# Patient Record
Sex: FEMALE | Race: WHITE | NOT HISPANIC OR LATINO | Employment: FULL TIME | URBAN - METROPOLITAN AREA
[De-identification: names, ages, dates, MRNs, and addresses within clinical notes are randomized per-mention and may not be internally consistent; named-entity substitution may affect disease eponyms.]

---

## 2017-04-18 ENCOUNTER — TELEPHONE (OUTPATIENT)
Dept: FAMILY MEDICINE | Facility: OTHER | Age: 37
End: 2017-04-18

## 2017-04-18 NOTE — TELEPHONE ENCOUNTER
I spoke with scheduling and patient requested appointment after 5 pm.   Appears to be her first pregnancy, so she should keep appointment in clinic vs phone visit.     Yaquelin Haynes, RN, BSN

## 2017-04-18 NOTE — TELEPHONE ENCOUNTER
Pt is scheduled with Donnie Mansfield (instead of RN) on Thursday for a pregnancy test.   Possibly due to later appt availability?  We may be able to have her drop off a urine sample at a convenient time and then have RN staff discuss results and next steps by phone.  Will discuss with RN staff/supervisor as needed.    Elizabeth Mackenzie RN, BSN

## 2017-04-19 ENCOUNTER — ALLIED HEALTH/NURSE VISIT (OUTPATIENT)
Dept: FAMILY MEDICINE | Facility: OTHER | Age: 37
End: 2017-04-19
Payer: COMMERCIAL

## 2017-04-19 VITALS — BODY MASS INDEX: 22.49 KG/M2 | DIASTOLIC BLOOD PRESSURE: 76 MMHG | WEIGHT: 131 LBS | SYSTOLIC BLOOD PRESSURE: 124 MMHG

## 2017-04-19 DIAGNOSIS — Z32.00 POSSIBLE PREGNANCY, NOT YET CONFIRMED: Primary | ICD-10-CM

## 2017-04-19 LAB — BETA HCG QUAL IFA URINE: POSITIVE

## 2017-04-19 PROCEDURE — 84703 CHORIONIC GONADOTROPIN ASSAY: CPT | Performed by: FAMILY MEDICINE

## 2017-04-19 PROCEDURE — 84702 CHORIONIC GONADOTROPIN TEST: CPT | Performed by: OBSTETRICS & GYNECOLOGY

## 2017-04-19 PROCEDURE — 36415 COLL VENOUS BLD VENIPUNCTURE: CPT | Performed by: OBSTETRICS & GYNECOLOGY

## 2017-04-19 RX ORDER — PRENATAL VIT/IRON FUM/FOLIC AC 27MG-0.8MG
1 TABLET ORAL DAILY
Status: ON HOLD | COMMUNITY
End: 2018-01-06

## 2017-04-19 ASSESSMENT — PAIN SCALES - GENERAL: PAINLEVEL: NO PAIN (0)

## 2017-04-19 NOTE — PROGRESS NOTES
Tasha Short is a 36 year old here today for a pregnancy test.  LMP: Patient's last menstrual period was 2017 (exact date).  Wt: 131 lbs 0 oz.    Symptoms include weight gain, breast tenderness, absence of menses and fatigue.    Tasha informed of positive pregnancy test results. BRENDAN: 2017    Educational advice given: nutrition, smoking and drugs & alcohol.    Current medications reviewed: Yes    Previous pregnancy history remarkable for: none.    Plan: Follow-up appointment for pre- care with Dr. Benavidez made per pt request, take multivitamin or pre- vitamins and OB Education packet given.    She is to call back if she has any questions or concerns.  She is advised to notify a provider immediately if she experiences any severe cramping or abdominal pain or any vaginal bleeding.  Vicki Owens RN

## 2017-04-19 NOTE — MR AVS SNAPSHOT
After Visit Summary   4/19/2017    Tasha Short    MRN: [id number]           Patient Information     Date Of Birth          1980        Visit Information        Provider Department      4/19/2017 3:00 PM NL RN TEAM A, [facility]        Today's Diagnoses     Possible pregnancy, not yet confirmed    -  1       Follow-ups after your visit        Your next 10 appointments already scheduled     May 15, 2017  9:30 AM CDT   New [facility]atal with Madeline Benavidez,    [facility]    [address] [facility] [id number] [phone]              Who to contact     If you have questions or need follow up information about today's clinic visit or your schedule please contact [facility] directly at [phone].  Normal or non-critical lab and imaging results will be communicated to you by Y'allhart, letter or phone within 4 business days after the clinic has received the results. If you do not hear from us within 7 days, please contact the clinic through Y'allhart or phone. If you have a critical or abnormal lab result, we will notify you by phone as soon as possible.  Submit refill requests through [employer] or call your pharmacy and they will forward the refill request to us. Please allow 3 business days for your refill to be completed.          Additional Information About Your Visit        MyChart Information     [employer] gives you secure access to your electronic health record. If you see a primary care provider, you can also send messages to your care team and make appointments. If you have questions, please call your primary care clinic.  If you do not have a primary care provider, please call [phone] and they will assist you.        Care EveryWhere ID     This is your Care EveryWhere ID. This could be used by other organizations to access your [place] medical records  [id number]        Your Vitals Were      Last Period Breastfeeding? BMI (Body Mass Index)             03/01/2017 (Exact Date) No 22.49 kg/m2          Blood Pressure from Last 3 Encounters:   04/19/17 124/76   12/27/16 120/84   06/03/16 132/89    Weight from Last 3 Encounters:   04/19/17 131 lb (59.4 kg)   12/27/16 120 lb 9.6 oz (54.7 kg)   06/03/16 115 lb (52.2 kg)              We Performed the Following     Beta HCG qual IFA urine     HCG, quantitative, pregnancy        Primary Care Provider Office Phone # Fax #    Hortensia Carol Goldberg [phone]        [facility] [address] [place] [address]        Thank you!     Thank you for choosing [facility]  for your care. Our goal is always to provide you with excellent care. Hearing back from our patients is one way we can continue to improve our services. Please take a few minutes to complete the written survey that you may receive in the mail after your visit with us. Thank you!             Your Updated Medication List - Protect others around you: Learn how to safely use, store and throw away your medicines at www.disposemymeds.org.          This list is accurate as of: 4/19/17  3:29 PM.  Always use your most recent med list.                   Brand Name Dispense Instructions for use    busPIRone 5 MG tablet    BUSPAR    90 tablet    Take 1 tablet (5 mg) by mouth 3 times daily       flunisolide 29 MCG/ACT (0.025%) Soln    NASAREL    1 Box    Spray 2 sprays into both nostrils 2 times daily       gabapentin 100 MG capsule    NEURONTIN     Take 100 mg by mouth 3 times daily Reported on 4/19/2017       lisinopril 20 MG tablet    PRINIVIL/ZESTRIL    30 tablet    Take 1 tablet (20 mg) by mouth daily       methylphenidate 10 MG tablet    RITALIN     Take 10 mg by mouth 2 times daily       prenatal multivitamin  plus iron 27-0.8 MG Tabs per tablet      Take 1 tablet by mouth daily       PREVIFEM 0.25-35 MG-MCG per tablet   Generic drug:  norgestimate-ethinyl  estradiol     84 tablet    TAKE 1 TABLET BY MOUTH DAILY       propranolol 20 MG tablet    INDERAL    60 tablet    Take 1 tablet (20 mg) by mouth 2 times daily

## 2017-04-20 DIAGNOSIS — Z36.87 UNSURE OF LMP (LAST MENSTRUAL PERIOD) AS REASON FOR ULTRASOUND SCAN: Primary | ICD-10-CM

## 2017-04-20 LAB — B-HCG SERPL-ACNC: ABNORMAL IU/L (ref 0–5)

## 2017-05-15 ENCOUNTER — RADIANT APPOINTMENT (OUTPATIENT)
Dept: ULTRASOUND IMAGING | Facility: OTHER | Age: 37
End: 2017-05-15
Attending: OBSTETRICS & GYNECOLOGY
Payer: COMMERCIAL

## 2017-05-15 ENCOUNTER — PRENATAL OFFICE VISIT (OUTPATIENT)
Dept: OBGYN | Facility: OTHER | Age: 37
End: 2017-05-15
Payer: COMMERCIAL

## 2017-05-15 ENCOUNTER — TELEPHONE (OUTPATIENT)
Dept: OBGYN | Facility: OTHER | Age: 37
End: 2017-05-15

## 2017-05-15 VITALS
BODY MASS INDEX: 21.34 KG/M2 | DIASTOLIC BLOOD PRESSURE: 80 MMHG | SYSTOLIC BLOOD PRESSURE: 116 MMHG | HEIGHT: 64 IN | HEART RATE: 86 BPM | WEIGHT: 125 LBS

## 2017-05-15 DIAGNOSIS — O09.511 ELDERLY PRIMIGRAVIDA IN FIRST TRIMESTER: Primary | ICD-10-CM

## 2017-05-15 DIAGNOSIS — F10.11 H/O ETOH ABUSE: ICD-10-CM

## 2017-05-15 DIAGNOSIS — F41.1 GENERALIZED ANXIETY DISORDER: ICD-10-CM

## 2017-05-15 DIAGNOSIS — F17.200 TOBACCO USE DISORDER: ICD-10-CM

## 2017-05-15 DIAGNOSIS — Z36.87 UNSURE OF LMP (LAST MENSTRUAL PERIOD) AS REASON FOR ULTRASOUND SCAN: ICD-10-CM

## 2017-05-15 PROBLEM — Z23 NEED FOR TDAP VACCINATION: Status: ACTIVE | Noted: 2017-05-15

## 2017-05-15 LAB
ABO + RH BLD: NORMAL
ABO + RH BLD: NORMAL
ALBUMIN UR-MCNC: NEGATIVE MG/DL
APPEARANCE UR: CLEAR
BASOPHILS # BLD AUTO: 0 10E9/L (ref 0–0.2)
BASOPHILS NFR BLD AUTO: 0.3 %
BILIRUB UR QL STRIP: NEGATIVE
BLD GP AB SCN SERPL QL: NORMAL
BLOOD BANK CMNT PATIENT-IMP: NORMAL
COLOR UR AUTO: YELLOW
DIFFERENTIAL METHOD BLD: NORMAL
EOSINOPHIL # BLD AUTO: 0.1 10E9/L (ref 0–0.7)
EOSINOPHIL NFR BLD AUTO: 1.3 %
ERYTHROCYTE [DISTWIDTH] IN BLOOD BY AUTOMATED COUNT: 12.9 % (ref 10–15)
GLUCOSE UR STRIP-MCNC: NEGATIVE MG/DL
HCT VFR BLD AUTO: 38.7 % (ref 35–47)
HGB BLD-MCNC: 13.8 G/DL (ref 11.7–15.7)
HGB UR QL STRIP: NEGATIVE
KETONES UR STRIP-MCNC: NEGATIVE MG/DL
LEUKOCYTE ESTERASE UR QL STRIP: NEGATIVE
LYMPHOCYTES # BLD AUTO: 1.2 10E9/L (ref 0.8–5.3)
LYMPHOCYTES NFR BLD AUTO: 17.3 %
MCH RBC QN AUTO: 32.3 PG (ref 26.5–33)
MCHC RBC AUTO-ENTMCNC: 35.7 G/DL (ref 31.5–36.5)
MCV RBC AUTO: 91 FL (ref 78–100)
MONOCYTES # BLD AUTO: 0.6 10E9/L (ref 0–1.3)
MONOCYTES NFR BLD AUTO: 8 %
NEUTROPHILS # BLD AUTO: 5 10E9/L (ref 1.6–8.3)
NEUTROPHILS NFR BLD AUTO: 73.1 %
NITRATE UR QL: NEGATIVE
PH UR STRIP: 6 PH (ref 5–7)
PLATELET # BLD AUTO: 160 10E9/L (ref 150–450)
RBC # BLD AUTO: 4.27 10E12/L (ref 3.8–5.2)
RBC #/AREA URNS AUTO: NORMAL /HPF (ref 0–2)
SP GR UR STRIP: <=1.005 (ref 1–1.03)
SPECIMEN EXP DATE BLD: NORMAL
URN SPEC COLLECT METH UR: NORMAL
UROBILINOGEN UR STRIP-ACNC: 0.2 EU/DL (ref 0.2–1)
WBC # BLD AUTO: 6.9 10E9/L (ref 4–11)
WBC #/AREA URNS AUTO: NORMAL /HPF (ref 0–2)

## 2017-05-15 PROCEDURE — 36415 COLL VENOUS BLD VENIPUNCTURE: CPT | Performed by: OBSTETRICS & GYNECOLOGY

## 2017-05-15 PROCEDURE — 87389 HIV-1 AG W/HIV-1&-2 AB AG IA: CPT | Performed by: OBSTETRICS & GYNECOLOGY

## 2017-05-15 PROCEDURE — 87340 HEPATITIS B SURFACE AG IA: CPT | Performed by: OBSTETRICS & GYNECOLOGY

## 2017-05-15 PROCEDURE — 85025 COMPLETE CBC W/AUTO DIFF WBC: CPT | Performed by: OBSTETRICS & GYNECOLOGY

## 2017-05-15 PROCEDURE — 76801 OB US < 14 WKS SINGLE FETUS: CPT

## 2017-05-15 PROCEDURE — 99207 ZZC FIRST OB VISIT: CPT | Performed by: OBSTETRICS & GYNECOLOGY

## 2017-05-15 PROCEDURE — 86780 TREPONEMA PALLIDUM: CPT | Performed by: OBSTETRICS & GYNECOLOGY

## 2017-05-15 PROCEDURE — 86901 BLOOD TYPING SEROLOGIC RH(D): CPT | Performed by: OBSTETRICS & GYNECOLOGY

## 2017-05-15 PROCEDURE — 86900 BLOOD TYPING SEROLOGIC ABO: CPT | Performed by: OBSTETRICS & GYNECOLOGY

## 2017-05-15 PROCEDURE — 87491 CHLMYD TRACH DNA AMP PROBE: CPT | Performed by: OBSTETRICS & GYNECOLOGY

## 2017-05-15 PROCEDURE — 87591 N.GONORRHOEAE DNA AMP PROB: CPT | Performed by: OBSTETRICS & GYNECOLOGY

## 2017-05-15 PROCEDURE — 81001 URINALYSIS AUTO W/SCOPE: CPT | Performed by: OBSTETRICS & GYNECOLOGY

## 2017-05-15 PROCEDURE — 86762 RUBELLA ANTIBODY: CPT | Performed by: OBSTETRICS & GYNECOLOGY

## 2017-05-15 PROCEDURE — 86850 RBC ANTIBODY SCREEN: CPT | Performed by: OBSTETRICS & GYNECOLOGY

## 2017-05-15 ASSESSMENT — PAIN SCALES - GENERAL: PAINLEVEL: NO PAIN (0)

## 2017-05-15 NOTE — PATIENT INSTRUCTIONS
Prenatal Care  What is prenatal care?   Prenatal care is the care you receive when you are pregnant. It includes care given by your healthcare provider, support from your family, and an extra focus on giving yourself the care you need during this special time. Prenatal care improves your chances for a healthy pregnancy and healthy baby.   When should I see my healthcare provider?   Good care during pregnancy includes regularly scheduled prenatal exams.   If you are not yet pregnant but planning to get pregnant in the next few months, see your provider. Your provider may do some tests and talk about things you can do to have a healthy pregnancy and healthy baby.   You should schedule your first prenatal visit with your provider as soon as you think or know you are pregnant. Depending on your health and health history, your provider will probably schedule visits at least once a month for the first 6 months. During the 7th and 8th months you will see your provider every 2 weeks. During the last month you will probably see your provider once a week until you deliver your baby. If your pregnancy is high risk, your provider will probably want to see you more often. In some cases your provider may refer you to a medical specialist for more help with special needs, such as diabetes.   At each visit your healthcare provider will check to make sure that you and the baby are healthy. Regular visits can help you and your provider prevent possible problems. They can also help your provider find and treat any problems early. In addition to meeting your medical needs, your provider advise you about caring for yourself. You will talk about how to have a healthy diet and get plenty of exercise and rest. Your provider can also help you deal with the emotional changes that can happen during pregnancy.   What will happen at the first prenatal visit?   At your first visit, your provider will ask about your personal medical history. He  or she will also ask about the baby's father and your family health history. This information can help give your provider an idea of any problems you might have during your pregnancy. You will have a physical exam, including checks of your height, weight, and blood pressure and a pelvic exam. You will have a Pap test, urine tests, blood tests, and cultures of the cervix and vagina to check for infection.   Your provider will calculate your due date and the age of your baby. If your periods were regular before you got pregnant, and you are sure of the day when your last period started, your due date will be estimated to be 40 weeks from that day.   Your provider will talk to you about how to stay healthy during your pregnancy.   What will happen at other prenatal visits?   Your provider will check how you are doing and how the baby is developing. He or she will discuss how you are feeling, ask if you have any problems, and answer your questions.   During each prenatal visit your provider will:   weigh you   take your blood pressure   check your urine for sugar, protein, or bacteria   check your face, hands, ankles, and feet for swelling   listen to the baby's heartbeat   measure the size of the uterus to check the baby's growth.   At different times during the pregnancy, other exams and tests may be done. Some are routine and others are done only when a problem is suspected or you have a risk factor for a problem. Examples of other tests you might have are:   tests to check for genetic problems and some birth defects, such as:   chorionic villus sampling of cells from the placenta   amniocentesis to test the fluid around the baby   blood tests called triple or quad screens   ultrasound scans to check the baby's growth, development, and health and to look at your uterus, the amniotic sac, and the placenta   blood tests to check for diabetes   electronic monitoring to check the health of the baby.   How can I take care  of myself during my pregnancy?   Here are some things you can do to take good care of yourself during your pregnancy and prepare for the birth of your child:   Keep all appointments with your healthcare provider. Use these visits to discuss your pregnancy concerns or problems. Write down questions before each visit so that you will not forget about things you want to talk about.   Eat healthy meals that include whole grains, fresh fruits and vegetables, and calcium-rich foods, such as milk, cheese, and yogurt. Choose foods low in saturated fat. Do not eat uncooked or undercooked meats or fish.   Avoid certain fish with high levels of mercury. These fish include shark, kylah mackerel, swordfish, and tilefish. Do not eat more than 12 ounces of fish per week, or no more than 6 ounces of tuna fish per week. Because albacore tuna fish is also high in mercury, choose light tuna.   Drink plenty of water each day.   Take vitamins, other supplements, and medicines as recommended by your provider.   Unless your healthcare provider tells you not to, try to be physically active for at least 30 minutes a day, most days of the week. If you are pressed for time, you might find it easier to exercise 10 minutes at a time, 3 times a day. Consider taking a prenatal exercise class.   Do not smoke, do not drink alcohol, and do not take illegal drugs.   Talk to your healthcare provider before you take any medicine, including nonprescription and herbal medicines. Some medicines are not safe during pregnancy.   Avoid hot tubs or saunas.   If you have cats in your home, do not empty the cat litter while you are pregnant. It may contain a parasite that causes an infection called toxoplasmosis, which can cause birth defects. Also, use gloves when you work in garden areas used by cats.   Stay away from toxic chemicals like insecticides, solvents (such as some  or paint thinners), lead, and mercury. Check labels on household products.  Most dangerous products have pregnancy warnings on their labels. Ask your healthcare provider about products if you are unsure.   Relax by taking breaks from work or chores.   Help reduce stress by sharing your feelings with others.   Report any violence or other types of abuse in your home.   Learn more about pregnancy, labor, and delivery. Read books, watch videos, go to a childbirth class, and talk with experienced moms.   Plan for the lifestyle changes a new baby will bring. Prepare for possible changes in your budget, work situation, daily schedule, and relationships with family and friends.   Talk to your provider about the pros and cons of breast-feeding.   Before and during your pregnancy, try to do everything you can to keep yourself and your baby healthy during your pregnancy.     Published by [employer].  This content is reviewed periodically and is subject to change as new health information becomes available. The information is intended to inform and educate and is not a replacement for medical evaluation, advice, diagnosis or treatment by a healthcare professional.   Developed by [employer].   ? 2010 [employer] and/or its affiliates. All Rights Reserved.   Copyright   [employer] 2011

## 2017-05-15 NOTE — MR AVS SNAPSHOT
After Visit Summary   5/15/2017    Tasha Short    MRN: [id number]           Patient Information     Date Of Birth          1980        Visit Information        Provider Department      5/15/2017 9:30 AM Madeline Benavidez DO [facility]        Today's Diagnoses     Elderly primigravida in first trimester    -  1    H/O ETOH abuse        Tobacco use disorder        Generalized anxiety disorder          Care Instructions    Prenatal Care  What is prenatal care?   Prenatal care is the care you receive when you are pregnant. It includes care given by your healthcare provider, support from your family, and an extra focus on giving yourself the care you need during this special time. Prenatal care improves your chances for a healthy pregnancy and healthy baby.   When should I see my healthcare provider?   Good care during pregnancy includes regularly scheduled prenatal exams.   If you are not yet pregnant but planning to get pregnant in the next few months, see your provider. Your provider may do some tests and talk about things you can do to have a healthy pregnancy and healthy baby.   You should schedule your first prenatal visit with your provider as soon as you think or know you are pregnant. Depending on your health and health history, your provider will probably schedule visits at least once a month for the first 6 months. During the 7th and 8th months you will see your provider every 2 weeks. During the last month you will probably see your provider once a week until you deliver your baby. If your pregnancy is high risk, your provider will probably want to see you more often. In some cases your provider may refer you to a medical specialist for more help with special needs, such as diabetes.   At each visit your healthcare provider will check to make sure that you and the baby are healthy. Regular visits can help you and your provider prevent possible problems. They can  also help your provider find and treat any problems early. In addition to meeting your medical needs, your provider advise you about caring for yourself. You will talk about how to have a healthy diet and get plenty of exercise and rest. Your provider can also help you deal with the emotional changes that can happen during pregnancy.   What will happen at the first prenatal visit?   At your first visit, your provider will ask about your personal medical history. He or she will also ask about the baby's father and your family health history. This information can help give your provider an idea of any problems you might have during your pregnancy. You will have a physical exam, including checks of your height, weight, and blood pressure and a pelvic exam. You will have a Pap test, urine tests, blood tests, and cultures of the cervix and vagina to check for infection.   Your provider will calculate your due date and the age of your baby. If your periods were regular before you got pregnant, and you are sure of the day when your last period started, your due date will be estimated to be 40 weeks from that day.   Your provider will talk to you about how to stay healthy during your pregnancy.   What will happen at other prenatal visits?   Your provider will check how you are doing and how the baby is developing. He or she will discuss how you are feeling, ask if you have any problems, and answer your questions.   During each prenatal visit your provider will:   weigh you   take your blood pressure   check your urine for sugar, protein, or bacteria   check your face, hands, ankles, and feet for swelling   listen to the baby's heartbeat   measure the size of the uterus to check the baby's growth.   At different times during the pregnancy, other exams and tests may be done. Some are routine and others are done only when a problem is suspected or you have a risk factor for a problem. Examples of other tests you might have  are:   tests to check for genetic problems and some birth defects, such as:   chorionic villus sampling of cells from the placenta   amniocentesis to test the fluid around the baby   blood tests called triple or quad screens   ultrasound scans to check the baby's growth, development, and health and to look at your uterus, the amniotic sac, and the placenta   blood tests to check for diabetes   electronic monitoring to check the health of the baby.   How can I take care of myself during my pregnancy?   Here are some things you can do to take good care of yourself during your pregnancy and prepare for the birth of your child:   Keep all appointments with your healthcare provider. Use these visits to discuss your pregnancy concerns or problems. Write down questions before each visit so that you will not forget about things you want to talk about.   Eat healthy meals that include whole grains, fresh fruits and vegetables, and calcium-rich foods, such as milk, cheese, and yogurt. Choose foods low in saturated fat. Do not eat uncooked or undercooked meats or fish.   Avoid certain fish with high levels of mercury. These fish include shark, kylah mackerel, swordfish, and tilefish. Do not eat more than 12 ounces of fish per week, or no more than 6 ounces of tuna fish per week. Because albacore tuna fish is also high in mercury, choose light tuna.   Drink plenty of water each day.   Take vitamins, other supplements, and medicines as recommended by your provider.   Unless your healthcare provider tells you not to, try to be physically active for at least 30 minutes a day, most days of the week. If you are pressed for time, you might find it easier to exercise 10 minutes at a time, 3 times a day. Consider taking a prenatal exercise class.   Do not smoke, do not drink alcohol, and do not take illegal drugs.   Talk to your healthcare provider before you take any medicine, including nonprescription and herbal medicines. Some  medicines are not safe during pregnancy.   Avoid hot tubs or saunas.   If you have cats in your home, do not empty the cat litter while you are pregnant. It may contain a parasite that causes an infection called toxoplasmosis, which can cause birth defects. Also, use gloves when you work in garden areas used by cats.   Stay away from toxic chemicals like insecticides, solvents (such as some  or paint thinners), lead, and mercury. Check labels on household products. Most dangerous products have pregnancy warnings on their labels. Ask your healthcare provider about products if you are unsure.   Relax by taking breaks from work or chores.   Help reduce stress by sharing your feelings with others.   Report any violence or other types of abuse in your home.   Learn more about pregnancy, labor, and delivery. Read books, watch videos, go to a childbirth class, and talk with experienced moms.   Plan for the lifestyle changes a new baby will bring. Prepare for possible changes in your budget, work situation, daily schedule, and relationships with family and friends.   Talk to your provider about the pros and cons of breast-feeding.   Before and during your pregnancy, try to do everything you can to keep yourself and your baby healthy during your pregnancy.     Published by [employer].  This content is reviewed periodically and is subject to change as new health information becomes available. The information is intended to inform and educate and is not a replacement for medical evaluation, advice, diagnosis or treatment by a healthcare professional.   Developed by [employer].   ? 2010 [facility] and/or its affiliates. All Rights Reserved.   Copyright   [employer] 2011            Follow-ups after your visit        Follow-up notes from your care team     Return in about 4 weeks (around 6/12/2017).      Your next 10 appointments already scheduled     Jun 12, 2017  9:30 AM CDT   ESTABLISHED PRENATAL  "with Madeline Benavidez, DO   [facility] ([facility]    [address] [facility] [address] [phone]              Future tests that were ordered for you today     Open Future Orders        Priority Expected Expires Ordered    US OB < 14 Weeks Single Routine 5/15/2017 8/13/2017 5/15/2017            Who to contact     If you have questions or need follow up information about today's clinic visit or your schedule please contact [facility] directly at [phone].  Normal or non-critical lab and imaging results will be communicated to you by Cubiclhart, letter or phone within 4 business days after the clinic has received the results. If you do not hear from us within 7 days, please contact the clinic through iPowowt or phone. If you have a critical or abnormal lab result, we will notify you by phone as soon as possible.  Submit refill requests through [employer] or call your pharmacy and they will forward the refill request to us. Please allow 3 business days for your refill to be completed.          Additional Information About Your Visit        Cubiclhart Information     [employer] gives you secure access to your electronic health record. If you see a primary care provider, you can also send messages to your care team and make appointments. If you have questions, please call your primary care clinic.  If you do not have a primary care provider, please call [phone] and they will assist you.        Care EveryWhere ID     This is your Care EveryWhere ID. This could be used by other organizations to access your [facility] medical records  [id number]        Your Vitals Were     Pulse Height Last Period BMI (Body Mass Index)          86 5' 4\" (1.626 m) 03/01/2017 (Exact Date) 21.46 kg/m2         Blood Pressure from Last 3 Encounters:   05/15/17 116/80   04/19/17 124/76   12/27/16 120/84    Weight from Last 3 Encounters:   05/15/17 125 lb (56.7 kg)   04/19/17 131 lb " (59.4 kg)   12/27/16 120 lb 9.6 oz (54.7 kg)              We Performed the Following     ABO/Rh type and screen     Anti Treponema     CBC with platelets differential     Chlamydia trachomatis PCR     Hepatitis B surface antigen     HIV Antigen Antibody Combo     Neisseria gonorrhoeae PCR     Rubella Antibody IgG Quantitative     UA with Microscopic reflex to Culture        Primary Care Provider Office Phone # Fax #    Hortensia Carol Goldberg [phone]       [facility] [address] [place] [address]        Thank you!     Thank you for choosing [facility]  for your care. Our goal is always to provide you with excellent care. Hearing back from our patients is one way we can continue to improve our services. Please take a few minutes to complete the written survey that you may receive in the mail after your visit with us. Thank you!             Your Updated Medication List - Protect others around you: Learn how to safely use, store and throw away your medicines at www.disposemymeds.org.          This list is accurate as of: 5/15/17 10:32 AM.  Always use your most recent med list.                   Brand Name Dispense Instructions for use    busPIRone 5 MG tablet    BUSPAR    90 tablet    Take 1 tablet (5 mg) by mouth 3 times daily       flunisolide 29 MCG/ACT (0.025%) Soln    NASAREL    1 Box    Spray 2 sprays into both nostrils 2 times daily       methylphenidate 10 MG tablet    RITALIN     Take 10 mg by mouth 2 times daily       prenatal multivitamin  plus iron 27-0.8 MG Tabs per tablet      Take 1 tablet by mouth daily

## 2017-05-15 NOTE — TELEPHONE ENCOUNTER
Please let patient know I asked and patient needs to be seen by FP to have her medications changed.    Madeline Benavidez

## 2017-05-15 NOTE — PROGRESS NOTES
HPI:    Tasha is a 36 year old yo  at 10 5/7 weeks by LMP of 3/1/17 who presents today for her initial OB visit.  Last pap smear was in  and was normal.    Issues: None    Past OB Hx: N/A     Father of baby: No health issues       Past Medical History:   Diagnosis Date     ALCOHOL ABUSE-IN REMISS 2007     Cataplexy and narcolepsy 2002    tried Provigil, Straterra, Ritalin (works)     ROSA 3 - cervical intraepithelial neoplasia grade 3 1/4/10    ROSA 2/3  on LEEP biopsy     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     Hypersomnia with sleep apnea      Irregular menstrual cycle 1997     Metrorrhagia 2001     Narcolepsy and cataplexy      Other acne      Other and unspecified adverse effect of drug, medicinal and biological substance 2001    Allergic and adverse reaction to Dilantin     Substance abuse 10/2/2009    see 2009 confidential report     Unspecified contraceptive management              Past Surgical History:   Procedure Laterality Date     COLPOSCOPY CERVIX, LOOP ELECTRODE BIOPSY, COMBINED  2010    ROSA 2/3     HC REMOVAL OF TONSILS,12+ Y/O      Tonsils 12+y.o.     REPAIR TENDON PERONEAL  11/15/2013    Procedure: REPAIR TENDON PERONEAL;  right peroneal tendon  transfer;  Surgeon: Jesus Manuel Oden DPM;  Location: [facility] OR        Family History   Problem Relation Age of Onset     Depression Mother      Arthritis Paternal Grandmother      Hypertension Paternal Grandfather      birth FF     Asthma No family hx of      C.A.D. No family hx of      DIABETES No family hx of      Cancer - colorectal No family hx of      Prostate Cancer No family hx of      Coronary Artery Disease No family hx of      Ovarian Cancer No family hx of      Mental Illness No family hx of      CEREBROVASCULAR DISEASE No family hx of      Anesthesia Reaction No family hx of      Other Cancer No family hx of      OSTEOPOROSIS No family hx of      Known  Genetic Syndrome No family hx of      Obesity No family hx of      Unknown/Adopted No family hx of         Social History     Social History     Marital status: Single     Spouse name: N/A     Number of children: 0     Years of education: 14     Occupational History     [employer]      [employer]      [employer]     Social History Main Topics     Smoking status: Current Every Day Smoker     Packs/day: 0.50     Last attempt to quit: 1/5/2015     Smokeless tobacco: Never Used     Alcohol use No     Drug use: No     Sexual activity: Yes     Partners: Male     Birth control/ protection: Condom, Pill     Other Topics Concern     Parent/Sibling W/ Cabg, Mi Or Angioplasty Before 65f 55m? No     Social History Narrative    Lives with significan other. Denies domestic  violence issues.         Current Outpatient Prescriptions:      Prenatal Vit-Fe Fumarate-FA (PRENATAL MULTIVITAMIN  PLUS IRON) 27-0.8 MG TABS per tablet, Take 1 tablet by mouth daily, Disp: , Rfl:      methylphenidate (RITALIN) 10 MG tablet, Take 10 mg by mouth 2 times daily, Disp: , Rfl:      busPIRone (BUSPAR) 5 MG tablet, Take 1 tablet (5 mg) by mouth 3 times daily, Disp: 90 tablet, Rfl: 3     flunisolide (NASAREL) 29 MCG/ACT (0.025%) SOLN, Spray 2 sprays into both nostrils 2 times daily, Disp: 1 Box, Rfl: 12  No current facility-administered medications for this visit.     Facility-Administered Medications Ordered in Other Visits:      bupivacaine 0.5% EPINEPHrine 1:200,000 injection, , , PRN, Lucian Champion APRN CRNA, 30 mL at 11/15/13 1257      Objective:  Physical Exam:   Breast:  Benign exam, no masses palpated.  No skin changes, no axillary lymphadenopathy, no nipple discharge.  Axilla feel completely normal, no lymph node enlargement and non-tender.  Heart=RRR, no murmurs  Thyroid=normal, no masses, no TTP  Lungs=Clear to ascultation bilateral, non-labored breathing  Abd=soft,  Nontender/nondistended, +bowel sounds x4  PELVIC:    External genitalia: normal without lesion  Vagina: normal mucosa and rugae, no discharge.  Cervix: normal without lesion, healthy, GC and Chlamydia obtained  Uterus: small, mobile, nontender.  Adnexa: non tender, without masses  Rectal: deffered  Ext=no clubbing or cyanosis  FHTs=160's by BSUS      Assessment:   1.  IUP at 10 5/7 weeks here for her initial OB visit    Plan:    1.  PNV  2.  NOB Labs and ultrasound   3.  Discussed routine prenatal care, quad screen, GCT, anatomy scan at ~19 weeks, frequency of visits.  4.  Discussed first trimester screen and she wants it done  5.  Delivery hospital: [facility]  6.  RTC 4 weeks.  7.  Anxiety/depression=stable with Buspar  8.  History of HTN on antidepressants=stable  9.  Tobacco abuse=1/2 pack per day  10.  Narcolepsy= patient taking Ritalin and has been for 10+ years, recommend stopping and follow up with FP  11.  AMA=will get level II ultrasound   12.  History of ETOH abuse  13.  History of LEEP      Discussed physician coverage, tertiary support, diet, exercise, weight gain, schedule of visits, routine and indicated ultrasounds, and childbirth education.    Options for  testing for chromosomal and birth defects were discussed with the patient. Diagnostic tests include CVS and Amniocentesis. We discussed that these tests are definitive but invasive and do carry a risk of fetal loss.   Screening tests include nuchal translucency/blood marker testing in the first trimester and quad screening in the second trimester. We discussed that these are screening tests and not diagnostic tests and that false positives and negatives are a distinct possibility.     25 minutes was spent face to face with the patient today discussing her history, diagnosis, and follow-up plan as noted above.  Over 50% of the visit was spent in counseling and coordination of care.    Total Visit Time: 30 minutes      Madeline Benavidez

## 2017-05-15 NOTE — NURSING NOTE
"Chief Complaint   Patient presents with     Prenatal Care       Initial /80 (BP Location: Right arm)  Pulse 86  Ht 5' 4\" (1.626 m)  Wt 125 lb (56.7 kg)  LMP 03/01/2017 (Exact Date)  BMI 21.46 kg/m2 Estimated body mass index is 21.46 kg/(m^2) as calculated from the following:    Height as of this encounter: 5' 4\" (1.626 m).    Weight as of this encounter: 125 lb (56.7 kg).  Medication Reconciliation: complete  "

## 2017-05-15 NOTE — TELEPHONE ENCOUNTER
Patient informed she needs to see FP for change in medication (Ritalin).   Patient declined to schedule at this time.     Yaquelin Haynes, RN, BSN

## 2017-05-16 LAB
HBV SURFACE AG SERPL QL IA: NONREACTIVE
HIV 1+2 AB+HIV1 P24 AG SERPL QL IA: NORMAL
RUBV IGG SERPL IA-ACNC: 20 IU/ML
T PALLIDUM IGG+IGM SER QL: NEGATIVE

## 2017-05-17 LAB
C TRACH DNA SPEC QL NAA+PROBE: NORMAL
N GONORRHOEA DNA SPEC QL NAA+PROBE: NORMAL
SPECIMEN SOURCE: NORMAL
SPECIMEN SOURCE: NORMAL

## 2017-06-19 ENCOUNTER — PRENATAL OFFICE VISIT (OUTPATIENT)
Dept: OBGYN | Facility: OTHER | Age: 37
End: 2017-06-19
Payer: COMMERCIAL

## 2017-06-19 VITALS
WEIGHT: 131 LBS | DIASTOLIC BLOOD PRESSURE: 84 MMHG | BODY MASS INDEX: 22.49 KG/M2 | SYSTOLIC BLOOD PRESSURE: 124 MMHG | HEART RATE: 80 BPM

## 2017-06-19 DIAGNOSIS — F10.11 H/O ETOH ABUSE: ICD-10-CM

## 2017-06-19 DIAGNOSIS — K21.9 GASTROESOPHAGEAL REFLUX DISEASE WITHOUT ESOPHAGITIS: ICD-10-CM

## 2017-06-19 DIAGNOSIS — O09.512 ELDERLY PRIMIGRAVIDA IN SECOND TRIMESTER: Primary | ICD-10-CM

## 2017-06-19 DIAGNOSIS — F33.1 MAJOR DEPRESSIVE DISORDER, RECURRENT EPISODE, MODERATE (H): ICD-10-CM

## 2017-06-19 DIAGNOSIS — F41.1 GENERALIZED ANXIETY DISORDER: ICD-10-CM

## 2017-06-19 PROCEDURE — 99207 ZZC PRENATAL VISIT: CPT | Performed by: OBSTETRICS & GYNECOLOGY

## 2017-06-19 ASSESSMENT — PAIN SCALES - GENERAL: PAINLEVEL: NO PAIN (0)

## 2017-06-19 NOTE — NURSING NOTE
"Chief Complaint   Patient presents with     Prenatal Care       Initial /84  Pulse 80  Wt 131 lb (59.4 kg)  LMP 03/01/2017 (Exact Date)  BMI 22.49 kg/m2 Estimated body mass index is 22.49 kg/(m^2) as calculated from the following:    Height as of 5/15/17: 5' 4\" (1.626 m).    Weight as of this encounter: 131 lb (59.4 kg).  Medication Reconciliation: complete     15w5d    "

## 2017-06-19 NOTE — PATIENT INSTRUCTIONS
Prenatal Care  What is prenatal care?   Prenatal care is the care you receive when you are pregnant. It includes care given by your healthcare provider, support from your family, and an extra focus on giving yourself the care you need during this special time. Prenatal care improves your chances for a healthy pregnancy and healthy baby.   When should I see my healthcare provider?   Good care during pregnancy includes regularly scheduled prenatal exams.   If you are not yet pregnant but planning to get pregnant in the next few months, see your provider. Your provider may do some tests and talk about things you can do to have a healthy pregnancy and healthy baby.   You should schedule your first prenatal visit with your provider as soon as you think or know you are pregnant. Depending on your health and health history, your provider will probably schedule visits at least once a month for the first 6 months. During the 7th and 8th months you will see your provider every 2 weeks. During the last month you will probably see your provider once a week until you deliver your baby. If your pregnancy is high risk, your provider will probably want to see you more often. In some cases your provider may refer you to a medical specialist for more help with special needs, such as diabetes.   At each visit your healthcare provider will check to make sure that you and the baby are healthy. Regular visits can help you and your provider prevent possible problems. They can also help your provider find and treat any problems early. In addition to meeting your medical needs, your provider advise you about caring for yourself. You will talk about how to have a healthy diet and get plenty of exercise and rest. Your provider can also help you deal with the emotional changes that can happen during pregnancy.   What will happen at the first prenatal visit?   At your first visit, your provider will ask about your personal medical history. He  or she will also ask about the baby's father and your family health history. This information can help give your provider an idea of any problems you might have during your pregnancy. You will have a physical exam, including checks of your height, weight, and blood pressure and a pelvic exam. You will have a Pap test, urine tests, blood tests, and cultures of the cervix and vagina to check for infection.   Your provider will calculate your due date and the age of your baby. If your periods were regular before you got pregnant, and you are sure of the day when your last period started, your due date will be estimated to be 40 weeks from that day.   Your provider will talk to you about how to stay healthy during your pregnancy.   What will happen at other prenatal visits?   Your provider will check how you are doing and how the baby is developing. He or she will discuss how you are feeling, ask if you have any problems, and answer your questions.   During each prenatal visit your provider will:   weigh you   take your blood pressure   check your urine for sugar, protein, or bacteria   check your face, hands, ankles, and feet for swelling   listen to the baby's heartbeat   measure the size of the uterus to check the baby's growth.   At different times during the pregnancy, other exams and tests may be done. Some are routine and others are done only when a problem is suspected or you have a risk factor for a problem. Examples of other tests you might have are:   tests to check for genetic problems and some birth defects, such as:   chorionic villus sampling of cells from the placenta   amniocentesis to test the fluid around the baby   blood tests called triple or quad screens   ultrasound scans to check the baby's growth, development, and health and to look at your uterus, the amniotic sac, and the placenta   blood tests to check for diabetes   electronic monitoring to check the health of the baby.   How can I take care  of myself during my pregnancy?   Here are some things you can do to take good care of yourself during your pregnancy and prepare for the birth of your child:   Keep all appointments with your healthcare provider. Use these visits to discuss your pregnancy concerns or problems. Write down questions before each visit so that you will not forget about things you want to talk about.   Eat healthy meals that include whole grains, fresh fruits and vegetables, and calcium-rich foods, such as milk, cheese, and yogurt. Choose foods low in saturated fat. Do not eat uncooked or undercooked meats or fish.   Avoid certain fish with high levels of mercury. These fish include shark, kylah mackerel, swordfish, and tilefish. Do not eat more than 12 ounces of fish per week, or no more than 6 ounces of tuna fish per week. Because albacore tuna fish is also high in mercury, choose light tuna.   Drink plenty of water each day.   Take vitamins, other supplements, and medicines as recommended by your provider.   Unless your healthcare provider tells you not to, try to be physically active for at least 30 minutes a day, most days of the week. If you are pressed for time, you might find it easier to exercise 10 minutes at a time, 3 times a day. Consider taking a prenatal exercise class.   Do not smoke, do not drink alcohol, and do not take illegal drugs.   Talk to your healthcare provider before you take any medicine, including nonprescription and herbal medicines. Some medicines are not safe during pregnancy.   Avoid hot tubs or saunas.   If you have cats in your home, do not empty the cat litter while you are pregnant. It may contain a parasite that causes an infection called toxoplasmosis, which can cause birth defects. Also, use gloves when you work in garden areas used by cats.   Stay away from toxic chemicals like insecticides, solvents (such as some  or paint thinners), lead, and mercury. Check labels on household products.  Most dangerous products have pregnancy warnings on their labels. Ask your healthcare provider about products if you are unsure.   Relax by taking breaks from work or chores.   Help reduce stress by sharing your feelings with others.   Report any violence or other types of abuse in your home.   Learn more about pregnancy, labor, and delivery. Read books, watch videos, go to a childbirth class, and talk with experienced moms.   Plan for the lifestyle changes a new baby will bring. Prepare for possible changes in your budget, work situation, daily schedule, and relationships with family and friends.   Talk to your provider about the pros and cons of breast-feeding.   Before and during your pregnancy, try to do everything you can to keep yourself and your baby healthy during your pregnancy.     Published by [employer].  This content is reviewed periodically and is subject to change as new health information becomes available. The information is intended to inform and educate and is not a replacement for medical evaluation, advice, diagnosis or treatment by a healthcare professional.   Developed by [employer].   ? 2010 [employer] and/or its affiliates. All Rights Reserved.   Copyright   [employer] 2011

## 2017-06-19 NOTE — MR AVS SNAPSHOT
After Visit Summary   6/19/2017    Tasha Short    MRN: [id number]           Patient Information     Date Of Birth          1980        Visit Information        Provider Department      6/19/2017 9:45 AM Madeline Benavidez, DO [facility]        Today's Diagnoses     Elderly primigravida in second trimester    -  1    Gastroesophageal reflux disease without esophagitis        H/O ETOH abuse        Generalized anxiety disorder        Major depressive disorder, recurrent episode, moderate (H)          Care Instructions    Prenatal Care  What is prenatal care?   Prenatal care is the care you receive when you are pregnant. It includes care given by your healthcare provider, support from your family, and an extra focus on giving yourself the care you need during this special time. Prenatal care improves your chances for a healthy pregnancy and healthy baby.   When should I see my healthcare provider?   Good care during pregnancy includes regularly scheduled prenatal exams.   If you are not yet pregnant but planning to get pregnant in the next few months, see your provider. Your provider may do some tests and talk about things you can do to have a healthy pregnancy and healthy baby.   You should schedule your first prenatal visit with your provider as soon as you think or know you are pregnant. Depending on your health and health history, your provider will probably schedule visits at least once a month for the first 6 months. During the 7th and 8th months you will see your provider every 2 weeks. During the last month you will probably see your provider once a week until you deliver your baby. If your pregnancy is high risk, your provider will probably want to see you more often. In some cases your provider may refer you to a medical specialist for more help with special needs, such as diabetes.   At each visit your healthcare provider will check to make sure that you and the  baby are healthy. Regular visits can help you and your provider prevent possible problems. They can also help your provider find and treat any problems early. In addition to meeting your medical needs, your provider advise you about caring for yourself. You will talk about how to have a healthy diet and get plenty of exercise and rest. Your provider can also help you deal with the emotional changes that can happen during pregnancy.   What will happen at the first prenatal visit?   At your first visit, your provider will ask about your personal medical history. He or she will also ask about the baby's father and your family health history. This information can help give your provider an idea of any problems you might have during your pregnancy. You will have a physical exam, including checks of your height, weight, and blood pressure and a pelvic exam. You will have a Pap test, urine tests, blood tests, and cultures of the cervix and vagina to check for infection.   Your provider will calculate your due date and the age of your baby. If your periods were regular before you got pregnant, and you are sure of the day when your last period started, your due date will be estimated to be 40 weeks from that day.   Your provider will talk to you about how to stay healthy during your pregnancy.   What will happen at other prenatal visits?   Your provider will check how you are doing and how the baby is developing. He or she will discuss how you are feeling, ask if you have any problems, and answer your questions.   During each prenatal visit your provider will:   weigh you   take your blood pressure   check your urine for sugar, protein, or bacteria   check your face, hands, ankles, and feet for swelling   listen to the baby's heartbeat   measure the size of the uterus to check the baby's growth.   At different times during the pregnancy, other exams and tests may be done. Some are routine and others are done only when a  problem is suspected or you have a risk factor for a problem. Examples of other tests you might have are:   tests to check for genetic problems and some birth defects, such as:   chorionic villus sampling of cells from the placenta   amniocentesis to test the fluid around the baby   blood tests called triple or quad screens   ultrasound scans to check the baby's growth, development, and health and to look at your uterus, the amniotic sac, and the placenta   blood tests to check for diabetes   electronic monitoring to check the health of the baby.   How can I take care of myself during my pregnancy?   Here are some things you can do to take good care of yourself during your pregnancy and prepare for the birth of your child:   Keep all appointments with your healthcare provider. Use these visits to discuss your pregnancy concerns or problems. Write down questions before each visit so that you will not forget about things you want to talk about.   Eat healthy meals that include whole grains, fresh fruits and vegetables, and calcium-rich foods, such as milk, cheese, and yogurt. Choose foods low in saturated fat. Do not eat uncooked or undercooked meats or fish.   Avoid certain fish with high levels of mercury. These fish include shark, kylah mackerel, swordfish, and tilefish. Do not eat more than 12 ounces of fish per week, or no more than 6 ounces of tuna fish per week. Because albacore tuna fish is also high in mercury, choose light tuna.   Drink plenty of water each day.   Take vitamins, other supplements, and medicines as recommended by your provider.   Unless your healthcare provider tells you not to, try to be physically active for at least 30 minutes a day, most days of the week. If you are pressed for time, you might find it easier to exercise 10 minutes at a time, 3 times a day. Consider taking a prenatal exercise class.   Do not smoke, do not drink alcohol, and do not take illegal drugs.   Talk to your  healthcare provider before you take any medicine, including nonprescription and herbal medicines. Some medicines are not safe during pregnancy.   Avoid hot tubs or saunas.   If you have cats in your home, do not empty the cat litter while you are pregnant. It may contain a parasite that causes an infection called toxoplasmosis, which can cause birth defects. Also, use gloves when you work in garden areas used by cats.   Stay away from toxic chemicals like insecticides, solvents (such as some  or paint thinners), lead, and mercury. Check labels on household products. Most dangerous products have pregnancy warnings on their labels. Ask your healthcare provider about products if you are unsure.   Relax by taking breaks from work or chores.   Help reduce stress by sharing your feelings with others.   Report any violence or other types of abuse in your home.   Learn more about pregnancy, labor, and delivery. Read books, watch videos, go to a childbirth class, and talk with experienced moms.   Plan for the lifestyle changes a new baby will bring. Prepare for possible changes in your budget, work situation, daily schedule, and relationships with family and friends.   Talk to your provider about the pros and cons of breast-feeding.   Before and during your pregnancy, try to do everything you can to keep yourself and your baby healthy during your pregnancy.     Published by [employer].  This content is reviewed periodically and is subject to change as new health information becomes available. The information is intended to inform and educate and is not a replacement for medical evaluation, advice, diagnosis or treatment by a healthcare professional.   Developed by [employer].   ? 2010 [facility] and/or its affiliates. All Rights Reserved.   Copyright   Clinical Reference Systems 2011              Follow-ups after your visit        Additional Services     PERINATOLOGY REFERRAL       Your provider has referred you  to Perinatology Services (please use [facility] Referral if referring to [facility]).    Please be aware that coverage of these services is subject to the terms and limitations of your health insurance plan.  Call member services at your health plan with any benefit or coverage questions.      Please bring the following with you to your appointment:    (1) Any X-Rays, CTs or MRIs which have been performed.  Contact the facility where they were done to arrange for  prior to your scheduled appointment.  Any new CT, MRI or other procedures ordered by your specialist must be performed at a [facility] or coordinated by your clinic's referral office.    (2) List of current medications   (3) This referral request   (4) Any documents/labs given to you for this referral                  Follow-up notes from your care team     Return in about 4 weeks (around 7/17/2017).      Who to contact     If you have questions or need follow up information about today's clinic visit or your schedule please contact [facility] directly at [phone].  Normal or non-critical lab and imaging results will be communicated to you by [employer], letter or phone within 4 business days after the clinic has received the results. If you do not hear from us within 7 days, please contact the clinic through [employer] or phone. If you have a critical or abnormal lab result, we will notify you by phone as soon as possible.  Submit refill requests through [employer] or call your pharmacy and they will forward the refill request to us. Please allow 3 business days for your refill to be completed.          Additional Information About Your Visit        [employer] Information     [employer] gives you secure access to your electronic health record. If you see a primary care provider, you can also send messages to your care team and make appointments. If you have questions, please call your primary care clinic.  If you  do not have a primary care provider, please call [phone] and they will assist you.        Care EveryWhere ID     This is your Care EveryWhere ID. This could be used by other organizations to access your [facility] medical records  [id number]        Your Vitals Were     Pulse Last Period BMI (Body Mass Index)             80 03/01/2017 (Exact Date) 22.49 kg/m2          Blood Pressure from Last 3 Encounters:   06/19/17 124/84   05/15/17 116/80   04/19/17 124/76    Weight from Last 3 Encounters:   06/19/17 131 lb (59.4 kg)   05/15/17 125 lb (56.7 kg)   04/19/17 131 lb (59.4 kg)              We Performed the Following     PERINATOLOGY REFERRAL          Today's Medication Changes          These changes are accurate as of: 6/19/17 10:15 AM.  If you have any questions, ask your nurse or doctor.               Start taking these medicines.        Dose/Directions    ranitidine 150 MG tablet   Commonly known as:  ZANTAC   Used for:  Gastroesophageal reflux disease without esophagitis   Started by:  Madeline Benavidez DO        Dose:  150 mg   Take 1 tablet (150 mg) by mouth 2 times daily   Quantity:  60 tablet   Refills:  1            Where to get your medicines      These medications were sent to [facility] [place] - [address]  [address] [place] [address], [place] [address]     Phone:  [phone]     ranitidine 150 MG tablet                Primary Care Provider Office Phone # Fax #    Hortensia Carol Goldberg [phone]        [facility] [address] [place] [address]        Thank you!     Thank you for choosing [facility]  for your care. Our goal is always to provide you with excellent care. Hearing back from our patients is one way we can continue to improve our services. Please take a few minutes to complete the written survey that you may receive in the mail after your visit with us. Thank you!              Your Updated Medication List - Protect others around you: Learn how to safely use, store and throw away your medicines at www.disposemymeds.org.          This list is accurate as of: 6/19/17 10:15 AM.  Always use your most recent med list.                   Brand Name Dispense Instructions for use    busPIRone 5 MG tablet    BUSPAR    90 tablet    Take 1 tablet (5 mg) by mouth 3 times daily       flunisolide 29 MCG/ACT (0.025%) Soln    NASAREL    1 Box    Spray 2 sprays into both nostrils 2 times daily       methylphenidate 10 MG tablet    RITALIN     Take 10 mg by mouth 2 times daily       prenatal multivitamin  plus iron 27-0.8 MG Tabs per tablet      Take 1 tablet by mouth daily       ranitidine 150 MG tablet    ZANTAC    60 tablet    Take 1 tablet (150 mg) by mouth 2 times daily

## 2017-07-18 ENCOUNTER — TRANSFERRED RECORDS (OUTPATIENT)
Dept: HEALTH INFORMATION MANAGEMENT | Facility: CLINIC | Age: 37
End: 2017-07-18

## 2017-07-20 PROBLEM — O44.40 LOW LYING PLACENTA, ANTEPARTUM: Status: ACTIVE | Noted: 2017-07-20

## 2017-09-07 NOTE — PROGRESS NOTES
"  SUBJECTIVE:                                                    Tasha Short is a 36 year old female who presents to clinic today for the following health issues:      History of Present Illness   Depression & Anxiety Follow-up:     Depression/Anxiety:  Depression only    Status since last visit::  Worsened    Other associated symptoms of depression::  None    Significant life event::  No    Current substance use::  None    Anxiety/Panic symptoms::  No    Also to establish care, provider from [facility]     - In second trimester of pregnancy, things going well    1st baby      Been off Ritalin a couple months now   - Buspar - taking as needed, up to 3x/day      Last week only took one   - Reported HTN with anti-depressants  - Hydroxyzine, Prozac, Effexor, Zoloft, Wellburtrin - etc. All in the past      Seemed like got used to them after awhile  - Got DNA swab from psychiatry - Paxil and Effexor   - Also has hypersomnia      Very tired all the time      Falling asleep at work., as above, been off Ritalin for awhile now and feels like really struggling       PHQ-9 SCORE 4/18/2016 12/27/2016 9/11/2017   Total Score - - -   Total Score MyChart - - 8 (Mild depression)   Total Score 8 1 8       ROMI-7 SCORE 4/18/2016 12/27/2016 9/11/2017   Total Score - - -   Total Score - - 6 (mild anxiety)   Total Score 10 3 6         Answers for HPI/ROS submitted by the patient on 9/11/2017   If you checked off any problems, how difficult have these problems made it for you to do your work, take care of things at home, or get along with other people?: Somewhat difficult  PHQ9 TOTAL SCORE: 8  ROMI 7 TOTAL SCORE: 6    Problem list and histories reviewed & adjusted, as indicated.  Additional history: as documented    ROS:  Constitutional, HEENT, cardiovascular, pulmonary, gi and gu systems are negative, except as otherwise noted.      OBJECTIVE:   /80  Pulse 93  Temp 98.1  F (36.7  C) (Oral)  Resp 16  Ht 5' 4\" (1.626 m) "  Wt 138 lb (62.6 kg)  LMP 03/01/2017 (Exact Date)  SpO2 100%  BMI 23.69 kg/m2  Body mass index is 23.69 kg/(m^2).  GENERAL APPEARANCE: healthy, alert and no distress  EYES: Eyes grossly normal to inspection, PERRLA, conjunctivae and sclerae without injection or discharge, EOM intact   PSYCH: Alert and oriented x3; speech- coherent , normal rate and volume; able to articulate logical thoughts, able to abstract reason, no tangential thoughts, no hallucinations or delusions, mentation appears normal, Mood is euthymic. Affect is appropriate for this mood state and bright. Thought content is free of suicidal ideation, hallucinations, and delusions. Dress is adequate and upkept. Eye contact is good during conversation.       Diagnostic Test Results:  none     ASSESSMENT/PLAN:       ICD-10-CM    1. Major depressive disorder, recurrent episode, moderate (H) F33.1 PARoxetine (PAXIL) 10 MG tablet   2. Generalized anxiety disorder F41.1 PARoxetine (PAXIL) 10 MG tablet   3. Hypersomnia G47.10 atomoxetine (STRATTERA) 40 MG capsule     - Discussed risk vs. Benefit of SSRI/SNRI treatment in pregnancy  - Recommend trial of Paxil, discussed use and side effects, including Preg category C  - Recheck 1 month   - Continue to use Buspar as needed   - Regarding stimulants for hypersomnia      All are category C, but would recommend non-stimulant first, and then if needed stimulants      Discussed risks to baby with category C     The patient indicates understanding of these issues and agrees with the plan.    Follow up: 1 month         Enid Mansfield PA-C  [facility]

## 2017-09-11 ENCOUNTER — PRENATAL OFFICE VISIT (OUTPATIENT)
Dept: OBGYN | Facility: OTHER | Age: 37
End: 2017-09-11
Payer: COMMERCIAL

## 2017-09-11 ENCOUNTER — OFFICE VISIT (OUTPATIENT)
Dept: FAMILY MEDICINE | Facility: OTHER | Age: 37
End: 2017-09-11
Payer: COMMERCIAL

## 2017-09-11 VITALS
WEIGHT: 140 LBS | DIASTOLIC BLOOD PRESSURE: 84 MMHG | HEART RATE: 88 BPM | BODY MASS INDEX: 24.03 KG/M2 | SYSTOLIC BLOOD PRESSURE: 130 MMHG

## 2017-09-11 VITALS
TEMPERATURE: 98.1 F | RESPIRATION RATE: 16 BRPM | HEART RATE: 93 BPM | HEIGHT: 64 IN | BODY MASS INDEX: 23.56 KG/M2 | WEIGHT: 138 LBS | DIASTOLIC BLOOD PRESSURE: 80 MMHG | OXYGEN SATURATION: 100 % | SYSTOLIC BLOOD PRESSURE: 120 MMHG

## 2017-09-11 DIAGNOSIS — K21.9 GASTROESOPHAGEAL REFLUX DISEASE WITHOUT ESOPHAGITIS: ICD-10-CM

## 2017-09-11 DIAGNOSIS — F17.200 TOBACCO USE DISORDER: ICD-10-CM

## 2017-09-11 DIAGNOSIS — Z23 NEED FOR VACCINATION: ICD-10-CM

## 2017-09-11 DIAGNOSIS — Z23 NEED FOR TDAP VACCINATION: ICD-10-CM

## 2017-09-11 DIAGNOSIS — F10.11 H/O ETOH ABUSE: ICD-10-CM

## 2017-09-11 DIAGNOSIS — F33.2 SEVERE EPISODE OF RECURRENT MAJOR DEPRESSIVE DISORDER, WITHOUT PSYCHOTIC FEATURES (H): ICD-10-CM

## 2017-09-11 DIAGNOSIS — F41.1 GENERALIZED ANXIETY DISORDER: ICD-10-CM

## 2017-09-11 DIAGNOSIS — F33.1 MAJOR DEPRESSIVE DISORDER, RECURRENT EPISODE, MODERATE (H): Primary | ICD-10-CM

## 2017-09-11 DIAGNOSIS — G47.10 HYPERSOMNIA: ICD-10-CM

## 2017-09-11 DIAGNOSIS — O09.512 ELDERLY PRIMIGRAVIDA IN SECOND TRIMESTER: Primary | ICD-10-CM

## 2017-09-11 DIAGNOSIS — O09.511 ELDERLY PRIMIGRAVIDA IN FIRST TRIMESTER: ICD-10-CM

## 2017-09-11 PROCEDURE — 99214 OFFICE O/P EST MOD 30 MIN: CPT | Performed by: PHYSICIAN ASSISTANT

## 2017-09-11 PROCEDURE — 99207 ZZC PRENATAL VISIT: CPT | Performed by: OBSTETRICS & GYNECOLOGY

## 2017-09-11 PROCEDURE — 90471 IMMUNIZATION ADMIN: CPT | Performed by: OBSTETRICS & GYNECOLOGY

## 2017-09-11 PROCEDURE — 90715 TDAP VACCINE 7 YRS/> IM: CPT | Performed by: OBSTETRICS & GYNECOLOGY

## 2017-09-11 RX ORDER — ATOMOXETINE 40 MG/1
40 CAPSULE ORAL DAILY
Qty: 30 CAPSULE | Refills: 1 | Status: SHIPPED | OUTPATIENT
Start: 2017-09-11 | End: 2017-12-11

## 2017-09-11 RX ORDER — PAROXETINE 10 MG/1
5 TABLET, FILM COATED ORAL AT BEDTIME
Qty: 15 TABLET | Refills: 1 | Status: SHIPPED | OUTPATIENT
Start: 2017-09-11 | End: 2017-12-11

## 2017-09-11 ASSESSMENT — PAIN SCALES - GENERAL
PAINLEVEL: NO PAIN (0)
PAINLEVEL: NO PAIN (0)

## 2017-09-11 ASSESSMENT — ANXIETY QUESTIONNAIRES
GAD7 TOTAL SCORE: 6
3. WORRYING TOO MUCH ABOUT DIFFERENT THINGS: SEVERAL DAYS
1. FEELING NERVOUS, ANXIOUS, OR ON EDGE: SEVERAL DAYS
5. BEING SO RESTLESS THAT IT IS HARD TO SIT STILL: NOT AT ALL
6. BECOMING EASILY ANNOYED OR IRRITABLE: MORE THAN HALF THE DAYS
2. NOT BEING ABLE TO STOP OR CONTROL WORRYING: SEVERAL DAYS
4. TROUBLE RELAXING: NOT AT ALL
7. FEELING AFRAID AS IF SOMETHING AWFUL MIGHT HAPPEN: SEVERAL DAYS
GAD7 TOTAL SCORE: 6
7. FEELING AFRAID AS IF SOMETHING AWFUL MIGHT HAPPEN: SEVERAL DAYS
GAD7 TOTAL SCORE: 6

## 2017-09-11 ASSESSMENT — PATIENT HEALTH QUESTIONNAIRE - PHQ9
SUM OF ALL RESPONSES TO PHQ QUESTIONS 1-9: 8
10. IF YOU CHECKED OFF ANY PROBLEMS, HOW DIFFICULT HAVE THESE PROBLEMS MADE IT FOR YOU TO DO YOUR WORK, TAKE CARE OF THINGS AT HOME, OR GET ALONG WITH OTHER PEOPLE: SOMEWHAT DIFFICULT
SUM OF ALL RESPONSES TO PHQ QUESTIONS 1-9: 8

## 2017-09-11 NOTE — MR AVS SNAPSHOT
After Visit Summary   9/11/2017    Tasha Short    MRN: [id number]           Patient Information     Date Of Birth          1980        Visit Information        Provider Department      9/11/2017 7:30 AM Madeline Benavidez DO [facility]        Today's Diagnoses     Elderly primigravida in second trimester    -  1    Gastroesophageal reflux disease without esophagitis        Need for Tdap vaccination        H/O ETOH abuse        Tobacco use disorder        Severe episode of recurrent major depressive disorder, without psychotic features (H)        Elderly primigravida in first trimester        Need for vaccination          Care Instructions    What to watch out for are: regular contractions every 5 min, vaginal bleeding, decreased fetal movement, or leakage of fluid.  Please call the office or go to L&D if you develop any of these signs and symptoms.      I will see you for your follow up appointment.  Please feel free to call if you have any questions or concerns.      Thanks,  Madeline Benavidez DO            Follow-ups after your visit        Follow-up notes from your care team     Return in about 2 weeks (around 9/25/2017).      Your next 10 appointments already scheduled     Sep 25, 2017  7:15 AM CDT   ESTABLISHED PRENATAL with DO Whitney Anguiano [facility] ([facility])    [address] [address]   [phone]            Oct 16, 2017  7:45 AM CDT   [facility]    [address] [address]   [phone]            Oct 30, 2017  7:30 AM CDT   ESTABLISHED PRENATAL with DO Whitney Anguiano [facility] ([facility])    [address] [address]   [phone]            Nov 13, 2017  7:30 AM CST   ESTABLISHED PRENATAL with DO Whitney Anguiano  [facility] ([facility])    [address] [facility] [address] [phone]              Who to contact     If you have questions or need follow up information about today's clinic visit or your schedule please contact [facility] directly at [phone].  Normal or non-critical lab and imaging results will be communicated to you by MyChart, letter or phone within 4 business days after the clinic has received the results. If you do not hear from us within 7 days, please contact the clinic through MyChart or phone. If you have a critical or abnormal lab result, we will notify you by phone as soon as possible.  Submit refill requests through [employer] or call your pharmacy and they will forward the refill request to us. Please allow 3 business days for your refill to be completed.          Additional Information About Your Visit        MyChart Information     [employer] gives you secure access to your electronic health record. If you see a primary care provider, you can also send messages to your care team and make appointments. If you have questions, please call your primary care clinic.  If you do not have a primary care provider, please call [phone] and they will assist you.        Care EveryWhere ID     This is your Care EveryWhere ID. This could be used by other organizations to access your [place] medical records  [id number]        Your Vitals Were     Pulse Last Period BMI (Body Mass Index)             88 03/01/2017 (Exact Date) 24.03 kg/m2          Blood Pressure from Last 3 Encounters:   09/11/17 130/84   06/19/17 124/84   05/15/17 116/80    Weight from Last 3 Encounters:   09/11/17 140 lb (63.5 kg)   06/19/17 131 lb (59.4 kg)   05/15/17 125 lb (56.7 kg)              We Performed the Following     1st  Administration  [85401]     TDAP VACCINE (ADACEL) [60309.002]        Primary Care Provider    None       No address on file        Equal Access to Services      LINDY CAMERON : Hadii aad ku robert Rossi, waaxda luqadaha, qaybta kaalmada adelaurie, noemi curt misaelparker ottonicholtavo singh . So [facility] [phone].    ATENCIÓN: Si habla español, tiene a chaves disposición servicios gratuitos de asistencia lingüística. Llame al [phone].    We comply with applicable federal civil rights laws and [place] laws. We do not discriminate on the basis of race, color, national origin, age, disability sex, sexual orientation or gender identity.            Thank you!     Thank you for choosing [facility]  for your care. Our goal is always to provide you with excellent care. Hearing back from our patients is one way we can continue to improve our services. Please take a few minutes to complete the written survey that you may receive in the mail after your visit with us. Thank you!             Your Updated Medication List - Protect others around you: Learn how to safely use, store and throw away your medicines at www.disposemymeds.org.          This list is accurate as of: 9/11/17  8:11 AM.  Always use your most recent med list.                   Brand Name Dispense Instructions for use Diagnosis    busPIRone 5 MG tablet    BUSPAR    90 tablet    Take 1 tablet (5 mg) by mouth 3 times daily    Anxiety       flunisolide 29 MCG/ACT (0.025%) Soln    NASAREL    1 Box    Spray 2 sprays into both nostrils 2 times daily    Nasal congestion       methylphenidate 10 MG tablet    RITALIN     Take 10 mg by mouth 2 times daily        prenatal multivitamin plus iron 27-0.8 MG Tabs per tablet      Take 1 tablet by mouth daily        ranitidine 150 MG tablet    ZANTAC    60 tablet    Take 1 tablet (150 mg) by mouth 2 times daily    Gastroesophageal reflux disease without esophagitis

## 2017-09-11 NOTE — NURSING NOTE
"Chief Complaint   Patient presents with     Prenatal Care     GCT and offer T-dap       Initial /84  Pulse 88  Wt 140 lb (63.5 kg)  LMP 03/01/2017 (Exact Date)  BMI 24.03 kg/m2 Estimated body mass index is 24.03 kg/(m^2) as calculated from the following:    Height as of 5/15/17: 5' 4\" (1.626 m).    Weight as of this encounter: 140 lb (63.5 kg).  Medication Reconciliation: complete     27w5d    Screening Questionnaire for Adult Immunization    Are you sick today?   No   Do you have allergies to medications, food, a vaccine component or latex?   No   Have you ever had a serious reaction after receiving a vaccination?   No   Do you have a long-term health problem with heart disease, lung disease, asthma, kidney disease, metabolic disease (e.g. diabetes), anemia, or other blood disorder?   No   Do you have cancer, leukemia, HIV/AIDS, or any other immune system problem?   No   In the past 3 months, have you taken medications that affect  your immune system, such as prednisone, other steroids, or anticancer drugs; drugs for the treatment of rheumatoid arthritis, Crohn s disease, or psoriasis; or have you had radiation treatments?   No   Have you had a seizure, or a brain or other nervous system problem?   No   During the past year, have you received a transfusion of blood or blood     products, or been given immune (gamma) globulin or antiviral drug?   No   For women: Are you pregnant or is there a chance you could become        pregnant during the next month?   Yes   Have you received any vaccinations in the past 4 weeks?   No     Immunization questionnaire pregnant.        Per orders of Dr. Benavidez, injection of T-dap given by Ebony Reyes. Patient instructed to remain in clinic for 15 minutes afterwards, and to report any adverse reaction to me immediately.       Screening performed by Ebony Reyes on 9/11/2017 at 8:02 AM.      "

## 2017-09-11 NOTE — NURSING NOTE
"Chief Complaint   Patient presents with     Depression     Panel Management     height, flu, lipid, phq9, bmp       Initial /80  Pulse 93  Temp 98.1  F (36.7  C) (Oral)  Resp 16  Ht 5' 4\" (1.626 m)  Wt 138 lb (62.6 kg)  LMP 03/01/2017 (Exact Date)  SpO2 100%  BMI 23.69 kg/m2 Estimated body mass index is 23.69 kg/(m^2) as calculated from the following:    Height as of this encounter: 5' 4\" (1.626 m).    Weight as of this encounter: 138 lb (62.6 kg).  Medication Reconciliation: complete     Annie Colorado MA       "

## 2017-09-11 NOTE — PATIENT INSTRUCTIONS
What to watch out for are: regular contractions every 5 min, vaginal bleeding, decreased fetal movement, or leakage of fluid.  Please call the office or go to L&D if you develop any of these signs and symptoms.      I will see you for your follow up appointment.  Please feel free to call if you have any questions or concerns.      Thanks,  Madeline Benavidez, DO

## 2017-09-11 NOTE — PATIENT INSTRUCTIONS
- Start Paxil 1/2 tablet (5 mg) at night  - Recheck 1 month     - Start Strattera, if too expensive call and I will send Intuniv

## 2017-09-11 NOTE — MR AVS SNAPSHOT
After Visit Summary   9/11/2017    Tasha Short    MRN: [id number]           Patient Information     Date Of Birth          1980        Visit Information        Provider Department      9/11/2017 8:00 AM Enid Mansfield PA-C [facility]        Today's Diagnoses     Major depressive disorder, recurrent episode, moderate (H)    -  1    Generalized anxiety disorder        Hypersomnia          Care Instructions    - Start Paxil 1/2 tablet (5 mg) at night  - Recheck 1 month     - Start Strattera, if too expensive call and I will send Intuniv                   Follow-ups after your visit        Your next 10 appointments already scheduled     Sep 25, 2017  7:15 AM CDT   ESTABLISHED PRENATAL with Madeline [facility] DO   [facility]    [address] [address] [phone]            Oct 16, 2017  7:45 AM CDT   ESTABLISHED PRENATAL with Madeline [facility]z DO   [facility]    [address] [address] [phone]            Oct 30, 2017  7:30 AM CDT   ESTABLISHED PRENATAL with Madeline [facility]z DO   [facility]    [address] [address] [phone]            Nov 13, 2017  7:30 AM CST   ESTABLISHED PRENATAL with Madeline [facility]z DO   [facility]    [address] [place] [address] [phone]              Who to contact     If you have questions or need follow up information about today's clinic visit or your schedule please contact [facility] directly at [phone].  Normal or non-critical lab and imaging results will be communicated to you by MyChart, letter or phone within 4 business days after the clinic has received the results. If you do not hear from us within 7 days, please contact the clinic  "through [employer] or phone. If you have a critical or abnormal lab result, we will notify you by phone as soon as possible.  Submit refill requests through [employer] or call your pharmacy and they will forward the refill request to us. Please allow 3 business days for your refill to be completed.          Additional Information About Your Visit        [employer] Information     [employer] gives you secure access to your electronic health record. If you see a primary care provider, you can also send messages to your care team and make appointments. If you have questions, please call your primary care clinic.  If you do not have a primary care provider, please call [phone] and they will assist you.        Care EveryWhere ID     This is your Care EveryWhere ID. This could be used by other organizations to access your [place] medical records  [id number]        Your Vitals Were     Pulse Temperature Respirations Height Last Period Pulse Oximetry    93 98.1  F (36.7  C) (Oral) 16 5' 4\" (1.626 m) 03/01/2017 (Exact Date) 100%    BMI (Body Mass Index)                   23.69 kg/m2            Blood Pressure from Last 3 Encounters:   09/11/17 120/80   09/11/17 130/84   06/19/17 124/84    Weight from Last 3 Encounters:   09/11/17 138 lb (62.6 kg)   09/11/17 140 lb (63.5 kg)   06/19/17 131 lb (59.4 kg)              Today, you had the following     No orders found for display         Today's Medication Changes          These changes are accurate as of: 9/11/17  8:45 AM.  If you have any questions, ask your nurse or doctor.               Start taking these medicines.        Dose/Directions    atomoxetine 40 MG capsule   Commonly known as:  STRATTERA   Used for:  Hypersomnia   Started by:  Enid Mansfield PA-C        Dose:  40 mg   Take 1 capsule (40 mg) by mouth daily   Quantity:  30 capsule   Refills:  1       PARoxetine 10 MG tablet   Commonly known as:  PAXIL   Used for:  Major depressive disorder, recurrent " episode, moderate (H), Generalized anxiety disorder   Started by:  Enid Mansfield PA-C        Dose:  5 mg   Take 0.5 tablets (5 mg) by mouth At Bedtime   Quantity:  15 tablet   Refills:  1            Where to get your medicines      These medications were sent to [facility] [place] - [address] [place] [address]  [address] [place] [address], [place] [address]     Phone:  [phone]     atomoxetine 40 MG capsule    PARoxetine 10 MG tablet                Primary Care Provider    None       No address on file        Equal Access to Services     [facility]: Hadii adam ernst hadasho Soomaali, waaxda luqadaha, qaybta kaalmada espinoza, noemi singh . So [facility] [phone].    ATENCIÓN: Si habla español, tiene a chaves disposición servicios gratuitos de asistencia lingüística. [facility] [phone].    We comply with applicable federal civil rights laws and [place] laws. We do not discriminate on the basis of race, color, national origin, age, disability sex, sexual orientation or gender identity.            Thank you!     Thank you for choosing [facility]  for your care. Our goal is always to provide you with excellent care. Hearing back from our patients is one way we can continue to improve our services. Please take a few minutes to complete the written survey that you may receive in the mail after your visit with us. Thank you!             Your Updated Medication List - Protect others around you: Learn how to safely use, store and throw away your medicines at www.disposemymeds.org.          This list is accurate as of: 9/11/17  8:45 AM.  Always use your most recent med list.                   Brand Name Dispense Instructions for use Diagnosis    atomoxetine 40 MG capsule    STRATTERA    30 capsule    Take 1 capsule (40 mg) by mouth daily    Hypersomnia       busPIRone 5 MG tablet    BUSPAR    90 tablet    Take 1 tablet  (5 mg) by mouth 3 times daily    Anxiety       flunisolide 29 MCG/ACT (0.025%) Soln    NASAREL    1 Box    Spray 2 sprays into both nostrils 2 times daily    Nasal congestion       methylphenidate 10 MG tablet    RITALIN     Take 10 mg by mouth 2 times daily        PARoxetine 10 MG tablet    PAXIL    15 tablet    Take 0.5 tablets (5 mg) by mouth At Bedtime    Major depressive disorder, recurrent episode, moderate (H), Generalized anxiety disorder       prenatal multivitamin plus iron 27-0.8 MG Tabs per tablet      Take 1 tablet by mouth daily        ranitidine 150 MG tablet    ZANTAC    60 tablet    Take 1 tablet (150 mg) by mouth 2 times daily    Gastroesophageal reflux disease without esophagitis

## 2017-09-11 NOTE — NURSING NOTE
"Chief Complaint   Patient presents with     Prenatal Care     GCT and offer T-dap       Initial /84  Pulse 88  Wt 140 lb (63.5 kg)  LMP 03/01/2017 (Exact Date)  BMI 24.03 kg/m2 Estimated body mass index is 24.03 kg/(m^2) as calculated from the following:    Height as of 5/15/17: 5' 4\" (1.626 m).    Weight as of this encounter: 140 lb (63.5 kg).  Medication Reconciliation: complete  "

## 2017-09-11 NOTE — PROGRESS NOTES
36 year old  at 27w5d weeks presents to the clinic for a routine prenatal visit.  Low lying placenta.  Repeat ultrasound tomorrow with MFM.  Pelvic rest  No vaginal bleeding, leakage of fluid, or contractions   Good fetal movement.  Fundal height=26cm  ULPn=087  GCT and hgb this week  Rh B+  RTC 2 weeks.  TDAP today  GERD=stable  Anxiety/depression=stable with Buspar  Narcolepsy  Tobacco abuse=5cigs/day  Syphilis testing declined    Madeline Benavidez

## 2017-09-12 ASSESSMENT — PATIENT HEALTH QUESTIONNAIRE - PHQ9: SUM OF ALL RESPONSES TO PHQ QUESTIONS 1-9: 8

## 2017-09-12 ASSESSMENT — ANXIETY QUESTIONNAIRES: GAD7 TOTAL SCORE: 6

## 2017-09-25 ENCOUNTER — PRENATAL OFFICE VISIT (OUTPATIENT)
Dept: OBGYN | Facility: OTHER | Age: 37
End: 2017-09-25
Payer: COMMERCIAL

## 2017-09-25 VITALS
HEART RATE: 105 BPM | BODY MASS INDEX: 24.03 KG/M2 | DIASTOLIC BLOOD PRESSURE: 87 MMHG | SYSTOLIC BLOOD PRESSURE: 138 MMHG | WEIGHT: 140 LBS

## 2017-09-25 DIAGNOSIS — F17.200 TOBACCO USE DISORDER: ICD-10-CM

## 2017-09-25 DIAGNOSIS — O09.513 ELDERLY PRIMIGRAVIDA IN THIRD TRIMESTER: Primary | ICD-10-CM

## 2017-09-25 DIAGNOSIS — K21.9 GASTROESOPHAGEAL REFLUX DISEASE WITHOUT ESOPHAGITIS: ICD-10-CM

## 2017-09-25 DIAGNOSIS — Z98.890 S/P LEEP: ICD-10-CM

## 2017-09-25 DIAGNOSIS — F33.1 MAJOR DEPRESSIVE DISORDER, RECURRENT EPISODE, MODERATE (H): ICD-10-CM

## 2017-09-25 DIAGNOSIS — O44.40 LOW LYING PLACENTA, ANTEPARTUM: ICD-10-CM

## 2017-09-25 PROCEDURE — 99207 ZZC PRENATAL VISIT: CPT | Performed by: OBSTETRICS & GYNECOLOGY

## 2017-09-25 ASSESSMENT — PAIN SCALES - GENERAL: PAINLEVEL: NO PAIN (0)

## 2017-09-25 NOTE — MR AVS SNAPSHOT
After Visit Summary   9/25/2017    Tasha Short    MRN: [id number]           Patient Information     Date Of Birth          1980        Visit Information        Provider Department      9/25/2017 7:15 AM Madeline Benavidez DO [facility]        Today's Diagnoses     Elderly primigravida in third trimester    -  1    Low lying placenta, antepartum        S/P LEEP        Major depressive disorder, recurrent episode, moderate (H)        Tobacco use disorder        Gastroesophageal reflux disease without esophagitis          Care Instructions    What to watch out for are: regular contractions every 5 min, vaginal bleeding, decreased fetal movement, or leakage of fluid.  Please call the office or go to L&D if you develop any of these signs and symptoms.      I will see you for your follow up appointment.  Please feel free to call if you have any questions or concerns.      Thanks,  Madeline Benavidez, DO            Follow-ups after your visit        Follow-up notes from your care team     Return in about 2 weeks (around 10/9/2017).      Your next 10 appointments already scheduled     Oct 16, 2017  7:45 AM CDT   ESTABLISHED PRENATAL with Madeline Benavidez DO   [facility]    [address] [place] [address]   [phone]            Oct 30, 2017  7:30 AM CDT   ESTABLISHED PRENATAL with Madeline Benavidez DO   [facility]    [address] [place] [address]   [phone]            Nov 13, 2017  7:30 AM CST   ESTABLISHED PRENATAL with Madeline Benavidez DO   [facility]    [address] [place] [address]   [phone]              Who to contact     If you have questions or need follow up information about today's clinic visit or your schedule please contact [facility] directly at [phone].  Normal or  non-critical lab and imaging results will be communicated to you by MyChart, letter or phone within 4 business days after the clinic has received the results. If you do not hear from us within 7 days, please contact the clinic through [employer] or phone. If you have a critical or abnormal lab result, we will notify you by phone as soon as possible.  Submit refill requests through [employer] or call your pharmacy and they will forward the refill request to us. Please allow 3 business days for your refill to be completed.          Additional Information About Your Visit        [employer] Information     [employer] gives you secure access to your electronic health record. If you see a primary care provider, you can also send messages to your care team and make appointments. If you have questions, please call your primary care clinic.  If you do not have a primary care provider, please call [phone] and they will assist you.        Care EveryWhere ID     This is your Care EveryWhere ID. This could be used by other organizations to access your [place] medical records  [id number]        Your Vitals Were     Pulse Last Period BMI (Body Mass Index)             105 03/01/2017 (Exact Date) 24.03 kg/m2          Blood Pressure from Last 3 Encounters:   09/25/17 138/87   09/11/17 120/80   09/11/17 130/84    Weight from Last 3 Encounters:   09/25/17 140 lb (63.5 kg)   09/11/17 138 lb (62.6 kg)   09/11/17 140 lb (63.5 kg)              Today, you had the following     No orders found for display       Primary Care Provider Office Phone # Fax #    Enid LINDSEY Mansfield PA-C [phone]       [address] [place] [address] [address]  [facility] [address]        Equal Access to Services     : Hadii adam Rossi, waniteshda lusandraadaha, qaybta kaalmada espinoza, noemi singh . So [facility] [phone].    ATENCIÓN: Si habla español, tiene a chaves disposición servicios gratuitos de asistencia  lingüística. [facility] [phone].    We comply with applicable federal civil rights laws and [place] laws. We do not discriminate on the basis of race, color, national origin, age, disability sex, sexual orientation or gender identity.            Thank you!     Thank you for choosing [facility]  for your care. Our goal is always to provide you with excellent care. Hearing back from our patients is one way we can continue to improve our services. Please take a few minutes to complete the written survey that you may receive in the mail after your visit with us. Thank you!             Your Updated Medication List - Protect others around you: Learn how to safely use, store and throw away your medicines at www.disposemymeds.org.          This list is accurate as of: 9/25/17  7:41 AM.  Always use your most recent med list.                   Brand Name Dispense Instructions for use Diagnosis    atomoxetine 40 MG capsule    STRATTERA    30 capsule    Take 1 capsule (40 mg) by mouth daily    Hypersomnia       busPIRone 5 MG tablet    BUSPAR    90 tablet    Take 1 tablet (5 mg) by mouth 3 times daily    Anxiety       flunisolide 29 MCG/ACT (0.025%) Soln    NASAREL    1 Box    Spray 2 sprays into both nostrils 2 times daily    Nasal congestion       PARoxetine 10 MG tablet    PAXIL    15 tablet    Take 0.5 tablets (5 mg) by mouth At Bedtime    Major depressive disorder, recurrent episode, moderate (H), Generalized anxiety disorder       prenatal multivitamin plus iron 27-0.8 MG Tabs per tablet      Take 1 tablet by mouth daily        ranitidine 150 MG tablet    ZANTAC    60 tablet    Take 1 tablet (150 mg) by mouth 2 times daily    Gastroesophageal reflux disease without esophagitis

## 2017-09-25 NOTE — PROGRESS NOTES
36 year old  at 29w5d weeks presents to the clinic for a routine prenatal visit.  Low lying placenta=repeat ultrasound with MFM in Oct  Pelvic rest  AMA  No vaginal bleeding, leakage of fluid, or contractions   Good fetal movement.  Fundal height=28cm  WZRh=043  GCT and hgb this week.  Discussed importance of this.  Rh B+  RTC 2 weeks.  Tobacco abuse=5cigs/day  Depression=pt was starting on Paxil but she has not started taking it yet. Patient recently scored an 8 on the PHQ-9  GERD=stable    Madeline Benavidez

## 2017-09-25 NOTE — NURSING NOTE
"Chief Complaint   Patient presents with     Prenatal Care     Had T-dap       Initial /87  Pulse 105  Wt 140 lb (63.5 kg)  LMP 03/01/2017 (Exact Date)  BMI 24.03 kg/m2 Estimated body mass index is 24.03 kg/(m^2) as calculated from the following:    Height as of 9/11/17: 5' 4\" (1.626 m).    Weight as of this encounter: 140 lb (63.5 kg).  Medication Reconciliation: complete  "

## 2017-09-29 ENCOUNTER — TELEPHONE (OUTPATIENT)
Dept: FAMILY MEDICINE | Facility: OTHER | Age: 37
End: 2017-09-29

## 2017-09-29 ENCOUNTER — OFFICE VISIT (OUTPATIENT)
Dept: FAMILY MEDICINE | Facility: OTHER | Age: 37
End: 2017-09-29
Payer: COMMERCIAL

## 2017-09-29 VITALS
RESPIRATION RATE: 18 BRPM | WEIGHT: 139.4 LBS | BODY MASS INDEX: 23.93 KG/M2 | TEMPERATURE: 98.1 F | OXYGEN SATURATION: 100 % | SYSTOLIC BLOOD PRESSURE: 118 MMHG | HEART RATE: 108 BPM | DIASTOLIC BLOOD PRESSURE: 78 MMHG

## 2017-09-29 DIAGNOSIS — R06.02 SOB (SHORTNESS OF BREATH): Primary | ICD-10-CM

## 2017-09-29 DIAGNOSIS — D72.829 LEUKOCYTOSIS, UNSPECIFIED TYPE: ICD-10-CM

## 2017-09-29 DIAGNOSIS — R53.83 LETHARGY: ICD-10-CM

## 2017-09-29 DIAGNOSIS — R20.9 DISTURBANCE OF SKIN SENSATION: ICD-10-CM

## 2017-09-29 LAB
ALBUMIN UR-MCNC: NEGATIVE MG/DL
APPEARANCE UR: CLEAR
BASOPHILS # BLD AUTO: 0 10E9/L (ref 0–0.2)
BASOPHILS NFR BLD AUTO: 0.2 %
BILIRUB UR QL STRIP: NEGATIVE
COLOR UR AUTO: YELLOW
DIFFERENTIAL METHOD BLD: ABNORMAL
EOSINOPHIL # BLD AUTO: 0.1 10E9/L (ref 0–0.7)
EOSINOPHIL NFR BLD AUTO: 0.5 %
ERYTHROCYTE [DISTWIDTH] IN BLOOD BY AUTOMATED COUNT: 14 % (ref 10–15)
GLUCOSE UR STRIP-MCNC: NEGATIVE MG/DL
HCT VFR BLD AUTO: 33.6 % (ref 35–47)
HGB BLD-MCNC: 11.6 G/DL (ref 11.7–15.7)
HGB UR QL STRIP: NEGATIVE
KETONES UR STRIP-MCNC: NEGATIVE MG/DL
LEUKOCYTE ESTERASE UR QL STRIP: NEGATIVE
LYMPHOCYTES # BLD AUTO: 1.4 10E9/L (ref 0.8–5.3)
LYMPHOCYTES NFR BLD AUTO: 8.9 %
MAGNESIUM SERPL-MCNC: 1.6 MG/DL (ref 1.6–2.3)
MCH RBC QN AUTO: 33.5 PG (ref 26.5–33)
MCHC RBC AUTO-ENTMCNC: 34.5 G/DL (ref 31.5–36.5)
MCV RBC AUTO: 97 FL (ref 78–100)
MONOCYTES # BLD AUTO: 1 10E9/L (ref 0–1.3)
MONOCYTES NFR BLD AUTO: 6.4 %
NEUTROPHILS # BLD AUTO: 13 10E9/L (ref 1.6–8.3)
NEUTROPHILS NFR BLD AUTO: 84 %
NITRATE UR QL: NEGATIVE
PH UR STRIP: 7 PH (ref 5–7)
PLATELET # BLD AUTO: 173 10E9/L (ref 150–450)
POTASSIUM SERPL-SCNC: 3.7 MMOL/L (ref 3.4–5.3)
RBC # BLD AUTO: 3.46 10E12/L (ref 3.8–5.2)
SOURCE: NORMAL
SP GR UR STRIP: 1.01 (ref 1–1.03)
UROBILINOGEN UR STRIP-ACNC: 0.2 EU/DL (ref 0.2–1)
WBC # BLD AUTO: 15.4 10E9/L (ref 4–11)

## 2017-09-29 PROCEDURE — 36415 COLL VENOUS BLD VENIPUNCTURE: CPT | Performed by: NURSE PRACTITIONER

## 2017-09-29 PROCEDURE — 99214 OFFICE O/P EST MOD 30 MIN: CPT | Performed by: NURSE PRACTITIONER

## 2017-09-29 PROCEDURE — 84132 ASSAY OF SERUM POTASSIUM: CPT | Performed by: NURSE PRACTITIONER

## 2017-09-29 PROCEDURE — 83735 ASSAY OF MAGNESIUM: CPT | Performed by: NURSE PRACTITIONER

## 2017-09-29 PROCEDURE — 81003 URINALYSIS AUTO W/O SCOPE: CPT | Performed by: NURSE PRACTITIONER

## 2017-09-29 PROCEDURE — 85025 COMPLETE CBC W/AUTO DIFF WBC: CPT | Performed by: NURSE PRACTITIONER

## 2017-09-29 ASSESSMENT — PAIN SCALES - GENERAL: PAINLEVEL: NO PAIN (0)

## 2017-09-29 NOTE — TELEPHONE ENCOUNTER
Spoke with patient.  Woke up with heartburn.  Feels like she's is going to pass out. Doing a better with fluids and eating.  BP 96/70 pluse 86 more short of breath then normal, able to talk in full sentences.  Baby is moving more this am.   Urination is normal.   Weaker and icky feeling then yesterday.    Advised she needs to be seen for evaluation.  If symptoms worsen she should to to ED.  Appointment made in clinic.    RECOMMENDED DISPOSITION:  See in 24 hours -   Will comply with recommendation: yes   If further questions/concerns or if Sx do not improve, worsen or new Sx develop, call your PCP or [facility] as soon as possible.    NOTES:  Disposition was determined by the first positive assessment question, therefore all previous assessment questions were negative.     Guideline used:breathing problem, heartburn  Telephone Triage Protocols for Nurses, Fifth Edition, Vonnie Weldon, RN, BSN

## 2017-09-29 NOTE — TELEPHONE ENCOUNTER
Please have RN call Tasha to see how she is feeling if symptoms of SOB have improved? Her lab results show that she has a current infection. Recommend chest xray to rule out pneumonia. If her symptoms are worsening I recommend going to ED for further evaluation.     Thank you  Libia Miramontes CNP

## 2017-09-29 NOTE — TELEPHONE ENCOUNTER
Spoke with patient. She has been resting at home. Stated the shortness of breath is still coming and going. Stated she is unable to get an CXR until Monday 10/02/2017. Patient would like to know if it is possible for her to be treated with antibiotics without a CXR. Discussed its difficult to treat without knowing a cause especially during pregnancy. Patient declined going to the ED for the shortness of breath. Please review and advise. Doris Estrella RN, BSN

## 2017-09-29 NOTE — PROGRESS NOTES
SUBJECTIVE:                                                    Tasha Short is a 36 year old female who presents to clinic today for the following health issues:      HPI    Pt spoke with triage RN this morning. Please see notes below. Feeling a little bit better but not much change since the phone call. Just feels like she is going to pass out all the time.    Philipp Weldon      9/29/17 8:20 AM   Note      Spoke with patient.  Woke up with heartburn.  Feels like she's is going to pass out. Doing a better with fluids and eating.  BP 96/70 pluse 86 more short of breath then normal, able to talk in full sentences.  Baby is moving more this am.   Urination is normal.   Weaker and icky feeling then yesterday.     Advised she needs to be seen for evaluation.  If symptoms worsen she should to to ED.  Appointment made in clinic.     RECOMMENDED DISPOSITION:  See in 24 hours -   Will comply with recommendation: yes   If further questions/concerns or if Sx do not improve, worsen or new Sx develop, call your PCP or [facility] as soon as possible.     NOTES:  Disposition was determined by the first positive assessment question, therefore all previous assessment questions were negative.      Guideline used:breathing problem, heartburn  Telephone Triage Protocols for Nurses, Fifth Edition, Vonnie Weldon, RN, BSN        Patient states she has history of low potassium and magnesium and that symptoms are similar.   She is 30 wks pregnant with first baby.   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 Denies seasonal allergies. States that she is feeling a bit sinusy the frontal area behind her eyes. Has had a cough that is moist and nonproductive.    Problem list and histories reviewed & adjusted, as indicated.  Additional history: as documented    BP Readings from Last 3 Encounters:   09/29/17 118/78   09/25/17 138/87   09/11/17 120/80    Wt Readings from Last 3 Encounters:   09/29/17  139 lb 6.4 oz (63.2 kg)   09/25/17 140 lb (63.5 kg)   09/11/17 138 lb (62.6 kg)         Labs reviewed in EPIC      ROS:  Constitutional, HEENT, cardiovascular, pulmonary, gi and gu systems are negative, except as otherwise noted.      OBJECTIVE:   /78 (BP Location: Left arm, Patient Position: Chair, Cuff Size: Adult Regular)  Pulse 108  Temp 98.1  F (36.7  C) (Oral)  Resp 18  Wt 139 lb 6.4 oz (63.2 kg)  LMP 03/01/2017 (Exact Date)  SpO2 100%  BMI 23.93 kg/m2  Body mass index is 23.93 kg/(m^2).  GENERAL: healthy, alert and no distress  EYES: Eyes grossly normal to inspection, PERRL and conjunctivae and sclerae normal  HENT: ear canals and TM's normal, nose and mouth without ulcers or lesions  NECK: no adenopathy, no asymmetry, masses, or scars and thyroid normal to palpation  RESP: lungs clear to auscultation - no rales, rhonchi or wheezes  CV: regular rate and rhythm, normal S1 S2, no S3 or S4, no murmur, click or rub, no peripheral edema and peripheral pulses strong  ABDOMEN: soft, nontender, no hepatosplenomegaly, no masses and bowel sounds normal  MS: no gross musculoskeletal defects noted, no edema  SKIN: no suspicious lesions or rashes  NEURO: Normal strength and tone, mentation intact and speech normal  BACK: no CVA tenderness, no paralumbar tenderness  PSYCH: mentation appears normal, affect normal/bright    Diagnostic Test Results:  pending    ASSESSMENT/PLAN:     1. SOB (shortness of breath)  Will check for anemia, viral, bacterial infection.   - CBC with platelets and differential    2. Disturbance of skin sensation  History of electrolyte dysfunction   - Potassium; Future  - Magnesium    3. Lethargy  - CBC with platelets and differential  - *UA reflex to Microscopic and Culture ([place] [facility] (except [place] and [place])    Home care instructions were reviewed with the patient. The risks, benefits and treatment options of prescribed medications or other treatments have been  discussed with the patient. The patient verbalized their understanding and should call or follow up if no improvement or if they develop further problems.  Return to clinic prn    Patient Instructions   I will call you with your lab results. Please take it easy over the weekend. Symptoms appear to be from onset of viral infection. Recommend increased fluids, may take probiotic or eat yogurt, vitamin C both for immune booster.     Tylenol as needed for discomfort.   Return to clinic if symptoms worsen or do not improve with treatment.     Thank you  Libia Miramontes CNP

## 2017-09-29 NOTE — TELEPHONE ENCOUNTER
Huddled with WS. Provider declined prescribing antibiotics a this time as we do not know what we are treating. Would recommend monitoring her symptoms, if shortness of breath worsens would recommend following up in the ED. Informed the patient of this and patient stated she understood. Doris Estrella, RN, BSN

## 2017-09-29 NOTE — TELEPHONE ENCOUNTER
Huddled with WS. RN to speak with patient and find out if she is still having or worsening shortness of breath. If still having shortness of breath would recommend being seen in the ED as her blood work labs are showing signs of an infection. Urine results were normal. WS is recommending a CXR to see if this is the source of an infection.     LM for the patient to return call to the clinic to discuss the below. Will await to hear from patient. Doris Estrella, RN, BSN

## 2017-09-29 NOTE — PROGRESS NOTES
Please advise Tasha Short,  1980, that her lab results were magnesium and potassium are normal   [phone] (home) NONE (work)  Libia Miramontes

## 2017-09-29 NOTE — PATIENT INSTRUCTIONS
I will call you with your lab results. Please take it easy over the weekend. Symptoms appear to be from onset of viral infection. Recommend increased fluids, may take probiotic or eat yogurt, vitamin C both for immune booster.     Tylenol as needed for discomfort.   Return to clinic if symptoms worsen or do not improve with treatment.     Thank you  Libia Miramontes CNP

## 2017-09-29 NOTE — MR AVS SNAPSHOT
After Visit Summary   9/29/2017    Tasha Short    MRN: [id number]           Patient Information     Date Of Birth          1980        Visit Information        Provider Department      9/29/2017 12:00 PM Libia Miramontes APRN CNP [facility]        Today's Diagnoses     Disturbance of skin sensation    -  1    Lethargy        SOB (shortness of breath)          Care Instructions    I will call you with your lab results. Please take it easy over the weekend. Symptoms appear to be from onset of viral infection. Recommend increased fluids, may take probiotic or eat yogurt, vitamin C both for immune booster.     Tylenol as needed for discomfort.   Return to clinic if symptoms worsen or do not improve with treatment.     Thank you  Libia Miramontes CNP            Follow-ups after your visit        Your next 10 appointments already scheduled     Oct 16, 2017  7:45 AM CDT   [facility]    [address] [facility] [address] [phone]            Oct 30, 2017  7:30 AM CDT   [facility]    [address] [facility] [address] [phone]            Nov 13, 2017  7:30 AM CST   [facility]ABL[facility]   [facility]    [address] [facility] [address]   [phone]              Future tests that were ordered for you today     Open Future Orders        Priority Expected Expires Ordered    Potassium Routine  3/28/2018 9/29/2017            Who to contact     If you have questions or need follow up information about today's clinic visit or your schedule please contact [facility] directly at [phone].  Normal or non-critical lab and imaging results will be communicated to you by  MyChart, letter or phone within 4 business days after the clinic has received the results. If you do not hear from us within 7 days, please contact the clinic through [employer] or phone. If you have a critical or abnormal lab result, we will notify you by phone as soon as possible.  Submit refill requests through [employer] or call your pharmacy and they will forward the refill request to us. Please allow 3 business days for your refill to be completed.          Additional Information About Your Visit        [employer] Information     [employer] gives you secure access to your electronic health record. If you see a primary care provider, you can also send messages to your care team and make appointments. If you have questions, please call your primary care clinic.  If you do not have a primary care provider, please call [phone] and they will assist you.        Care EveryWhere ID     This is your Care EveryWhere ID. This could be used by other organizations to access your [facility] medical records  [id number]        Your Vitals Were     Pulse Temperature Respirations Last Period Pulse Oximetry BMI (Body Mass Index)    108 98.1  F (36.7  C) (Oral) 18 03/01/2017 (Exact Date) 100% 23.93 kg/m2       Blood Pressure from Last 3 Encounters:   09/29/17 118/78   09/25/17 138/87   09/11/17 120/80    Weight from Last 3 Encounters:   09/29/17 139 lb 6.4 oz (63.2 kg)   09/25/17 140 lb (63.5 kg)   09/11/17 138 lb (62.6 kg)              We Performed the Following     *UA reflex to Microscopic and Culture ([place] [facility] (except [place] and [place])     CBC with platelets and differential     Magnesium        Primary Care Provider Office Phone # Fax #    Enid Mansfield PA-C [phone]       [address]  [facility] [address]        Equal Access to Services     LINDY CAMERON : Catalina Rossi, wolf hunter, qaybta kadeonte doran, noemi joseph  laestiven araiza. So [facility] [phone].    ATENCIÓN: Si habla doe, tiene a chaves disposición servicios gratuitos de asistencia lingüística. Daksha luz [phone].    We comply with applicable federal civil rights laws and [place] laws. We do not discriminate on the basis of race, color, national origin, age, disability, sex, sexual orientation, or gender identity.            Thank you!     Thank you for choosing [facility]  for your care. Our goal is always to provide you with excellent care. Hearing back from our patients is one way we can continue to improve our services. Please take a few minutes to complete the written survey that you may receive in the mail after your visit with us. Thank you!             Your Updated Medication List - Protect others around you: Learn how to safely use, store and throw away your medicines at www.disposemymeds.org.          This list is accurate as of: 9/29/17 12:31 PM.  Always use your most recent med list.                   Brand Name Dispense Instructions for use Diagnosis    atomoxetine 40 MG capsule    STRATTERA    30 capsule    Take 1 capsule (40 mg) by mouth daily    Hypersomnia       busPIRone 5 MG tablet    BUSPAR    90 tablet    Take 1 tablet (5 mg) by mouth 3 times daily    Anxiety       flunisolide 29 MCG/ACT (0.025%) Soln    NASAREL    1 Box    Spray 2 sprays into both nostrils 2 times daily    Nasal congestion       PARoxetine 10 MG tablet    PAXIL    15 tablet    Take 0.5 tablets (5 mg) by mouth At Bedtime    Major depressive disorder, recurrent episode, moderate (H), Generalized anxiety disorder       prenatal multivitamin plus iron 27-0.8 MG Tabs per tablet      Take 1 tablet by mouth daily        ranitidine 150 MG tablet    ZANTAC    60 tablet    Take 1 tablet (150 mg) by mouth 2 times daily    Gastroesophageal reflux disease without esophagitis

## 2017-09-29 NOTE — NURSING NOTE
"Chief Complaint   Patient presents with     Breathing Problem       Initial /78 (BP Location: Left arm, Patient Position: Chair, Cuff Size: Adult Regular)  Pulse 108  Temp 98.1  F (36.7  C) (Oral)  Resp 18  Wt 139 lb 6.4 oz (63.2 kg)  LMP 03/01/2017 (Exact Date)  SpO2 100%  BMI 23.93 kg/m2 Estimated body mass index is 23.93 kg/(m^2) as calculated from the following:    Height as of 9/11/17: 5' 4\" (1.626 m).    Weight as of this encounter: 139 lb 6.4 oz (63.2 kg).  Medication Reconciliation: complete    "

## 2017-09-30 ENCOUNTER — HOSPITAL ENCOUNTER (EMERGENCY)
Facility: CLINIC | Age: 37
Discharge: HOME | End: 2017-09-30
Attending: PHYSICIAN ASSISTANT | Admitting: PHYSICIAN ASSISTANT
Payer: COMMERCIAL

## 2017-09-30 VITALS
SYSTOLIC BLOOD PRESSURE: 126 MMHG | WEIGHT: 143 LBS | BODY MASS INDEX: 24.55 KG/M2 | HEART RATE: 104 BPM | RESPIRATION RATE: 20 BRPM | DIASTOLIC BLOOD PRESSURE: 71 MMHG | TEMPERATURE: 98.8 F | OXYGEN SATURATION: 100 %

## 2017-09-30 DIAGNOSIS — F17.200 TOBACCO USE DISORDER: ICD-10-CM

## 2017-09-30 DIAGNOSIS — Z72.0 TOBACCO ABUSE: ICD-10-CM

## 2017-09-30 DIAGNOSIS — Z3A.30 30 WEEKS GESTATION OF PREGNANCY: ICD-10-CM

## 2017-09-30 DIAGNOSIS — J20.9 ACUTE BRONCHITIS, UNSPECIFIED ORGANISM: ICD-10-CM

## 2017-09-30 DIAGNOSIS — O09.513 ELDERLY PRIMIGRAVIDA IN THIRD TRIMESTER: ICD-10-CM

## 2017-09-30 LAB
ALBUMIN SERPL-MCNC: 2.6 G/DL (ref 3.4–5)
ALBUMIN UR-MCNC: NEGATIVE MG/DL
ALP SERPL-CCNC: 94 U/L (ref 40–150)
ALT SERPL W P-5'-P-CCNC: 22 U/L (ref 0–50)
ANION GAP SERPL CALCULATED.3IONS-SCNC: 9 MMOL/L (ref 3–14)
APPEARANCE UR: CLEAR
AST SERPL W P-5'-P-CCNC: 13 U/L (ref 0–45)
BASOPHILS # BLD AUTO: 0 10E9/L (ref 0–0.2)
BASOPHILS NFR BLD AUTO: 0.1 %
BILIRUB SERPL-MCNC: 0.3 MG/DL (ref 0.2–1.3)
BILIRUB UR QL STRIP: NEGATIVE
BUN SERPL-MCNC: 10 MG/DL (ref 7–30)
CALCIUM SERPL-MCNC: 8.4 MG/DL (ref 8.5–10.1)
CHLORIDE SERPL-SCNC: 109 MMOL/L (ref 94–109)
CO2 SERPL-SCNC: 21 MMOL/L (ref 20–32)
COLOR UR AUTO: YELLOW
CREAT SERPL-MCNC: 0.54 MG/DL (ref 0.52–1.04)
DIFFERENTIAL METHOD BLD: ABNORMAL
EOSINOPHIL # BLD AUTO: 0.2 10E9/L (ref 0–0.7)
EOSINOPHIL NFR BLD AUTO: 1.3 %
ERYTHROCYTE [DISTWIDTH] IN BLOOD BY AUTOMATED COUNT: 13.8 % (ref 10–15)
GFR SERPL CREATININE-BSD FRML MDRD: >90 ML/MIN/1.7M2
GLUCOSE SERPL-MCNC: 106 MG/DL (ref 70–99)
GLUCOSE UR STRIP-MCNC: NEGATIVE MG/DL
HCT VFR BLD AUTO: 30.2 % (ref 35–47)
HGB BLD-MCNC: 10.5 G/DL (ref 11.7–15.7)
HGB UR QL STRIP: NEGATIVE
IMM GRANULOCYTES # BLD: 0.1 10E9/L (ref 0–0.4)
IMM GRANULOCYTES NFR BLD: 0.6 %
KETONES UR STRIP-MCNC: NEGATIVE MG/DL
LEUKOCYTE ESTERASE UR QL STRIP: NEGATIVE
LYMPHOCYTES # BLD AUTO: 1.7 10E9/L (ref 0.8–5.3)
LYMPHOCYTES NFR BLD AUTO: 11.9 %
MCH RBC QN AUTO: 33.3 PG (ref 26.5–33)
MCHC RBC AUTO-ENTMCNC: 34.8 G/DL (ref 31.5–36.5)
MCV RBC AUTO: 96 FL (ref 78–100)
MONOCYTES # BLD AUTO: 1.2 10E9/L (ref 0–1.3)
MONOCYTES NFR BLD AUTO: 8 %
NEUTROPHILS # BLD AUTO: 11.2 10E9/L (ref 1.6–8.3)
NEUTROPHILS NFR BLD AUTO: 78.1 %
NITRATE UR QL: NEGATIVE
PH UR STRIP: 6 PH (ref 5–7)
PLATELET # BLD AUTO: 153 10E9/L (ref 150–450)
POTASSIUM SERPL-SCNC: 3.4 MMOL/L (ref 3.4–5.3)
PROT SERPL-MCNC: 5.5 G/DL (ref 6.8–8.8)
RBC # BLD AUTO: 3.15 10E12/L (ref 3.8–5.2)
SODIUM SERPL-SCNC: 139 MMOL/L (ref 133–144)
SOURCE: NORMAL
SP GR UR STRIP: 1.01 (ref 1–1.03)
UROBILINOGEN UR STRIP-MCNC: 0 MG/DL (ref 0–2)
WBC # BLD AUTO: 14.4 10E9/L (ref 4–11)

## 2017-09-30 PROCEDURE — 94640 AIRWAY INHALATION TREATMENT: CPT | Performed by: PHYSICIAN ASSISTANT

## 2017-09-30 PROCEDURE — 85025 COMPLETE CBC W/AUTO DIFF WBC: CPT | Performed by: PHYSICIAN ASSISTANT

## 2017-09-30 PROCEDURE — 36415 COLL VENOUS BLD VENIPUNCTURE: CPT | Performed by: PHYSICIAN ASSISTANT

## 2017-09-30 PROCEDURE — 80053 COMPREHEN METABOLIC PANEL: CPT | Performed by: PHYSICIAN ASSISTANT

## 2017-09-30 PROCEDURE — 25000125 ZZHC RX 250: Performed by: PHYSICIAN ASSISTANT

## 2017-09-30 PROCEDURE — 99283 EMERGENCY DEPT VISIT LOW MDM: CPT | Mod: 25 | Performed by: PHYSICIAN ASSISTANT

## 2017-09-30 PROCEDURE — 81003 URINALYSIS AUTO W/O SCOPE: CPT | Performed by: PHYSICIAN ASSISTANT

## 2017-09-30 PROCEDURE — 99284 EMERGENCY DEPT VISIT MOD MDM: CPT | Mod: Z6 | Performed by: PHYSICIAN ASSISTANT

## 2017-09-30 RX ORDER — AZITHROMYCIN 250 MG/1
TABLET, FILM COATED ORAL
Qty: 6 TABLET | Refills: 0 | Status: SHIPPED | OUTPATIENT
Start: 2017-09-30 | End: 2017-10-05

## 2017-09-30 RX ORDER — ALBUTEROL SULFATE 0.83 MG/ML
2.5 SOLUTION RESPIRATORY (INHALATION)
Status: DISCONTINUED | OUTPATIENT
Start: 2017-09-30 | End: 2017-09-30 | Stop reason: HOSPADM

## 2017-09-30 RX ORDER — ALBUTEROL SULFATE 90 UG/1
2 AEROSOL, METERED RESPIRATORY (INHALATION) EVERY 4 HOURS PRN
Qty: 1 INHALER | Refills: 0 | Status: ON HOLD | OUTPATIENT
Start: 2017-09-30 | End: 2018-01-06

## 2017-09-30 RX ADMIN — ALBUTEROL SULFATE 2.5 MG: 2.5 SOLUTION RESPIRATORY (INHALATION) at 19:35

## 2017-09-30 ASSESSMENT — ENCOUNTER SYMPTOMS
LIGHT-HEADEDNESS: 0
DIZZINESS: 0
COUGH: 1
CHILLS: 1
SINUS PRESSURE: 1
NAUSEA: 0
FEVER: 0
DIARRHEA: 0
SHORTNESS OF BREATH: 1
VOMITING: 0
ABDOMINAL PAIN: 0
SORE THROAT: 0
WHEEZING: 1

## 2017-09-30 NOTE — ED NOTES
Patient states she was seen for shortness of breath at clinic yesterday. They called her back yesterday and told her to be seen for a chest xray as lab work was elevated.

## 2017-09-30 NOTE — ED AVS SNAPSHOT
[facility]    [address]     [place] [address]    Phone:  [phone]    Fax:  [phone]                                       Tasha Short   MRN: [id number]    Department:  [facility]   Date of Visit:  9/30/2017           Patient Information     Date Of Birth          1980        Your diagnoses for this visit were:     Acute bronchitis, unspecified organism     Elderly primigravida in third trimester     Tobacco abuse        You were seen by Elio Quiles PA-C.      Follow-up Information     Follow up with Enid Mansfield PA-C. Schedule an appointment as soon as possible for a visit in 3 days.    Specialty:  Physician Assistant - Medical    Why:  For ED recheck    Contact information:    [address] [facility] [address]  [facility] [address]  [phone]          Discharge Instructions         What Is Acute Bronchitis?  Acute bronchitis is when the airways in your lungs (bronchial tubes) become red and swollen (inflamed). It is usually caused by a viral infection. But it can also occur because of a bacteria or allergen. Symptoms include a cough that produces yellow or greenish mucus and can last for days or sometimes weeks.  Inside healthy lungs    Air travels in and out of the lungs through the airways. The linings of these airways produce sticky mucus. This mucus traps particles that enter the lungs. Tiny structures called cilia then sweep the particles out of the airways.     Healthy airway: Airways are normally open. Air moves in and out easily.      Healthy cilia: Tiny, hairlike cilia sweep mucus and particles up and out of the airways.   Lungs with bronchitis  Bronchitis often occurs with a cold or the flu virus. The airways become inflamed (red and swollen). There is a deep hacking cough from the extra mucus. Other symptoms may include:    Wheezing    Chest discomfort    Shortness of breath    Mild fever  A  second infection, this time due to bacteria, may then occur. And airways irritated by allergens or smoke are more likely to get infected.        Inflamed airway: Inflammation and extra mucus narrow the airway, causing shortness of breath.      Impaired cilia: Extra mucus impairs cilia, causing congestion and wheezing. Smoking makes the problem worse.   Making a diagnosis  A physical exam, health history, and certain tests help your healthcare provider make the diagnosis.  Health history  Your healthcare provider will ask you about your symptoms.  The exam  Your provider listens to your chest for signs of congestion. He or she may also check your ears, nose, and throat.  Possible tests    A sputum test for bacteria. This requires a sample of mucus from your lungs.    A nasal or throat swab. This tests to see if you have a bacterial infection.    A chest X-ray. This is done if your healthcare provider thinks you have pneumonia.    Tests to check for an underlying condition. Other tests may be done to check for things such as allergies, asthma, or COPD (chronic obstructive pulmonary disease). You may need to see a specialist for more lung function testing.  Treating a cough  The main treatment for bronchitis is easing symptoms. Avoiding smoke, allergens, and other things that trigger coughing can often help. If the infection is bacterial, you may be given antibiotics. During the illness, it's important to get plenty of sleep. To ease symptoms:    Don t smoke. Also avoid secondhand smoke.    Use a humidifier. Or try breathing in steam from a hot shower. This may help loosen mucus.    Drink a lot of water and juice. They can soothe the throat and may help thin mucus.    Sit up or use extra pillows when in bed. This helps to lessen coughing and congestion.    Ask your provider about using medicine. Ask about using cough medicine, pain and fever medicine, or a decongestant.  Antibiotics  Most cases of bronchitis are caused  by cold or flu viruses. They don t need antibiotics to treat them, even if your mucus is thick and green or yellow. Antibiotics don t treat viral illness and antibiotics have not been shown to have any benefit in cases of acute bronchitis. Taking antibiotics when they are not needed increases your risk of getting an infection later that is antibiotic-resistant. Antibiotics can also cause severe cases of diarrhea that require other antibiotics to treat.  It is important that you accept your healthcare provider's opinion to not use antibiotics. Your provider will prescribe antibiotics if the infection is caused by bacteria. If they are prescribed:    Take all of the medicine. Take the medicine until it is used up, even if symptoms have improved. If you don t, the bronchitis may come back.    Take the medicines as directed. For instance, some medicines should be taken with food.    Ask about side effects. Ask your provider or pharmacist what side effects are common, and what to do about them.  Follow-up care  You should see your provider again in 2 to 3 weeks. By this time, symptoms should have improved. An infection that lasts longer may mean you have a more serious problem.  Prevention    Avoid tobacco smoke. If you smoke, quit. Stay away from smoky places. Ask friends and family not to smoke around you, or in your home or car.    Get checked for allergies.    Ask your provider about getting a yearly flu shot. Also ask about pneumococcal or pneumonia shots.    Wash your hands often. This helps reduce the chance of picking up viruses that cause colds and flu.  Call your healthcare provider if:    Symptoms worsen, or you have new symptoms    Breathing problems worsen or  become severe    Symptoms don t get better within a week, or within 3 days of taking antibiotics   Date Last Reviewed: 2/1/2017 2000-2017 [employer]. [address] [place] [address], [place] [address]. All rights reserved. This information is  not intended as a substitute for professional medical care. Always follow your healthcare professional's instructions.          Future Appointments        Provider Department Dept Phone Center    10/16/2017 7:45 AM Madeline Benavidez DO [facility] [phone] [facility]    10/30/2017 7:30 AM Madeline Benavidez DO [facility] [phone] [facility]    11/13/2017 7:30 AM Madeline Benavidez [facility] [phone] [facility]      24 Hour Appointment Hotline       To make an appointment at any [facility], call [phone] ([phone]). If you don't have a family doctor or clinic, we will help you find one. [facility] are conveniently located to serve the needs of you and your family.             Review of your medicines      START taking        Dose / Directions Last dose taken    albuterol 108 (90 BASE) MCG/ACT Inhaler   Commonly known as:  PROAIR HFA/PROVENTIL HFA/VENTOLIN HFA   Dose:  2 puff   Quantity:  1 Inhaler        Inhale 2 puffs into the lungs every 4 hours as needed   Refills:  0        azithromycin 250 MG tablet   Commonly known as:  ZITHROMAX Z-CHANEL   Quantity:  6 tablet        Two tablets on the first day, then one tablet daily for the next 4 days   Refills:  0          Our records show that you are taking the medicines listed below. If these are incorrect, please call your family doctor or clinic.        Dose / Directions Last dose taken    atomoxetine 40 MG capsule   Commonly known as:  STRATTERA   Dose:  40 mg   Quantity:  30 capsule        Take 1 capsule (40 mg) by mouth daily   Refills:  1        busPIRone 5 MG tablet   Commonly known as:  BUSPAR   Dose:  5 mg   Quantity:  90 tablet        Take 1 tablet (5 mg) by mouth 3 times daily   Refills:  3        flunisolide 29 MCG/ACT (0.025%) Soln   Commonly known as:  NASAREL   Dose:  2 spray   Quantity:  1 Box        Spray 2 sprays into both nostrils 2 times daily   Refills:   12        PARoxetine 10 MG tablet   Commonly known as:  PAXIL   Dose:  5 mg   Quantity:  15 tablet        Take 0.5 tablets (5 mg) by mouth At Bedtime   Refills:  1        prenatal multivitamin plus iron 27-0.8 MG Tabs per tablet   Dose:  1 tablet   Indication:  Pregnancy        Take 1 tablet by mouth daily   Refills:  0        ranitidine 150 MG tablet   Commonly known as:  ZANTAC   Dose:  150 mg   Quantity:  60 tablet        Take 1 tablet (150 mg) by mouth 2 times daily   Refills:  1                Prescriptions were sent or printed at these locations (2 Prescriptions)                   [facility]   [address] [address]    Telephone:  [phone]   Fax:  [phone]   Hours:                  These medications are not ready yet because we are checking if your insurance will help you pay for them. Call your pharmacy to confirm that your medication is ready for pickup. It may take up to 24 hours for them to receive the prescription. If the prescription is not ready within 3 business days, please contact your clinic or your provider (2 of 2)         azithromycin (ZITHROMAX Z-CHANEL) 250 MG tablet               albuterol (PROAIR HFA/PROVENTIL HFA/VENTOLIN HFA) 108 (90 BASE) MCG/ACT Inhaler                Procedures and tests performed during your visit     CBC with platelets differential    Comprehensive metabolic panel    UA reflex to Microscopic      Orders Needing Specimen Collection     None      Pending Results     No orders found from 9/28/2017 to 10/1/2017.            Pending Culture Results     No orders found from 9/28/2017 to 10/1/2017.            Pending Results Instructions     If you had any lab results that were not finalized at the time of your Discharge, you can call the ED Lab Result RN at [phone]. You will be contacted by this team for any positive Lab results or changes in treatment. The nurses are available 7 days a week from 10A to 6:30P.  You  can leave a message 24 hours per day and they will return your call.        Thank you for choosing [facility]       Thank you for choosing [facility] for your care. Our goal is always to provide you with excellent care. Hearing back from our patients is one way we can continue to improve our services. Please take a few minutes to complete the written survey that you may receive in the mail after you visit with us. Thank you!        [employer] Information     [employer] gives you secure access to your electronic health record. If you see a primary care provider, you can also send messages to your care team and make appointments. If you have questions, please call your primary care clinic.  If you do not have a primary care provider, please call [phone] and they will assist you.        Care EveryWhere ID     This is your Care EveryWhere ID. This could be used by other organizations to access your [facility] medical records  [id number]        Equal Access to Services     LINDY CAMERON : Catalina Rossi, wolf hunter, noemi ferrer. So [facility] [phone].    ATENCIÓN: Si habla español, tiene a chaves disposición servicios gratuitos de asistencia lingüística. Llame al [phone].    We comply with applicable federal civil rights laws and [place] laws. We do not discriminate on the basis of race, color, national origin, age, disability, sex, sexual orientation, or gender identity.            After Visit Summary       This is your record. Keep this with you and show to your community pharmacist(s) and doctor(s) at your next visit.

## 2017-09-30 NOTE — ED PROVIDER NOTES
History     Chief Complaint   Patient presents with     Shortness of Breath     The history is provided by the patient.     Tasha Short is a 37 year old female who presents to the ED for evaluation of shortness of breath. Patient is currently 30 weeks pregnant. She has been experiencing shortness of breath since yesterday. She was seen in the clinic yesterday and received a call that her lab work was abnormal. She was told to come into ED to have chest xray to rule out PE. She has been fighting a sinus/nasal cold over the past couple of weeks. Has had chest tightness, head pressure and a productive cough. Productive cough is a yellow mucous. She typically has allergies in the fall, that involves nasal congestion, but is typically relieved by a nasal decongestant. No recent long car rides or plane rides. Smokes roughly around 5 cigarettes a day.     Denies sore throat, leg edema, calf pain, rash or abdominal pain.     I have reviewed the Medications, Allergies, Past Medical and Surgical History, and Social History in the Epic system.    Allergies:   Allergies   Allergen Reactions     Penicillins      hives     Phenytoin      rash,puritis (Dilantin)     Prednisone Itching     Face itching, unable to concentrate          Current Facility-Administered Medications on File Prior to Encounter:  bupivacaine 0.5% EPINEPHrine 1:200,000 injection     Current Outpatient Prescriptions on File Prior to Encounter:  PARoxetine (PAXIL) 10 MG tablet Take 0.5 tablets (5 mg) by mouth At Bedtime   atomoxetine (STRATTERA) 40 MG capsule Take 1 capsule (40 mg) by mouth daily (Patient not taking: Reported on 9/29/2017)   ranitidine (ZANTAC) 150 MG tablet Take 1 tablet (150 mg) by mouth 2 times daily   Prenatal Vit-Fe Fumarate-FA (PRENATAL MULTIVITAMIN  PLUS IRON) 27-0.8 MG TABS per tablet Take 1 tablet by mouth daily   busPIRone (BUSPAR) 5 MG tablet Take 1 tablet (5 mg) by mouth 3 times daily   flunisolide (NASAREL) 29 MCG/ACT  "(0.025%) SOLN Spray 2 sprays into both nostrils 2 times daily       Patient Active Problem List   Diagnosis     ROSA I (cervical intraepithelial neoplasia I)     Cervical dysplasia     S/P LEEP     Narcolepsy and cataplexy     Health Care Home     Calcific tendonitis peroneals     Hypersomnia     Major depressive disorder, recurrent episode, moderate (H)     Generalized anxiety disorder     HTN, goal below 140/90     Irregular heartbeat     Tobacco use disorder     Elderly primigravida in first trimester     Need for Tdap vaccination     H/O ETOH abuse     Gastroesophageal reflux disease without esophagitis     Low lying placenta, antepartum       Past Surgical History:   Procedure Laterality Date     COLPOSCOPY CERVIX, LOOP ELECTRODE BIOPSY, COMBINED  11/2010    ROSA 2/3     HC REMOVAL OF TONSILS,12+ Y/O  1999    Tonsils 12+y.o.     REPAIR TENDON PERONEAL  11/15/2013    Procedure: REPAIR TENDON PERONEAL;  right peroneal tendon  transfer;  Surgeon: Jesus Manuel Oden DPM;  Location:  OR       Social History   Substance Use Topics     Smoking status: Current Every Day Smoker     Packs/day: 0.50     Last attempt to quit: 1/5/2015     Smokeless tobacco: Never Used     Alcohol use No       Most Recent Immunizations   Administered Date(s) Administered     HepB 02/18/1997     Influenza Vaccine IM 3yrs+ 4 Valent IIV4 11/05/2015     MMR 01/11/1982     Mantoux 05/23/2005     TD (ADULT, 7+) 10/13/2003     TDAP Vaccine (Adacel) 09/11/2017       BMI: Estimated body mass index is 24.55 kg/(m^2) as calculated from the following:    Height as of 9/11/17: 1.626 m (5' 4\").    Weight as of this encounter: 64.9 kg (143 lb).      Review of Systems   Constitutional: Positive for chills. Negative for fever.   HENT: Positive for congestion and sinus pressure. Negative for sore throat.    Respiratory: Positive for cough, shortness of breath and wheezing.    Cardiovascular: Negative for leg swelling.   Gastrointestinal: Negative for " abdominal pain, diarrhea, nausea and vomiting.   Skin: Negative for rash.   Neurological: Negative for dizziness and light-headedness.   All other systems reviewed and are negative.      Physical Exam   BP: (!) 141/106  Pulse: 104  Temp: 98.8  F (37.1  C)  Resp: 20  Weight: 64.9 kg (143 lb)  SpO2: 100 %  Physical Exam   Constitutional: She is oriented to person, place, and time. She appears well-developed and well-nourished. No distress.   HENT:   Head: Normocephalic and atraumatic.   Right Ear: Tympanic membrane, external ear and ear canal normal.   Left Ear: Tympanic membrane, external ear and ear canal normal.   Mouth/Throat: Uvula is midline, oropharynx is clear and moist and mucous membranes are normal. No oropharyngeal exudate, posterior oropharyngeal edema or posterior oropharyngeal erythema.   Eyes: Conjunctivae, EOM and lids are normal. Pupils are equal, round, and reactive to light.   Neck: Trachea normal and normal range of motion. Neck supple.   Cardiovascular: Regular rhythm, normal heart sounds and normal pulses.  Tachycardia present.  Exam reveals no gallop, no distant heart sounds and no friction rub.    No murmur heard.  Pulmonary/Chest: Effort normal. No respiratory distress. She has no decreased breath sounds. She has wheezes in the right upper field and the right middle field. She has no rhonchi. She has rales.   Abdominal: Soft. Bowel sounds are normal. There is no tenderness. There is no rigidity, no rebound and no guarding.   Musculoskeletal: Normal range of motion.   Neurological: She is alert and oriented to person, place, and time.   Skin: Skin is warm and dry. No rash noted. No pallor.   Psychiatric: She has a normal mood and affect. Her behavior is normal.   Nursing note and vitals reviewed.      ED Course     ED Course       WELLS PE SCORE for Pulmonary Embolism likelihood (calculator)  Background  Calculates likelihood of PE based on 7 criteria including suspected DVT, HR>100, recent  surgery or immobilization, prior VTE, hemoptysis, active cancer.  Data  has ROSA I (cervical intraepithelial neoplasia I); Cervical dysplasia; S/P LEEP; Narcolepsy and cataplexy; Health Care Home; Calcific tendonitis peroneals; Hypersomnia; Major depressive disorder, recurrent episode, moderate (H); Generalized anxiety disorder; HTN, goal below 140/90; Irregular heartbeat; Tobacco use disorder; Elderly primigravida in first trimester; Need for Tdap vaccination; H/O ETOH abuse; Gastroesophageal reflux disease without esophagitis; and Low lying placenta, antepartum on her problem list.   has a past surgical history that includes REMOVAL OF TONSILS,12+ Y/O (1999); Colposcopy cervix, loop electrode biopsy, combined (11/2010); and Repair tendon peroneal (11/15/2013).  Pulse: 104  Criteria   Of possible 12.5 points for 7 possible items:  NEGATIVE  Interpretation  Wells PE Score: 0  Score 0-2 points: Low PE probability (3.6% risk)            Procedures             Critical Care time:  none                   Labs Ordered and Resulted from Time of ED Arrival Up to the Time of Departure from the ED   CBC WITH PLATELETS DIFFERENTIAL - Abnormal; Notable for the following:        Result Value    WBC 14.4 (*)     RBC Count 3.15 (*)     Hemoglobin 10.5 (*)     Hematocrit 30.2 (*)     MCH 33.3 (*)     Absolute Neutrophil 11.2 (*)     All other components within normal limits   COMPREHENSIVE METABOLIC PANEL - Abnormal; Notable for the following:     Glucose 106 (*)     Calcium 8.4 (*)     Albumin 2.6 (*)     Protein Total 5.5 (*)     All other components within normal limits   URINE MACROSCOPIC WITH REFLEX TO MICRO   PERIPHERAL IV CATHETER        Results for orders placed or performed during the hospital encounter of 09/30/17 (from the past 24 hour(s))   CBC with platelets differential   Result Value Ref Range    WBC 14.4 (H) 4.0 - 11.0 10e9/L    RBC Count 3.15 (L) 3.8 - 5.2 10e12/L    Hemoglobin 10.5 (L) 11.7 - 15.7 g/dL     Hematocrit 30.2 (L) 35.0 - 47.0 %    MCV 96 78 - 100 fl    MCH 33.3 (H) 26.5 - 33.0 pg    MCHC 34.8 31.5 - 36.5 g/dL    RDW 13.8 10.0 - 15.0 %    Platelet Count 153 150 - 450 10e9/L    Diff Method Automated Method     % Neutrophils 78.1 %    % Lymphocytes 11.9 %    % Monocytes 8.0 %    % Eosinophils 1.3 %    % Basophils 0.1 %    % Immature Granulocytes 0.6 %    Absolute Neutrophil 11.2 (H) 1.6 - 8.3 10e9/L    Absolute Lymphocytes 1.7 0.8 - 5.3 10e9/L    Absolute Monocytes 1.2 0.0 - 1.3 10e9/L    Absolute Eosinophils 0.2 0.0 - 0.7 10e9/L    Absolute Basophils 0.0 0.0 - 0.2 10e9/L    Abs Immature Granulocytes 0.1 0 - 0.4 10e9/L   Comprehensive metabolic panel   Result Value Ref Range    Sodium 139 133 - 144 mmol/L    Potassium 3.4 3.4 - 5.3 mmol/L    Chloride 109 94 - 109 mmol/L    Carbon Dioxide 21 20 - 32 mmol/L    Anion Gap 9 3 - 14 mmol/L    Glucose 106 (H) 70 - 99 mg/dL    Urea Nitrogen 10 7 - 30 mg/dL    Creatinine 0.54 0.52 - 1.04 mg/dL    GFR Estimate >90 >60 mL/min/1.7m2    GFR Estimate If Black >90 >60 mL/min/1.7m2    Calcium 8.4 (L) 8.5 - 10.1 mg/dL    Bilirubin Total 0.3 0.2 - 1.3 mg/dL    Albumin 2.6 (L) 3.4 - 5.0 g/dL    Protein Total 5.5 (L) 6.8 - 8.8 g/dL    Alkaline Phosphatase 94 40 - 150 U/L    ALT 22 0 - 50 U/L    AST 13 0 - 45 U/L   UA reflex to Microscopic   Result Value Ref Range    Color Urine Yellow     Appearance Urine Clear     Glucose Urine Negative NEG^Negative mg/dL    Bilirubin Urine Negative NEG^Negative    Ketones Urine Negative NEG^Negative mg/dL    Specific Gravity Urine 1.008 1.003 - 1.035    Blood Urine Negative NEG^Negative    pH Urine 6.0 5.0 - 7.0 pH    Protein Albumin Urine Negative NEG^Negative mg/dL    Urobilinogen mg/dL 0.0 0.0 - 2.0 mg/dL    Nitrite Urine Negative NEG^Negative    Leukocyte Esterase Urine Negative NEG^Negative    Source Unspecified Urine        Medications   albuterol neb solution 2.5 mg (2.5 mg Nebulization Given 9/30/17 1935)       Assessments & Plan (with  Medical Decision Making)     Acute bronchitis, unspecified organism  Elderly primigravida in third trimester  Tobacco abuse     37 year old female currently 30w3d gestation with her first pregnancy who presents for evaluation of a worsening cough over the past 1 week. Now developing some dyspnea in the past 2 days.  With symptom onset, she had rhinorrhea and congestion, but that has now improved. Pulmonary symptoms persist. Patient is a smoker. Denies any lower extremity edema or discomfort. She was seen in the clinic yesterday and had laboratory work with noted leukocytosis. She is recommended to have a chest x-ray, and therefore came to the ED today. On exam blood pressure is elevated initially but by the end of her visit her blood pressure was down to 126/71. Temperature is 98.8 and oxygen saturations are 100% on room air. Right sided wheezing and a few rales noted on pulmonary exam. Albuterol nebulization treatment provided with good relief. No further dyspnea. No further wheezing noted on repeat exam. Laboratory workup with a CBC and CMP shows no elevation in her hepatic enzymes, no thrombocytopenia, and no proteinuria. Hemoglobin slightly low at 10.5, but she does not have any active leading signs, and this is likely secondary to her pregnancy. I do not think the risk of chest x-ray is worth it at this time. She has persistent and worsening symptoms of bronchitis without exam findings suggestive of pneumonia. Given her smoking history, I think treatment with antibiotics would be appropriate. Azithromycin prescribed as she is allergic to penicillins. Possible side effects of medication discussed. We discussed that this is category B in pregnancy. I did send her home with an albuterol MDI to use given the significant improvement using the albuterol nebulization treatment in the ED. Increase fluids and increase rest. Return if symptoms worsening or changing. The patient was in agreement with this plan and was  suitable for discharge. I recommended follow up appointment with her PCP in the next 3-4 days for repeat evaluation to recheck her condition.      I have reviewed the nursing notes.    I have reviewed the findings, diagnosis, plan and need for follow up with the patient.       Discharge Medication List as of 9/30/2017  8:11 PM      START taking these medications    Details   azithromycin (ZITHROMAX Z-CHANEL) 250 MG tablet Two tablets on the first day, then one tablet daily for the next 4 days, Disp-6 tablet, R-0, E-Prescribe      albuterol (PROAIR HFA/PROVENTIL HFA/VENTOLIN HFA) 108 (90 BASE) MCG/ACT Inhaler Inhale 2 puffs into the lungs every 4 hours as needed, Disp-1 Inhaler, R-0, E-Prescribe             Final diagnoses:   Acute bronchitis, unspecified organism   Elderly primigravida in third trimester   Tobacco abuse     This document serves as a record of services personally performed by Elio Quiles PA. It was created on their behalf by Erika Lima, a trained medical scribe. The creation of this record is based on the provider's personal observations and the statements of the patient. This document has been checked and approved by the attending provider.    Note: Chart documentation done in part with Dragon Voice Recognition software. Although reviewed after completion, some word and grammatical errors may remain.        9/30/2017   Elio Quiles PA-C   [facility] EMERGENCY DEPARTMENT     Elio Quiles PA-C  09/30/17 [id number]

## 2017-09-30 NOTE — ED AVS SNAPSHOT
[facility] Emergency Department    [address] [place] [address]    Phone:  [phone]    Fax:  [phone]                                       Tasha Short   MRN: [id number]    Department:  [facility] Emergency Department   Date of Visit:  9/30/2017           After Visit Summary Signature Page     I have received my discharge instructions, and my questions have been answered. I have discussed any challenges I see with this plan with the nurse or doctor.    ..........................................................................................................................................  Patient/Patient Representative Signature      ..........................................................................................................................................  Patient Representative Print Name and Relationship to Patient    ..................................................               ................................................  Date                                            Time    ..........................................................................................................................................  Reviewed by Signature/Title    ...................................................              ..............................................  Date                                                            Time

## 2017-10-01 NOTE — DISCHARGE INSTRUCTIONS

## 2017-10-16 ENCOUNTER — PRENATAL OFFICE VISIT (OUTPATIENT)
Dept: OBGYN | Facility: OTHER | Age: 37
End: 2017-10-16
Payer: COMMERCIAL

## 2017-10-16 VITALS
SYSTOLIC BLOOD PRESSURE: 124 MMHG | WEIGHT: 146 LBS | DIASTOLIC BLOOD PRESSURE: 80 MMHG | BODY MASS INDEX: 25.06 KG/M2 | HEART RATE: 88 BPM

## 2017-10-16 DIAGNOSIS — O09.513 ELDERLY PRIMIGRAVIDA IN THIRD TRIMESTER: Primary | ICD-10-CM

## 2017-10-16 DIAGNOSIS — O44.40 LOW LYING PLACENTA, ANTEPARTUM: ICD-10-CM

## 2017-10-16 DIAGNOSIS — F17.200 TOBACCO USE DISORDER: ICD-10-CM

## 2017-10-16 DIAGNOSIS — F41.1 GENERALIZED ANXIETY DISORDER: ICD-10-CM

## 2017-10-16 PROCEDURE — 99207 ZZC PRENATAL VISIT: CPT | Performed by: OBSTETRICS & GYNECOLOGY

## 2017-10-16 ASSESSMENT — PAIN SCALES - GENERAL: PAINLEVEL: NO PAIN (0)

## 2017-10-16 NOTE — PROGRESS NOTES
37 year old  at 32w5d weeks presents to the clinic for a routine prenatal visit.    No concerns.  Patient denies any vaginal bleeding, leakage of fluid, or contractions     Good fetal movement  Fundal height=32cm  MUNf=702  GERD=stable  Anxiety/depression=stable  Tobacco abuse  Low lying placenta=MFM ultrasound Oct 24th  Discussed labor precautions.  RTC 2 weeks    Madeline Benavidez

## 2017-10-16 NOTE — MR AVS SNAPSHOT
After Visit Summary   10/16/2017    Tasha Short    MRN: [id number]           Patient Information     Date Of Birth          1980        Visit Information        Provider Department      10/16/2017 7:45 AM Madeline Benavidez DO [facility]        Today's Diagnoses     Elderly primigravida in third trimester    -  1    Low lying placenta, antepartum        Generalized anxiety disorder        Tobacco use disorder          Care Instructions    SIGNS OF  LABOR    Labor is  if it happens more than three weeks before your due date.    It can be hard to know if you are in labor, since the symptoms can be like the normal feelings of pregnancy.  Often, the only difference is the symptoms increase or they don't go away.     Signs of  labor can include:    Change in your vaginal discharge:  You will have more vaginal discharge when you are pregnant and it should be creamy white.  Call the clinic right away if your discharge has a foul odor, pink or bloody,  or if it becomes watery or is more than is normal for you during your pregnancy.    More than 5-6 contractions or tightenings per hour.  Contractions can feel like period cramps or bowel (gas or diarrhea) pain.  You will feel it in the lower part of your abdomen, in your back or as a pressure feeling in your bottom.  It is often regular, coming for 30 seconds or a minute and then going away, only to come back 5 or 10 minutes later. Some contractions are normal during pregnancy, but if you are feeling more than 5-6 in one hour, empty your bladder, then drink 16-24 ounces of water, eat a snack and lay down on your left side. Put your hand on your abdomen to count the contractions.  If after one hour of resting you have still had 5-6 contractions call your clinic.          Follow-ups after your visit        Follow-up notes from your care team     Return in about 2 weeks (around 10/30/2017).      Your next 10  appointments already scheduled     Oct 30, 2017  7:30 AM CDT   ESTABLISHED PRENATAL with Madeline Benavidez    [facility]    [address] [facility] [address] [facility] [address] [phone]            Nov 13, 2017  7:30 AM CST   ESTABLISHED PRENATAL with Madeline Benavidez DO   [facility] ([facility])    [address] [facility] [id number] [phone]              Who to contact     If you have questions or need follow up information about today's clinic visit or your schedule please contact [facility] directly at [phone].  Normal or non-critical lab and imaging results will be communicated to you by Tibersofthart, letter or phone within 4 business days after the clinic has received the results. If you do not hear from us within 7 days, please contact the clinic through Handpayt or phone. If you have a critical or abnormal lab result, we will notify you by phone as soon as possible.  Submit refill requests through [employer] or call your pharmacy and they will forward the refill request to us. Please allow 3 business days for your refill to be completed.          Additional Information About Your Visit        Emerging [employer] Information     [employer] gives you secure access to your electronic health record. If you see a primary care provider, you can also send messages to your care team and make appointments. If you have questions, please call your primary care clinic.  If you do not have a primary care provider, please call [phone] and they will assist you.        Care EveryWhere ID     This is your Care EveryWhere ID. This could be used by other organizations to access your [place] medical records  [id number]        Your Vitals Were     Pulse Last Period BMI (Body Mass Index)             88 03/01/2017 (Exact Date) 25.06 kg/m2          Blood Pressure from Last 3 Encounters:   10/16/17 124/80   09/30/17 126/71   09/29/17  118/78    Weight from Last 3 Encounters:   10/16/17 146 lb (66.2 kg)   09/30/17 143 lb (64.9 kg)   09/29/17 139 lb 6.4 oz (63.2 kg)              Today, you had the following     No orders found for display       Primary Care Provider Office Phone # Fax #    Enid PORTILLO MARTHA [place] [phone]       [address]  [facility] [address]        Equal Access to Services     LINDY CAMERON : Hadii aad ku hadasho Soomaali, waaxda luqadaha, qaybta kaalmada adeegyada, waxay idiin hayaan constantine singh . So [facility] [phone].    ATENCIÓN: Si habla español, tiene a chaves disposición servicios gratuitos de asistencia lingüística. Ll[facility] [phone].    We comply with applicable federal civil rights laws and [place] laws. We do not discriminate on the basis of race, color, national origin, age, disability, sex, sexual orientation, or gender identity.            Thank you!     Thank you for choosing [facility]  for your care. Our goal is always to provide you with excellent care. Hearing back from our patients is one way we can continue to improve our services. Please take a few minutes to complete the written survey that you may receive in the mail after your visit with us. Thank you!             Your Updated Medication List - Protect others around you: Learn how to safely use, store and throw away your medicines at www.disposemymeds.org.          This list is accurate as of: 10/16/17  8:07 AM.  Always use your most recent med list.                   Brand Name Dispense Instructions for use Diagnosis    albuterol 108 (90 BASE) MCG/ACT Inhaler    PROAIR HFA/PROVENTIL HFA/VENTOLIN HFA    1 Inhaler    Inhale 2 puffs into the lungs every 4 hours as needed        atomoxetine 40 MG capsule    STRATTERA    30 capsule    Take 1 capsule (40 mg) by mouth daily    Hypersomnia       busPIRone 5 MG tablet    BUSPAR    90 tablet    Take 1 tablet (5 mg) by mouth 3 times daily    Anxiety        flunisolide 29 MCG/ACT (0.025%) Soln    NASAREL    1 Box    Spray 2 sprays into both nostrils 2 times daily    Nasal congestion       PARoxetine 10 MG tablet    PAXIL    15 tablet    Take 0.5 tablets (5 mg) by mouth At Bedtime    Major depressive disorder, recurrent episode, moderate (H), Generalized anxiety disorder       prenatal multivitamin plus iron 27-0.8 MG Tabs per tablet      Take 1 tablet by mouth daily        ranitidine 150 MG tablet    ZANTAC    60 tablet    Take 1 tablet (150 mg) by mouth 2 times daily    Gastroesophageal reflux disease without esophagitis

## 2017-10-16 NOTE — NURSING NOTE
"Chief Complaint   Patient presents with     Prenatal Care       Initial /80  Pulse 88  Wt 146 lb (66.2 kg)  LMP 03/01/2017 (Exact Date)  BMI 25.06 kg/m2 Estimated body mass index is 25.06 kg/(m^2) as calculated from the following:    Height as of 9/11/17: 5' 4\" (1.626 m).    Weight as of this encounter: 146 lb (66.2 kg).  Medication Reconciliation: complete     32w5d    "

## 2017-10-30 ENCOUNTER — PRENATAL OFFICE VISIT (OUTPATIENT)
Dept: OBGYN | Facility: OTHER | Age: 37
End: 2017-10-30
Payer: COMMERCIAL

## 2017-10-30 ENCOUNTER — TELEPHONE (OUTPATIENT)
Dept: OBGYN | Facility: OTHER | Age: 37
End: 2017-10-30

## 2017-10-30 VITALS
HEART RATE: 82 BPM | WEIGHT: 148 LBS | BODY MASS INDEX: 25.4 KG/M2 | SYSTOLIC BLOOD PRESSURE: 134 MMHG | DIASTOLIC BLOOD PRESSURE: 80 MMHG

## 2017-10-30 DIAGNOSIS — K21.9 GASTROESOPHAGEAL REFLUX DISEASE WITHOUT ESOPHAGITIS: ICD-10-CM

## 2017-10-30 DIAGNOSIS — F33.1 MAJOR DEPRESSIVE DISORDER, RECURRENT EPISODE, MODERATE (H): ICD-10-CM

## 2017-10-30 DIAGNOSIS — F17.200 TOBACCO USE DISORDER: ICD-10-CM

## 2017-10-30 DIAGNOSIS — F10.11 H/O ETOH ABUSE: ICD-10-CM

## 2017-10-30 DIAGNOSIS — O44.40 LOW LYING PLACENTA, ANTEPARTUM: Primary | ICD-10-CM

## 2017-10-30 DIAGNOSIS — O09.513 ELDERLY PRIMIGRAVIDA IN THIRD TRIMESTER: ICD-10-CM

## 2017-10-30 DIAGNOSIS — F41.1 GENERALIZED ANXIETY DISORDER: ICD-10-CM

## 2017-10-30 PROCEDURE — 99207 ZZC PRENATAL VISIT: CPT | Performed by: OBSTETRICS & GYNECOLOGY

## 2017-10-30 ASSESSMENT — PAIN SCALES - GENERAL: PAINLEVEL: NO PAIN (0)

## 2017-10-30 NOTE — PROGRESS NOTES
37 year old  at 34w5d weeks presents to the clinic for a routine prenatal visit.  Low lying placenta at 27 6/7 weeks=1.4cm  Repeat ultrasound on  with MFM  Will schedule primary c section today and cancel if needed.  Patient desires   No vaginal bleeding, leakage of fluid, or contractions  Fundal height=34cm  HTEb=690's  Tobacco abuse=a few cigs/day  Anxiety/depression=stable  GERD=stable  Labor precautions discussed  RTC 1 week    Madeline Benavidez

## 2017-10-30 NOTE — NURSING NOTE
"Chief Complaint   Patient presents with     Prenatal Care       Initial /80  Pulse 82  Wt 148 lb (67.1 kg)  LMP 03/01/2017 (Exact Date)  BMI 25.4 kg/m2 Estimated body mass index is 25.4 kg/(m^2) as calculated from the following:    Height as of 9/11/17: 5' 4\" (1.626 m).    Weight as of this encounter: 148 lb (67.1 kg).  Medication Reconciliation: complete     34w5d    "

## 2017-10-30 NOTE — TELEPHONE ENCOUNTER
Surgery Scheduled    Date of Surgery 17 Time of Surgery 7:30am  Procedure:  Section  Hospital/Surgical Facility: [facility]  Surgeon: Dr Benavidez  Type of Anesthesia Anticipated: Spinal  Pre-Op: 17 with Dr Benavidez   Post-Op: 17 with Dr Benavidez  Pre-Certification -to be completed  Consent Signed -to be completed  Hospital Stay -inpatient procedure    Surgery Packet (and/or) Colonscopy Prep (was given/or mailed) to patient. Patient was also instructed to arrive 1 1/2 hour(s) prior to surgery.  Patient understood and agrees to the plan.      Mayda Abbasi  Specialty    ___________________________________________  Surgery Pre-Certification     Medical Record Number: [id number]   Tasha PORTILLO Deja   YOB: 1980   Phone: [phone] (home)   Primary Provider: Enid Mansfield     Reason for Admit:   OB Procedure     Surgeon: Dr Benavidez   Surgical Procedure:  Section   ICD-9 Coded: low lying placenta   Date of Surgery: 17   Consent signed? No    Date signed:   Hospital: [facility]   Inpatient- Length of stay:  3 days.     Requestor:   Myriam Abbasi     Location:   [facility]

## 2017-11-01 ENCOUNTER — TRANSFERRED RECORDS (OUTPATIENT)
Dept: HEALTH INFORMATION MANAGEMENT | Facility: CLINIC | Age: 37
End: 2017-11-01

## 2017-11-13 ENCOUNTER — PRENATAL OFFICE VISIT (OUTPATIENT)
Dept: OBGYN | Facility: OTHER | Age: 37
End: 2017-11-13
Payer: COMMERCIAL

## 2017-11-13 VITALS
OXYGEN SATURATION: 100 % | BODY MASS INDEX: 25.66 KG/M2 | WEIGHT: 149.5 LBS | SYSTOLIC BLOOD PRESSURE: 140 MMHG | HEART RATE: 100 BPM | DIASTOLIC BLOOD PRESSURE: 90 MMHG

## 2017-11-13 DIAGNOSIS — O16.3 ELEVATED BLOOD PRESSURE AFFECTING PREGNANCY IN THIRD TRIMESTER, ANTEPARTUM: Primary | ICD-10-CM

## 2017-11-13 DIAGNOSIS — K21.9 GASTROESOPHAGEAL REFLUX DISEASE WITHOUT ESOPHAGITIS: ICD-10-CM

## 2017-11-13 DIAGNOSIS — F17.200 TOBACCO USE DISORDER: ICD-10-CM

## 2017-11-13 DIAGNOSIS — O09.513 ELDERLY PRIMIGRAVIDA IN THIRD TRIMESTER: ICD-10-CM

## 2017-11-13 DIAGNOSIS — F33.1 MAJOR DEPRESSIVE DISORDER, RECURRENT EPISODE, MODERATE (H): ICD-10-CM

## 2017-11-13 DIAGNOSIS — F41.1 GENERALIZED ANXIETY DISORDER: ICD-10-CM

## 2017-11-13 PROBLEM — O44.40 LOW LYING PLACENTA, ANTEPARTUM: Status: RESOLVED | Noted: 2017-07-20 | Resolved: 2017-11-13

## 2017-11-13 LAB
ALBUMIN SERPL-MCNC: 2.6 G/DL (ref 3.4–5)
ALP SERPL-CCNC: 152 U/L (ref 40–150)
ALT SERPL W P-5'-P-CCNC: 40 U/L (ref 0–50)
ANION GAP SERPL CALCULATED.3IONS-SCNC: 9 MMOL/L (ref 3–14)
AST SERPL W P-5'-P-CCNC: 35 U/L (ref 0–45)
BILIRUB SERPL-MCNC: 0.4 MG/DL (ref 0.2–1.3)
BUN SERPL-MCNC: 16 MG/DL (ref 7–30)
CALCIUM SERPL-MCNC: 8.4 MG/DL (ref 8.5–10.1)
CHLORIDE SERPL-SCNC: 108 MMOL/L (ref 94–109)
CO2 SERPL-SCNC: 23 MMOL/L (ref 20–32)
CREAT SERPL-MCNC: 0.79 MG/DL (ref 0.52–1.04)
CREAT UR-MCNC: 131 MG/DL
ERYTHROCYTE [DISTWIDTH] IN BLOOD BY AUTOMATED COUNT: 15.1 % (ref 10–15)
GFR SERPL CREATININE-BSD FRML MDRD: 82 ML/MIN/1.7M2
GLUCOSE SERPL-MCNC: 90 MG/DL (ref 70–99)
HCT VFR BLD AUTO: 33 % (ref 35–47)
HGB BLD-MCNC: 11.1 G/DL (ref 11.7–15.7)
MCH RBC QN AUTO: 32.8 PG (ref 26.5–33)
MCHC RBC AUTO-ENTMCNC: 33.6 G/DL (ref 31.5–36.5)
MCV RBC AUTO: 98 FL (ref 78–100)
PLATELET # BLD AUTO: 158 10E9/L (ref 150–450)
POTASSIUM SERPL-SCNC: 3.8 MMOL/L (ref 3.4–5.3)
PROT SERPL-MCNC: 5.8 G/DL (ref 6.8–8.8)
PROT UR-MCNC: 0.3 G/L
PROT/CREAT 24H UR: 0.23 G/G CR (ref 0–0.2)
RBC # BLD AUTO: 3.38 10E12/L (ref 3.8–5.2)
SODIUM SERPL-SCNC: 140 MMOL/L (ref 133–144)
WBC # BLD AUTO: 12.4 10E9/L (ref 4–11)

## 2017-11-13 PROCEDURE — 80053 COMPREHEN METABOLIC PANEL: CPT | Performed by: OBSTETRICS & GYNECOLOGY

## 2017-11-13 PROCEDURE — 87653 STREP B DNA AMP PROBE: CPT | Performed by: OBSTETRICS & GYNECOLOGY

## 2017-11-13 PROCEDURE — 85027 COMPLETE CBC AUTOMATED: CPT | Performed by: OBSTETRICS & GYNECOLOGY

## 2017-11-13 PROCEDURE — 84156 ASSAY OF PROTEIN URINE: CPT | Performed by: OBSTETRICS & GYNECOLOGY

## 2017-11-13 PROCEDURE — 99207 ZZC PRENATAL VISIT: CPT | Performed by: OBSTETRICS & GYNECOLOGY

## 2017-11-13 PROCEDURE — 36415 COLL VENOUS BLD VENIPUNCTURE: CPT | Performed by: OBSTETRICS & GYNECOLOGY

## 2017-11-13 ASSESSMENT — PAIN SCALES - GENERAL: PAINLEVEL: MODERATE PAIN (5)

## 2017-11-13 NOTE — NURSING NOTE
"Chief Complaint   Patient presents with     Prenatal Care     phq9       Initial /90 (BP Location: Right arm, Patient Position: Sitting, Cuff Size: Adult Regular)  Pulse 100  Wt 149 lb 8 oz (67.8 kg)  LMP 03/01/2017 (Exact Date)  SpO2 100%  BMI 25.66 kg/m2 Estimated body mass index is 25.66 kg/(m^2) as calculated from the following:    Height as of 9/11/17: 5' 4\" (1.626 m).    Weight as of this encounter: 149 lb 8 oz (67.8 kg).  Medication Reconciliation: complete   Taylor Mayo MA  November 13, 2017      "

## 2017-11-13 NOTE — PROGRESS NOTES
37 year old  at 36w5d weeks presents to the clinic for a routine prenatal visit.  Patient complains of rib pain on her right side.  She was seen in the ED a few weeks ago and was dx with bronchitis.  She was given a Z beck which she finished however she is still having a lot of coughing.  Will have her see FP today or schedule an appointment   Still smoking 2 cigs/day  Elevated blood pressure vesct=299/90.  No headaches or vision changes.  No epigastric pain.  Will check PIH labs today.  No vaginal bleeding, leakage of fluid, or contractions  Breech presentation at her last M ultrasound=pt declines a version.  C section scheduled for   Fundal height=35cm  YBFt=957  CX=Cl//-3  GBS done today  GERD=stable  Labor precautions discussed  Pre-E precautions given      Madeline Benavidez

## 2017-11-13 NOTE — MR AVS SNAPSHOT
After Visit Summary   11/13/2017    Tasha Short    MRN: [id number]           Patient Information     Date Of Birth          1980        Visit Information        Provider Department      11/13/2017 7:30 AM Madeline Benavidez DO [facility]        Today's Diagnoses     Elevated blood pressure affecting pregnancy in third trimester, antepartum    -  1    Gastroesophageal reflux disease without esophagitis        Elderly primigravida in third trimester        Breech presentation, single or unspecified fetus        Major depressive disorder, recurrent episode, moderate (H)        Generalized anxiety disorder        Tobacco use disorder          Care Instructions    What to watch out for are: regular contractions every 5 min, vaginal bleeding, decreased fetal movement, or leakage of fluid.  Please call the office or go to L&D if you develop any of these signs and symptoms.      I will see you for your follow up appointment.  Please feel free to call if you have any questions or concerns.      Thanks,  Madeline Benavidez, DO            Follow-ups after your visit        Follow-up notes from your care team     Return in about 1 week (around 11/20/2017).      Your next 10 appointments already scheduled     Nov 27, 2017  7:15 AM CST   [facility]    [address] [facility] [address]   [phone]            Jan 19, 2018  1:30 PM CST   Office Visit with Madeline Benavidez DO   [facility]    [address] [facility] [address]   [phone]           Bring a current list of meds and any records pertaining to this visit. For Physicals, please bring immunization records and any forms needing to be filled out. Please arrive 10 minutes early to complete paperwork.              Who to contact     If you have questions or need follow up  information about today's clinic visit or your schedule please contact [facility] directly at [phone].  Normal or non-critical lab and imaging results will be communicated to you by Gynzyhart, letter or phone within 4 business days after the clinic has received the results. If you do not hear from us within 7 days, please contact the clinic through Gynzyhart or phone. If you have a critical or abnormal lab result, we will notify you by phone as soon as possible.  Submit refill requests through [employer] or call your pharmacy and they will forward the refill request to us. Please allow 3 business days for your refill to be completed.          Additional Information About Your Visit        MyChart Information     [employer] gives you secure access to your electronic health record. If you see a primary care provider, you can also send messages to your care team and make appointments. If you have questions, please call your primary care clinic.  If you do not have a primary care provider, please call [phone] and they will assist you.        Care EveryWhere ID     This is your Care EveryWhere ID. This could be used by other organizations to access your [place] medical records  [id number]        Your Vitals Were     Pulse Last Period Pulse Oximetry Breastfeeding? BMI (Body Mass Index)       100 03/01/2017 (Exact Date) 100% Unknown 25.66 kg/m2        Blood Pressure from Last 3 Encounters:   11/13/17 140/90   10/30/17 134/80   10/16/17 124/80    Weight from Last 3 Encounters:   11/13/17 149 lb 8 oz (67.8 kg)   10/30/17 148 lb (67.1 kg)   10/16/17 146 lb (66.2 kg)              We Performed the Following     CBC with platelets     Comprehensive metabolic panel     Creatinine urine calculation only     Group B strep PCR     Protein  random urine with Creat Ratio          Today's Medication Changes          These changes are accurate as of: 11/13/17  8:15 AM.  If you have any questions, ask your nurse or  doctor.               These medicines have changed or have updated prescriptions.        Dose/Directions    * ranitidine 150 MG tablet   Commonly known as:  ZANTAC   This may have changed:  Another medication with the same name was added. Make sure you understand how and when to take each.   Used for:  Gastroesophageal reflux disease without esophagitis   Changed by:  Madeline Benavidez DO        Dose:  150 mg   Take 1 tablet (150 mg) by mouth 2 times daily   Quantity:  60 tablet   Refills:  1       * ranitidine 150 MG tablet   Commonly known as:  ZANTAC   This may have changed:  You were already taking a medication with the same name, and this prescription was added. Make sure you understand how and when to take each.   Used for:  Gastroesophageal reflux disease without esophagitis   Changed by:  Madeline Benavidez DO        Dose:  150 mg   Take 1 tablet (150 mg) by mouth 2 times daily   Quantity:  60 tablet   Refills:  1       * Notice:  This list has 2 medication(s) that are the same as other medications prescribed for you. Read the directions carefully, and ask your doctor or other care provider to review them with you.         Where to get your medicines      These medications were sent to [facility] [place] - [address]  [address] [place] [address], [place] [address]     Phone:  [phone]     ranitidine 150 MG tablet                Primary Care Provider Office Phone # Fax #    Enid Mansfield PA-C [phone]       [address] [facility] [address]  [facility] [address]        Equal Access to Services     [facility]: Hadii adam hubbardo Soroselia, waaxda luqadaha, qaybta kaalmada espinoza, noemi singh . So [facility] [phone].    ATENCIÓN: Si habla español, tiene a chaves disposición servicios gratuitos de asistencia lingüística. Llame al [phone].    We comply with applicable federal civil  rights laws and [place] laws. We do not discriminate on the basis of race, color, national origin, age, disability, sex, sexual orientation, or gender identity.            Thank you!     Thank you for choosing [facility]  for your care. Our goal is always to provide you with excellent care. Hearing back from our patients is one way we can continue to improve our services. Please take a few minutes to complete the written survey that you may receive in the mail after your visit with us. Thank you!             Your Updated Medication List - Protect others around you: Learn how to safely use, store and throw away your medicines at www.disposemymeds.org.          This list is accurate as of: 11/13/17  8:15 AM.  Always use your most recent med list.                   Brand Name Dispense Instructions for use Diagnosis    albuterol 108 (90 BASE) MCG/ACT Inhaler    PROAIR HFA/PROVENTIL HFA/VENTOLIN HFA    1 Inhaler    Inhale 2 puffs into the lungs every 4 hours as needed        atomoxetine 40 MG capsule    STRATTERA    30 capsule    Take 1 capsule (40 mg) by mouth daily    Hypersomnia       busPIRone 5 MG tablet    BUSPAR    90 tablet    Take 1 tablet (5 mg) by mouth 3 times daily    Anxiety       flunisolide 29 MCG/ACT (0.025%) Soln    NASAREL    1 Box    Spray 2 sprays into both nostrils 2 times daily    Nasal congestion       PARoxetine 10 MG tablet    PAXIL    15 tablet    Take 0.5 tablets (5 mg) by mouth At Bedtime    Major depressive disorder, recurrent episode, moderate (H), Generalized anxiety disorder       prenatal multivitamin plus iron 27-0.8 MG Tabs per tablet      Take 1 tablet by mouth daily        * ranitidine 150 MG tablet    ZANTAC    60 tablet    Take 1 tablet (150 mg) by mouth 2 times daily    Gastroesophageal reflux disease without esophagitis       * ranitidine 150 MG tablet    ZANTAC    60 tablet    Take 1 tablet (150 mg) by mouth 2 times daily    Gastroesophageal reflux disease  without esophagitis       * Notice:  This list has 2 medication(s) that are the same as other medications prescribed for you. Read the directions carefully, and ask your doctor or other care provider to review them with you.

## 2017-11-14 LAB
GP B STREP DNA SPEC QL NAA+PROBE: NEGATIVE
SPECIMEN SOURCE: NORMAL

## 2017-11-21 ENCOUNTER — PRENATAL OFFICE VISIT (OUTPATIENT)
Dept: OBGYN | Facility: OTHER | Age: 37
End: 2017-11-21
Payer: COMMERCIAL

## 2017-11-21 VITALS
WEIGHT: 148 LBS | DIASTOLIC BLOOD PRESSURE: 86 MMHG | SYSTOLIC BLOOD PRESSURE: 128 MMHG | HEART RATE: 76 BPM | BODY MASS INDEX: 25.4 KG/M2

## 2017-11-21 DIAGNOSIS — O13.3 PREGNANCY-INDUCED HYPERTENSION IN THIRD TRIMESTER: ICD-10-CM

## 2017-11-21 DIAGNOSIS — Z23 NEED FOR TDAP VACCINATION: ICD-10-CM

## 2017-11-21 DIAGNOSIS — K21.9 GASTROESOPHAGEAL REFLUX DISEASE WITHOUT ESOPHAGITIS: ICD-10-CM

## 2017-11-21 DIAGNOSIS — O09.523 ELDERLY MULTIGRAVIDA IN THIRD TRIMESTER: Primary | ICD-10-CM

## 2017-11-21 LAB
ALT SERPL W P-5'-P-CCNC: 38 U/L (ref 0–50)
AST SERPL W P-5'-P-CCNC: 30 U/L (ref 0–45)
CREAT UR-MCNC: 65 MG/DL
ERYTHROCYTE [DISTWIDTH] IN BLOOD BY AUTOMATED COUNT: 14.9 % (ref 10–15)
HCT VFR BLD AUTO: 34.3 % (ref 35–47)
HGB BLD-MCNC: 11.5 G/DL (ref 11.7–15.7)
MCH RBC QN AUTO: 32.5 PG (ref 26.5–33)
MCHC RBC AUTO-ENTMCNC: 33.5 G/DL (ref 31.5–36.5)
MCV RBC AUTO: 97 FL (ref 78–100)
PLATELET # BLD AUTO: 167 10E9/L (ref 150–450)
PROT UR-MCNC: 0.13 G/L
PROT/CREAT 24H UR: 0.2 G/G CR (ref 0–0.2)
RBC # BLD AUTO: 3.54 10E12/L (ref 3.8–5.2)
WBC # BLD AUTO: 12 10E9/L (ref 4–11)

## 2017-11-21 PROCEDURE — 84460 ALANINE AMINO (ALT) (SGPT): CPT | Performed by: ADVANCED PRACTICE MIDWIFE

## 2017-11-21 PROCEDURE — 84156 ASSAY OF PROTEIN URINE: CPT | Performed by: ADVANCED PRACTICE MIDWIFE

## 2017-11-21 PROCEDURE — 99207 ZZC COMPLICATED OB VISIT: CPT | Performed by: ADVANCED PRACTICE MIDWIFE

## 2017-11-21 PROCEDURE — 36415 COLL VENOUS BLD VENIPUNCTURE: CPT | Performed by: ADVANCED PRACTICE MIDWIFE

## 2017-11-21 PROCEDURE — 84450 TRANSFERASE (AST) (SGOT): CPT | Performed by: ADVANCED PRACTICE MIDWIFE

## 2017-11-21 PROCEDURE — 85027 COMPLETE CBC AUTOMATED: CPT | Performed by: ADVANCED PRACTICE MIDWIFE

## 2017-11-21 NOTE — MR AVS SNAPSHOT
After Visit Summary   11/21/2017    Tasha Short    MRN: [id number]           Patient Information     Date Of Birth          1980        Visit Information        Provider Department      11/21/2017 1:45 PM Isamar Ramírez APRN CNM [facility]        Today's Diagnoses     Elderly multigravida in third trimester    -  1    Breech presentation, single or unspecified fetus        Gastroesophageal reflux disease without esophagitis        Need for Tdap vaccination        Pregnancy-induced hypertension in third trimester           Follow-ups after your visit        Your next 10 appointments already scheduled     Nov 27, 2017  7:15 AM CST   ESTABLISHED PRENATAL with Madeline Benavidez DO   [facility]    [address] [facility] [id number] [phone]            Jan 19, 2018  1:30 PM CST   Office Visit with Madeline Benavidez DO   [facility] ([facility])    [address] [facility] [id number] [phone]           Bring a current list of meds and any records pertaining to this visit. For Physicals, please bring immunization records and any forms needing to be filled out. Please arrive 10 minutes early to complete paperwork.              Who to contact     If you have questions or need follow up information about today's clinic visit or your schedule please contact [facility] directly at [phone].  Normal or non-critical lab and imaging results will be communicated to you by MyChart, letter or phone within 4 business days after the clinic has received the results. If you do not hear from us within 7 days, please contact the clinic through MyChart or phone. If you have a critical or abnormal lab result, we will notify you by phone as soon as possible.  Submit refill requests through [employer] or call your pharmacy and they will forward the refill request to us. Please  allow 3 business days for your refill to be completed.          Additional Information About Your Visit        MyChart Information     [employer]sp[employer] Technologies[employer] gives you secure access to your electronic health record. If you see a primary care provider, you can also send messages to your care team and make appointments. If you have questions, please call your primary care clinic.  If you do not have a primary care provider, please call [phone] and they will assist you.        Care EveryWhere ID     This is your Care EveryWhere ID. This could be used by other organizations to access your [place] medical records  [id number]        Your Vitals Were     Pulse Last Period BMI (Body Mass Index)             76 03/01/2017 (Exact Date) 25.4 kg/m2          Blood Pressure from Last 3 Encounters:   11/21/17 128/86   11/13/17 140/90   10/30/17 134/80    Weight from Last 3 Encounters:   11/21/17 148 lb (67.1 kg)   11/13/17 149 lb 8 oz (67.8 kg)   10/30/17 148 lb (67.1 kg)              We Performed the Following     ALT     AST     CBC with platelets     Protein  random urine with Creat Ratio        Primary Care Provider Office Phone # Fax #    Enid LINDSEY Mansfield PA-C [phone]       [address] [address]        Equal Access to Services     [facility] : Hadii adam ku hadasho Soomaali, waaxda luqadaha, qaybta kaalmada adeegyada, noemi gamingin haydom singh . So [facility] [phone].    ATENCIÓN: Si habla español, tiene a chaves disposición servicios gratuitos de asistencia lingüística. [facility] [phone].    We comply with applicable federal civil rights laws and [place] laws. We do not discriminate on the basis of race, color, national origin, age, disability, sex, sexual orientation, or gender identity.            Thank you!     Thank you for choosing [facility]  for your care. Our goal is always to provide you with excellent care. Hearing back from our patients  is one way we can continue to improve our services. Please take a few minutes to complete the written survey that you may receive in the mail after your visit with us. Thank you!             Your Updated Medication List - Protect others around you: Learn how to safely use, store and throw away your medicines at www.disposemymeds.org.          This list is accurate as of: 11/21/17  2:13 PM.  Always use your most recent med list.                   Brand Name Dispense Instructions for use Diagnosis    albuterol 108 (90 BASE) MCG/ACT Inhaler    PROAIR HFA/PROVENTIL HFA/VENTOLIN HFA    1 Inhaler    Inhale 2 puffs into the lungs every 4 hours as needed        atomoxetine 40 MG capsule    STRATTERA    30 capsule    Take 1 capsule (40 mg) by mouth daily    Hypersomnia       busPIRone 5 MG tablet    BUSPAR    90 tablet    Take 1 tablet (5 mg) by mouth 3 times daily    Anxiety       flunisolide 29 MCG/ACT (0.025%) Soln    NASAREL    1 Box    Spray 2 sprays into both nostrils 2 times daily    Nasal congestion       PARoxetine 10 MG tablet    PAXIL    15 tablet    Take 0.5 tablets (5 mg) by mouth At Bedtime    Major depressive disorder, recurrent episode, moderate (H), Generalized anxiety disorder       prenatal multivitamin plus iron 27-0.8 MG Tabs per tablet      Take 1 tablet by mouth daily        * ranitidine 150 MG tablet    ZANTAC    60 tablet    Take 1 tablet (150 mg) by mouth 2 times daily    Gastroesophageal reflux disease without esophagitis       * ranitidine 150 MG tablet    ZANTAC    60 tablet    Take 1 tablet (150 mg) by mouth 2 times daily    Gastroesophageal reflux disease without esophagitis       * Notice:  This list has 2 medication(s) that are the same as other medications prescribed for you. Read the directions carefully, and ask your doctor or other care provider to review them with you.

## 2017-11-21 NOTE — PROGRESS NOTES
No HA/VD/EGP  No contra/LOF/VB  Taking BP at home and is a bit higher than here today.   Will recheck labs.   Has c/s scheduled.   Not breast feeding, female baby, perhaps pills for BC.  Partner is in [place] she thinks so her parents will be her support.   Discussed depression.  Has issues with SAD.  Consider restarting meds immediately post partum  RTC 1 week,\  Isamar Burden, FLORES, CNM

## 2017-11-21 NOTE — NURSING NOTE
"Chief Complaint   Patient presents with     Prenatal Care       Initial /86 (BP Location: Left arm, Patient Position: Chair, Cuff Size: Adult Regular)  Pulse 76  Wt 148 lb (67.1 kg)  LMP 03/01/2017 (Exact Date)  BMI 25.4 kg/m2 Estimated body mass index is 25.4 kg/(m^2) as calculated from the following:    Height as of 9/11/17: 5' 4\" (1.626 m).    Weight as of this encounter: 148 lb (67.1 kg).  Medication Reconciliation: complete   Alexandra Puri CMA      "

## 2017-11-27 ENCOUNTER — PRENATAL OFFICE VISIT (OUTPATIENT)
Dept: OBGYN | Facility: OTHER | Age: 37
End: 2017-11-27
Payer: COMMERCIAL

## 2017-11-27 VITALS
WEIGHT: 148 LBS | BODY MASS INDEX: 25.4 KG/M2 | HEART RATE: 88 BPM | DIASTOLIC BLOOD PRESSURE: 98 MMHG | SYSTOLIC BLOOD PRESSURE: 144 MMHG

## 2017-11-27 DIAGNOSIS — F17.200 TOBACCO USE DISORDER: ICD-10-CM

## 2017-11-27 DIAGNOSIS — F41.1 GENERALIZED ANXIETY DISORDER: ICD-10-CM

## 2017-11-27 DIAGNOSIS — O09.513 ELDERLY PRIMIGRAVIDA IN THIRD TRIMESTER: Primary | ICD-10-CM

## 2017-11-27 DIAGNOSIS — O13.3 PREGNANCY-INDUCED HYPERTENSION IN THIRD TRIMESTER: ICD-10-CM

## 2017-11-27 PROCEDURE — 99207 ZZC PRENATAL VISIT: CPT | Performed by: OBSTETRICS & GYNECOLOGY

## 2017-11-27 ASSESSMENT — PAIN SCALES - GENERAL: PAINLEVEL: MODERATE PAIN (5)

## 2017-11-27 NOTE — NURSING NOTE
"Chief Complaint   Patient presents with     Prenatal Care       Initial BP (!) 144/98  Pulse 88  Wt 67.1 kg (148 lb)  LMP 03/01/2017 (Exact Date)  BMI 25.4 kg/m2 Estimated body mass index is 25.4 kg/(m^2) as calculated from the following:    Height as of 9/11/17: 1.626 m (5' 4\").    Weight as of this encounter: 67.1 kg (148 lb).  Medication Reconciliation: complete       "

## 2017-11-27 NOTE — MR AVS SNAPSHOT
After Visit Summary   11/27/2017    Tasha Short    MRN: [id number]           Patient Information     Date Of Birth          1980        Visit Information        Provider Department      11/27/2017 7:15 AM Madeline Benavidez DO [facility]        Today's Diagnoses     Elderly primigravida in third trimester    -  1    Pregnancy-induced hypertension in third trimester        Breech presentation, single or unspecified fetus        Generalized anxiety disorder        Tobacco use disorder          Care Instructions    Go To L&D          Follow-ups after your visit        Your next 10 appointments already scheduled     Jan 19, 2018  1:30 PM CST   Office Visit with Madeline Benavidez DO   [facility] ([facility])    [address] [facility] [address] [phone]           Bring a current list of meds and any records pertaining to this visit. For Physicals, please bring immunization records and any forms needing to be filled out. Please arrive 10 minutes early to complete paperwork.              Who to contact     If you have questions or need follow up information about today's clinic visit or your schedule please contact [facility] directly at [phone].  Normal or non-critical lab and imaging results will be communicated to you by MyChart, letter or phone within 4 business days after the clinic has received the results. If you do not hear from us within 7 days, please contact the clinic through [employer] Network[employer] or phone. If you have a critical or abnormal lab result, we will notify you by phone as soon as possible.  Submit refill requests through [employer] or call your pharmacy and they will forward the refill request to us. Please allow 3 business days for your refill to be completed.          Additional Information About Your Visit        MyChart Information     [employer] gives you secure access to your electronic health  record. If you see a primary care provider, you can also send messages to your care team and make appointments. If you have questions, please call your primary care clinic.  If you do not have a primary care provider, please call [phone] and they will assist you.        Care EveryWhere ID     This is your Care EveryWhere ID. This could be used by other organizations to access your [place] medical records  [id number]        Your Vitals Were     Pulse Last Period BMI (Body Mass Index)             88 03/01/2017 (Exact Date) 25.4 kg/m2          Blood Pressure from Last 3 Encounters:   11/27/17 (!) 144/98   11/21/17 128/86   11/13/17 140/90    Weight from Last 3 Encounters:   11/27/17 148 lb (67.1 kg)   11/21/17 148 lb (67.1 kg)   11/13/17 149 lb 8 oz (67.8 kg)              Today, you had the following     No orders found for display       Primary Care Provider Office Phone # Fax #    Enid LINDSEY Mansfield PA-C [phone]       [address] [place] [address] [address]  [facility] [address]        Equal Access to Services     [facility]: Hadii aad ku hadasho Soomaali, waaxda luqadaha, qaybta kaalmada adeegyada, noemi elias haydelvinn constantine singh . So [facility] [phone].    ATENCIÓN: Si habla español, tiene a chaves disposición servicios gratuitos de asistencia lingüística. Llame al [phone].    We comply with applicable federal civil rights laws and [place] laws. We do not discriminate on the basis of race, color, national origin, age, disability, sex, sexual orientation, or gender identity.            Thank you!     Thank you for choosing [facility]  for your care. Our goal is always to provide you with excellent care. Hearing back from our patients is one way we can continue to improve our services. Please take a few minutes to complete the written survey that you may receive in the mail after your visit with us. Thank you!             Your Updated Medication List - Protect others  around you: Learn how to safely use, store and throw away your medicines at www.disposemymeds.org.          This list is accurate as of: 11/27/17  8:20 AM.  Always use your most recent med list.                   Brand Name Dispense Instructions for use Diagnosis    albuterol 108 (90 BASE) MCG/ACT Inhaler    PROAIR HFA/PROVENTIL HFA/VENTOLIN HFA    1 Inhaler    Inhale 2 puffs into the lungs every 4 hours as needed        atomoxetine 40 MG capsule    STRATTERA    30 capsule    Take 1 capsule (40 mg) by mouth daily    Hypersomnia       busPIRone 5 MG tablet    BUSPAR    90 tablet    Take 1 tablet (5 mg) by mouth 3 times daily    Anxiety       flunisolide 29 MCG/ACT (0.025%) Soln    NASAREL    1 Box    Spray 2 sprays into both nostrils 2 times daily    Nasal congestion       PARoxetine 10 MG tablet    PAXIL    15 tablet    Take 0.5 tablets (5 mg) by mouth At Bedtime    Major depressive disorder, recurrent episode, moderate (H), Generalized anxiety disorder       prenatal multivitamin plus iron 27-0.8 MG Tabs per tablet      Take 1 tablet by mouth daily        * ranitidine 150 MG tablet    ZANTAC    60 tablet    Take 1 tablet (150 mg) by mouth 2 times daily    Gastroesophageal reflux disease without esophagitis       * ranitidine 150 MG tablet    ZANTAC    60 tablet    Take 1 tablet (150 mg) by mouth 2 times daily    Gastroesophageal reflux disease without esophagitis       * Notice:  This list has 2 medication(s) that are the same as other medications prescribed for you. Read the directions carefully, and ask your doctor or other care provider to review them with you.

## 2017-11-27 NOTE — PROGRESS NOTES
37 year old  at 38w5d weeks presents to the clinic for a routine prenatal visit.  Pre-op originally scheduled for today however patient needs to go to L&D today due to elevated blood pressure  Patient complains of pain along her left upper abdominal radiating to her upper back.  This has been there for the last few days however very painful yesterday.  Heat helped a little.   Some blurry vision.  No N/V  Blood pressure=144/98  I discussed with patient the need to go to L&D for a workup and likely delivery due to GHTN and possible Pre-E.  Patient states she has to have carpet installed today so may not go today.  I discussed the urgency asso with this and the need to go there now.    Likely still breech based on heart tone location however will scan patient in triage  No vaginal bleeding, leakage of fluid, or contractions   Fundal height=37cm  CBBo=146  CX=deferred  GBS=Negative  Tobacco abuse  GERD=stable  Anxiety=stable    Madeline Benavidez

## 2017-12-07 ENCOUNTER — MYC MEDICAL ADVICE (OUTPATIENT)
Dept: OBGYN | Facility: OTHER | Age: 37
End: 2017-12-07

## 2017-12-07 DIAGNOSIS — Z98.891 S/P CESAREAN SECTION: Primary | ICD-10-CM

## 2017-12-07 NOTE — TELEPHONE ENCOUNTER
LM for the patient to return call to the clinic to discuss the below. Will await to hear from patient. Doris Estrella RN, BSN     Responded via [employer] using hypertension protocol. Doris Estrella RN, BSN

## 2017-12-07 NOTE — TELEPHONE ENCOUNTER
"GG-patient is requesting a refill on Percocet. She thinks she will be out on . She has an appt with PCP on Monday at 1100. Please call patient back     Tasha Short is a 37 year old female who calls with elevated B/P.    NURSING ASSESSMENT:  Description:  Pt states she was discharged from hospital 17 after a  and started on Labetalol 100mg BID. B/P at home are 140-150s/90-100s  Onset/duration:  pregnancy  Precip. factors:  Pre-eclampsia   Associated symptoms:  Dizzy, HA, Denies nausea or vomiting. Pt also stating she would like to restart antidepressants. She denies wanting to harm herself or anyone else. She was on something prior to pregnancy. She states she feels she is sleeping ok.  Unable to concentrate \"feels like she is all over the place\"  Pain: at  incision site   LMP/preg/breast feeding:  Not breast feeding    Allergies:   Allergies   Allergen Reactions     Penicillins      hives     Phenytoin      rash,puritis (Dilantin)     Prednisone Itching     Face itching, unable to concentrate      RECOMMENDED DISPOSITION:  Huddled with CDL who said appt Monday is fine.  Next 5 appointments (look out 90 days)     Dec 11, 2017 11:00 AM CST   [facility] with Enid Mansfield PA-C   [facility]    [address] [address]   [phone]            2018  1:30 PM CST   Office Visit with Madeline Benavidez DO   [facility]    [address] [facility] [address]   [phone]                  Will comply with recommendation: Yes  If further questions/concerns or if symptoms do not improve, worsen or new symptoms develop, call your PCP or [facility] as soon as possible.      Guideline used: HTN, Depression  Telephone Triage Protocols for Nurses, Fifth Edition, Vonnie Hernandez RN    "

## 2017-12-08 RX ORDER — OXYCODONE AND ACETAMINOPHEN 5; 325 MG/1; MG/1
1-2 TABLET ORAL EVERY 4 HOURS PRN
Qty: 10 TABLET | Refills: 0 | Status: ON HOLD | OUTPATIENT
Start: 2017-12-08 | End: 2018-01-06

## 2017-12-08 NOTE — PROGRESS NOTES
SUBJECTIVE:                                                    Tasha Short is a 37 year old female who presents to clinic today for the following health issues:      History of Present Illness     Depression & Anxiety Follow-up:     Depression/Anxiety:  Depression & Anxiety    Status since last visit::  Worsened    Other associated symptoms of depression and anxiety::  None    Significant life event::  YES    Current substance use::  None    - New baby  - Buspar as needed   - Wishes to start Paxil       Pt would also like to discuss being rx'ed something for hypersomnia today has not had medication for it since before pregnancy, - was put on strattera but made her more tired, was previously taking ritalin.  -  Made her more tired       Also rechecking her blood pressure.  Was rx'ed labetalol in the hospital but pt states it is not helping has been checking BPs at home states they are 140-150's/'s  - Not working   - Previously on Lisinopril   - Not breast feeding         ROMI-7 SCORE 12/27/2016 9/11/2017 12/11/2017   Total Score - - -   Total Score - 6 (mild anxiety) 9 (mild anxiety)   Total Score 3 6 9       PHQ-9 SCORE 12/27/2016 9/11/2017 12/11/2017   Total Score - - -   Total Score MyChart - 8 (Mild depression) 5 (Mild depression)   Total Score 1 8 5       Answers for HPI/ROS submitted by the patient on 12/11/2017   If you checked off any problems, how difficult have these problems made it for you to do your work, take care of things at home, or get along with other people?: Somewhat difficult  PHQ9 TOTAL SCORE: 5  ROMI 7 TOTAL SCORE: 9        Plan from last visit 9/11/17  - Discussed risk vs. Benefit of SSRI/SNRI treatment in pregnancy  - Recommend trial of Paxil, discussed use and side effects, including Preg category C  - Recheck 1 month   - Continue to use Buspar as needed   - Regarding stimulants for hypersomnia      All are category C, but would recommend non-stimulant first, and then if needed  stimulants      Discussed risks to baby with category C         Problem list and histories reviewed & adjusted, as indicated.  Additional history: as documented    Labs reviewed in EPIC    ROS:  Constitutional, HEENT, cardiovascular, pulmonary, gi and gu systems are negative, except as otherwise noted.      OBJECTIVE:   BP (!) 154/112 (BP Location: Right arm, Patient Position: Chair, Cuff Size: Adult Regular)  Pulse 92  Temp 98.7  F (37.1  C) (Oral)  Resp 16  Wt 126 lb (57.2 kg)  SpO2 99%  BMI 21.63 kg/m2  Body mass index is 21.63 kg/(m^2).  GENERAL APPEARANCE: healthy, alert and no distress  EYES: Eyes grossly normal to inspection, PERRLA, conjunctivae and sclerae without injection or discharge, EOM intact   RESP: Lungs clear to auscultation - no rales, rhonchi or wheezes   CV: Regular rates and rhythm, normal S1 S2, no S3 or S4, no murmur, click or rub  MS: No musculoskeletal defects are noted and gait is age appropriate without ataxia   SKIN: No suspicious lesions or rashes, hydration status appears adeuqate with normal skin turgor   PSYCH: Alert and oriented x3; speech- coherent , normal rate and volume; able to articulate logical thoughts, able to abstract reason, no tangential thoughts, no hallucinations or delusions, mentation appears normal, Mood is euthymic. Affect is appropriate for this mood state and bright. Thought content is free of suicidal ideation, hallucinations, and delusions. Dress is adequate and upkept. Eye contact is good during conversation.       Diagnostic Test Results:  none     ASSESSMENT/PLAN:       ICD-10-CM    1. HTN, goal below 140/90 I10 lisinopril (PRINIVIL/ZESTRIL) 20 MG tablet   2. Major depressive disorder, recurrent episode, moderate (H) F33.1 PARoxetine (PAXIL) 10 MG tablet   3. Generalized anxiety disorder F41.1 busPIRone (BUSPAR) 5 MG tablet     PARoxetine (PAXIL) 10 MG tablet   4. Narcolepsy and cataplexy G47.411 methylphenidate (RITALIN) 10 MG tablet     1. HTN   -  Chronic issue for patient before and after pregnancy   - NOT breastfeeding   - Will have her stop labetalol since not effective   - Restart Lisinopril  - Monitor BPs, given log   - Recheck 1 month, will need BMP at that time     2 & 3. Mood   - Worsened but denies any post partum   - Restart Paxil 10 mg, 1/2 tablet for 1 week then can increase, reviewed use and side effects   - Advised watch for post partum, does have support home (baby 11 days old)   - Continue Buspar as needed, reviewed use and side effects   - Recheck 1 month due to holiday     4. Narcolepsy   - Documented Narcolepsy, was on Ritalin for many years, stopped due to pregnancy   - Strattera was not effective   - Will restart Ritalin since not breast feeding   - Reviewed use and side effects, may be different in her body now that had baby  - Will recheck in 1 month and plan to do CSA at that time     The patient indicates understanding of these issues and agrees with the plan.    Follow up: 1 month         Enid Mansfield PA-C  [facility]

## 2017-12-08 NOTE — TELEPHONE ENCOUNTER
Patient needs to  the prescription because it is a narcotic.  She will have to pick it up in [place] because that is the location that I am at today.  Find out if that is ok.     Madeline Benavidez

## 2017-12-08 NOTE — TELEPHONE ENCOUNTER
GG-Pharmacy is [facility] in [facility]. She wants to wait on restarting Buspar.    Elizabeth Hernandez, RN, BSN

## 2017-12-08 NOTE — TELEPHONE ENCOUNTER
Patient can have #10 more Percocet refilled however she needs an appointment if that does not work.  I can start patient back on Buspar.  Please find out what Pharmacy she wants to use and where she wants to  the prescription from.  I am only in [place] this morning.    Madeline Benavidez

## 2017-12-08 NOTE — PROGRESS NOTES
Patient needs to  the prescription because it is a narcotic.  She will have to pick it up in [place] because that is the location that I am at today.  Find out if that is ok.    Madeline Benavidez

## 2017-12-11 ENCOUNTER — OFFICE VISIT (OUTPATIENT)
Dept: FAMILY MEDICINE | Facility: OTHER | Age: 37
End: 2017-12-11
Payer: COMMERCIAL

## 2017-12-11 VITALS
SYSTOLIC BLOOD PRESSURE: 142 MMHG | BODY MASS INDEX: 21.63 KG/M2 | RESPIRATION RATE: 16 BRPM | WEIGHT: 126 LBS | DIASTOLIC BLOOD PRESSURE: 95 MMHG | HEART RATE: 92 BPM | OXYGEN SATURATION: 99 % | TEMPERATURE: 98.7 F

## 2017-12-11 DIAGNOSIS — G47.411 NARCOLEPSY AND CATAPLEXY: ICD-10-CM

## 2017-12-11 DIAGNOSIS — F41.1 GENERALIZED ANXIETY DISORDER: ICD-10-CM

## 2017-12-11 DIAGNOSIS — F33.1 MAJOR DEPRESSIVE DISORDER, RECURRENT EPISODE, MODERATE (H): ICD-10-CM

## 2017-12-11 DIAGNOSIS — I10 HTN, GOAL BELOW 140/90: Primary | ICD-10-CM

## 2017-12-11 PROCEDURE — 99214 OFFICE O/P EST MOD 30 MIN: CPT | Performed by: PHYSICIAN ASSISTANT

## 2017-12-11 RX ORDER — PAROXETINE 10 MG/1
10 TABLET, FILM COATED ORAL AT BEDTIME
Qty: 30 TABLET | Refills: 1 | Status: ON HOLD | OUTPATIENT
Start: 2017-12-11 | End: 2018-01-10

## 2017-12-11 RX ORDER — BUSPIRONE HYDROCHLORIDE 5 MG/1
5 TABLET ORAL 3 TIMES DAILY
Qty: 90 TABLET | Refills: 3 | Status: ON HOLD | OUTPATIENT
Start: 2017-12-11 | End: 2018-01-10

## 2017-12-11 RX ORDER — METHYLPHENIDATE HYDROCHLORIDE 10 MG/1
10 TABLET ORAL 2 TIMES DAILY
Qty: 60 TABLET | Refills: 0 | Status: ON HOLD | OUTPATIENT
Start: 2017-12-11 | End: 2018-01-10

## 2017-12-11 RX ORDER — LISINOPRIL 20 MG/1
20 TABLET ORAL DAILY
Qty: 90 TABLET | Refills: 3 | Status: SHIPPED | OUTPATIENT
Start: 2017-12-11 | End: 2018-02-22

## 2017-12-11 ASSESSMENT — ANXIETY QUESTIONNAIRES
2. NOT BEING ABLE TO STOP OR CONTROL WORRYING: MORE THAN HALF THE DAYS
GAD7 TOTAL SCORE: 9
5. BEING SO RESTLESS THAT IT IS HARD TO SIT STILL: NOT AT ALL
3. WORRYING TOO MUCH ABOUT DIFFERENT THINGS: SEVERAL DAYS
GAD7 TOTAL SCORE: 9
GAD7 TOTAL SCORE: 9
6. BECOMING EASILY ANNOYED OR IRRITABLE: NEARLY EVERY DAY
4. TROUBLE RELAXING: MORE THAN HALF THE DAYS
7. FEELING AFRAID AS IF SOMETHING AWFUL MIGHT HAPPEN: NOT AT ALL
1. FEELING NERVOUS, ANXIOUS, OR ON EDGE: SEVERAL DAYS
7. FEELING AFRAID AS IF SOMETHING AWFUL MIGHT HAPPEN: NOT AT ALL

## 2017-12-11 ASSESSMENT — PATIENT HEALTH QUESTIONNAIRE - PHQ9
SUM OF ALL RESPONSES TO PHQ QUESTIONS 1-9: 5
10. IF YOU CHECKED OFF ANY PROBLEMS, HOW DIFFICULT HAVE THESE PROBLEMS MADE IT FOR YOU TO DO YOUR WORK, TAKE CARE OF THINGS AT HOME, OR GET ALONG WITH OTHER PEOPLE: SOMEWHAT DIFFICULT
SUM OF ALL RESPONSES TO PHQ QUESTIONS 1-9: 5

## 2017-12-11 NOTE — NURSING NOTE
"Chief Complaint   Patient presents with     Recheck Medication     Depression     Panel Management     flu, tobacco use, lipid, phq/clint       Initial BP (!) 154/112 (BP Location: Right arm, Patient Position: Chair, Cuff Size: Adult Regular)  Pulse 92  Temp 98.7  F (37.1  C) (Oral)  Resp 16  Wt 126 lb (57.2 kg)  SpO2 99%  BMI 21.63 kg/m2 Estimated body mass index is 21.63 kg/(m^2) as calculated from the following:    Height as of 9/11/17: 5' 4\" (1.626 m).    Weight as of this encounter: 126 lb (57.2 kg).  Medication Reconciliation: complete  "

## 2017-12-11 NOTE — MR AVS SNAPSHOT
After Visit Summary   12/11/2017    Tasha Short    MRN: [id number]           Patient Information     Date Of Birth          1980        Visit Information        Provider Department      12/11/2017 11:00 AM Enid Mansfield PA-C [facility]        Today's Diagnoses     Need for prophylactic vaccination and inoculation against influenza    -  1    HTN, goal below 140/90        Major depressive disorder, recurrent episode, moderate (H)        Generalized anxiety disorder        Anxiety        Essential hypertension with goal blood pressure less than 140/90        Narcolepsy and cataplexy          Care Instructions    - Start Paxil      1/2 tablet for 1 week, then increase to full tablet     - Continue Buspar as needed     - Start Lisinopril, and monitor blood pressure     - Restart Ritalin 10 mg twice a day     - Recheck 1 month                     Follow-ups after your visit        Your next 10 appointments already scheduled     Jan 19, 2018  1:30 PM CST   Office Visit with Madeline Benavidez DO   [facility]    [address] [facility] [id number] [phone]           Bring a current list of meds and any records pertaining to this visit. For Physicals, please bring immunization records and any forms needing to be filled out. Please arrive 10 minutes early to complete paperwork.              Who to contact     If you have questions or need follow up information about today's clinic visit or your schedule please contact [facility] directly at [phone].  Normal or non-critical lab and imaging results will be communicated to you by MyChart, letter or phone within 4 business days after the clinic has received the results. If you do not hear from us within 7 days, please contact the clinic through MyChart or phone. If you have a critical or abnormal lab result, we will notify you by phone as  soon as possible.  Submit refill requests through [email or web address] or call your pharmacy and they will forward the refill request to us. Please allow 3 business days for your refill to be completed.          Additional Information About Your Visit        [email or web address] Information     [email or web address] gives you secure access to your electronic health record. If you see a primary care provider, you can also send messages to your care team and make appointments. If you have questions, please call your primary care clinic.  If you do not have a primary care provider, please call [phone] and they will assist you.        Care EveryWhere ID     This is your Care EveryWhere ID. This could be used by other organizations to access your [place] medical records  [id number]        Your Vitals Were     Pulse Temperature Respirations Pulse Oximetry Breastfeeding? BMI (Body Mass Index)    92 98.7  F (37.1  C) (Oral) 16 99% No 21.63 kg/m2       Blood Pressure from Last 3 Encounters:   12/11/17 (!) 142/95   11/27/17 (!) 144/98   11/21/17 128/86    Weight from Last 3 Encounters:   12/11/17 126 lb (57.2 kg)   11/27/17 148 lb (67.1 kg)   11/21/17 148 lb (67.1 kg)              Today, you had the following     No orders found for display         Today's Medication Changes          These changes are accurate as of: 12/11/17 11:42 AM.  If you have any questions, ask your nurse or doctor.               Start taking these medicines.        Dose/Directions    lisinopril 20 MG tablet   Commonly known as:  PRINIVIL/ZESTRIL   Used for:  Essential hypertension with goal blood pressure less than 140/90   Started by:  Enid Mansfield PA-C        Dose:  20 mg   Take 1 tablet (20 mg) by mouth daily   Quantity:  90 tablet   Refills:  3       methylphenidate 10 MG tablet   Commonly known as:  RITALIN   Used for:  Narcolepsy and cataplexy   Started by:  Enid Mansfield PA-C        Dose:  10 mg   Take 1 tablet (10 mg) by mouth 2 times  daily   Quantity:  60 tablet   Refills:  0         These medicines have changed or have updated prescriptions.        Dose/Directions    PARoxetine 10 MG tablet   Commonly known as:  PAXIL   This may have changed:  how much to take   Used for:  Major depressive disorder, recurrent episode, moderate (H), Generalized anxiety disorder   Changed by:  Enid Mansfield PA-C        Dose:  10 mg   Take 1 tablet (10 mg) by mouth At Bedtime   Quantity:  30 tablet   Refills:  1         Stop taking these medicines if you haven't already. Please contact your care team if you have questions.     atomoxetine 40 MG capsule   Commonly known as:  STRATTERA   Stopped by:  Enid Mansfield PA-C           LABETALOL HCL PO   Stopped by:  Enid Mansfield PA-C                Where to get your medicines      These medications were sent to [facility] - [place], [place] - [address] [facility]  [address], [facility] [address]     Phone:  [phone]     busPIRone 5 MG tablet    lisinopril 20 MG tablet    PARoxetine 10 MG tablet         Some of these will need a paper prescription and others can be bought over the counter.  Ask your nurse if you have questions.     Bring a paper prescription for each of these medications     methylphenidate 10 MG tablet                Primary Care Provider Office Phone # Fax #    Enid Mansfield PA-C [phone]       [address]  [facility] [address]        Equal Access to Services     LINDY CAMERON AH: Hadii adam hubbardo Soroselia, waaxda luqadaha, qaybta kaalmada adeegyada, waxay curt araiza. So [facility] [phone].    ATENCIÓN: Si habla español, tiene a chaves disposición servicios gratuitos de asistencia lingüística. Llame al [phone].    We comply with applicable federal civil rights laws and [place] laws. We do not discriminate on the basis of race, color, national origin, age,  disability, sex, sexual orientation, or gender identity.            Thank you!     Thank you for choosing [facility]  for your care. Our goal is always to provide you with excellent care. Hearing back from our patients is one way we can continue to improve our services. Please take a few minutes to complete the written survey that you may receive in the mail after your visit with us. Thank you!             Your Updated Medication List - Protect others around you: Learn how to safely use, store and throw away your medicines at www.disposemymeds.org.          This list is accurate as of: 17 11:42 AM.  Always use your most recent med list.                   Brand Name Dispense Instructions for use Diagnosis    albuterol 108 (90 BASE) MCG/ACT Inhaler    PROAIR HFA/PROVENTIL HFA/VENTOLIN HFA    1 Inhaler    Inhale 2 puffs into the lungs every 4 hours as needed        busPIRone 5 MG tablet    BUSPAR    90 tablet    Take 1 tablet (5 mg) by mouth 3 times daily    Anxiety       flunisolide 29 MCG/ACT (0.025%) Soln    NASAREL    1 Box    Spray 2 sprays into both nostrils 2 times daily    Nasal congestion       lisinopril 20 MG tablet    PRINIVIL/ZESTRIL    90 tablet    Take 1 tablet (20 mg) by mouth daily    Essential hypertension with goal blood pressure less than 140/90       methylphenidate 10 MG tablet    RITALIN    60 tablet    Take 1 tablet (10 mg) by mouth 2 times daily    Narcolepsy and cataplexy       oxyCODONE-acetaminophen 5-325 MG per tablet    PERCOCET    10 tablet    Take 1-2 tablets by mouth every 4 hours as needed for pain    S/P  section       PARoxetine 10 MG tablet    PAXIL    30 tablet    Take 1 tablet (10 mg) by mouth At Bedtime    Major depressive disorder, recurrent episode, moderate (H), Generalized anxiety disorder       prenatal multivitamin plus iron 27-0.8 MG Tabs per tablet      Take 1 tablet by mouth daily        ranitidine 150 MG tablet    ZANTAC    60 tablet     Take 1 tablet (150 mg) by mouth 2 times daily    Gastroesophageal reflux disease without esophagitis

## 2017-12-11 NOTE — PATIENT INSTRUCTIONS
- Start Paxil      1/2 tablet for 1 week, then increase to full tablet     - Continue Buspar as needed     - Start Lisinopril, and monitor blood pressure     - Restart Ritalin 10 mg twice a day     - Recheck 1 month

## 2017-12-12 ASSESSMENT — ANXIETY QUESTIONNAIRES: GAD7 TOTAL SCORE: 9

## 2017-12-12 ASSESSMENT — PATIENT HEALTH QUESTIONNAIRE - PHQ9: SUM OF ALL RESPONSES TO PHQ QUESTIONS 1-9: 5

## 2017-12-14 ENCOUNTER — MYC MEDICAL ADVICE (OUTPATIENT)
Dept: FAMILY MEDICINE | Facility: OTHER | Age: 37
End: 2017-12-14

## 2018-01-03 ENCOUNTER — TELEPHONE (OUTPATIENT)
Dept: FAMILY MEDICINE | Facility: OTHER | Age: 38
End: 2018-01-03

## 2018-01-03 NOTE — TELEPHONE ENCOUNTER
Tasha Short is a 37 year old female who's grandmother Joan calls with concerns about her mental health.    NURSING ASSESSMENT:  Description:  Depression, patient feeling very stressed and down lately per grandmother  Onset/duration:  Worsened over the last 1 1/2 weeks  Precip. factors:  Depression, new baby at home with minimal help  Associated symptoms:  Grandmother said that she is with patient and she is safe. Patient denies suicidal ideation or wanting to hurt herself or others.  Improves/worsens symptoms:  Recently increased her antidepressant and that has not helped yet  Pain scale (0-10)   0/10  LMP/preg/breast feeding: Recent pregnancy  Last exam/Treatment:  12/11/2017  Allergies:   Allergies   Allergen Reactions     Penicillins      hives     Phenytoin      rash,puritis (Dilantin)     Prednisone Itching     Face itching, unable to concentrate        MEDICATIONS:   Taking medication(s) as prescribed? Yes  Taking over the counter medication(s?) No  Any medication side effects? No significant side effects    Any barriers to taking medication(s) as prescribed?  No  Medication(s) improving/managing symptoms?  No  Medication reconciliation completed: No      NURSING PLAN: Routed to provider Yes     Grandmother Paige is requesting a work in appointment for patient tomorrow between 11:30 and noon. Patient does not drive and her baby has an appointment tomorrow at 10:30. Her mother is giving her a ride and mother needs to be gone by 2:30 to go to work. Paige is requesting only Donnie see patient since she knows her. Grandmother is concerned about Tasha's depression after the baby. Please advise if able to work in 1/4/18 around 11:30 am.    Brittney Pugh RN, BSN

## 2018-01-03 NOTE — TELEPHONE ENCOUNTER
Reason for Call:  Other appointment    Detailed comments: pt grandmother Paige calling states pt needs to see Johanne Block today or tomorrow. Pt is post partum depressed and grandmother worried about something happening with pt  [phone]    Phone Number Patient can be reached at: Cell number on file:    Telephone Information:   Mobile [phone]       Best Time: ANY    Can we leave a detailed message on this number? YES    Call taken on 1/3/2018 at 9:51 AM by Rosa Isela Easton

## 2018-01-03 NOTE — TELEPHONE ENCOUNTER
"Gma called back. There is no consent on file to talk to Grandma so I asked her to get the patient so I could speak with her directly.      NURSING ASSESSMENT:  Description:  I spoke with patient who states she feels \"horrible\". She   Onset/duration:  Restarted antidepressant 12/11/17  Precip. factors:  History of depression  Associated symptoms:  Denies thoughts of harming herself or others. Does not want to F/U with OB. Both Gma and pt stated they do not think it is postpartum depression. Pt states she has some help with baby and the baby has a well child appointment tomorrow.    Improves/worsens symptoms:  Restarted Paxil 12/11/17, Pt states she has noticed an improvement.   Pain scale (0-10)   0/10  LMP/preg/breast feeding:  First baby-approximately 1 month old  Last exam/Treatment:  12/11/2017  Allergies:   Allergies   Allergen Reactions     Penicillins      hives     Phenytoin      rash,puritis (Dilantin)     Prednisone Itching     Face itching, unable to concentrate      RECOMMENDED DISPOSITION:  See in 72 hours  Will comply with recommendation: Yes   Next 5 appointments (look out 90 days)     Jan 05, 2018 11:20 AM CST   Office Visit with FLORES Powell CNP   [facility]    [address] [place] [address], [address]  [facility] [id number] [phone]            Jan 19, 2018  1:30 PM CST   Office Visit with Madeline Benavidez DO   [facility]    [address] [facility] [id number] [phone]                If further questions/concerns or if symptoms do not improve, worsen or new symptoms develop, call your PCP or [facility] as soon as possible.      Guideline used: depression, nursing advice, postpartum depression  Telephone Triage Protocols for Nurses, Fifth Edition, Vonnie Hernandez RN        "

## 2018-01-03 NOTE — TELEPHONE ENCOUNTER
LM for the patient to return call to the clinic to discuss the below. Please read the patient CDL response. Please offer appointment with OB/GYN for postpartum depression. Doris Estrella RN, BSN

## 2018-01-03 NOTE — TELEPHONE ENCOUNTER
Unfortunately not able to work in. Already over booked.     Donnie Mansfield PA-C  [facility]

## 2018-01-05 ENCOUNTER — HOSPITAL ENCOUNTER (INPATIENT)
Facility: CLINIC | Age: 38
LOS: 5 days | Discharge: HOME | End: 2018-01-10
Attending: PSYCHIATRY & NEUROLOGY | Admitting: PSYCHIATRY & NEUROLOGY
Payer: COMMERCIAL

## 2018-01-05 ENCOUNTER — TRANSFERRED RECORDS (OUTPATIENT)
Dept: HEALTH INFORMATION MANAGEMENT | Facility: CLINIC | Age: 38
End: 2018-01-05

## 2018-01-05 ENCOUNTER — APPOINTMENT (OUTPATIENT)
Dept: GENERAL RADIOLOGY | Facility: CLINIC | Age: 38
End: 2018-01-05
Attending: NURSE PRACTITIONER
Payer: COMMERCIAL

## 2018-01-05 ENCOUNTER — HOSPITAL ENCOUNTER (EMERGENCY)
Facility: CLINIC | Age: 38
Discharge: ANOTHER HOSPITAL | End: 2018-01-05
Attending: NURSE PRACTITIONER | Admitting: NURSE PRACTITIONER
Payer: COMMERCIAL

## 2018-01-05 VITALS
OXYGEN SATURATION: 98 % | DIASTOLIC BLOOD PRESSURE: 78 MMHG | SYSTOLIC BLOOD PRESSURE: 135 MMHG | RESPIRATION RATE: 20 BRPM | HEART RATE: 117 BPM | TEMPERATURE: 98.7 F

## 2018-01-05 DIAGNOSIS — R45.851 SUICIDAL IDEATION: ICD-10-CM

## 2018-01-05 DIAGNOSIS — G47.411 NARCOLEPSY AND CATAPLEXY: ICD-10-CM

## 2018-01-05 LAB
ALBUMIN SERPL-MCNC: 4 G/DL (ref 3.4–5)
ALBUMIN UR-MCNC: 100 MG/DL
ALP SERPL-CCNC: 78 U/L (ref 40–150)
ALT SERPL W P-5'-P-CCNC: 27 U/L (ref 0–50)
AMPHETAMINES UR QL: NOT DETECTED NG/ML
ANION GAP SERPL CALCULATED.3IONS-SCNC: 11 MMOL/L (ref 3–14)
APAP SERPL-MCNC: <5 MG/L (ref 10–20)
APPEARANCE UR: ABNORMAL
AST SERPL W P-5'-P-CCNC: 16 U/L (ref 0–45)
B-HCG SERPL-ACNC: 2 IU/L (ref 0–5)
BACTERIA #/AREA URNS HPF: ABNORMAL /HPF
BARBITURATES UR QL SCN: NOT DETECTED NG/ML
BASOPHILS # BLD AUTO: 0 10E9/L (ref 0–0.2)
BASOPHILS NFR BLD AUTO: 0.2 %
BENZODIAZ UR QL SCN: NOT DETECTED NG/ML
BILIRUB SERPL-MCNC: 0.4 MG/DL (ref 0.2–1.3)
BILIRUB UR QL STRIP: NEGATIVE
BUN SERPL-MCNC: 20 MG/DL (ref 7–30)
BUPRENORPHINE UR QL: NOT DETECTED NG/ML
CALCIUM SERPL-MCNC: 8.4 MG/DL (ref 8.5–10.1)
CANNABINOIDS UR QL: NOT DETECTED NG/ML
CHLORIDE SERPL-SCNC: 103 MMOL/L (ref 94–109)
CO2 SERPL-SCNC: 25 MMOL/L (ref 20–32)
COCAINE UR QL SCN: NOT DETECTED NG/ML
COLOR UR AUTO: YELLOW
CREAT SERPL-MCNC: 0.78 MG/DL (ref 0.52–1.04)
D-METHAMPHET UR QL: NOT DETECTED NG/ML
DIFFERENTIAL METHOD BLD: NORMAL
EOSINOPHIL # BLD AUTO: 0 10E9/L (ref 0–0.7)
EOSINOPHIL NFR BLD AUTO: 0.1 %
ERYTHROCYTE [DISTWIDTH] IN BLOOD BY AUTOMATED COUNT: 14 % (ref 10–15)
GFR SERPL CREATININE-BSD FRML MDRD: 83 ML/MIN/1.7M2
GLUCOSE SERPL-MCNC: 113 MG/DL (ref 70–99)
GLUCOSE UR STRIP-MCNC: 50 MG/DL
HCG UR QL: POSITIVE
HCT VFR BLD AUTO: 44.9 % (ref 35–47)
HGB BLD-MCNC: 14.8 G/DL (ref 11.7–15.7)
HGB UR QL STRIP: NEGATIVE
HYALINE CASTS #/AREA URNS LPF: 2 /LPF (ref 0–2)
IMM GRANULOCYTES # BLD: 0 10E9/L (ref 0–0.4)
IMM GRANULOCYTES NFR BLD: 0.3 %
KETONES UR STRIP-MCNC: 80 MG/DL
LEUKOCYTE ESTERASE UR QL STRIP: NEGATIVE
LYMPHOCYTES # BLD AUTO: 1.5 10E9/L (ref 0.8–5.3)
LYMPHOCYTES NFR BLD AUTO: 14.4 %
MCH RBC QN AUTO: 30.1 PG (ref 26.5–33)
MCHC RBC AUTO-ENTMCNC: 33 G/DL (ref 31.5–36.5)
MCV RBC AUTO: 91 FL (ref 78–100)
METHADONE UR QL SCN: NOT DETECTED NG/ML
MONOCYTES # BLD AUTO: 0.6 10E9/L (ref 0–1.3)
MONOCYTES NFR BLD AUTO: 6.1 %
MUCOUS THREADS #/AREA URNS LPF: PRESENT /LPF
NEUTROPHILS # BLD AUTO: 8.2 10E9/L (ref 1.6–8.3)
NEUTROPHILS NFR BLD AUTO: 78.9 %
NITRATE UR QL: NEGATIVE
OPIATES UR QL SCN: NOT DETECTED NG/ML
OXYCODONE UR QL SCN: NOT DETECTED NG/ML
PCP UR QL SCN: NOT DETECTED NG/ML
PH UR STRIP: 5 PH (ref 5–7)
PLATELET # BLD AUTO: 174 10E9/L (ref 150–450)
POTASSIUM SERPL-SCNC: 3.9 MMOL/L (ref 3.4–5.3)
PROPOXYPH UR QL: NOT DETECTED NG/ML
PROT SERPL-MCNC: 7.3 G/DL (ref 6.8–8.8)
RBC # BLD AUTO: 4.92 10E12/L (ref 3.8–5.2)
RBC #/AREA URNS AUTO: 3 /HPF (ref 0–2)
SALICYLATES SERPL-MCNC: 3 MG/DL
SODIUM SERPL-SCNC: 139 MMOL/L (ref 133–144)
SOURCE: ABNORMAL
SP GR UR STRIP: 1.03 (ref 1–1.03)
SQUAMOUS #/AREA URNS AUTO: 10 /HPF (ref 0–1)
TRICYCLICS UR QL SCN: NOT DETECTED NG/ML
UROBILINOGEN UR STRIP-MCNC: 0 MG/DL (ref 0–2)
WBC # BLD AUTO: 10.4 10E9/L (ref 4–11)
WBC #/AREA URNS AUTO: 6 /HPF (ref 0–2)

## 2018-01-05 PROCEDURE — 81025 URINE PREGNANCY TEST: CPT | Performed by: NURSE PRACTITIONER

## 2018-01-05 PROCEDURE — 90791 PSYCH DIAGNOSTIC EVALUATION: CPT

## 2018-01-05 PROCEDURE — 84702 CHORIONIC GONADOTROPIN TEST: CPT | Performed by: NURSE PRACTITIONER

## 2018-01-05 PROCEDURE — 80329 ANALGESICS NON-OPIOID 1 OR 2: CPT | Performed by: NURSE PRACTITIONER

## 2018-01-05 PROCEDURE — 80329 ANALGESICS NON-OPIOID 1 OR 2: CPT | Mod: 91 | Performed by: NURSE PRACTITIONER

## 2018-01-05 PROCEDURE — 85025 COMPLETE CBC W/AUTO DIFF WBC: CPT | Performed by: NURSE PRACTITIONER

## 2018-01-05 PROCEDURE — 80053 COMPREHEN METABOLIC PANEL: CPT | Performed by: NURSE PRACTITIONER

## 2018-01-05 PROCEDURE — 99285 EMERGENCY DEPT VISIT HI MDM: CPT | Mod: Z6 | Performed by: FAMILY MEDICINE

## 2018-01-05 PROCEDURE — 99285 EMERGENCY DEPT VISIT HI MDM: CPT | Mod: 25 | Performed by: FAMILY MEDICINE

## 2018-01-05 PROCEDURE — 12400007 ZZH R&B MH INTERMEDIATE UMMC

## 2018-01-05 PROCEDURE — 25000132 ZZH RX MED GY IP 250 OP 250 PS 637: Performed by: NURSE PRACTITIONER

## 2018-01-05 PROCEDURE — 80306 DRUG TEST PRSMV INSTRMNT: CPT | Performed by: NURSE PRACTITIONER

## 2018-01-05 PROCEDURE — 81001 URINALYSIS AUTO W/SCOPE: CPT | Performed by: NURSE PRACTITIONER

## 2018-01-05 PROCEDURE — 71046 X-RAY EXAM CHEST 2 VIEWS: CPT | Mod: TC

## 2018-01-05 RX ORDER — LORAZEPAM 1 MG/1
1 TABLET ORAL ONCE
Status: COMPLETED | OUTPATIENT
Start: 2018-01-05 | End: 2018-01-05

## 2018-01-05 RX ADMIN — LORAZEPAM 1 MG: 1 TABLET ORAL at 18:40

## 2018-01-05 ASSESSMENT — ENCOUNTER SYMPTOMS
COUGH: 1
FEVER: 0
APPETITE CHANGE: 1

## 2018-01-05 NOTE — ED PROVIDER NOTES
History     Chief Complaint   Patient presents with     Suicidal     The history is provided by the patient.     Tasha Short is a 37 year old female who presents to the emergency department with concerns of suicidal ideations. Patient is 5 weeks post partum and has been had increasing depression and anxiety. She is currently on paxil 10 mg for her depression and anxiety, she states that it does not seem to be helping. At first she denied being suicidal, but after talking more she told me that she had a plan of suicide, this would be to cut her wrists. She denies wanting to hurt others. Physically patient complains of a productive cough lasting 3 weeks, coughing up a yellow mucus. She is currently a smoker of about half a pack daily. No fever, vomiting, diarrhea.        Problem List:    Patient Active Problem List    Diagnosis Date Noted     Health Care Home 2011     Priority: High     x  DX V65.8 REPLACED WITH [facility] (2013)       Breech presentation 2017     Priority: Medium     Impression  =========     1) Intrauterine pregnancy at 35 0/7 weeks gestational age.  2) None of the anomalies commonly detected by ultrasound were evident in the fetal anatomic survey described above.  3) Growth parameters and estimated fetal weight were consistent with an appropriate for gestation age pattern of growth.  4) The amniotic fluid volume appeared normal.  5) The placenta no longer appears low-lying by transvaginal imaging.  6) The fetus is BREECH.     Recommendation  ==============     We discussed the findings on today's ultrasound with the patient.     We discussed that  section is no longer necessary for the indication of a low-lying placenta as this has now resolved. However, the fetus is breech today. We  briefly discussed options, including  section for breech presentation versus external cephalic version. Tasha will discuss the options further at her next  OB  visit.       Gastroesophageal reflux disease without esophagitis 06/19/2017     Priority: Medium     Elderly primigravida in first trimester 05/15/2017     Priority: Medium     Impression  =========     1) Intrauterine pregnancy at 19 6/7 weeks gestational age.  2) None of the anomalies commonly detected by ultrasound were evident in the detailed fetal anatomic survey described above.  3) Growth parameters and estimated fetal weight were consistent with an appropriate for gestation age pattern of growth.  4) The amniotic fluid volume appeared normal.  5) The placenta is posterior and low lying.     Recommendation  ==============     We discussed the findings on today's ultrasound with the patient.     Alternatives available for detecting fetal anomalies, aneuploidy and predicting developmental outcome for this pregnancy were thoroughly discussed. The risks, benefits and  limitations of maternal serum screening, cell-free DNA screening, ultrasound and genetic amniocentesis were thoroughly reviewed with the patient. We discussed the  availability of amniocentesis for the precise diagnosis of chromosomal abnormalities including the associated procedure-related risk of pregnancy loss of 1/300 ? 1/500. The  patient declined all further aneuploidy screening and diagnostic tests.     A repeat US has been scheduled here in 8 weeks to re-evaluate placental location. Return to primary provider for continued prenatal care.       Need for Tdap vaccination 05/15/2017     Priority: Medium     H/O ETOH abuse 05/15/2017     Priority: Medium     Irregular heartbeat 04/18/2016     Priority: Medium     Tobacco use disorder 04/18/2016     Priority: Medium     HTN, goal below 140/90 11/24/2014     Priority: Medium     Major depressive disorder, recurrent episode, moderate (H) 10/22/2014     Priority: Medium     Generalized anxiety disorder 10/22/2014     Priority: Medium     Hypersomnia 04/12/2013     Priority: Medium     Calcific  tendonitis peroneals 06/21/2012     Priority: Medium     Narcolepsy and cataplexy      Priority: Medium     S/P LEEP 11/17/2010     Priority: Medium     12/14/09 ASCUS + HPV 18, 53, 58 (high risk)  1/13/10 colposcopy- bx (dysplasia) ECC (negative) recommend repeat pap at 6 and 12 months  6/28/10 pap ASCUS + HPV 16 (high risk) recommend colpo  7/21/10 colposcopy- BX ( ROSA 2/3 with gland involvement)  8/2/10 clinic appt to discuss LEEP  10/26/10 tele enc: LEEP scheduled for 11/4/10  11/4/10 LEEP- (ROSA 2/3) not extending to margins.  ECC (negative)  11/17/10 recommend repeat pap 6 months  5/9/11- NIL- repeat 6 months  HM/problem list/history updated.  Reminder sent to pap pool.         Cervical dysplasia 11/07/2010     Priority: Medium     12/14/09: ASCUS + HPV 18, 58, 53 (all high risk)- colpo recommended  1/13/10 colposcopy:  bx (dysplasia- ROSA 1) ECC (negative) recommend repeat pap smears at 6/12 months  6/28/10 pap ASCUS + HPV 16. Recommend colpo  7/22/10 colposcopy- BX (ROSA 2/3 mod/severe dysplasia with gland involovement)  LEEP recommended  11/4/2010: LEEP:  Path= ROSA 2/3 without extension to margins. Recommend repeat pap at 6 months  5/9/11 NIL   12/5/11 pap NIL. Plan-- pap in 6 months (due 6/5/12)   6/18/12 pap NIL.   10/8/13 pap NIL/ neg HR HPV.   5/22/15 pap NIL/neg HPV. Plan: cotest in 3 yrs           ROSA I (cervical intraepithelial neoplasia I) 06/28/2010     Priority: Medium        Past Medical History:    Past Medical History:   Diagnosis Date     ALCOHOL ABUSE-IN REMISS 7/30/2007     Cataplexy and narcolepsy 2002     ROSA 3 - cervical intraepithelial neoplasia grade 3 1/4/10     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 02/19/2001     Hypersomnia with sleep apnea      Irregular menstrual cycle 05/12/1997     Metrorrhagia 02/08/2001     Narcolepsy and cataplexy      Other acne      Other and unspecified adverse effect of drug, medicinal and biological substance 02/19/2001      Substance abuse 10/2/2009     Unspecified contraceptive management        Past Surgical History:    Past Surgical History:   Procedure Laterality Date     COLPOSCOPY CERVIX, LOOP ELECTRODE BIOPSY, COMBINED  11/2010    ROSA 2/3     HC REMOVAL OF TONSILS,12+ Y/O  1999    Tonsils 12+y.o.     REPAIR TENDON PERONEAL  11/15/2013    Procedure: REPAIR TENDON PERONEAL;  right peroneal tendon  transfer;  Surgeon: Jesus Manuel Oden DPM;  Location: [facility] OR       Family History:    Family History   Problem Relation Age of Onset     Depression Mother      Arthritis Paternal Grandmother      Hypertension Paternal Grandfather      birth FF     Asthma No family hx of      C.A.D. No family hx of      DIABETES No family hx of      Cancer - colorectal No family hx of      Prostate Cancer No family hx of      Coronary Artery Disease No family hx of      Ovarian Cancer No family hx of      Mental Illness No family hx of      CEREBROVASCULAR DISEASE No family hx of      Anesthesia Reaction No family hx of      Other Cancer No family hx of      OSTEOPOROSIS No family hx of      Known Genetic Syndrome No family hx of      Obesity No family hx of      Unknown/Adopted No family hx of        Social History:  Marital Status:  Single [1]  Social History   Substance Use Topics     Smoking status: Former Smoker     Packs/day: 0.50     Quit date: 1/5/2015     Smokeless tobacco: Never Used     Alcohol use No        Medications:      busPIRone (BUSPAR) 5 MG tablet   PARoxetine (PAXIL) 10 MG tablet   methylphenidate (RITALIN) 10 MG tablet   lisinopril (PRINIVIL/ZESTRIL) 20 MG tablet   oxyCODONE-acetaminophen (PERCOCET) 5-325 MG per tablet   albuterol (PROAIR HFA/PROVENTIL HFA/VENTOLIN HFA) 108 (90 BASE) MCG/ACT Inhaler   ranitidine (ZANTAC) 150 MG tablet   Prenatal Vit-Fe Fumarate-FA (PRENATAL MULTIVITAMIN  PLUS IRON) 27-0.8 MG TABS per tablet   flunisolide (NASAREL) 29 MCG/ACT (0.025%) SOLN         Review of Systems   Constitutional: Positive for  appetite change. Negative for fever.   Respiratory: Positive for cough.    Psychiatric/Behavioral: Positive for suicidal ideas. Negative for self-injury.   All other systems reviewed and are negative.      Physical Exam          Physical Exam   Constitutional: She is oriented to person, place, and time. She appears well-developed and well-nourished.   HENT:   Head: Normocephalic and atraumatic.   Eyes: Conjunctivae are normal. Pupils are equal, round, and reactive to light.   Neck: Normal range of motion. Neck supple.   Cardiovascular: Normal rate and regular rhythm.    No murmur heard.  Pulmonary/Chest: Effort normal and breath sounds normal. She has no wheezes. She has no rales.   Abdominal: Soft. Bowel sounds are normal. There is no tenderness. There is no rebound and no guarding.   Musculoskeletal: Normal range of motion. She exhibits no edema.   Neurological: She is alert and oriented to person, place, and time.   Skin: Skin is warm and dry.   Psychiatric:   Patient is tearful and anxious.       ED Course     ED Course     Procedures       Results for orders placed or performed during the hospital encounter of 01/05/18 (from the past 24 hour(s))   HCG qualitative urine (UPT)   Result Value Ref Range    HCG Qual Urine Positive (A) NEG^Negative   Urine Drugs of Abuse Screen Panel 13   Result Value Ref Range    Cannabinoids (89-gjr-0-carboxy-9-THC) Not Detected NDET^Not Detected ng/mL    Phencyclidine (Phencyclidine) Not Detected NDET^Not Detected ng/mL    Cocaine (Benzoylecgonine) Not Detected NDET^Not Detected ng/mL    Methamphetamine (d-Methamphetamine) Not Detected NDET^Not Detected ng/mL    Opiates (Morphine) Not Detected NDET^Not Detected ng/mL    Amphetamine (d-Amphetamine) Not Detected NDET^Not Detected ng/mL    Benzodiazepines (Nordiazepam) Not Detected NDET^Not Detected ng/mL    Tricyclic Antidepressants (Desipramine) Not Detected NDET^Not Detected ng/mL    Methadone (Methadone) Not Detected NDET^Not  Detected ng/mL    Barbiturates (Butalbital) Not Detected NDET^Not Detected ng/mL    Oxycodone (Oxycodone) Not Detected NDET^Not Detected ng/mL    Propoxyphene (Norpropoxyphene) Not Detected NDET^Not Detected ng/mL    Buprenorphine (Buprenorphine) Not Detected NDET^Not Detected ng/mL   UA with Microscopic   Result Value Ref Range    Color Urine Yellow     Appearance Urine Cloudy     Glucose Urine 50 (A) NEG^Negative mg/dL    Bilirubin Urine Negative NEG^Negative    Ketones Urine 80 (A) NEG^Negative mg/dL    Specific Gravity Urine 1.029 1.003 - 1.035    Blood Urine Negative NEG^Negative    pH Urine 5.0 5.0 - 7.0 pH    Protein Albumin Urine 100 (A) NEG^Negative mg/dL    Urobilinogen mg/dL 0.0 0.0 - 2.0 mg/dL    Nitrite Urine Negative NEG^Negative    Leukocyte Esterase Urine Negative NEG^Negative    Source Unspecified Urine     WBC Urine 6 (H) 0 - 2 /HPF    RBC Urine 3 (H) 0 - 2 /HPF    Bacteria Urine Few (A) NEG^Negative /HPF    Squamous Epithelial /HPF Urine 10 (H) 0 - 1 /HPF    Mucous Urine Present (A) NEG^Negative /LPF    Hyaline Casts 2 0 - 2 /LPF   CBC with platelets differential   Result Value Ref Range    WBC 10.4 4.0 - 11.0 10e9/L    RBC Count 4.92 3.8 - 5.2 10e12/L    Hemoglobin 14.8 11.7 - 15.7 g/dL    Hematocrit 44.9 35.0 - 47.0 %    MCV 91 78 - 100 fl    MCH 30.1 26.5 - 33.0 pg    MCHC 33.0 31.5 - 36.5 g/dL    RDW 14.0 10.0 - 15.0 %    Platelet Count 174 150 - 450 10e9/L    Diff Method Automated Method     % Neutrophils 78.9 %    % Lymphocytes 14.4 %    % Monocytes 6.1 %    % Eosinophils 0.1 %    % Basophils 0.2 %    % Immature Granulocytes 0.3 %    Absolute Neutrophil 8.2 1.6 - 8.3 10e9/L    Absolute Lymphocytes 1.5 0.8 - 5.3 10e9/L    Absolute Monocytes 0.6 0.0 - 1.3 10e9/L    Absolute Eosinophils 0.0 0.0 - 0.7 10e9/L    Absolute Basophils 0.0 0.0 - 0.2 10e9/L    Abs Immature Granulocytes 0.0 0 - 0.4 10e9/L   Comprehensive metabolic panel   Result Value Ref Range    Sodium 139 133 - 144 mmol/L     Potassium 3.9 3.4 - 5.3 mmol/L    Chloride 103 94 - 109 mmol/L    Carbon Dioxide 25 20 - 32 mmol/L    Anion Gap 11 3 - 14 mmol/L    Glucose 113 (H) 70 - 99 mg/dL    Urea Nitrogen 20 7 - 30 mg/dL    Creatinine 0.78 0.52 - 1.04 mg/dL    GFR Estimate 83 >60 mL/min/1.7m2    GFR Estimate If Black >90 >60 mL/min/1.7m2    Calcium 8.4 (L) 8.5 - 10.1 mg/dL    Bilirubin Total 0.4 0.2 - 1.3 mg/dL    Albumin 4.0 3.4 - 5.0 g/dL    Protein Total 7.3 6.8 - 8.8 g/dL    Alkaline Phosphatase 78 40 - 150 U/L    ALT 27 0 - 50 U/L    AST 16 0 - 45 U/L   Acetaminophen level   Result Value Ref Range    Acetaminophen Level <5 mg/L   Salicylate level   Result Value Ref Range    Salicylate Level 3 mg/dL   HCG quantitative pregnancy   Result Value Ref Range    HCG Quantitative Serum 2 0 - 5 IU/L   XR Chest 2 Views    Narrative    CHEST TWO VIEWS  1/5/2018 5:02 PM     HISTORY: Cough.    COMPARISON: 6/3/2016      Impression    IMPRESSION: Increased interstitial markings in both lungs, new since  the previous exam. Lower right anterolateral rounded densities  adjacent to the anterior right sixth and seventh ribs and lateral  right eighth rib, likely old rib fractures that have healed. Normal  heart size and pulmonary vascularity.    MARY GRACE RUSH MD     Medications - No data to display    Assessments & Plan (with Medical Decision Making)  Suicidal, post partum depression.  Blood work unremarkable, Urine pregnancy initially returned positive, Beta quant obtained and is 2, likely positive from recent post partum state.  CXR negative for any acute lobar infiltrate.  Discussed patient with Rafaela from [facility], agrees with inpatient admission.  Patient agreeable.   hold signed.  Patient will be transferred to [facility] via [facility] in stable condition.   Patient given Ativan after speaking with [facility] per request for anxiety.  Her grandmother is at bedside, they are agreeable with plan of care and transfer.   Patient staffed in the ED with   Michelle.      I have reviewed the nursing notes.    I have reviewed the findings, diagnosis, plan and need for follow up with the patient.    New Prescriptions    No medications on file       Final diagnoses:   Suicidal ideation   Post partum depression     This document serves as a record of services personally performed by Brandi Orozco APRN-CNP. It was created on their behalf by Kristy Richardson, a trained medical scribe. The creation of this record is based on the provider's personal observations and the statements of the patient. This document has been checked and approved by the attending provider.  Note: Chart documentation done in part with Dragon Voice Recognition software. Although reviewed after completion, some word and grammatical errors may remain.  1/5/2018   [facility] EMERGENCY DEPARTMENT     Brandi Orozco APRN CNP  01/05/18 1905

## 2018-01-05 NOTE — IP AVS SNAPSHOT
[address]    [address]    [place] [address]    Phone:  [phone]                                       After Visit Summary   1/5/2018    Tasha Short    MRN: [id number]           After Visit Summary Signature Page     I have received my discharge instructions, and my questions have been answered. I have discussed any challenges I see with this plan with the nurse or doctor.    ..........................................................................................................................................  Patient/Patient Representative Signature      ..........................................................................................................................................  Patient Representative Print Name and Relationship to Patient    ..................................................               ................................................  Date                                            Time    ..........................................................................................................................................  Reviewed by Signature/Title    ...................................................              ..............................................  Date                                                            Time

## 2018-01-05 NOTE — ED NOTES
"Patient presented to registration with c/o shortness of breath. Upon rooming patient admitted to anxiety and depression and admits to suicidal thoughts. She states, \"I just dont want to be here\". She is 5 weeks post partum and has been having increasing depression and anxiety. Her grandmother states patient had a clinic appointment this morning but \"was too sick to make the appointment\". Patient denies thoughts of wanting to hurt her baby.  "

## 2018-01-05 NOTE — IP AVS SNAPSHOT
MRN:[id number]                      After Visit Summary   1/5/2018    Tasha Short    MRN: [id number]           Thank you!     Thank you for choosing [facility] for your care. Our goal is always to provide you with excellent care.        Patient Information     Date Of Birth          1980        Designated Caregiver       Most Recent Value    Caregiver    Will someone help with your care after discharge? no      About your hospital stay     You were admitted on:  January 5, 2018 You last received care in the:  [facility]    You were discharged on:  January 10, 2018       Who to Call     For medical emergencies, please call 911.  For non-urgent questions about your medical care, please call your primary care provider or clinic, [phone]          Attending Provider     Provider Specialty    Ronan Bradford MD Psychiatry       Primary Care Provider Office Phone # Fax #    Enid LINDSEY Mansfield PA-C [phone]      Your next 10 appointments already scheduled     Jan 19, 2018  1:30 PM CST   Office Visit with Madeline Benavidez DO   [facility]    [address] [facility] [id number] [phone]           Bring a current list of meds and any records pertaining to this visit. For Physicals, please bring immunization records and any forms needing to be filled out. Please arrive 10 minutes early to complete paperwork.              Further instructions from your care team        Behavioral Discharge Planning and Instructions      Summary:  You were admitted on 1/5/2018  due to Depression and Suicidal Ideations.  You were treated by Yaquelin Ryan NP and discharged on 1/10/2018 from [facility]      Principal Diagnosis: Major Depressive Disorder, severe, with peripartum onset; Generalized Anxiety Disorder      Health Care Follow-up Appointments:   OB/Gyn Appointment Date/Time: 1/19/2018 at 1:30 pm    Provider:  Dr. Madeline Benavidez  [facility]  [address] [facility]  [address]  [place] [address]  [phone]    In Home Therapy Appointment Date/Time: Monday, January 15, 2018 @ 10:00 am    Provider: Luli  Facility:  [facility]  Phone: [phone]   Fax: [phone]    Medication management:  Appointment:  Thursday, February 8, 2018  (Arrive at 12:45 for paperwork)  Provider:  NING Boogie  [address] [place] [address]  [address]  [place] [address]  Phone: [phone]  The Health Unit Coordinator has faxed these discharge instructions to Fax: [phone]     PREGNANCY AND POSTPARTUM SUPPORT [facility] residents or providers may contact the Pregnancy & Postpartum Support [place] HelpLine for referrals, resources and to be connected with peer support.  Call or text HelpLine: 470.831.3296  lesly@[employer].com  http://www.ppsupportmn.org/get_help      Assistance: Phone: [phone]  In Person:  [facility]  [address] [place] [address]    Attend all scheduled appointments with your outpatient providers. Call at least 24 hours in advance if you need to reschedule an appointment to ensure continued access to your outpatient providers.   Major Treatments, Procedures and Findings:  You were provided with: a psychiatric assessment, assessed for medical stability, medication evaluation and/or management, group therapy and milieu management    Symptoms to Report: feeling more aggressive, increased confusion, losing more sleep, mood getting worse or thoughts of suicide    Early warning signs can include: increased depression or anxiety sleep disturbances increased thoughts or behaviors of suicide or self-harm  increased unusual thinking, such as paranoia or hearing voices    Safety and Wellness:  Take all medicines as directed.  Make no changes unless your doctor suggests them.      Follow treatment recommendations.  Refrain from alcohol and  "non-prescribed drugs.  If there is a concern for safety, call 911.    Resources:   Crisis Intervention: 843.308.3793 or 135-195-4666 (TTY: 152.989.6934).  Call anytime for help.  National [place] on Mental Illness (www.[place].dayron.org): 311.159.1827 or 517-019-0348.  Suicide Awareness Voices of Education (SAVE) (www.save.org): 114-587-IYDU (0328)  National Suicide Prevention Line (www.mentalhealthmn.org): 376-478-STIW (8108)  Mental Health Consumer/Survivor Network of [place] (www.mhcsn.net): 192.246.8513 or 539-723-1309  [facility] Crisis Response [phone]    The treatment team has appreciated the opportunity to work with you.     If you have any questions or concerns our unit number is [phone]  You may be receiving a follow-up phone call within the next three days from a representative from behavioral health.    You have identified the best phone number to reach you as [phone]        Pending Results     No orders found from 1/3/2018 to 1/6/2018.            Admission Information     Date & Time Provider Department Dept. Phone    1/5/2018 Ronan Bradford MD [facility] [phone]      Your Vitals Were     Blood Pressure Pulse Temperature Respirations Height Weight    117/84 73 98.3  F (36.8  C) 16 1.626 m (5' 4\") 54.7 kg (120 lb 8 oz)    BMI (Body Mass Index)                   20.68 kg/m2           [employer] Information     [employer] gives you secure access to your electronic health record. If you see a primary care provider, you can also send messages to your care team and make appointments. If you have questions, please call your primary care clinic.  If you do not have a primary care provider, please call [phone] and they will assist you.        Care EveryWhere ID     This is your Care EveryWhere ID. This could be used by other organizations to access your [place] medical records  [id number]        Equal Access to Services     LINDY CAMERON AH: wolf Azul qaybta " noemi nice misaelparker perkinsdeandra singh ah. Janey [facility] [phone].    ATENCIÓN: Si mynor azar, tiene a chaves disposición servicios gratuitos de asistencia lingüística. Daksha al [phone].    We comply with applicable federal civil rights laws and [place] laws. We do not discriminate on the basis of race, color, national origin, age, disability, sex, sexual orientation, or gender identity.               Review of your medicines      START taking        Dose / Directions    sertraline 100 MG tablet   Commonly known as:  ZOLOFT        Dose:  100 mg   Start taking on:  1/11/2018   Take 1 tablet (100 mg) by mouth daily   Quantity:  30 tablet   Refills:  1         CONTINUE these medicines which may have CHANGED, or have new prescriptions. If we are uncertain of the size of tablets/capsules you have at home, strength may be listed as something that might have changed.        Dose / Directions    busPIRone 15 MG tablet   Commonly known as:  BUSPAR   This may have changed:    - medication strength  - how much to take  - when to take this        Dose:  15 mg   Take 1 tablet (15 mg) by mouth 2 times daily   Quantity:  60 tablet   Refills:  1         CONTINUE these medicines which have NOT CHANGED        Dose / Directions    flunisolide 29 MCG/ACT (0.025%) Soln   Commonly known as:  NASAREL   Used for:  Nasal congestion        Dose:  2 spray   Spray 2 sprays into both nostrils 2 times daily   Quantity:  1 Box   Refills:  12       ibuprofen 600 MG tablet   Commonly known as:  ADVIL/MOTRIN        Dose:  600 mg   Take 600 mg by mouth every 6 hours as needed for moderate pain   Refills:  0       lisinopril 20 MG tablet   Commonly known as:  PRINIVIL/ZESTRIL   Used for:  HTN, goal below 140/90        Dose:  20 mg   Take 1 tablet (20 mg) by mouth daily   Quantity:  90 tablet   Refills:  3       methylphenidate 10 MG tablet   Commonly known as:  RITALIN   Used for:  Narcolepsy and cataplexy        Dose:  10 mg    Take 1 tablet (10 mg) by mouth 2 times daily   Quantity:  60 tablet   Refills:  0         STOP taking     PARoxetine 10 MG tablet   Commonly known as:  PAXIL                Where to get your medicines      These medications were sent to [facility] [place], [place] - [address]  [address], [facility] [address]     Phone:  [phone]     busPIRone 15 MG tablet    sertraline 100 MG tablet         Some of these will need a paper prescription and others can be bought over the counter. Ask your nurse if you have questions.     Bring a paper prescription for each of these medications     methylphenidate 10 MG tablet                Protect others around you: Learn how to safely use, store and throw away your medicines at www.disposemymeds.org.             Medication List: This is a list of all your medications and when to take them. Check marks below indicate your daily home schedule. Keep this list as a reference.      Medications           Morning Afternoon Evening Bedtime As Needed    busPIRone 15 MG tablet   Commonly known as:  BUSPAR   Take 1 tablet (15 mg) by mouth 2 times daily   Last time this was given:  15 mg on 1/10/2018  7:42 AM                                      flunisolide 29 MCG/ACT (0.025%) Soln   Commonly known as:  NASAREL   Spray 2 sprays into both nostrils 2 times daily                                      ibuprofen 600 MG tablet   Commonly known as:  ADVIL/MOTRIN   Take 600 mg by mouth every 6 hours as needed for moderate pain   Last time this was given:  600 mg on 1/10/2018  7:42 AM                                   lisinopril 20 MG tablet   Commonly known as:  PRINIVIL/ZESTRIL   Take 1 tablet (20 mg) by mouth daily   Last time this was given:  20 mg on 1/10/2018  7:42 AM                                   methylphenidate 10 MG tablet   Commonly known as:  RITALIN   Take 1 tablet (10 mg) by mouth 2 times daily   Last time this was given:  10 mg on 1/10/2018  1:27  PM                                      sertraline 100 MG tablet   Commonly known as:  ZOLOFT   Take 1 tablet (100 mg) by mouth daily   Start taking on:  1/11/2018   Last time this was given:  100 mg on 1/10/2018  7:42 AM

## 2018-01-06 PROBLEM — F32.A DEPRESSION: Status: ACTIVE | Noted: 2018-01-06

## 2018-01-06 PROCEDURE — 25000132 ZZH RX MED GY IP 250 OP 250 PS 637: Performed by: PSYCHIATRY & NEUROLOGY

## 2018-01-06 PROCEDURE — 12400007 ZZH R&B MH INTERMEDIATE UMMC

## 2018-01-06 PROCEDURE — 99207 ZZC CDG-MDM COMPONENT: MEETS LOW - DOWN CODED: CPT | Performed by: PSYCHIATRY & NEUROLOGY

## 2018-01-06 PROCEDURE — 99222 1ST HOSP IP/OBS MODERATE 55: CPT | Mod: AI | Performed by: PSYCHIATRY & NEUROLOGY

## 2018-01-06 RX ORDER — IBUPROFEN 600 MG/1
600 TABLET, FILM COATED ORAL EVERY 6 HOURS PRN
COMMUNITY
End: 2018-05-08

## 2018-01-06 RX ORDER — IBUPROFEN 600 MG/1
600 TABLET, FILM COATED ORAL EVERY 6 HOURS PRN
Status: DISCONTINUED | OUTPATIENT
Start: 2018-01-06 | End: 2018-01-10 | Stop reason: HOSPADM

## 2018-01-06 RX ORDER — ALUMINA, MAGNESIA, AND SIMETHICONE 2400; 2400; 240 MG/30ML; MG/30ML; MG/30ML
30 SUSPENSION ORAL EVERY 4 HOURS PRN
Status: DISCONTINUED | OUTPATIENT
Start: 2018-01-06 | End: 2018-01-10 | Stop reason: HOSPADM

## 2018-01-06 RX ORDER — BUSPIRONE HYDROCHLORIDE 15 MG/1
15 TABLET ORAL 2 TIMES DAILY
Status: DISCONTINUED | OUTPATIENT
Start: 2018-01-06 | End: 2018-01-10 | Stop reason: HOSPADM

## 2018-01-06 RX ORDER — PAROXETINE 10 MG/1
10 TABLET, FILM COATED ORAL AT BEDTIME
Status: DISCONTINUED | OUTPATIENT
Start: 2018-01-06 | End: 2018-01-06

## 2018-01-06 RX ORDER — HYDROXYZINE HYDROCHLORIDE 25 MG/1
25-50 TABLET, FILM COATED ORAL EVERY 4 HOURS PRN
Status: DISCONTINUED | OUTPATIENT
Start: 2018-01-06 | End: 2018-01-10 | Stop reason: HOSPADM

## 2018-01-06 RX ORDER — METHYLPHENIDATE HYDROCHLORIDE 10 MG/1
10 TABLET ORAL 2 TIMES DAILY
Status: DISCONTINUED | OUTPATIENT
Start: 2018-01-06 | End: 2018-01-10 | Stop reason: HOSPADM

## 2018-01-06 RX ORDER — SERTRALINE HYDROCHLORIDE 100 MG/1
50 TABLET, FILM COATED ORAL DAILY
Status: DISCONTINUED | OUTPATIENT
Start: 2018-01-07 | End: 2018-01-09

## 2018-01-06 RX ORDER — SERTRALINE HYDROCHLORIDE 25 MG/1
25 TABLET, FILM COATED ORAL DAILY
Status: COMPLETED | OUTPATIENT
Start: 2018-01-06 | End: 2018-01-06

## 2018-01-06 RX ORDER — TRAZODONE HYDROCHLORIDE 50 MG/1
50 TABLET, FILM COATED ORAL
Status: DISCONTINUED | OUTPATIENT
Start: 2018-01-06 | End: 2018-01-10 | Stop reason: HOSPADM

## 2018-01-06 RX ORDER — IBUPROFEN 600 MG/1
600 TABLET, FILM COATED ORAL EVERY 6 HOURS
Status: DISCONTINUED | OUTPATIENT
Start: 2018-01-06 | End: 2018-01-06 | Stop reason: CLARIF

## 2018-01-06 RX ORDER — LISINOPRIL 20 MG/1
20 TABLET ORAL DAILY
Status: DISCONTINUED | OUTPATIENT
Start: 2018-01-06 | End: 2018-01-10 | Stop reason: HOSPADM

## 2018-01-06 RX ORDER — ACETAMINOPHEN 325 MG/1
650 TABLET ORAL EVERY 4 HOURS PRN
Status: DISCONTINUED | OUTPATIENT
Start: 2018-01-06 | End: 2018-01-10 | Stop reason: HOSPADM

## 2018-01-06 RX ADMIN — SERTRALINE HYDROCHLORIDE 25 MG: 25 TABLET ORAL at 16:06

## 2018-01-06 RX ADMIN — METHYLPHENIDATE HYDROCHLORIDE 10 MG: 10 TABLET ORAL at 16:26

## 2018-01-06 RX ADMIN — BUSPIRONE HYDROCHLORIDE 15 MG: 15 TABLET ORAL at 21:16

## 2018-01-06 RX ADMIN — BUSPIRONE HYDROCHLORIDE 15 MG: 15 TABLET ORAL at 16:06

## 2018-01-06 ASSESSMENT — ACTIVITIES OF DAILY LIVING (ADL)
DRESS: STREET CLOTHES;SCRUBS (BEHAVIORAL HEALTH);INDEPENDENT
DRESS: INDEPENDENT
ORAL_HYGIENE: INDEPENDENT
LAUNDRY: WITH SUPERVISION
ORAL_HYGIENE: INDEPENDENT
ORAL_HYGIENE: INDEPENDENT
GROOMING: INDEPENDENT
DRESS: SCRUBS (BEHAVIORAL HEALTH)
GROOMING: HANDWASHING
LAUNDRY: WITH SUPERVISION
GROOMING: HANDWASHING;INDEPENDENT

## 2018-01-06 NOTE — PLAN OF CARE
Problem: Depressive Symptoms  Goal: Depressive Symptoms  Signs and symptoms of listed problems will be absent or manageable.   Pt admitted to [facility] from [facility], depressed, cooperative with admission process. Pt says her stressors are being a single parent with an infant, denies Suicidal Ideation at this time, stated this is her 1st Psych admission and has a therapist. Denies alcohol and illicit drug use Utox Negative, pt on 15 minute checks.

## 2018-01-06 NOTE — H&P
Psychiatry History and Physical    Tasha Short MRN# [id number]   Age: 37 year old YOB: 1980     Date of Admission:  2018          Contacts:   Primary Outpatient Psychiatrist: None  Primary Care Physician: Dr. Block, [place]         Assessment:   This patient is a 37 year old  female with history of MDD and ROMI who presented to our facility from [facility] with emergence of passive suicidal ideation in postpartum period  (5 weeks postpartum) and in the context of lack of perceived support from her family and her 's father who is currently in [facility].  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  Utox negative in ED. Patient was evaluated by PCP in 2017 at which time Paxil 10 mg daily was initiated to target moderate symptoms of depression.  Patient denies any improvement since initiation of Paxil and requests alternative AD. She has been on Zoloft in  though patient does not recall whether it was helpful. Records indicate she noted partial improvement when on Zoloft in the past though unclear how long she was taking it. She is amenable to retrial at this time. She is not breastfeeding. She also notes improvement in acute anxiety when taking Buspar, thus it will be scheduled and increased. R/B/A discussed and she is in agreement with plan. Lastly, patient has a history of Narcolepsy and has been taking Ritalin for several years for this reason. Will resume during this admission. She would benefit from individual psychotherapy following discharge. Inpatient psychiatric hospitalization is warranted at this time for safety, stabilization, and possible adjustment in medications.         Diagnoses:     MDD, severe, with peripartum onset  ROMI  Narcolepsy         Plan:   Psychiatric treatment/inteventions:  Medications:   Start Buspar 15 mg  "BID  DC Paxil due to ineffectiveness  Start Zoloft 50 mg daily (will have dose of 25 mg daily x 1 today)  Resume Ritalin 10 mg BID    Laboratory/Imaging: Of note, urine HCG was elevated. ED provider rechecked serum HCG, which was negative thus there are no concerns about pregnancy. Patient has not been sexually active.     Consults: None indicated at this time    Patient will be treated in therapeutic milieu with appropriate individual and group therapies as described.    Medical treatment/interventions:  Medical concerns: Patient denies with exception of fatigue and reduced appetite  Consults: Offered nutrition consult though given improved appetite, patient declines    Legal Status: Voluntary    Safety Assessment:   Checks: Status 15  Precautions: Suicide  Pt has not required locked seclusion or restraints in the past 24 hours to maintain safety, please refer to RN documentation for further details.    The risks, benefits, alternatives and side effects have been discussed and are understood by the patient.    Disposition: Pending clinical stabilization.    Tarah Arciniega MD  [facility] Psychiatry         Chief Complaint:   History obtained from patient and EMR (DEC assessment by Skylar Malhotra on 1/5/2018)  Patient interviewed on station 32 at approximately 1 pm    \"I just feel horrible and not myself at all\"         History of Present Illness:     Per DEC assessment: Patient is a 37-year-old female with a history of depression and anxiety who presented to the ED with worsening depression and anxiety in the postpartum period.    The patient was diagnosed with depression in 2007 per her report and is treated by her outpatient PCP, Dr. Block.  Patient reports discontinuing psychotropic medications when she became pregnant and she restarted Paxil shortly after her daughter's birth due to the history of depression and concerns regarding postpartum depression.  Patient reports that for over a week, she has " "been depressed to the extent that she is unable to function.  She notes desire to sleep all day but is up regularly to care for her daughter.  She notes that she has not been eating or drinking fluids recently, and estimated it has been at least 3 days since she has eaten a meal.  She denies thoughts of harming herself or her , but reported suicidal ideation to ED provider.  DEC  also noted confusion on examination as patient referred to her child as her son, but later told [facility]  that she had a daughter.  She could not recall the name of her outpatient therapist or what agency the therapist was working from.  She did not appear to be fully oriented her DEC  note.    Of note, patient was evaluated by her PCP for moderate depressive symptoms and heightened anxiety on 2017 at which time Paxil 10 mg daily was initiated.  Additionally, patient was instructed to take BuSpar 5 mg TID as needed for anxiety.  Patient reports that given sensitivity to medications in the past, her PCP ordered  Genesight testing, which indicated that she may benefit from both Paxil and Effexor.  However, patient denies any improvement in depressive symptoms since initiation of Paxil.    During the interview with the patient, she expresses significant concerns about worsening depression in the past week.  She said \"it has been absolutely horrible.\"  She feels more isolated at home, has been sleeping more, and has very low energy.  She notes significant depressed mood more days than not.  Currently, she denies any suicidal ideation or homicidal ideation.              Psychiatric Review of Systems:   Depression:   Reports: depressed mood, suicidal ideation, decreased interest, changes in sleep, changes in appetite, guilt, hopelessness, helplessness, impaired concentration, decreased energy, irritability.   Sary:   Denies: sleeplessness, impulsiveness (spending, driving recklessly, change in sexual activity), " "racing thoughts, increased goal-directed activities, pressured speech, grandiose delusions.  Psychosis:   Denies: visual hallucinations, auditory hallucinations, paranoia, grandiosity, ideas of reference, thought insertions, thought broadcasting.  Anxiety: Patient notes baseline of heightened anxiety, though no recent worsening and anxiety.  She denies worsening anxiety when taking Ritalin.  She notes improvement when taking as needed BuSpar.  She expresses worries about finances and her father's health.  She denies panic attacks.  PTSD: Patient denies history of trauma  OCD:   Denies: obsessions, checking, symmetry, cleaning, skin picking.  ADD/ADHD:   Denies:  impaired attention, unable to listen, difficulty with organization, difficulty with task completion, forgetful, interrupting.  ED:   Denies: restriction, binging, purging.           Medical Review of Systems:     Review of systems positive for fatigue  10 point review of systems is otherwise negative unless noted above.            Psychiatric History:   Past diagnoses: MDD and ROMI  Psychiatric Hospitalizations: None  History of Psychosis: None  Prior ECT: None  Court Commitment: None  Suicide Attempts: None  Self-injurious Behavior: None  Violence toward others: None  Use of Psychotropics: Prozac (\"worked really well for awhile but then stopped working completely\"), Cymbalta, Zoloft  Psychotherapy: None         Substance Use History:   Patient denies any recent illicit substance or alcohol use.  U tox negative on admission.  Per chart review, patient has a remote history of alcohol and opiate misuse.  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  However, when patient was asked about this, she adamantly denied any history of alcohol or opiate use.  She reported that the loss of license or \"suspension\" was related to her desire for it to be suspended because \"I did not feel safe caring for " "myself so how can I care for anyone else?\"         Social History:   Educational History: BA in college, RN  Relationships: None  Children: one child, 5 weeks old, healthy  Current Living Situation: Living with her Dad who has multiple medical issues and her .  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   Financial Support: Dad   History: None  Legal History: None  Abuse History: None         Family History:   None per patient  H/o completed suicides in family: None         Past Medical History:     Past Medical History:   Diagnosis Date     ALCOHOL ABUSE-IN REMISS 2007     Cataplexy and narcolepsy     tried Provigil, Straterra, Ritalin (works)     ROSA 3 - cervical intraepithelial neoplasia grade 3 1/4/10    ROSA 2/3  on LEEP biopsy     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     Hypersomnia with sleep apnea      Irregular menstrual cycle 1997     Metrorrhagia 2001     Narcolepsy and cataplexy      Other acne      Other and unspecified adverse effect of drug, medicinal and biological substance 2001    Allergic and adverse reaction to Dilantin     Substance abuse 10/2/2009    see 2009 confidential report     Unspecified contraceptive management      History of seizures: None  History of Hepatitis, Head Trauma with or without loss of consciousness: None         Past Surgical History:     Past Surgical History:   Procedure Laterality Date     COLPOSCOPY CERVIX, LOOP ELECTRODE BIOPSY, COMBINED  2010    ROSA 2/3     HC REMOVAL OF TONSILS,12+ Y/O      Tonsils 12+y.o.     REPAIR TENDON PERONEAL  11/15/2013    Procedure: REPAIR TENDON PERONEAL;  right peroneal tendon  transfer;  Surgeon: Jesus Manuel Oden DPM;  Location:  OR "              Allergies:      Allergies   Allergen Reactions     Penicillins      hives     Phenytoin      rash,puritis (Dilantin)     Prednisone Itching     Face itching, unable to concentrate      Seasonal Allergies               Medications:   I have reviewed this patient's current medications  Prescriptions Prior to Admission   Medication Sig Dispense Refill Last Dose     IBUPROFEN PO Take 600 mg by mouth every 6 hours        busPIRone (BUSPAR) 5 MG tablet Take 1 tablet (5 mg) by mouth 3 times daily (Patient taking differently: Take 5 mg by mouth 3 times daily as needed (anxiety) ) 90 tablet 3      PARoxetine (PAXIL) 10 MG tablet Take 1 tablet (10 mg) by mouth At Bedtime 30 tablet 1      methylphenidate (RITALIN) 10 MG tablet Take 1 tablet (10 mg) by mouth 2 times daily 60 tablet 0      lisinopril (PRINIVIL/ZESTRIL) 20 MG tablet Take 1 tablet (20 mg) by mouth daily 90 tablet 3      flunisolide (NASAREL) 29 MCG/ACT (0.025%) SOLN Spray 2 sprays into both nostrils 2 times daily (Patient taking differently: Spray 2 sprays into both nostrils 2 times daily as needed ) 1 Box 12 Taking     oxyCODONE-acetaminophen (PERCOCET) 5-325 MG per tablet Take 1-2 tablets by mouth every 4 hours as needed for pain 10 tablet 0  at not taking     albuterol (PROAIR HFA/PROVENTIL HFA/VENTOLIN HFA) 108 (90 BASE) MCG/ACT Inhaler Inhale 2 puffs into the lungs every 4 hours as needed 1 Inhaler 0 Taking     ranitidine (ZANTAC) 150 MG tablet Take 1 tablet (150 mg) by mouth 2 times daily 60 tablet 1  at not taking     Prenatal Vit-Fe Fumarate-FA (PRENATAL MULTIVITAMIN  PLUS IRON) 27-0.8 MG TABS per tablet Take 1 tablet by mouth daily    at not taking             Labs:     Recent Results (from the past 24 hour(s))   HCG qualitative urine (UPT)    Collection Time: 01/05/18  3:50 PM   Result Value Ref Range    HCG Qual Urine Positive (A) NEG^Negative   Urine Drugs of Abuse Screen Panel 13    Collection Time: 01/05/18  3:50 PM   Result Value Ref  Range    Cannabinoids (05-qwo-1-carboxy-9-THC) Not Detected NDET^Not Detected ng/mL    Phencyclidine (Phencyclidine) Not Detected NDET^Not Detected ng/mL    Cocaine (Benzoylecgonine) Not Detected NDET^Not Detected ng/mL    Methamphetamine (d-Methamphetamine) Not Detected NDET^Not Detected ng/mL    Opiates (Morphine) Not Detected NDET^Not Detected ng/mL    Amphetamine (d-Amphetamine) Not Detected NDET^Not Detected ng/mL    Benzodiazepines (Nordiazepam) Not Detected NDET^Not Detected ng/mL    Tricyclic Antidepressants (Desipramine) Not Detected NDET^Not Detected ng/mL    Methadone (Methadone) Not Detected NDET^Not Detected ng/mL    Barbiturates (Butalbital) Not Detected NDET^Not Detected ng/mL    Oxycodone (Oxycodone) Not Detected NDET^Not Detected ng/mL    Propoxyphene (Norpropoxyphene) Not Detected NDET^Not Detected ng/mL    Buprenorphine (Buprenorphine) Not Detected NDET^Not Detected ng/mL   UA with Microscopic    Collection Time: 01/05/18  3:50 PM   Result Value Ref Range    Color Urine Yellow     Appearance Urine Cloudy     Glucose Urine 50 (A) NEG^Negative mg/dL    Bilirubin Urine Negative NEG^Negative    Ketones Urine 80 (A) NEG^Negative mg/dL    Specific Gravity Urine 1.029 1.003 - 1.035    Blood Urine Negative NEG^Negative    pH Urine 5.0 5.0 - 7.0 pH    Protein Albumin Urine 100 (A) NEG^Negative mg/dL    Urobilinogen mg/dL 0.0 0.0 - 2.0 mg/dL    Nitrite Urine Negative NEG^Negative    Leukocyte Esterase Urine Negative NEG^Negative    Source Unspecified Urine     WBC Urine 6 (H) 0 - 2 /HPF    RBC Urine 3 (H) 0 - 2 /HPF    Bacteria Urine Few (A) NEG^Negative /HPF    Squamous Epithelial /HPF Urine 10 (H) 0 - 1 /HPF    Mucous Urine Present (A) NEG^Negative /LPF    Hyaline Casts 2 0 - 2 /LPF   CBC with platelets differential    Collection Time: 01/05/18  4:25 PM   Result Value Ref Range    WBC 10.4 4.0 - 11.0 10e9/L    RBC Count 4.92 3.8 - 5.2 10e12/L    Hemoglobin 14.8 11.7 - 15.7 g/dL    Hematocrit 44.9 35.0 -  "47.0 %    MCV 91 78 - 100 fl    MCH 30.1 26.5 - 33.0 pg    MCHC 33.0 31.5 - 36.5 g/dL    RDW 14.0 10.0 - 15.0 %    Platelet Count 174 150 - 450 10e9/L    Diff Method Automated Method     % Neutrophils 78.9 %    % Lymphocytes 14.4 %    % Monocytes 6.1 %    % Eosinophils 0.1 %    % Basophils 0.2 %    % Immature Granulocytes 0.3 %    Absolute Neutrophil 8.2 1.6 - 8.3 10e9/L    Absolute Lymphocytes 1.5 0.8 - 5.3 10e9/L    Absolute Monocytes 0.6 0.0 - 1.3 10e9/L    Absolute Eosinophils 0.0 0.0 - 0.7 10e9/L    Absolute Basophils 0.0 0.0 - 0.2 10e9/L    Abs Immature Granulocytes 0.0 0 - 0.4 10e9/L   Comprehensive metabolic panel    Collection Time: 01/05/18  4:25 PM   Result Value Ref Range    Sodium 139 133 - 144 mmol/L    Potassium 3.9 3.4 - 5.3 mmol/L    Chloride 103 94 - 109 mmol/L    Carbon Dioxide 25 20 - 32 mmol/L    Anion Gap 11 3 - 14 mmol/L    Glucose 113 (H) 70 - 99 mg/dL    Urea Nitrogen 20 7 - 30 mg/dL    Creatinine 0.78 0.52 - 1.04 mg/dL    GFR Estimate 83 >60 mL/min/1.7m2    GFR Estimate If Black >90 >60 mL/min/1.7m2    Calcium 8.4 (L) 8.5 - 10.1 mg/dL    Bilirubin Total 0.4 0.2 - 1.3 mg/dL    Albumin 4.0 3.4 - 5.0 g/dL    Protein Total 7.3 6.8 - 8.8 g/dL    Alkaline Phosphatase 78 40 - 150 U/L    ALT 27 0 - 50 U/L    AST 16 0 - 45 U/L   Acetaminophen level    Collection Time: 01/05/18  4:25 PM   Result Value Ref Range    Acetaminophen Level <5 mg/L   Salicylate level    Collection Time: 01/05/18  4:25 PM   Result Value Ref Range    Salicylate Level 3 mg/dL   HCG quantitative pregnancy    Collection Time: 01/05/18  4:25 PM   Result Value Ref Range    HCG Quantitative Serum 2 0 - 5 IU/L       /88  Pulse 118  Temp 99  F (37.2  C) (Oral)  Resp 16  Ht 1.626 m (5' 4\")  Wt 52.9 kg (116 lb 11.2 oz)  BMI 20.03 kg/m2  Weight is 116 lbs 11.2 oz  Body mass index is 20.03 kg/(m^2).         Psychiatric Mental Status Examination:   Appearance: awake, alert, thin, appeared very fatigued  Attitude: cooperative " "and pleasant  Eye Contact: good  Mood:  \"blah\"  Affect: mood congruent and full range  Speech:  clear, coherent and normal prosody  Language: fluent in English  Psychomotor Behavior: hypervigilant, fidgety, no evidence of tardive dyskinesia, dystonia, or tics  Gait/Station: normal  Thought Process:  linear, logical, goal oriented  Associations:  no loose associations  Thought Content:  Denying SI/HI/AVH; no evidence of psychotic thinking  Insight:  good  Judgement: intact  Oriented to:  time, person, and place  Attention Span and Concentration:  intact  Recent and Remote Memory:  intact  Fund of Knowledge: appropriate         Physical Exam:   Please refer to physical exam completed by ED provider, Brandi TANNER, on 1/5/18. I agree with the findings and/// assessment and have no additional findings to add at this time.     "

## 2018-01-06 NOTE — PROGRESS NOTES
Items with Pt :None. Pt belongings were forgotten at [facility] by EMT.     Items sent to security : None     A               Admission:  I am responsible for any personal items that are not sent to the safe or pharmacy.  [facility] is not responsible for loss, theft or damage of any property in my possession.    Signature:  _________________________________ Date: _______  Time: _____                                              Staff Signature:  ____________________________ Date: ________  Time: _____      2nd Staff person, if patient is unable/unwilling to sign:    Signature: ________________________________ Date: ________  Time: _____     Discharge:  [facility] has returned all of my personal belongings:    Signature: _________________________________ Date: ________  Time: _____                                          Staff Signature:  ____________________________ Date: ________  Time: _____

## 2018-01-06 NOTE — PROGRESS NOTES
Initial Psychosocial Assessment    I have reviewed the chart, met with the patient, and developed Care Plan.  Information for assessment was obtained from: chart review and patient interview      Presenting Problem:  The patient is a 37-year-old female who is brought to the emergency department by family with suicidal ideations. She has had increasing symptoms of anxiety and depression since the birth of her child 5 weeks ago.      History of Mental Health and Chemical Dependency:  Patient has a history of depression and anxiety. Patient was hosptialized at [facility] 1-2 years ago for SI with a plan. Patient was treated for alcohol and narcotic abuse by [facility] and [facility]. Patient denies recent substance use and drug screen was negative.    Family Description (Constellation, Family Psychiatric History):  Raised by mom and step-dad who adopted patient at age 5. Biological father was an alcoholic and not involved. Patient has a half sister 9 years younger. Patient reports her childhood was stressful. Step-dad was a workaholic and not around much. Mom was not emotionally available. Patient has never been  and has one child. The baby's father is not involved and is currently in jail. Both sides of the family have depression: sister, mom, maternal grandmother and paternal grandmother    Significant Life Events (Illness, Abuse, Trauma, Death):  Patient denies trauma and abuse. Phi passed away unexpectedly two years ago at age 37 from a heart attack. They were together for 13 years. Phi's mom passed away two months later. Parents  a year ago. Dad broke both of his legs and patient had to run his business. Dad has been mentally abusive, controlling and manipulative.    Living Situation:  Patient lives in a house with her father and her infant daughter.     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    Financial Status:  Unstable; MA, WIC    Legal Issues:  Denies    Ethnic/Cultural Issues:  none    Spiritual Orientation:  Episcopalian     Service History:  None    Social Functioning (organization, interests):  Limited social support. Patient feels isolated. Parents and grandparents live one hour from patient. Patient enjoys outdoors, gardening, playing with her dog, camping.    Current Treatment Providers are:  PCP: Mitra THOMAS, [facility] Assessment/Plan:  Patient could benefit from therapy and is interested in a female therapist. Patient interested in  assistance and vocational services.

## 2018-01-06 NOTE — PROGRESS NOTES
Pt arrived on Station 32 without any belongings, they were left behind by [facility], in [facility] Emergency [facility].

## 2018-01-06 NOTE — PROGRESS NOTES
"Pt spent most of shift in bed.  She was up for meals, however otherwise isolated in room.  During check-in, pt was constantly moving feet/legs or readjusting.  Pt reported feeling restless.  Pt says that goal for today is to meet with doctor. \"I am here for medication management,\" states pt.  Denies SI/SIB.  Ate well.  Staff encouraged shower, however pt denied.     01/06/18 1308   Behavioral Health   Hallucinations denies / not responding to hallucinations   Thinking poor concentration   Orientation person: oriented;place: oriented;date: oriented;time: oriented   Memory baseline memory   Insight insight appropriate to situation   Judgement impaired   Eye Contact at examiner   Affect blunted, flat   Mood depressed   Physical Appearance/Attire disheveled   Hygiene neglected grooming - unclean body, hair, teeth   Suicidality other (see comments)  (denies)   1. Wish to be Dead No   2. Non-Specific Active Suicidal Thoughts  No   Change in Protective Factors? No   Enviromental Risk Factors None   Self Injury other (see comment)  (denies)   Elopement (none stated or observed)   Activity isolative;restless   Speech clear;coherent   Medication Sensitivity (\"medication management is why I'm here\")   Psychomotor / Gait steady;balanced   Activities of Daily Living   Hygiene/Grooming handwashing   Oral Hygiene independent   Dress scrubs (behavioral health)   Room Organization independent     "

## 2018-01-07 PROCEDURE — 25000132 ZZH RX MED GY IP 250 OP 250 PS 637: Performed by: PSYCHIATRY & NEUROLOGY

## 2018-01-07 PROCEDURE — 12400007 ZZH R&B MH INTERMEDIATE UMMC

## 2018-01-07 RX ADMIN — NICOTINE POLACRILEX 4 MG: 2 GUM, CHEWING ORAL at 13:03

## 2018-01-07 RX ADMIN — BUSPIRONE HYDROCHLORIDE 15 MG: 15 TABLET ORAL at 09:31

## 2018-01-07 RX ADMIN — SERTRALINE HYDROCHLORIDE 50 MG: 100 TABLET ORAL at 09:33

## 2018-01-07 RX ADMIN — METHYLPHENIDATE HYDROCHLORIDE 10 MG: 10 TABLET ORAL at 09:32

## 2018-01-07 RX ADMIN — LISINOPRIL 20 MG: 20 TABLET ORAL at 09:31

## 2018-01-07 RX ADMIN — METHYLPHENIDATE HYDROCHLORIDE 10 MG: 10 TABLET ORAL at 14:22

## 2018-01-07 RX ADMIN — BUSPIRONE HYDROCHLORIDE 15 MG: 15 TABLET ORAL at 19:55

## 2018-01-07 ASSESSMENT — ACTIVITIES OF DAILY LIVING (ADL)
ORAL_HYGIENE: INDEPENDENT
DRESS: SCRUBS (BEHAVIORAL HEALTH)
LAUNDRY: WITH SUPERVISION
GROOMING: INDEPENDENT
GROOMING: INDEPENDENT
ORAL_HYGIENE: INDEPENDENT
LAUNDRY: WITH SUPERVISION
DRESS: SCRUBS (BEHAVIORAL HEALTH)

## 2018-01-07 NOTE — PROGRESS NOTES
01/06/18 2306   Patient Belongings   Patient Belongings clothing;shoes   Disposition of Belongings Locker     Kept in locker #15  Pants, shirts, bra, coat and shoes    A               Admission:  I am responsible for any personal items that are not sent to the safe or pharmacy.  [facility] is not responsible for loss, theft or damage of any property in my possession.    Signature:  _________________________________ Date: _______  Time: _____                                              Staff Signature:  ____________________________ Date: ________  Time: _____      2nd Staff person, if patient is unable/unwilling to sign:    Signature: ________________________________ Date: ________  Time: _____     Discharge:  [facility] has returned all of my personal belongings:    Signature: _________________________________ Date: ________  Time: _____                                          Staff Signature:  ____________________________ Date: ________  Time: _____

## 2018-01-07 NOTE — PHARMACY-ADMISSION MEDICATION HISTORY
Admission Medication History status for the 1/5/2018 admission is complete.  See EPIC admission navigator for Prior to Admission medications.    Medication history sources:  Patient, [facility]     Medication history source reliability: Good    Medication adherence:  Moderate    Changes made to PTA medication list (reason)  Added: None  Deleted: Albuterol, Oxycodone-acetaminophen, Prenatal vitamin, ranitidine (Pt not taking any of these)  Changed: None    Additional medication history information (including reliability of information, actions taken by pharmacist): -Pt was a good historian for her mediation history  -Confirmed home doses of buspirone, lisinopril, methylphenidate, and paroxetine with [facility]    Time spent in this activity: 20min    Medication history completed by: Gallito Ding, Pharmacy Intern       Prior to Admission medications    Medication Sig Last Dose Taking? Auth Provider   ibuprofen (ADVIL/MOTRIN) 600 MG tablet Take 600 mg by mouth every 6 hours as needed for moderate pain 1/4/2018 at Unknown time Yes Unknown, Entered By History   busPIRone (BUSPAR) 5 MG tablet Take 1 tablet (5 mg) by mouth 3 times daily Past Month at Unknown time Yes Enid Mansfield PA-C   PARoxetine (PAXIL) 10 MG tablet Take 1 tablet (10 mg) by mouth At Bedtime 1/4/2018 Yes Enid Mansfield PA-C   methylphenidate (RITALIN) 10 MG tablet Take 1 tablet (10 mg) by mouth 2 times daily 1/5/2018 at Unknown time Yes Enid Mansfield PA-C   lisinopril (PRINIVIL/ZESTRIL) 20 MG tablet Take 1 tablet (20 mg) by mouth daily 1/5/2018 at Unknown time Yes Enid Mansfield PA-C   flunisolide (NASAREL) 29 MCG/ACT (0.025%) SOLN Spray 2 sprays into both nostrils 2 times daily More than a month at Unknown time  Hortensia Goldberg MD

## 2018-01-07 NOTE — PROGRESS NOTES
Pt was up and visible out in milieu. On approach calm and cooperative, behavior was appropriate. Pt spent most of shift out in milieu watching TV and socializing with others. Denies SI, SIB, HI and cooperates.

## 2018-01-07 NOTE — PLAN OF CARE
"Problem: Depressive Symptoms  Goal: Depressive Symptoms  Signs and symptoms of listed problems will be absent or manageable.     RN 48 hour assessment:  Patient stated she felt a little \"off\" due to going off an antidepressant and starting a new one. She was much more visible and social in the milieu today than yesterday. Patient denied having any suicidal thoughts at this time.       "

## 2018-01-08 PROCEDURE — 25000132 ZZH RX MED GY IP 250 OP 250 PS 637: Performed by: PSYCHIATRY & NEUROLOGY

## 2018-01-08 PROCEDURE — 12400007 ZZH R&B MH INTERMEDIATE UMMC

## 2018-01-08 PROCEDURE — 97150 GROUP THERAPEUTIC PROCEDURES: CPT | Mod: GO

## 2018-01-08 PROCEDURE — 90853 GROUP PSYCHOTHERAPY: CPT

## 2018-01-08 PROCEDURE — 99232 SBSQ HOSP IP/OBS MODERATE 35: CPT | Performed by: NURSE PRACTITIONER

## 2018-01-08 RX ADMIN — LISINOPRIL 20 MG: 20 TABLET ORAL at 09:11

## 2018-01-08 RX ADMIN — METHYLPHENIDATE HYDROCHLORIDE 10 MG: 10 TABLET ORAL at 09:11

## 2018-01-08 RX ADMIN — SERTRALINE HYDROCHLORIDE 50 MG: 100 TABLET ORAL at 09:11

## 2018-01-08 RX ADMIN — TRAZODONE HYDROCHLORIDE 50 MG: 50 TABLET ORAL at 21:46

## 2018-01-08 RX ADMIN — BUSPIRONE HYDROCHLORIDE 15 MG: 15 TABLET ORAL at 20:22

## 2018-01-08 RX ADMIN — IBUPROFEN 600 MG: 600 TABLET ORAL at 14:18

## 2018-01-08 RX ADMIN — METHYLPHENIDATE HYDROCHLORIDE 10 MG: 10 TABLET ORAL at 14:18

## 2018-01-08 RX ADMIN — BUSPIRONE HYDROCHLORIDE 15 MG: 15 TABLET ORAL at 09:11

## 2018-01-08 ASSESSMENT — ACTIVITIES OF DAILY LIVING (ADL)
LAUNDRY: WITH SUPERVISION
GROOMING: INDEPENDENT
DRESS: INDEPENDENT
GROOMING: INDEPENDENT
ORAL_HYGIENE: INDEPENDENT
LAUNDRY: WITH SUPERVISION
DRESS: INDEPENDENT
ORAL_HYGIENE: INDEPENDENT

## 2018-01-08 NOTE — PROGRESS NOTES
Pt withdrawn, isolative to room for part of this shift socializing with selective peers. Pt expressed missing her 5 weeks old baby, stated  she just wants to feel better and go home.  Rated her depression and anxiety at a 2/10, denied SI/SIB thoughts.Pt calm, polite and cooperative, polite on approach, no issues stated or observed.         01/07/18 2100   Behavioral Health   Hallucinations denies / not responding to hallucinations   Thinking poor concentration   Orientation time: oriented;date: oriented;place: oriented;person: oriented   Memory baseline memory   Insight admits / accepts   Judgement intact   Eye Contact at examiner   Affect blunted, flat   Mood depressed;anxious   Physical Appearance/Attire untidy   Hygiene (Pt could use a shower.)   Suicidality other (see comments)  (Pt denies)   1. Wish to be Dead No   2. Non-Specific Active Suicidal Thoughts  No   3. Active Sucidal Ideation with any Methods (Not Plan) Without Intent to Act  No   4. Active Suicidal Ideation with Some Intent to Act, Without Specific Plan  No   5. Active Suicidal Ideation with Specific Plan and Intent  No   Duration (Lifetime) NA   Enviromental Risk Factors None   Self Injury other (see comment)  (Pt denies.)   Elopement (None stated or observed.)   Activity other (see comment);withdrawn  (visible, social with selective peers.)   Speech clear;coherent   Medication Sensitivity no observed side effects;no stated side effects   Psychomotor / Gait balanced;steady   Psycho Education   Type of Intervention 1:1 intervention   Response participates, initiates socially appropriate   Hours 0.5   Treatment Detail (check in.)   Activities of Daily Living   Hygiene/Grooming independent   Oral Hygiene independent   Dress scrubs (behavioral health)   Laundry with supervision   Room Organization independent   Activity   Activity Assistance Provided independent   Behavioral Health Interventions   Depression maintain safety precautions   Social and  Therapeutic Interventions (Depression) encourage socialization with peers;encourage participation in therapeutic groups and milieu activities

## 2018-01-08 NOTE — PROGRESS NOTES
Initially seen by OT on this date. Tasha  attended 3 of 3 OT groups today. This a.m. she  participated in OT clinic and was able to initiate task, follow through with plan and ask for help as needed. Worked with good focus on a simple structured painting project. This afternoon she attended an OT topic group that involved reflective storytelling. Engaged in the session, thoughtful responses. Will be given a written self-assessment upon increased group attendance. More observation needed to complete initial evaluation at this time.

## 2018-01-08 NOTE — PROGRESS NOTES
Patient reports no suicidal or self-harmful ideation. She said her depression and anxiety is decreasing. SHE ATTENDED GROUPS.      01/08/18 1500   Behavioral Health   Hallucinations denies / not responding to hallucinations   Thinking poor concentration   Orientation time: oriented;date: oriented;place: oriented;person: oriented   Memory baseline memory   Insight admits / accepts   Judgement intact   Eye Contact at examiner   Affect blunted, flat   Mood depressed;anxious   Physical Appearance/Attire untidy   Hygiene other (see comment)  (Adequate)   Suicidality other (see comments)  (denies)   1. Wish to be Dead No   2. Non-Specific Active Suicidal Thoughts  No   Self Injury other (see comment)  (denies)   Elopement Hallucinations directing behavior  (nothing indicated)   Activity withdrawn;other (see comment)  (visible in milieu)   Speech clear;coherent   Medication Sensitivity no stated side effects;no observed side effects   Psychomotor / Gait balanced;steady   Activities of Daily Living   Hygiene/Grooming independent   Oral Hygiene independent   Dress independent   Laundry with supervision   Room Organization independent   Activity   Activity Assistance Provided independent   Behavioral Health Interventions   Depression maintain safety precautions;monitor need to revise level of observation;maintain safe secure environment;provide emotional support;establish therapeutic relationship;assist with developing and utilizing healthy coping strategies;build upon strengths;assess patient response to medication;assess medication adherance;monitor need for prn medication   Social and Therapeutic Interventions (Depression) encourage socialization with peers;encourage participation in therapeutic groups and milieu activities

## 2018-01-08 NOTE — PROGRESS NOTES
"[facility]   Psychiatric Progress Note      Impression:     Tasha \"Valery\" Deja is a 37-year-old female admitted to [facility] on 1/5/2018.  She was admitted as a voluntary patient through [facility] due to depressive symptoms and suicidal ideation.  She is 5 weeks postpartum.  Her baby's father is in detention.  Her father is temporarily living with her and her baby since her parents recently .  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  She characterizes him as controlling.  Her baby Emerald is currently with her mother.  Since, admission, Paxil was discontinued.  Zoloft was initiated.  Buspar was increased.  Ritalin was continued.  She denies SI.           Diagnoses:     Major depressive disorder, severe, with peripartum onset  Generalized anxiety disorder  Hypersomnia         Plan:     Medications:  Continue Zoloft.  Continue Buspar.  Continue Ritalin.      Discharge to home when stable, likely later this week.  She has a PCP but would like a referral for psychiatry.  She has an in-home therapist.      Attestation:  Patient has been seen and evaluated by me,  FLORES Jack CNP  The patient was counseled on  nature of illness and treatment plan/options  Care was coordinated with  tx team          Interim History:     The patient's care was discussed with the treatment team and chart notes were reviewed.  Pt was documented as sleeping 6.5, 7 and 6 hours during the weekend overnight shifts.  Pt reports she has been spending time in the milieu, social with other patients.  She has been attending to ADLs.  She has been eating well.  She had had some difficulty sleeping related to noise on the unit.  Denies SI.  Mood remains depressed.  She misses her baby but does not yet feel ready to return home.           Medications:     Current Facility-Administered Medications   Medication     lisinopril " "(PRINIVIL/ZESTRIL) tablet 20 mg     hydrOXYzine (ATARAX) tablet 25-50 mg     nicotine polacrilex (NICORETTE) gum 2-4 mg     acetaminophen (TYLENOL) tablet 650 mg     alum & mag hydroxide-simethicone (MYLANTA ES/MAALOX  ES) suspension 30 mL     magnesium hydroxide (MILK OF MAGNESIA) suspension 30 mL     traZODone (DESYREL) tablet 50 mg     ibuprofen (ADVIL/MOTRIN) tablet 600 mg     sertraline (ZOLOFT) tablet 50 mg     busPIRone (BUSPAR) tablet 15 mg     methylphenidate (RITALIN) tablet 10 mg     Facility-Administered Medications Ordered in Other Encounters   Medication     bupivacaine 0.5% EPINEPHrine 1:200,000 injection             Allergies:     Allergies   Allergen Reactions     Penicillins      hives     Phenytoin      rash,puritis (Dilantin)     Prednisone Itching     Face itching, unable to concentrate      Seasonal Allergies             Psychiatric Examination:   BP (!) 146/95  Pulse 68  Temp 97.6  F (36.4  C) (Oral)  Resp 16  Ht 1.626 m (5' 4\")  Wt 54.2 kg (119 lb 8 oz)  BMI 20.51 kg/m2  Weight is 119 lbs 8 oz  Body mass index is 20.51 kg/(m^2).    Appearance: awake, alert, adequate grooming/hygiene  Attitude: cooperative and pleasant  Eye Contact: good  Mood:  depressed  Affect: mood congruent and full range  Speech:  clear, coherent and normal prosody  Language: fluent in English, intact  Psychomotor Behavior: no evidence of tardive dyskinesia, dystonia, or tics  Gait/Station: normal  Thought Process:  linear, logical, goal oriented  Associations:  no loose associations  Thought Content:  Denying SI/HI/AVH; no evidence of psychotic thinking  Insight:  good  Judgement: intact  Oriented to:  time, person, and place  Attention Span and Concentration:  intact  Recent and Remote Memory:  intact  Fund of Knowledge: appropriate         Labs:      Ref. Range 1/5/2018 15:50 1/5/2018 16:25 1/5/2018 17:02   Sodium Latest Ref Range: 133 - 144 mmol/L  139    Potassium Latest Ref Range: 3.4 - 5.3 mmol/L  3.9  "   Chloride Latest Ref Range: 94 - 109 mmol/L  103    Carbon Dioxide Latest Ref Range: 20 - 32 mmol/L  25    Urea Nitrogen Latest Ref Range: 7 - 30 mg/dL  20    Creatinine Latest Ref Range: 0.52 - 1.04 mg/dL  0.78    GFR Estimate Latest Ref Range: >60 mL/min/1.7m2  83    GFR Estimate If Black Latest Ref Range: >60 mL/min/1.7m2  >90    Calcium Latest Ref Range: 8.5 - 10.1 mg/dL  8.4 (L)    Anion Gap Latest Ref Range: 3 - 14 mmol/L  11    Albumin Latest Ref Range: 3.4 - 5.0 g/dL  4.0    Protein Total Latest Ref Range: 6.8 - 8.8 g/dL  7.3    Bilirubin Total Latest Ref Range: 0.2 - 1.3 mg/dL  0.4    Alkaline Phosphatase Latest Ref Range: 40 - 150 U/L  78    ALT Latest Ref Range: 0 - 50 U/L  27    AST Latest Ref Range: 0 - 45 U/L  16    HCG Qual Urine Latest Ref Range: NEG^Negative  Positive (A)     HCG Quantitative Serum Latest Ref Range: 0 - 5 IU/L  2    Glucose Latest Ref Range: 70 - 99 mg/dL  113 (H)    WBC Latest Ref Range: 4.0 - 11.0 10e9/L  10.4    Hemoglobin Latest Ref Range: 11.7 - 15.7 g/dL  14.8    Hematocrit Latest Ref Range: 35.0 - 47.0 %  44.9    Platelet Count Latest Ref Range: 150 - 450 10e9/L  174    RBC Count Latest Ref Range: 3.8 - 5.2 10e12/L  4.92    MCV Latest Ref Range: 78 - 100 fl  91    MCH Latest Ref Range: 26.5 - 33.0 pg  30.1    MCHC Latest Ref Range: 31.5 - 36.5 g/dL  33.0    RDW Latest Ref Range: 10.0 - 15.0 %  14.0    Diff Method Unknown  Automated Method    % Neutrophils Latest Units: %  78.9    % Lymphocytes Latest Units: %  14.4    % Monocytes Latest Units: %  6.1    % Eosinophils Latest Units: %  0.1    % Basophils Latest Units: %  0.2    % Immature Granulocytes Latest Units: %  0.3    Absolute Neutrophil Latest Ref Range: 1.6 - 8.3 10e9/L  8.2    Absolute Lymphocytes Latest Ref Range: 0.8 - 5.3 10e9/L  1.5    Absolute Monocytes Latest Ref Range: 0.0 - 1.3 10e9/L  0.6    Absolute Eosinophils Latest Ref Range: 0.0 - 0.7 10e9/L  0.0    Absolute Basophils Latest Ref Range: 0.0 - 0.2 10e9/L   0.0    Abs Immature Granulocytes Latest Ref Range: 0 - 0.4 10e9/L  0.0    Color Urine Unknown Yellow     Appearance Urine Unknown Cloudy     Glucose Urine Latest Ref Range: NEG^Negative mg/dL 50 (A)     Bilirubin Urine Latest Ref Range: NEG^Negative  Negative     Ketones Urine Latest Ref Range: NEG^Negative mg/dL 80 (A)     Specific Gravity Urine Latest Ref Range: 1.003 - 1.035  1.029     pH Urine Latest Ref Range: 5.0 - 7.0 pH 5.0     Protein Albumin Urine Latest Ref Range: NEG^Negative mg/dL 100 (A)     Urobilinogen mg/dL Latest Ref Range: 0.0 - 2.0 mg/dL 0.0     Nitrite Urine Latest Ref Range: NEG^Negative  Negative     Blood Urine Latest Ref Range: NEG^Negative  Negative     Leukocyte Esterase Urine Latest Ref Range: NEG^Negative  Negative     Source Unknown Unspecified Urine     WBC Urine Latest Ref Range: 0 - 2 /HPF 6 (H)     RBC Urine Latest Ref Range: 0 - 2 /HPF 3 (H)     Bacteria Urine Latest Ref Range: NEG^Negative /HPF Few (A)     Squamous Epithelial /HPF Urine Latest Ref Range: 0 - 1 /HPF 10 (H)     Mucous Urine Latest Ref Range: NEG^Negative /LPF Present (A)     Hyaline Casts Latest Ref Range: 0 - 2 /LPF 2     Acetaminophen Level Latest Units: mg/L  <5    Salicylate Level Latest Units: mg/dL  3    Cannabinoids (19-wst-0-carboxy-9-THC) Latest Ref Range: NDET^Not Detected ng/mL Not Detected     Phencyclidine (Phencyclidine) Latest Ref Range: NDET^Not Detected ng/mL Not Detected     Cocaine (Benzoylecgonine) Latest Ref Range: NDET^Not Detected ng/mL Not Detected     Methamphetamine (d-Methamphetamine) Latest Ref Range: NDET^Not Detected ng/mL Not Detected     Opiates (Morphine) Latest Ref Range: NDET^Not Detected ng/mL Not Detected     Amphetamine (d-Amphetamine) Latest Ref Range: NDET^Not Detected ng/mL Not Detected     Benzodiazepines (Nordiazepam) Latest Ref Range: NDET^Not Detected ng/mL Not Detected     Tricyclic Antidepressants (Desipramine) Latest Ref Range: NDET^Not Detected ng/mL Not Detected      Methadone (Methadone) Latest Ref Range: NDET^Not Detected ng/mL Not Detected     Barbiturates (Butalbital) Latest Ref Range: NDET^Not Detected ng/mL Not Detected     Oxycodone (Oxycodone) Latest Ref Range: NDET^Not Detected ng/mL Not Detected     Propoxyphene (Norpropoxyphene) Latest Ref Range: NDET^Not Detected ng/mL Not Detected     Buprenorphine (Buprenorphine) Latest Ref Range: NDET^Not Detected ng/mL Not Detected     XR CHEST 2 VW Unknown   Rpt

## 2018-01-09 PROCEDURE — 25000132 ZZH RX MED GY IP 250 OP 250 PS 637: Performed by: PSYCHIATRY & NEUROLOGY

## 2018-01-09 PROCEDURE — 12400007 ZZH R&B MH INTERMEDIATE UMMC

## 2018-01-09 PROCEDURE — 90853 GROUP PSYCHOTHERAPY: CPT

## 2018-01-09 PROCEDURE — 25000132 ZZH RX MED GY IP 250 OP 250 PS 637: Performed by: NURSE PRACTITIONER

## 2018-01-09 PROCEDURE — 97150 GROUP THERAPEUTIC PROCEDURES: CPT | Mod: GO

## 2018-01-09 PROCEDURE — 99232 SBSQ HOSP IP/OBS MODERATE 35: CPT | Performed by: NURSE PRACTITIONER

## 2018-01-09 RX ORDER — SERTRALINE HYDROCHLORIDE 100 MG/1
100 TABLET, FILM COATED ORAL DAILY
Status: DISCONTINUED | OUTPATIENT
Start: 2018-01-09 | End: 2018-01-10 | Stop reason: HOSPADM

## 2018-01-09 RX ADMIN — METHYLPHENIDATE HYDROCHLORIDE 10 MG: 10 TABLET ORAL at 08:52

## 2018-01-09 RX ADMIN — BUSPIRONE HYDROCHLORIDE 15 MG: 15 TABLET ORAL at 08:52

## 2018-01-09 RX ADMIN — NICOTINE POLACRILEX 4 MG: 2 GUM, CHEWING ORAL at 19:56

## 2018-01-09 RX ADMIN — NICOTINE POLACRILEX 4 MG: 2 GUM, CHEWING ORAL at 14:01

## 2018-01-09 RX ADMIN — BUSPIRONE HYDROCHLORIDE 15 MG: 15 TABLET ORAL at 19:56

## 2018-01-09 RX ADMIN — IBUPROFEN 600 MG: 600 TABLET ORAL at 19:56

## 2018-01-09 RX ADMIN — NICOTINE POLACRILEX 4 MG: 2 GUM, CHEWING ORAL at 15:59

## 2018-01-09 RX ADMIN — IBUPROFEN 600 MG: 600 TABLET ORAL at 14:01

## 2018-01-09 RX ADMIN — METHYLPHENIDATE HYDROCHLORIDE 10 MG: 10 TABLET ORAL at 14:01

## 2018-01-09 RX ADMIN — SERTRALINE HYDROCHLORIDE 100 MG: 100 TABLET ORAL at 08:52

## 2018-01-09 NOTE — PROGRESS NOTES
"INITIAL O.T. ASSESSMENT:  Tasha has attended OT regularly since admission. Affect anxious. Participative with minimal encouragement.    Was given and completed a written self assessment. Cited \"having a baby\" as the reason for her current hospitalization.Endorsed feelings of sadness, anxiety, helplessness, depression, and overwhelm. Identified \"parents, therapist\" as important personal supports in her life.     Goals prioritized for hospitalization (selected from list): Identify and express feelings in a better way, Manage anxiety.    OT staff explained the purpose of pt being involved in current treatment plan.      Plan: Encourage ongoing attendance as able to meet self-stated goals, implement positive coping skills, engage in graded opportunities for success, and provide assessment ongoing.       01/09/18 1300   Clinical Impression   Affect Anxious   Orientation Oriented to person, place and time   Appearance and ADLs Disheveled   Attention to Internal Stimuli No observed signs   Interaction Skills Interacts with prompts, minimal response   Ability to Communicate Needs Independent   Verbal Content Clear;Appropriate to topic   Ability to Maintain Boundaries Maintains appropriate physical boundaries;Maintains appropriate verbal boundaries   Participation Participates with minimal encouragement   Concentration Concentrates 20-30 minutes   Ability to Concentrate With structure;Preoccupied   Follows and Comprehends Directions Independently follows 1 step verbal directions   Memory Needs further assessment   Organization Independently organizes simple tasks   Decision Making Independent   Planning and Problem Solving Occasionally needs assist/feedback   Ability to Apply and Learn Concepts Comprehends concepts, but needs assist to apply   Frustrations / Stress Tolerance Utilizes coping skills with prompts   Level of Insight Some insight   Self Esteem Takes risks with support and encouragement   Social Supports Has " knowledge of support systems

## 2018-01-09 NOTE — PROGRESS NOTES
"[facility]   Psychiatric Progress Note      Impression:     Tasha \"Valery\" Deja is a 37-year-old female admitted to [facility] on 1/5/2018.  She was admitted as a voluntary patient through [facility] ER due to depressive symptoms and suicidal ideation.  She is 5 weeks postpartum.  Her baby's father is in jail.  Her father is temporarily living with her and her baby since her parents recently .  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  She characterizes him as controlling.  Her baby Emerald is currently with her mother.  Since, admission, Paxil was discontinued.  Zoloft was initiated.  Buspar was increased.  Ritalin was continued.  She denies SI.  Mood is improved.         Diagnoses:     Major depressive disorder, severe, with peripartum onset  Generalized anxiety disorder  Hypersomnia         Plan:     Medications:  Increase Zoloft to 100mg.   Continue Buspar.  Continue Ritalin.      Discharge to home when stable; goal to discharge Thursday.  She has a PCP but would like a referral for psychiatry.  She has an in-home therapist.  She is interested in postpartum support groups and childcare assistance in [facility].    Attestation:  Patient has been seen and evaluated by me,  FLORES Jack CNP  The patient was counseled on  nature of illness and treatment plan/options  Care was coordinated with  tx team          Interim History:     The patient's care was discussed with the treatment team and chart notes were reviewed.  Pt was documented as sleeping 7 hours during the overnight shift.  She says she took Trazodone because she was having difficulty sleeping due to noise on the unit.  She typically sleeps well at home.  She has been attending groups regularly.  She has been social with other patients.  Mood is improving.  She denies SI.  She reports anxiety is lower.  Concentration and focus " "are good.  She has been talking to her family on the phone.  States she wishes she had asked her family for help sooner.  States goal is to discharge Thursday.           Medications:     Current Facility-Administered Medications   Medication     sertraline (ZOLOFT) tablet 100 mg     lisinopril (PRINIVIL/ZESTRIL) tablet 20 mg     hydrOXYzine (ATARAX) tablet 25-50 mg     nicotine polacrilex (NICORETTE) gum 2-4 mg     acetaminophen (TYLENOL) tablet 650 mg     alum & mag hydroxide-simethicone (MYLANTA ES/MAALOX  ES) suspension 30 mL     magnesium hydroxide (MILK OF MAGNESIA) suspension 30 mL     traZODone (DESYREL) tablet 50 mg     ibuprofen (ADVIL/MOTRIN) tablet 600 mg     busPIRone (BUSPAR) tablet 15 mg     methylphenidate (RITALIN) tablet 10 mg     Facility-Administered Medications Ordered in Other Encounters   Medication     bupivacaine 0.5% EPINEPHrine 1:200,000 injection             Allergies:     Allergies   Allergen Reactions     Penicillins      hives     Phenytoin      rash,puritis (Dilantin)     Prednisone Itching     Face itching, unable to concentrate      Seasonal Allergies             Psychiatric Examination:   /84  Pulse 73  Temp 98.2  F (36.8  C)  Resp 16  Ht 1.626 m (5' 4\")  Wt 54.7 kg (120 lb 8 oz)  BMI 20.68 kg/m2  Weight is 120 lbs 8 oz  Body mass index is 20.68 kg/(m^2).    Appearance: awake, alert, adequate grooming/hygiene  Attitude: cooperative and pleasant  Eye Contact: good  Mood:  \"feeling better\"  Affect: mood congruent and full range  Speech:  clear, coherent and normal prosody  Language: fluent in English, intact  Psychomotor Behavior: no evidence of tardive dyskinesia, dystonia, or tics  Gait/Station: normal  Thought Process:  linear, logical, goal oriented  Associations:  no loose associations  Thought Content:  Denying SI/HI/AVH; no evidence of psychotic thinking  Insight:  good  Judgement: intact  Oriented to:  time, person, and place  Attention Span and Concentration: "  intact  Recent and Remote Memory:  intact  Fund of Knowledge: appropriate         Labs:      Ref. Range 1/5/2018 15:50 1/5/2018 16:25 1/5/2018 17:02   Sodium Latest Ref Range: 133 - 144 mmol/L  139    Potassium Latest Ref Range: 3.4 - 5.3 mmol/L  3.9    Chloride Latest Ref Range: 94 - 109 mmol/L  103    Carbon Dioxide Latest Ref Range: 20 - 32 mmol/L  25    Urea Nitrogen Latest Ref Range: 7 - 30 mg/dL  20    Creatinine Latest Ref Range: 0.52 - 1.04 mg/dL  0.78    GFR Estimate Latest Ref Range: >60 mL/min/1.7m2  83    GFR Estimate If Black Latest Ref Range: >60 mL/min/1.7m2  >90    Calcium Latest Ref Range: 8.5 - 10.1 mg/dL  8.4 (L)    Anion Gap Latest Ref Range: 3 - 14 mmol/L  11    Albumin Latest Ref Range: 3.4 - 5.0 g/dL  4.0    Protein Total Latest Ref Range: 6.8 - 8.8 g/dL  7.3    Bilirubin Total Latest Ref Range: 0.2 - 1.3 mg/dL  0.4    Alkaline Phosphatase Latest Ref Range: 40 - 150 U/L  78    ALT Latest Ref Range: 0 - 50 U/L  27    AST Latest Ref Range: 0 - 45 U/L  16    HCG Qual Urine Latest Ref Range: NEG^Negative  Positive (A)     HCG Quantitative Serum Latest Ref Range: 0 - 5 IU/L  2    Glucose Latest Ref Range: 70 - 99 mg/dL  113 (H)    WBC Latest Ref Range: 4.0 - 11.0 10e9/L  10.4    Hemoglobin Latest Ref Range: 11.7 - 15.7 g/dL  14.8    Hematocrit Latest Ref Range: 35.0 - 47.0 %  44.9    Platelet Count Latest Ref Range: 150 - 450 10e9/L  174    RBC Count Latest Ref Range: 3.8 - 5.2 10e12/L  4.92    MCV Latest Ref Range: 78 - 100 fl  91    MCH Latest Ref Range: 26.5 - 33.0 pg  30.1    MCHC Latest Ref Range: 31.5 - 36.5 g/dL  33.0    RDW Latest Ref Range: 10.0 - 15.0 %  14.0    Diff Method Unknown  Automated Method    % Neutrophils Latest Units: %  78.9    % Lymphocytes Latest Units: %  14.4    % Monocytes Latest Units: %  6.1    % Eosinophils Latest Units: %  0.1    % Basophils Latest Units: %  0.2    % Immature Granulocytes Latest Units: %  0.3    Absolute Neutrophil Latest Ref Range: 1.6 - 8.3 10e9/L   8.2    Absolute Lymphocytes Latest Ref Range: 0.8 - 5.3 10e9/L  1.5    Absolute Monocytes Latest Ref Range: 0.0 - 1.3 10e9/L  0.6    Absolute Eosinophils Latest Ref Range: 0.0 - 0.7 10e9/L  0.0    Absolute Basophils Latest Ref Range: 0.0 - 0.2 10e9/L  0.0    Abs Immature Granulocytes Latest Ref Range: 0 - 0.4 10e9/L  0.0    Color Urine Unknown Yellow     Appearance Urine Unknown Cloudy     Glucose Urine Latest Ref Range: NEG^Negative mg/dL 50 (A)     Bilirubin Urine Latest Ref Range: NEG^Negative  Negative     Ketones Urine Latest Ref Range: NEG^Negative mg/dL 80 (A)     Specific Gravity Urine Latest Ref Range: 1.003 - 1.035  1.029     pH Urine Latest Ref Range: 5.0 - 7.0 pH 5.0     Protein Albumin Urine Latest Ref Range: NEG^Negative mg/dL 100 (A)     Urobilinogen mg/dL Latest Ref Range: 0.0 - 2.0 mg/dL 0.0     Nitrite Urine Latest Ref Range: NEG^Negative  Negative     Blood Urine Latest Ref Range: NEG^Negative  Negative     Leukocyte Esterase Urine Latest Ref Range: NEG^Negative  Negative     Source Unknown Unspecified Urine     WBC Urine Latest Ref Range: 0 - 2 /HPF 6 (H)     RBC Urine Latest Ref Range: 0 - 2 /HPF 3 (H)     Bacteria Urine Latest Ref Range: NEG^Negative /HPF Few (A)     Squamous Epithelial /HPF Urine Latest Ref Range: 0 - 1 /HPF 10 (H)     Mucous Urine Latest Ref Range: NEG^Negative /LPF Present (A)     Hyaline Casts Latest Ref Range: 0 - 2 /LPF 2     Acetaminophen Level Latest Units: mg/L  <5    Salicylate Level Latest Units: mg/dL  3    Cannabinoids (95-lzu-6-carboxy-9-THC) Latest Ref Range: NDET^Not Detected ng/mL Not Detected     Phencyclidine (Phencyclidine) Latest Ref Range: NDET^Not Detected ng/mL Not Detected     Cocaine (Benzoylecgonine) Latest Ref Range: NDET^Not Detected ng/mL Not Detected     Methamphetamine (d-Methamphetamine) Latest Ref Range: NDET^Not Detected ng/mL Not Detected     Opiates (Morphine) Latest Ref Range: NDET^Not Detected ng/mL Not Detected     Amphetamine  (d-Amphetamine) Latest Ref Range: NDET^Not Detected ng/mL Not Detected     Benzodiazepines (Nordiazepam) Latest Ref Range: NDET^Not Detected ng/mL Not Detected     Tricyclic Antidepressants (Desipramine) Latest Ref Range: NDET^Not Detected ng/mL Not Detected     Methadone (Methadone) Latest Ref Range: NDET^Not Detected ng/mL Not Detected     Barbiturates (Butalbital) Latest Ref Range: NDET^Not Detected ng/mL Not Detected     Oxycodone (Oxycodone) Latest Ref Range: NDET^Not Detected ng/mL Not Detected     Propoxyphene (Norpropoxyphene) Latest Ref Range: NDET^Not Detected ng/mL Not Detected     Buprenorphine (Buprenorphine) Latest Ref Range: NDET^Not Detected ng/mL Not Detected     XR CHEST 2 VW Unknown   Rpt

## 2018-01-09 NOTE — PLAN OF CARE
Problem: Depressive Symptoms  Goal: Depressive Symptoms  Signs and symptoms of listed problems will be absent or manageable.   Outcome: Improving  Pt was visible in milieu, social with peers, watching tv. Affect was full range, somewhat sad. Mood was calm, anxious at times. Pt stated that she misses her daughter. Pt reported that her mood and anxiety are improving and that the medications are helping her. No SI, SIB. Will continue to monitor.

## 2018-01-09 NOTE — DISCHARGE SUMMARY
"Psychiatric Discharge Summary    Tasha Short MRN# [id number]   Age: 37 year old YOB: 1980     Date of Admission:  2018  Date of Discharge:  1/10/2018  4:34 PM  Admitting Physician:  Ronan Bradford MD  Discharge Physician:  FLORES Jack CNP (Contact: [phone])         Event Leading to Hospitalization:      Tasha \"Valery\" Deja is a 37-year-old female admitted to [address] on 2018.  She was admitted as a voluntary patient through [facility] ER due to depressive symptoms and suicidal ideation.  She is 5 weeks postpartum.  Her baby's father is in half-way.  Her father is temporarily living with her and her baby since her parents recently .  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  She characterizes him as controlling.  Her baby Emerald is currently with her mother.      From H&P 2018:    Per DEC assessment: Patient is a 37-year-old female with a history of depression and anxiety who presented to the ED with worsening depression and anxiety in the postpartum period.     The patient was diagnosed with depression in  per her report and is treated by her outpatient PCP, Dr. Block.  Patient reports discontinuing psychotropic medications when she became pregnant and she restarted Paxil shortly after her daughter's birth due to the history of depression and concerns regarding postpartum depression.  Patient reports that for over a week, she has been depressed to the extent that she is unable to function.  She notes desire to sleep all day but is up regularly to care for her daughter.  She notes that she has not been eating or drinking fluids recently, and estimated it has been at least 3 days since she has eaten a meal.  She denies thoughts of harming herself or her , but reported suicidal ideation to ED provider.  DEC  also noted confusion on examination as patient referred to " "her child as her son, but later told [facility]  that she had a daughter.  She could not recall the name of her outpatient therapist or what agency the therapist was working from.  She did not appear to be fully oriented her DEC  note.     Of note, patient was evaluated by her PCP for moderate depressive symptoms and heightened anxiety on 12/11/2017 at which time Paxil 10 mg daily was initiated.  Additionally, patient was instructed to take BuSpar 5 mg TID as needed for anxiety.  Patient reports that given sensitivity to medications in the past, her PCP ordered  DuXploreight testing, which indicated that she may benefit from both Paxil and Effexor.  However, patient denies any improvement in depressive symptoms since initiation of Paxil.     During the interview with the patient, she expresses significant concerns about worsening depression in the past week.  She said \"it has been absolutely horrible.\"  She feels more isolated at home, has been sleeping more, and has very low energy.  She notes significant depressed mood more days than not.  Currently, she denies any suicidal ideation or homicidal ideation.    See full admission note by Dr. Arciniega 1/6/2018 for details.           Diagnoses:     Major depressive disorder, severe, with peripartum onset  Generalized anxiety disorder  Hypersomnia         Labs:      Ref. Range 1/5/2018 15:50 1/5/2018 16:25   Sodium Latest Ref Range: 133 - 144 mmol/L  139   Potassium Latest Ref Range: 3.4 - 5.3 mmol/L  3.9   Chloride Latest Ref Range: 94 - 109 mmol/L  103   Carbon Dioxide Latest Ref Range: 20 - 32 mmol/L  25   Urea Nitrogen Latest Ref Range: 7 - 30 mg/dL  20   Creatinine Latest Ref Range: 0.52 - 1.04 mg/dL  0.78   GFR Estimate Latest Ref Range: >60 mL/min/1.7m2  83   GFR Estimate If Black Latest Ref Range: >60 mL/min/1.7m2  >90   Calcium Latest Ref Range: 8.5 - 10.1 mg/dL  8.4 (L)   Anion Gap Latest Ref Range: 3 - 14 mmol/L  11   Albumin Latest Ref Range: 3.4 - 5.0 " g/dL  4.0   Protein Total Latest Ref Range: 6.8 - 8.8 g/dL  7.3   Bilirubin Total Latest Ref Range: 0.2 - 1.3 mg/dL  0.4   Alkaline Phosphatase Latest Ref Range: 40 - 150 U/L  78   ALT Latest Ref Range: 0 - 50 U/L  27   AST Latest Ref Range: 0 - 45 U/L  16   HCG Qual Urine Latest Ref Range: NEG^Negative  Positive (A)    HCG Quantitative Serum Latest Ref Range: 0 - 5 IU/L  2   Glucose Latest Ref Range: 70 - 99 mg/dL  113 (H)   WBC Latest Ref Range: 4.0 - 11.0 10e9/L  10.4   Hemoglobin Latest Ref Range: 11.7 - 15.7 g/dL  14.8   Hematocrit Latest Ref Range: 35.0 - 47.0 %  44.9   Platelet Count Latest Ref Range: 150 - 450 10e9/L  174   RBC Count Latest Ref Range: 3.8 - 5.2 10e12/L  4.92   MCV Latest Ref Range: 78 - 100 fl  91   MCH Latest Ref Range: 26.5 - 33.0 pg  30.1   MCHC Latest Ref Range: 31.5 - 36.5 g/dL  33.0   RDW Latest Ref Range: 10.0 - 15.0 %  14.0   Diff Method Unknown  Automated Method   % Neutrophils Latest Units: %  78.9   % Lymphocytes Latest Units: %  14.4   % Monocytes Latest Units: %  6.1   % Eosinophils Latest Units: %  0.1   % Basophils Latest Units: %  0.2   % Immature Granulocytes Latest Units: %  0.3   Absolute Neutrophil Latest Ref Range: 1.6 - 8.3 10e9/L  8.2   Absolute Lymphocytes Latest Ref Range: 0.8 - 5.3 10e9/L  1.5   Absolute Monocytes Latest Ref Range: 0.0 - 1.3 10e9/L  0.6   Absolute Eosinophils Latest Ref Range: 0.0 - 0.7 10e9/L  0.0   Absolute Basophils Latest Ref Range: 0.0 - 0.2 10e9/L  0.0   Abs Immature Granulocytes Latest Ref Range: 0 - 0.4 10e9/L  0.0   Color Urine Unknown Yellow    Appearance Urine Unknown Cloudy    Glucose Urine Latest Ref Range: NEG^Negative mg/dL 50 (A)    Bilirubin Urine Latest Ref Range: NEG^Negative  Negative    Ketones Urine Latest Ref Range: NEG^Negative mg/dL 80 (A)    Specific Gravity Urine Latest Ref Range: 1.003 - 1.035  1.029    pH Urine Latest Ref Range: 5.0 - 7.0 pH 5.0    Protein Albumin Urine Latest Ref Range: NEG^Negative mg/dL 100 (A)     Urobilinogen mg/dL Latest Ref Range: 0.0 - 2.0 mg/dL 0.0    Nitrite Urine Latest Ref Range: NEG^Negative  Negative    Blood Urine Latest Ref Range: NEG^Negative  Negative    Leukocyte Esterase Urine Latest Ref Range: NEG^Negative  Negative    Source Unknown Unspecified Urine    WBC Urine Latest Ref Range: 0 - 2 /HPF 6 (H)    RBC Urine Latest Ref Range: 0 - 2 /HPF 3 (H)    Bacteria Urine Latest Ref Range: NEG^Negative /HPF Few (A)    Squamous Epithelial /HPF Urine Latest Ref Range: 0 - 1 /HPF 10 (H)    Mucous Urine Latest Ref Range: NEG^Negative /LPF Present (A)    Hyaline Casts Latest Ref Range: 0 - 2 /LPF 2    Acetaminophen Level Latest Units: mg/L  <5   Salicylate Level Latest Units: mg/dL  3   Cannabinoids (27-stc-6-carboxy-9-THC) Latest Ref Range: NDET^Not Detected ng/mL Not Detected    Phencyclidine (Phencyclidine) Latest Ref Range: NDET^Not Detected ng/mL Not Detected    Cocaine (Benzoylecgonine) Latest Ref Range: NDET^Not Detected ng/mL Not Detected    Methamphetamine (d-Methamphetamine) Latest Ref Range: NDET^Not Detected ng/mL Not Detected    Opiates (Morphine) Latest Ref Range: NDET^Not Detected ng/mL Not Detected    Amphetamine (d-Amphetamine) Latest Ref Range: NDET^Not Detected ng/mL Not Detected    Benzodiazepines (Nordiazepam) Latest Ref Range: NDET^Not Detected ng/mL Not Detected    Tricyclic Antidepressants (Desipramine) Latest Ref Range: NDET^Not Detected ng/mL Not Detected    Methadone (Methadone) Latest Ref Range: NDET^Not Detected ng/mL Not Detected    Barbiturates (Butalbital) Latest Ref Range: NDET^Not Detected ng/mL Not Detected    Oxycodone (Oxycodone) Latest Ref Range: NDET^Not Detected ng/mL Not Detected    Propoxyphene (Norpropoxyphene) Latest Ref Range: NDET^Not Detected ng/mL Not Detected    Buprenorphine (Buprenorphine) Latest Ref Range: NDET^Not Detected ng/mL Not Detected      CHEST TWO VIEWS  1/5/2018 5:02 PM      HISTORY: Cough.     COMPARISON: 6/3/2016         IMPRESSION:  Increased interstitial markings in both lungs, new since  the previous exam. Lower right anterolateral rounded densities  adjacent to the anterior right sixth and seventh ribs and lateral  right eighth rib, likely old rib fractures that have healed. Normal  heart size and pulmonary vascularity.         Consults:     No consultations were requested during this admission.         Hospital Course:     Tasha Short was admitted to [facility] with attending Ronan Bradford MD, under the direct care of Yaquelin Ryan NP as a voluntary patient. The patient was placed under status 15 (15 minute checks) to ensure patient safety.     Paxil was discontinued.  Zoloft was initiated.  Buspar was increased.  Ritalin was continued.  She tolerated her medications well.      Tasha Short did participate in groups and was visible in the milieu.  Behavior was calm and cooperative.  She appeared motivated for treatment and recovery.  Her family was in contact with her and were supportive.    The patient's symptoms of depression improved.  She was hopeful and future-oriented.  She looked forward to returning home to her baby.  She denied suicidal ideation.  Anxiety was reduced.  She ate and slept well.      Tasha Short was released to home.  At the time of discharge Tasha Short was determined to not be a danger to herself or others.          Discharge Medications:      Tasha Short   Home Medication Instructions BEBE:[id number]    Printed on:01/11/18 3371   Medication Information                      busPIRone (BUSPAR) 15 MG tablet  Take 1 tablet (15 mg) by mouth 2 times daily             flunisolide (NASAREL) 29 MCG/ACT (0.025%) SOLN  Spray 2 sprays into both nostrils 2 times daily             ibuprofen (ADVIL/MOTRIN) 600 MG tablet  Take 600 mg by mouth every 6 hours as needed for moderate pain             lisinopril (PRINIVIL/ZESTRIL) 20 MG tablet  Take 1 tablet (20 mg) by mouth daily            "  methylphenidate (RITALIN) 10 MG tablet  Take 1 tablet (10 mg) by mouth 2 times daily  #60 dispensed (0 refills)           sertraline (ZOLOFT) 100 MG tablet  Take 1 tablet (100 mg) by mouth daily                        Psychiatric Examination:     Appearance: awake, alert, adequate grooming/hygiene  Attitude: cooperative and pleasant  Eye Contact: good  Mood:  hopeful, less depressed  Affect: mood congruent and full range  Speech:  clear, coherent and normal prosody  Language: fluent in English, intact  Psychomotor Behavior: no evidence of tardive dyskinesia, dystonia, or tics  Gait/Station: normal  Thought Process:  linear, logical, goal oriented  Associations:  no loose associations  Thought Content:  Denying SI/HI/AVH; no evidence of psychotic thinking  Insight:  good  Judgement: intact  Oriented to:  time, person, and place  Attention Span and Concentration:  intact  Recent and Remote Memory:  intact  Fund of Knowledge: appropriate    /90  Pulse 82  Temp 98.2  F (36.8  C) (Oral)  Resp 16  Ht 1.626 m (5' 4\")  Wt 54.2 kg (119 lb 8 oz)  BMI 20.51 kg/m2           Discharge Plan:     Per Discharge AVS:    Health Care Follow-up Appointments:     OB/Gyn Appointment Date/Time: 1/19/2018 at 1:30 pm    Provider: Dr. Madeline Benavidez  [facility]  [address]  [address]  [place] [address] [phone]    In Home Therapy Appointment Date/Time: Monday, January 15, 2018 @ 10:00 am    Provider: Luli  Facility:  [facility]  Phone: [phone]   Fax: [phone]    Medication management:  Appointment:  Thursday, February 8, 2018  (Arrive at 12:45 for paperwork)  Provider:  NING Boogie  [address] [place] [address]  [address]  [place] [address]  Phone: [phone]  The Health Unit Coordinator has faxed these discharge instructions to Fax: [phone]     PREGNANCY AND POSTPARTUM SUPPORT [facility] residents or providers may contact the Pregnancy & Postpartum Support " [place] [facility] for referrals, resources and to be connected with peer support.  Call or text HelpLine: 834.729.1135  lesly@[employer].com  http://www.ppsupportmn.org/get_help      Assistance: Phone: [phone]  In Person:  [facility]  [address] [place] [address]      She was provided with a 30-day supply of medications plus one refill; she was not provided with a refill on Ritalin.  She was advised to take her medications as prescribed and to abstain from alcohol and illicit substances.        Attestation:  The patient has been seen and evaluated by me,  FLORES Jack CNP   Discharge time > 30 minutes

## 2018-01-10 VITALS
SYSTOLIC BLOOD PRESSURE: 117 MMHG | DIASTOLIC BLOOD PRESSURE: 84 MMHG | BODY MASS INDEX: 20.57 KG/M2 | HEART RATE: 73 BPM | TEMPERATURE: 98.3 F | HEIGHT: 64 IN | RESPIRATION RATE: 16 BRPM | WEIGHT: 120.5 LBS

## 2018-01-10 PROCEDURE — 25000132 ZZH RX MED GY IP 250 OP 250 PS 637: Performed by: PSYCHIATRY & NEUROLOGY

## 2018-01-10 PROCEDURE — 99239 HOSP IP/OBS DSCHRG MGMT >30: CPT | Performed by: NURSE PRACTITIONER

## 2018-01-10 PROCEDURE — 97150 GROUP THERAPEUTIC PROCEDURES: CPT | Mod: GO

## 2018-01-10 PROCEDURE — 25000132 ZZH RX MED GY IP 250 OP 250 PS 637: Performed by: NURSE PRACTITIONER

## 2018-01-10 RX ORDER — SERTRALINE HYDROCHLORIDE 100 MG/1
100 TABLET, FILM COATED ORAL DAILY
Qty: 30 TABLET | Refills: 1 | Status: SHIPPED | OUTPATIENT
Start: 2018-01-11 | End: 2018-02-01 | Stop reason: DRUGHIGH

## 2018-01-10 RX ORDER — METHYLPHENIDATE HYDROCHLORIDE 10 MG/1
10 TABLET ORAL 2 TIMES DAILY
Qty: 60 TABLET | Refills: 0 | Status: SHIPPED | OUTPATIENT
Start: 2018-01-10 | End: 2018-02-01

## 2018-01-10 RX ORDER — BUSPIRONE HYDROCHLORIDE 15 MG/1
15 TABLET ORAL 2 TIMES DAILY
Qty: 60 TABLET | Refills: 1 | Status: SHIPPED | OUTPATIENT
Start: 2018-01-10 | End: 2018-02-01 | Stop reason: DRUGHIGH

## 2018-01-10 RX ADMIN — IBUPROFEN 600 MG: 600 TABLET ORAL at 07:42

## 2018-01-10 RX ADMIN — METHYLPHENIDATE HYDROCHLORIDE 10 MG: 10 TABLET ORAL at 13:27

## 2018-01-10 RX ADMIN — LISINOPRIL 20 MG: 20 TABLET ORAL at 07:42

## 2018-01-10 RX ADMIN — METHYLPHENIDATE HYDROCHLORIDE 10 MG: 10 TABLET ORAL at 07:42

## 2018-01-10 RX ADMIN — NICOTINE POLACRILEX 4 MG: 2 GUM, CHEWING ORAL at 09:16

## 2018-01-10 RX ADMIN — NICOTINE POLACRILEX 4 MG: 2 GUM, CHEWING ORAL at 13:27

## 2018-01-10 RX ADMIN — BUSPIRONE HYDROCHLORIDE 15 MG: 15 TABLET ORAL at 07:42

## 2018-01-10 RX ADMIN — SERTRALINE HYDROCHLORIDE 100 MG: 100 TABLET ORAL at 07:42

## 2018-01-10 ASSESSMENT — ACTIVITIES OF DAILY LIVING (ADL)
ORAL_HYGIENE: INDEPENDENT
DRESS: SCRUBS (BEHAVIORAL HEALTH);INDEPENDENT
LAUNDRY: UNABLE TO COMPLETE
GROOMING: INDEPENDENT

## 2018-01-10 NOTE — PLAN OF CARE
Problem: Depressive Symptoms  Goal: Depressive Symptoms  Signs and symptoms of listed problems will be absent or manageable.   Outcome: Adequate for Discharge Date Met: 01/10/18  48 hour nursing assessment:  Pt evaluation continues. Assessed mood, anxiety, thoughts, and behavior. Is progressing towards goals. Encourage participation in groups and developing healthy coping skills. Pt denies auditory or visual  hallucinations. Refer to daily team meeting notes for individualized plan of care. Will continue to assess.    Patient prepared for discharge this afternoon, awaiting pick-up. Patient expresses feeling anxious (rated at a 3 of 10), admits that her father and her have a stressful relationship. Patient states she has a good support system of family and outpatient mental health providers, and has effective coping skills as well. Patient denies any thoughts of suicide or intent to harm self in any way. Patient denies feeling depressed. Patient and writer have reviewed patient's medications and patient expresses understanding of medication regimen. Patient has received all of her belongings. No other concerns at this time. Nursing will continue to monitor and assess until patient is discharged.

## 2018-01-10 NOTE — PROGRESS NOTES
Patient discharged to home via ride with father at 1550.  She has her discharge instructions and take home medications, as well as all belongings brought with her into the hospital.  Patient indicated all questions had been answered to her satisfaction and that she had no suicidal thoughts and would be able to maintain her safety outside the hospital.

## 2018-01-10 NOTE — PROGRESS NOTES
"[facility]   Psychiatric Progress Note      Impression:     Tasha \"Valery\" Deja is a 37-year-old female admitted to [facility] on 2018.  She was admitted as a voluntary patient through [facility] due to depressive symptoms and suicidal ideation.  She is 5 weeks postpartum.  Her baby's father is in [facility].  Her father is temporarily living with her and her baby since her parents recently .  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  She characterizes him as controlling.  Her baby Emerald is currently with her mother.  Since, admission, Paxil was discontinued.  Zoloft was initiated.  Buspar was increased.  Ritalin was continued.  She denies SI.  Mood is improved.         Diagnoses:     Major depressive disorder, severe, with peripartum onset  Generalized anxiety disorder  Hypersomnia         Plan:     Medications:  Continue Zoloft 100mg.   Continue Buspar.  Continue Ritalin.      Discharge to home when stable; goal to discharge Thursday.  She has a PCP but would like a referral for psychiatry.  She has an in-home therapist.  She is interested in postpartum support groups and childcare assistance in [facility].    Attestation:  Patient has been seen and evaluated by me,  Kathleen Ryan, FLORES CNP  The patient was counseled on  nature of illness and treatment plan/options  Care was coordinated with  tx team          Interim History:     The patient's care was discussed with the treatment team and chart notes were reviewed.  Pt was documented as sleeping 7 hours during the overnight shift.  Discussed the death of a patient the occurred on the unit yesterday.  Pt said that she has been thinking about her fiance who  3 years ago from an MI.  \"You've really gotta cherish life.\"  She said she is feeling better today, but slept poorly due to her roommate having status individual observation with " "staff seated outside her room.  Pt denies SI.  She denies feeling anxious or irritable.  She has been eating well.  She has been attending groups.  States she plans to call to make her outpatient therapy appointment today.  She said her father will move out soon.  Her grandmother will stay with her for a while and help her care for her baby.  Goal to discharge tomorrow.            Medications:     Current Facility-Administered Medications   Medication     sertraline (ZOLOFT) tablet 100 mg     lisinopril (PRINIVIL/ZESTRIL) tablet 20 mg     hydrOXYzine (ATARAX) tablet 25-50 mg     nicotine polacrilex (NICORETTE) gum 2-4 mg     acetaminophen (TYLENOL) tablet 650 mg     alum & mag hydroxide-simethicone (MYLANTA ES/MAALOX  ES) suspension 30 mL     magnesium hydroxide (MILK OF MAGNESIA) suspension 30 mL     traZODone (DESYREL) tablet 50 mg     ibuprofen (ADVIL/MOTRIN) tablet 600 mg     busPIRone (BUSPAR) tablet 15 mg     methylphenidate (RITALIN) tablet 10 mg     Facility-Administered Medications Ordered in Other Encounters   Medication     bupivacaine 0.5% EPINEPHrine 1:200,000 injection             Allergies:     Allergies   Allergen Reactions     Penicillins      hives     Phenytoin      rash,puritis (Dilantin)     Prednisone Itching     Face itching, unable to concentrate      Seasonal Allergies             Psychiatric Examination:   /84  Pulse 73  Temp 98.3  F (36.8  C)  Resp 16  Ht 1.626 m (5' 4\")  Wt 54.7 kg (120 lb 8 oz)  BMI 20.68 kg/m2  Weight is 120 lbs 8 oz  Body mass index is 20.68 kg/(m^2).    Appearance: awake, alert, adequate grooming/hygiene  Attitude: cooperative and pleasant  Eye Contact: good  Mood:  \"feeling better\"  Affect: mood congruent and full range  Speech:  clear, coherent and normal prosody  Language: fluent in English, intact  Psychomotor Behavior: no evidence of tardive dyskinesia, dystonia, or tics  Gait/Station: normal  Thought Process:  linear, logical, goal " oriented  Associations:  no loose associations  Thought Content:  Denying SI/HI/AVH; no evidence of psychotic thinking  Insight:  good  Judgement: intact  Oriented to:  time, person, and place  Attention Span and Concentration:  intact  Recent and Remote Memory:  intact  Fund of Knowledge: appropriate         Labs:      Ref. Range 1/5/2018 15:50 1/5/2018 16:25 1/5/2018 17:02   Sodium Latest Ref Range: 133 - 144 mmol/L  139    Potassium Latest Ref Range: 3.4 - 5.3 mmol/L  3.9    Chloride Latest Ref Range: 94 - 109 mmol/L  103    Carbon Dioxide Latest Ref Range: 20 - 32 mmol/L  25    Urea Nitrogen Latest Ref Range: 7 - 30 mg/dL  20    Creatinine Latest Ref Range: 0.52 - 1.04 mg/dL  0.78    GFR Estimate Latest Ref Range: >60 mL/min/1.7m2  83    GFR Estimate If Black Latest Ref Range: >60 mL/min/1.7m2  >90    Calcium Latest Ref Range: 8.5 - 10.1 mg/dL  8.4 (L)    Anion Gap Latest Ref Range: 3 - 14 mmol/L  11    Albumin Latest Ref Range: 3.4 - 5.0 g/dL  4.0    Protein Total Latest Ref Range: 6.8 - 8.8 g/dL  7.3    Bilirubin Total Latest Ref Range: 0.2 - 1.3 mg/dL  0.4    Alkaline Phosphatase Latest Ref Range: 40 - 150 U/L  78    ALT Latest Ref Range: 0 - 50 U/L  27    AST Latest Ref Range: 0 - 45 U/L  16    HCG Qual Urine Latest Ref Range: NEG^Negative  Positive (A)     HCG Quantitative Serum Latest Ref Range: 0 - 5 IU/L  2    Glucose Latest Ref Range: 70 - 99 mg/dL  113 (H)    WBC Latest Ref Range: 4.0 - 11.0 10e9/L  10.4    Hemoglobin Latest Ref Range: 11.7 - 15.7 g/dL  14.8    Hematocrit Latest Ref Range: 35.0 - 47.0 %  44.9    Platelet Count Latest Ref Range: 150 - 450 10e9/L  174    RBC Count Latest Ref Range: 3.8 - 5.2 10e12/L  4.92    MCV Latest Ref Range: 78 - 100 fl  91    MCH Latest Ref Range: 26.5 - 33.0 pg  30.1    MCHC Latest Ref Range: 31.5 - 36.5 g/dL  33.0    RDW Latest Ref Range: 10.0 - 15.0 %  14.0    Diff Method Unknown  Automated Method    % Neutrophils Latest Units: %  78.9    % Lymphocytes Latest  Units: %  14.4    % Monocytes Latest Units: %  6.1    % Eosinophils Latest Units: %  0.1    % Basophils Latest Units: %  0.2    % Immature Granulocytes Latest Units: %  0.3    Absolute Neutrophil Latest Ref Range: 1.6 - 8.3 10e9/L  8.2    Absolute Lymphocytes Latest Ref Range: 0.8 - 5.3 10e9/L  1.5    Absolute Monocytes Latest Ref Range: 0.0 - 1.3 10e9/L  0.6    Absolute Eosinophils Latest Ref Range: 0.0 - 0.7 10e9/L  0.0    Absolute Basophils Latest Ref Range: 0.0 - 0.2 10e9/L  0.0    Abs Immature Granulocytes Latest Ref Range: 0 - 0.4 10e9/L  0.0    Color Urine Unknown Yellow     Appearance Urine Unknown Cloudy     Glucose Urine Latest Ref Range: NEG^Negative mg/dL 50 (A)     Bilirubin Urine Latest Ref Range: NEG^Negative  Negative     Ketones Urine Latest Ref Range: NEG^Negative mg/dL 80 (A)     Specific Gravity Urine Latest Ref Range: 1.003 - 1.035  1.029     pH Urine Latest Ref Range: 5.0 - 7.0 pH 5.0     Protein Albumin Urine Latest Ref Range: NEG^Negative mg/dL 100 (A)     Urobilinogen mg/dL Latest Ref Range: 0.0 - 2.0 mg/dL 0.0     Nitrite Urine Latest Ref Range: NEG^Negative  Negative     Blood Urine Latest Ref Range: NEG^Negative  Negative     Leukocyte Esterase Urine Latest Ref Range: NEG^Negative  Negative     Source Unknown Unspecified Urine     WBC Urine Latest Ref Range: 0 - 2 /HPF 6 (H)     RBC Urine Latest Ref Range: 0 - 2 /HPF 3 (H)     Bacteria Urine Latest Ref Range: NEG^Negative /HPF Few (A)     Squamous Epithelial /HPF Urine Latest Ref Range: 0 - 1 /HPF 10 (H)     Mucous Urine Latest Ref Range: NEG^Negative /LPF Present (A)     Hyaline Casts Latest Ref Range: 0 - 2 /LPF 2     Acetaminophen Level Latest Units: mg/L  <5    Salicylate Level Latest Units: mg/dL  3    Cannabinoids (19-onx-2-carboxy-9-THC) Latest Ref Range: NDET^Not Detected ng/mL Not Detected     Phencyclidine (Phencyclidine) Latest Ref Range: NDET^Not Detected ng/mL Not Detected     Cocaine (Benzoylecgonine) Latest Ref Range:  NDET^Not Detected ng/mL Not Detected     Methamphetamine (d-Methamphetamine) Latest Ref Range: NDET^Not Detected ng/mL Not Detected     Opiates (Morphine) Latest Ref Range: NDET^Not Detected ng/mL Not Detected     Amphetamine (d-Amphetamine) Latest Ref Range: NDET^Not Detected ng/mL Not Detected     Benzodiazepines (Nordiazepam) Latest Ref Range: NDET^Not Detected ng/mL Not Detected     Tricyclic Antidepressants (Desipramine) Latest Ref Range: NDET^Not Detected ng/mL Not Detected     Methadone (Methadone) Latest Ref Range: NDET^Not Detected ng/mL Not Detected     Barbiturates (Butalbital) Latest Ref Range: NDET^Not Detected ng/mL Not Detected     Oxycodone (Oxycodone) Latest Ref Range: NDET^Not Detected ng/mL Not Detected     Propoxyphene (Norpropoxyphene) Latest Ref Range: NDET^Not Detected ng/mL Not Detected     Buprenorphine (Buprenorphine) Latest Ref Range: NDET^Not Detected ng/mL Not Detected     XR CHEST 2 VW Unknown   Rpt

## 2018-01-10 NOTE — DISCHARGE INSTRUCTIONS
Behavioral Discharge Planning and Instructions      Summary:  You were admitted on 1/5/2018  due to Depression and Suicidal Ideations.  You were treated by Yaquelin Ryan NP and discharged on 1/10/2018 from [facility] to Home      Principal Diagnosis: Major Depressive Disorder, severe, with peripartum onset; Generalized Anxiety Disorder      Health Care Follow-up Appointments:   OB/Gyn Appointment Date/Time: 1/19/2018 at 1:30 pm    Provider: Dr. Madeline Benavidez  [facility]  [address]  [address]  [place], [place] [address] [phone]    In Home Therapy Appointment Date/Time: Monday, January 15, 2018 @ 10:00 am    Provider: Luli  Facility:  [facility]  Phone: [phone]   Fax: [phone]    Medication management:  Appointment:  Thursday, February 8, 2018  (Arrive at 12:45 for paperwork)  Provider:  NING Boogie  [address] [place] [address]  [address]  [place] [address]  Phone: [phone]  The Health Unit Coordinator has faxed these discharge instructions to Fax: [phone]     PREGNANCY AND POSTPARTUM SUPPORT [facility] residents or providers may contact the Pregnancy & Postpartum Support [place] HelpLine for referrals, resources and to be connected with peer support.  Call or text HelpLine: 518.480.4286  lesly@Frilp.ExecNote  http://www.ppsupportmn.org/get_help      Assistance: Phone: [phone]  In Person:  [facility]  [address] [place] [address]    Attend all scheduled appointments with your outpatient providers. Call at least 24 hours in advance if you need to reschedule an appointment to ensure continued access to your outpatient providers.   Major Treatments, Procedures and Findings:  You were provided with: a psychiatric assessment, assessed for medical stability, medication evaluation and/or management, group therapy and milieu management    Symptoms to Report: feeling more aggressive, increased confusion, losing  more sleep, mood getting worse or thoughts of suicide    Early warning signs can include: increased depression or anxiety sleep disturbances increased thoughts or behaviors of suicide or self-harm  increased unusual thinking, such as paranoia or hearing voices    Safety and Wellness:  Take all medicines as directed.  Make no changes unless your doctor suggests them.      Follow treatment recommendations.  Refrain from alcohol and non-prescribed drugs.  If there is a concern for safety, call 911.    Resources:   Crisis Intervention: 924.885.1595 or 648-774-4137 (TTY: 634.855.7888).  Call anytime for help.  National [place] on Mental Illness (www.[place].dayron.org): 221.509.8539 or 628-482-1670.  Suicide Awareness Voices of Education (SAVE) (www.save.org): 322-845-KGUC (9410)  National Suicide Prevention Line (www.mentalhealthmn.org): 664-622-XFJK (6567)  Mental Health Consumer/Survivor Network of [place] (www.mhcsn.net): 720.487.1763 or 588-607-7563  [facility] Crisis Response [phone]    The treatment team has appreciated the opportunity to work with you.     If you have any questions or concerns our unit number is [phone]  You may be receiving a follow-up phone call within the next three days from a representative from behavioral health.    You have identified the best phone number to reach you as [phone]

## 2018-01-11 ENCOUNTER — TELEPHONE (OUTPATIENT)
Dept: FAMILY MEDICINE | Facility: OTHER | Age: 38
End: 2018-01-11

## 2018-01-11 NOTE — TELEPHONE ENCOUNTER
RN to call for hospital follow up:    Reason for follow up: Tasha Short appeared on our list for being seen in an Emergency Room or a recent Hospital discharge.    Admitting date: 1/5/18  Discharge date: 1/10/18 late afternoon  Location: [facility]  Reason for visit:   Chief Complaint   Patient presents with     Hospital F/U     Narcolepsy And Cataplexy     Yaquelin Haynes RN, BSN

## 2018-01-11 NOTE — TELEPHONE ENCOUNTER
ED / Discharge Outreach Protocol    Patient Contact    Attempt # 1    Was call answered?  No.  Left message on voicemail with information to call me back.    Doris Estrella, RN, BSN

## 2018-01-11 NOTE — PROGRESS NOTES
Attended 2 of 2 OT creative expression groups offered. Initiated group, made decisions, planned and organized task independently. Good attention to details. Social with peers and spoke futuristically regarding self, daughter and family. Attentive to task x 45 minutes.

## 2018-01-12 NOTE — TELEPHONE ENCOUNTER
"Hospital/TCU/ED for chronic condition Discharge Protocol    \"Hi, my name is Doris Estrella, a registered nurse, and I am calling from [facility].  I am calling to follow up and see how things are going for you after your recent emergency visit/hospital/TCU stay.\"    Tell me how you are doing now that you are home? \"Good.\"      Discharge Instructions    \"Let's review your discharge instructions.  What is/are the follow-up recommendations?  Pt. Response: scheduled Psych appt    \"Has an appointment with your primary care provider been scheduled?\"   Yes. (confirm)    \"When you see the provider, I would recommend that you bring your medications with you.\"    Medications    \"Tell me what changed about your medicines when you discharged?\"    Changes to chronic meds?    0-1    \"What questions do you have about your medications?\"    None     New diagnoses of heart failure, COPD, diabetes, or MI?    No    Medication reconciliation completed? Yes  Was MTM referral placed (*Make sure to put transitions as reason for referral)?   No    Call Summary    \"What questions or concerns do you have about your recent visit and your follow-up care?\"     none    \"If you have questions or things don't continue to improve, we encourage you contact us through the main clinic number (give number).  Even if the clinic is not open, triage nurses are available 24/7 to help you.     We would like you to know that our clinic has extended hours (provide information).  We also have urgent care (provide details on closest location and hours/contact info)\"      \"Thank you for your time and take care!\"       Doris Estrella RN, BSN       "

## 2018-01-19 ENCOUNTER — OFFICE VISIT (OUTPATIENT)
Dept: OBGYN | Facility: OTHER | Age: 38
End: 2018-01-19
Payer: COMMERCIAL

## 2018-01-19 VITALS
DIASTOLIC BLOOD PRESSURE: 78 MMHG | WEIGHT: 125 LBS | SYSTOLIC BLOOD PRESSURE: 129 MMHG | BODY MASS INDEX: 21.46 KG/M2 | HEART RATE: 88 BPM

## 2018-01-19 DIAGNOSIS — F41.1 GENERALIZED ANXIETY DISORDER: ICD-10-CM

## 2018-01-19 DIAGNOSIS — Z30.011 ENCOUNTER FOR INITIAL PRESCRIPTION OF CONTRACEPTIVE PILLS: ICD-10-CM

## 2018-01-19 PROCEDURE — 99207 ZZC POST PARTUM EXAM: CPT | Performed by: OBSTETRICS & GYNECOLOGY

## 2018-01-19 RX ORDER — NORGESTIMATE AND ETHINYL ESTRADIOL 0.25-0.035
1 KIT ORAL DAILY
Qty: 90 TABLET | Refills: 3 | Status: SHIPPED | OUTPATIENT
Start: 2018-01-19 | End: 2018-11-26

## 2018-01-19 ASSESSMENT — PATIENT HEALTH QUESTIONNAIRE - PHQ9
SUM OF ALL RESPONSES TO PHQ QUESTIONS 1-9: 8
5. POOR APPETITE OR OVEREATING: NOT AT ALL

## 2018-01-19 ASSESSMENT — ANXIETY QUESTIONNAIRES
GAD7 TOTAL SCORE: 7
3. WORRYING TOO MUCH ABOUT DIFFERENT THINGS: MORE THAN HALF THE DAYS
2. NOT BEING ABLE TO STOP OR CONTROL WORRYING: SEVERAL DAYS
1. FEELING NERVOUS, ANXIOUS, OR ON EDGE: SEVERAL DAYS
7. FEELING AFRAID AS IF SOMETHING AWFUL MIGHT HAPPEN: SEVERAL DAYS
5. BEING SO RESTLESS THAT IT IS HARD TO SIT STILL: SEVERAL DAYS
6. BECOMING EASILY ANNOYED OR IRRITABLE: SEVERAL DAYS

## 2018-01-19 ASSESSMENT — PAIN SCALES - GENERAL: PAINLEVEL: NO PAIN (0)

## 2018-01-19 NOTE — PROGRESS NOTES
Subjective  37 year old non-pregnant female presents today for a postpartum visit.  Patient had a primary c section on 11/28/17 for breech.  No pain.  No vaginal bleeding.  No problems urinating.  Normal bowel movements.  Patient is bottle feeding.  No signs and symptoms of ppd.  Patient scored a 8 on the PHQ-9 and a 7 on the ROMI-7.  Patient was [facility] to [facility]10 for suicidal ideations.  She feels much better now.  No thoughts of suicide or infanticide.   Patient is due for a pap smear today but is not wanting this done today.  Patient is wanting to do an oral contractive pill for birth control.         ROS: 10 point ROS neg other than the symptoms noted above in the HPI.  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     REPAIR TENDON PERONEAL  11/15/2013    Procedure: REPAIR TENDON PERONEAL;  right peroneal tendon  transfer;  Surgeon: Jesus Manuel Oden DPM;  Location: [facility] OR     Family History   Problem Relation Age of Onset     Depression Mother      Arthritis Paternal Grandmother      Hypertension Paternal Grandfather      birth  FF     Asthma No family hx of      C.A.D. No family hx of      DIABETES No family hx of      Cancer - colorectal No family hx of      Prostate Cancer No family hx of      Coronary Artery Disease No family hx of      Ovarian Cancer No family hx of      Mental Illness No family hx of      CEREBROVASCULAR DISEASE No family hx of      Anesthesia Reaction No family hx of      Other Cancer No family hx of      OSTEOPOROSIS No family hx of      Known Genetic Syndrome No family hx of      Obesity No family hx of      Unknown/Adopted No family hx of      Social History   Substance Use Topics     Smoking status: Former Smoker     Packs/day: 0.50     Quit date: 1/5/2015     Smokeless tobacco: Never Used     Alcohol use No         Objective  Vitals: /78  Pulse 88  Wt 125 lb (56.7 kg)  BMI 21.46 kg/m2  BMI= Body mass index is 21.46 kg/(m^2).         Abd=soft, Nontender/nondistended, incision=healed well      Assessment  1.)  Post partum visit  2.)  S/p primary c section for breech on 11/28  3.)  Birth control   4.)  Due for pap smear  5.)  Anxiety/depression       Plan  1.)  Oral contractive pills ordered  2.)  Follow up for Pap smear  3.)  Follow up with psych      Madeline Benavidez

## 2018-01-19 NOTE — MR AVS SNAPSHOT
After Visit Summary   1/19/2018    Tasha Short    MRN: [id number]           Patient Information     Date Of Birth          1980        Visit Information        Provider Department      1/19/2018 1:30 PM Madeline Benavidez DO [facility]        Today's Diagnoses     Routine postpartum follow-up    -  1    Encounter for initial prescription of contraceptive pills        Generalized anxiety disorder          Care Instructions    Please call if you any questions.    [address]   [address]  [phone]        Mdaeline Benavidez            Follow-ups after your visit        Follow-up notes from your care team     Return in about 4 weeks (around 2/16/2018).      Who to contact     If you have questions or need follow up information about today's clinic visit or your schedule please contact [facility] directly at [phone].  Normal or non-critical lab and imaging results will be communicated to you by MyChart, letter or phone within 4 business days after the clinic has received the results. If you do not hear from us within 7 days, please contact the clinic through MyChart or phone. If you have a critical or abnormal lab result, we will notify you by phone as soon as possible.  Submit refill requests through Switchfly or call your pharmacy and they will forward the refill request to us. Please allow 3 business days for your refill to be completed.          Additional Information About Your Visit        MyChart Information     Switchfly gives you secure access to your electronic health record. If you see a primary care provider, you can also send messages to your care team and make appointments. If you have questions, please call your primary care clinic.  If you do not have a primary care provider, please call [phone] and they will assist you.        Care EveryWhere ID     This is your Care EveryWhere ID. This could be  used by other organizations to access your [facility] medical records  [id number]        Your Vitals Were     Pulse BMI (Body Mass Index)                88 21.46 kg/m2           Blood Pressure from Last 3 Encounters:   01/19/18 129/78   01/09/18 117/84   01/05/18 135/78    Weight from Last 3 Encounters:   01/19/18 125 lb (56.7 kg)   01/09/18 120 lb 8 oz (54.7 kg)   12/11/17 126 lb (57.2 kg)              Today, you had the following     No orders found for display         Today's Medication Changes          These changes are accurate as of: 1/19/18 11:59 PM.  If you have any questions, ask your nurse or doctor.               Start taking these medicines.        Dose/Directions    norgestimate-ethinyl estradiol 0.25-35 MG-MCG per tablet   Commonly known as:  ORTHO-CYCLEN, SPRINTEC   Used for:  Encounter for initial prescription of contraceptive pills   Started by:  Madeline Benavidez DO        Dose:  1 tablet   Take 1 tablet by mouth daily   Quantity:  90 tablet   Refills:  3            Where to get your medicines      These medications were sent to [facility] [place] [address]  [address] [place] [address], [place] [address]     Phone:  [phone]     norgestimate-ethinyl estradiol 0.25-35 MG-MCG per tablet                Primary Care Provider Office Phone # Fax #    Enid PORTILLO MARTHA [place] [phone]       [address] [place] [address] [address]  [facility] [address]        Equal Access to Services     YOUNG CAMERON : Hadii adam hubbardo Soroselia, waaxda luqadaha, qaybta kaalmada adeegyadona, waxay idijulianna araiza. So [facility] [phone].    ATENCIÓN: Si habla español, tiene a chaves disposición servicios gratuitos de asistencia lingüística. Daksha al [phone].    We comply with applicable federal civil rights laws and [place] laws. We do not discriminate on the basis of race, color, national origin, age, disability, sex,  sexual orientation, or gender identity.            Thank you!     Thank you for choosing [facility]  for your care. Our goal is always to provide you with excellent care. Hearing back from our patients is one way we can continue to improve our services. Please take a few minutes to complete the written survey that you may receive in the mail after your visit with us. Thank you!             Your Updated Medication List - Protect others around you: Learn how to safely use, store and throw away your medicines at www.disposemymeds.org.          This list is accurate as of: 1/19/18 11:59 PM.  Always use your most recent med list.                   Brand Name Dispense Instructions for use Diagnosis    busPIRone 15 MG tablet    BUSPAR    60 tablet    Take 1 tablet (15 mg) by mouth 2 times daily        flunisolide 29 MCG/ACT (0.025%) Soln    NASAREL    1 Box    Spray 2 sprays into both nostrils 2 times daily    Nasal congestion       ibuprofen 600 MG tablet    ADVIL/MOTRIN     Take 600 mg by mouth every 6 hours as needed for moderate pain        lisinopril 20 MG tablet    PRINIVIL/ZESTRIL    90 tablet    Take 1 tablet (20 mg) by mouth daily    HTN, goal below 140/90       methylphenidate 10 MG tablet    RITALIN    60 tablet    Take 1 tablet (10 mg) by mouth 2 times daily    Narcolepsy and cataplexy       norgestimate-ethinyl estradiol 0.25-35 MG-MCG per tablet    ORTHO-CYCLEN, SPRINTEC    90 tablet    Take 1 tablet by mouth daily    Encounter for initial prescription of contraceptive pills       sertraline 100 MG tablet    ZOLOFT    30 tablet    Take 1 tablet (100 mg) by mouth daily

## 2018-01-19 NOTE — NURSING NOTE
"Chief Complaint   Patient presents with     Post Partum Exam     Pap due in May of 2018--  PHQ9       Initial /78  Pulse 88  Wt 56.7 kg (125 lb)  BMI 21.46 kg/m2 Estimated body mass index is 21.46 kg/(m^2) as calculated from the following:    Height as of 1/5/18: 1.626 m (5' 4\").    Weight as of this encounter: 56.7 kg (125 lb).  Medication Reconciliation: complete  "

## 2018-01-20 NOTE — PATIENT INSTRUCTIONS
Please call if you any questions.    [address]   [address]  [phone]        Madeline Benavidez

## 2018-01-23 ASSESSMENT — ANXIETY QUESTIONNAIRES: GAD7 TOTAL SCORE: 7

## 2018-02-01 ENCOUNTER — OFFICE VISIT (OUTPATIENT)
Dept: FAMILY MEDICINE | Facility: OTHER | Age: 38
End: 2018-02-01
Payer: COMMERCIAL

## 2018-02-01 VITALS
TEMPERATURE: 97.8 F | BODY MASS INDEX: 21.2 KG/M2 | DIASTOLIC BLOOD PRESSURE: 88 MMHG | OXYGEN SATURATION: 99 % | HEART RATE: 92 BPM | SYSTOLIC BLOOD PRESSURE: 130 MMHG | WEIGHT: 123.5 LBS | RESPIRATION RATE: 18 BRPM

## 2018-02-01 DIAGNOSIS — R53.83 FATIGUE, UNSPECIFIED TYPE: ICD-10-CM

## 2018-02-01 DIAGNOSIS — G47.411 NARCOLEPSY AND CATAPLEXY: ICD-10-CM

## 2018-02-01 DIAGNOSIS — F33.1 MAJOR DEPRESSIVE DISORDER, RECURRENT EPISODE, MODERATE (H): Primary | ICD-10-CM

## 2018-02-01 DIAGNOSIS — F41.1 GENERALIZED ANXIETY DISORDER: ICD-10-CM

## 2018-02-01 LAB — TSH SERPL DL<=0.005 MIU/L-ACNC: 0.58 MU/L (ref 0.4–4)

## 2018-02-01 PROCEDURE — 36415 COLL VENOUS BLD VENIPUNCTURE: CPT | Performed by: PHYSICIAN ASSISTANT

## 2018-02-01 PROCEDURE — 82306 VITAMIN D 25 HYDROXY: CPT | Performed by: PHYSICIAN ASSISTANT

## 2018-02-01 PROCEDURE — 84443 ASSAY THYROID STIM HORMONE: CPT | Performed by: PHYSICIAN ASSISTANT

## 2018-02-01 PROCEDURE — 99214 OFFICE O/P EST MOD 30 MIN: CPT | Performed by: PHYSICIAN ASSISTANT

## 2018-02-01 RX ORDER — BUSPIRONE HYDROCHLORIDE 5 MG/1
5 TABLET ORAL 3 TIMES DAILY PRN
Qty: 90 TABLET | Refills: 1 | Status: ON HOLD | OUTPATIENT
Start: 2018-02-01 | End: 2020-10-02

## 2018-02-01 RX ORDER — METHYLPHENIDATE HYDROCHLORIDE 20 MG/1
20 TABLET ORAL 2 TIMES DAILY
Qty: 60 TABLET | Refills: 0 | Status: SHIPPED | OUTPATIENT
Start: 2018-02-01 | End: 2018-05-08

## 2018-02-01 ASSESSMENT — PATIENT HEALTH QUESTIONNAIRE - PHQ9
SUM OF ALL RESPONSES TO PHQ QUESTIONS 1-9: 9
10. IF YOU CHECKED OFF ANY PROBLEMS, HOW DIFFICULT HAVE THESE PROBLEMS MADE IT FOR YOU TO DO YOUR WORK, TAKE CARE OF THINGS AT HOME, OR GET ALONG WITH OTHER PEOPLE: SOMEWHAT DIFFICULT
SUM OF ALL RESPONSES TO PHQ QUESTIONS 1-9: 9

## 2018-02-01 ASSESSMENT — PAIN SCALES - GENERAL: PAINLEVEL: NO PAIN (0)

## 2018-02-01 ASSESSMENT — ANXIETY QUESTIONNAIRES
2. NOT BEING ABLE TO STOP OR CONTROL WORRYING: SEVERAL DAYS
5. BEING SO RESTLESS THAT IT IS HARD TO SIT STILL: SEVERAL DAYS
GAD7 TOTAL SCORE: 7
GAD7 TOTAL SCORE: 7
4. TROUBLE RELAXING: SEVERAL DAYS
7. FEELING AFRAID AS IF SOMETHING AWFUL MIGHT HAPPEN: NOT AT ALL
6. BECOMING EASILY ANNOYED OR IRRITABLE: MORE THAN HALF THE DAYS
3. WORRYING TOO MUCH ABOUT DIFFERENT THINGS: SEVERAL DAYS
1. FEELING NERVOUS, ANXIOUS, OR ON EDGE: SEVERAL DAYS
GAD7 TOTAL SCORE: 7
7. FEELING AFRAID AS IF SOMETHING AWFUL MIGHT HAPPEN: NOT AT ALL

## 2018-02-01 NOTE — NURSING NOTE
"Chief Complaint   Patient presents with     Depression     Anxiety     Health Maintenance     pap, lipid, phq9, clint       Initial /88  Pulse 92  Resp 18  Wt 123 lb 8 oz (56 kg)  SpO2 99%  BMI 21.2 kg/m2 Estimated body mass index is 21.2 kg/(m^2) as calculated from the following:    Height as of 1/5/18: 5' 4\" (1.626 m).    Weight as of this encounter: 123 lb 8 oz (56 kg).  Medication Reconciliation: complete    "

## 2018-02-01 NOTE — PROGRESS NOTES
"  SUBJECTIVE:   Tasha Short is a 37 year old female who presents to clinic today for the following health issues:      History of Present Illness     Depression & Anxiety Follow-up:     Depression/Anxiety:  Depression & Anxiety    Status since last visit::  Worsened    Other associated symptoms of depression and anxiety::  None    Significant life event::  No    Current substance use::  None       Today's PHQ-9         PHQ-9 Total Score:     (P) 9   PHQ-9 Q9 Suicidal ideation:   (P) Not at all   Thoughts of suicide or self harm:      Self-harm Plan:        Self-harm Action:          Safety concerns for self or others:       ROMI-7 Total Score: (P) 7      - Last visit 12/11/17 - started on paxil and Ritalin and BUSPAR PRN   - ED visit 1/5/18 for SI and spent time at [place]      Continue Bupar 15 mg BID, Zoloft 100 mg, and Ritalin   - Feeling over medicated \"zombie\", confused, icky feeling   - Counseling once a week       PHQ-9 12/11/2017 1/19/2018 2/1/2018   Total Score 5 8 9   Q9: Suicide Ideation Not at all Not at all Not at all     ROMI-7 SCORE 12/11/2017 1/19/2018 2/1/2018   Total Score - - -   Total Score 9 (mild anxiety) - 7 (mild anxiety)   Total Score 9 7 7     In the past two weeks have you had thoughts of suicide or self-harm?  No.    Do you have concerns about your personal safety or the safety of others?   No      Problem list and histories reviewed & adjusted, as indicated.  Additional history: as documented    ROS:  Constitutional, HEENT, cardiovascular, pulmonary, gi and gu systems are negative, except as otherwise noted.    OBJECTIVE:   /88  Pulse 92  Temp 97.8  F (36.6  C) (Oral)  Resp 18  Wt 123 lb 8 oz (56 kg)  SpO2 99%  BMI 21.2 kg/m2  Body mass index is 21.2 kg/(m^2).  GENERAL APPEARANCE: healthy, alert and no distress  EYES: Eyes grossly normal to inspection, PERRLA, conjunctivae and sclerae without injection or discharge, EOM intact   MS: No musculoskeletal defects are noted " and gait is age appropriate without ataxia   SKIN: No suspicious lesions or rashes, hydration status appears adeuqate with normal skin turgor   PSYCH: Alert and oriented x3; speech- coherent , normal rate and volume; able to articulate logical thoughts, able to abstract reason, no tangential thoughts, no hallucinations or delusions, mentation appears normal, Mood is euthymic. Affect is appropriate for this mood state and bright. Thought content is free of suicidal ideation, hallucinations, and delusions. Dress is adequate and upkept. Eye contact is good during conversation.       Diagnostic Test Results:  none     ASSESSMENT/PLAN:       ICD-10-CM    1. Major depressive disorder, recurrent episode, moderate (H) F33.1 sertraline (ZOLOFT) 50 MG tablet     busPIRone (BUSPAR) 5 MG tablet   2. Generalized anxiety disorder F41.1 sertraline (ZOLOFT) 50 MG tablet     busPIRone (BUSPAR) 5 MG tablet   3. Narcolepsy and cataplexy G47.411 methylphenidate (RITALIN) 20 MG tablet   4. Fatigue, unspecified type R53.83 Vitamin D Deficiency     TSH with free T4 reflex     - Here with mom, verbally consents to safety, no current SI or plan   - Stopped all medications several days ago  - Restart Zoloft 50 mg   - Restart Buspar 5 mg as needed for anxiety   - Ritalin increase to 20 mg twice a day      Reviewed all use and side effects      Once stable will need CSA and Utox, Utox was completed in ED and was non-concerning       reviewed, as expected   - Recheck as scheduled every 2 weeks   - Continue with weekly counseling in her home   - Discussed eventually may need out patient counseling, recommend Meka as well as for medication management if needed, patient will think about this   - Worsening fatigue, however narcolepsy not controlled      Lots of labs done recently, reviewed all, will get TSH and vitamin D to rule out fatigue evaluation, likely due to her narcolepsy     The patient indicates understanding of these issues and  agrees with the plan.    Follow up: Every 2 weeks         Enid Mansfield PA-C  [facility] [place]

## 2018-02-01 NOTE — PATIENT INSTRUCTIONS
- Zoya Hood NP  at [facility]     - Restart Zoloft 50 mg   - Buspar 5 mg as needed for anxiety   - Ritalin increase to 20 mg twice a day     - Recheck as scheduled

## 2018-02-01 NOTE — MR AVS SNAPSHOT
After Visit Summary   2/1/2018    Tasha Short    MRN: [id number]           Patient Information     Date Of Birth          1980        Visit Information        Provider Department      2/1/2018 3:15 PM Enid Mansfield PA-C [facility]        Today's Diagnoses     Major depressive disorder, recurrent episode, moderate (H)    -  1    Generalized anxiety disorder        Narcolepsy and cataplexy        Fatigue, unspecified type          Care Instructions    - Zoya Hood NP  at [facility]     - Restart Zoloft 50 mg   - Buspar 5 mg as needed for anxiety   - Ritalin increase to 20 mg twice a day     - Recheck as scheduled                 Follow-ups after your visit        Your next 10 appointments already scheduled     Feb 22, 2018  2:00 PM CST   [facility] with [facility]id [facility]fiedl [facility]    [address] [place] [id number] [phone]           Note: this is not an onsite visit; there is no need to come to the facility.            Mar 08, 2018  1:00 PM CST   [facility] Visit with Enid Mansfield [facility]-C   [facility]    [address] [place] [id number] [phone]           Bring a current list of meds and any records pertaining to this visit. For Physicals, please bring immunization records and any forms needing to be filled out. Please arrive 10 minutes early to complete paperwork.              Who to contact     If you have questions or need follow up information about today's clinic visit or your schedule please contact [facility] directly at [phone].  Normal or non-critical lab and imaging results will be communicated to you by MyChart, letter or phone within 4 business days after the clinic has received the results. If you do not hear from us within 7 days, please contact the clinic  through Connectbeam or phone. If you have a critical or abnormal lab result, we will notify you by phone as soon as possible.  Submit refill requests through Connectbeam or call your pharmacy and they will forward the refill request to us. Please allow 3 business days for your refill to be completed.          Additional Information About Your Visit        Digital BloomharVestorly Information     Connectbeam gives you secure access to your electronic health record. If you see a primary care provider, you can also send messages to your care team and make appointments. If you have questions, please call your primary care clinic.  If you do not have a primary care provider, please call [phone] and they will assist you.        Care EveryWhere ID     This is your Care EveryWhere ID. This could be used by other organizations to access your [place] medical records  [id number]        Your Vitals Were     Pulse Temperature Respirations Pulse Oximetry BMI (Body Mass Index)       92 97.8  F (36.6  C) (Oral) 18 99% 21.2 kg/m2        Blood Pressure from Last 3 Encounters:   02/01/18 130/88   01/19/18 129/78   01/09/18 117/84    Weight from Last 3 Encounters:   02/01/18 123 lb 8 oz (56 kg)   01/19/18 125 lb (56.7 kg)   01/09/18 120 lb 8 oz (54.7 kg)              We Performed the Following     TSH with free T4 reflex     Vitamin D Deficiency          Today's Medication Changes          These changes are accurate as of 2/1/18  3:55 PM.  If you have any questions, ask your nurse or doctor.               These medicines have changed or have updated prescriptions.        Dose/Directions    busPIRone 5 MG tablet   Commonly known as:  BUSPAR   This may have changed:    - medication strength  - how much to take  - when to take this  - reasons to take this   Used for:  Major depressive disorder, recurrent episode, moderate (H), Generalized anxiety disorder   Changed by:  Enid Mansfield PA-C        Dose:  5 mg   Take 1 tablet (5 mg) by mouth 3  times daily as needed   Quantity:  90 tablet   Refills:  1       methylphenidate 20 MG tablet   Commonly known as:  RITALIN   This may have changed:    - medication strength  - how much to take   Used for:  Narcolepsy and cataplexy   Changed by:  Enid Mansfield PA-C        Dose:  20 mg   Take 1 tablet (20 mg) by mouth 2 times daily   Quantity:  60 tablet   Refills:  0       sertraline 50 MG tablet   Commonly known as:  ZOLOFT   This may have changed:    - medication strength  - how much to take   Used for:  Major depressive disorder, recurrent episode, moderate (H), Generalized anxiety disorder   Changed by:  Enid Mansfield PA-C        Dose:  50 mg   Take 1 tablet (50 mg) by mouth daily   Quantity:  30 tablet   Refills:  1            Where to get your medicines      These medications were sent to [facility] [place] - [address]  [address] [place] [address], [place] [address]     Phone:  [phone]     busPIRone 5 MG tablet    sertraline 50 MG tablet         Some of these will need a paper prescription and others can be bought over the counter.  Ask your nurse if you have questions.     Bring a paper prescription for each of these medications     methylphenidate 20 MG tablet                Primary Care Provider Office Phone # Fax #    Enid Mansfield PA-C [phone]       [address] [facility] [address]  [facility] [address]        Equal Access to Services     LINDY CAMERON AH: Hadii adam ku hadasho Somalgorzataali, waaxda luqadaha, qaybta kaalmada adeegyada, noemi araiza. So [facility] [phone].    ATENCIÓN: Si habla español, tiene a chaves disposición servicios gratuitos de asistencia lingüística. Daksha al [phone].    We comply with applicable federal civil rights laws and [place] laws. We do not discriminate on the basis of race, color, national origin, age, disability, sex, sexual  orientation, or gender identity.            Thank you!     Thank you for choosing [facility]  for your care. Our goal is always to provide you with excellent care. Hearing back from our patients is one way we can continue to improve our services. Please take a few minutes to complete the written survey that you may receive in the mail after your visit with us. Thank you!             Your Updated Medication List - Protect others around you: Learn how to safely use, store and throw away your medicines at www.disposemymeds.org.          This list is accurate as of 2/1/18  3:55 PM.  Always use your most recent med list.                   Brand Name Dispense Instructions for use Diagnosis    busPIRone 5 MG tablet    BUSPAR    90 tablet    Take 1 tablet (5 mg) by mouth 3 times daily as needed    Major depressive disorder, recurrent episode, moderate (H), Generalized anxiety disorder       flunisolide 29 MCG/ACT (0.025%) Soln    NASAREL    1 Box    Spray 2 sprays into both nostrils 2 times daily    Nasal congestion       ibuprofen 600 MG tablet    ADVIL/MOTRIN     Take 600 mg by mouth every 6 hours as needed for moderate pain        lisinopril 20 MG tablet    PRINIVIL/ZESTRIL    90 tablet    Take 1 tablet (20 mg) by mouth daily    HTN, goal below 140/90       methylphenidate 20 MG tablet    RITALIN    60 tablet    Take 1 tablet (20 mg) by mouth 2 times daily    Narcolepsy and cataplexy       norgestimate-ethinyl estradiol 0.25-35 MG-MCG per tablet    ORTHO-CYCLEN, SPRINTEC    90 tablet    Take 1 tablet by mouth daily    Encounter for initial prescription of contraceptive pills       ranitidine 150 MG tablet    ZANTAC          sertraline 50 MG tablet    ZOLOFT    30 tablet    Take 1 tablet (50 mg) by mouth daily    Major depressive disorder, recurrent episode, moderate (H), Generalized anxiety disorder

## 2018-02-02 LAB — DEPRECATED CALCIDIOL+CALCIFEROL SERPL-MC: 35 UG/L (ref 20–75)

## 2018-02-02 ASSESSMENT — ANXIETY QUESTIONNAIRES: GAD7 TOTAL SCORE: 7

## 2018-02-02 ASSESSMENT — PATIENT HEALTH QUESTIONNAIRE - PHQ9: SUM OF ALL RESPONSES TO PHQ QUESTIONS 1-9: 9

## 2018-02-02 NOTE — PROGRESS NOTES
Josefina Cordova    Your vitamin D results were on the low end of normal. I recommend a supplement of 1,000 IU daily (over the counter).     The results are attached for your review.       Donnie Mansfield PA-C

## 2018-02-02 NOTE — PROGRESS NOTES
Josefina Cordova    Your thyroid results were normal. Still awaiting Vitamin D, can take several more days.     The results are attached for your review.       Donnie Mansfield PA-C

## 2018-02-15 DIAGNOSIS — I10 HTN, GOAL BELOW 140/90: ICD-10-CM

## 2018-02-15 RX ORDER — LISINOPRIL 20 MG/1
20 TABLET ORAL DAILY
Qty: 90 TABLET | Refills: 3 | OUTPATIENT
Start: 2018-02-15

## 2018-02-15 NOTE — PROGRESS NOTES
"  Tasha Short is a 37 year old female who is being evaluated via a telephone visit.      The patient has been notified of following:     \"This telephone visit will be conducted via a call between you and your physician/provider. We have found that certain health care needs can be provided without the need for a physical exam.  This service lets us provide the care you need with a short phone conversation.  If a prescription is necessary we can send it directly to your pharmacy.  If lab work is needed we can place an order for that and you can then stop by our lab to have the test done at a later time.    We will bill your insurance company for this service.  Please check with your medical insurance if this type of visit is covered. You may be responsible for the cost of this type of visit if insurance coverage is denied.  The typical cost is $30 (10min), $59 (11-20min) and $85 (21-30min).  Most often these visits are shorter than 10 minutes.    If during the course of the call the physician/provider feels a telephone visit is not appropriate, you will not be charged for this service.\"       Consent has been obtained for this service by care team member: yes.   See the scanned image in the medical record.    Tasha Short complains of  Depression      I have reviewed and updated the patient's Past Medical History, Social History, Family History and Medication List.    ALLERGIES  Penicillins; Phenytoin; Prednisone; and Seasonal allergies    Seema Christie CMA    (MA signature)      Additional provider notes:   Depression and Anxiety Follow-Up    Status since last visit: More depressed     Other associated symptoms:None    Complicating factors:     Significant life event: Yes-  Not working, new baby (3 months today)       Current substance abuse: None    - Therapist every week   - Lisinopril - off 2 days, was told pharmacy waiting on refills   - Motivation is low  -  No side effects   - Just down because no " job right now, not working every day   - Ritalin doing well, helping more   - Sleeping well   - Buspar - 1 every other day       PHQ-9 1/19/2018 2/1/2018 2/22/2018   Total Score 8 9 7   Q9: Suicide Ideation Not at all Not at all Not at all     ROMI-7 SCORE 1/19/2018 2/1/2018 2/22/2018   Total Score - - -   Total Score - 7 (mild anxiety) -   Total Score 7 7 7     In the past two weeks have you had thoughts of suicide or self-harm?  No.    Do you have concerns about your personal safety or the safety of others?   No      Plan from last visit 2/1/18  - Here with mom, verbally consents to safety, no current SI or plan   - Stopped all medications several days ago  - Restart Zoloft 50 mg   - Restart Buspar 5 mg as needed for anxiety   - Ritalin increase to 20 mg twice a day      Reviewed all use and side effects      Once stable will need CSA and Utox, Utox was completed in ED and was non-concerning       reviewed, as expected   - Recheck as scheduled every 2 weeks   - Continue with weekly counseling in her home   - Discussed eventually may need out patient counseling, recommend Meka as well as for medication management if needed, patient will think about this   - Worsening fatigue, however narcolepsy not controlled      Lots of labs done recently, reviewed all, will get TSH and vitamin D to rule out fatigue evaluation, likely due to her narcolepsy         Assessment/Plan:    ICD-10-CM    1. Major depressive disorder, recurrent episode, moderate (H) F33.1 sertraline (ZOLOFT) 100 MG tablet   2. Generalized anxiety disorder F41.1 sertraline (ZOLOFT) 100 MG tablet   3. HTN, goal below 140/90 I10 lisinopril (PRINIVIL/ZESTRIL) 20 MG tablet     - Patient continues to struggle after birth of child and not working full time, denies any SI/HI, also worried about over medication    - Started on sertraline 2 weeks ago at 50 mg, states really no change in mood, no side effects  - Will increase to 100 mg, reviewed use and side  effects  - Continues to use Buspar PRN (afraid of over treatment as above)   - Will continue to follow every 2 weeks as mood improves   - Next visit will have office visit due to will plan recheck of her narcolepsy medications   - Refills of her blood pressure medication on file (sent 1 year in Dec) but patient reports issue with pharmacy. Reviewed use and side effects and re-sent   - Patient verbally consents to safety, has help and support of parents     The patient indicates understanding of these issues and agrees with the plan.    Follow up: 2 weeks         I have reviewed the note as documented above.  This accurately captures the substance of my conversation with the patient, Donnie Mansfield PA-C   [facility] - [place]       Total time of call between patient and provider was 6 minutes     HPI

## 2018-02-16 NOTE — TELEPHONE ENCOUNTER
Refill not appropriate.  Rx sent to the requesting pharmacy on 12/11/17 for a 3 month supply with an additional 3 refills.    Yuko Harris RN

## 2018-02-22 ENCOUNTER — VIRTUAL VISIT (OUTPATIENT)
Dept: FAMILY MEDICINE | Facility: OTHER | Age: 38
End: 2018-02-22
Payer: COMMERCIAL

## 2018-02-22 DIAGNOSIS — F41.1 GENERALIZED ANXIETY DISORDER: ICD-10-CM

## 2018-02-22 DIAGNOSIS — I10 HTN, GOAL BELOW 140/90: ICD-10-CM

## 2018-02-22 DIAGNOSIS — F33.1 MAJOR DEPRESSIVE DISORDER, RECURRENT EPISODE, MODERATE (H): ICD-10-CM

## 2018-02-22 PROCEDURE — 98966 PH1 ASSMT&MGMT NQHP 5-10: CPT | Performed by: PHYSICIAN ASSISTANT

## 2018-02-22 RX ORDER — LISINOPRIL 20 MG/1
20 TABLET ORAL DAILY
Qty: 90 TABLET | Refills: 3 | Status: SHIPPED | OUTPATIENT
Start: 2018-02-22 | End: 2018-05-08

## 2018-02-22 RX ORDER — SERTRALINE HYDROCHLORIDE 100 MG/1
100 TABLET, FILM COATED ORAL DAILY
Qty: 30 TABLET | Refills: 1 | Status: SHIPPED | OUTPATIENT
Start: 2018-02-22 | End: 2018-05-08

## 2018-02-22 ASSESSMENT — ANXIETY QUESTIONNAIRES
6. BECOMING EASILY ANNOYED OR IRRITABLE: NEARLY EVERY DAY
GAD7 TOTAL SCORE: 7
3. WORRYING TOO MUCH ABOUT DIFFERENT THINGS: SEVERAL DAYS
1. FEELING NERVOUS, ANXIOUS, OR ON EDGE: SEVERAL DAYS
7. FEELING AFRAID AS IF SOMETHING AWFUL MIGHT HAPPEN: NOT AT ALL
2. NOT BEING ABLE TO STOP OR CONTROL WORRYING: SEVERAL DAYS
5. BEING SO RESTLESS THAT IT IS HARD TO SIT STILL: NOT AT ALL

## 2018-02-22 ASSESSMENT — PATIENT HEALTH QUESTIONNAIRE - PHQ9: 5. POOR APPETITE OR OVEREATING: SEVERAL DAYS

## 2018-02-22 NOTE — MR AVS SNAPSHOT
After Visit Summary   2/22/2018    Tasha Short    MRN: [id number]           Patient Information     Date Of Birth          1980        Visit Information        Provider Department      2/22/2018 2:00 PM Enid Mansfield PA-C [facility]        Today's Diagnoses     Major depressive disorder, recurrent episode, moderate (H)        Generalized anxiety disorder        HTN, goal below 140/90           Follow-ups after your visit        Your next 10 appointments already scheduled     Mar 08, 2018  1:00 PM CST   Office Visit with Enid Mansfield PA-C   [facility]    [address] [facility] [address]  [facility] [id number] [phone]           Bring a current list of meds and any records pertaining to this visit. For Physicals, please bring immunization records and any forms needing to be filled out. Please arrive 10 minutes early to complete paperwork.              Who to contact     If you have questions or need follow up information about today's clinic visit or your schedule please contact [facility] directly at [phone].  Normal or non-critical lab and imaging results will be communicated to you by MyChart, letter or phone within 4 business days after the clinic has received the results. If you do not hear from us within 7 days, please contact the clinic through nprogresst or phone. If you have a critical or abnormal lab result, we will notify you by phone as soon as possible.  Submit refill requests through NiftyThrifty or call your pharmacy and they will forward the refill request to us. Please allow 3 business days for your refill to be completed.          Additional Information About Your Visit        MyChart Information     N[employer]Thrifty gives you secure access to your electronic health record. If you see a primary care provider, you can also send messages to your care team and make appointments.  If you have questions, please call your primary care clinic.  If you do not have a primary care provider, please call [phone] and they will assist you.        Care EveryWhere ID     This is your Care EveryWhere ID. This could be used by other organizations to access your [place] medical records  [id number]         Blood Pressure from Last 3 Encounters:   02/01/18 130/88   01/19/18 129/78   01/09/18 117/84    Weight from Last 3 Encounters:   02/01/18 123 lb 8 oz (56 kg)   01/19/18 125 lb (56.7 kg)   01/09/18 120 lb 8 oz (54.7 kg)              Today, you had the following     No orders found for display         Today's Medication Changes          These changes are accurate as of 2/22/18  9:40 PM.  If you have any questions, ask your nurse or doctor.               These medicines have changed or have updated prescriptions.        Dose/Directions    sertraline 100 MG tablet   Commonly known as:  ZOLOFT   This may have changed:    - medication strength  - how much to take   Used for:  Major depressive disorder, recurrent episode, moderate (H), Generalized anxiety disorder   Changed by:  Enid Mansfield PA-C        Dose:  100 mg   Take 1 tablet (100 mg) by mouth daily   Quantity:  30 tablet   Refills:  1            Where to get your medicines      These medications were sent to [facility] [place] - [address] [place] [address]  [address] [place] [address], [place] [address]     Phone:  [phone]     lisinopril 20 MG tablet    sertraline 100 MG tablet                Primary Care Provider Office Phone # Fax #    Enid Mansfield PA-C [phone]       [address]  [facility] [address]        Equal Access to Services     YOUNG CAMERON : Catalina Rossi, wolf hunter, debbie doran, noemi araiza. So [facility] [phone].    ATENCIÓN: Si mynor azar, joao rizzo chaves  disposición servicios gratuitos de asistencia lingüística. Daksha luz [phone].    We comply with applicable federal civil rights laws and [place] laws. We do not discriminate on the basis of race, color, national origin, age, disability, sex, sexual orientation, or gender identity.            Thank you!     Thank you for choosing [facility]  for your care. Our goal is always to provide you with excellent care. Hearing back from our patients is one way we can continue to improve our services. Please take a few minutes to complete the written survey that you may receive in the mail after your visit with us. Thank you!             Your Updated Medication List - Protect others around you: Learn how to safely use, store and throw away your medicines at www.disposemymeds.org.          This list is accurate as of 2/22/18  9:40 PM.  Always use your most recent med list.                   Brand Name Dispense Instructions for use Diagnosis    busPIRone 5 MG tablet    BUSPAR    90 tablet    Take 1 tablet (5 mg) by mouth 3 times daily as needed    Major depressive disorder, recurrent episode, moderate (H), Generalized anxiety disorder       flunisolide 29 MCG/ACT (0.025%) Soln    NASAREL    1 Box    Spray 2 sprays into both nostrils 2 times daily    Nasal congestion       ibuprofen 600 MG tablet    ADVIL/MOTRIN     Take 600 mg by mouth every 6 hours as needed for moderate pain        lisinopril 20 MG tablet    PRINIVIL/ZESTRIL    90 tablet    Take 1 tablet (20 mg) by mouth daily    HTN, goal below 140/90       methylphenidate 20 MG tablet    RITALIN    60 tablet    Take 1 tablet (20 mg) by mouth 2 times daily    Narcolepsy and cataplexy       norgestimate-ethinyl estradiol 0.25-35 MG-MCG per tablet    ORTHO-CYCLEN, SPRINTEC    90 tablet    Take 1 tablet by mouth daily    Encounter for initial prescription of contraceptive pills       ranitidine 150 MG tablet    ZANTAC          sertraline 100 MG tablet     ZOLOFT    30 tablet    Take 1 tablet (100 mg) by mouth daily    Major depressive disorder, recurrent episode, moderate (H), Generalized anxiety disorder

## 2018-02-23 ASSESSMENT — ANXIETY QUESTIONNAIRES: GAD7 TOTAL SCORE: 7

## 2018-02-23 ASSESSMENT — PATIENT HEALTH QUESTIONNAIRE - PHQ9: SUM OF ALL RESPONSES TO PHQ QUESTIONS 1-9: 7

## 2018-03-29 ENCOUNTER — TRANSFERRED RECORDS (OUTPATIENT)
Dept: HEALTH INFORMATION MANAGEMENT | Facility: CLINIC | Age: 38
End: 2018-03-29

## 2018-05-03 NOTE — PROGRESS NOTES
SUBJECTIVE:   Tasha Short is a 37 year old female who presents to clinic today for the following health issues:      History of Present Illness     Depression & Anxiety Follow-up:     Depression/Anxiety:  Depression & Anxiety    Status since last visit::  Worsened    Other associated symptoms of depression and anxiety::  None    Significant life event::  No    Current substance use::  None       Today's PHQ-9         PHQ-9 Total Score:     (P) 9   PHQ-9 Q9 Suicidal ideation:   (P) Not at all   Thoughts of suicide or self harm:      Self-harm Plan:        Self-harm Action:          Safety concerns for self or others:       ROMI-7 Total Score: (P) 8    - ETOH relapse, about 2 months in the treatment center - [facility]   - Sober about 2 months now   - Back to work really helps   - Zoloft to 150 mg during treatment, going well now almost 4 months   - Hasn't really used Buspar, only if needed   - Daughter almost 6 months old   - Still a struggle being a single mom but her mom helps out a lot     Hypertension Follow-up    Outpatient blood pressures are being checked at home.  Results are 120-130's/80's90's.    Low Salt Diet: low salt      PHQ-9 2/1/2018 2/22/2018 5/8/2018   Total Score 9 7 9   Q9: Suicide Ideation Not at all Not at all Not at all     ROMI-7 SCORE 2/1/2018 2/22/2018 5/8/2018   Total Score - - -   Total Score 7 (mild anxiety) - 8 (mild anxiety)   Total Score 7 7 8     In the past two weeks have you had thoughts of suicide or self-harm?  No.    Do you have concerns about your personal safety or the safety of others?   No      Plan from last visit 2/22/18  - Patient continues to struggle after birth of child and not working full time, denies any SI/HI, also worried about over medication    - Started on sertraline 2 weeks ago at 50 mg, states really no change in mood, no side effects  - Will increase to 100 mg, reviewed use and side effects  - Continues to use Buspar PRN (afraid of over treatment as  "above)   - Will continue to follow every 2 weeks as mood improves   - Next visit will have office visit due to will plan recheck of her narcolepsy medications   - Refills of her blood pressure medication on file (sent 1 year in Dec) but patient reports issue with pharmacy. Reviewed use and side effects and re-sent   - Patient verbally consents to safety, has help and support of parents       Problem list and histories reviewed & adjusted, as indicated.  Additional history: as documented    ROS:  Constitutional, HEENT, cardiovascular, pulmonary, gi and gu systems are negative, except as otherwise noted.    OBJECTIVE:   \BP (!) (P) 140/98 (BP Location: Right arm, Patient Position: Chair, Cuff Size: Adult Regular)  Pulse (P) 80  Temp (P) 98.5  F (36.9  C) (Oral)  Resp (P) 16  Ht (P) 5' 3.78\" (1.62 m)  Wt (P) 121 lb (54.9 kg)  SpO2 (P) 100%  BMI (P) 20.91 kg/m2  Body mass index is 20.91 kg/(m^2) (pended).  GENERAL APPEARANCE: healthy, alert and no distress  EYES: Eyes grossly normal to inspection, PERRLA, conjunctivae and sclerae without injection or discharge, EOM intact   RESP: Lungs clear to auscultation - no rales, rhonchi or wheezes    CV: Regular rates and rhythm, normal S1 S2, no S3 or S4, no murmur, click or rub, no peripheral edema and peripheral pulses strong and symmetric bilaterally   MS: No musculoskeletal defects are noted and gait is age appropriate without ataxia   SKIN: No suspicious lesions or rashes, hydration status appears adeuqate with normal skin turgor   PSYCH: Alert and oriented x3; speech- coherent , normal rate and volume; able to articulate logical thoughts, able to abstract reason, no tangential thoughts, no hallucinations or delusions, mentation appears normal, Mood is euthymic. Affect is appropriate for this mood state and bright. Thought content is free of suicidal ideation, hallucinations, and delusions. Dress is adequate and upkept. Eye contact is good during conversation. "       Diagnostic Test Results:  See orders pending in Epic     ASSESSMENT/PLAN:       ICD-10-CM    1. HTN, goal below 140/90 I10 Basic metabolic panel     lisinopril (PRINIVIL/ZESTRIL) 20 MG tablet   2. Generalized anxiety disorder F41.1 sertraline (ZOLOFT) 100 MG tablet   3. Major depressive disorder, recurrent episode, moderate (H) F33.1 sertraline (ZOLOFT) 100 MG tablet   4. H/O ETOH abuse Z87.898    5. Hypertriglyceridemia E78.1 Lipid panel reflex to direct LDL Fasting   6. Chronic seasonal allergic rhinitis due to pollen J30.1 flunisolide (NASALIDE) 25 MCG/ACT (0.025%) SOLN spray   7. Narcolepsy and cataplexy G47.411 Pain Drug Scr UR W Rptd Meds     methylphenidate (RITALIN) 20 MG tablet     DISCONTINUED: methylphenidate (RITALIN) 20 MG tablet   8. Tobacco use disorder F17.200 TOBACCO CESSATION ORDER FOR      1. HTN   - Stable on Lisinopril 20 mg   - Due for yearly lipid and BMP recheck, fasting so will get today  - Results will be communicated to patient when available and appropriate medication changes made if necessary   - Refilled for 1 year     2-4. 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 High triglycerides in the past, fasting today, recommend yearly rechecks from now on     6. Allergies  - Worse in spring and fall  - Does well with only nasal spray (nothing else OTC)   - Reviewed use and side effects and refilled     7. Narcolepsy   - Diagnosed 5/14/2010 by sleep specialist   - Was off Ritalin for awhile due to pregnancy, baby now  almost 6 months and is not breast feeding   -  reviewed, as expected, no concerns, last fill consistent with chart last fill of 2/1/18   - Will re-start today, gave 2 months RX       Ritalin 20 mg BID   - Reviewed use and side effects, including addiction since patient is prone to ETOH addiction   - CSA discussed and signed today (5/8/18)   - Utox collected as well today, await results, shouldn't have any Ritalin in it   - Recheck 2 months   - If stable, can push out visits to every 3 months next time     8. Declined tobacco cessation assistance     The patient indicates understanding of these issues and agrees with the plan.    Follow up: 1-2 months for mood recheck       Enid Whiting-MARTHA Block  [facility]

## 2018-05-08 ENCOUNTER — OFFICE VISIT (OUTPATIENT)
Dept: FAMILY MEDICINE | Facility: OTHER | Age: 38
End: 2018-05-08
Payer: COMMERCIAL

## 2018-05-08 VITALS — DIASTOLIC BLOOD PRESSURE: 82 MMHG | SYSTOLIC BLOOD PRESSURE: 126 MMHG

## 2018-05-08 DIAGNOSIS — F41.1 GENERALIZED ANXIETY DISORDER: ICD-10-CM

## 2018-05-08 DIAGNOSIS — E78.1 HYPERTRIGLYCERIDEMIA: ICD-10-CM

## 2018-05-08 DIAGNOSIS — I10 HTN, GOAL BELOW 140/90: Primary | ICD-10-CM

## 2018-05-08 DIAGNOSIS — G47.411 NARCOLEPSY AND CATAPLEXY: ICD-10-CM

## 2018-05-08 DIAGNOSIS — F10.11 H/O ETOH ABUSE: ICD-10-CM

## 2018-05-08 DIAGNOSIS — F17.200 TOBACCO USE DISORDER: ICD-10-CM

## 2018-05-08 DIAGNOSIS — J30.1 CHRONIC SEASONAL ALLERGIC RHINITIS DUE TO POLLEN: ICD-10-CM

## 2018-05-08 DIAGNOSIS — F33.1 MAJOR DEPRESSIVE DISORDER, RECURRENT EPISODE, MODERATE (H): ICD-10-CM

## 2018-05-08 PROBLEM — F32.A DEPRESSION: Status: RESOLVED | Noted: 2018-01-06 | Resolved: 2018-05-08

## 2018-05-08 PROBLEM — Z23 NEED FOR TDAP VACCINATION: Status: RESOLVED | Noted: 2017-05-15 | Resolved: 2018-05-08

## 2018-05-08 LAB
ANION GAP SERPL CALCULATED.3IONS-SCNC: 7 MMOL/L (ref 3–14)
BUN SERPL-MCNC: 15 MG/DL (ref 7–30)
CALCIUM SERPL-MCNC: 8.6 MG/DL (ref 8.5–10.1)
CHLORIDE SERPL-SCNC: 106 MMOL/L (ref 94–109)
CHOLEST SERPL-MCNC: 173 MG/DL
CO2 SERPL-SCNC: 26 MMOL/L (ref 20–32)
CREAT SERPL-MCNC: 0.86 MG/DL (ref 0.52–1.04)
GFR SERPL CREATININE-BSD FRML MDRD: 74 ML/MIN/1.7M2
GLUCOSE SERPL-MCNC: 83 MG/DL (ref 70–99)
HDLC SERPL-MCNC: 61 MG/DL
LDLC SERPL CALC-MCNC: 84 MG/DL
NONHDLC SERPL-MCNC: 112 MG/DL
POTASSIUM SERPL-SCNC: 4.5 MMOL/L (ref 3.4–5.3)
SODIUM SERPL-SCNC: 139 MMOL/L (ref 133–144)
TRIGL SERPL-MCNC: 142 MG/DL

## 2018-05-08 PROCEDURE — 80307 DRUG TEST PRSMV CHEM ANLYZR: CPT | Mod: 90 | Performed by: PHYSICIAN ASSISTANT

## 2018-05-08 PROCEDURE — 99214 OFFICE O/P EST MOD 30 MIN: CPT | Performed by: PHYSICIAN ASSISTANT

## 2018-05-08 PROCEDURE — 99000 SPECIMEN HANDLING OFFICE-LAB: CPT | Performed by: PHYSICIAN ASSISTANT

## 2018-05-08 PROCEDURE — 80048 BASIC METABOLIC PNL TOTAL CA: CPT | Performed by: PHYSICIAN ASSISTANT

## 2018-05-08 PROCEDURE — 80061 LIPID PANEL: CPT | Performed by: PHYSICIAN ASSISTANT

## 2018-05-08 PROCEDURE — 36415 COLL VENOUS BLD VENIPUNCTURE: CPT | Performed by: PHYSICIAN ASSISTANT

## 2018-05-08 RX ORDER — FLUNISOLIDE 0.25 MG/ML
2 SOLUTION NASAL EVERY 12 HOURS
Qty: 1 BOTTLE | Refills: 3 | Status: SHIPPED | OUTPATIENT
Start: 2018-05-08 | End: 2018-11-26

## 2018-05-08 RX ORDER — LISINOPRIL 20 MG/1
20 TABLET ORAL DAILY
Qty: 90 TABLET | Refills: 3 | Status: SHIPPED | OUTPATIENT
Start: 2018-05-08 | End: 2018-08-17 | Stop reason: SINTOL

## 2018-05-08 RX ORDER — METHYLPHENIDATE HYDROCHLORIDE 20 MG/1
20 TABLET ORAL 2 TIMES DAILY
Qty: 60 TABLET | Refills: 0 | Status: SHIPPED | OUTPATIENT
Start: 2018-05-08 | End: 2018-05-08

## 2018-05-08 RX ORDER — METHYLPHENIDATE HYDROCHLORIDE 20 MG/1
20 TABLET ORAL 2 TIMES DAILY
Qty: 60 TABLET | Refills: 0 | Status: SHIPPED | OUTPATIENT
Start: 2018-06-05 | End: 2018-08-17

## 2018-05-08 RX ORDER — SERTRALINE HYDROCHLORIDE 100 MG/1
150 TABLET, FILM COATED ORAL DAILY
Qty: 45 TABLET | Refills: 1 | Status: SHIPPED | OUTPATIENT
Start: 2018-05-08 | End: 2018-08-31

## 2018-05-08 ASSESSMENT — ANXIETY QUESTIONNAIRES
7. FEELING AFRAID AS IF SOMETHING AWFUL MIGHT HAPPEN: NOT AT ALL
7. FEELING AFRAID AS IF SOMETHING AWFUL MIGHT HAPPEN: NOT AT ALL
6. BECOMING EASILY ANNOYED OR IRRITABLE: MORE THAN HALF THE DAYS
5. BEING SO RESTLESS THAT IT IS HARD TO SIT STILL: SEVERAL DAYS
2. NOT BEING ABLE TO STOP OR CONTROL WORRYING: MORE THAN HALF THE DAYS
GAD7 TOTAL SCORE: 8
3. WORRYING TOO MUCH ABOUT DIFFERENT THINGS: SEVERAL DAYS
4. TROUBLE RELAXING: SEVERAL DAYS
1. FEELING NERVOUS, ANXIOUS, OR ON EDGE: SEVERAL DAYS
GAD7 TOTAL SCORE: 8
GAD7 TOTAL SCORE: 8

## 2018-05-08 ASSESSMENT — PATIENT HEALTH QUESTIONNAIRE - PHQ9
SUM OF ALL RESPONSES TO PHQ QUESTIONS 1-9: 9
SUM OF ALL RESPONSES TO PHQ QUESTIONS 1-9: 9
10. IF YOU CHECKED OFF ANY PROBLEMS, HOW DIFFICULT HAVE THESE PROBLEMS MADE IT FOR YOU TO DO YOUR WORK, TAKE CARE OF THINGS AT HOME, OR GET ALONG WITH OTHER PEOPLE: SOMEWHAT DIFFICULT

## 2018-05-08 NOTE — PROGRESS NOTES
Josefina Cordova    Your lab results were normal. Still awaiting urine.     The results are attached for your review.       Donnie Mansfield PA-C

## 2018-05-08 NOTE — MR AVS SNAPSHOT
After Visit Summary   5/8/2018    Tasha Short    MRN: [id number]           Patient Information     Date Of Birth          1980        Visit Information        Provider Department      5/8/2018 9:00 AM Enid Mansfield PA-C [facility] [place]        Today's Diagnoses     HTN, goal below 140/90    -  1    Generalized anxiety disorder        Major depressive disorder, recurrent episode, moderate (H)        Tobacco use disorder        H/O ETOH abuse        Hypertriglyceridemia        Chronic seasonal allergic rhinitis due to pollen        Narcolepsy and cataplexy          Care Instructions    - Recheck 2 months                 Follow-ups after your visit        Follow-up notes from your care team     Return in about 2 months (around 7/8/2018) for Recheck.      Who to contact     If you have questions or need follow up information about today's clinic visit or your schedule please contact [facility] directly at [phone].  Normal or non-critical lab and imaging results will be communicated to you by MyChart, letter or phone within 4 business days after the clinic has received the results. If you do not hear from us within 7 days, please contact the clinic through [email or web address] or phone. If you have a critical or abnormal lab result, we will notify you by phone as soon as possible.  Submit refill requests through [employer] or call your pharmacy and they will forward the refill request to us. Please allow 3 business days for your refill to be completed.          Additional Information About Your Visit        MyChart Information     [employer] gives you secure access to your electronic health record. If you see a primary care provider, you can also send messages to your care team and make appointments. If you have questions, please call your primary care clinic.  If you do not have a primary care provider, please call [phone] and they will assist you.        Care  EveryWhere ID     This is your Care EveryWhere ID. This could be used by other organizations to access your [place] medical records  [id number]         Blood Pressure from Last 3 Encounters:   05/08/18 (!) (P) 140/98   02/01/18 130/88   01/19/18 129/78    Weight from Last 3 Encounters:   05/08/18 (P) 121 lb (54.9 kg)   02/01/18 123 lb 8 oz (56 kg)   01/19/18 125 lb (56.7 kg)              We Performed the Following     Basic metabolic panel     Lipid panel reflex to direct LDL Fasting     Pain Drug Scr UR W Rptd Meds     TOBACCO CESSATION ORDER FOR HM          Today's Medication Changes          These changes are accurate as of 5/8/18  9:33 AM.  If you have any questions, ask your nurse or doctor.               Start taking these medicines.        Dose/Directions    flunisolide 25 MCG/ACT (0.025%) Soln spray   Commonly known as:  NASALIDE   Used for:  Chronic seasonal allergic rhinitis due to pollen   Started by:  Enid Mansfield PA-C        Dose:  2 spray   Spray 2 sprays into both nostrils every 12 hours   Quantity:  1 Bottle   Refills:  3       methylphenidate 20 MG tablet   Commonly known as:  RITALIN   Used for:  Narcolepsy and cataplexy   Started by:  Enid Mansfield PA-C        Dose:  20 mg   Start taking on:  6/5/2018   Take 1 tablet (20 mg) by mouth 2 times daily   Quantity:  60 tablet   Refills:  0         These medicines have changed or have updated prescriptions.        Dose/Directions    sertraline 100 MG tablet   Commonly known as:  ZOLOFT   This may have changed:  how much to take   Used for:  Major depressive disorder, recurrent episode, moderate (H), Generalized anxiety disorder   Changed by:  Enid Mansfield PA-C        Dose:  150 mg   Take 1.5 tablets (150 mg) by mouth daily   Quantity:  45 tablet   Refills:  1            Where to get your medicines      These [place] [facility] [place] - [address] [place] [address]   [address] [facility] [address]     Phone:  [phone]     flunisolide 25 MCG/ACT (0.025%) Soln spray    lisinopril 20 MG tablet    sertraline 100 MG tablet         Some of these will need a paper prescription and others can be bought over the counter.  Ask your nurse if you have questions.     Bring a paper prescription for each of these medications     methylphenidate 20 MG tablet                Primary Care Provider Office Phone # Fax #    Enid LINDSEY Mansfield PA-C [phone]       [address] [facility] [address]  [facility] [address]        Equal Access to Services     [facility]: Hadii adam ernst hadasho Soomaali, waaxda luqadaha, qaybta kaalmada adeegyada, noemi singh . So [facility] [phone].    ATENCIÓN: Si habla español, tiene a chaves disposición servicios gratuitos de asistencia lingüística. [facility] [phone].    We comply with applicable federal civil rights laws and [place] laws. We do not discriminate on the basis of race, color, national origin, age, disability, sex, sexual orientation, or gender identity.            Thank you!     Thank you for choosing [facility]  for your care. Our goal is always to provide you with excellent care. Hearing back from our patients is one way we can continue to improve our services. Please take a few minutes to complete the written survey that you may receive in the mail after your visit with us. Thank you!             Your Updated Medication List - Protect others around you: Learn how to safely use, store and throw away your medicines at www.disposemymeds.org.          This list is accurate as of 5/8/18  9:33 AM.  Always use your most recent med list.                   Brand Name Dispense Instructions for use Diagnosis    busPIRone 5 MG tablet    BUSPAR    90 tablet    Take 1 tablet (5 mg) by mouth 3 times daily as needed    Major depressive disorder, recurrent episode, moderate (H), Generalized anxiety  disorder       flunisolide 25 MCG/ACT (0.025%) Soln spray    NASALIDE    1 Bottle    Spray 2 sprays into both nostrils every 12 hours    Chronic seasonal allergic rhinitis due to pollen       lisinopril 20 MG tablet    PRINIVIL/ZESTRIL    90 tablet    Take 1 tablet (20 mg) by mouth daily    HTN, goal below 140/90       methylphenidate 20 MG tablet   Start taking on:  6/5/2018    RITALIN    60 tablet    Take 1 tablet (20 mg) by mouth 2 times daily    Narcolepsy and cataplexy       norgestimate-ethinyl estradiol 0.25-35 MG-MCG per tablet    ORTHO-CYCLEN, SPRINTEC    90 tablet    Take 1 tablet by mouth daily    Encounter for initial prescription of contraceptive pills       sertraline 100 MG tablet    ZOLOFT    45 tablet    Take 1.5 tablets (150 mg) by mouth daily    Major depressive disorder, recurrent episode, moderate (H), Generalized anxiety disorder

## 2018-05-08 NOTE — LETTER
[facility]    05/08/18    Patient: Tasha Short  YOB: 1980  Medical Record Number: [id number]                                                                  Controlled Substance Agreement  I understand that my care provider has prescribed controlled substances (narcotics, tranquilizers, and/or stimulants) to help manage my condition(s).  I am taking this medicine to help me function or work.  I know that this is strong medicine.  It could have serious side effects and even cause a dependency on the drug.  If I stop these medicines suddenly, I could have severe withdrawal symptoms.    The risks, benefits, and side effects of these medication(s) were explained to me.  I agree that:  1. I will take part in other treatments as advised by my provider.  This may be psychiatry or counseling, physical therapy, behavioral therapy, group treatment, or a referral to a pain clinic.  I will reduce or stop my medicine when my provider tells me to do so.   2. I will take my medicines as prescribed.  I will not change the dose or schedule unless my provider tells me to.  There will be no refills if I  run out early.   I may be contacted at any time without warning and asked to complete a drug test or pill count.   3. I will keep all my appointments at the clinic.  If I miss appointments or fail to follow instructions, my provider may stop my medicine.  4. I will not ask other providers to prescribe controlled substances. And I will not accept controlled substances from other people. If I need another prescribed controlled substance for a new reason, I will notify my provider within one business day.  5. If I enroll in the [place] Medical Marijuana program, I will tell my provider.  I will also sign an agreement to share my medical records with my provider.  6. I will use one pharmacy to fill all of my controlled substance prescriptions.  If my prescription is mailed to my pharmacy, it may  take 5 to 7 days for my medicine to be ready.  7. I understand that my provider, clinic care team, and pharmacy can track controlled substance prescriptions from other providers through a central database (prescription monitoring program).  8. I will bring in my list of medications (or my medicine bottles) each time I come to the clinic.  REV-  04/2016                                                                                                                                            Page 1 of 2      [facility]    05/08/18    Patient: Tasha Short  YOB: 1980  Medical Record Number: [id number]    9. Refills of controlled substances will be made only during office hours.  It is up to me to make sure that I do not run out of my medicines on weekends or holidays.    10. I am responsible for my prescriptions.  If the medicine is lost or stolen, it will not be replaced.   I also agree not to share these medicines with anyone.  11. I agree to not use ANY illegal or recreational drugs.  This includes marijuana, cocaine, bath salts or other drugs.  I agree not to use alcohol unless my provider says I may.  I agree to give urine samples whenever asked.  If I fail to give a urine sample, the provider may stop my medicine.     12. I will tell my nurse or provider right away if I become pregnant or have a new medical problem treated outside of [facility].  13. I understand that this medicine can affect my thinking and judgment.  It may be unsafe for me to drive, use machinery and do dangerous tasks.  I will not do any of these things until I know how the medicine affects me.  If my dose changes, I will wait to see how it affects me.  I will contact my provider if I have concerns about medicine side effects.  I understand that if I do not follow any of the conditions above, my prescriptions or treatment may be stopped.    I agree that my provider, clinic care team, and pharmacy may  work with any city, state or federal law enforcement agency that investigates the misuse, sale, or other diversion of my controlled medicine. I will allow my provider to discuss my care with or share a copy of this agreement with any other treating provider, pharmacy or emergency room where I receive care.  I agree to give up (waive) any right of privacy or confidentiality with respect to these authorizations.   I have read this agreement and have asked questions about anything I did not understand.   ___________________________________    ___________________________  Patient Signature                                                           Date and Time  ___________________________________     ____________________________  Enid Mansfield PA-C    REV-  04/2016                                                                                                                                                                 Page 2 of 2

## 2018-05-09 ASSESSMENT — PATIENT HEALTH QUESTIONNAIRE - PHQ9: SUM OF ALL RESPONSES TO PHQ QUESTIONS 1-9: 9

## 2018-05-09 ASSESSMENT — ANXIETY QUESTIONNAIRES: GAD7 TOTAL SCORE: 8

## 2018-05-11 LAB — PAIN DRUG SCR UR W RPTD MEDS: NORMAL

## 2018-05-11 NOTE — PROGRESS NOTES
Josefina Cordova    Your urine results were normal.     The results are attached for your review.       Donnie Mansfield PA-C

## 2018-05-14 ENCOUNTER — TELEPHONE (OUTPATIENT)
Dept: FAMILY MEDICINE | Facility: OTHER | Age: 38
End: 2018-05-14

## 2018-05-14 NOTE — TELEPHONE ENCOUNTER
Reason for Call:  Other call back    Detailed comments: Emily is calling from [employer] to make sure the later patient gave them is actually correct. Patient brought in a med list and a hand written letter for a medication that she is to be taking but is prohibited at their location. methylphenidate (RITALIN) 20 MG tablet    Unsure if we can speak with [employer] or not    Phone Number Patient can be reached at: Other phone number: [phone]    Best Time: 8- 4:30    Can we leave a detailed message on this number? Not Applicable    Call taken on 5/14/2018 at 8:11 AM by Rupal Carias

## 2018-05-14 NOTE — TELEPHONE ENCOUNTER
Patient is on this medication. But I don't see any [facility] for a  or program. They would need to fax me a consent or we can call and ask patient if its ok and have her come in to sign consent.     Donnie Mansfield PA-C  [facility]

## 2018-05-14 NOTE — TELEPHONE ENCOUNTER
Message left for Emily to fax over a consent so we can speak with her directly.    Rupal Carias  Reception/ Scheduling

## 2018-05-16 NOTE — TELEPHONE ENCOUNTER
Spoke to Emily and she is no longer needing to discuss with patient's PCP she was able to clear up with the patient.     Luma Palmer MA

## 2018-07-28 ENCOUNTER — HEALTH MAINTENANCE LETTER (OUTPATIENT)
Age: 38
End: 2018-07-28

## 2018-08-13 NOTE — PROGRESS NOTES
SUBJECTIVE:   Tasha Short is a 37 year old female who presents to clinic today for the following health issues:    History of Present Illness     Depression & Anxiety Follow-up:     Depression/Anxiety:  Depression & Anxiety    Status since last visit::  Worsened    Other associated symptoms of depression and anxiety::  None    Significant life event::  No    Current substance use::  None       Today's PHQ-9         PHQ-9 Total Score:     (P) 11   PHQ-9 Q9 Suicidal ideation:   (P) Not at all   Thoughts of suicide or self harm:      Self-harm Plan:        Self-harm Action:          Safety concerns for self or others:       ROMI-7 Total Score: (P) 9    - [place] [facility] for 2 months      Really helpful      Doing Buspar and Zoloft, wasn't changed      Doesn't feel Zoloft is helping      No motivation, sad      Been on a lot of things - Wellbutrin (seizures), Prozac, Celexa, Effexor, Cymbalta (worked for awhile), Paxil (didn't work)      Buspar PRN     - Lisinopril      Woke up one morning with swollen lips, told due to lisinopril      Started on Norvasc 10 mg      140's/90's to 120's/80's       PHQ-9 2/1/2018 2/22/2018 5/8/2018   Total Score 9 7 9   Q9: Suicide Ideation Not at all Not at all Not at all     ROMI-7 SCORE 2/1/2018 2/22/2018 5/8/2018   Total Score - - -   Total Score 7 (mild anxiety) - 8 (mild anxiety)   Total Score 7 7 8     In the past two weeks have you had thoughts of suicide or self-harm?  No.    Do you have concerns about your personal safety or the safety of others?   No      Problem list and histories reviewed & adjusted, as indicated.  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    OBJECTIVE:   /80  Pulse 94  Temp 99.1  F (37.3  C) (Temporal)  Resp 18  Wt 127 lb 9.6 oz (57.9 kg)  LMP 08/06/2018 (Exact Date)  SpO2 100%  BMI (P) 22.05 kg/m2  Body mass index is 22.05 kg/(m^2) (pended).  GENERAL APPEARANCE: healthy, alert and no distress  EYES: Eyes grossly normal to inspection, PERRLA, conjunctivae and sclerae without injection or discharge, EOM intact   MS: No musculoskeletal defects are noted and gait is age appropriate without ataxia   SKIN: No suspicious lesions or rashes, hydration status appears adeuqate with normal skin turgor   PSYCH: Alert and oriented x3; speech- coherent , normal rate and volume; able to articulate logical thoughts, able to abstract reason, no tangential thoughts, no hallucinations or delusions, mentation appears normal, Mood is euthymic. Affect is appropriate for this mood state and bright. Thought content is free of suicidal ideation, hallucinations, and delusions. Dress is adequate and upkept. Eye contact is good during conversation.       Diagnostic Test Results:  none     ASSESSMENT/PLAN:       ICD-10-CM    1. Major depressive disorder, recurrent episode, moderate (H) F33.1 DULoxetine (CYMBALTA) 20 MG EC capsule   2. Generalized anxiety disorder F41.1 DULoxetine (CYMBALTA) 20 MG EC capsule   3. H/O ETOH abuse Z87.898    4. HTN, goal below 140/90 I10 amLODIPine (NORVASC)  10 MG tablet     metoprolol succinate (TOPROL-XL) 25 MG 24 hr tablet   5. Narcolepsy and cataplexy G47.411 methylphenidate (RITALIN) 20 MG tablet   6. Hypersomnia G47.10      1-3. Mood   - Patient struggling but still sober, recently did 2 months at [place] [employer] [facility]   - Failed on many medications in the past, currently feeling like Zoloft not effective      Wellbutrin (seizures), Prozac, Celexa, Effexor, Cymbalta (worked for awhile), Paxil (didn't work)   - Discussed [facility] and patient consented   - For now while we await results   - Taper off Zoloft - decrease to 1 tablet (100 mg) for 1 week             Then decrease to 1/2 tablet (50 mg) for 1 week              Then stop   - Tonight can start Cymbalta 20 mg - 1 tablet twice a day      Reviewed use and side effects   - Continue Buspar as needed   - Recheck 2 weeks     4. HTN   - Had angioedema to Lisinopril   - Stopped by hospital and started on Amlodipine and titrated up to 10 mg   - BP still above goal here and at home   - Recommend add in Metoprolol 12.5 mg, reviewed use and side effects  - Continue Amlodipine     Start Metoprolol      Send [employer] message with 1 week of BP and pulses in 1 weeks   - Recheck 2 weeks     5 & 6.   - Diagnosed 5/14/2010 by sleep specialist   -  reviewed, as expected, no concerns, last fill consistent with chart last fill of 6/5/18   - CSA 5/8/18      Ritalin 20 mg BID   - Reviewed use and side effects, including addiction since patient is prone to ETOH addiction   - Utox 5/8/18  - Given 1 month today, recheck next appointment     The patient indicates understanding of these issues and agrees with the plan.    Follow up: 2 weeks       Enid Whiting-MARTHA Block  [facility]

## 2018-08-17 ENCOUNTER — OFFICE VISIT (OUTPATIENT)
Dept: FAMILY MEDICINE | Facility: OTHER | Age: 38
End: 2018-08-17
Payer: COMMERCIAL

## 2018-08-17 VITALS
HEART RATE: 94 BPM | TEMPERATURE: 99.1 F | BODY MASS INDEX: 21.9 KG/M2 | SYSTOLIC BLOOD PRESSURE: 130 MMHG | RESPIRATION RATE: 18 BRPM | DIASTOLIC BLOOD PRESSURE: 80 MMHG | OXYGEN SATURATION: 100 % | WEIGHT: 127.6 LBS

## 2018-08-17 DIAGNOSIS — G47.10 HYPERSOMNIA: ICD-10-CM

## 2018-08-17 DIAGNOSIS — F10.11 H/O ETOH ABUSE: ICD-10-CM

## 2018-08-17 DIAGNOSIS — F33.1 MAJOR DEPRESSIVE DISORDER, RECURRENT EPISODE, MODERATE (H): Primary | ICD-10-CM

## 2018-08-17 DIAGNOSIS — G47.411 NARCOLEPSY AND CATAPLEXY: ICD-10-CM

## 2018-08-17 DIAGNOSIS — F41.1 GENERALIZED ANXIETY DISORDER: ICD-10-CM

## 2018-08-17 DIAGNOSIS — I10 HTN, GOAL BELOW 140/90: ICD-10-CM

## 2018-08-17 PROCEDURE — 99214 OFFICE O/P EST MOD 30 MIN: CPT | Performed by: PHYSICIAN ASSISTANT

## 2018-08-17 RX ORDER — METOPROLOL SUCCINATE 25 MG/1
12.5 TABLET, EXTENDED RELEASE ORAL DAILY
Qty: 15 TABLET | Refills: 1 | Status: SHIPPED | OUTPATIENT
Start: 2018-08-17 | End: 2018-08-31

## 2018-08-17 RX ORDER — DULOXETIN HYDROCHLORIDE 20 MG/1
20 CAPSULE, DELAYED RELEASE ORAL 2 TIMES DAILY
Qty: 60 CAPSULE | Refills: 1 | Status: SHIPPED | OUTPATIENT
Start: 2018-08-17 | End: 2018-10-22

## 2018-08-17 RX ORDER — AMLODIPINE BESYLATE 10 MG/1
10 TABLET ORAL DAILY
Qty: 90 TABLET | Refills: 3 | Status: SHIPPED | OUTPATIENT
Start: 2018-08-17 | End: 2019-08-08

## 2018-08-17 RX ORDER — METHYLPHENIDATE HYDROCHLORIDE 20 MG/1
20 TABLET ORAL 2 TIMES DAILY
Qty: 60 TABLET | Refills: 0 | Status: SHIPPED | OUTPATIENT
Start: 2018-08-17 | End: 2018-08-31

## 2018-08-17 RX ORDER — AMLODIPINE BESYLATE 10 MG/1
TABLET ORAL
Refills: 0 | COMMUNITY
Start: 2018-06-21 | End: 2018-08-17

## 2018-08-17 ASSESSMENT — ANXIETY QUESTIONNAIRES
7. FEELING AFRAID AS IF SOMETHING AWFUL MIGHT HAPPEN: SEVERAL DAYS
GAD7 TOTAL SCORE: 9
6. BECOMING EASILY ANNOYED OR IRRITABLE: MORE THAN HALF THE DAYS
GAD7 TOTAL SCORE: 9
2. NOT BEING ABLE TO STOP OR CONTROL WORRYING: SEVERAL DAYS
GAD7 TOTAL SCORE: 9
4. TROUBLE RELAXING: SEVERAL DAYS
7. FEELING AFRAID AS IF SOMETHING AWFUL MIGHT HAPPEN: SEVERAL DAYS
5. BEING SO RESTLESS THAT IT IS HARD TO SIT STILL: SEVERAL DAYS
3. WORRYING TOO MUCH ABOUT DIFFERENT THINGS: MORE THAN HALF THE DAYS
1. FEELING NERVOUS, ANXIOUS, OR ON EDGE: SEVERAL DAYS

## 2018-08-17 ASSESSMENT — PATIENT HEALTH QUESTIONNAIRE - PHQ9
10. IF YOU CHECKED OFF ANY PROBLEMS, HOW DIFFICULT HAVE THESE PROBLEMS MADE IT FOR YOU TO DO YOUR WORK, TAKE CARE OF THINGS AT HOME, OR GET ALONG WITH OTHER PEOPLE: SOMEWHAT DIFFICULT
SUM OF ALL RESPONSES TO PHQ QUESTIONS 1-9: 11
SUM OF ALL RESPONSES TO PHQ QUESTIONS 1-9: 11

## 2018-08-17 ASSESSMENT — PAIN SCALES - GENERAL: PAINLEVEL: NO PAIN (0)

## 2018-08-17 NOTE — PATIENT INSTRUCTIONS
- Continue Amlodipine     Start Metoprolol      Send [employer] message with 1 week of BP and pulses in 1-2 weeks     - Zoloft - decrease to 1 tablet (100 mg) for 1 week             Then decrease to 1/2 tablet (50 mg) for 1 week              Then stop     - Tonight can start Cymbalta - 1 tablet twice a day     - Recheck 2 weeks

## 2018-08-17 NOTE — MR AVS SNAPSHOT
After Visit Summary   8/17/2018    Tasha Short    MRN: [id number]           Patient Information     Date Of Birth          1980        Visit Information        Provider Department      8/17/2018 8:45 AM Enid Mansfield PA-C [facility]        Today's Diagnoses     Major depressive disorder, recurrent episode, moderate (H)    -  1    Generalized anxiety disorder        HTN, goal below 140/90          Care Instructions      - Continue Amlodipine     Start Metoprolol      Send [employer] message with 1 week of BP and pulses in 1-2 weeks     - Zoloft - decrease to 1 tablet (100 mg) for 1 week             Then decrease to 1/2 tablet (50 mg) for 1 week              Then stop     - Tonight can start Cymbalta - 1 tablet twice a day     - Recheck 2 weeks           Follow-ups after your visit        Follow-up notes from your care team     Return in about 2 weeks (around 8/31/2018).      Who to contact     If you have questions or need follow up information about today's clinic visit or your schedule please contact [facility] directly at [phone].  Normal or non-critical lab and imaging results will be communicated to you by [employer], letter or phone within 4 business days after the clinic has received the results. If you do not hear from us within 7 days, please contact the clinic through [employer] or phone. If you have a critical or abnormal lab result, we will notify you by phone as soon as possible.  Submit refill requests through [employer] or call your pharmacy and they will forward the refill request to us. Please allow 3 business days for your refill to be completed.          Additional Information About Your Visit        [employer] Information     [employer] gives you secure access to your electronic health record. If you see a primary care provider, you can also send messages to your care team and make appointments. If you have questions, please call your  primary care clinic.  If you do not have a primary care provider, please call [phone] and they will assist you.        Care EveryWhere ID     This is your Care EveryWhere ID. This could be used by other organizations to access your [facility] medical records  [id number]        Your Vitals Were     Pulse Temperature Respirations Last Period Pulse Oximetry BMI (Body Mass Index)    94 99.1  F (37.3  C) (Temporal) 18 08/06/2018 (Exact Date) 100% 21.9 kg/m2       Blood Pressure from Last 3 Encounters:   08/17/18 (!) 140/92   05/08/18 126/82   02/01/18 130/88    Weight from Last 3 Encounters:   08/17/18 127 lb 9.6 oz (57.9 kg)   05/08/18 (P) 121 lb (54.9 kg)   02/01/18 123 lb 8 oz (56 kg)              Today, you had the following     No orders found for display         Today's Medication Changes          These changes are accurate as of 8/17/18  9:17 AM.  If you have any questions, ask your nurse or doctor.               Start taking these medicines.        Dose/Directions    DULoxetine 20 MG EC capsule   Commonly known as:  CYMBALTA   Used for:  Major depressive disorder, recurrent episode, moderate (H), Generalized anxiety disorder   Started by:  Enid Mansfield PA-C        Dose:  20 mg   Take 1 capsule (20 mg) by mouth 2 times daily   Quantity:  60 capsule   Refills:  1       metoprolol succinate 25 MG 24 hr tablet   Commonly known as:  TOPROL-XL   Used for:  HTN, goal below 140/90   Started by:  Enid Mansfield PA-C        Dose:  12.5 mg   Take 0.5 tablets (12.5 mg) by mouth daily   Quantity:  15 tablet   Refills:  1         These medicines have changed or have updated prescriptions.        Dose/Directions    amLODIPine 10 MG tablet   Commonly known as:  NORVASC   This may have changed:    - how much to take  - how to take this  - when to take this   Used for:  HTN, goal below 140/90   Changed by:  Enid Mansfield PA-C        Dose:  10 mg   Take 1 tablet (10 mg) by  mouth daily   Quantity:  90 tablet   Refills:  3         Stop taking these medicines if you haven't already. Please contact your care team if you have questions.     lisinopril 20 MG tablet   Commonly known as:  PRINIVIL/ZESTRIL   Stopped by:  Enid Mansfield PA-C                Where to get your medicines      These medications were sent to [facility] - [place], [place] - [address]  [address], [facility] [address]     Phone:  [phone]     amLODIPine 10 MG tablet    DULoxetine 20 MG EC capsule    metoprolol succinate 25 MG 24 hr tablet                Primary Care Provider Office Phone # Fax #    Enid Mansfield PA-C [phone]       [address]  [facility] [place] [address]        Equal Access to Services     [facility] Joseph[facility] : Hadii aad ku hadasho Soomaali, waaxda luqadaha, qaybta kaalmada adeegyada, noemi singh . So [facility] [phone].    ATENCIÓN: Si habla español, tiene a chaves disposición servicios gratuitos de asistencia lingüística. [facility] [phone].    We comply with applicable federal civil rights laws and [place] laws. We do not discriminate on the basis of race, color, national origin, age, disability, sex, sexual orientation, or gender identity.            Thank you!     Thank you for choosing [facility]  for your care. Our goal is always to provide you with excellent care. Hearing back from our patients is one way we can continue to improve our services. Please take a few minutes to complete the written survey that you may receive in the mail after your visit with us. Thank you!             Your Updated Medication List - Protect others around you: Learn how to safely use, store and throw away your medicines at www.disposemymeds.org.          This list is accurate as of 8/17/18  9:17 AM.  Always use your most recent med list.                   Brand Name Dispense Instructions for use  Diagnosis    amLODIPine 10 MG tablet    NORVASC    90 tablet    Take 1 tablet (10 mg) by mouth daily    HTN, goal below 140/90       busPIRone 5 MG tablet    BUSPAR    90 tablet    Take 1 tablet (5 mg) by mouth 3 times daily as needed    Major depressive disorder, recurrent episode, moderate (H), Generalized anxiety disorder       DULoxetine 20 MG EC capsule    CYMBALTA    60 capsule    Take 1 capsule (20 mg) by mouth 2 times daily    Major depressive disorder, recurrent episode, moderate (H), Generalized anxiety disorder       flunisolide 25 MCG/ACT (0.025%) Soln spray    NASALIDE    1 Bottle    Spray 2 sprays into both nostrils every 12 hours    Chronic seasonal allergic rhinitis due to pollen       methylphenidate 20 MG tablet    RITALIN    60 tablet    Take 1 tablet (20 mg) by mouth 2 times daily    Narcolepsy and cataplexy       metoprolol succinate 25 MG 24 hr tablet    TOPROL-XL    15 tablet    Take 0.5 tablets (12.5 mg) by mouth daily    HTN, goal below 140/90       norgestimate-ethinyl estradiol 0.25-35 MG-MCG per tablet    ORTHO-CYCLEN, SPRINTEC    90 tablet    Take 1 tablet by mouth daily    Encounter for initial prescription of contraceptive pills       sertraline 100 MG tablet    ZOLOFT    45 tablet    Take 1.5 tablets (150 mg) by mouth daily    Major depressive disorder, recurrent episode, moderate (H), Generalized anxiety disorder

## 2018-08-18 ASSESSMENT — ANXIETY QUESTIONNAIRES: GAD7 TOTAL SCORE: 9

## 2018-08-18 ASSESSMENT — PATIENT HEALTH QUESTIONNAIRE - PHQ9: SUM OF ALL RESPONSES TO PHQ QUESTIONS 1-9: 11

## 2018-08-21 ENCOUNTER — TRANSFERRED RECORDS (OUTPATIENT)
Dept: HEALTH INFORMATION MANAGEMENT | Facility: CLINIC | Age: 38
End: 2018-08-21

## 2018-08-24 NOTE — PROGRESS NOTES
SUBJECTIVE:   Tasha Short is a 37 year old female who presents to clinic today for the following health issues:    History of Present Illness     Depression & Anxiety Follow-up:     Depression/Anxiety:  Depression only    Status since last visit::  Stable    Other associated symptoms of depression::  None    Significant life event::  No    Current substance use::  None    Anxiety/Panic symptoms::  No       Today's PHQ-9         PHQ-9 Total Score:     (P) 12   PHQ-9 Q9 Suicidal ideation:   (P) Not at all   Thoughts of suicide or self harm:      Self-harm Plan:        Self-harm Action:          Safety concerns for self or others:       ROMI-7 Total Score: (P) 7    Hypertension:     Outpatient blood pressures:  Are being checked    Blood pressures checked at:  Home    Dietary sodium intake::  No added salt diet    8/26/18 - BP better. Cymbalta making tired. OK to taper off     - Went down to 1 Cymbalta at night, fatigued improved         PHQ-9 SCORE 5/8/2018 8/17/2018 8/31/2018   Total Score - - -   Total Score MyChart 9 (Mild depression) 11 (Moderate depression) 12 (Moderate depression)   Total Score 9 11 12     ROMI-7 SCORE 5/8/2018 8/17/2018 8/31/2018   Total Score - - -   Total Score 8 (mild anxiety) 9 (mild anxiety) 7 (mild anxiety)   Total Score 8 9 7             Plan from last visit 8/17/18  1-3. Mood   - Patient struggling but still sober, recently did 2 months at [place] [employer] [facility]   - Failed on many medications in the past, currently feeling like Zoloft not effective      Wellbutrin (seizures), Prozac, Celexa, Effexor, Cymbalta (worked for awhile), Paxil (didn't work)   - Discussed [facility] and patient consented   - For now while we await results   - Taper off Zoloft - decrease to 1 tablet (100 mg) for 1 week             Then decrease to 1/2 tablet (50 mg) for 1 week              Then stop   - Tonight can start Cymbalta 20 mg - 1 tablet twice a day      Reviewed use and side effects   - Continue Buspar  as needed   - Recheck 2 weeks      4. HTN   - Had angioedema to Lisinopril   - Stopped by hospital and started on Amlodipine and titrated up to 10 mg   - BP still above goal here and at home   - Recommend add in Metoprolol 12.5 mg, reviewed use and side effects  - Continue Amlodipine     Start Metoprolol      Send [employer] message with 1 week of BP and pulses in 1 weeks   - Recheck 2 weeks     Problem list and histories reviewed & adjusted, as indicated.  Additional history: as documented    BP Readings from Last 3 Encounters:   08/31/18 104/72   08/17/18 130/80   05/08/18 126/82    Wt Readings from Last 3 Encounters:   08/31/18 133 lb 6.4 oz (60.5 kg)   08/17/18 127 lb 9.6 oz (57.9 kg)   05/08/18 (P) 121 lb (54.9 kg)                  Labs reviewed in EPIC    ROS:  Constitutional, HEENT, cardiovascular, pulmonary, gi and gu systems are negative, except as otherwise noted.    OBJECTIVE:   /72  Pulse 76  Temp 98.8  F (37.1  C) (Temporal)  Resp 18  Wt 133 lb 6.4 oz (60.5 kg)  LMP 08/06/2018 (Exact Date)  SpO2 98%  BMI (P) 23.06 kg/m2  Body mass index is 23.06 kg/(m^2) (pended).  GENERAL APPEARANCE: healthy, alert and no distress  EYES: Eyes grossly normal to inspection, PERRLA, conjunctivae and sclerae without injection or discharge, EOM intact   RESP: Lungs clear to auscultation - no rales, rhonchi or wheezes    CV: Regular rates and rhythm, normal S1 S2, no S3 or S4, no murmur, click or rub, no peripheral edema and peripheral pulses strong and symmetric bilaterally   MS: No musculoskeletal defects are noted and gait is age appropriate without ataxia   SKIN: No suspicious lesions or rashes, hydration status appears adeuqate with normal skin turgor   PSYCH: Alert and oriented x3; speech- coherent , normal rate and volume; able to articulate logical thoughts, able to abstract reason, no tangential thoughts, no hallucinations or delusions, mentation appears normal, Mood is euthymic. Affect is appropriate for  this mood state and bright. Thought content is free of suicidal ideation, hallucinations, and delusions. Dress is adequate and upkept. Eye contact is good during conversation.       Diagnostic Test Results:  none     ASSESSMENT/PLAN:       ICD-10-CM    1. Major depressive disorder, recurrent episode, moderate (H) F33.1    2. Generalized anxiety disorder F41.1    3. Hypersomnia G47.10 amphetamine-dextroamphetamine (ADDERALL XR) 20 MG per 24 hr capsule   4. HTN, goal below 140/90 I10 metoprolol succinate (TOPROL-XL) 25 MG 24 hr tablet     1 & 2. Mood   - Some significant fatigue with BID dosing of Cymbalta   - Improving since on Cymbalta 20 mg just at night   - Will increase to 40 mg at night   - Reviewed [facility] testing results   - Cymbalta on no interaction list, but if continues to have fatigue will consider Pristiq or Effexor   - Continues to use Buspar PRN for anxiety   - Recheck 1 month     3. Hypersomnia   - Diagnosed 5/14/2010 by sleep specialist   - Patient doing ok on Ritalin but doesn't like BID dosing   - Reviewed [facility]   - Ritalin on significant interaction, Adderall on moderate list   - Will switch  - Stop the Ritalin      Start Adderall XR 20 mg once a day      Reviewed use and side effects   - Recheck 1 month   -  reviewed, as expected, no concerns  - CSA 5/8/18  - Utox 5/8/18, no concern     4. HTN   - Marked improvement with continuing Amlodipine and addition of Metoprolol   - Continue without change   - Continue to monitor     The patient indicates understanding of these issues and agrees with the plan.    Follow up: 1 month       Enid Mansfield PA-C  [facility]

## 2018-08-26 ENCOUNTER — MYC MEDICAL ADVICE (OUTPATIENT)
Dept: FAMILY MEDICINE | Facility: OTHER | Age: 38
End: 2018-08-26

## 2018-08-27 NOTE — TELEPHONE ENCOUNTER
Replied via [employer]    Donnie Mansfield PA-C  [facility]

## 2018-08-31 ENCOUNTER — OFFICE VISIT (OUTPATIENT)
Dept: FAMILY MEDICINE | Facility: OTHER | Age: 38
End: 2018-08-31
Payer: COMMERCIAL

## 2018-08-31 VITALS
DIASTOLIC BLOOD PRESSURE: 72 MMHG | WEIGHT: 133.4 LBS | SYSTOLIC BLOOD PRESSURE: 104 MMHG | TEMPERATURE: 98.8 F | BODY MASS INDEX: 22.9 KG/M2 | HEART RATE: 76 BPM | RESPIRATION RATE: 18 BRPM | OXYGEN SATURATION: 98 %

## 2018-08-31 DIAGNOSIS — F41.1 GENERALIZED ANXIETY DISORDER: ICD-10-CM

## 2018-08-31 DIAGNOSIS — I10 HTN, GOAL BELOW 140/90: ICD-10-CM

## 2018-08-31 DIAGNOSIS — F33.1 MAJOR DEPRESSIVE DISORDER, RECURRENT EPISODE, MODERATE (H): Primary | ICD-10-CM

## 2018-08-31 DIAGNOSIS — G47.10 HYPERSOMNIA: ICD-10-CM

## 2018-08-31 PROCEDURE — 99214 OFFICE O/P EST MOD 30 MIN: CPT | Performed by: PHYSICIAN ASSISTANT

## 2018-08-31 RX ORDER — DEXTROAMPHETAMINE SACCHARATE, AMPHETAMINE ASPARTATE MONOHYDRATE, DEXTROAMPHETAMINE SULFATE AND AMPHETAMINE SULFATE 5; 5; 5; 5 MG/1; MG/1; MG/1; MG/1
20 CAPSULE, EXTENDED RELEASE ORAL DAILY
Qty: 30 CAPSULE | Refills: 0 | Status: SHIPPED | OUTPATIENT
Start: 2018-08-31 | End: 2018-09-25

## 2018-08-31 RX ORDER — METOPROLOL SUCCINATE 25 MG/1
12.5 TABLET, EXTENDED RELEASE ORAL DAILY
Qty: 45 TABLET | Refills: 3 | Status: SHIPPED | OUTPATIENT
Start: 2018-08-31 | End: 2018-10-22

## 2018-08-31 ASSESSMENT — ANXIETY QUESTIONNAIRES
2. NOT BEING ABLE TO STOP OR CONTROL WORRYING: SEVERAL DAYS
GAD7 TOTAL SCORE: 7
7. FEELING AFRAID AS IF SOMETHING AWFUL MIGHT HAPPEN: NOT AT ALL
1. FEELING NERVOUS, ANXIOUS, OR ON EDGE: SEVERAL DAYS
6. BECOMING EASILY ANNOYED OR IRRITABLE: MORE THAN HALF THE DAYS
4. TROUBLE RELAXING: SEVERAL DAYS
3. WORRYING TOO MUCH ABOUT DIFFERENT THINGS: SEVERAL DAYS
7. FEELING AFRAID AS IF SOMETHING AWFUL MIGHT HAPPEN: NOT AT ALL
GAD7 TOTAL SCORE: 7
5. BEING SO RESTLESS THAT IT IS HARD TO SIT STILL: SEVERAL DAYS
GAD7 TOTAL SCORE: 7

## 2018-08-31 ASSESSMENT — PATIENT HEALTH QUESTIONNAIRE - PHQ9
SUM OF ALL RESPONSES TO PHQ QUESTIONS 1-9: 12
10. IF YOU CHECKED OFF ANY PROBLEMS, HOW DIFFICULT HAVE THESE PROBLEMS MADE IT FOR YOU TO DO YOUR WORK, TAKE CARE OF THINGS AT HOME, OR GET ALONG WITH OTHER PEOPLE: SOMEWHAT DIFFICULT
SUM OF ALL RESPONSES TO PHQ QUESTIONS 1-9: 12

## 2018-08-31 ASSESSMENT — PAIN SCALES - GENERAL: PAINLEVEL: NO PAIN (0)

## 2018-08-31 NOTE — PATIENT INSTRUCTIONS
1. Increase Cymbalta to 2 tablet (40 mg) at night     2. Stop the Ritalin      Start Adderall XR 20 mg once a day        3. 1 month

## 2018-08-31 NOTE — MR AVS SNAPSHOT
After Visit Summary   8/31/2018    Tasha Short    MRN: [id number]           Patient Information     Date Of Birth          1980        Visit Information        Provider Department      8/31/2018 8:00 AM Enid Mansfield PA-C [facility]        Today's Diagnoses     Major depressive disorder, recurrent episode, moderate (H)    -  1    Generalized anxiety disorder        Hypersomnia        HTN, goal below 140/90          Care Instructions    1. Increase Cymbalta to 2 tablet (40 mg) at night     2. Stop the Ritalin      Start Adderall XR 20 mg once a day        3. 1 month                 Follow-ups after your visit        Follow-up notes from your care team     Return in about 1 month (around 9/30/2018).      Who to contact     If you have questions or need follow up information about today's clinic visit or your schedule please contact [facility] directly at [phone].  Normal or non-critical lab and imaging results will be communicated to you by Yaphart, letter or phone within 4 business days after the clinic has received the results. If you do not hear from us within 7 days, please contact the clinic through Yaphart or phone. If you have a critical or abnormal lab result, we will notify you by phone as soon as possible.  Submit refill requests through Sobresalen or call your pharmacy and they will forward the refill request to us. Please allow 3 business days for your refill to be completed.          Additional Information About Your Visit        Yaphart Information     Sobresalen gives you secure access to your electronic health record. If you see a primary care provider, you can also send messages to your care team and make appointments. If you have questions, please call your primary care clinic.  If you do not have a primary care provider, please call [phone] and they will assist you.        Care EveryWhere ID     This is your Care  EveryWhere ID. This could be used by other organizations to access your [facility] medical records  [id number]        Your Vitals Were     Pulse Temperature Respirations Last Period Pulse Oximetry BMI (Body Mass Index)    76 98.8  F (37.1  C) (Temporal) 18 08/06/2018 (Exact Date) 98% 22.9 kg/m2       Blood Pressure from Last 3 Encounters:   08/31/18 104/72   08/17/18 130/80   05/08/18 126/82    Weight from Last 3 Encounters:   08/31/18 133 lb 6.4 oz (60.5 kg)   08/17/18 127 lb 9.6 oz (57.9 kg)   05/08/18 (P) 121 lb (54.9 kg)              Today, you had the following     No orders found for display         Today's Medication Changes          These changes are accurate as of 8/31/18  8:31 AM.  If you have any questions, ask your nurse or doctor.               Start taking these medicines.        Dose/Directions    amphetamine-dextroamphetamine 20 MG per 24 hr capsule   Commonly known as:  ADDERALL XR   Used for:  Hypersomnia   Started by:  Enid Mansfield PA-C        Dose:  20 mg   Take 1 capsule (20 mg) by mouth daily   Quantity:  30 capsule   Refills:  0         Stop taking these medicines if you haven't already. Please contact your care team if you have questions.     sertraline 100 MG tablet   Commonly known as:  ZOLOFT   Stopped by:  Enid Mansfield PA-C                Where to get your medicines      These medications were sent to [facility] [place] - [address] [place] [address]  [address] [place] [address], [place] [address]     Phone:  [phone]     metoprolol succinate 25 MG 24 hr tablet         Some of these will need a paper prescription and others can be bought over the counter.  Ask your nurse if you have questions.     Bring a paper prescription for each of these medications     amphetamine-dextroamphetamine 20 MG per 24 hr capsule                Primary Care Provider Office Phone # Fax #    Enid Mansfield PA-C  [phone]       [address] [facility] [address]  [facility] [address]        Equal Access to Services     LINDY CAMERON : Hadii aad ku hadcristianejesus Janeyroselia, waniteshda angellauraha, kasandrawendy kaaguilarda espinoza, noemi mekhiin hayaaparker perkinsdeandra joseph laFloresdom araiza. So [facility] [phone].    ATENCIÓN: Si habla español, tiene a chaves disposición servicios gratuitos de asistencia lingüística. Llame al [phone].    We comply with applicable federal civil rights laws and [place] laws. We do not discriminate on the basis of race, color, national origin, age, disability, sex, sexual orientation, or gender identity.            Thank you!     Thank you for choosing [facility]  for your care. Our goal is always to provide you with excellent care. Hearing back from our patients is one way we can continue to improve our services. Please take a few minutes to complete the written survey that you may receive in the mail after your visit with us. Thank you!             Your Updated Medication List - Protect others around you: Learn how to safely use, store and throw away your medicines at www.disposemymeds.org.          This list is accurate as of 8/31/18  8:31 AM.  Always use your most recent med list.                   Brand Name Dispense Instructions for use Diagnosis    amLODIPine 10 MG tablet    NORVASC    90 tablet    Take 1 tablet (10 mg) by mouth daily    HTN, goal below 140/90       amphetamine-dextroamphetamine 20 MG per 24 hr capsule    ADDERALL XR    30 capsule    Take 1 capsule (20 mg) by mouth daily    Hypersomnia       busPIRone 5 MG tablet    BUSPAR    90 tablet    Take 1 tablet (5 mg) by mouth 3 times daily as needed    Major depressive disorder, recurrent episode, moderate (H), Generalized anxiety disorder       DULoxetine 20 MG EC capsule    CYMBALTA    60 capsule    Take 1 capsule (20 mg) by mouth 2 times daily    Major depressive disorder, recurrent episode, moderate (H), Generalized anxiety disorder        flunisolide 25 MCG/ACT (0.025%) Soln spray    NASALIDE    1 Bottle    Spray 2 sprays into both nostrils every 12 hours    Chronic seasonal allergic rhinitis due to pollen       metoprolol succinate 25 MG 24 hr tablet    TOPROL-XL    45 tablet    Take 0.5 tablets (12.5 mg) by mouth daily    HTN, goal below 140/90       norgestimate-ethinyl estradiol 0.25-35 MG-MCG per tablet    ORTHO-CYCLEN, SPRINTEC    90 tablet    Take 1 tablet by mouth daily    Encounter for initial prescription of contraceptive pills

## 2018-09-01 ASSESSMENT — ANXIETY QUESTIONNAIRES: GAD7 TOTAL SCORE: 7

## 2018-09-01 ASSESSMENT — PATIENT HEALTH QUESTIONNAIRE - PHQ9: SUM OF ALL RESPONSES TO PHQ QUESTIONS 1-9: 12

## 2018-09-17 ENCOUNTER — MYC MEDICAL ADVICE (OUTPATIENT)
Dept: FAMILY MEDICINE | Facility: OTHER | Age: 38
End: 2018-09-17

## 2018-09-17 NOTE — TELEPHONE ENCOUNTER
Unfortunately, since a controlled substance, can't change or add medication without an appointment.     Donnie Mansfield PA-C  [facility]

## 2018-09-17 NOTE — TELEPHONE ENCOUNTER
"Likely needs to address at appointment and/or reschedule until sooner? Will route to CDL to review/advise.  Chante Tinajero, CMA    Last OV 08/31: \"- Diagnosed 5/14/2010 by sleep specialist   - Patient doing ok on Ritalin but doesn't like BID dosing   - Reviewed [facility]   - Ritalin on significant interaction, Adderall on moderate list   - Will switch  - Stop the Ritalin      Start Adderall XR 20 mg once a day      Reviewed use and side effects   - Recheck 1 month   -  reviewed, as expected, no concerns  - CSA 5/8/18  - Utox 5/8/18, no concern \"    Next 5 appointments (look out 90 days)     Sep 28, 2018  8:00 AM CDT   Office Visit with Enid Whiting-MARTHA Block   [facility]    [address]  [facility] [id number] [phone]                  "

## 2018-09-24 ENCOUNTER — TELEPHONE (OUTPATIENT)
Dept: FAMILY MEDICINE | Facility: OTHER | Age: 38
End: 2018-09-24

## 2018-09-24 NOTE — TELEPHONE ENCOUNTER
Patient is scheduled to see CDL on Friday 09/28.  Left message for patient to return call to see if she if fine waiting till her appointment on Friday to discuss referral further.

## 2018-09-24 NOTE — TELEPHONE ENCOUNTER
Reason for Call: Request for an order or referral:    Order or referral being requested: patient is having a hard time with her alcoholism and agreed to get injection they get once per month, vivitrol is one of them. How can she go about doing this?     Date needed: as soon as possible    Has the patient been seen by the PCP for this problem? no    Additional comments: call to discuss referral. Please call asap.    Phone number Patient can be reached at:  Other phone number:  Cheri Gibson [phone]    Best Time:  any    Can we leave a detailed message on this number?  YES    Call taken on 9/24/2018 at 9:43 AM by Kassi Beavers

## 2018-09-24 NOTE — TELEPHONE ENCOUNTER
Patient would like to be seen with CDL on Tuesday instead of Friday to discuss Vivitrol injections.   She reports cravings for alcohol and does not want to wait until Friday.     Yaquelin Haynes, RN, BSN

## 2018-09-24 NOTE — TELEPHONE ENCOUNTER
Gma called again to follow-up on this message. There is no C2C on file so I did not share any information with her except that we have talked to the pt. Gma is very worried about pt but relieved that we are communicating with pt.    Elizabeth Hernandez, RN, BSN

## 2018-09-24 NOTE — TELEPHONE ENCOUNTER
Reason for Call:  Other call back    Detailed comments: Patient called clinic back; relayed message from below. She stated she cannot wait until appt on Friday. She would like referral right away. Please call her back to discuss.     Phone Number Patient can be reached at: Home number on file [phone] (home)    Best Time: any    Can we leave a detailed message on this number? YES    Call taken on 9/24/2018 at 11:56 AM by Aj Corral

## 2018-09-25 ENCOUNTER — OFFICE VISIT (OUTPATIENT)
Dept: FAMILY MEDICINE | Facility: OTHER | Age: 38
End: 2018-09-25
Payer: COMMERCIAL

## 2018-09-25 ENCOUNTER — TELEPHONE (OUTPATIENT)
Dept: FAMILY MEDICINE | Facility: OTHER | Age: 38
End: 2018-09-25

## 2018-09-25 VITALS
OXYGEN SATURATION: 100 % | TEMPERATURE: 98.1 F | RESPIRATION RATE: 16 BRPM | DIASTOLIC BLOOD PRESSURE: 70 MMHG | HEART RATE: 88 BPM | BODY MASS INDEX: 22.14 KG/M2 | SYSTOLIC BLOOD PRESSURE: 118 MMHG | WEIGHT: 129 LBS

## 2018-09-25 DIAGNOSIS — I10 HTN, GOAL BELOW 140/90: ICD-10-CM

## 2018-09-25 DIAGNOSIS — G47.411 NARCOLEPSY AND CATAPLEXY: ICD-10-CM

## 2018-09-25 DIAGNOSIS — F33.1 MAJOR DEPRESSIVE DISORDER, RECURRENT EPISODE, MODERATE (H): Primary | ICD-10-CM

## 2018-09-25 DIAGNOSIS — F10.11 H/O ETOH ABUSE: ICD-10-CM

## 2018-09-25 DIAGNOSIS — F41.1 GENERALIZED ANXIETY DISORDER: ICD-10-CM

## 2018-09-25 DIAGNOSIS — G47.10 HYPERSOMNIA: ICD-10-CM

## 2018-09-25 PROCEDURE — 99214 OFFICE O/P EST MOD 30 MIN: CPT | Performed by: PHYSICIAN ASSISTANT

## 2018-09-25 RX ORDER — DESVENLAFAXINE 25 MG/1
25 TABLET, EXTENDED RELEASE ORAL DAILY
Qty: 30 TABLET | Refills: 1 | Status: SHIPPED | OUTPATIENT
Start: 2018-09-25 | End: 2018-09-25

## 2018-09-25 RX ORDER — NALTREXONE HYDROCHLORIDE 50 MG/1
50 TABLET, FILM COATED ORAL DAILY
Qty: 2 TABLET | Refills: 0 | Status: SHIPPED | OUTPATIENT
Start: 2018-09-25 | End: 2018-09-25

## 2018-09-25 RX ORDER — NALTREXONE HYDROCHLORIDE 50 MG/1
50 TABLET, FILM COATED ORAL DAILY
Qty: 2 TABLET | Refills: 0 | Status: CANCELLED | OUTPATIENT
Start: 2018-09-25

## 2018-09-25 RX ORDER — DESVENLAFAXINE 25 MG/1
25 TABLET, EXTENDED RELEASE ORAL DAILY
Qty: 30 TABLET | Refills: 1 | Status: SHIPPED | OUTPATIENT
Start: 2018-09-25 | End: 2018-10-22

## 2018-09-25 RX ORDER — DEXTROAMPHETAMINE SACCHARATE, AMPHETAMINE ASPARTATE MONOHYDRATE, DEXTROAMPHETAMINE SULFATE AND AMPHETAMINE SULFATE 5; 5; 5; 5 MG/1; MG/1; MG/1; MG/1
40 CAPSULE, EXTENDED RELEASE ORAL DAILY
Qty: 60 CAPSULE | Refills: 0 | Status: SHIPPED | OUTPATIENT
Start: 2018-09-25 | End: 2018-10-22

## 2018-09-25 RX ORDER — METHYLPHENIDATE HYDROCHLORIDE 10 MG/1
10 TABLET ORAL 2 TIMES DAILY
Qty: 60 TABLET | Refills: 0 | Status: SHIPPED | OUTPATIENT
Start: 2018-09-25 | End: 2018-10-22 | Stop reason: ALTCHOICE

## 2018-09-25 RX ORDER — NALTREXONE HYDROCHLORIDE 50 MG/1
50 TABLET, FILM COATED ORAL DAILY
Qty: 2 TABLET | Refills: 0 | Status: SHIPPED | OUTPATIENT
Start: 2018-09-25 | End: 2018-11-26

## 2018-09-25 ASSESSMENT — PATIENT HEALTH QUESTIONNAIRE - PHQ9: 5. POOR APPETITE OR OVEREATING: NOT AT ALL

## 2018-09-25 ASSESSMENT — ANXIETY QUESTIONNAIRES
7. FEELING AFRAID AS IF SOMETHING AWFUL MIGHT HAPPEN: SEVERAL DAYS
GAD7 TOTAL SCORE: 2
5. BEING SO RESTLESS THAT IT IS HARD TO SIT STILL: NOT AT ALL
2. NOT BEING ABLE TO STOP OR CONTROL WORRYING: NOT AT ALL
1. FEELING NERVOUS, ANXIOUS, OR ON EDGE: NOT AT ALL
6. BECOMING EASILY ANNOYED OR IRRITABLE: SEVERAL DAYS
3. WORRYING TOO MUCH ABOUT DIFFERENT THINGS: NOT AT ALL
IF YOU CHECKED OFF ANY PROBLEMS ON THIS QUESTIONNAIRE, HOW DIFFICULT HAVE THESE PROBLEMS MADE IT FOR YOU TO DO YOUR WORK, TAKE CARE OF THINGS AT HOME, OR GET ALONG WITH OTHER PEOPLE: SOMEWHAT DIFFICULT

## 2018-09-25 ASSESSMENT — PAIN SCALES - GENERAL: PAINLEVEL: NO PAIN (0)

## 2018-09-25 NOTE — MR AVS SNAPSHOT
After Visit Summary   9/25/2018    Tasha Short    MRN: [id number]           Patient Information     Date Of Birth          1980        Visit Information        Provider Department      9/25/2018 2:00 PM Enid Mansfield PA-C [facility]        Today's Diagnoses     Major depressive disorder, recurrent episode, moderate (H)    -  1    Generalized anxiety disorder        H/O ETOH abuse        HTN, goal below 140/90        Hypersomnia          Care Instructions    1. Continue Cymbalta    2. Start Pristiq (start at night, if insomnia move to AM)     3. Increase Adderall to 40 mg (2 capsules of 20 mg)     4. Recheck 2 weeks           Follow-ups after your visit        Follow-up notes from your care team     Return in about 2 weeks (around 10/9/2018).      Your next 10 appointments already scheduled     Sep 28, 2018  8:00 AM CDT   Office Visit with Enid Mansfield PA-C   [facility] ([facility])    [address] [place] [id number] [phone]           Bring a current list of meds and any records pertaining to this visit. For Physicals, please bring immunization records and any forms needing to be filled out. Please arrive 10 minutes early to complete paperwork.              Who to contact     If you have questions or need follow up information about today's clinic visit or your schedule please contact [facility] directly at [phone].  Normal or non-critical lab and imaging results will be communicated to you by MyChart, letter or phone within 4 business days after the clinic has received the results. If you do not hear from us within 7 days, please contact the clinic through MyChart or phone. If you have a critical or abnormal lab result, we will notify you by phone as soon as possible.  Submit refill requests through [employer] or call your pharmacy and they will forward the refill  request to us. Please allow 3 business days for your refill to be completed.          Additional Information About Your Visit        Kloodhart Information     Cook123 gives you secure access to your electronic health record. If you see a primary care provider, you can also send messages to your care team and make appointments. If you have questions, please call your primary care clinic.  If you do not have a primary care provider, please call [phone] and they will assist you.        Care EveryWhere ID     This is your Care EveryWhere ID. This could be used by other organizations to access your [facility] medical records  [id number]        Your Vitals Were     Pulse Temperature Respirations Last Period Pulse Oximetry BMI (Body Mass Index)    88 98.1  F (36.7  C) (Temporal) 16 09/05/2018 (Approximate) 100% 22.14 kg/m2       Blood Pressure from Last 3 Encounters:   09/25/18 118/70   08/31/18 104/72   08/17/18 130/80    Weight from Last 3 Encounters:   09/25/18 129 lb (58.5 kg)   08/31/18 133 lb 6.4 oz (60.5 kg)   08/17/18 127 lb 9.6 oz (57.9 kg)              Today, you had the following     No orders found for display         Today's Medication Changes          These changes are accurate as of 9/25/18  2:50 PM.  If you have any questions, ask your nurse or doctor.               Start taking these medicines.        Dose/Directions    desvenlafaxine succinate 25 MG 24 hr tablet   Commonly known as:  PRISTIQ   Used for:  Major depressive disorder, recurrent episode, moderate (H), Generalized anxiety disorder   Started by:  Enid Mansfield PA-C        Dose:  25 mg   Take 1 tablet (25 mg) by mouth daily   Quantity:  30 tablet   Refills:  1       naltrexone 50 MG tablet   Commonly known as:  DEPADE;REVIA   Used for:  H/O ETOH abuse   Started by:  Enid Mansfield PA-C        Dose:  50 mg   Take 1 tablet (50 mg) by mouth daily   Quantity:  2 tablet   Refills:  0         These medicines have  changed or have updated prescriptions.        Dose/Directions    amphetamine-dextroamphetamine 20 MG per 24 hr capsule   Commonly known as:  ADDERALL XR   This may have changed:  how much to take   Used for:  Hypersomnia   Changed by:  Enid Mansfield PA-C        Dose:  40 mg   Take 2 capsules (40 mg) by mouth daily   Quantity:  60 capsule   Refills:  0            Where to get your medicines      These medications were sent to [facility] [place], [place] - [address]  [address] [address]     Phone:  [phone]     desvenlafaxine succinate 25 MG 24 hr tablet    naltrexone 50 MG tablet         Some of these will need a paper prescription and others can be bought over the counter.  Ask your nurse if you have questions.     Bring a paper prescription for each of these medications     amphetamine-dextroamphetamine 20 MG per 24 hr capsule                Primary Care Provider Office Phone # Fax #    Enid Mansfield PA-C [phone]       [address]  [facility] [address]        Equal Access to Services     [facility]: Hadii adam ernst hadasho Soomaali, waaxda luqadaha, qaybta kaalmada adeegyadona, noemi singh . So [facility] [phone].    ATENCIÓN: Si habla español, tiene a chaves disposición servicios gratuitos de asistencia lingüística. Llame al [phone].    We comply with applicable federal civil rights laws and [place] laws. We do not discriminate on the basis of race, color, national origin, age, disability, sex, sexual orientation, or gender identity.            Thank you!     Thank you for choosing [facility]  for your care. Our goal is always to provide you with excellent care. Hearing back from our patients is one way we can continue to improve our services. Please take a few minutes to complete the written survey that you may receive in the mail after your visit with us. Thank you!              Your Updated Medication List - Protect others around you: Learn how to safely use, store and throw away your medicines at www.disposemymeds.org.          This list is accurate as of 9/25/18  2:50 PM.  Always use your most recent med list.                   Brand Name Dispense Instructions for use Diagnosis    amLODIPine 10 MG tablet    NORVASC    90 tablet    Take 1 tablet (10 mg) by mouth daily    HTN, goal below 140/90       amphetamine-dextroamphetamine 20 MG per 24 hr capsule    ADDERALL XR    60 capsule    Take 2 capsules (40 mg) by mouth daily    Hypersomnia       busPIRone 5 MG tablet    BUSPAR    90 tablet    Take 1 tablet (5 mg) by mouth 3 times daily as needed    Major depressive disorder, recurrent episode, moderate (H), Generalized anxiety disorder       desvenlafaxine succinate 25 MG 24 hr tablet    PRISTIQ    30 tablet    Take 1 tablet (25 mg) by mouth daily    Major depressive disorder, recurrent episode, moderate (H), Generalized anxiety disorder       DULoxetine 20 MG EC capsule    CYMBALTA    60 capsule    Take 1 capsule (20 mg) by mouth 2 times daily    Major depressive disorder, recurrent episode, moderate (H), Generalized anxiety disorder       flunisolide 25 MCG/ACT (0.025%) Soln spray    NASALIDE    1 Bottle    Spray 2 sprays into both nostrils every 12 hours    Chronic seasonal allergic rhinitis due to pollen       metoprolol succinate 25 MG 24 hr tablet    TOPROL-XL    45 tablet    Take 0.5 tablets (12.5 mg) by mouth daily    HTN, goal below 140/90       naltrexone 50 MG tablet    DEPADE;REVIA    2 tablet    Take 1 tablet (50 mg) by mouth daily    H/O ETOH abuse       norgestimate-ethinyl estradiol 0.25-35 MG-MCG per tablet    ORTHO-CYCLEN, SPRINTEC    90 tablet    Take 1 tablet by mouth daily    Encounter for initial prescription of contraceptive pills

## 2018-09-25 NOTE — TELEPHONE ENCOUNTER
LM for the patient to return call to the clinic to discuss the below. Will await to hear from patient. Doris Estrella RN, BSN

## 2018-09-25 NOTE — TELEPHONE ENCOUNTER
[facility] Prior Authorization Team Request    Medication: Desvenlafaxine  Dosing:   NDC (required for Medicaid members): [address]-74    Insurance   BIN: [id number]  PCN: [id number]  Grp: [id number]  ID: [id number]    CoverMyMeds Key (if applicable):     Additional documentation:     Filling Pharmacy: [facility]  Phone Number: [phone]  Contact: [facility] (P [id number]) please send all responses to this pool.  Pharmacy NPI (required for Medicaid members):[id number]

## 2018-09-25 NOTE — TELEPHONE ENCOUNTER
[facility] Prior Authorization Team Request    Medication: Adderall XR 20mg-Plan limitations exceeded-Max daily dose of 1  Dosing:   NDC (required for Medicaid members): [address]-61    Insurance   BIN: [id number]  PCN: [id number]  Grp: [id number]  ID: [id number]    CoverMyMeds Key (if applicable):     Additional documentation:     Filling [facility]acy:[facility]  Phone Number: [phone]  Contact: [facility] (P 72351) please send all responses to this pool.  Pharmacy NPI (required for Medicaid members):[id number]

## 2018-09-25 NOTE — TELEPHONE ENCOUNTER
We can not do this in clinic. I could give her one time tablet, but if needs more she would need to get this from addiction medicine. She might be able to get from [place] [facility] (she was in there just recently). Otherwise I believe they might be able to do in ED.     I can see her today at 2 pm. That is the only time I have open.       Donnie Mansfield PA-C  [facility]

## 2018-09-25 NOTE — TELEPHONE ENCOUNTER
Patient returned call and in agreement with plan will be seen today. Stated she has not relapsed at this time. Doris Estrella RN, BSN

## 2018-09-25 NOTE — PATIENT INSTRUCTIONS
1. Continue Cymbalta    2. Start Pristiq (start at night, if insomnia move to AM)     3. Increase Adderall to 40 mg (2 capsules of 20 mg)     4. Recheck 2 weeks

## 2018-09-25 NOTE — PROGRESS NOTES
SUBJECTIVE:   Tasha Short is a 37 year old female who presents to clinic today for the following health issues:    HPI     Depression and Anxiety Follow-Up    Status since last visit: Worsened meds not working at all    Other associated symptoms:constantly wanting to sleep     Complicating factors:     Significant life event: No     Current substance abuse: None    Wanting to use because of depression, but has not because of the baby.  Cymbalta works some days but not others   TAKES adderall 6 am, wears off by 11   [facility] end of October         PHQ-9 8/17/2018 8/31/2018 9/25/2018   Total Score 11 12 9   Q9: Suicide Ideation Not at all Not at all Not at all     ROMI-7 SCORE 8/17/2018 8/31/2018 9/25/2018   Total Score - - -   Total Score 9 (mild anxiety) 7 (mild anxiety) -   Total Score 9 7 2     In the past two weeks have you had thoughts of suicide or self-harm?  No.    Do you have concerns about your personal safety or the safety of others?   No      Plan from last visit 8/31/18  1 & 2. Mood   - Some significant fatigue with BID dosing of Cymbalta   - Improving since on Cymbalta 20 mg just at night   - Will increase to 40 mg at night   - Reviewed Genesight testing results   - Cymbalta on no interaction list, but if continues to have fatigue will consider Pristiq or Effexor   - Continues to use Buspar PRN for anxiety   - Recheck 1 month      3. Hypersomnia   - Diagnosed 5/14/2010 by sleep specialist   - Patient doing ok on Ritalin but doesn't like BID dosing   - Reviewed Genesight   - Ritalin on significant interaction, Adderall on moderate list   - Will switch  - Stop the Ritalin      Start Adderall XR 20 mg once a day      Reviewed use and side effects   - Recheck 1 month   -  reviewed, as expected, no concerns  - CSA 5/8/18  - Utox 5/8/18, no concern         Problem list and histories reviewed & adjusted, as indicated.  Additional history: as documented    BP Readings from Last 3 Encounters:    08/31/18 104/72   08/17/18 130/80   05/08/18 126/82    Wt Readings from Last 3 Encounters:   09/25/18 129 lb (58.5 kg)   08/31/18 133 lb 6.4 oz (60.5 kg)   08/17/18 127 lb 9.6 oz (57.9 kg)                  Labs reviewed in EPIC    ROS:  Constitutional, HEENT, cardiovascular, pulmonary, gi and gu systems are negative, except as otherwise noted.    OBJECTIVE:   /70  Pulse 88  Temp 98.1  F (36.7  C) (Temporal)  Resp 16  Wt 129 lb (58.5 kg)  LMP 09/05/2018 (Approximate)  SpO2 100%  BMI (P) 22.3 kg/m2  Body mass index is 22.3 kg/(m^2) (pended).  GENERAL APPEARANCE: healthy, alert and no distress  EYES: Eyes grossly normal to inspection, PERRLA, conjunctivae and sclerae without injection or discharge, EOM intact   RESP: Lungs clear to auscultation - no rales, rhonchi or wheezes    CV: Regular rates and rhythm, normal S1 S2, no S3 or S4, no murmur, click or rub, no peripheral edema and peripheral pulses strong and symmetric bilaterally   MS: No musculoskeletal defects are noted and gait is age appropriate without ataxia   SKIN: No suspicious lesions or rashes, hydration status appears adeuqate with normal skin turgor   PSYCH: Alert and oriented x3; speech- coherent , normal rate and volume; able to articulate logical thoughts, able to abstract reason, no tangential thoughts, no hallucinations or delusions, mentation appears normal, Mood is euthymic. Affect is appropriate for this mood state and bright. Thought content is free of suicidal ideation, hallucinations, and delusions. Dress is adequate and upkept. Eye contact is good during conversation.       Diagnostic Test Results:  none     ASSESSMENT/PLAN:       ICD-10-CM    1. Major depressive disorder, recurrent episode, moderate (H) F33.1 desvenlafaxine succinate (PRISTIQ) 25 MG 24 hr tablet   2. Generalized anxiety disorder F41.1 desvenlafaxine succinate (PRISTIQ) 25 MG 24 hr tablet   3. H/O ETOH abuse Z87.898 naltrexone (DEPADE;REVIA) 50 MG tablet   4.  HTN, goal below 140/90 I10    5. Hypersomnia G47.10 amphetamine-dextroamphetamine (ADDERALL XR) 20 MG per 24 hr capsule     1-3. Mood and ETOH abuse  - Still sober but states worsening depression is making her want to drink, recently completed rehab at [facility] this past spring    - Discussed we do not do Vivitrol injections, but I will give her a few oral tablets to take just when needed   - Discussed we will work on finding out how this can be done for her, but encouraged her to reach out to [facility] and if need be the ER   -  Per previous exam, we had gene sight completed and there wasn't a lot on her approved list  - Recently increased Cymbalta but patient feels it is not enough, taking 40 mg at night due to sedation   - Discussed increasing Cymbalta to 60 mg at night (top dose) or adding in Pristiq which was on the approved list   - Patient would like to add Pristiq, reviewed use and side effects   - Recheck 2 weeks due to acute issues   - Patient denies SI and verbally consents to safety     4. HTN   - Stable on Amlodipine & Metoprolol     5. Hypersomnia   -  Diagnosed 5/14/2010 by sleep specialist   - Patient was previously doing ok on Ritalin but doesn't like BID dosing, was switched last visit to Adderall XR 20 mg     - Per last visit has Gene Sight results for stimulants - Ritalin on significant interaction, Adderall on moderate list   - Adderall XR 20 mg was wearing off too early (took 6 am wears off by 11 am)   - Recommend increase to 40 mg, reviewed use and side effects   -  reviewed, as expected, no concerns  - CSA 5/8/18  - Utox 5/8/18, no concern   - Recheck 2 weeks     The patient indicates understanding of these issues and agrees with the plan.    Follow up: 2 weeks     Addendum:   - Prior auth was needed for both Adderal XR 20 mg (two tablets per day dosing) and Naltrexone   - Patient requesting return to Ritalin until next visit      This was done, but patient told we need to  discuss at next visit, review Gene Sight again and find her long acting alternative   - No alternative to Naltrexone, will need to wait PA approval, team sent to expedited   - Team called N about Vivitrol injection, was routed to [facility] [phone]. They do injection but not at first visit, they usually do oral 25 mg first then up to 50 to assure no reaction and then switch to injection       SUZANNE MetzC  [facility]

## 2018-09-26 ASSESSMENT — PATIENT HEALTH QUESTIONNAIRE - PHQ9: SUM OF ALL RESPONSES TO PHQ QUESTIONS 1-9: 9

## 2018-09-26 ASSESSMENT — ANXIETY QUESTIONNAIRES: GAD7 TOTAL SCORE: 2

## 2018-09-26 NOTE — TELEPHONE ENCOUNTER
PA Initiation    Medication: Adderall XR 20mg-Plan limitations exceeded-Max daily dose of 1  Insurance Company: [employer] - Phone [phone] Fax [phone]  Pharmacy Filling the Rx: [facility] [place], [place] - [address]  Filling Pharmacy Phone: [phone]  Filling Pharmacy Fax:    Start Date: 9/26/2018    THIS HAS BEEN SUBMITTED BY THE PRIOR-AUTHORIZATION TEAM. ANY QUESTIONS PLEASE CALL [phone]. THANK YOU

## 2018-09-26 NOTE — TELEPHONE ENCOUNTER
PA Initiation    Medication: PRISTIQ ER 25MG  Insurance Company: [employer] - Phone [phone] Fax [phone]  Pharmacy [place] the Rx: [facility] [place] [address] [place] [address]  Filling Pharmacy Phone: [phone]  Filling Pharmacy Fax:    Start Date: 9/26/2018

## 2018-09-26 NOTE — TELEPHONE ENCOUNTER
Prior Authorization Approval    Authorization Effective Date: 9/25/2018  Authorization Expiration Date: 9/25/2019  Medication: Adderall XR 20mg-Plan limitations exceeded-Max daily dose of 1  Approved Dose/Quantity:   Reference #:     Insurance Company: [employer] - Phone [phone] Fax [phone]  Expected CoPay:       CoPay Card Available:      Foundation Assistance Needed:    Which Pharmacy is filling the prescription (Not needed for infusion/clinic administered): [facility] [place] [address] [place] [address]  Pharmacy Notified: Yes  Patient Notified: Yes

## 2018-09-27 ENCOUNTER — TELEPHONE (OUTPATIENT)
Dept: FAMILY MEDICINE | Facility: OTHER | Age: 38
End: 2018-09-27

## 2018-09-27 NOTE — TELEPHONE ENCOUNTER
Thank patient for the update. Unfortunately, due to licensing restrictions. I can not refill Naltrexone.     Donnie Mansfield PA-C  [facility]

## 2018-09-27 NOTE — TELEPHONE ENCOUNTER
Mitch Rossi   I know that you cant share any information with me regarding Tasha. But i know you can listen to me. My concern regarding Valery are She has been drinking hand  now and her cravings has increased. Last weekend she realized how serious this is and wanted to go in ASAP to get the Vivitrol shot. She thought she could just call and get it now hasn't gotten and almost a week . She told me the clinic prescribed her some pills for cravings. She knows she not been a faithful pill taker in the past and that is why she wanted the shot. If your unable to do the shot can you send a order some place so she could get it ASAP.. She has a strong desire to over come this.      The following message was sent by patient's mother (through mother's chart, copied and pasted).     I found C2C from 3/11/13 and patient wants nothing shared with others (nobody listed on form).     Please call patient to see how she is doing. Looks like her Pristiq prior auth was approved. Check to see if she has gotten this. Did she get the Naltrexone?     I have had our team call around. It appears that even the ED doesn't do the Vivitrol injections. Only psychiatric centers do. We could try to get her set up with [facility] to get the injections (see my last visit note) but this won't be right away. Did she try to call [place] [facility] as they might be able to help her better?     If patient isn't talkative, we may need to do a wellfare check through the police station.     Donnie Mansfield PA-C  [facility]

## 2018-09-27 NOTE — TELEPHONE ENCOUNTER
Prior Authorization Approval    Authorization Effective Date: 9/25/2018  Authorization Expiration Date: 9/25/2019  Medication: PRISTIQ ER 25MG - APPROVED  Approved Dose/Quantity: DAILY  Reference #: [id number]   Insurance Company: [employer] - Phone [phone] Fax [phone]  Expected CoPay: $1.00     CoPay Card Available:      Foundation Assistance Needed:    Which Pharmacy is filling the prescription (Not needed for infusion/clinic administered): [facility] [place] [address] [place] [address]  Pharmacy Notified: Yes  Patient Notified: No

## 2018-09-27 NOTE — TELEPHONE ENCOUNTER
"Spoke with patient.  States things are going the same.  Informed her on moms aditya and let her know that we did not respond as we do not have consent to do so.  She sounded thankful. She did not comment on the hand . She did agree, that she wanted the Vivitrol shot if possible.  States that [facility] is looking for her also.  Also had asked for a refill on the naltrexone. States that she has not taking it, but stated \"the weekends coming up and I might need them\"  Informed her that she has the two and that I would send the request for her.  Would like it sent to [facility].  States that she'll  the Pristiq today as well.    Route to [facility] for review.    Philipp Weldon, RN, BSN        "

## 2018-09-27 NOTE — TELEPHONE ENCOUNTER
Left message asking pt to return call. Please transfer to triage RNs    Elizabeth Hernandez, RN, BSN

## 2018-09-27 NOTE — TELEPHONE ENCOUNTER
"Pt called back. Reviewed CDL message with her, she said \"OK\"    She also states she does not need the appt tomorrow.     Elizabeth Hernandez, RN, BSN    "

## 2018-10-10 ENCOUNTER — MYC MEDICAL ADVICE (OUTPATIENT)
Dept: FAMILY MEDICINE | Facility: OTHER | Age: 38
End: 2018-10-10

## 2018-10-10 DIAGNOSIS — F10.10 ALCOHOL ABUSE: Primary | ICD-10-CM

## 2018-10-10 NOTE — TELEPHONE ENCOUNTER
Please see if Care Coordination can help. I do not know any place but [place] [facility], [facility], and (per previous message) [facility] at the     Donnie Whiting-MARTHA Block  [facility]

## 2018-10-10 NOTE — TELEPHONE ENCOUNTER
Unfortunately I am not aware of any other places than have already been suggested and it is likely that patient would not be able to get in for psychiatry anywhere sooner than 10/31/18. I would suggest that she keep the appointment for [facility] on 10/31/18.     Thank you!  Ivory Naidu [facility] Care Coordinator  [place] [facility]  10/10/2018 1:02 PM  [phone]

## 2018-10-22 ENCOUNTER — TELEPHONE (OUTPATIENT)
Dept: FAMILY MEDICINE | Facility: OTHER | Age: 38
End: 2018-10-22

## 2018-10-22 ENCOUNTER — OFFICE VISIT (OUTPATIENT)
Dept: FAMILY MEDICINE | Facility: OTHER | Age: 38
End: 2018-10-22
Payer: COMMERCIAL

## 2018-10-22 VITALS
DIASTOLIC BLOOD PRESSURE: 72 MMHG | OXYGEN SATURATION: 96 % | BODY MASS INDEX: 22.18 KG/M2 | WEIGHT: 129.2 LBS | TEMPERATURE: 99.6 F | SYSTOLIC BLOOD PRESSURE: 104 MMHG | HEART RATE: 86 BPM | RESPIRATION RATE: 18 BRPM

## 2018-10-22 DIAGNOSIS — G47.10 HYPERSOMNIA: ICD-10-CM

## 2018-10-22 DIAGNOSIS — F41.1 GENERALIZED ANXIETY DISORDER: ICD-10-CM

## 2018-10-22 DIAGNOSIS — F33.1 MAJOR DEPRESSIVE DISORDER, RECURRENT EPISODE, MODERATE (H): ICD-10-CM

## 2018-10-22 DIAGNOSIS — I10 HTN, GOAL BELOW 140/90: ICD-10-CM

## 2018-10-22 PROCEDURE — 99214 OFFICE O/P EST MOD 30 MIN: CPT | Performed by: PHYSICIAN ASSISTANT

## 2018-10-22 RX ORDER — DEXTROAMPHETAMINE SACCHARATE, AMPHETAMINE ASPARTATE MONOHYDRATE, DEXTROAMPHETAMINE SULFATE AND AMPHETAMINE SULFATE 5; 5; 5; 5 MG/1; MG/1; MG/1; MG/1
40 CAPSULE, EXTENDED RELEASE ORAL DAILY
Qty: 60 CAPSULE | Refills: 0 | Status: SHIPPED | OUTPATIENT
Start: 2018-10-22 | End: 2018-11-26

## 2018-10-22 RX ORDER — DESVENLAFAXINE 50 MG/1
50 TABLET, FILM COATED, EXTENDED RELEASE ORAL DAILY
Qty: 30 TABLET | Refills: 1 | Status: SHIPPED | OUTPATIENT
Start: 2018-10-22 | End: 2018-11-26

## 2018-10-22 RX ORDER — DULOXETIN HYDROCHLORIDE 20 MG/1
20 CAPSULE, DELAYED RELEASE ORAL 2 TIMES DAILY
Qty: 60 CAPSULE | Refills: 11 | Status: SHIPPED | OUTPATIENT
Start: 2018-10-22 | End: 2019-04-03

## 2018-10-22 RX ORDER — METOPROLOL SUCCINATE 25 MG/1
12.5 TABLET, EXTENDED RELEASE ORAL DAILY
Qty: 45 TABLET | Refills: 3 | Status: SHIPPED | OUTPATIENT
Start: 2018-10-22 | End: 2019-08-08

## 2018-10-22 ASSESSMENT — ANXIETY QUESTIONNAIRES
1. FEELING NERVOUS, ANXIOUS, OR ON EDGE: SEVERAL DAYS
7. FEELING AFRAID AS IF SOMETHING AWFUL MIGHT HAPPEN: SEVERAL DAYS
GAD7 TOTAL SCORE: 7
5. BEING SO RESTLESS THAT IT IS HARD TO SIT STILL: SEVERAL DAYS
2. NOT BEING ABLE TO STOP OR CONTROL WORRYING: SEVERAL DAYS
6. BECOMING EASILY ANNOYED OR IRRITABLE: MORE THAN HALF THE DAYS
3. WORRYING TOO MUCH ABOUT DIFFERENT THINGS: SEVERAL DAYS
IF YOU CHECKED OFF ANY PROBLEMS ON THIS QUESTIONNAIRE, HOW DIFFICULT HAVE THESE PROBLEMS MADE IT FOR YOU TO DO YOUR WORK, TAKE CARE OF THINGS AT HOME, OR GET ALONG WITH OTHER PEOPLE: SOMEWHAT DIFFICULT

## 2018-10-22 ASSESSMENT — PAIN SCALES - GENERAL: PAINLEVEL: NO PAIN (0)

## 2018-10-22 ASSESSMENT — PATIENT HEALTH QUESTIONNAIRE - PHQ9: 5. POOR APPETITE OR OVEREATING: NOT AT ALL

## 2018-10-22 NOTE — TELEPHONE ENCOUNTER
Have two openings for 30 min. OK to put patient in.    Donnie Mansfield PA-C  [facility]

## 2018-10-22 NOTE — PROGRESS NOTES
"  SUBJECTIVE:   Tasha Short is a 38 year old female who presents to clinic today for the following health issues:      HPI  Depression Followup    Status since last visit: Stable     See PHQ-9 for current symptoms.  Other associated symptoms: None    Complicating factors:   Significant life event:  No   Current substance abuse:  None  Anxiety or Panic symptoms:  No    PHQ 8/31/2018 9/25/2018 10/22/2018   PHQ-9 Total Score 12 9 8   Q9: Suicide Ideation Not at all Not at all Not at all     {PROVIDER ONLY Complete follow-up questions for patients who report suicide ideation  (Optional):857604}  PHQ-9  English  PHQ-9   Any Language  Suicide Assessment Five-step Evaluation and Treatment (SAFE-T)  Problem list and histories reviewed & adjusted, as indicated.  Additional history: {NONE - AS DOCUMENTED:458247::\"as documented\"}    {ACUTE Problem SUPERLIST - brief histories:133634}    {HIST REVIEW/ LINKS 2:513298}    {PROVIDER CHARTING PREFERENCE:167873}  "

## 2018-10-22 NOTE — PROGRESS NOTES
SUBJECTIVE:   Tasha Short is a 38 year old female who presents to clinic today for the following health issues:    HPI  Depression and Anxiety Follow-Up    Status since last visit: Improved     Other associated symptoms:None    Complicating factors:     Significant life event: No     Current substance abuse: None    - Pristiq      No side effects      Got about 1 month ago      [facility] didn't help, DBT in [facility]   - [facility] medical in [place], will get vivitrol   -       PHQ 8/31/2018 9/25/2018 10/22/2018   PHQ-9 Total Score 12 9 8   Q9: Suicide Ideation Not at all Not at all Not at all     ROMI-7 SCORE 8/31/2018 9/25/2018 10/22/2018   Total Score - - -   Total Score 7 (mild anxiety) - -   Total Score 7 2 7     In the past two weeks have you had thoughts of suicide or self-harm?  No.    Do you have concerns about your personal safety or the safety of others?   No      Plan from last visit 9/25/18  - Still sober but states worsening depression is making her want to drink, recently completed rehab at [facility] this past spring    - Discussed we do not do Vivitrol injections, but I will give her a few oral tablets to take just when needed   - Discussed we will work on finding out how this can be done for her, but encouraged her to reach out to [facility] and if need be the ER   -  Per previous exam, we had gene sight completed and there wasn't a lot on her approved list  - Recently increased Cymbalta but patient feels it is not enough, taking 40 mg at night due to sedation   - Discussed increasing Cymbalta to 60 mg at night (top dose) or adding in Pristiq which was on the approved list   - Patient would like to add Pristiq, reviewed use and side effects   - Recheck 2 weeks due to acute issues   - Patient denies SI and verbally consents to safety   Addendum:   - Prior auth was needed for both Adderal XR 20 mg (two tablets per day dosing) and Naltrexone   - Patient  requesting return to [facility] until next visit      This was done, but patient told we need to discuss at next visit, review Gene Sight again and find her long acting alternative   - No alternative to Naltrexone, will need to wait PA approval, team sent to expedited   - Team called N about Vivitrol injection, was routed to [facility] [phone]. They do injection but not at first visit, they usually do oral 25 mg first then up to 50 to assure no reaction and then switch to injection               Problem list and histories reviewed & adjusted, as indicated.  Additional history: as documented    ROS:  Constitutional, HEENT, cardiovascular, pulmonary, gi and gu systems are negative, except as otherwise noted.    OBJECTIVE:   /72  Pulse 86  Temp 99.6  F (37.6  C) (Temporal)  Resp 18  Wt 129 lb 3.2 oz (58.6 kg)  LMP 10/05/2018 (Approximate)  SpO2 96%  Breastfeeding? No  BMI (P) 22.33 kg/m2  Body mass index is 22.33 kg/(m^2) (pended).  GENERAL APPEARANCE: healthy, alert and no distress  EYES: Eyes grossly normal to inspection, PERRLA, conjunctivae and sclerae without injection or discharge, EOM intact   RESP: Lungs clear to auscultation - no rales, rhonchi or wheezes    CV: Regular rates and rhythm, normal S1 S2, no S3 or S4, no murmur, click or rub, no peripheral edema and peripheral pulses strong and symmetric bilaterally   MS: No musculoskeletal defects are noted and gait is age appropriate without ataxia   SKIN: No suspicious lesions or rashes, hydration status appears adeuqate with normal skin turgor   PSYCH: Alert and oriented x3; speech- coherent , normal rate and volume; able to articulate logical thoughts, able to abstract reason, no tangential thoughts, no hallucinations or delusions, mentation appears normal, Mood is euthymic. Affect is appropriate for this mood state and bright. Thought content is free of suicidal ideation, hallucinations, and delusions. Dress is adequate and  upkept. Eye contact is good during conversation.       Diagnostic Test Results:  none     ASSESSMENT/PLAN:       ICD-10-CM    1. HTN, goal below 140/90 I10 metoprolol succinate (TOPROL-XL) 25 MG 24 hr tablet   2. Major depressive disorder, recurrent episode, moderate (H) F33.1 desvenlafaxine succinate (PRISTIQ) 50 MG 24 hr tablet     DULoxetine (CYMBALTA) 20 MG EC capsule   3. Generalized anxiety disorder F41.1 desvenlafaxine succinate (PRISTIQ) 50 MG 24 hr tablet     DULoxetine (CYMBALTA) 20 MG EC capsule   4. Hypersomnia G47.10 amphetamine-dextroamphetamine (ADDERALL XR) 20 MG per 24 hr capsule     1. HTN   - Doing well under goal with metoprolol & Amlodipine   - Continue without change     2 & 3. Mood   - Finally starting to improve, on Pristiq 50 mg for about 4 weeks now, no side effects  - Also continues on Cymbalta 60 mg at bedtime   - Discussed giving more time to peak effect vs. Increasing early   - Patient would like to increase as upcoming weather and laying off from work usually makes mood worse   - Will increase to 100 mg   - Reviewed use and side effects  - Recheck 1 month   - Also improving as found a place to give her Vivitrol injections for the next 1-2 years, no more desires to drink and maintains her sobriety (per patient report)     4. Hypersomnia   - Diagnosed 5/14/2010 by sleep specialist   - Patient was previously doing ok on Ritalin but doesn't like BID dosing, was switched last visit to Adderall XR 20 mg     - Per last visit has Gene Sight results for stimulants - Ritalin on significant interaction, Adderall on moderate list   - Adderall XR 20 mg was wearing off too early (took 6 am wears off by 11 am)   - Recommend increase to 40 mg, reviewed use and side effects      This was finally approved by insurance prior auth but hasn't gotten it yet   -  reviewed, as expected, no concerns  - CSA 5/8/18  - Utox 5/8/18, no concern   - Recheck 1 month     The patient indicates understanding of these  issues and agrees with the plan.    Follow up: 1 month      The patient was seen with student YAYA Colby.         Enid Whiting-MARTHA Block  [facility]

## 2018-10-22 NOTE — TELEPHONE ENCOUNTER
Reason for Call:  Other appointment    Detailed comments: pt wondering about being seen today instead of tomorrow since she has today off. Please advise.     Phone Number Patient can be reached at: Home number on file [phone] (home)    Best Time: any     Can we leave a detailed message on this number? YES    Call taken on 10/22/2018 at 8:05 AM by Nicole East

## 2018-10-22 NOTE — MR AVS SNAPSHOT
After Visit Summary   10/22/2018    Tasha Short    MRN: [id number]           Patient Information     Date Of Birth          1980        Visit Information        Provider Department      10/22/2018 10:00 AM Enid Mansfield PA-C [facility]        Today's Diagnoses     HTN, goal below 140/90        Major depressive disorder, recurrent episode, moderate (H)        Generalized anxiety disorder        Hypersomnia          Care Instructions      - Increase Pristiq to 50 mg     - Trial of Adderall     - Recheck 1 month               Follow-ups after your visit        Follow-up notes from your care team     Return in about 1 month (around 11/22/2018).      Who to contact     If you have questions or need follow up information about today's clinic visit or your schedule please contact [facility] directly at [phone].  Normal or non-critical lab and imaging results will be communicated to you by Pongrhart, letter or phone within 4 business days after the clinic has received the results. If you do not hear from us within 7 days, please contact the clinic through Pongrhart or phone. If you have a critical or abnormal lab result, we will notify you by phone as soon as possible.  Submit refill requests through Jobbr or call your pharmacy and they will forward the refill request to us. Please allow 3 business days for your refill to be completed.          Additional Information About Your Visit        MyChart Information     Jobbr gives you secure access to your electronic health record. If you see a primary care provider, you can also send messages to your care team and make appointments. If you have questions, please call your primary care clinic.  If you do not have a primary care provider, please call [phone] and they will assist you.        Care EveryWhere ID     This is your Care EveryWhere ID. This could be used by other organizations to  access your [facility] medical records  [id number]        Your Vitals Were     Pulse Temperature Respirations Last Period Pulse Oximetry Breastfeeding?    86 99.6  F (37.6  C) (Temporal) 18 10/05/2018 (Approximate) 96% No    BMI (Body Mass Index)                   22.18 kg/m2            Blood Pressure from Last 3 Encounters:   10/22/18 104/72   09/25/18 118/70   08/31/18 104/72    Weight from Last 3 Encounters:   10/22/18 129 lb 3.2 oz (58.6 kg)   09/25/18 129 lb (58.5 kg)   08/31/18 133 lb 6.4 oz (60.5 kg)              Today, you had the following     No orders found for display         Today's Medication Changes          These changes are accurate as of 10/22/18 11:49 AM.  If you have any questions, ask your nurse or doctor.               These medicines have changed or have updated prescriptions.        Dose/Directions    desvenlafaxine succinate 50 MG 24 hr tablet   Commonly known as:  PRISTIQ   This may have changed:    - medication strength  - how much to take   Used for:  Major depressive disorder, recurrent episode, moderate (H), Generalized anxiety disorder   Changed by:  Enid Mansfield PA-C        Dose:  50 mg   Take 1 tablet (50 mg) by mouth daily   Quantity:  30 tablet   Refills:  1         Stop taking these medicines if you haven't already. Please contact your care team if you have questions.     methylphenidate 10 MG tablet   Commonly known as:  RITALIN   Stopped by:  Enid Mansfield PA-C                Where to get your medicines      These medications were sent to [facility] [address] [address]     Phone:  [phone]     desvenlafaxine succinate 50 MG 24 hr tablet    DULoxetine 20 MG EC capsule    metoprolol succinate 25 MG 24 hr tablet         Some of these will need a paper prescription and others can be bought over the counter.  Ask your nurse if you have questions.     Bring a paper prescription  for each of these medications     amphetamine-dextroamphetamine 20 MG per 24 hr capsule                Primary Care Provider Office Phone # Fax #    Enid PORTILLO MARTHA Mansfield [phone]       [address] [place] [address]        Equal Access to Services     LINDY CAMERON : Hadii aad ku hadasho Soomaali, waaxda luqadaha, qaybta kaalmada adeegyada, waxay curt haydom singh . So [facility] [phone].    ATENCIÓN: Si habla español, tiene a chaves disposición servicios gratuitos de asistencia lingüística. Daksha al [phone].    We comply with applicable federal civil rights laws and [place] laws. We do not discriminate on the basis of race, color, national origin, age, disability, sex, sexual orientation, or gender identity.            Thank you!     Thank you for choosing [facility]  for your care. Our goal is always to provide you with excellent care. Hearing back from our patients is one way we can continue to improve our services. Please take a few minutes to complete the written survey that you may receive in the mail after your visit with us. Thank you!             Your Updated Medication List - Protect others around you: Learn how to safely use, store and throw away your medicines at www.disposemymeds.org.          This list is accurate as of 10/22/18 11:49 AM.  Always use your most recent med list.                   Brand Name Dispense Instructions for use Diagnosis    amLODIPine 10 MG tablet    NORVASC    90 tablet    Take 1 tablet (10 mg) by mouth daily    HTN, goal below 140/90       amphetamine-dextroamphetamine 20 MG per 24 hr capsule    ADDERALL XR    60 capsule    Take 2 capsules (40 mg) by mouth daily    Hypersomnia       busPIRone 5 MG tablet    BUSPAR    90 tablet    Take 1 tablet (5 mg) by mouth 3 times daily as needed    Major depressive disorder, recurrent episode, moderate (H), Generalized anxiety disorder       desvenlafaxine  succinate 50 MG 24 hr tablet    PRISTIQ    30 tablet    Take 1 tablet (50 mg) by mouth daily    Major depressive disorder, recurrent episode, moderate (H), Generalized anxiety disorder       DULoxetine 20 MG EC capsule    CYMBALTA    60 capsule    Take 1 capsule (20 mg) by mouth 2 times daily    Major depressive disorder, recurrent episode, moderate (H), Generalized anxiety disorder       flunisolide 25 MCG/ACT (0.025%) Soln spray    NASALIDE    1 Bottle    Spray 2 sprays into both nostrils every 12 hours    Chronic seasonal allergic rhinitis due to pollen       metoprolol succinate 25 MG 24 hr tablet    TOPROL-XL    45 tablet    Take 0.5 tablets (12.5 mg) by mouth daily    HTN, goal below 140/90       naltrexone 50 MG tablet    DEPADE;REVIA    2 tablet    Take 1 tablet (50 mg) by mouth daily    H/O ETOH abuse       norgestimate-ethinyl estradiol 0.25-35 MG-MCG per tablet    ORTHO-CYCLEN, SPRINTEC    90 tablet    Take 1 tablet by mouth daily    Encounter for initial prescription of contraceptive pills

## 2018-10-23 ASSESSMENT — PATIENT HEALTH QUESTIONNAIRE - PHQ9: SUM OF ALL RESPONSES TO PHQ QUESTIONS 1-9: 8

## 2018-10-23 ASSESSMENT — ANXIETY QUESTIONNAIRES: GAD7 TOTAL SCORE: 7

## 2018-11-20 NOTE — PROGRESS NOTES
SUBJECTIVE:   Tasha Short is a 38 year old female who presents to clinic today for the following health issues:    History of Present Illness     Depression & Anxiety Follow-up:     Depression/Anxiety:  Depression only    Status since last visit::  Stable    Other associated symptoms of depression::  None    Significant life event::  No    Current substance use::  None    Anxiety/Panic symptoms::  No       Today's PHQ-9         PHQ-9 Total Score:     (P) 3   PHQ-9 Q9 Suicidal ideation:   (P) Not at all   Thoughts of suicide or self harm:      Self-harm Plan:        Self-harm Action:          Safety concerns for self or others:       ROMI-7 Total Score: (P) 2    Diet:  Regular (no restrictions)  Frequency of exercise:  6-7 days/week  Duration of exercise:  30-45 minutes  Taking medications regularly:  Yes  Medication side effects:  None  Additional concerns today:  No       Medication Followup of Adderall    Taking Medication as prescribed: yes    Side Effects:  None    Medication Helping Symptoms:  yes     Hypertension Follow-up      Outpatient blood pressures are being checked at home.  Results are 110/70.    Low Salt Diet: no added salt    Depression and Anxiety Follow-Up    Status since last visit: No change    Other associated symptoms:None    Complicating factors:     Significant life event: No     Current substance abuse: None    - Got Vivatrol injection helped, every 28 days   - Feeling stable       URINARY TRACT SYMPTOMS  Onset: 1 week     Description:   Painful urination (Dysuria): YES  Blood in urine (Hematuria): no   Delay in urine (Hesitency): YES- urgency, frequency     Intensity: mild    Progression of Symptoms:  worsening    Accompanying Signs & Symptoms:  Fever/chills: no   Flank pain: sometimes low back ache   Nausea and vomiting: no   Any vaginal symptoms: none  Abdominal/Pelvic Pain: no     History:   History of frequent UTI's: no   History of kidney stones: no   Sexually Active: no    Possibility of pregnancy: No    Precipitating factors: maybe dehydration   Therapies Tried and outcome: Increase fluid intake      Medication Followup of OCP    Taking Medication as prescribed: yes    Side Effects:  None    Medication Helping Symptoms:  yes     Medication Followup of nasal spray    Taking Medication as prescribed: yes    Side Effects:  None    Medication Helping Symptoms:  yes           PHQ 9/25/2018 10/22/2018 11/26/2018   PHQ-9 Total Score 9 8 3   Q9: Suicide Ideation Not at all Not at all Not at all     ROMI-7 SCORE 9/25/2018 10/22/2018 11/26/2018   Total Score - - -   Total Score - - 2 (minimal anxiety)   Total Score 2 7 2     In the past two weeks have you had thoughts of suicide or self-harm?  No.    Do you have concerns about your personal safety or the safety of others?   No    Problem list and histories reviewed & adjusted, as indicated.  Additional history: as documented    ROS:  Constitutional, HEENT, cardiovascular, pulmonary, gi and gu systems are negative, except as otherwise noted.    OBJECTIVE:   /80  Pulse 98  Temp 98.9  F (37.2  C) (Temporal)  Resp 16  Wt 129 lb (58.5 kg)  LMP 11/06/2018 (Approximate)  SpO2 99%  Breastfeeding? No  BMI (P) 22.3 kg/m2  Body mass index is 22.3 kg/(m^2) (pended).  GENERAL APPEARANCE: healthy, alert and no distress  EYES: Eyes grossly normal to inspection, PERRLA, conjunctivae and sclerae without injection or discharge, EOM intact   RESP: Lungs clear to auscultation - no rales, rhonchi or wheezes    CV: Regular rates and rhythm, normal S1 S2, no S3 or S4, no murmur, click or rub, no peripheral edema and peripheral pulses strong and symmetric bilaterally   MS: No musculoskeletal defects are noted and gait is age appropriate without ataxia   SKIN: No suspicious lesions or rashes, hydration status appears adeuqate with normal skin turgor   PSYCH: Alert and oriented x3; speech- coherent , normal rate and volume; able to articulate logical  thoughts, able to abstract reason, no tangential thoughts, no hallucinations or delusions, mentation appears normal, Mood is euthymic. Affect is appropriate for this mood state and bright. Thought content is free of suicidal ideation, hallucinations, and delusions. Dress is adequate and upkept. Eye contact is good during conversation.       Diagnostic Test Results:  none     ASSESSMENT/PLAN:       ICD-10-CM    1. HTN, goal below 140/90 I10    2. Major depressive disorder, recurrent episode, moderate (H) F33.1 desvenlafaxine (PRISTIQ) 50 MG 24 hr tablet   3. Generalized anxiety disorder F41.1 desvenlafaxine (PRISTIQ) 50 MG 24 hr tablet   4. Narcolepsy and cataplexy G47.411    5. Hypersomnia G47.10 amphetamine-dextroamphetamine (ADDERALL XR) 20 MG 24 hr capsule     DISCONTINUED: amphetamine-dextroamphetamine (ADDERALL XR) 20 MG 24 hr capsule     DISCONTINUED: amphetamine-dextroamphetamine (ADDERALL XR) 20 MG 24 hr capsule     DISCONTINUED: amphetamine-dextroamphetamine (ADDERALL XR) 20 MG 24 hr capsule     DISCONTINUED: amphetamine-dextroamphetamine (ADDERALL XR) 20 MG 24 hr capsule     DISCONTINUED: amphetamine-dextroamphetamine (ADDERALL XR) 20 MG 24 hr capsule   6. Need for prophylactic vaccination and inoculation against influenza Z23 FLU VACCINE, SPLIT VIRUS, IM (QUADRIVALENT) [86132]- >3 YRS     Vaccine Administration, Initial [22232]   7. Dysuria R30.0 *UA reflex to Microscopic and Culture ([facility] (except [place] and [place])   8. Chronic seasonal allergic rhinitis due to pollen J30.1 flunisolide (NASALIDE) 25 MCG/ACT (0.025%) SOLN spray   9. Encounter for initial prescription of contraceptive pills Z30.011 norgestimate-ethinyl estradiol (ORTHO-CYCLEN, SPRINTEC) 0.25-35 MG-MCG per tablet     1. HTN   - Doing well under goal with metoprolol & Amlodipine   - Continue without change      2 & 3. Mood   - On Pristiq 50 mg for about 2 months now and continues on Cymbalta 60 mg at bedtime  -  Will consider stable  - Reviewed use and side effects, refilled   - Also improving as found a place to give her Vivitrol injections for the next 1-2 years, no more desires to drink and maintains her sobriety (per patient report)      4. & 5. Hypersomnia   - Diagnosed 5/14/2010 by sleep specialist   - Patient was previously on Ritalin was switched to Adderall, Adderall XR 20 mg was wearing off too early (took 6 am wears off by 11 am)   - Marked improvement with increase Adderall to 40 mg once daily      This was finally approved by insurance prior auth  -  reviewed, as expected, no concerns  - CSA 5/8/18  - Utox 5/8/18, no concern  - Given 3 months today (some issues with printing, was given Nov, Dec, and Jan prescriptions with Jan dated for 1/21/19)    - Recheck 3 months (OK for different provider in my absence)     6. Flu shot given     7. Dysuria   - Patient reporting one week of symptoms  - UA left when leaving today, will call with results     8. Rhinitis   - Stable with year round use of Nasalide spray  - Reviewed use and side effects     9. OCP   - Stable, reviewed use and side effects, refilled       The patient indicates understanding of these issues and agrees with the plan.    Follow up: 3 months (OK to see another provider in my absence)         Enid Mansfield PA-C  [facility]

## 2018-11-25 PROBLEM — O09.511 ELDERLY PRIMIGRAVIDA IN FIRST TRIMESTER: Status: RESOLVED | Noted: 2017-05-15 | Resolved: 2018-11-25

## 2018-11-26 ENCOUNTER — OFFICE VISIT (OUTPATIENT)
Dept: FAMILY MEDICINE | Facility: OTHER | Age: 38
End: 2018-11-26
Payer: COMMERCIAL

## 2018-11-26 ENCOUNTER — TELEPHONE (OUTPATIENT)
Dept: FAMILY MEDICINE | Facility: OTHER | Age: 38
End: 2018-11-26

## 2018-11-26 VITALS
OXYGEN SATURATION: 99 % | TEMPERATURE: 98.9 F | RESPIRATION RATE: 16 BRPM | DIASTOLIC BLOOD PRESSURE: 80 MMHG | WEIGHT: 129 LBS | HEART RATE: 98 BPM | SYSTOLIC BLOOD PRESSURE: 118 MMHG | BODY MASS INDEX: 22.14 KG/M2

## 2018-11-26 DIAGNOSIS — R30.0 DYSURIA: ICD-10-CM

## 2018-11-26 DIAGNOSIS — R82.90 NONSPECIFIC FINDING ON EXAMINATION OF URINE: ICD-10-CM

## 2018-11-26 DIAGNOSIS — I10 HTN, GOAL BELOW 140/90: Primary | ICD-10-CM

## 2018-11-26 DIAGNOSIS — Z23 NEED FOR PROPHYLACTIC VACCINATION AND INOCULATION AGAINST INFLUENZA: ICD-10-CM

## 2018-11-26 DIAGNOSIS — Z30.011 ENCOUNTER FOR INITIAL PRESCRIPTION OF CONTRACEPTIVE PILLS: ICD-10-CM

## 2018-11-26 DIAGNOSIS — J30.1 CHRONIC SEASONAL ALLERGIC RHINITIS DUE TO POLLEN: ICD-10-CM

## 2018-11-26 DIAGNOSIS — F41.1 GENERALIZED ANXIETY DISORDER: ICD-10-CM

## 2018-11-26 DIAGNOSIS — G47.411 NARCOLEPSY AND CATAPLEXY: ICD-10-CM

## 2018-11-26 DIAGNOSIS — G47.10 HYPERSOMNIA: ICD-10-CM

## 2018-11-26 DIAGNOSIS — F33.1 MAJOR DEPRESSIVE DISORDER, RECURRENT EPISODE, MODERATE (H): ICD-10-CM

## 2018-11-26 LAB
ALBUMIN UR-MCNC: NEGATIVE MG/DL
APPEARANCE UR: CLEAR
BACTERIA #/AREA URNS HPF: ABNORMAL /HPF
BILIRUB UR QL STRIP: NEGATIVE
COLOR UR AUTO: YELLOW
GLUCOSE UR STRIP-MCNC: NEGATIVE MG/DL
HGB UR QL STRIP: NEGATIVE
KETONES UR STRIP-MCNC: NEGATIVE MG/DL
LEUKOCYTE ESTERASE UR QL STRIP: ABNORMAL
NITRATE UR QL: NEGATIVE
NON-SQ EPI CELLS #/AREA URNS LPF: ABNORMAL /LPF
PH UR STRIP: 7.5 PH (ref 5–7)
RBC #/AREA URNS AUTO: ABNORMAL /HPF
SOURCE: ABNORMAL
SP GR UR STRIP: 1.01 (ref 1–1.03)
UROBILINOGEN UR STRIP-ACNC: 0.2 EU/DL (ref 0.2–1)
WBC #/AREA URNS AUTO: ABNORMAL /HPF

## 2018-11-26 PROCEDURE — 81001 URINALYSIS AUTO W/SCOPE: CPT | Performed by: PHYSICIAN ASSISTANT

## 2018-11-26 PROCEDURE — 90471 IMMUNIZATION ADMIN: CPT | Performed by: PHYSICIAN ASSISTANT

## 2018-11-26 PROCEDURE — 87088 URINE BACTERIA CULTURE: CPT | Performed by: PHYSICIAN ASSISTANT

## 2018-11-26 PROCEDURE — 99214 OFFICE O/P EST MOD 30 MIN: CPT | Mod: 25 | Performed by: PHYSICIAN ASSISTANT

## 2018-11-26 PROCEDURE — 90686 IIV4 VACC NO PRSV 0.5 ML IM: CPT | Performed by: PHYSICIAN ASSISTANT

## 2018-11-26 PROCEDURE — 87086 URINE CULTURE/COLONY COUNT: CPT | Performed by: PHYSICIAN ASSISTANT

## 2018-11-26 RX ORDER — DESVENLAFAXINE 50 MG/1
50 TABLET, FILM COATED, EXTENDED RELEASE ORAL DAILY
Qty: 90 TABLET | Refills: 3 | Status: SHIPPED | OUTPATIENT
Start: 2018-11-26 | End: 2019-08-08 | Stop reason: ALTCHOICE

## 2018-11-26 RX ORDER — DEXTROAMPHETAMINE SACCHARATE, AMPHETAMINE ASPARTATE MONOHYDRATE, DEXTROAMPHETAMINE SULFATE AND AMPHETAMINE SULFATE 5; 5; 5; 5 MG/1; MG/1; MG/1; MG/1
40 CAPSULE, EXTENDED RELEASE ORAL DAILY
Qty: 60 CAPSULE | Refills: 0 | Status: SHIPPED | OUTPATIENT
Start: 2018-12-21 | End: 2019-03-01

## 2018-11-26 RX ORDER — DEXTROAMPHETAMINE SACCHARATE, AMPHETAMINE ASPARTATE MONOHYDRATE, DEXTROAMPHETAMINE SULFATE AND AMPHETAMINE SULFATE 5; 5; 5; 5 MG/1; MG/1; MG/1; MG/1
40 CAPSULE, EXTENDED RELEASE ORAL DAILY
Qty: 60 CAPSULE | Refills: 0 | Status: SHIPPED | OUTPATIENT
Start: 2018-11-26 | End: 2018-11-26

## 2018-11-26 RX ORDER — FLUNISOLIDE 0.25 MG/ML
2 SOLUTION NASAL EVERY 12 HOURS
Qty: 1 BOTTLE | Refills: 5 | Status: SHIPPED | OUTPATIENT
Start: 2018-11-26 | End: 2021-12-21

## 2018-11-26 RX ORDER — DEXTROAMPHETAMINE SACCHARATE, AMPHETAMINE ASPARTATE MONOHYDRATE, DEXTROAMPHETAMINE SULFATE AND AMPHETAMINE SULFATE 5; 5; 5; 5 MG/1; MG/1; MG/1; MG/1
40 CAPSULE, EXTENDED RELEASE ORAL DAILY
Qty: 60 CAPSULE | Refills: 0 | Status: SHIPPED | OUTPATIENT
Start: 2018-12-21 | End: 2018-11-26

## 2018-11-26 RX ORDER — NORGESTIMATE AND ETHINYL ESTRADIOL 0.25-0.035
1 KIT ORAL DAILY
Qty: 90 TABLET | Refills: 3 | Status: SHIPPED | OUTPATIENT
Start: 2018-11-26 | End: 2019-08-08

## 2018-11-26 RX ORDER — DEXTROAMPHETAMINE SACCHARATE, AMPHETAMINE ASPARTATE MONOHYDRATE, DEXTROAMPHETAMINE SULFATE AND AMPHETAMINE SULFATE 5; 5; 5; 5 MG/1; MG/1; MG/1; MG/1
40 CAPSULE, EXTENDED RELEASE ORAL DAILY
Qty: 60 CAPSULE | Refills: 0 | Status: SHIPPED | OUTPATIENT
Start: 2019-01-21 | End: 2018-11-26

## 2018-11-26 ASSESSMENT — PAIN SCALES - GENERAL: PAINLEVEL: NO PAIN (0)

## 2018-11-26 ASSESSMENT — ANXIETY QUESTIONNAIRES
6. BECOMING EASILY ANNOYED OR IRRITABLE: SEVERAL DAYS
7. FEELING AFRAID AS IF SOMETHING AWFUL MIGHT HAPPEN: SEVERAL DAYS
1. FEELING NERVOUS, ANXIOUS, OR ON EDGE: NOT AT ALL
GAD7 TOTAL SCORE: 2
7. FEELING AFRAID AS IF SOMETHING AWFUL MIGHT HAPPEN: SEVERAL DAYS
4. TROUBLE RELAXING: NOT AT ALL
3. WORRYING TOO MUCH ABOUT DIFFERENT THINGS: NOT AT ALL
5. BEING SO RESTLESS THAT IT IS HARD TO SIT STILL: NOT AT ALL
2. NOT BEING ABLE TO STOP OR CONTROL WORRYING: NOT AT ALL
GAD7 TOTAL SCORE: 2
GAD7 TOTAL SCORE: 2

## 2018-11-26 ASSESSMENT — PATIENT HEALTH QUESTIONNAIRE - PHQ9
SUM OF ALL RESPONSES TO PHQ QUESTIONS 1-9: 3
10. IF YOU CHECKED OFF ANY PROBLEMS, HOW DIFFICULT HAVE THESE PROBLEMS MADE IT FOR YOU TO DO YOUR WORK, TAKE CARE OF THINGS AT HOME, OR GET ALONG WITH OTHER PEOPLE: SOMEWHAT DIFFICULT
SUM OF ALL RESPONSES TO PHQ QUESTIONS 1-9: 3

## 2018-11-26 NOTE — PROGRESS NOTES
Injectable Influenza Immunization Documentation    1.  Is the person to be vaccinated sick today?   No    2. Does the person to be vaccinated have an allergy to a component   of the vaccine?   No  Egg Allergy Algorithm Link    3. Has the person to be vaccinated ever had a serious reaction   to influenza vaccine in the past?   No    4. Has the person to be vaccinated ever had Guillain-Barré syndrome?   No    Form completed by Angie Corral MA      Prior to injection verified patient identity using patient's name and date of birth.  Due to injection administration, patient instructed to remain in clinic for 15 minutes  afterwards, and to report any adverse reaction to me immediately.

## 2018-11-26 NOTE — MR AVS SNAPSHOT
After Visit Summary   11/26/2018    Tasha Short    MRN: [id number]           Patient Information     Date Of Birth          1980        Visit Information        Provider Department      11/26/2018 8:00 AM Enid Mansfield PA-C [facility]        Today's Diagnoses     HTN, goal below 140/90    -  1    Major depressive disorder, recurrent episode, moderate (H)        Generalized anxiety disorder        Narcolepsy and cataplexy        Hypersomnia        Need for prophylactic vaccination and inoculation against influenza        Dysuria        Chronic seasonal allergic rhinitis due to pollen        Encounter for initial prescription of contraceptive pills           Follow-ups after your visit        Follow-up notes from your care team     Return in about 3 months (around 2/26/2019).      Who to contact     If you have questions or need follow up information about today's clinic visit or your schedule please contact [facility] directly at [phone].  Normal or non-critical lab and imaging results will be communicated to you by MyChart, letter or phone within 4 business days after the clinic has received the results. If you do not hear from us within 7 days, please contact the clinic through Gudoghart or phone. If you have a critical or abnormal lab result, we will notify you by phone as soon as possible.  Submit refill requests through Catchoom or call your pharmacy and they will forward the refill request to us. Please allow 3 business days for your refill to be completed.          Additional Information About Your Visit        MyChart Information     Catchoom gives you secure access to your electronic health record. If you see a primary care provider, you can also send messages to your care team and make appointments. If you have questions, please call your primary care clinic.  If you do not have a primary care provider, please call [phone] and  they will assist you.        Care EveryWhere ID     This is your Care EveryWhere ID. This could be used by other organizations to access your [facility] medical records  [id number]        Your Vitals Were     Pulse Temperature Respirations Last Period Pulse Oximetry Breastfeeding?    98 98.9  F (37.2  C) (Temporal) 16 11/06/2018 (Approximate) 99% No    BMI (Body Mass Index)                   22.14 kg/m2            Blood Pressure from Last 3 Encounters:   11/26/18 118/80   10/22/18 104/72   09/25/18 118/70    Weight from Last 3 Encounters:   11/26/18 129 lb (58.5 kg)   10/22/18 129 lb 3.2 oz (58.6 kg)   09/25/18 129 lb (58.5 kg)              We Performed the Following     *UA reflex to Microscopic and Culture ([place] and [facility] (except [place] and [place])          Today's Medication Changes          These changes are accurate as of 11/26/18  8:33 AM.  If you have any questions, ask your nurse or doctor.               Start taking these medicines.        Dose/Directions    amphetamine-dextroamphetamine 20 MG 24 hr capsule   Commonly known as:  ADDERALL XR   Used for:  Hypersomnia   Started by:  Enid Mansfield PA-C        Dose:  40 mg   Start taking on:  1/21/2019   Take 2 capsules (40 mg) by mouth daily   Quantity:  60 capsule   Refills:  0            Where to get your medicines      These medications were sent to [facility] [address] [address]     Phone:  [phone]     desvenlafaxine 50 MG 24 hr tablet    flunisolide 25 MCG/ACT (0.025%) Soln spray    norgestimate-ethinyl estradiol 0.25-35 MG-MCG per tablet         Some of these will need a paper prescription and others can be bought over the counter.  Ask your nurse if you have questions.     Bring a paper prescription for each of these medications     amphetamine-dextroamphetamine 20 MG 24 hr capsule                Primary Care Provider Office Phone # Fax #     Enid Mansfield PA-C [phone]       [address] [place] [address] [address]  [facility] [address]        Equal Access to Services     LINDY CAMERON : Hadii aad ku hadcristianejesus Soroselia, waaxda luqadaha, qaybta kaalmada adedavidda, noemi mekhiin hayaaparker perkinsdeandra famnicholtavo araiza. So [facility] [phone].    ATENCIÓN: Si habla español, tiene a chaves disposición servicios gratuitos de asistencia lingüística. Llame al [phone].    We comply with applicable federal civil rights laws and [place] laws. We do not discriminate on the basis of race, color, national origin, age, disability, sex, sexual orientation, or gender identity.            Thank you!     Thank you for choosing [facility]  for your care. Our goal is always to provide you with excellent care. Hearing back from our patients is one way we can continue to improve our services. Please take a few minutes to complete the written survey that you may receive in the mail after your visit with us. Thank you!             Your Updated Medication List - Protect others around you: Learn how to safely use, store and throw away your medicines at www.disposemymeds.org.          This list is accurate as of 11/26/18  8:33 AM.  Always use your most recent med list.                   Brand Name Dispense Instructions for use Diagnosis    amLODIPine 10 MG tablet    NORVASC    90 tablet    Take 1 tablet (10 mg) by mouth daily    HTN, goal below 140/90       amphetamine-dextroamphetamine 20 MG 24 hr capsule   Start taking on:  1/21/2019    ADDERALL XR    60 capsule    Take 2 capsules (40 mg) by mouth daily    Hypersomnia       busPIRone 5 MG tablet    BUSPAR    90 tablet    Take 1 tablet (5 mg) by mouth 3 times daily as needed    Major depressive disorder, recurrent episode, moderate (H), Generalized anxiety disorder       desvenlafaxine 50 MG 24 hr tablet    PRISTIQ    90 tablet    Take 1 tablet (50 mg) by mouth daily    Major depressive disorder, recurrent  episode, moderate (H), Generalized anxiety disorder       DULoxetine 20 MG capsule    CYMBALTA    60 capsule    Take 1 capsule (20 mg) by mouth 2 times daily    Major depressive disorder, recurrent episode, moderate (H), Generalized anxiety disorder       flunisolide 25 MCG/ACT (0.025%) Soln spray    NASALIDE    1 Bottle    Spray 2 sprays into both nostrils every 12 hours    Chronic seasonal allergic rhinitis due to pollen       metoprolol succinate 25 MG 24 hr tablet    TOPROL-XL    45 tablet    Take 0.5 tablets (12.5 mg) by mouth daily    HTN, goal below 140/90       naltrexone 380 MG Susr    VIVITROL     Inject 380 mg into the muscle every 30 days        norgestimate-ethinyl estradiol 0.25-35 MG-MCG per tablet    ORTHO-CYCLEN, SPRINTEC    90 tablet    Take 1 tablet by mouth daily    Encounter for initial prescription of contraceptive pills

## 2018-11-26 NOTE — TELEPHONE ENCOUNTER
Please call patient     Urine not convincing for UTI. I will run culture and if any bacteria grows we will treat the infection. This may take several days. Otherwise, push fluids and continue to monitor.     Donnie Mansfield PA-C  [facility]

## 2018-11-26 NOTE — TELEPHONE ENCOUNTER
Reason for Call:  Other returning call    Detailed comments: Patient called clinic back. Relayed message from below. Please call back with culture resilts.     Phone Number Patient can be reached at: Home number on file [phone] (home)    Best Time: any    Can we leave a detailed message on this number? YES    Call taken on 11/26/2018 at 11:32 AM by Aj Corral

## 2018-11-27 ENCOUNTER — TELEPHONE (OUTPATIENT)
Dept: FAMILY MEDICINE | Facility: OTHER | Age: 38
End: 2018-11-27

## 2018-11-27 DIAGNOSIS — N30.00 ACUTE CYSTITIS WITHOUT HEMATURIA: Primary | ICD-10-CM

## 2018-11-27 LAB
BACTERIA SPEC CULT: ABNORMAL
BACTERIA SPEC CULT: ABNORMAL
Lab: ABNORMAL
SPECIMEN SOURCE: ABNORMAL

## 2018-11-27 RX ORDER — CEPHALEXIN 500 MG/1
500 CAPSULE ORAL 2 TIMES DAILY
Qty: 20 CAPSULE | Refills: 0 | Status: SHIPPED | OUTPATIENT
Start: 2018-11-27 | End: 2019-03-01

## 2018-11-27 ASSESSMENT — PATIENT HEALTH QUESTIONNAIRE - PHQ9: SUM OF ALL RESPONSES TO PHQ QUESTIONS 1-9: 3

## 2018-11-27 ASSESSMENT — ANXIETY QUESTIONNAIRES: GAD7 TOTAL SCORE: 2

## 2018-11-27 NOTE — TELEPHONE ENCOUNTER
Please call patient     Urine did grow out bacteria. I would like her to take Keflex twice a day for 10 days. I have sent this to [facility].     Donnie Mansfield PA-C  [facility]

## 2018-11-27 NOTE — TELEPHONE ENCOUNTER
LM for the patient to return call to the clinic to discuss the below and sent Rx to pharmacy per below note. Will await to hear from patient. Doris Estrella RN, BSN

## 2018-11-28 NOTE — TELEPHONE ENCOUNTER
Spoke to patient and informed her of results- She wanted the Rx sent to [facility] due to by her house and can't make here by 5:30 p

## 2019-02-07 ENCOUNTER — TELEPHONE (OUTPATIENT)
Dept: FAMILY MEDICINE | Facility: OTHER | Age: 39
End: 2019-02-07

## 2019-02-07 NOTE — TELEPHONE ENCOUNTER
Summary:    Patient is due/failing the following:   Depression follow up , PAP and PHQ9    Action needed:   Patient needs office visit for follow up and PAP. and Patient needs to do PHQ9.    Type of outreach:    phone person answered and hung up. My Chart letter sent     Questions for provider review:    None                                                                                                                                    Halle Linares       Chart routed to Care Team .          Panel Management Review      Patient has the following on her problem list:     Depression / Dysthymia review    Measure:  Needs PHQ-9 score of 4 or less during index window.  Administer PHQ-9 and if score is 5 or more, send encounter to provider for next steps.        PHQ-9 SCORE 9/25/2018 10/22/2018 11/26/2018   PHQ-9 Total Score - - -   PHQ-9 Total Score MyChart - - 3 (Minimal depression)   PHQ-9 Total Score 9 8 3       If PHQ-9 recheck is 5 or more, route to provider for next steps.    Patient is due for:  PHQ9    Hypertension   Last three blood pressure readings:  BP Readings from Last 3 Encounters:   11/26/18 118/80   10/22/18 104/72   09/25/18 118/70     Blood pressure: Passed    HTN Guidelines:  Age 18-59 BP range:  Less than 140/90  Age 60-85 with Diabetes:  Less than 140/90  Age 60-85 without Diabetes:  less than 150/90      Composite cancer screening  Chart review shows that this patient is due/due soon for the following Pap Smear

## 2019-02-26 NOTE — PROGRESS NOTES
SUBJECTIVE:   Tasha Short is a 38 year old female who presents to clinic today for the following health issues:      History of Present Illness     Depression & Anxiety Follow-up:     Depression/Anxiety:  Depression & Anxiety    Status since last visit::  Stable    Other associated symptoms of depression and anxiety::  None    Significant life event::  No    Current substance use::  None       Today's PHQ-9         PHQ-9 Total Score:     (P) 8   PHQ-9 Q9 Suicidal ideation:   (P) Not at all   Thoughts of suicide or self harm:      Self-harm Plan:        Self-harm Action:          Safety concerns for self or others:       ROMI-7 Total Score: (P) 8    Hypertension:     Outpatient blood pressures:  Are being checked    Blood pressures checked at:  Home    Dietary sodium intake::  Not monitoring salt intake    Diet:  Regular (no restrictions)  Frequency of exercise:  2-3 days/week  Duration of exercise:  15-30 minutes  Taking medications regularly:  Yes  Medication side effects:  Not applicable  Additional concerns today:  No    Medication Followup of Adderall    Taking Medication as prescribed: yes    Side Effects:  Irritable, makes her feel too wired and anxious. She would like to go back to Ritalin 10 mg twice daily which she was on in the past. She had switched to Adderall because the Ritalin wore off too quickly but she would rather have the medication wear off too quickly than feel the way she does on Adderall.    Medication Helping Symptoms:  yes         Problem list and histories reviewed & adjusted, as indicated.  Additional history: as documented    Patient Active Problem List   Diagnosis     ROSA I (cervical intraepithelial neoplasia I)     Cervical dysplasia     S/P LEEP     Narcolepsy and cataplexy     Health Care Home     Calcific tendonitis peroneals     Hypersomnia     Major depressive disorder, recurrent episode, moderate (H)     Generalized anxiety disorder     HTN, goal below 140/90      Irregular heartbeat     Tobacco use disorder     H/O ETOH abuse     Gastroesophageal reflux disease without esophagitis     Hypertriglyceridemia     Chronic seasonal allergic rhinitis due to pollen     Past Surgical History:   Procedure Laterality Date     COLPOSCOPY CERVIX, LOOP ELECTRODE BIOPSY, COMBINED  2010    ROSA 2/3     HC REMOVAL OF TONSILS,12+ Y/O      Tonsils 12+y.o.     REPAIR TENDON PERONEAL  11/15/2013    Procedure: REPAIR TENDON PERONEAL;  right peroneal tendon  transfer;  Surgeon: Jesus Manuel Oden DPM;  Location: [facility] OR       Social History     Tobacco Use     Smoking status: Current Every Day Smoker     Packs/day: 0.50     Last attempt to quit: 2015     Years since quittin.1     Smokeless tobacco: Never Used   Substance Use Topics     Alcohol use: No     Family History   Problem Relation Age of Onset     Depression Mother      Arthritis Paternal Grandmother      Hypertension Paternal Grandfather         birth FF     Asthma No family hx of      C.A.D. No family hx of      Diabetes No family hx of      Cancer - colorectal No family hx of      Prostate Cancer No family hx of      Coronary Artery Disease No family hx of      Ovarian Cancer No family hx of      Mental Illness No family hx of      Cerebrovascular Disease No family hx of      Anesthesia Reaction No family hx of      Other Cancer No family hx of      Osteoporosis No family hx of      Known Genetic Syndrome No family hx of      Obesity No family hx of      Unknown/Adopted No family hx of          Current Outpatient Medications   Medication Sig Dispense Refill     amLODIPine (NORVASC) 10 MG tablet Take 1 tablet (10 mg) by mouth daily 90 tablet 3     busPIRone (BUSPAR) 5 MG tablet Take 1 tablet (5 mg) by mouth 3 times daily as needed 90 tablet 1     desvenlafaxine (PRISTIQ) 50 MG 24 hr tablet Take 1 tablet (50 mg) by mouth daily 90 tablet 3     flunisolide (NASALIDE) 25 MCG/ACT (0.025%) SOLN spray Spray 2 sprays into both  "nostrils every 12 hours 1 Bottle 5     methylphenidate (RITALIN) 10 MG tablet Take 1 tablet (10 mg) by mouth 2 times daily 60 tablet 0     metoprolol succinate (TOPROL-XL) 25 MG 24 hr tablet Take 0.5 tablets (12.5 mg) by mouth daily 45 tablet 3     naltrexone (VIVITROL) 380 MG SUSR Inject 380 mg into the muscle every 30 days       norgestimate-ethinyl estradiol (ORTHO-CYCLEN, SPRINTEC) 0.25-35 MG-MCG per tablet Take 1 tablet by mouth daily 90 tablet 3     DULoxetine (CYMBALTA) 20 MG EC capsule Take 1 capsule (20 mg) by mouth 2 times daily (Patient not taking: Reported on 3/1/2019) 60 capsule 11     Allergies   Allergen Reactions     Bupropion Other (See Comments)     seizures     Lisinopril Swelling     Angioedema      Penicillins      hives     Phenytoin      rash,puritis (Dilantin)     Prednisone Itching     Face itching, unable to concentrate      Seasonal Allergies      BP Readings from Last 3 Encounters:   03/01/19 110/72   11/26/18 118/80   10/22/18 104/72    Wt Readings from Last 3 Encounters:   03/01/19 57.3 kg (126 lb 6.4 oz)   11/26/18 58.5 kg (129 lb)   10/22/18 58.6 kg (129 lb 3.2 oz)                  Labs reviewed in EPIC    ROS:  Constitutional, HEENT, cardiovascular, pulmonary, gi and gu systems are negative, except as otherwise noted.    OBJECTIVE:     /72   Pulse 76   Temp 98.6  F (37  C) (Temporal)   Resp 16   Ht 1.611 m (5' 3.43\")   Wt 57.3 kg (126 lb 6.4 oz)   BMI 22.09 kg/m    Body mass index is 22.09 kg/m .  GENERAL: healthy, alert and no distress  EYES: Eyes grossly normal to inspection and conjunctivae and sclerae normal  NECK: no adenopathy, no asymmetry, masses, or scars  RESP: lungs clear to auscultation - no rales, rhonchi or wheezes  CV: regular rate and rhythm, normal S1 S2, no S3 or S4, no murmur, click or rub  MS: no gross musculoskeletal defects noted, no edema    Diagnostic Test Results:  none     ASSESSMENT/PLAN:     1. Hypersomnia  Will switch back to Ritalin and she " will follow up with me or PCP if she is back in clinic in 3-4 weeks for recheck.  - methylphenidate (RITALIN) 10 MG tablet; Take 1 tablet (10 mg) by mouth 2 times daily  Dispense: 60 tablet; Refill: 0    FLORES Tucker [facility]

## 2019-03-01 ENCOUNTER — OFFICE VISIT (OUTPATIENT)
Dept: FAMILY MEDICINE | Facility: OTHER | Age: 39
End: 2019-03-01
Payer: COMMERCIAL

## 2019-03-01 VITALS
SYSTOLIC BLOOD PRESSURE: 110 MMHG | HEART RATE: 76 BPM | TEMPERATURE: 98.6 F | HEIGHT: 63 IN | BODY MASS INDEX: 22.39 KG/M2 | DIASTOLIC BLOOD PRESSURE: 72 MMHG | WEIGHT: 126.4 LBS | RESPIRATION RATE: 16 BRPM

## 2019-03-01 DIAGNOSIS — G47.10 HYPERSOMNIA: ICD-10-CM

## 2019-03-01 PROCEDURE — 99213 OFFICE O/P EST LOW 20 MIN: CPT | Performed by: STUDENT IN AN ORGANIZED HEALTH CARE EDUCATION/TRAINING PROGRAM

## 2019-03-01 RX ORDER — DEXTROAMPHETAMINE SACCHARATE, AMPHETAMINE ASPARTATE MONOHYDRATE, DEXTROAMPHETAMINE SULFATE AND AMPHETAMINE SULFATE 5; 5; 5; 5 MG/1; MG/1; MG/1; MG/1
40 CAPSULE, EXTENDED RELEASE ORAL DAILY
Qty: 60 CAPSULE | Refills: 0 | Status: SHIPPED | OUTPATIENT
Start: 2019-03-31 | End: 2019-03-01

## 2019-03-01 RX ORDER — METHYLPHENIDATE HYDROCHLORIDE 10 MG/1
10 TABLET ORAL 2 TIMES DAILY
Qty: 60 TABLET | Refills: 0 | Status: SHIPPED | OUTPATIENT
Start: 2019-03-01 | End: 2019-04-01

## 2019-03-01 RX ORDER — DEXTROAMPHETAMINE SACCHARATE, AMPHETAMINE ASPARTATE MONOHYDRATE, DEXTROAMPHETAMINE SULFATE AND AMPHETAMINE SULFATE 5; 5; 5; 5 MG/1; MG/1; MG/1; MG/1
40 CAPSULE, EXTENDED RELEASE ORAL DAILY
Qty: 60 CAPSULE | Refills: 0 | Status: SHIPPED | OUTPATIENT
Start: 2019-03-01 | End: 2019-03-01

## 2019-03-01 RX ORDER — DEXTROAMPHETAMINE SACCHARATE, AMPHETAMINE ASPARTATE MONOHYDRATE, DEXTROAMPHETAMINE SULFATE AND AMPHETAMINE SULFATE 5; 5; 5; 5 MG/1; MG/1; MG/1; MG/1
40 CAPSULE, EXTENDED RELEASE ORAL DAILY
Qty: 60 CAPSULE | Refills: 0 | Status: SHIPPED | OUTPATIENT
Start: 2019-04-29 | End: 2019-03-01

## 2019-03-01 ASSESSMENT — PATIENT HEALTH QUESTIONNAIRE - PHQ9
10. IF YOU CHECKED OFF ANY PROBLEMS, HOW DIFFICULT HAVE THESE PROBLEMS MADE IT FOR YOU TO DO YOUR WORK, TAKE CARE OF THINGS AT HOME, OR GET ALONG WITH OTHER PEOPLE: NOT DIFFICULT AT ALL
SUM OF ALL RESPONSES TO PHQ QUESTIONS 1-9: 8
SUM OF ALL RESPONSES TO PHQ QUESTIONS 1-9: 8

## 2019-03-01 ASSESSMENT — ANXIETY QUESTIONNAIRES
GAD7 TOTAL SCORE: 8
7. FEELING AFRAID AS IF SOMETHING AWFUL MIGHT HAPPEN: SEVERAL DAYS
7. FEELING AFRAID AS IF SOMETHING AWFUL MIGHT HAPPEN: SEVERAL DAYS
2. NOT BEING ABLE TO STOP OR CONTROL WORRYING: SEVERAL DAYS
5. BEING SO RESTLESS THAT IT IS HARD TO SIT STILL: NOT AT ALL
GAD7 TOTAL SCORE: 8
GAD7 TOTAL SCORE: 8
4. TROUBLE RELAXING: SEVERAL DAYS
1. FEELING NERVOUS, ANXIOUS, OR ON EDGE: SEVERAL DAYS
3. WORRYING TOO MUCH ABOUT DIFFERENT THINGS: MORE THAN HALF THE DAYS
6. BECOMING EASILY ANNOYED OR IRRITABLE: MORE THAN HALF THE DAYS

## 2019-03-01 ASSESSMENT — MIFFLIN-ST. JEOR: SCORE: 1229.22

## 2019-03-02 ASSESSMENT — ANXIETY QUESTIONNAIRES: GAD7 TOTAL SCORE: 8

## 2019-03-02 ASSESSMENT — PATIENT HEALTH QUESTIONNAIRE - PHQ9: SUM OF ALL RESPONSES TO PHQ QUESTIONS 1-9: 8

## 2019-03-18 NOTE — PROGRESS NOTES
SUBJECTIVE:   Tasha Short is a 38 year old female who presents to clinic today for the following health issues:    HPI  Joint Pain    Onset: 3/16/19    Description:   Location: right foot  Character: throbbing constant pain and bruised    Intensity: moderate    Progression of Symptoms: worse    Accompanying Signs & Symptoms:  Other symptoms: throbbing    History:   Previous similar pain:       Precipitating factors:   Trauma or overuse: YES    Alleviating factors:  Improved by: rest/inactivity    Therapies Tried and outcome: tylenol     Patient reports she fell after tripping over her dog 3 days ago. She reports ongoing pain, swelling and bruising over the right lateral foot extending into ankle. Pain worse over 5th metatarsal. She thinks she heard a pop when the fall occurred. Pain has been worsening. She reports history of break and tendon tear in the same area.     Problem list and histories reviewed & adjusted, as indicated.  Additional history: as documented    Patient Active Problem List   Diagnosis     ROSA I (cervical intraepithelial neoplasia I)     Cervical dysplasia     S/P LEEP     Narcolepsy and cataplexy     Health Care Home     Calcific tendonitis peroneals     Hypersomnia     Major depressive disorder, recurrent episode, moderate (H)     Generalized anxiety disorder     HTN, goal below 140/90     Irregular heartbeat     Tobacco use disorder     H/O ETOH abuse     Gastroesophageal reflux disease without esophagitis     Hypertriglyceridemia     Chronic seasonal allergic rhinitis due to pollen     Past Surgical History:   Procedure Laterality Date     COLPOSCOPY CERVIX, LOOP ELECTRODE BIOPSY, COMBINED  11/2010    ROSA 2/3     HC REMOVAL OF TONSILS,12+ Y/O  1999    Tonsils 12+y.o.     REPAIR TENDON PERONEAL  11/15/2013    Procedure: REPAIR TENDON PERONEAL;  right peroneal tendon  transfer;  Surgeon: Jesus Manuel Oden DPM;  Location:  OR       Social History     Tobacco Use     Smoking status:  Current Every Day Smoker     Packs/day: 0.50     Last attempt to quit: 2015     Years since quittin.2     Smokeless tobacco: Never Used   Substance Use Topics     Alcohol use: No     Family History   Problem Relation Age of Onset     Depression Mother      Arthritis Paternal Grandmother      Hypertension Paternal Grandfather         birth FF     Asthma No family hx of      C.A.D. No family hx of      Diabetes No family hx of      Cancer - colorectal No family hx of      Prostate Cancer No family hx of      Coronary Artery Disease No family hx of      Ovarian Cancer No family hx of      Mental Illness No family hx of      Cerebrovascular Disease No family hx of      Anesthesia Reaction No family hx of      Other Cancer No family hx of      Osteoporosis No family hx of      Known Genetic Syndrome No family hx of      Obesity No family hx of      Unknown/Adopted No family hx of          Current Outpatient Medications   Medication Sig Dispense Refill     amLODIPine (NORVASC) 10 MG tablet Take 1 tablet (10 mg) by mouth daily 90 tablet 3     busPIRone (BUSPAR) 5 MG tablet Take 1 tablet (5 mg) by mouth 3 times daily as needed 90 tablet 1     desvenlafaxine (PRISTIQ) 50 MG 24 hr tablet Take 1 tablet (50 mg) by mouth daily 90 tablet 3     flunisolide (NASALIDE) 25 MCG/ACT (0.025%) SOLN spray Spray 2 sprays into both nostrils every 12 hours 1 Bottle 5     methylphenidate (RITALIN) 10 MG tablet Take 1 tablet (10 mg) by mouth 2 times daily 60 tablet 0     metoprolol succinate (TOPROL-XL) 25 MG 24 hr tablet Take 0.5 tablets (12.5 mg) by mouth daily 45 tablet 3     naltrexone (VIVITROL) 380 MG SUSR Inject 380 mg into the muscle every 30 days       norgestimate-ethinyl estradiol (ORTHO-CYCLEN, SPRINTEC) 0.25-35 MG-MCG per tablet Take 1 tablet by mouth daily 90 tablet 3     DULoxetine (CYMBALTA) 20 MG EC capsule Take 1 capsule (20 mg) by mouth 2 times daily (Patient not taking: Reported on 3/1/2019) 60 capsule 11      Allergies   Allergen Reactions     Bupropion Other (See Comments)     seizures     Lisinopril Swelling     Angioedema      Penicillins      hives     Phenytoin      rash,puritis (Dilantin)     Prednisone Itching     Face itching, unable to concentrate      Seasonal Allergies      BP Readings from Last 3 Encounters:   03/19/19 118/82   03/01/19 110/72   11/26/18 118/80    Wt Readings from Last 3 Encounters:   03/19/19 57.6 kg (127 lb)   03/01/19 57.3 kg (126 lb 6.4 oz)   11/26/18 58.5 kg (129 lb)         ROS:  Constitutional, HEENT, cardiovascular, pulmonary, gi and gu systems are negative, except as otherwise noted.    OBJECTIVE:     /82   Pulse 82   Temp 99.7  F (37.6  C) (Temporal)   Resp 16   Wt 57.6 kg (127 lb)   LMP 03/06/2019 (Exact Date)   SpO2 99%   BMI 22.20 kg/m    Body mass index is 22.2 kg/m .  GENERAL: healthy, alert and no distress  MS: soft tissue swelling and significant ecchymosis present over the lateral aspect of the right foot. Tenderness with palpation over the 5th metatarsal. Full ROM of toes.  SKIN: no suspicious lesions or rashes  PSYCH: mentation appears normal, affect normal/bright    Diagnostic Test Results:  Xray - concerning for proximal right fifth metatarsal fracture, nondisplaced. There is also an area of concern over medial tibia - radiology read pending    ASSESSMENT/PLAN:     1. Nondisplaced fracture of fifth metatarsal bone, right foot, initial encounter for closed fracture  Patient with nondisplaced fracture of the right fifth metatarsal. She was placed in a boot today. Recommended rest, elevation and tylenol/ibuprofen as needed. Patient was scheduled with ortho for recheck in 2 weeks.   - ORTHO  REFERRAL    2. Right foot pain  - XR Foot Left G/E 3 Views; Future    The patient indicates understanding of these issues and agrees with the plan.    Britney Hernandez PA-C  [facility]

## 2019-03-19 ENCOUNTER — OFFICE VISIT (OUTPATIENT)
Dept: FAMILY MEDICINE | Facility: OTHER | Age: 39
End: 2019-03-19
Payer: COMMERCIAL

## 2019-03-19 ENCOUNTER — ANCILLARY PROCEDURE (OUTPATIENT)
Dept: GENERAL RADIOLOGY | Facility: OTHER | Age: 39
End: 2019-03-19
Attending: PHYSICIAN ASSISTANT
Payer: COMMERCIAL

## 2019-03-19 VITALS
SYSTOLIC BLOOD PRESSURE: 118 MMHG | RESPIRATION RATE: 16 BRPM | TEMPERATURE: 99.7 F | BODY MASS INDEX: 22.2 KG/M2 | HEART RATE: 82 BPM | WEIGHT: 127 LBS | OXYGEN SATURATION: 99 % | DIASTOLIC BLOOD PRESSURE: 82 MMHG

## 2019-03-19 DIAGNOSIS — M79.671 RIGHT FOOT PAIN: ICD-10-CM

## 2019-03-19 DIAGNOSIS — S92.354A NONDISPLACED FRACTURE OF FIFTH METATARSAL BONE, RIGHT FOOT, INITIAL ENCOUNTER FOR CLOSED FRACTURE: Primary | ICD-10-CM

## 2019-03-19 PROCEDURE — 73630 X-RAY EXAM OF FOOT: CPT | Mod: LT

## 2019-03-19 PROCEDURE — 99213 OFFICE O/P EST LOW 20 MIN: CPT | Performed by: PHYSICIAN ASSISTANT

## 2019-03-19 ASSESSMENT — PAIN SCALES - GENERAL: PAINLEVEL: MODERATE PAIN (5)

## 2019-03-19 NOTE — LETTER
March 19, 2019      Tasha Short  [address] [place] [address]        To Whom It May Concern:    Tasha Short  was seen on 3/19/2019.  Please excuse her from work 3/18/2019 and 3/19/2019.         Sincerely,        Britney Hernandez PA-C

## 2019-03-28 ENCOUNTER — ANCILLARY PROCEDURE (OUTPATIENT)
Dept: GENERAL RADIOLOGY | Facility: CLINIC | Age: 39
End: 2019-03-28
Attending: PODIATRIST
Payer: COMMERCIAL

## 2019-03-28 ENCOUNTER — OFFICE VISIT (OUTPATIENT)
Dept: PODIATRY | Facility: CLINIC | Age: 39
End: 2019-03-28
Attending: PHYSICIAN ASSISTANT
Payer: COMMERCIAL

## 2019-03-28 VITALS
HEIGHT: 63 IN | OXYGEN SATURATION: 97 % | WEIGHT: 127 LBS | DIASTOLIC BLOOD PRESSURE: 101 MMHG | BODY MASS INDEX: 22.5 KG/M2 | SYSTOLIC BLOOD PRESSURE: 146 MMHG | HEART RATE: 80 BPM

## 2019-03-28 DIAGNOSIS — S93.601A SPRAIN OF RIGHT FOOT, INITIAL ENCOUNTER: ICD-10-CM

## 2019-03-28 DIAGNOSIS — S92.354A CLOSED NONDISPLACED FRACTURE OF FIFTH METATARSAL BONE OF RIGHT FOOT, INITIAL ENCOUNTER: Primary | ICD-10-CM

## 2019-03-28 DIAGNOSIS — S92.354A CLOSED NONDISPLACED FRACTURE OF FIFTH METATARSAL BONE OF RIGHT FOOT, INITIAL ENCOUNTER: ICD-10-CM

## 2019-03-28 PROCEDURE — 28470 CLTX METATARSAL FX WO MNP EA: CPT | Performed by: PODIATRIST

## 2019-03-28 PROCEDURE — 99243 OFF/OP CNSLTJ NEW/EST LOW 30: CPT | Mod: 57 | Performed by: PODIATRIST

## 2019-03-28 PROCEDURE — 73630 X-RAY EXAM OF FOOT: CPT | Mod: RT

## 2019-03-28 ASSESSMENT — MIFFLIN-ST. JEOR: SCORE: 1225.2

## 2019-03-28 NOTE — LETTER
3/28/2019         RE: Tasha Short  [address] [place] [address]        Dear Colleague,    Thank you for referring your patient, Tasha Short, to the [facility]. Please see a copy of my visit note below.    Subjective:    Pt is seen today in consult from Britney Hernandez for 5th met fx right foot.  Had inversion type sprain on 3/13/2019 via tripping over her dog at.  Had swelling, pain, and ecchymosis.  Aggravated by activity and relieved by rest.   Seen in clinic, xrays taken, and given ankle high Cam walker.  Patient still having pain and swelling.  She also points to the top of the third fourth and fifth tarsometatarsal joints as to where she has pain.  Patient has a history of peroneal tendon pathology and has had a repair of these with an incision posterior distal to her lateral malleoli.  Patient works in [employer] and she has not started working yet.  She smokes 1/2 pack/day.      ROS:  A 10-point review of systems was performed and is positive for that noted in the HPI and as seen above.  All other areas are negative.          Allergies   Allergen Reactions     Bupropion Other (See Comments)     seizures     Lisinopril Swelling     Angioedema      Penicillins      hives     Phenytoin      rash,puritis (Dilantin)     Prednisone Itching     Face itching, unable to concentrate      Seasonal Allergies        Current Outpatient Medications   Medication Sig Dispense Refill     amLODIPine (NORVASC) 10 MG tablet Take 1 tablet (10 mg) by mouth daily 90 tablet 3     busPIRone (BUSPAR) 5 MG tablet Take 1 tablet (5 mg) by mouth 3 times daily as needed 90 tablet 1     desvenlafaxine (PRISTIQ) 50 MG 24 hr tablet Take 1 tablet (50 mg) by mouth daily 90 tablet 3     DULoxetine (CYMBALTA) 20 MG EC capsule Take 1 capsule (20 mg) by mouth 2 times daily (Patient not taking: Reported on 3/1/2019) 60 capsule 11     flunisolide (NASALIDE) 25 MCG/ACT (0.025%) SOLN spray Spray 2 sprays  into both nostrils every 12 hours 1 Bottle 5     methylphenidate (RITALIN) 10 MG tablet Take 1 tablet (10 mg) by mouth 2 times daily 60 tablet 0     metoprolol succinate (TOPROL-XL) 25 MG 24 hr tablet Take 0.5 tablets (12.5 mg) by mouth daily 45 tablet 3     naltrexone (VIVITROL) 380 MG SUSR Inject 380 mg into the muscle every 30 days       norgestimate-ethinyl estradiol (ORTHO-CYCLEN, SPRINTEC) 0.25-35 MG-MCG per tablet Take 1 tablet by mouth daily 90 tablet 3       Patient Active Problem List   Diagnosis     ROSA I (cervical intraepithelial neoplasia I)     Cervical dysplasia     S/P LEEP     Narcolepsy and cataplexy     Health Care Home     Calcific tendonitis peroneals     Hypersomnia     Major depressive disorder, recurrent episode, moderate (H)     Generalized anxiety disorder     HTN, goal below 140/90     Irregular heartbeat     Tobacco use disorder     H/O ETOH abuse     Gastroesophageal reflux disease without esophagitis     Hypertriglyceridemia     Chronic seasonal allergic rhinitis due to pollen       Past Medical History:   Diagnosis Date     ALCOHOL ABUSE-IN REMISS 7/30/2007     Cataplexy and narcolepsy 2002    tried Provigil, Straterra, Ritalin (works)     ROSA 3 - cervical intraepithelial neoplasia grade 3 1/4/10    ROSA 2/3  on LEEP biopsy     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 02/19/2001     Hypersomnia with sleep apnea      Irregular menstrual cycle 05/12/1997     Metrorrhagia 02/08/2001     Narcolepsy and cataplexy      Other acne      Other and unspecified adverse effect of drug, medicinal and biological substance 02/19/2001    Allergic and adverse reaction to Dilantin     Substance abuse (H) 10/2/2009    see 9/23/2009 confidential report     Unspecified contraceptive management        Past Surgical History:   Procedure Laterality Date     COLPOSCOPY CERVIX, LOOP ELECTRODE BIOPSY, COMBINED  11/2010    ROSA 2/3     HC REMOVAL OF TONSILS,12+ Y/O  1999    Tonsils  "12+y.o.     REPAIR TENDON PERONEAL  11/15/2013    Procedure: REPAIR TENDON PERONEAL;  right peroneal tendon  transfer;  Surgeon: Jesus Manuel Oden DPM;  Location: [facility] OR       Family History   Problem Relation Age of Onset     Depression Mother      Arthritis Paternal Grandmother      Hypertension Paternal Grandfather         birth FF     Asthma No family hx of      C.A.D. No family hx of      Diabetes No family hx of      Cancer - colorectal No family hx of      Prostate Cancer No family hx of      Coronary Artery Disease No family hx of      Ovarian Cancer No family hx of      Mental Illness No family hx of      Cerebrovascular Disease No family hx of      Anesthesia Reaction No family hx of      Other Cancer No family hx of      Osteoporosis No family hx of      Known Genetic Syndrome No family hx of      Obesity No family hx of      Unknown/Adopted No family hx of        Social History     Tobacco Use     Smoking status: Current Every Day Smoker     Packs/day: 0.50     Last attempt to quit: 2015     Years since quittin.2     Smokeless tobacco: Never Used   Substance Use Topics     Alcohol use: No         Exam:    Vitals: BP (!) 146/101   Pulse 80   Ht 1.6 m (5' 3\")   Wt 57.6 kg (127 lb)   LMP 2019 (Exact Date)   SpO2 97%   BMI 22.50 kg/m     BMI: Body mass index is 22.5 kg/m .  Height: 5' 3\"    Constitutional/ general:  Pt is in no apparent distress, appears well-nourished.  Cooperative with history and physical exam.     Psych:  The patient answered questions appropriately.  Normal affect.  Seems to have reasonable expectations, in terms of treatment.     Eyes:  Visual scanning/ tracking without deficit.     Ears:  Response to auditory stimuli is normal.  negative hearing aid devices.  Auricles in proper alignment.     Lymphatic:  Popliteal lymph nodes not enlarged.     Lungs:  Non labored breathing, non labored speech. No cough.  No audible wheezing. Even, quiet breathing.       Vascular: "  positive pedal pulses bilaterally for both the DP and PT arteries.  CFT < 3 sec.  negative ankle edema.  positive pedal hair growth.    Neuro:  Alert and oriented x 3. Coordinated gait.  Light touch sensation is intact to the L4, L5, S1 distributions. No obvious deficits.  No evidence of neurological-based weakness, spasticity, or contracture in the lower extremities.     Derm: Normal texture and turgor.  No erythema, ecchymosis, or cyanosis.      Musculoskeletal:    Lower extremity muscle strength is normal.  Patient is ambulatory without an assistive device or brace.  No gross deformities.  Normal arch.       Decent muscle strength and ROM to all areas tested.  Pain upon palpation to base of right 5th metatarsal.  Edema and echymosis noted.  No erythema.  No sign of ulceration, infection, or drainage.  Patient also has pain on the dorsum of the third fourth and fifth tarsometatarsal joints.  Edema is noted here.  There is no ecchymosis or erythema.    Radiographic Exam:  X-Ray Findings:  I personally reviewed the films.  Nondisplaced avulsion fracture at the base of the fifth metatarsal.  Perhaps slight amount more distraction at the fracture site plantar.    Assessment:    Fifth metatarsal styloid process avulsion fracture right foot  Right third fourth and fifth tarsometatarsal joint sprain    Plan:  X-rays personally reviewed.  Explained mechanism avulsion fracture of the right fifth metatarsal.  We discussed there is a slight increase in the gap.  Explained how motion in this area slows down bone healing and causes pain and swelling.  Discussed with patient helpful of peroneus brevis every time she walks on this causes motion at this fracture site.  Also discussed that she has sprained the dorsum of her third fourth and fifth tarsometatarsal joints.  We gave her a Cam walker and plantarflex this.  We instructed her to be nonweightbearing at all times.  Patient has crutches at home.  We also gave her a  prescription for a knee walker.   F/U 3 wks for repeat x-ray.  Fracture care.  Thank you for allowing me participate in the care of this patient.        Eric Madera DPM, FACFAS      Again, thank you for allowing me to participate in the care of your patient.        Sincerely,        Eric Madera DPM

## 2019-03-28 NOTE — PATIENT INSTRUCTIONS
Tasha to follow up with Primary Care provider regarding elevated blood pressure.  Body mass index is 22.5 kg/m .     We wish you continued good healing. If you have any questions or concerns, please do not hesitate to contact us at [phone]    Please remember to call and schedule a follow up appointment if one was recommended at your earliest convenience.   PODIATRY CLINIC HOURS  TELEPHONE NUMBER    Dr. Eric Madera D.P.[facility] FAS    Clinics:  [facility]    Emmy Blackman [facility]   Tuesday 1PM-6PM  [place]  Wednesday 7AM-2PM  [place]  Thursday 10AM-6PM  [place]  Friday 7AM-3PM  [place]  Specialty schedulers:   [phone] to make an appointment with any Specialty Provider.        Urgent Care locations:    [facility] Monday-Friday 5 pm - 9 pm. Saturday-Sunday 9 am -5pm    Monday-Friday 11 am - 9 pm Saturday 9 am - 5 pm     Monday-Sunday 12 noon-8PM [phone]     If you need a medication refill, please contact us you may need lab work and/or a follow up visit prior to your refill (i.e. Antifungal medications).    MyChart (secure e-mail communication and access to your chart) to send a message or to make an appointment.    If MRI needed please call Freddie Sequeira at [phone]              SMOKING CESSATION  What's in cigarette smoke? - Cigarette smoke contains over 4,000 chemicals. Nicotine is one of the main ingredients which is an insecticide/herbicide. It is poisonous to our nervous system, increases blood clotting risk, and decreases the body's defenses to fight off infection. Another chemical is Carbon Monoxide is an asphyxiating gas that permanently binds to blood cells and blocks the transport of oxygen. This leads to tissue death and decreases your metabolism. Tar is a chemical that coats your lungs and trachea which impairs new oxygen coming in and  carbon dioxide getting out of your body.   How does smoking impact surgery? - Smoking is particularly hazardous with regards to surgery. Surgery puts stress on the body and a smoker's body is already under strain from these chemicals. Putting the two together, especially for an elective surgery, could be a recipe for disaster. Smoking before and after surgery increases your risk of heart problems, slow wound healing, delayed bone healing, blood clots, wound infection and anesthesia complications.   What are the benefits of quitting? - In 20 minutes your blood pressure will drop back down to normal. In 8 hours the carbon monoxide (a toxic gas) levels in your blood stream will drop by half, and oxygen levels will return to normal. In 48 hours your chance of having a heart attack will have decreased. All nicotine will have left your body. Your sense of taste and smell will return to a normal level. In 72 hours your bronchial tubes will relax, and your energy levels will increase. In 2 weeks your circulation will increase, and it will continue to improve for the next 10 weeks.    Recommendations for elective surgery - Ideally, patients should quit smoking 8 weeks before and at least 2 weeks after elective surgery in order to avoid complications. Simply cutting back on the amount of cigarettes smoked per day does not offer any benefit or decrease the risk of poor wound healing, heart problems, and infection. Smokers should also start taking Vitamin C and B for two weeks before surgery and two weeks after surgery.    Ways to Stop Smokin. Nicotine patches, lozenges, or gum  2. Support Groups  3. Medications (see below)    List of Medications:  1. Varenicline Tartrate (CHANTIX)   2. Bupropion HCL (WELLBUTRIN, ZYBAN) - note: make sure Wellbutrin is for smoking cessation and not other issues   3. Nicotine Patch (NICODERM)   4. Nicotine Inhaler (NICOTROL)   5. Nicotine Gum Nicotine Polacrilex   6. Nicotine Lozenge:  Nicotine Polacrilex (COMMIT)   * [place] offers a smoking support group as well!  Please visit: [email or web address]  If you are interested in these, ask about getting a prescription or talk to your primary care doctor about what may be the best way for you to quit.

## 2019-03-28 NOTE — PROGRESS NOTES
Subjective:    Pt is seen today in consult from Britney Hernandez for 5th met fx right foot.  Had inversion type sprain on 3/13/2019 via tripping over her dog at.  Had swelling, pain, and ecchymosis.  Aggravated by activity and relieved by rest.   Seen in clinic, xrays taken, and given ankle high Cam walker.  Patient still having pain and swelling.  She also points to the top of the third fourth and fifth tarsometatarsal joints as to where she has pain.  Patient has a history of peroneal tendon pathology and has had a repair of these with an incision posterior distal to her lateral malleoli.  Patient works in [employer] and she has not started working yet.  She smokes 1/2 pack/day.      ROS:  A 10-point review of systems was performed and is positive for that noted in the HPI and as seen above.  All other areas are negative.          Allergies   Allergen Reactions     Bupropion Other (See Comments)     seizures     Lisinopril Swelling     Angioedema      Penicillins      hives     Phenytoin      rash,puritis (Dilantin)     Prednisone Itching     Face itching, unable to concentrate      Seasonal Allergies        Current Outpatient Medications   Medication Sig Dispense Refill     amLODIPine (NORVASC) 10 MG tablet Take 1 tablet (10 mg) by mouth daily 90 tablet 3     busPIRone (BUSPAR) 5 MG tablet Take 1 tablet (5 mg) by mouth 3 times daily as needed 90 tablet 1     desvenlafaxine (PRISTIQ) 50 MG 24 hr tablet Take 1 tablet (50 mg) by mouth daily 90 tablet 3     DULoxetine (CYMBALTA) 20 MG EC capsule Take 1 capsule (20 mg) by mouth 2 times daily (Patient not taking: Reported on 3/1/2019) 60 capsule 11     flunisolide (NASALIDE) 25 MCG/ACT (0.025%) SOLN spray Spray 2 sprays into both nostrils every 12 hours 1 Bottle 5     methylphenidate (RITALIN) 10 MG tablet Take 1 tablet (10 mg) by mouth 2 times daily 60 tablet 0     metoprolol succinate (TOPROL-XL) 25 MG 24 hr tablet Take 0.5 tablets (12.5 mg) by mouth daily 45 tablet 3      naltrexone (VIVITROL) 380 MG SUSR Inject 380 mg into the muscle every 30 days       norgestimate-ethinyl estradiol (ORTHO-CYCLEN, SPRINTEC) 0.25-35 MG-MCG per tablet Take 1 tablet by mouth daily 90 tablet 3       Patient Active Problem List   Diagnosis     ROSA I (cervical intraepithelial neoplasia I)     Cervical dysplasia     S/P LEEP     Narcolepsy and cataplexy     Health Care Home     Calcific tendonitis peroneals     Hypersomnia     Major depressive disorder, recurrent episode, moderate (H)     Generalized anxiety disorder     HTN, goal below 140/90     Irregular heartbeat     Tobacco use disorder     H/O ETOH abuse     Gastroesophageal reflux disease without esophagitis     Hypertriglyceridemia     Chronic seasonal allergic rhinitis due to pollen       Past Medical History:   Diagnosis Date     ALCOHOL ABUSE-IN REMISS 7/30/2007     Cataplexy and narcolepsy 2002    tried Provigil, Straterra, Ritalin (works)     ROSA 3 - cervical intraepithelial neoplasia grade 3 1/4/10    ROSA 2/3  on LEEP biopsy     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 02/19/2001     Hypersomnia with sleep apnea      Irregular menstrual cycle 05/12/1997     Metrorrhagia 02/08/2001     Narcolepsy and cataplexy      Other acne      Other and unspecified adverse effect of drug, medicinal and biological substance 02/19/2001    Allergic and adverse reaction to Dilantin     Substance abuse (H) 10/2/2009    see 9/23/2009 confidential report     Unspecified contraceptive management        Past Surgical History:   Procedure Laterality Date     COLPOSCOPY CERVIX, LOOP ELECTRODE BIOPSY, COMBINED  11/2010    ROSA 2/3     HC REMOVAL OF TONSILS,12+ Y/O  1999    Tonsils 12+y.o.     REPAIR TENDON PERONEAL  11/15/2013    Procedure: REPAIR TENDON PERONEAL;  right peroneal tendon  transfer;  Surgeon: Jesus Manuel Oden DPM;  Location: [facility] OR       Family History   Problem Relation Age of Onset     Depression Mother       "Arthritis Paternal Grandmother      Hypertension Paternal Grandfather         birth FF     Asthma No family hx of      C.A.D. No family hx of      Diabetes No family hx of      Cancer - colorectal No family hx of      Prostate Cancer No family hx of      Coronary Artery Disease No family hx of      Ovarian Cancer No family hx of      Mental Illness No family hx of      Cerebrovascular Disease No family hx of      Anesthesia Reaction No family hx of      Other Cancer No family hx of      Osteoporosis No family hx of      Known Genetic Syndrome No family hx of      Obesity No family hx of      Unknown/Adopted No family hx of        Social History     Tobacco Use     Smoking status: Current Every Day Smoker     Packs/day: 0.50     Last attempt to quit: 2015     Years since quittin.2     Smokeless tobacco: Never Used   Substance Use Topics     Alcohol use: No         Exam:    Vitals: BP (!) 146/101   Pulse 80   Ht 1.6 m (5' 3\")   Wt 57.6 kg (127 lb)   LMP 2019 (Exact Date)   SpO2 97%   BMI 22.50 kg/m    BMI: Body mass index is 22.5 kg/m .  Height: 5' 3\"    Constitutional/ general:  Pt is in no apparent distress, appears well-nourished.  Cooperative with history and physical exam.     Psych:  The patient answered questions appropriately.  Normal affect.  Seems to have reasonable expectations, in terms of treatment.     Eyes:  Visual scanning/ tracking without deficit.     Ears:  Response to auditory stimuli is normal.  negative hearing aid devices.  Auricles in proper alignment.     Lymphatic:  Popliteal lymph nodes not enlarged.     Lungs:  Non labored breathing, non labored speech. No cough.  No audible wheezing. Even, quiet breathing.       Vascular:  positive pedal pulses bilaterally for both the DP and PT arteries.  CFT < 3 sec.  negative ankle edema.  positive pedal hair growth.    Neuro:  Alert and oriented x 3. Coordinated gait.  Light touch sensation is intact to the L4, L5, S1 distributions. " No obvious deficits.  No evidence of neurological-based weakness, spasticity, or contracture in the lower extremities.     Derm: Normal texture and turgor.  No erythema, ecchymosis, or cyanosis.      Musculoskeletal:    Lower extremity muscle strength is normal.  Patient is ambulatory without an assistive device or brace.  No gross deformities.  Normal arch.       Decent muscle strength and ROM to all areas tested.  Pain upon palpation to base of right 5th metatarsal.  Edema and echymosis noted.  No erythema.  No sign of ulceration, infection, or drainage.  Patient also has pain on the dorsum of the third fourth and fifth tarsometatarsal joints.  Edema is noted here.  There is no ecchymosis or erythema.    Radiographic Exam:  X-Ray Findings:  I personally reviewed the films.  Nondisplaced avulsion fracture at the base of the fifth metatarsal.  Perhaps slight amount more distraction at the fracture site plantar.    Assessment:    Fifth metatarsal styloid process avulsion fracture right foot  Right third fourth and fifth tarsometatarsal joint sprain    Plan:  X-rays personally reviewed.  Explained mechanism avulsion fracture of the right fifth metatarsal.  We discussed there is a slight increase in the gap.  Explained how motion in this area slows down bone healing and causes pain and swelling.  Discussed with patient helpful of peroneus brevis every time she walks on this causes motion at this fracture site.  Also discussed that she has sprained the dorsum of her third fourth and fifth tarsometatarsal joints.  We gave her a Cam walker and plantarflex this.  We instructed her to be nonweightbearing at all times.  Patient has crutches at home.  We also gave her a prescription for a knee walker.   F/U 3 wks for repeat x-ray.  Fracture care.  Thank you for allowing me participate in the care of this patient.        Eric Madera DPM, FACFAS

## 2019-04-01 ENCOUNTER — MYC MEDICAL ADVICE (OUTPATIENT)
Dept: FAMILY MEDICINE | Facility: OTHER | Age: 39
End: 2019-04-01

## 2019-04-01 DIAGNOSIS — G47.10 HYPERSOMNIA: ICD-10-CM

## 2019-04-01 RX ORDER — METHYLPHENIDATE HYDROCHLORIDE 10 MG/1
10 TABLET ORAL 2 TIMES DAILY
Qty: 60 TABLET | Refills: 0 | Status: SHIPPED | OUTPATIENT
Start: 2019-04-01 | End: 2019-04-03

## 2019-04-01 NOTE — TELEPHONE ENCOUNTER
Ritalin      Last Written Prescription Date:  03/01/2019  Last Fill Quantity: 60,   # refills: 0  Last Office Visit: 03/19/2019  Future Office visit:    Next 5 appointments (look out 90 days)    Apr 02, 2019  1:30 PM CDT  PHYSICAL with FLORES Gerber CNKOURTNEY  [facility] [address]  [facility] [address]  [phone]   Apr 03, 2019 11:40 AM CDT  Office Visit with FLORES Daley CNP  [facility] ([facility]) [address] [facility] [address] [place] [id number]  [phone]   Apr 18, 2019 12:30 PM CDT  Return Visit with Eric Madera DPM  [facility] ([facility]) [address] [facility] [address]  [phone]           Routing refill request to provider for review/approval because:  Drug not on the [facility], [facility] or [facility] refill protocol or controlled substance

## 2019-04-01 NOTE — TELEPHONE ENCOUNTER
Last visit 3/1/19 with EM in my absence. Told to recheck in 3-4 weeks.     Needs recheck. I think she is scheduled with [facility] when it's supposed to be with me as she states in her message she is scheduled with me next Monday. Will give edna refill. Please assist in scheduling recheck with me.     Donnie Mansfield PA-C  [facility]

## 2019-04-02 ENCOUNTER — RESULT FOLLOW UP (OUTPATIENT)
Dept: FAMILY MEDICINE | Facility: OTHER | Age: 39
End: 2019-04-02

## 2019-04-02 ENCOUNTER — OFFICE VISIT (OUTPATIENT)
Dept: OBGYN | Facility: OTHER | Age: 39
End: 2019-04-02
Payer: COMMERCIAL

## 2019-04-02 VITALS
BODY MASS INDEX: 22.53 KG/M2 | HEIGHT: 64 IN | DIASTOLIC BLOOD PRESSURE: 70 MMHG | HEART RATE: 84 BPM | WEIGHT: 132 LBS | SYSTOLIC BLOOD PRESSURE: 116 MMHG

## 2019-04-02 DIAGNOSIS — Z11.3 ROUTINE SCREENING FOR STI (SEXUALLY TRANSMITTED INFECTION): ICD-10-CM

## 2019-04-02 DIAGNOSIS — Z12.4 SCREENING FOR MALIGNANT NEOPLASM OF CERVIX: ICD-10-CM

## 2019-04-02 DIAGNOSIS — N91.2 AMENORRHEA: ICD-10-CM

## 2019-04-02 DIAGNOSIS — Z13.220 SCREENING FOR CHOLESTEROL LEVEL: ICD-10-CM

## 2019-04-02 DIAGNOSIS — Z98.890 S/P LEEP: ICD-10-CM

## 2019-04-02 DIAGNOSIS — Z34.81 ENCOUNTER FOR SUPERVISION OF OTHER NORMAL PREGNANCY IN FIRST TRIMESTER: Primary | ICD-10-CM

## 2019-04-02 DIAGNOSIS — Z01.419 ENCOUNTER FOR WELL WOMAN EXAM WITH ROUTINE GYNECOLOGICAL EXAM: ICD-10-CM

## 2019-04-02 LAB — HCG UR QL: POSITIVE

## 2019-04-02 PROCEDURE — 87491 CHLMYD TRACH DNA AMP PROBE: CPT | Performed by: ADVANCED PRACTICE MIDWIFE

## 2019-04-02 PROCEDURE — 87624 HPV HI-RISK TYP POOLED RSLT: CPT | Performed by: ADVANCED PRACTICE MIDWIFE

## 2019-04-02 PROCEDURE — G0145 SCR C/V CYTO,THINLAYER,RESCR: HCPCS | Performed by: ADVANCED PRACTICE MIDWIFE

## 2019-04-02 PROCEDURE — 81025 URINE PREGNANCY TEST: CPT | Performed by: ADVANCED PRACTICE MIDWIFE

## 2019-04-02 PROCEDURE — 87591 N.GONORRHOEAE DNA AMP PROB: CPT | Performed by: ADVANCED PRACTICE MIDWIFE

## 2019-04-02 PROCEDURE — 99213 OFFICE O/P EST LOW 20 MIN: CPT | Mod: 25 | Performed by: ADVANCED PRACTICE MIDWIFE

## 2019-04-02 PROCEDURE — 99395 PREV VISIT EST AGE 18-39: CPT | Performed by: ADVANCED PRACTICE MIDWIFE

## 2019-04-02 PROCEDURE — G0476 HPV COMBO ASSAY CA SCREEN: HCPCS | Performed by: ADVANCED PRACTICE MIDWIFE

## 2019-04-02 ASSESSMENT — MIFFLIN-ST. JEOR: SCORE: 1263.75

## 2019-04-02 NOTE — PROGRESS NOTES
SUBJECTIVE:   CC: Tasha Short is an 38 year old woman who presents for preventive health visit.     Physical   Annual:     Getting at least 3 servings of Calcium per day:  Yes    Bi-annual eye exam:  Yes    Dental care twice a year:  Yes    Sleep apnea or symptoms of sleep apnea:  None    Diet:  Regular (no restrictions)    Frequency of exercise:  2-3 days/week    Duration of exercise:  15-30 minutes    Taking medications regularly:  Yes    Medication side effects:  None    Additional concerns today:  No    PHQ-2 Total Score: 2          Irregular period    Today's PHQ-2 Score:   PHQ-2 (  [employer]) 2019   Q1: Little interest or pleasure in doing things 1   Q2: Feeling down, depressed or hopeless 1   PHQ-2 Score 2   Q1: Little interest or pleasure in doing things Several days   Q2: Feeling down, depressed or hopeless Several days   PHQ-2 Score 2       Abuse: Current or Past(Physical, Sexual or Emotional)- No  Do you feel safe in your environment? Yes    Social History     Tobacco Use     Smoking status: Former Smoker     Packs/day: 0.50     Last attempt to quit: 2015     Years since quittin.2     Smokeless tobacco: Never Used   Substance Use Topics     Alcohol use: No     Alcohol Use 2019   If you drink alcohol do you typically have greater than 3 drinks per day OR greater than 7 drinks per week? Not Applicable   No flowsheet data found.    Reviewed orders with patient.  Reviewed health maintenance and updated orders accordingly - Yes  BP Readings from Last 3 Encounters:   19 116/70   19 (!) 146/101   19 118/82    Wt Readings from Last 3 Encounters:   19 59.9 kg (132 lb)   19 57.6 kg (127 lb)   19 57.6 kg (127 lb)                  Patient Active Problem List   Diagnosis     ROSA I (cervical intraepithelial neoplasia I)     Cervical dysplasia     S/P LEEP     Narcolepsy and cataplexy     Health Care Home     Calcific tendonitis peroneals     Hypersomnia      Major depressive disorder, recurrent episode, moderate (H)     Generalized anxiety disorder     HTN, goal below 140/90     Irregular heartbeat     Tobacco use disorder     H/O ETOH abuse     Gastroesophageal reflux disease without esophagitis     Hypertriglyceridemia     Chronic seasonal allergic rhinitis due to pollen     Past Surgical History:   Procedure Laterality Date      SECTION       COLPOSCOPY CERVIX, LOOP ELECTRODE BIOPSY, COMBINED  2010    ROSA 2/3     HC REMOVAL OF TONSILS,12+ Y/O      Tonsils 12+y.o.     REPAIR TENDON PERONEAL  11/15/2013    Procedure: REPAIR TENDON PERONEAL;  right peroneal tendon  transfer;  Surgeon: Jesus Manuel Oden DPM;  Location: [facility] OR       Social History     Tobacco Use     Smoking status: Former Smoker     Packs/day: 0.50     Smokeless tobacco: Never Used   Substance Use Topics     Alcohol use: No     Family History   Problem Relation Age of Onset     Depression Mother      Arthritis Paternal Grandmother      Hypertension Paternal Grandfather         birth FF     Asthma No family hx of      C.A.D. No family hx of      Diabetes No family hx of      Cancer - colorectal No family hx of      Prostate Cancer No family hx of      Coronary Artery Disease No family hx of      Ovarian Cancer No family hx of      Mental Illness No family hx of      Cerebrovascular Disease No family hx of      Anesthesia Reaction No family hx of      Other Cancer No family hx of      Osteoporosis No family hx of      Known Genetic Syndrome No family hx of      Obesity No family hx of      Unknown/Adopted No family hx of          Current Outpatient Medications   Medication Sig Dispense Refill     amLODIPine (NORVASC) 10 MG tablet Take 1 tablet (10 mg) by mouth daily 90 tablet 3     busPIRone (BUSPAR) 5 MG tablet Take 1 tablet (5 mg) by mouth 3 times daily as needed 90 tablet 1     desvenlafaxine (PRISTIQ) 50 MG 24 hr tablet Take 1 tablet (50 mg) by mouth daily 90 tablet 3     flunisolide  (NASALIDE) 25 MCG/ACT (0.025%) SOLN spray Spray 2 sprays into both nostrils every 12 hours 1 Bottle 5     methylphenidate (RITALIN) 10 MG tablet Take 1 tablet (10 mg) by mouth 2 times daily 60 tablet 0     metoprolol succinate (TOPROL-XL) 25 MG 24 hr tablet Take 0.5 tablets (12.5 mg) by mouth daily 45 tablet 3     naltrexone (VIVITROL) 380 MG SUSR Inject 380 mg into the muscle every 30 days       norgestimate-ethinyl estradiol (ORTHO-CYCLEN, SPRINTEC) 0.25-35 MG-MCG per tablet Take 1 tablet by mouth daily 90 tablet 3     DULoxetine (CYMBALTA) 20 MG EC capsule Take 1 capsule (20 mg) by mouth 2 times daily (Patient not taking: Reported on 3/1/2019) 60 capsule 11       Mammogram not appropriate for this patient based on age.    Pertinent mammograms are reviewed under the imaging tab.  History of abnormal Pap smear: NO - age 30-65 PAP every 5 years with negative HPV co-testing recommended  PAP / HPV Latest Ref Rng & Units 5/22/2015 10/8/2013 6/18/2012   PAP - NIL NIL NIL   HPV 16 DNA NEG Negative - -   HPV 18 DNA NEG Negative - -   OTHER HR HPV NEG Negative - -     Reviewed and updated as needed this visit by clinical staff  Tobacco  Allergies  Meds  Med Hx  Surg Hx  Fam Hx  Soc Hx        Reviewed and updated as needed this visit by Provider            Review of Systems  CONSTITUTIONAL: NEGATIVE for fever, chills, change in weight  INTEGUMENTARU/SKIN: NEGATIVE for worrisome rashes, moles or lesions  EYES: NEGATIVE for vision changes or irritation  ENT: NEGATIVE for ear, mouth and throat problems  RESP: NEGATIVE for significant cough or SOB  BREAST: NEGATIVE for masses, tenderness or discharge  CV: NEGATIVE for chest pain, palpitations or peripheral edema  GI: NEGATIVE for nausea, abdominal pain, heartburn, or change in bowel habits  : NEGATIVE for unusual urinary or vaginal symptoms. Periods are regular except for her last one.   States that she bled the whole month of February and then didn't have a period in  "March.  Feels that she has taken her pills on time and not skipped any  MUSCULOSKELETAL: NEGATIVE for significant arthralgias or myalgia  NEURO: NEGATIVE for weakness, dizziness or paresthesias  PSYCHIATRIC: NEGATIVE for changes in mood or affect     OBJECTIVE:   /70 (BP Location: Left arm, Patient Position: Sitting, Cuff Size: Adult Regular)   Pulse 84   Ht 1.626 m (5' 4\")   Wt 59.9 kg (132 lb)   LMP 03/06/2019 (Exact Date)   BMI 22.66 kg/m    Physical Exam  GENERAL: healthy, alert and no distress  EYES: Eyes grossly normal to inspection, PERRL and conjunctivae and sclerae normal  HENT: ear canals and TM's normal, nose and mouth without ulcers or lesions  NECK: no adenopathy, no asymmetry, masses, or scars and thyroid normal to palpation  RESP: lungs clear to auscultation - no rales, rhonchi or wheezes  BREAST: normal without masses, tenderness or nipple discharge and no palpable axillary masses or adenopathy  CV: regular rate and rhythm, normal S1 S2, no S3 or S4, no murmur, click or rub, no peripheral edema and peripheral pulses strong  ABDOMEN: soft, nontender, no hepatosplenomegaly, no masses and bowel sounds normal   (female): normal female external genitalia, normal urethral meatus, vaginal mucosa pink, moist, well rugated, and normal cervix/adnexa/uterus without masses or discharge  MS: no gross musculoskeletal defects noted, no edema  SKIN: no suspicious lesions or rashes  NEURO: Normal strength and tone, mentation intact and speech normal  PSYCH: mentation appears normal, affect normal/bright    Diagnostic Test Results:  none     ASSESSMENT/PLAN:   (Z01.419) Encounter for well woman exam with routine gynecological exam  (primary encounter diagnosis)  Comment:   Plan:     (N91.2) Amenorrhea  Comment:   Plan: HCG Qual, Urine ([id number])            (Z13.220) Screening for cholesterol level  Comment:   Plan: Lipid panel reflex to direct LDL Fasting            (Z11.3) Routine screening for STI " "(sexually transmitted infection)  Comment:   Plan: Neisseria gonorrhoeae PCR, Chlamydia         trachomatis PCR, HPV High Risk Types DNA         Cervical, Pap imaged thin layer screen with HPV        - recommended age 30 - 65 years (select HPV         order below)            (Z12.4) Screening for malignant neoplasm of cervix  Comment:   Plan:     COUNSELING:  Reviewed preventive health counseling, as reflected in patient instructions       Healthy diet/nutrition       Vision screening       Contraception       Family planning    BP Readings from Last 1 Encounters:   03/28/19 (!) 146/101     Estimated body mass index is 22.5 kg/m  as calculated from the following:    Height as of 3/28/19: 1.6 m (5' 3\").    Weight as of 3/28/19: 57.6 kg (127 lb).           reports that she quit smoking about 4 years ago. She smoked 0.50 packs per day. she has never used smokeless tobacco.  Tobacco Cessation Action Plan: Information offered: Patient not interested at this time    Counseling Resources:  ATP IV Guidelines  Pooled Cohorts Equation Calculator  Breast Cancer Risk Calculator  FRAX Risk Assessment  ICSI Preventive Guidelines  Dietary Guidelines for Americans, 2010  USDA's MyPlate  ASA Prophylaxis  Lung CA Screening    Day FLORES Posadas [facility]  "

## 2019-04-02 NOTE — NURSING NOTE
"Chief Complaint   Patient presents with     Physical       Initial /70 (BP Location: Left arm, Patient Position: Sitting, Cuff Size: Adult Regular)   Pulse 84   Ht 1.626 m (5' 4\")   Wt 59.9 kg (132 lb)   LMP 03/06/2019 (Exact Date)   BMI 22.66 kg/m   Estimated body mass index is 22.66 kg/m  as calculated from the following:    Height as of this encounter: 1.626 m (5' 4\").    Weight as of this encounter: 59.9 kg (132 lb).  Medication Reconciliation: re Puri CMA    "

## 2019-04-02 NOTE — PROGRESS NOTES
SUBJECTIVE:   Tasha Short is a 38 year old female who presents to clinic today for the following health issues:      History of Present Illness     Hypertension:     Outpatient blood pressures:  Are being checked    Blood pressures checked at:  Home    Dietary sodium intake::  Not monitoring salt intake    Diet:  Regular (no restrictions)  Frequency of exercise:  2-3 days/week  Duration of exercise:  15-30 minutes  Taking medications regularly:  Yes  Medication side effects:  None  Additional concerns today:  No    SUBJECTIVE:  Patient is here today to recheck ADHD/ADD.    Updates since last visit: None  Routine for taking medicine, including time: around 8am/1pm  Time medicine wears off: 12pm/6pm  Issues at school/Work: none  Issues at home: none  Control of symptoms: good    Side effects:  Headaches: No  Stomach aches: No  Irritability/mood swings: No  Difficulties with sleep: No  Social withdrawal: No  Unusual movements/tics: No  Decreased appetite: No    Other concerns: none    - Hasn't had Ritalin in 2 days       Mood   - Going well with Pristiq   - Psychiatry every 28 days for vivatrol  - Going to meetings   - Going to DBT   - Buspar as needed               Problem list and histories reviewed & adjusted, as indicated.  Additional history: as documented    BP Readings from Last 3 Encounters:   04/03/19 106/62   04/02/19 116/70   03/28/19 (!) 146/101    Wt Readings from Last 3 Encounters:   04/03/19 59.4 kg (131 lb)   04/02/19 59.9 kg (132 lb)   03/28/19 57.6 kg (127 lb)                  Labs reviewed in EPIC    ROS:  Constitutional, HEENT, cardiovascular, pulmonary, gi and gu systems are negative, except as otherwise noted.    OBJECTIVE:   /62 (BP Location: Left arm, Patient Position: Chair, Cuff Size: Adult Regular)   Pulse 87   Temp 99.5  F (37.5  C) (Temporal)   Resp 14   Wt 59.4 kg (131 lb)   LMP 03/06/2019 (Exact Date)   SpO2 100%   BMI 22.49 kg/m    Body mass index is 22.49  kg/m .  GENERAL APPEARANCE: healthy, alert and no distress  EYES: Eyes grossly normal to inspection, PERRLA, conjunctivae and sclerae without injection or discharge, EOM intact   RESP: Lungs clear to auscultation - no rales, rhonchi or wheezes    CV: Regular rates and rhythm, normal S1 S2, no S3 or S4, no murmur, click or rub, no peripheral edema and peripheral pulses strong and symmetric bilaterally   MS: No musculoskeletal defects are noted and gait is age appropriate without ataxia   SKIN: No suspicious lesions or rashes, hydration status appears adeuqate with normal skin turgor   PSYCH: Alert and oriented x3; speech- coherent , normal rate and volume; able to articulate logical thoughts, able to abstract reason, no tangential thoughts, no hallucinations or delusions, mentation appears normal, Mood is euthymic. Affect is appropriate for this mood state and bright. Thought content is free of suicidal ideation, hallucinations, and delusions. Dress is adequate and upkept. Eye contact is good during conversation.       Diagnostic Test Results:  Results for orders placed or performed in visit on 04/03/19 (from the past 24 hour(s))   Comprehensive metabolic panel   Result Value Ref Range    Sodium 137 133 - 144 mmol/L    Potassium 4.6 3.4 - 5.3 mmol/L    Chloride 105 94 - 109 mmol/L    Carbon Dioxide 25 20 - 32 mmol/L    Anion Gap 7 3 - 14 mmol/L    Glucose 81 70 - 99 mg/dL    Urea Nitrogen 7 7 - 30 mg/dL    Creatinine 0.67 0.52 - 1.04 mg/dL    GFR Estimate >90 >60 mL/min/[1.73_m2]    GFR Estimate If Black >90 >60 mL/min/[1.73_m2]    Calcium 8.5 8.5 - 10.1 mg/dL    Bilirubin Total 0.4 0.2 - 1.3 mg/dL    Albumin 4.0 3.4 - 5.0 g/dL    Protein Total 7.3 6.8 - 8.8 g/dL    Alkaline Phosphatase 57 40 - 150 U/L    ALT 16 0 - 50 U/L    AST 11 0 - 45 U/L   TSH with free T4 reflex   Result Value Ref Range    TSH 0.59 0.40 - 4.00 mU/L   HCG Quantitative Pregnancy, Blood (HSE812)   Result Value Ref Range    HCG Quantitative Serum  94,456 (H) 0 - 5 IU/L   Lipid panel reflex to direct LDL Fasting   Result Value Ref Range    Cholesterol 178 <200 mg/dL    Triglycerides 158 (H) <150 mg/dL    HDL Cholesterol 56 >49 mg/dL    LDL Cholesterol Calculated 90 <100 mg/dL    Non HDL Cholesterol 122 <130 mg/dL         ASSESSMENT/PLAN:       ICD-10-CM    1. HTN, goal below 140/90 I10 Comprehensive metabolic panel   2. Major depressive disorder, recurrent episode, moderate (H) F33.1 25 OH Vit D therapy monitoring   3. Alcohol dependence in remission (H) F10.21    4. Hypersomnia G47.10 methylphenidate (RITALIN) 10 MG tablet     DISCONTINUED: methylphenidate (RITALIN) 10 MG tablet   5. Narcolepsy and cataplexy G47.411 TSH with free T4 reflex   6. Vitamin D deficiency E55.9 25 OH Vit D therapy monitoring   7. Screening for cholesterol level Z13.220 Lipid panel reflex to direct LDL Fasting   8. Amenorrhea N91.2 HCG Quantitative Pregnancy, Blood (ULA725)     1. HTN   - Stable on current regimen  - Did have one episode of dizziness upon standing   - Discussed low BP could be cause   - Recommend monitoring, has at home cuff   - Monitor blood pressure, check if gets dizzy      Return to clinic if consistently <110/70   - Continue current regimen   - Update BMP today, results were normal     2. & 3. Mood   - Well controlled on Pristiq with periodic but rare Buspar use   - Continues DBT and counseling   - Reviewed use and side effects, continue regimen   - Continues to see psychiatry as well, gets Vivitrol injections   - Maintains her sobriety     4 & 5. Narcolepsy   - Diagnosed 5/14/2010 by sleep specialist   - Stable on Ritalin 10 mg BID   -  reviewed, as expected, no concerns  - CSA 5/8/18, reviewed and updated today (4/4/19)  - Utox 5/8/18, no concern, will update at next visit   - Given 3 months today   - Recheck 3 months     6. Vitamin D  - Issues in the past, would like lab updated   - Results will be communicated to patient when available and appropriate  medication changes made if necessary     7 & 8. Saw GYN provider for physical, these labs per that provider     The patient indicates understanding of these issues and agrees with the plan.    Follow up: 3 months (with Utox update)      Enid Mansfield PA-C  [facility]

## 2019-04-03 ENCOUNTER — OFFICE VISIT (OUTPATIENT)
Dept: FAMILY MEDICINE | Facility: OTHER | Age: 39
End: 2019-04-03
Payer: COMMERCIAL

## 2019-04-03 VITALS
BODY MASS INDEX: 22.49 KG/M2 | SYSTOLIC BLOOD PRESSURE: 106 MMHG | DIASTOLIC BLOOD PRESSURE: 62 MMHG | WEIGHT: 131 LBS | HEART RATE: 87 BPM | OXYGEN SATURATION: 100 % | RESPIRATION RATE: 14 BRPM | TEMPERATURE: 99.5 F

## 2019-04-03 DIAGNOSIS — E55.9 VITAMIN D DEFICIENCY: ICD-10-CM

## 2019-04-03 DIAGNOSIS — F33.1 MAJOR DEPRESSIVE DISORDER, RECURRENT EPISODE, MODERATE (H): ICD-10-CM

## 2019-04-03 DIAGNOSIS — G47.10 HYPERSOMNIA: ICD-10-CM

## 2019-04-03 DIAGNOSIS — Z13.220 SCREENING FOR CHOLESTEROL LEVEL: ICD-10-CM

## 2019-04-03 DIAGNOSIS — N91.2 AMENORRHEA: ICD-10-CM

## 2019-04-03 DIAGNOSIS — G47.411 NARCOLEPSY AND CATAPLEXY: ICD-10-CM

## 2019-04-03 DIAGNOSIS — I10 HTN, GOAL BELOW 140/90: Primary | ICD-10-CM

## 2019-04-03 DIAGNOSIS — F10.21 ALCOHOL DEPENDENCE IN REMISSION (H): ICD-10-CM

## 2019-04-03 LAB
ALBUMIN SERPL-MCNC: 4 G/DL (ref 3.4–5)
ALP SERPL-CCNC: 57 U/L (ref 40–150)
ALT SERPL W P-5'-P-CCNC: 16 U/L (ref 0–50)
ANION GAP SERPL CALCULATED.3IONS-SCNC: 7 MMOL/L (ref 3–14)
AST SERPL W P-5'-P-CCNC: 11 U/L (ref 0–45)
BILIRUB SERPL-MCNC: 0.4 MG/DL (ref 0.2–1.3)
BUN SERPL-MCNC: 7 MG/DL (ref 7–30)
CALCIUM SERPL-MCNC: 8.5 MG/DL (ref 8.5–10.1)
CHLORIDE SERPL-SCNC: 105 MMOL/L (ref 94–109)
CHOLEST SERPL-MCNC: 178 MG/DL
CO2 SERPL-SCNC: 25 MMOL/L (ref 20–32)
CREAT SERPL-MCNC: 0.67 MG/DL (ref 0.52–1.04)
GFR SERPL CREATININE-BSD FRML MDRD: >90 ML/MIN/{1.73_M2}
GLUCOSE SERPL-MCNC: 81 MG/DL (ref 70–99)
HDLC SERPL-MCNC: 56 MG/DL
LDLC SERPL CALC-MCNC: 90 MG/DL
N GONORRHOEA DNA SPEC QL NAA+PROBE: NEGATIVE
NONHDLC SERPL-MCNC: 122 MG/DL
POTASSIUM SERPL-SCNC: 4.6 MMOL/L (ref 3.4–5.3)
PROT SERPL-MCNC: 7.3 G/DL (ref 6.8–8.8)
SODIUM SERPL-SCNC: 137 MMOL/L (ref 133–144)
SPECIMEN SOURCE: NORMAL
TRIGL SERPL-MCNC: 158 MG/DL
TSH SERPL DL<=0.005 MIU/L-ACNC: 0.59 MU/L (ref 0.4–4)

## 2019-04-03 PROCEDURE — 82306 VITAMIN D 25 HYDROXY: CPT | Performed by: PHYSICIAN ASSISTANT

## 2019-04-03 PROCEDURE — 80061 LIPID PANEL: CPT | Performed by: ADVANCED PRACTICE MIDWIFE

## 2019-04-03 PROCEDURE — 84443 ASSAY THYROID STIM HORMONE: CPT | Performed by: PHYSICIAN ASSISTANT

## 2019-04-03 PROCEDURE — 84702 CHORIONIC GONADOTROPIN TEST: CPT | Performed by: ADVANCED PRACTICE MIDWIFE

## 2019-04-03 PROCEDURE — 80053 COMPREHEN METABOLIC PANEL: CPT | Performed by: PHYSICIAN ASSISTANT

## 2019-04-03 PROCEDURE — 99214 OFFICE O/P EST MOD 30 MIN: CPT | Performed by: PHYSICIAN ASSISTANT

## 2019-04-03 PROCEDURE — 36415 COLL VENOUS BLD VENIPUNCTURE: CPT | Performed by: PHYSICIAN ASSISTANT

## 2019-04-03 RX ORDER — METHYLPHENIDATE HYDROCHLORIDE 10 MG/1
10 TABLET ORAL 2 TIMES DAILY
Qty: 60 TABLET | Refills: 0 | Status: SHIPPED | OUTPATIENT
Start: 2019-05-01 | End: 2019-04-03

## 2019-04-03 RX ORDER — METHYLPHENIDATE HYDROCHLORIDE 10 MG/1
10 TABLET ORAL 2 TIMES DAILY
Qty: 60 TABLET | Refills: 0 | Status: SHIPPED | OUTPATIENT
Start: 2019-05-31 | End: 2019-08-08

## 2019-04-03 ASSESSMENT — ANXIETY QUESTIONNAIRES
GAD7 TOTAL SCORE: 5
GAD7 TOTAL SCORE: 5
1. FEELING NERVOUS, ANXIOUS, OR ON EDGE: SEVERAL DAYS
4. TROUBLE RELAXING: NOT AT ALL
7. FEELING AFRAID AS IF SOMETHING AWFUL MIGHT HAPPEN: SEVERAL DAYS
GAD7 TOTAL SCORE: 5
1. FEELING NERVOUS, ANXIOUS, OR ON EDGE: SEVERAL DAYS
5. BEING SO RESTLESS THAT IT IS HARD TO SIT STILL: NOT AT ALL
3. WORRYING TOO MUCH ABOUT DIFFERENT THINGS: SEVERAL DAYS
6. BECOMING EASILY ANNOYED OR IRRITABLE: SEVERAL DAYS
7. FEELING AFRAID AS IF SOMETHING AWFUL MIGHT HAPPEN: SEVERAL DAYS
7. FEELING AFRAID AS IF SOMETHING AWFUL MIGHT HAPPEN: SEVERAL DAYS
2. NOT BEING ABLE TO STOP OR CONTROL WORRYING: SEVERAL DAYS
3. WORRYING TOO MUCH ABOUT DIFFERENT THINGS: SEVERAL DAYS
4. TROUBLE RELAXING: NOT AT ALL
6. BECOMING EASILY ANNOYED OR IRRITABLE: SEVERAL DAYS
5. BEING SO RESTLESS THAT IT IS HARD TO SIT STILL: NOT AT ALL
2. NOT BEING ABLE TO STOP OR CONTROL WORRYING: SEVERAL DAYS
GAD7 TOTAL SCORE: 5

## 2019-04-03 ASSESSMENT — PATIENT HEALTH QUESTIONNAIRE - PHQ9
SUM OF ALL RESPONSES TO PHQ QUESTIONS 1-9: 5
10. IF YOU CHECKED OFF ANY PROBLEMS, HOW DIFFICULT HAVE THESE PROBLEMS MADE IT FOR YOU TO DO YOUR WORK, TAKE CARE OF THINGS AT HOME, OR GET ALONG WITH OTHER PEOPLE: SOMEWHAT DIFFICULT

## 2019-04-03 NOTE — PATIENT INSTRUCTIONS
- Monitor blood pressure, check if gets dizzy      Return to clinic if consistently <110/70     - Recheck 3 months

## 2019-04-03 NOTE — LETTER
[facility]  -- Controlled Medication Agreement    4/3/2019   Tasha Saucedogeoff   1980   [id number]       I understand that my provider is prescribing controlled medications to assist me in managing my ADHD.  The risks, benefits, and side effects of these medications have been explained to me and I agree to the following conditions for this type of treatment.    Stimulant Medication Prescribed: Ritalin    1.  I will take my medications exactly as prescribed and will not change the medication dosage or schedule without my provider's approval.  Refills will not be given if I  runs out early.     2.  I will keep all regular appointments at this clinic.  If there are three or more missed appointments or appointments canceled less than 2 hours before the scheduled time, my medication may be discontinued.    3.  I understand that prescriptions may only be written for one month at a time, and a written prescription is required each month.  Prescriptions cannot be called in or faxed to the pharmacy.    4.  If the prescription is lost or stolen, replacement is at the discretion of my provider.  I understand that this may mean the prescription might not be replaced.    5.  If I am late for scheduled follow up, I understand that I must make an appointment and that another refill is at the discretion of my provider.  This may mean a prescription for only the amount required until the appointment, regardless of prescription co-pay.  For example, if an appointment is made in 1 week, a prescription might only be written for 7 pills.      I understand that if I violate any of the above conditions, my prescription medications and/or treatment may be terminated.  If the violation includes providing controlled substances to anyone other than to whom the medication is prescribed, a report may be made to my child's physician, pharmacy, and other authorities, including the police.    I have read this contract and it has been  explained to me.  I fully understand the consequences of violating this agreement.    _________________________________/______________    Patient signature/Date

## 2019-04-04 LAB
B-HCG SERPL-ACNC: ABNORMAL IU/L (ref 0–5)
C TRACH DNA SPEC QL NAA+PROBE: NEGATIVE
COPATH REPORT: NORMAL
PAP: NORMAL
SPECIMEN SOURCE: NORMAL

## 2019-04-04 ASSESSMENT — ANXIETY QUESTIONNAIRES: GAD7 TOTAL SCORE: 5

## 2019-04-04 ASSESSMENT — PATIENT HEALTH QUESTIONNAIRE - PHQ9: SUM OF ALL RESPONSES TO PHQ QUESTIONS 1-9: 5

## 2019-04-05 LAB
DEPRECATED CALCIDIOL+CALCIFEROL SERPL-MC: <39 UG/L (ref 20–75)
FINAL DIAGNOSIS: ABNORMAL
HPV HR 12 DNA CVX QL NAA+PROBE: POSITIVE
HPV16 DNA SPEC QL NAA+PROBE: NEGATIVE
HPV18 DNA SPEC QL NAA+PROBE: NEGATIVE
SPECIMEN DESCRIPTION: ABNORMAL
SPECIMEN SOURCE CVX/VAG CYTO: ABNORMAL
VITAMIN D2 SERPL-MCNC: <5 UG/L
VITAMIN D3 SERPL-MCNC: 34 UG/L

## 2019-04-08 ENCOUNTER — MYC MEDICAL ADVICE (OUTPATIENT)
Dept: FAMILY MEDICINE | Facility: OTHER | Age: 39
End: 2019-04-08

## 2019-04-08 NOTE — PROGRESS NOTES
12/14/09 ASCUS + HPV 18, 53, 58 (high risk)  1/13/10 colposcopy- bx (dysplasia) ECC (negative) recommend repeat pap at 6 and 12 months  6/28/10 pap ASCUS + HPV 16 (high risk) recommend colpo  7/21/10 colposcopy- BX ( ROSA 2/3 with gland involvement)  8/2/10 clinic appt to discuss LEEP  10/26/10 tele enc: LEEP scheduled for 11/4/10  11/4/10 LEEP- (ROSA 2/3) not extending to margins.  ECC (negative). Plan: pap in 6 mo.   5/9/11- NIL pap. Plan: pap in 6 mo  10/8/13 NIL pap, neg HR HPV. Plan: pap in 3 years.  5/22/15 NIL pap, neg HR HPV. Plan: pap in 3 years.  4/2/19 NIL pap, + HR HPV (not 16 or 18). Plan: cotest in 1 yr.  [place] [facility]   [place]

## 2019-04-08 NOTE — RESULT ENCOUNTER NOTE
Josefina Cordova    Your vitamin D results were in normal range.     The results are attached for your review.       Donnie Mansfield PA-C

## 2019-04-10 ENCOUNTER — ANCILLARY PROCEDURE (OUTPATIENT)
Dept: ULTRASOUND IMAGING | Facility: OTHER | Age: 39
End: 2019-04-10
Attending: ADVANCED PRACTICE MIDWIFE
Payer: COMMERCIAL

## 2019-04-10 DIAGNOSIS — Z34.81 ENCOUNTER FOR SUPERVISION OF OTHER NORMAL PREGNANCY IN FIRST TRIMESTER: ICD-10-CM

## 2019-04-10 PROCEDURE — 76801 OB US < 14 WKS SINGLE FETUS: CPT

## 2019-04-23 ENCOUNTER — TELEPHONE (OUTPATIENT)
Dept: OBGYN | Facility: OTHER | Age: 39
End: 2019-04-23

## 2019-04-23 NOTE — TELEPHONE ENCOUNTER
Left message for patient to return call to clinic.  First available with Dr. Benavidez is 5/10/2019 in [place].  Please see if this is ok for her.    Jade Gautam, CMA  April 23, 2019

## 2019-04-23 NOTE — TELEPHONE ENCOUNTER
Reason for Call:  Other appointment    Detailed comments: pt states seen Isamar Burden for physical on 04/2/19 and had OB confirmation that day and has had ultrasound. Patient wants to schedule new prenatal with [facility] only. Pt wanting to be seen this week with her or next week at the latest. Patient open to [place] or [place] location. Please advise and call pt     Phone Number Patient can be reached at: Cell number on file:    Telephone Information:   Mobile [phone]       Best Time: ANY    Can we leave a detailed message on this number? YES    Call taken on 4/23/2019 at 7:59 AM by Rosa Isela Easton

## 2019-04-29 NOTE — TELEPHONE ENCOUNTER
Left message for RTN call. No openings with Dr. Benavidez at the moment. Patient can complete first prenatal with an other provider then schedule with Dr. Benavidez to follow. Unable to work in sooner then next available.     I did review chart after leaving call back message. If patient has changed mind we can assist in scheduling next OB visit.    Rupal Carias  [facility]

## 2019-05-01 ENCOUNTER — TELEPHONE (OUTPATIENT)
Dept: PODIATRY | Facility: CLINIC | Age: 39
End: 2019-05-01

## 2019-05-03 NOTE — TELEPHONE ENCOUNTER
LMTC(left mess to call)  Emmy Blackman, CMA    
LMTC(left mess to call)  Emmy Blackman, CMA    
Reason for call:  Other   Patient called regarding (reason for call): call back  Additional comments: Form Medical Opinion form dated 04/26/2019 - please call back to clarify how much time the patient will be off of work.    Phone number to reach patient:  Other phone number:  [phone]*    Best Time:  ASAP    Can we leave a detailed message on this number?  YES    
Statement Selected

## 2019-05-25 ENCOUNTER — TRANSFERRED RECORDS (OUTPATIENT)
Dept: HEALTH INFORMATION MANAGEMENT | Facility: CLINIC | Age: 39
End: 2019-05-25

## 2019-07-19 DIAGNOSIS — F31.81 BIPOLAR II DISORDER (H): Primary | ICD-10-CM

## 2019-07-19 DIAGNOSIS — F31.81 MILD HYPOMANIC BIPOLAR II DISORDER (H): ICD-10-CM

## 2019-07-19 LAB
ALBUMIN SERPL-MCNC: 4.2 G/DL (ref 3.4–5)
ALP SERPL-CCNC: 75 U/L (ref 40–150)
ALT SERPL W P-5'-P-CCNC: 59 U/L (ref 0–50)
ANION GAP SERPL CALCULATED.3IONS-SCNC: 8 MMOL/L (ref 3–14)
AST SERPL W P-5'-P-CCNC: 50 U/L (ref 0–45)
BASOPHILS # BLD AUTO: 0 10E9/L (ref 0–0.2)
BASOPHILS NFR BLD AUTO: 0.6 %
BILIRUB DIRECT SERPL-MCNC: 0.1 MG/DL (ref 0–0.2)
BILIRUB SERPL-MCNC: 0.5 MG/DL (ref 0.2–1.3)
BUN SERPL-MCNC: 14 MG/DL (ref 7–30)
CALCIUM SERPL-MCNC: 8.5 MG/DL (ref 8.5–10.1)
CHLORIDE SERPL-SCNC: 105 MMOL/L (ref 94–109)
CHOLEST SERPL-MCNC: 194 MG/DL
CO2 SERPL-SCNC: 24 MMOL/L (ref 20–32)
CREAT SERPL-MCNC: 0.75 MG/DL (ref 0.52–1.04)
DIFFERENTIAL METHOD BLD: ABNORMAL
EOSINOPHIL # BLD AUTO: 0.1 10E9/L (ref 0–0.7)
EOSINOPHIL NFR BLD AUTO: 1.9 %
ERYTHROCYTE [DISTWIDTH] IN BLOOD BY AUTOMATED COUNT: 13.5 % (ref 10–15)
GFR SERPL CREATININE-BSD FRML MDRD: >90 ML/MIN/{1.73_M2}
GLUCOSE SERPL-MCNC: 95 MG/DL (ref 70–99)
HCT VFR BLD AUTO: 37 % (ref 35–47)
HDLC SERPL-MCNC: 59 MG/DL
HGB BLD-MCNC: 13 G/DL (ref 11.7–15.7)
LDLC SERPL CALC-MCNC: 119 MG/DL
LYMPHOCYTES # BLD AUTO: 1.1 10E9/L (ref 0.8–5.3)
LYMPHOCYTES NFR BLD AUTO: 15.5 %
MAGNESIUM SERPL-MCNC: 1.7 MG/DL (ref 1.6–2.3)
MCH RBC QN AUTO: 33.8 PG (ref 26.5–33)
MCHC RBC AUTO-ENTMCNC: 35.1 G/DL (ref 31.5–36.5)
MCV RBC AUTO: 96 FL (ref 78–100)
MONOCYTES # BLD AUTO: 0.6 10E9/L (ref 0–1.3)
MONOCYTES NFR BLD AUTO: 9.3 %
NEUTROPHILS # BLD AUTO: 5 10E9/L (ref 1.6–8.3)
NEUTROPHILS NFR BLD AUTO: 72.7 %
NONHDLC SERPL-MCNC: 135 MG/DL
PLATELET # BLD AUTO: 170 10E9/L (ref 150–450)
POTASSIUM SERPL-SCNC: 3.7 MMOL/L (ref 3.4–5.3)
PROT SERPL-MCNC: 7.2 G/DL (ref 6.8–8.8)
RBC # BLD AUTO: 3.85 10E12/L (ref 3.8–5.2)
SODIUM SERPL-SCNC: 137 MMOL/L (ref 133–144)
TRIGL SERPL-MCNC: 78 MG/DL
TSH SERPL DL<=0.005 MIU/L-ACNC: 1.02 MU/L (ref 0.4–4)
WBC # BLD AUTO: 6.9 10E9/L (ref 4–11)

## 2019-07-19 PROCEDURE — 80061 LIPID PANEL: CPT | Performed by: CLINICAL NURSE SPECIALIST

## 2019-07-19 PROCEDURE — 80178 ASSAY OF LITHIUM: CPT | Performed by: CLINICAL NURSE SPECIALIST

## 2019-07-19 PROCEDURE — 84443 ASSAY THYROID STIM HORMONE: CPT | Performed by: CLINICAL NURSE SPECIALIST

## 2019-07-19 PROCEDURE — 85025 COMPLETE CBC W/AUTO DIFF WBC: CPT | Performed by: CLINICAL NURSE SPECIALIST

## 2019-07-19 PROCEDURE — 82565 ASSAY OF CREATININE: CPT | Performed by: CLINICAL NURSE SPECIALIST

## 2019-07-19 PROCEDURE — 80048 BASIC METABOLIC PNL TOTAL CA: CPT | Performed by: CLINICAL NURSE SPECIALIST

## 2019-07-19 PROCEDURE — 36415 COLL VENOUS BLD VENIPUNCTURE: CPT | Performed by: CLINICAL NURSE SPECIALIST

## 2019-07-19 PROCEDURE — 80076 HEPATIC FUNCTION PANEL: CPT | Performed by: CLINICAL NURSE SPECIALIST

## 2019-07-19 PROCEDURE — 83735 ASSAY OF MAGNESIUM: CPT | Performed by: CLINICAL NURSE SPECIALIST

## 2019-07-20 LAB — LITHIUM SERPL-SCNC: 0.32 MMOL/L (ref 0.6–1.2)

## 2019-08-05 ENCOUNTER — TELEPHONE (OUTPATIENT)
Dept: FAMILY MEDICINE | Facility: OTHER | Age: 39
End: 2019-08-05

## 2019-08-05 NOTE — TELEPHONE ENCOUNTER
Called and left a voicemail for patient to return call. When call is returned please get more information on what her daughter needs to be seen for? Also, can they come in early. If they cannot come early [facility] can only see patient at 4:30 but daughter will need to be scheduled for a different day/time.     Carolina Stacy MA

## 2019-08-05 NOTE — PROGRESS NOTES
Subjective     Tasha Short is a 38 year old female who presents to clinic today for the following health issues:    History of Present Illness        Hypertension: She presents for follow up of hypertension.  She does not check blood pressure  regularly outside of the clinic. Outpatient blood pressures have not been over 140/90. She follows a low salt diet.     She eats 2-3 servings of fruits and vegetables daily.She consumes 2 sweetened beverage(s) daily.  She is taking medications regularly.      - Started lithium and BP has been elevated     [facility]   - Sober 1 year and 3 months   - Going to AA and getting Vivatrol  - They told her they want PCP to follow for Ritalin     -1/2 pack per day     Wellbutrin - seizures, Chantix- SI     Gum was helpful      Would like to stop smoking     - Depo shot      Got pregnant on OCP with boyfriend of 1 year, he didn't want more kids (patient has 20 month old with different father) so she had to get        They put her on Depo after, is going well     Would like to continue this here if possible      Depression and Anxiety Follow-Up    How are you doing with your depression since your last visit? Improved     How are you doing with your anxiety since your last visit?  No change    Are you having other symptoms that might be associated with depression or anxiety? No    Have you had a significant life event? No     Do you have any concerns with your use of alcohol or other drugs? No    Social History     Tobacco Use     Smoking status: Current Every Day Smoker     Packs/day: 0.50     Smokeless tobacco: Never Used   Substance Use Topics     Alcohol use: No     Drug use: No     PHQ 4/3/2019 4/3/2019 2019   PHQ-9 Total Score 5 5 8   Q9: Thoughts of better off dead/self-harm past 2 weeks Not at all Not at all Not at all     ROMI-7 SCORE 4/3/2019 4/3/2019 2019   Total Score - - -   Total Score - 5 (mild anxiety) 10 (moderate anxiety)   Total  "Score 5 5 10       BP Readings from Last 3 Encounters:   08/08/19 (!) 138/92   04/03/19 106/62   04/02/19 116/70    Wt Readings from Last 3 Encounters:   08/08/19 55.7 kg (122 lb 12.8 oz)   04/03/19 59.4 kg (131 lb)   04/02/19 59.9 kg (132 lb)             Reviewed and updated as needed this visit by Provider  Allergies  Meds  Problems  Med Hx  Surg Hx         Review of Systems   ROS COMP: Constitutional, HEENT, cardiovascular, pulmonary, gi and gu systems are negative, except as otherwise noted.      Objective    BP (!) 138/92   Pulse 122   Temp 98.1  F (36.7  C) (Temporal)   Resp 16   Ht 1.626 m (5' 4\")   Wt 55.7 kg (122 lb 12.8 oz)   SpO2 100%   BMI 21.08 kg/m    Body mass index is 21.08 kg/m .  Physical Exam   GENERAL APPEARANCE: healthy, alert and no distress  EYES: Eyes grossly normal to inspection, PERRLA, conjunctivae and sclerae without injection or discharge, EOM intact   RESP: Lungs clear to auscultation - no rales, rhonchi or wheezes    CV: Regular rates and rhythm, normal S1 S2, no S3 or S4, no murmur, click or rub, no peripheral edema and peripheral pulses strong and symmetric bilaterally   MS: No musculoskeletal defects are noted and gait is age appropriate without ataxia   SKIN: No suspicious lesions or rashes, hydration status appears adeuqate with normal skin turgor   PSYCH: Alert and oriented x3; speech- coherent , normal rate and volume; able to articulate logical thoughts, able to abstract reason, no tangential thoughts, no hallucinations or delusions, mentation appears normal, Mood is euthymic. Affect is appropriate for this mood state and bright. Thought content is free of suicidal ideation, hallucinations, and delusions. Dress is adequate and upkept. Eye contact is good during conversation.       Diagnostic Test Results:  Labs reviewed in Epic        Assessment & Plan       ICD-10-CM    1. HTN, goal below 140/90 I10 amLODIPine (NORVASC) 10 MG tablet     metoprolol succinate ER " (TOPROL-XL) 25 MG 24 hr tablet   2. Hypertriglyceridemia E78.1    3. Generalized anxiety disorder F41.1    4. Major depressive disorder, recurrent episode, moderate (H) F33.1    5. Alcohol dependence in remission (H) F10.21    6. Hypersomnia G47.10 methylphenidate (RITALIN) 10 MG tablet     DISCONTINUED: methylphenidate (RITALIN) 10 MG tablet     DISCONTINUED: methylphenidate (RITALIN) 10 MG tablet   7. Tobacco use disorder F17.200 TOBACCO CESSATION ORDER FOR HM     nicotine (NICODERM CQ) 21 MG/24HR 24 hr patch     nicotine (NICORETTE) 2 MG gum   8. Encounter for surveillance of injectable contraceptive Z30.42      1. HTN   - Elevated since switching medication to Lithium, can feel increased heart rate   - Will increase Metoprolol to 25 mg (from 12.5 mg) & continue Amlodipine   - Patient to monitor BP at home        Maptia blood pressure log in 1 week - twice a day (once in the AM and before bed)     2. Lipids      Following with no medication at this time, checked by her psychiatry team 19 and was only slightly elevated, will continue to monitor     3-5. Mood   - Improved with switch from Pristiq to Lithium, is being titrated up by psychiatry team at [facility]   - Continue   - Maintains her sobriety for 1 year and 3 months now     6. Hypersomnia   - Diagnosed 2010 by sleep specialist   - Stable on Ritalin 10 mg BID   -  reviewed, as expected, no concerns  - CSA 19  - Utox 18, no concern, will update at next visit   - Given 3 months today   - Recheck 3 months     7. Tobacco  - 1/2 pack per day, failed on Chantix and Wellbutrin, wishes to quit     Tobacco Cessation:   reports that she has been smoking.  She has been smoking about 0.50 packs per day. She has never used smokeless tobacco.  Tobacco Cessation Action Plan: Pharmacotherapies : Nicotine patch and gum    8. Contraception  - As above, got pregnant on OCP, had , was put on Depo by outside clinic   - Wishes to continue Depo    - JUANITA obtained, will get records to find window for next injection   - Reviewed use and side effects of Depo and refilled     The patient indicates understanding of these issues and agrees with the plan.    Return in about 3 months (around 11/8/2019).    Enid Mansfield PA-C  [facility]

## 2019-08-08 ENCOUNTER — OFFICE VISIT (OUTPATIENT)
Dept: FAMILY MEDICINE | Facility: OTHER | Age: 39
End: 2019-08-08
Payer: COMMERCIAL

## 2019-08-08 VITALS
WEIGHT: 122.8 LBS | TEMPERATURE: 98.1 F | BODY MASS INDEX: 20.96 KG/M2 | HEART RATE: 122 BPM | DIASTOLIC BLOOD PRESSURE: 92 MMHG | HEIGHT: 64 IN | OXYGEN SATURATION: 100 % | RESPIRATION RATE: 16 BRPM | SYSTOLIC BLOOD PRESSURE: 138 MMHG

## 2019-08-08 DIAGNOSIS — F17.200 TOBACCO USE DISORDER: ICD-10-CM

## 2019-08-08 DIAGNOSIS — F33.1 MAJOR DEPRESSIVE DISORDER, RECURRENT EPISODE, MODERATE (H): ICD-10-CM

## 2019-08-08 DIAGNOSIS — E78.1 HYPERTRIGLYCERIDEMIA: ICD-10-CM

## 2019-08-08 DIAGNOSIS — I10 HTN, GOAL BELOW 140/90: Primary | ICD-10-CM

## 2019-08-08 DIAGNOSIS — G47.10 HYPERSOMNIA: ICD-10-CM

## 2019-08-08 DIAGNOSIS — F41.1 GENERALIZED ANXIETY DISORDER: ICD-10-CM

## 2019-08-08 DIAGNOSIS — F10.21 ALCOHOL DEPENDENCE IN REMISSION (H): ICD-10-CM

## 2019-08-08 DIAGNOSIS — Z30.42 ENCOUNTER FOR SURVEILLANCE OF INJECTABLE CONTRACEPTIVE: ICD-10-CM

## 2019-08-08 PROCEDURE — 99214 OFFICE O/P EST MOD 30 MIN: CPT | Performed by: PHYSICIAN ASSISTANT

## 2019-08-08 RX ORDER — LITHIUM CARBONATE 300 MG/1
TABLET, FILM COATED, EXTENDED RELEASE ORAL
Refills: 2 | COMMUNITY
Start: 2019-06-25 | End: 2020-04-23 | Stop reason: SINTOL

## 2019-08-08 RX ORDER — METOPROLOL SUCCINATE 25 MG/1
25 TABLET, EXTENDED RELEASE ORAL DAILY
Qty: 90 TABLET | Refills: 3 | Status: SHIPPED | OUTPATIENT
Start: 2019-08-08 | End: 2020-04-06

## 2019-08-08 RX ORDER — NICOTINE 21 MG/24HR
1 PATCH, TRANSDERMAL 24 HOURS TRANSDERMAL EVERY 24 HOURS
Qty: 28 PATCH | Refills: 3 | Status: SHIPPED | OUTPATIENT
Start: 2019-08-08 | End: 2020-09-22

## 2019-08-08 RX ORDER — METHYLPHENIDATE HYDROCHLORIDE 10 MG/1
10 TABLET ORAL 2 TIMES DAILY
Qty: 60 TABLET | Refills: 0 | Status: SHIPPED | OUTPATIENT
Start: 2019-08-08 | End: 2019-08-08

## 2019-08-08 RX ORDER — METHYLPHENIDATE HYDROCHLORIDE 10 MG/1
10 TABLET ORAL 2 TIMES DAILY
Qty: 60 TABLET | Refills: 0 | Status: SHIPPED | OUTPATIENT
Start: 2019-09-06 | End: 2019-08-08

## 2019-08-08 RX ORDER — AMLODIPINE BESYLATE 10 MG/1
10 TABLET ORAL DAILY
Qty: 90 TABLET | Refills: 3 | Status: SHIPPED | OUTPATIENT
Start: 2019-08-08 | End: 2020-09-16

## 2019-08-08 RX ORDER — METHYLPHENIDATE HYDROCHLORIDE 10 MG/1
10 TABLET ORAL 2 TIMES DAILY
Qty: 60 TABLET | Refills: 0 | Status: SHIPPED | OUTPATIENT
Start: 2019-10-07 | End: 2019-11-18

## 2019-08-08 RX ORDER — MEDROXYPROGESTERONE ACETATE 150 MG/ML
150 INJECTION, SUSPENSION INTRAMUSCULAR
Status: ACTIVE | OUTPATIENT
Start: 2019-08-08 | End: 2020-08-02

## 2019-08-08 ASSESSMENT — ANXIETY QUESTIONNAIRES
2. NOT BEING ABLE TO STOP OR CONTROL WORRYING: MORE THAN HALF THE DAYS
5. BEING SO RESTLESS THAT IT IS HARD TO SIT STILL: SEVERAL DAYS
GAD7 TOTAL SCORE: 10
GAD7 TOTAL SCORE: 10
6. BECOMING EASILY ANNOYED OR IRRITABLE: MORE THAN HALF THE DAYS
7. FEELING AFRAID AS IF SOMETHING AWFUL MIGHT HAPPEN: SEVERAL DAYS
4. TROUBLE RELAXING: SEVERAL DAYS
3. WORRYING TOO MUCH ABOUT DIFFERENT THINGS: MORE THAN HALF THE DAYS
GAD7 TOTAL SCORE: 10
7. FEELING AFRAID AS IF SOMETHING AWFUL MIGHT HAPPEN: SEVERAL DAYS
1. FEELING NERVOUS, ANXIOUS, OR ON EDGE: SEVERAL DAYS

## 2019-08-08 ASSESSMENT — MIFFLIN-ST. JEOR: SCORE: 1222.02

## 2019-08-08 ASSESSMENT — PAIN SCALES - GENERAL: PAINLEVEL: NO PAIN (0)

## 2019-08-08 NOTE — PATIENT INSTRUCTIONS
- Nicotine replacement as we discussed     - Increase Metoprolol to full tablet & continue Amlodipine same dose     MyChart blood pressure log in 1 week - twice a day (once in the AM and before bed)     - Ritalin recheck in 3 months

## 2019-08-09 ASSESSMENT — PATIENT HEALTH QUESTIONNAIRE - PHQ9: SUM OF ALL RESPONSES TO PHQ QUESTIONS 1-9: 8

## 2019-08-09 ASSESSMENT — ANXIETY QUESTIONNAIRES: GAD7 TOTAL SCORE: 10

## 2019-08-14 ENCOUNTER — TELEPHONE (OUTPATIENT)
Dept: FAMILY MEDICINE | Facility: OTHER | Age: 39
End: 2019-08-14

## 2019-08-14 NOTE — TELEPHONE ENCOUNTER
Please let patient know I received her records     Depo was given on 5/25/19. Her window for next dose is August 10-24. So she will need to return soon for her next depo (I previously ordered these).     Assist in scheduling.     Donnie Mansfield PA-C  [facility]

## 2019-08-14 NOTE — TELEPHONE ENCOUNTER
Left message for patient to call back. Please see message below and help schedule a float nurse visit for Depo.  Chante Tinajero CMA

## 2019-08-16 ENCOUNTER — ALLIED HEALTH/NURSE VISIT (OUTPATIENT)
Dept: FAMILY MEDICINE | Facility: OTHER | Age: 39
End: 2019-08-16
Payer: COMMERCIAL

## 2019-08-16 VITALS — DIASTOLIC BLOOD PRESSURE: 80 MMHG | HEART RATE: 68 BPM | SYSTOLIC BLOOD PRESSURE: 124 MMHG

## 2019-08-16 PROCEDURE — 96372 THER/PROPH/DIAG INJ SC/IM: CPT

## 2019-08-16 PROCEDURE — 99207 ZZC NO CHARGE NURSE ONLY: CPT

## 2019-08-16 RX ADMIN — MEDROXYPROGESTERONE ACETATE 150 MG: 150 INJECTION, SUSPENSION INTRAMUSCULAR at 09:39

## 2019-08-16 NOTE — PROGRESS NOTES
Clinic Administered Medication Documentation      Depo Provera Documentation    Prior to injection, verified patient identity using patient's name and date of birth. Medication was administered. Please see MAR and medication order for additional information. Patient instructed to remain in clinic for 15 minutes.    BP: 124/80    LAST PAP/EXAM:   Lab Results   Component Value Date    PAP NIL 04/02/2019     URINE HCG:not indicated    NEXT INJECTION DUE: 11/1/19 - 11/15/19    Was entire vial of medication used? Yes  Vial/Syringe: Single dose vial  Expiration Date:  06/2020    The following medication was given:     MEDICATION: Depo Provera 150mg  ROUTE: IM  SITE: Deltoid - Left  DOSE: 150 mg  LOT #: [id number]  :  Mandi  EXPIRATION DATE:  06/2020  NDC: 96156-158-63

## 2019-09-28 ENCOUNTER — HEALTH MAINTENANCE LETTER (OUTPATIENT)
Age: 39
End: 2019-09-28

## 2019-11-13 NOTE — PROGRESS NOTES
Subjective     Tasha Short is a 39 year old female who presents to clinic today for the following health issues:    HPI   Hypertension Follow-up      Do you check your blood pressure regularly outside of the clinic? Yes     Are you following a low salt diet? Yes    Are your blood pressures ever more than 140 on the top number (systolic) OR more   than 90 on the bottom number (diastolic), for example 140/90? No      How many servings of fruits and vegetables do you eat daily?  2-3    On average, how many sweetened beverages do you drink each day (soda, juice, sweet tea, etc)?   2    How many days per week do you miss taking your medication? 0     - Gum works for smoking, on and off   - Mood good      Working with DBT weekly and psychiatrist      Almost 1 year sober       Medication Followup of Ritalin     Taking Medication as prescribed: yes    Side Effects:  None    Medication Helping Symptoms:  Yes    - Doesn't take on weekends, been home sick with daugther, hasn't taken in a couple days        ABDOMINAL   PAIN     Onset: 1 month     Description:   Character: Sharp, Cramping and throbbing  Location: right lower quadrant  Radiation: None    Intensity: moderate    Progression of Symptoms:  worsening    Accompanying Signs & Symptoms:  Fever/Chills?: no   Gas/Bloating: YES- bloating  Nausea: no   Vomitting: no   Diarrhea?: no   Constipation:no   Dysuria or Hematuria: no    History:   Trauma: no   Previous similar pain: no    Previous tests done: none    Precipitating factors:   Does the pain change with:     Food: no      BM: no     Urination: no     Alleviating factors:  None    Therapies Tried and outcome: nothing    LMP:  Having it now      - Period every other week since Depo and    - Smell   - Abdominal pain, comes and goes, then past 5 days constant pain   - Weight loss - 6 lbs   - Normal BM, once in AM, soft and easy to push out   - Did have ovarian cysts   - Had HPV and colposcopy in the past   -  "No discharge   - No change to urination, drinks lots of water           Reviewed and updated as needed this visit by Provider  Allergies  Meds  Problems  Med Hx  Surg Hx         Review of Systems   ROS COMP: Constitutional, HEENT, cardiovascular, pulmonary, gi and gu systems are negative, except as otherwise noted.      Objective    /84   Pulse 70   Temp 98.5  F (36.9  C)   Resp 16   Ht 1.626 m (5' 4\")   Wt 53 kg (116 lb 12.8 oz)   LMP 11/14/2019 (Approximate)   Breastfeeding No   BMI 20.05 kg/m    Body mass index is 20.05 kg/m .  Physical Exam   GENERAL APPEARANCE: healthy, alert and no distress  EYES: Eyes grossly normal to inspection, PERRLA, conjunctivae and sclerae without injection or discharge, EOM intact   RESP: Lungs clear to auscultation - no rales, rhonchi or wheezes    CV: Regular rates and rhythm, normal S1 S2, no S3 or S4, no murmur, click or rub, no peripheral edema and peripheral pulses strong and symmetric bilaterally   ABD: Normal bowel sounds, mildly tender over bilateral lower quadrants with a little bit worsening over RLQ with very deep palpation, no rebound or guarding, no hepatosplenomegaly   MS: No musculoskeletal defects are noted and gait is age appropriate without ataxia   SKIN: No suspicious lesions or rashes, hydration status appears adeuqate with normal skin turgor   PSYCH: Alert and oriented x3; speech- coherent , normal rate and volume; able to articulate logical thoughts, able to abstract reason, no tangential thoughts, no hallucinations or delusions, mentation appears normal, Mood is euthymic. Affect is appropriate for this mood state and bright. Thought content is free of suicidal ideation, hallucinations, and delusions. Dress is adequate and upkept. Eye contact is good during conversation.       Diagnostic Test Results:  Labs reviewed in Epic  none         Assessment & Plan       ICD-10-CM    1. Hypersomnia G47.10 Pain Drug Scr UR W Rptd Meds     methylphenidate " (RITALIN) 10 MG tablet     DISCONTINUED: methylphenidate (RITALIN) 10 MG tablet     DISCONTINUED: methylphenidate (RITALIN) 10 MG tablet   2. Narcolepsy and cataplexy G47.411 Pain Drug Scr UR W Rptd Meds     methylphenidate (RITALIN) 10 MG tablet   3. HTN, goal below 140/90 I10    4. Major depressive disorder, recurrent episode, moderate (H) F33.1    5. Generalized anxiety disorder F41.1    6. H/O ETOH abuse F10.11    7. Encounter for surveillance of injectable contraceptive Z30.42 HCG Qual, Urine ([id number])     Urine Microscopic   8. RLQ abdominal pain R10.31 *UA reflex to Microscopic and Culture ([facility] [facility] (except [place] and [place])     Wet prep     CBC with platelets     US Pelvic Complete w Transvaginal   9. Screen for STD (sexually transmitted disease) Z11.3 Neisseria gonorrhoeae PCR     Chlamydia trachomatis PCR   10. Need for prophylactic vaccination and inoculation against influenza Z23 INFLUENZA VACCINE IM > 6 MONTHS VALENT IIV4 [72068]     medroxyPROGESTERone (DEPO-PROVERA) injection 150 mg     Vaccine Administration, Initial [42862]     1. & 2. Hypersomnia and Narcolepsy   - Diagnosed 2010 by sleep specialist   - Stable on Ritalin 10 mg BID   -  reviewed, as expected, no concerns  - CSA 19   - Utox 18, will update today, await results   - Given 3 months today   - Recheck every 3 months     3. HTN   - Marked improvement and now stable with Metoprolol 25 mg and Amlodipine 10 mg  - Reviewed use and side effects, refilled as needed   - BMP due in -. Mood   - Followed by [facility], getting medications there (Lithium and Buspar) plus her Vivitrol injections for alcoholism (almost 1 year sober now)   - Going to DBT weekly and sees psychiatrist  - Feels things are really working for her, feeling really good     7-9. Abdominal pain, bleeding, Depo   - Patient reports bleeding every 2 weeks since  and depo (this would be her 3rd shot, so about 6 months ago) and  1 month of throbbing pain in the RLQ with bloating, no changes to bowel movements or urination  - Abdominal exam normal with no signs of acute or surgical abdomen   - Etiology uterine vs. Ovarian (cyst?) vs. Medication reaction (depo) vs. Urinary tract infection (low suspicion) vs. Constipation vs. Pelvic Infection vs. Other   - Discussed recommend labs - CBC for infection as she does report some chills/sweats and is worried about an infection after her , discussed possible std - consents to G/C but declined blood test for HIV, Hep C, and syphilis, also recommend check UA for infection   - Recommend pelvic ultrasound with tranvag to rule out uterine/ovarian etiology   - Unlikely to be GI in origin as this would not cause bleeding and she reports no change to bowel movements   - Recommend evaluation by GYN provider as well, plus doesn't want to go back on OCP as got pregnant on it in the past, wants to get IUD if can't continue depo       Also had colposcopy and due for 1 year follow up PAP, discussed this   - Depo - A few days outside of window on this, will get urine pregnancy test first and then give her shot, reports no unprotected sex in months (not sexually active since  6 months ago), discussed could be cause of her consistent bleeding and possibly the cramping as well, as above may need to consider alternative birth control     10. Flu shot given     The patient indicates understanding of these issues and agrees with the plan.    Return in about 3 months (around 2020). and with specialist (GYN)      A total of 50 minutes was spent with the patient today, with greater than 50% of the visit involving counseling and coordination of care.      Enid Mansfield PA-C  [facility]

## 2019-11-18 ENCOUNTER — ANCILLARY PROCEDURE (OUTPATIENT)
Dept: ULTRASOUND IMAGING | Facility: OTHER | Age: 39
End: 2019-11-18
Attending: PHYSICIAN ASSISTANT
Payer: COMMERCIAL

## 2019-11-18 ENCOUNTER — OFFICE VISIT (OUTPATIENT)
Dept: FAMILY MEDICINE | Facility: OTHER | Age: 39
End: 2019-11-18
Payer: COMMERCIAL

## 2019-11-18 VITALS
SYSTOLIC BLOOD PRESSURE: 120 MMHG | RESPIRATION RATE: 16 BRPM | TEMPERATURE: 98.5 F | WEIGHT: 116.8 LBS | BODY MASS INDEX: 19.94 KG/M2 | DIASTOLIC BLOOD PRESSURE: 84 MMHG | HEIGHT: 64 IN | HEART RATE: 70 BPM

## 2019-11-18 DIAGNOSIS — R10.31 RLQ ABDOMINAL PAIN: ICD-10-CM

## 2019-11-18 DIAGNOSIS — F10.11 H/O ETOH ABUSE: ICD-10-CM

## 2019-11-18 DIAGNOSIS — Z30.42 ENCOUNTER FOR SURVEILLANCE OF INJECTABLE CONTRACEPTIVE: ICD-10-CM

## 2019-11-18 DIAGNOSIS — F41.1 GENERALIZED ANXIETY DISORDER: ICD-10-CM

## 2019-11-18 DIAGNOSIS — G47.411 NARCOLEPSY AND CATAPLEXY: ICD-10-CM

## 2019-11-18 DIAGNOSIS — G47.10 HYPERSOMNIA: Primary | ICD-10-CM

## 2019-11-18 DIAGNOSIS — I10 HTN, GOAL BELOW 140/90: ICD-10-CM

## 2019-11-18 DIAGNOSIS — F33.1 MAJOR DEPRESSIVE DISORDER, RECURRENT EPISODE, MODERATE (H): ICD-10-CM

## 2019-11-18 DIAGNOSIS — Z23 NEED FOR PROPHYLACTIC VACCINATION AND INOCULATION AGAINST INFLUENZA: ICD-10-CM

## 2019-11-18 DIAGNOSIS — Z11.3 SCREEN FOR STD (SEXUALLY TRANSMITTED DISEASE): ICD-10-CM

## 2019-11-18 LAB
ALBUMIN UR-MCNC: 100 MG/DL
APPEARANCE UR: CLEAR
BILIRUB UR QL STRIP: NEGATIVE
COLOR UR AUTO: YELLOW
ERYTHROCYTE [DISTWIDTH] IN BLOOD BY AUTOMATED COUNT: 14.6 % (ref 10–15)
GLUCOSE UR STRIP-MCNC: NEGATIVE MG/DL
HCG UR QL: NEGATIVE
HCT VFR BLD AUTO: 39.8 % (ref 35–47)
HGB BLD-MCNC: 13.7 G/DL (ref 11.7–15.7)
HGB UR QL STRIP: ABNORMAL
KETONES UR STRIP-MCNC: NEGATIVE MG/DL
LEUKOCYTE ESTERASE UR QL STRIP: NEGATIVE
MCH RBC QN AUTO: 33 PG (ref 26.5–33)
MCHC RBC AUTO-ENTMCNC: 34.4 G/DL (ref 31.5–36.5)
MCV RBC AUTO: 96 FL (ref 78–100)
NITRATE UR QL: NEGATIVE
NON-SQ EPI CELLS #/AREA URNS LPF: NORMAL /LPF
PH UR STRIP: 6 PH (ref 5–7)
PLATELET # BLD AUTO: 154 10E9/L (ref 150–450)
RBC # BLD AUTO: 4.15 10E12/L (ref 3.8–5.2)
RBC #/AREA URNS AUTO: NORMAL /HPF
SOURCE: ABNORMAL
SP GR UR STRIP: 1.02 (ref 1–1.03)
SPECIMEN SOURCE: NORMAL
UROBILINOGEN UR STRIP-ACNC: 0.2 EU/DL (ref 0.2–1)
WBC # BLD AUTO: 8.4 10E9/L (ref 4–11)
WBC #/AREA URNS AUTO: NORMAL /HPF
WET PREP SPEC: NORMAL

## 2019-11-18 PROCEDURE — 87591 N.GONORRHOEAE DNA AMP PROB: CPT | Performed by: PHYSICIAN ASSISTANT

## 2019-11-18 PROCEDURE — 81001 URINALYSIS AUTO W/SCOPE: CPT | Performed by: PHYSICIAN ASSISTANT

## 2019-11-18 PROCEDURE — 76856 US EXAM PELVIC COMPLETE: CPT

## 2019-11-18 PROCEDURE — 87491 CHLMYD TRACH DNA AMP PROBE: CPT | Performed by: PHYSICIAN ASSISTANT

## 2019-11-18 PROCEDURE — 36415 COLL VENOUS BLD VENIPUNCTURE: CPT | Performed by: PHYSICIAN ASSISTANT

## 2019-11-18 PROCEDURE — 96372 THER/PROPH/DIAG INJ SC/IM: CPT | Mod: 59 | Performed by: PHYSICIAN ASSISTANT

## 2019-11-18 PROCEDURE — 90686 IIV4 VACC NO PRSV 0.5 ML IM: CPT | Performed by: PHYSICIAN ASSISTANT

## 2019-11-18 PROCEDURE — 80307 DRUG TEST PRSMV CHEM ANLYZR: CPT | Mod: 90 | Performed by: PHYSICIAN ASSISTANT

## 2019-11-18 PROCEDURE — 90471 IMMUNIZATION ADMIN: CPT | Performed by: PHYSICIAN ASSISTANT

## 2019-11-18 PROCEDURE — 99000 SPECIMEN HANDLING OFFICE-LAB: CPT | Performed by: PHYSICIAN ASSISTANT

## 2019-11-18 PROCEDURE — 85027 COMPLETE CBC AUTOMATED: CPT | Performed by: PHYSICIAN ASSISTANT

## 2019-11-18 PROCEDURE — 99215 OFFICE O/P EST HI 40 MIN: CPT | Mod: 25 | Performed by: PHYSICIAN ASSISTANT

## 2019-11-18 PROCEDURE — 76830 TRANSVAGINAL US NON-OB: CPT

## 2019-11-18 PROCEDURE — 81025 URINE PREGNANCY TEST: CPT | Performed by: PHYSICIAN ASSISTANT

## 2019-11-18 PROCEDURE — 87210 SMEAR WET MOUNT SALINE/INK: CPT | Performed by: PHYSICIAN ASSISTANT

## 2019-11-18 RX ORDER — METHYLPHENIDATE HYDROCHLORIDE 10 MG/1
10 TABLET ORAL 2 TIMES DAILY
Qty: 60 TABLET | Refills: 0 | Status: SHIPPED | OUTPATIENT
Start: 2020-01-18 | End: 2020-04-06

## 2019-11-18 RX ORDER — MEDROXYPROGESTERONE ACETATE 150 MG/ML
150 INJECTION, SUSPENSION INTRAMUSCULAR
Status: DISCONTINUED | OUTPATIENT
Start: 2019-11-18 | End: 2020-09-16

## 2019-11-18 RX ORDER — METHYLPHENIDATE HYDROCHLORIDE 10 MG/1
10 TABLET ORAL 2 TIMES DAILY
Qty: 60 TABLET | Refills: 0 | Status: SHIPPED | OUTPATIENT
Start: 2019-11-18 | End: 2019-11-18

## 2019-11-18 RX ORDER — METHYLPHENIDATE HYDROCHLORIDE 10 MG/1
10 TABLET ORAL 2 TIMES DAILY
Qty: 60 TABLET | Refills: 0 | Status: SHIPPED | OUTPATIENT
Start: 2019-12-18 | End: 2019-11-18

## 2019-11-18 RX ADMIN — MEDROXYPROGESTERONE ACETATE 150 MG: 150 INJECTION, SUSPENSION INTRAMUSCULAR at 10:22

## 2019-11-18 ASSESSMENT — MIFFLIN-ST. JEOR: SCORE: 1189.8

## 2019-11-18 ASSESSMENT — ANXIETY QUESTIONNAIRES
1. FEELING NERVOUS, ANXIOUS, OR ON EDGE: SEVERAL DAYS
7. FEELING AFRAID AS IF SOMETHING AWFUL MIGHT HAPPEN: SEVERAL DAYS
GAD7 TOTAL SCORE: 6
6. BECOMING EASILY ANNOYED OR IRRITABLE: SEVERAL DAYS
GAD7 TOTAL SCORE: 6
7. FEELING AFRAID AS IF SOMETHING AWFUL MIGHT HAPPEN: SEVERAL DAYS
5. BEING SO RESTLESS THAT IT IS HARD TO SIT STILL: SEVERAL DAYS
GAD7 TOTAL SCORE: 6
3. WORRYING TOO MUCH ABOUT DIFFERENT THINGS: SEVERAL DAYS
2. NOT BEING ABLE TO STOP OR CONTROL WORRYING: SEVERAL DAYS
4. TROUBLE RELAXING: NOT AT ALL

## 2019-11-18 ASSESSMENT — PATIENT HEALTH QUESTIONNAIRE - PHQ9
SUM OF ALL RESPONSES TO PHQ QUESTIONS 1-9: 6
SUM OF ALL RESPONSES TO PHQ QUESTIONS 1-9: 6

## 2019-11-18 ASSESSMENT — PAIN SCALES - GENERAL: PAINLEVEL: SEVERE PAIN (6)

## 2019-11-18 NOTE — PROGRESS NOTES
Clinic Administered Medication Documentation    MEDICATION LIST:   Depo Provera Documentation    Prior to injection, verified patient identity using patient's name and date of birth. Medication was administered. Please see MAR and medication order for additional information. Patient instructed to remain in clinic for 15 minutes and report any adverse reaction to staff immediately .    BP: 120/84    LAST PAP/EXAM:   Lab Results   Component Value Date    PAP NIL 04/02/2019     URINE HCG:negative    NEXT INJECTION DUE: 2/4/20 - 2/18/20    Was entire vial of medication used? Yes  Vial/Syringe: Single dose vial  Expiration Date:  07/20    Follow Up Injection    Patient returning during stated date range given at previous visit: No, urine pregnancy test performed, results neg - injection administered      If here at the correct interval:   BP Readings from Last 1 Encounters:   11/18/19 120/84     Wt Readings from Last 1 Encounters:   11/18/19 53 kg (116 lb 12.8 oz)       Last Pap/exam date: NA      Side effects or problems with last injection?  No.  Date range is given to patient for next dose: Yes    See Medication Note for administration information    Staff Sig: Duyen Giraldo MA on 11/19/2019 at 11:57 AM      Answers for HPI/ROS submitted by the patient on 11/18/2019   PHQ9 TOTAL SCORE: 6  ROMI 7 TOTAL SCORE: 6

## 2019-11-18 NOTE — NURSING NOTE
Clinic Administered Medication Documentation    MEDICATION LIST:   Depo Provera Documentation    Prior to injection, verified patient identity using patient's name and date of birth. Medication was administered. Please see MAR and medication order for additional information. Patient instructed to remain in clinic for 15 minutes and report any adverse reaction to staff immediately .    BP: 120/84    LAST PAP/EXAM:   Lab Results   Component Value Date    PAP NIL 04/02/2019     URINE HCG:negative    NEXT INJECTION DUE: 2/3/20 - 2/17/20    Was entire vial of medication used? Yes  Vial/Syringe: Single dose vial  Expiration Date:  07/20  Duyen Giraldo MA on 11/18/2019 at 10:25 AM

## 2019-11-18 NOTE — PATIENT INSTRUCTIONS
- Appointment with Dr. Benavidez     - Await results     - Recheck 3 month    

## 2019-11-18 NOTE — RESULT ENCOUNTER NOTE
Tasha     Your ultrasound was normal. I am thinking the cramping has to do with the constant periods. So I think following up with gynecology would be a good next step.     Donnie Mansfield PA-C

## 2019-11-19 LAB
C TRACH DNA SPEC QL NAA+PROBE: NEGATIVE
N GONORRHOEA DNA SPEC QL NAA+PROBE: NEGATIVE
SPECIMEN SOURCE: NORMAL
SPECIMEN SOURCE: NORMAL

## 2019-11-19 ASSESSMENT — PATIENT HEALTH QUESTIONNAIRE - PHQ9: SUM OF ALL RESPONSES TO PHQ QUESTIONS 1-9: 6

## 2019-11-19 ASSESSMENT — ANXIETY QUESTIONNAIRES: GAD7 TOTAL SCORE: 6

## 2019-11-19 NOTE — RESULT ENCOUNTER NOTE
Josefina Cordova    Your gonorrhea and chlamydia testing was negative.     The results are attached for your review.       Donnie Mansfield PA-C

## 2019-11-22 LAB — PAIN DRUG SCR UR W RPTD MEDS: NORMAL

## 2019-12-04 ENCOUNTER — OFFICE VISIT (OUTPATIENT)
Dept: OBGYN | Facility: OTHER | Age: 39
End: 2019-12-04
Payer: COMMERCIAL

## 2019-12-04 VITALS
DIASTOLIC BLOOD PRESSURE: 72 MMHG | HEIGHT: 64 IN | WEIGHT: 112.5 LBS | BODY MASS INDEX: 19.21 KG/M2 | SYSTOLIC BLOOD PRESSURE: 118 MMHG | HEART RATE: 68 BPM

## 2019-12-04 DIAGNOSIS — N92.1 BREAKTHROUGH BLEEDING ON DEPO PROVERA: ICD-10-CM

## 2019-12-04 DIAGNOSIS — Z01.419 ENCOUNTER FOR GYNECOLOGICAL EXAMINATION WITHOUT ABNORMAL FINDING: Primary | ICD-10-CM

## 2019-12-04 PROCEDURE — 99395 PREV VISIT EST AGE 18-39: CPT | Performed by: ADVANCED PRACTICE MIDWIFE

## 2019-12-04 ASSESSMENT — MIFFLIN-ST. JEOR: SCORE: 1170.3

## 2019-12-04 NOTE — NURSING NOTE
"Chief Complaint   Patient presents with     Abnormal Uterine Bleeding     irregular periods     Physical       Initial /72 (BP Location: Right arm, Patient Position: Sitting, Cuff Size: Adult Regular)   Pulse 68   Ht 1.626 m (5' 4\")   Wt 51 kg (112 lb 8 oz)   LMP 11/19/2019 (LMP Unknown)   BMI 19.31 kg/m   Estimated body mass index is 19.31 kg/m  as calculated from the following:    Height as of this encounter: 1.626 m (5' 4\").    Weight as of this encounter: 51 kg (112 lb 8 oz).  Medication Reconciliation: complete    Alexandra Puri CMA    "

## 2019-12-04 NOTE — PROGRESS NOTES
SUBJECTIVE:   CC: Tasha Short is an 39 year old woman who presents for preventive health visit.     Healthy Habits:    Do you get at least three servings of calcium containing foods daily (dairy, green leafy vegetables, etc.)? yes    Amount of exercise or daily activities, outside of work: active at work    Problems taking medications regularly No    Medication side effects: No    Have you had an eye exam in the past two years? yes    Do you see a dentist twice per year? yes    Do you have sleep apnea, excessive snoring or daytime drowsiness?no      Irregular peroids    Today's PHQ-2 Score:   PHQ-2 ( 1999 [employer]) 8/8/2019 4/3/2019   Q1: Little interest or pleasure in doing things 1 1   Q2: Feeling down, depressed or hopeless 1 1   PHQ-2 Score 2 2   Q1: Little interest or pleasure in doing things Several days Several days   Q2: Feeling down, depressed or hopeless Several days Several days   PHQ-2 Score 2 2       Abuse: Current or Past(Physical, Sexual or Emotional)- No  Do you feel safe in your environment? Yes        Social History     Tobacco Use     Smoking status: Current Every Day Smoker     Packs/day: 0.50     Smokeless tobacco: Never Used   Substance Use Topics     Alcohol use: No     If you drink alcohol do you typically have >3 drinks per day or >7 drinks per week? No                     Reviewed orders with patient.  Reviewed health maintenance and updated orders accordingly - Yes  Labs reviewed in EPIC  BP Readings from Last 3 Encounters:   12/04/19 118/72   11/18/19 120/84   08/16/19 124/80    Wt Readings from Last 3 Encounters:   12/04/19 51 kg (112 lb 8 oz)   11/18/19 53 kg (116 lb 12.8 oz)   08/08/19 55.7 kg (122 lb 12.8 oz)                  Patient Active Problem List   Diagnosis     ROSA I (cervical intraepithelial neoplasia I)     Cervical dysplasia     S/P LEEP     Narcolepsy and cataplexy     Health Care Home     Calcific tendonitis peroneals     Hypersomnia     Major depressive disorder,  recurrent episode, moderate (H)     Generalized anxiety disorder     HTN, goal below 140/90     Irregular heartbeat     Tobacco use disorder     H/O ETOH abuse     Gastroesophageal reflux disease without esophagitis     Hypertriglyceridemia     Chronic seasonal allergic rhinitis due to pollen     Alcohol dependence in remission (H)     Encounter for surveillance of injectable contraceptive     Past Surgical History:   Procedure Laterality Date      SECTION       COLPOSCOPY CERVIX, LOOP ELECTRODE BIOPSY, COMBINED  2010    ROSA 2/3     HC REMOVAL OF TONSILS,12+ Y/O      Tonsils 12+y.o.     REPAIR TENDON PERONEAL  11/15/2013    Procedure: REPAIR TENDON PERONEAL;  right peroneal tendon  transfer;  Surgeon: Jesus Manuel Oden DPM;  Location: [facility] OR       Social History     Tobacco Use     Smoking status: Current Every Day Smoker     Packs/day: 0.50     Smokeless tobacco: Never Used   Substance Use Topics     Alcohol use: No     Family History   Problem Relation Age of Onset     Depression Mother      Arthritis Paternal Grandmother      Hypertension Paternal Grandfather         birth FF     Asthma No family hx of      C.A.D. No family hx of      Diabetes No family hx of      Cancer - colorectal No family hx of      Prostate Cancer No family hx of      Coronary Artery Disease No family hx of      Ovarian Cancer No family hx of      Mental Illness No family hx of      Cerebrovascular Disease No family hx of      Anesthesia Reaction No family hx of      Other Cancer No family hx of      Osteoporosis No family hx of      Known Genetic Syndrome No family hx of      Obesity No family hx of      Unknown/Adopted No family hx of          Current Outpatient Medications   Medication Sig Dispense Refill     amLODIPine (NORVASC) 10 MG tablet Take 1 tablet (10 mg) by mouth daily 90 tablet 3     busPIRone (BUSPAR) 5 MG tablet Take 1 tablet (5 mg) by mouth 3 times daily as needed 90 tablet 1     flunisolide (NASALIDE) 25  MCG/ACT (0.025%) SOLN spray Spray 2 sprays into both nostrils every 12 hours 1 Bottle 5     lithium ER (LITHOBID) 300 MG CR tablet   2     [START ON 1/18/2020] methylphenidate (RITALIN) 10 MG tablet Take 1 tablet (10 mg) by mouth 2 times daily 60 tablet 0     metoprolol succinate ER (TOPROL-XL) 25 MG 24 hr tablet Take 1 tablet (25 mg) by mouth daily 90 tablet 3     naltrexone (VIVITROL) 380 MG SUSR Inject 380 mg into the muscle every 30 days       nicotine (NICODERM CQ) 21 MG/24HR 24 hr patch Place 1 patch onto the skin every 24 hours (Patient not taking: Reported on 12/4/2019) 28 patch 3     nicotine (NICORETTE) 2 MG gum Place 1 each (2 mg) inside cheek as needed for smoking cessation (Patient not taking: Reported on 12/4/2019) 100 each 3     Allergies   Allergen Reactions     Bupropion Other (See Comments)     seizures     Lisinopril Swelling     Angioedema      Penicillins      hives     Phenytoin      rash,puritis (Dilantin)     Prednisone Itching     Face itching, unable to concentrate      Seasonal Allergies        Mammogram not appropriate for this patient based on age.    Pertinent mammograms are reviewed under the imaging tab.  History of abnormal Pap smear:   PAP / HPV Latest Ref Rng & Units 4/2/2019 5/22/2015 10/8/2013   PAP - NIL NIL NIL   HPV 16 DNA NEG:Negative Negative Negative -   HPV 18 DNA NEG:Negative Negative Negative -   OTHER HR HPV NEG:Negative Positive(A) Negative -     Reviewed and updated as needed this visit by clinical staff  Tobacco  Allergies  Meds  Med Hx  Surg Hx  Fam Hx  Soc Hx        Reviewed and updated as needed this visit by Provider            ROS:  CONSTITUTIONAL: NEGATIVE for fever, chills, change in weight  INTEGUMENTARU/SKIN: NEGATIVE for worrisome rashes, moles or lesions  EYES: NEGATIVE for vision changes or irritation  ENT: NEGATIVE for ear, mouth and throat problems  RESP: NEGATIVE for significant cough or SOB  BREAST: NEGATIVE for masses, tenderness or  "discharge  CV: NEGATIVE for chest pain, palpitations or peripheral edema  GI: NEGATIVE for nausea, abdominal pain, heartburn, or change in bowel habits  : NEGATIVE for unusual urinary or vaginal symptoms.vaginal bleeding is irregular due to depo.  Has recently gotten another dose.  MUSCULOSKELETAL: NEGATIVE for significant arthralgias or myalgia  NEURO: NEGATIVE for weakness, dizziness or paresthesias  ENDOCRINE: NEGATIVE for temperature intolerance, skin/hair changes  HEME/ALLERGY/IMMUNE: NEGATIVE for bleeding problems  PSYCHIATRIC: NEGATIVE for changes in mood or affect    OBJECTIVE:   /72 (BP Location: Right arm, Patient Position: Sitting, Cuff Size: Adult Regular)   Pulse 68   Ht 1.626 m (5' 4\")   Wt 51 kg (112 lb 8 oz)   LMP 11/19/2019 (LMP Unknown)   BMI 19.31 kg/m    EXAM:  GENERAL: healthy, alert and no distress  EYES: Eyes grossly normal to inspection, PERRL and conjunctivae and sclerae normal  HENT: ear canals and TM's normal, nose and mouth without ulcers or lesions  NECK: no adenopathy, no asymmetry, masses, or scars and thyroid normal to palpation  RESP: lungs clear to auscultation - no rales, rhonchi or wheezes  BREAST: normal without masses, tenderness or nipple discharge and no palpable axillary masses or adenopathy  CV: regular rate and rhythm, normal S1 S2, no S3 or S4, no murmur, click or rub, no peripheral edema and peripheral pulses strong  ABDOMEN: soft, nontender, no hepatosplenomegaly, no masses and bowel sounds normal   (female): normal female external genitalia, normal urethral meatus, vaginal mucosa pink, moist, well rugated, and normal cervix/adnexa/uterus without masses or discharge  MS: no gross musculoskeletal defects noted, no edema  SKIN: no suspicious lesions or rashes  NEURO: Normal strength and tone, mentation intact and speech normal  PSYCH: mentation appears normal, affect normal/bright    Diagnostic Test Results:  Labs reviewed in Epic    ASSESSMENT/PLAN: " "  (Z01.419) Encounter for gynecological examination without abnormal finding  (primary encounter diagnosis)  Comment:   Plan:     (N92.1) Breakthrough bleeding on depo provera  Comment:   Plan:   Discussed typical bleeding patterns with depo.   Would anticipate decrease in bleeding with continued depo use.   No plans for a pregnancy in the next year.  Discussed the delay in return to fertility with depo.     Discussed pap at length.  Due in April.  Because she is HPV positive.  Would not repeat pap today but give her body a chance to suppress the HPV infection before repeating.       COUNSELING:   Reviewed preventive health counseling, as reflected in patient instructions       Contraception       Family planning    Estimated body mass index is 20.05 kg/m  as calculated from the following:    Height as of 11/18/19: 1.626 m (5' 4\").    Weight as of 11/18/19: 53 kg (116 lb 12.8 oz).         reports that she has been smoking. She has been smoking about 0.50 packs per day. She has never used smokeless tobacco.  Tobacco Cessation Action Plan: Information offered: Patient not interested at this time    Counseling Resources:  ATP IV Guidelines  Pooled Cohorts Equation Calculator  Breast Cancer Risk Calculator  FRAX Risk Assessment  ICSI Preventive Guidelines  Dietary Guidelines for Americans, 2010  Vaughn [employer]'s MyPlate  ASA Prophylaxis  Lung CA Screening    [facility] FLOR[facility] Posadas [facility]  "

## 2020-02-13 ENCOUNTER — TELEPHONE (OUTPATIENT)
Dept: FAMILY MEDICINE | Facility: OTHER | Age: 40
End: 2020-02-13

## 2020-02-13 DIAGNOSIS — G47.411 NARCOLEPSY AND CATAPLEXY: Primary | ICD-10-CM

## 2020-02-13 DIAGNOSIS — Z87.898 HISTORY OF SEIZURE: ICD-10-CM

## 2020-02-24 ENCOUNTER — TELEPHONE (OUTPATIENT)
Dept: FAMILY MEDICINE | Facility: OTHER | Age: 40
End: 2020-02-24

## 2020-02-24 NOTE — TELEPHONE ENCOUNTER
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    Angie Corral MA

## 2020-02-24 NOTE — PROGRESS NOTES
Subjective     Tasha Short is a 39 year old female who presents to clinic today for the following health issues:      History of Present Illness        Mental Health Follow-up:  Patient presents to follow-up on Depression & Anxiety.Patient's depression since last visit has been:  Better  The patient is not having other symptoms associated with depression.  Patient's anxiety since last visit has been:  Medium  The patient is not having other symptoms associated with anxiety.  Any significant life events: financial concerns  Patient is feeling anxious or having panic attacks.  Patient has no concerns about alcohol or drug use.     Social History  Tobacco Use    Smoking status: Current Every Day Smoker      Packs/day: 0.50    Smokeless tobacco: Never Used  Alcohol use: No  Drug use: No      Today's PHQ-9         PHQ-9 Total Score:     (P) 9   PHQ-9 Q9 Thoughts of better off dead/self-harm past 2 weeks :   (P) Not at all   Thoughts of suicide or self harm:      Self-harm Plan:        Self-harm Action:          Safety concerns for self or others:           Hypertension: She presents for follow up of hypertension.  She does check blood pressure  regularly outside of the clinic. Outpatient blood pressures have not been over 140/90. She follows a low salt diet.     She eats 2-3 servings of fruits and vegetables daily.She consumes 1 sweetened beverage(s) daily.She exercises with enough effort to increase her heart rate 60 or more minutes per day.  She exercises with enough effort to increase her heart rate 6 days per week.   She is taking medications regularly.     Acute Illness   Acute illness concerns: URI  Onset: 1 month    Fever: no    Chills/Sweats: no    Headache (location?): no     Sinus Pressure:no    Conjunctivitis:  no    Ear Pain: no    Rhinorrhea: YES    Congestion: YES    Sore Throat: no      Cough: YES    Wheeze: no     Decreased Appetite: YES    Nausea: no    Vomiting: no    Diarrhea:   "no    Dysuria/Freq.: no    Fatigue/Achiness: YES    Sick/Strep Exposure: no     Therapies Tried and outcome: was on tamiflu and antibiotics helps a little but still having the UPPER RESPIRATORY INFECTION    - Zpack, finished, still coughing up stuff   - No appetite over past few months   - Started lithium - no appetite      Wants to put her on all different kinds of medications   - Sober 12/6/18      Prednisone            Review of Systems   ROS COMP: Constitutional, HEENT, cardiovascular, pulmonary, gi and gu systems are negative, except as otherwise noted.      Objective    /70   Pulse 104   Temp 99.6  F (37.6  C) (Temporal)   Resp 16   Ht 1.631 m (5' 4.21\")   Wt 49.2 kg (108 lb 6.4 oz)   SpO2 98%   BMI 18.48 kg/m    Body mass index is 18.48 kg/m .  Physical Exam   GENERAL APPEARANCE: ill appearing, alert and no distress  EYES: Eyes grossly normal to inspection, PERRLA, conjunctivae and sclerae without injection or discharge, EOM intact   HENT: Bilateral ear canals without erythema or cerumen, bilateral TM's pearly grey with normal light reflex, no effusion, injection, or bulging, nasal turbinates without swelling, erythema, or discharge, mouth without ulcers or lesions, oropharynx clear and oral mucous membranes moist, no sinus tenderness   NECK: Bilateral submandibular adenopathy, no adenopathy in cervical or supraclavicular regions  RESP: Occasional soft rales with intermittent expiratory wheeze in bilateral bronchial areas, remainder of lungs clear to auscultation - no rales, rhonchi or wheezes   CV: Regular rates and rhythm, normal S1 S2, no S3 or S4, no murmur, click or rub  MS: No musculoskeletal defects are noted and gait is age appropriate without ataxia   SKIN: No suspicious lesions or rashes, hydration status appears adeuqate with normal skin turgor   PSYCH: Alert and oriented x3; speech- coherent , normal rate and volume; able to articulate logical thoughts, able to abstract reason, no " "tangential thoughts, no hallucinations or delusions, mentation appears normal, Mood is euthymic. Affect is anxious. Thought content is free of suicidal ideation, hallucinations, and delusions. Dress is adequate and upkept. Eye contact is good during conversation.       Diagnostic Test Results:  Labs reviewed in Epic  none         Assessment & Plan       ICD-10-CM    1. Acute bronchitis treated with antibiotics in the past 60 days  J20.9 methylPREDNISolone (MEDROL DOSEPAK) 4 MG tablet therapy pack     levofloxacin (LEVAQUIN) 750 MG tablet   2. Generalized anxiety disorder  F41.1    3. Major depressive disorder, recurrent episode, moderate (H)  F33.1    4. Alcohol dependence in remission (H)  F10.21    5. HTN, goal below 140/90  I10    6. Hypersomnia  G47.10    7. Narcolepsy and cataplexy  G47.411      1. Bronchitis   - Patient recently treated with Zpack and medrol dose pack with no change to symptoms   - Concerning lung exam with wheezes  - Recommend repeat Medrol dose pack   - Will also have her do 10 days of Levaquin  - Discussed antibiotic use, duration, and side effects  - Encouraged rest and fluids     2-4. Mood   - Patient states is struggling a bit - , doesn't want to go back to psychiatrist, \"they always want to up my meds, this last one told me I'm bipolar, I'm not\"      Reports weight loss and no appetitite since starting Lithium and Naltrexone - discussed both could cause, along with recent illness, advised protein shakes/bars even if not hungry   - [facility], discussed what this means at length and that she will return to me for care, she is in agreement with this   - For now continue medications currently on   - Still sober, been sober since Dec 2018, started Naltrexone (Vivitrol) injections about 4-5 months after, she is interested in stopping these since doesn't want to go back to psychiatrist, recommend continue for now and discuss stopping with " psychiatry     5. HTN   - Stable on current regimen     6 & 7. Sleep   - Patient reports tired all the time, even if takes her Ritalin or not   - Informs provider just wants to stop taking it     The patient indicates understanding of these issues and agrees with the plan.    Return in about 1 month (around 4/5/2020).    Enid Mansfield PA-C  [facility]

## 2020-02-24 NOTE — TELEPHONE ENCOUNTER
Appointment notes for 3/5/20 visit scheduled via [facility]: 'I have been experiencing weight loss. I'm very short of breath.'    Called patient, left message to call back.    JERRELL MckeonN, RN, PHN

## 2020-02-25 NOTE — TELEPHONE ENCOUNTER
LM for patient to return call to any triage RN.     See appointment notes below.     Muna Garnett, RN BSN

## 2020-02-26 NOTE — TELEPHONE ENCOUNTER
Attempt #3.     LM for patient to call back to any triage RN.     See appointment notes below.    Muna Garnett RN BSN

## 2020-02-27 ENCOUNTER — TELEPHONE (OUTPATIENT)
Dept: FAMILY MEDICINE | Facility: OTHER | Age: 40
End: 2020-02-27

## 2020-02-27 DIAGNOSIS — F10.21 ALCOHOL DEPENDENCE IN REMISSION (H): ICD-10-CM

## 2020-02-27 DIAGNOSIS — F33.1 MAJOR DEPRESSIVE DISORDER, RECURRENT EPISODE, MODERATE (H): Primary | ICD-10-CM

## 2020-02-27 DIAGNOSIS — F41.1 GENERALIZED ANXIETY DISORDER: ICD-10-CM

## 2020-02-27 NOTE — TELEPHONE ENCOUNTER
"Pasted from Mom's MyChart encounter: \"Noris Grimm need one more refereal for Tasha tripp she is coming into see you in march but she asked i get this there 1 month out for appts. She needs one to the Behavior services at [facility] with diagnose which would be Depression anxiety sobriety need diagnose so they can see what dr to send to she would like to see a phycologist at [facility] for all her psy meds and got a appt threw neurologist for seizures problems so needs to see Behavior services before can see a Psychologist  Your Great Thanks \"    Please review/advise.    Mom informed in other encounter that CDL out until Monday.  "

## 2020-03-05 ENCOUNTER — OFFICE VISIT (OUTPATIENT)
Dept: FAMILY MEDICINE | Facility: OTHER | Age: 40
End: 2020-03-05
Payer: COMMERCIAL

## 2020-03-05 VITALS
HEIGHT: 64 IN | WEIGHT: 108.4 LBS | OXYGEN SATURATION: 98 % | DIASTOLIC BLOOD PRESSURE: 70 MMHG | RESPIRATION RATE: 16 BRPM | BODY MASS INDEX: 18.51 KG/M2 | SYSTOLIC BLOOD PRESSURE: 114 MMHG | TEMPERATURE: 99.6 F | HEART RATE: 104 BPM

## 2020-03-05 DIAGNOSIS — J20.9 ACUTE BRONCHITIS TREATED WITH ANTIBIOTICS IN THE PAST 60 DAYS: Primary | ICD-10-CM

## 2020-03-05 DIAGNOSIS — I10 HTN, GOAL BELOW 140/90: ICD-10-CM

## 2020-03-05 DIAGNOSIS — F33.1 MAJOR DEPRESSIVE DISORDER, RECURRENT EPISODE, MODERATE (H): ICD-10-CM

## 2020-03-05 DIAGNOSIS — G47.411 NARCOLEPSY AND CATAPLEXY: ICD-10-CM

## 2020-03-05 DIAGNOSIS — F41.1 GENERALIZED ANXIETY DISORDER: ICD-10-CM

## 2020-03-05 DIAGNOSIS — G47.10 HYPERSOMNIA: ICD-10-CM

## 2020-03-05 DIAGNOSIS — F10.21 ALCOHOL DEPENDENCE IN REMISSION (H): ICD-10-CM

## 2020-03-05 PROCEDURE — 99214 OFFICE O/P EST MOD 30 MIN: CPT | Performed by: PHYSICIAN ASSISTANT

## 2020-03-05 RX ORDER — METHYLPREDNISOLONE 4 MG
TABLET, DOSE PACK ORAL
Qty: 21 TABLET | Refills: 0 | Status: SHIPPED | OUTPATIENT
Start: 2020-03-05 | End: 2020-04-06

## 2020-03-05 RX ORDER — LEVOFLOXACIN 750 MG/1
750 TABLET, FILM COATED ORAL DAILY
Qty: 10 TABLET | Refills: 0 | Status: SHIPPED | OUTPATIENT
Start: 2020-03-05 | End: 2020-04-06

## 2020-03-05 ASSESSMENT — ANXIETY QUESTIONNAIRES
1. FEELING NERVOUS, ANXIOUS, OR ON EDGE: MORE THAN HALF THE DAYS
GAD7 TOTAL SCORE: 12
7. FEELING AFRAID AS IF SOMETHING AWFUL MIGHT HAPPEN: MORE THAN HALF THE DAYS
GAD7 TOTAL SCORE: 12
7. FEELING AFRAID AS IF SOMETHING AWFUL MIGHT HAPPEN: MORE THAN HALF THE DAYS
2. NOT BEING ABLE TO STOP OR CONTROL WORRYING: MORE THAN HALF THE DAYS
4. TROUBLE RELAXING: SEVERAL DAYS
GAD7 TOTAL SCORE: 12
3. WORRYING TOO MUCH ABOUT DIFFERENT THINGS: MORE THAN HALF THE DAYS
6. BECOMING EASILY ANNOYED OR IRRITABLE: MORE THAN HALF THE DAYS
5. BEING SO RESTLESS THAT IT IS HARD TO SIT STILL: SEVERAL DAYS

## 2020-03-05 ASSESSMENT — MIFFLIN-ST. JEOR: SCORE: 1155.08

## 2020-03-05 NOTE — PATIENT INSTRUCTIONS
[facility]: Collaborative Care Psychiatry Service/Bridge to Long-Term Psychiatry as indicated ([phone])    Dr. Rosalie Richardson in [place]       Neurology -      [facility]: [facility] [place] [phone]   [email or web address]

## 2020-03-06 ASSESSMENT — PATIENT HEALTH QUESTIONNAIRE - PHQ9: SUM OF ALL RESPONSES TO PHQ QUESTIONS 1-9: 9

## 2020-03-06 ASSESSMENT — ANXIETY QUESTIONNAIRES: GAD7 TOTAL SCORE: 12

## 2020-03-31 ENCOUNTER — E-VISIT (OUTPATIENT)
Dept: FAMILY MEDICINE | Facility: OTHER | Age: 40
End: 2020-03-31
Payer: COMMERCIAL

## 2020-03-31 DIAGNOSIS — F33.1 MAJOR DEPRESSIVE DISORDER, RECURRENT EPISODE, MODERATE (H): ICD-10-CM

## 2020-03-31 DIAGNOSIS — F41.1 GENERALIZED ANXIETY DISORDER: Primary | ICD-10-CM

## 2020-03-31 PROCEDURE — 99421 OL DIG E/M SVC 5-10 MIN: CPT | Performed by: PHYSICIAN ASSISTANT

## 2020-03-31 ASSESSMENT — ANXIETY QUESTIONNAIRES
7. FEELING AFRAID AS IF SOMETHING AWFUL MIGHT HAPPEN: NEARLY EVERY DAY
7. FEELING AFRAID AS IF SOMETHING AWFUL MIGHT HAPPEN: NEARLY EVERY DAY
5. BEING SO RESTLESS THAT IT IS HARD TO SIT STILL: NEARLY EVERY DAY
4. TROUBLE RELAXING: NEARLY EVERY DAY
GAD7 TOTAL SCORE: 21
2. NOT BEING ABLE TO STOP OR CONTROL WORRYING: NEARLY EVERY DAY
1. FEELING NERVOUS, ANXIOUS, OR ON EDGE: NEARLY EVERY DAY
6. BECOMING EASILY ANNOYED OR IRRITABLE: NEARLY EVERY DAY
3. WORRYING TOO MUCH ABOUT DIFFERENT THINGS: NEARLY EVERY DAY

## 2020-03-31 ASSESSMENT — PATIENT HEALTH QUESTIONNAIRE - PHQ9
SUM OF ALL RESPONSES TO PHQ QUESTIONS 1-9: 18
SUM OF ALL RESPONSES TO PHQ QUESTIONS 1-9: 18
10. IF YOU CHECKED OFF ANY PROBLEMS, HOW DIFFICULT HAVE THESE PROBLEMS MADE IT FOR YOU TO DO YOUR WORK, TAKE CARE OF THINGS AT HOME, OR GET ALONG WITH OTHER PEOPLE: VERY DIFFICULT

## 2020-04-01 ASSESSMENT — ANXIETY QUESTIONNAIRES: GAD7 TOTAL SCORE: 21

## 2020-04-01 ASSESSMENT — PATIENT HEALTH QUESTIONNAIRE - PHQ9: SUM OF ALL RESPONSES TO PHQ QUESTIONS 1-9: 18

## 2020-04-01 NOTE — TELEPHONE ENCOUNTER
"ROMI-7 SCORE 11/18/2019 3/5/2020 3/31/2020   Total Score - - -   Total Score 6 (mild anxiety) 12 (moderate anxiety) 21 (severe anxiety)   Total Score 6 12 21       PHQ 11/18/2019 3/5/2020 3/31/2020   PHQ-9 Total Score 6 9 18   Q9: Thoughts of better off dead/self-harm past 2 weeks Not at all Not at all Not at all       Plan from last visit    Patient states is struggling a bit - , doesn't want to go back to psychiatrist, \"they always want to up my meds, this last one told me I'm bipolar, I'm not\"      Reports weight loss and no appetitite since starting Lithium and Naltrexone - discussed both could cause, along with recent illness, advised protein shakes/bars even if not hungry   - Previously gave her referral to [facility] collaborative care, discussed what this means at length and that she will return to me for care, she is in agreement with this   - For now continue medications currently on   - Still sober, been sober since Dec 2018, started Naltrexone (Vivitrol) injections about 4-5 months after, she is interested in stopping these since doesn't want to go back to psychiatrist, recommend continue for now and discuss stopping with psychiatry      Has appointment on 4/23/20 with psychiatry     Patient did not respond to my message in >48 hours. Will close     Provider E-Visit time total (minutes): 6    Donnie Mansfield PA-C  [facility]     "

## 2020-04-03 ENCOUNTER — TELEPHONE (OUTPATIENT)
Dept: FAMILY MEDICINE | Facility: OTHER | Age: 40
End: 2020-04-03

## 2020-04-03 NOTE — PATIENT INSTRUCTIONS
"    Warning Signs of Suicide and What You Can Do    If you think a person could be suicidal, ask, \"Have you thought about suicide?\" If they say \"yes,\" they may already have a plan for how and when they will attempt it. Find out as much as you can. The more detailed the plan, and the easier it is to carry out, the more danger the person is in right now.  Know the warning signs  The warning signs for suicide include:    Threats or talk of suicide    Sense of hopelessness    Buying a gun or other weapon    Statements such as \"Soon, I won't be a problem\" or \"Nothing matters\"    Giving away items they own, making out a will, or planning their     Suddenly being happy or calm after being depressed  Factors that put a person at a higher risk of attempting suicide include:    A history of suicide in the person's family    Previous suicide attempts    Alcohol and drug use, along with impulsive behaviors    Having a diagnose mood disorder such as depression or bipolar disorder    History of trauma or abuse including bullying    Significant losses such as a divorce, death of a loved one, financial problems, or legal problems    Having access to a lethal weapon (for example firearms in the home)    Chronic physical illnesses, including chronic pain    Exposure to suicidal behavior of others  Get help  Don't try to handle this alone. You can be the most help by getting the person to a trained professional. Suicidal thinking may be a sign of depression, a serious but treatable illness.  In an emergency--call 911  Don't leave the person alone. Anyone who is at imminent risk of suicide needs psychiatric services right away. The person must be continuously monitored, and never left out of sight. Call 911 or a 24-hour suicide crisis hotline. It can be found in the white pages of your phone book under \"Suicide.\" You can also take the person to the nearest hospital emergency room (ER).  Don't keep it a secret and don't " wait  Call a mental health clinic or a licensed mental health professional in your area right away: a psychiatrist, clinical psychologist, psychiatric or licensed clinical , marriage and family counselor, or clergy. Tell them you need help for a person who is thinking about suicide.  Resources    National Suicide Prevention Omwvlphf920-766-3950 (307-483-EDYZ)www.suicidepreventionlifeline.org    National Suicide Hemplft405-268-6265 (800-SUICIDE)    National [place] of Mental Dgeztb380-997-5840ukj.[facility].nih.gov    National [place] on Mental Nnjjopq691-523-9383ssb.dayron.org    Mental Health Jejovjb489-068-4849rpt.nmha.org   Date Last Reviewed: 1/1/2017 2000-2019 [employer]. [address] [place] [address]. All rights reserved. This information is not intended as a substitute for professional medical care. Always follow your healthcare professional's instructions.          Recognizing Suicide Warning Signs in Yourself    People who are thinking about suicide may not know they are depressed. Certain thoughts, feelings, and actions can be signals that let you know you may need help. The best thing you can do is watch for signs that you may be at risk. Then, ask for help. You can talk to your regular healthcare provider or seek help from a mental health provider.  Depression  Depression is a treatable illness. To know if depression is causing you to feel like ending your life, ask yourself:    Do I feel worthless, guilty, helpless, or hopeless?    Have I been feeling sad, down, or blue on most days?    Have I lost interest in my work or people I used to enjoy?    Do I have trouble sleeping or do I sleep too much?    Do I eat more or less than usual?    Do I feel tired, weak, and low on energy?    Do I feel restless and unable to sit still?    Do I have trouble thinking or making choices?    Do I cry more than usual?    Do I feel life isn't worth living?  Warning signs for  "suicide    Thinking often about taking your life    Planning how you may attempt it    Talking or writing about committing suicide    Feeling that death is the only solution to your problems    Feeling a pressing need to make out your will or arrange your     Giving away things you own    Participating in risky behaviors, such as sex with someone you don't know or drinking and driving    Buying a lethal weapon, such as a gun, or hoarding medicines that could be used in an over dose  If you notice any of these warning signs, call for help right away or go to your closest hospital emergency department. You can also call a mental health clinic or a 24-hour suicide crisis hotline for help and support. Search for local suicide prevention resources on your computer or look for the number in the white pages of your phone book under \"Suicide.\" In an emergency, if you are in immediate risk of harming yourself, call 911. For more information about depression:    National [place] of Mental Hwttmw458-803-8500jyw.[facility].nih.gov    National Suicide Prevention Bntcyzkg919-563-8156 (758-753-CFRU)www.suicidepreventionlifeline.org    National [place] on Mental Ahbjhzn909-463-5334cid.dayron.org    Mental Health Kccbbbr237-002-9665mei.[facility].org    National Suicide Tthoalh389-146-8890 (800-SUICIDE)   Date Last Reviewed: 2017-2019 [employer]. [address] [place] [address]. All rights reserved. This information is not intended as a substitute for professional medical care. Always follow your healthcare professional's instructions.          Using Antidepressants  Depression is a mood disorder that affects the way you think and feel. The most common symptom is a feeling of deep sadness. This feeling does not go away or get better on its own. But most types of depression can be helped with therapy and antidepressant medicines. (Note: This covers antidepressant use in adults only.)    What do " antidepressants do?  Antidepressants restore the balance of certain chemicals in your brain to help ease your depression. You will likely feel better in 4 to 6 weeks. But you may continue taking antidepressants for a year or more to keep your symptoms from coming back. Some people with depression need to take antidepressants for life. There are several types of antidepressants. The main types are described below.  Selective serotonin reuptake inhibitors (SSRIs)  SSRIs are effective medicines for the treatment of depression. They tend to have fewer side effects than other antidepressants. Possible side effects include anxiety, trouble sleeping, nausea, diarrhea, sexual dysfunction, and headaches. In rare cases, they may make you more depressed. SSRIs shouldn t be mixed with certain other medicines. Talk with your healthcare provider about all the medicines, herbs, and supplements you are taking.  Tricyclic antidepressants  Tricyclics help severe or long-term depression. They have been used for many years with good results. Possible side effects include blurred vision, dry mouth, and constipation.  Monoamine oxidase inhibitors (MAOIs)  If you don t respond to tricyclics or SSRIs, your healthcare provider may prescribe MAOIs. These medicines can be very effective. But people taking MAOIs must stay away from certain foods and medicines. Your healthcare provider can tell you more.  Lithium  If you have bipolar disorder, you may take a medicine called lithium. This medicine helps even out your mood. Possible side effects are weight gain, trembling, loose stool, and nausea. Lithium is also used:    For people who have unipolar depression and have not responded to other antidepressants    For people who have a sudden (acute) episode of unipolar depression    As a maintenance medicine to prevent unipolar depression from happening again  Things to avoid if you are taking MAOIs  If you are taking MAOIs, don t take any of the  following:    Beans    Aged cheese    Chocolate    Red wine    Most cold medicines    Certain medicines (ask your healthcare provider)  To reduce the risk of lithium poisoning  You can reduce the risk of lithium poisoning by following this advice:    Take only the prescribed amount of lithium. If your depressive symptoms get worse, contact your healthcare provider. Never increase or decrease your medicine on your own.    Drink plenty of fluids other than coffee, tea, and soda.    Limit salt in your diet.    Before using other prescribed medicines or over-the-counter medicines, check with your pharmacist. This is to be sure the medicines won t interact with the lithium.    Never share your medicines or use another person's medicines, even if it is the same medicine and dose.    Keep follow-up appointments    Have your Lithium blood level checked as advised. Blood work will need to occur more often when symptoms are not under control  If you have side effects  The side effects of antidepressants are usually mild. But if you have troubling side effects, call your healthcare provider. Changing the dosage or type of medicine may help. Never stop taking medicines on your own.  Date Last Reviewed: 5/1/2017 2000-2019 [employer]. [address] [place] [address]. All rights reserved. This information is not intended as a substitute for professional medical care. Always follow your healthcare professional's instructions.          Depression: Tips to Help Yourself    As your healthcare providers help treat your depression, you can also help yourself. Keep in mind that your illness affects you emotionally, physically, mentally, and socially. So full recovery will take time. Take care of your body and your soul, and be patient with yourself as you get better.  Self-care    Educate yourself. Read about treatment and medicine options. If you have the energy, attend local conferences or support groups.  Keep a list of useful websites and helpful books and use them as needed. This illness is not your fault. Don t blame yourself for your depression.    Manage early symptoms. If you notice symptoms returning, experience triggers, or identify other factors that may lead to a depressive episode, get help as soon as possible. Ask trusted friends and family to monitor your behavior and let you know if they see anything of concern.    Work with your provider. Find a provider you can trust. Communicate honestly with that person and share information on your treatment for depression and your reaction to medicines.    Be prepared for a crisis. Know what to do if you experience a crisis. Keep the phone number of a crisis hotline and know the location of your community's urgent care centers and the closest emergency department.    Hold off on big decisions. Depression can cloud your judgment. So wait until you feel better before making major life decisions, such as changing jobs, moving, or getting  or .    Be patient. Recovering from depression is a process. Don t be discouraged if it takes some time to feel better.    Keep it simple. Depression saps your energy and concentration. So you won t be able to do all the things you used to do. Set small goals and do what you can.    Be with others. Don t isolate yourself--you ll only feel worse. Try to be with other people. And take part in fun activities when you can. Go to a movie, ballgame, Confucianism service, or social event. Talk openly with people you can trust. And accept help when it s offered.  Take care of your body  People with depression often lose the desire to take care of themselves. That only makes their problems worse. During treatment and afterward, make a point to:    Exercise. It s a great way to take care of your body. And studies have shown that exercise helps fight depression.    Avoid drugs and alcohol. These may ease the pain in the short term.  But they ll only make your problems worse in the long run.    Get relief from stress. Ask your healthcare provider for relaxation exercises and techniques to help relieve stress.    Eat right. A balanced and healthy diet helps keep your body healthy.  Date Last Reviewed: 1/1/2017 2000-2019 [employer]. [address] [place] [address]. All rights reserved. This information is not intended as a substitute for professional medical care. Always follow your healthcare professional's instructions.

## 2020-04-03 NOTE — TELEPHONE ENCOUNTER
Patient did not respond to E-visit reply.     Please call and check on her and see if she wants to set up a video or phone visit instead.     Donnie Mansfield PA-C  [facility]

## 2020-04-06 ENCOUNTER — VIRTUAL VISIT (OUTPATIENT)
Dept: FAMILY MEDICINE | Facility: OTHER | Age: 40
End: 2020-04-06
Payer: COMMERCIAL

## 2020-04-06 DIAGNOSIS — F41.1 GENERALIZED ANXIETY DISORDER: Primary | ICD-10-CM

## 2020-04-06 DIAGNOSIS — I10 HTN, GOAL BELOW 140/90: ICD-10-CM

## 2020-04-06 DIAGNOSIS — G47.10 HYPERSOMNIA: ICD-10-CM

## 2020-04-06 DIAGNOSIS — G47.411 NARCOLEPSY AND CATAPLEXY: ICD-10-CM

## 2020-04-06 PROCEDURE — 99441 ZZC PHYSICIAN TELEPHONE EVALUATION 5-10 MIN: CPT | Performed by: PHYSICIAN ASSISTANT

## 2020-04-06 RX ORDER — HYDROXYZINE HYDROCHLORIDE 25 MG/1
25-50 TABLET, FILM COATED ORAL 3 TIMES DAILY PRN
Qty: 60 TABLET | Refills: 1 | Status: SHIPPED | OUTPATIENT
Start: 2020-04-06 | End: 2020-09-10

## 2020-04-06 RX ORDER — METOPROLOL SUCCINATE 50 MG/1
50 TABLET, EXTENDED RELEASE ORAL DAILY
Qty: 90 TABLET | Refills: 1 | Status: ON HOLD | OUTPATIENT
Start: 2020-04-06 | End: 2020-10-06

## 2020-04-06 RX ORDER — METHYLPHENIDATE HYDROCHLORIDE 10 MG/1
10 TABLET ORAL 2 TIMES DAILY
Qty: 60 TABLET | Refills: 0 | Status: ON HOLD | OUTPATIENT
Start: 2020-04-06 | End: 2020-10-06

## 2020-04-06 ASSESSMENT — ANXIETY QUESTIONNAIRES
1. FEELING NERVOUS, ANXIOUS, OR ON EDGE: NEARLY EVERY DAY
GAD7 TOTAL SCORE: 13
5. BEING SO RESTLESS THAT IT IS HARD TO SIT STILL: MORE THAN HALF THE DAYS
3. WORRYING TOO MUCH ABOUT DIFFERENT THINGS: MORE THAN HALF THE DAYS
6. BECOMING EASILY ANNOYED OR IRRITABLE: MORE THAN HALF THE DAYS
2. NOT BEING ABLE TO STOP OR CONTROL WORRYING: MORE THAN HALF THE DAYS
7. FEELING AFRAID AS IF SOMETHING AWFUL MIGHT HAPPEN: NOT AT ALL
IF YOU CHECKED OFF ANY PROBLEMS ON THIS QUESTIONNAIRE, HOW DIFFICULT HAVE THESE PROBLEMS MADE IT FOR YOU TO DO YOUR WORK, TAKE CARE OF THINGS AT HOME, OR GET ALONG WITH OTHER PEOPLE: SOMEWHAT DIFFICULT

## 2020-04-06 ASSESSMENT — PATIENT HEALTH QUESTIONNAIRE - PHQ9
5. POOR APPETITE OR OVEREATING: MORE THAN HALF THE DAYS
SUM OF ALL RESPONSES TO PHQ QUESTIONS 1-9: 12

## 2020-04-06 NOTE — PROGRESS NOTES
"Subjective     Tasha Short is a 39 year old female who is being evaluated via a billable telephone visit.      The patient has been notified of following:     \"This telephone visit will be conducted via a call between you and your physician/provider. We have found that certain health care needs can be provided without the need for a physical exam.  This service lets us provide the care you need with a short phone conversation.  If a prescription is necessary we can send it directly to your pharmacy.  If lab work is needed we can place an order for that and you can then stop by our lab to have the test done at a later time.    If during the course of the call the physician/provider feels a telephone visit is not appropriate, you will not be charged for this service.\"     Patient has given verbal consent for Telephone visit?  Yes    Tasha Short complains of   Chief Complaint   Patient presents with     Recheck Medication       ALLERGIES  Bupropion; Lisinopril; Penicillins; Phenytoin; Prednisone; and Seasonal allergies    - Withdrawal from lithium - nausea, depressed, anxiety, weak      Was weaned off     BP and pulses have been high - 120's 130's      /103, 133/106   - Hard to wake up in AM, going back to work next week      Can get her Ritalin back?     Depression and Anxiety Follow-Up    How are you doing with your depression since your last visit? Worsened     How are you doing with your anxiety since your last visit?  Worsened     Are you having other symptoms that might be associated with depression or anxiety? No    Have you had a significant life event? OTHER: everything - COVID, job loss, money      Do you have any concerns with your use of alcohol or other drugs? No    Social History     Tobacco Use     Smoking status: Current Every Day Smoker     Packs/day: 0.50     Smokeless tobacco: Never Used   Substance Use Topics     Alcohol use: No     Drug use: No     PHQ 3/5/2020 3/31/2020 " "4/6/2020   PHQ-9 Total Score 9 18 12   Q9: Thoughts of better off dead/self-harm past 2 weeks Not at all Not at all Not at all     ROMI-7 SCORE 3/5/2020 3/31/2020 4/6/2020   Total Score - - -   Total Score 12 (moderate anxiety) 21 (severe anxiety) -   Total Score 12 21 13       Reviewed and updated as needed this visit by Provider  Allergies  Meds  Problems  Med Hx  Surg Hx         Review of Systems   ROS COMP: Constitutional, HEENT, cardiovascular, pulmonary, gi and gu systems are negative, except as otherwise noted.       Objective   Reported vitals:  There were no vitals taken for this visit.   healthy, alert and no distress  Psych: Alert and oriented times 3; coherent speech, normal   rate and volume, able to articulate logical thoughts, able   to abstract reason, no tangential thoughts, no hallucinations   or delusions  Her affect is anxious      Diagnostic Test Results:  Labs reviewed in Epic  none         Assessment/Plan:    ICD-10-CM    1. Generalized anxiety disorder  F41.1 hydrOXYzine (ATARAX) 25 MG tablet   2. HTN, goal below 140/90  I10 metoprolol succinate ER (TOPROL-XL) 50 MG 24 hr tablet   3. Hypersomnia  G47.10 methylphenidate (RITALIN) 10 MG tablet   4. Narcolepsy and cataplexy  G47.411 methylphenidate (RITALIN) 10 MG tablet     - Patient struggling with mood and blood pressure      Reports this happened after she was tapered off Lithium   - Has appointment with psychiatry 4/23/20      Is reluctant to try anything long term until seen by psychiatry, just wants \"something to get through\"   - Will do trial of Hydroxyzine, has been on in the past, makes her tired, but she is willing to try again, reviewed use and side effects  - Pulses very high     Recommend increase Metoprolol from 25 mg to 50 mg      Continue Norvasc 10 mg      Reviewed both use and side effects     Continue to monitor BP and call me later this week with log   - Patient with Narcolepsy diagnosed by sleep study      Uses Ritalin " 20 mg BID PRN      Reviewed use and side effects, refilled       The patient indicates understanding of these issues and agrees with the plan.    Follow up: later this week with BP and pulses     Phone call duration:  8 minutes and 30 seconds     Donnie Mansfield PA-C  [facility]

## 2020-04-07 ASSESSMENT — ANXIETY QUESTIONNAIRES: GAD7 TOTAL SCORE: 13

## 2020-04-10 ENCOUNTER — TELEPHONE (OUTPATIENT)
Dept: FAMILY MEDICINE | Facility: OTHER | Age: 40
End: 2020-04-10

## 2020-04-10 NOTE — TELEPHONE ENCOUNTER
Please call patient and get update on blood pressure and pulses. Route back to provider.    Donnie Mansfield PA-C  [facility]

## 2020-04-10 NOTE — TELEPHONE ENCOUNTER
Spoke to patient, she was at work and will reply to [employer] message when she gets off.  Bozena Zelaya MA

## 2020-04-17 NOTE — TELEPHONE ENCOUNTER
I still have not received BP and pulses from patient.     Please call again. She didn't read the MyChart I sent her.     Donnie Mansfield PA-C  [facility]

## 2020-04-21 NOTE — TELEPHONE ENCOUNTER
Multiple attempts to reach patient with no return call or my chart message will forward to [facility] as a FYI.

## 2020-04-22 NOTE — PROGRESS NOTES
"Tasha Short is a 39 year old female who is being evaluated via a billable telephone visit.      The patient has been notified of following:     \"This telephone visit will be conducted via a call between you and your physician/provider. We have found that certain health care needs can be provided without the need for a physical exam.  This service lets us provide the care you need with a short phone conversation.  If a prescription is necessary we can send it directly to your pharmacy.  If lab work is needed we can place an order for that and you can then stop by our lab to have the test done at a later time.    Telephone visits are billed at different rates depending on your insurance coverage. During this emergency period, for some insurers they may be billed the same as an in-person visit.  Please reach out to your insurance provider with any questions.    If during the course of the call the physician/provider feels a telephone visit is not appropriate, you will not be charged for this service.\"    Patient has given verbal consent for Telephone visit?  Yes    How would you like to obtain your AVS? [facility]                                                             Outpatient Psychiatric Evaluation- Standard  Adult    Name:  Tasha Short  : 1980    Source of Referral:  Primary Care Provider: Enid Mansfield PA-C   Current Psychotherapist: None    Identifying Data:  Patient is a 39 year old, single  White American female  who presents for initial visit with me.  Patient is currently employed. Patient attended the phone session alone. Patient prefers to be called: \"Tasha\"    Chief Complaint:  Patient presents with:  Depression      HPI:  Tasha Short is a 39 year old female with past history including hypersomnia, depression, anxiety, and alcohol and opioid dependence in sustained remission who presents today with worsening mood sxs.     Was first diagnosed with " depression/anxiety when 24 yo. Was on meds that worked well. Has been off work intermittently. Was most recently seeing Dr. House from [facility]. Was on lithium for about 6 months. Last month of taking it wasn't doing well. Irritable, agitated, and had weight loss. Looking for a new psychiatrist/different opinion. Worsening anxiety lately with Covid-19. Pt reports being sober since 12/6/18: AA meetings 1-2 week, has a sponsor. Pt had completed 6 months of DBT fairly recently ([facility]). Involved in [employer].     Pt lately has felt depressed, decreased interest, decreased appetite, hypersomnia, low energy, low motivation. Denies suicidal ideation. Says she could never do that to her daughter. 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 Has a 3 yo at home and lives by herself.     Needs to reschedule with neurology for f/u    Past diagnoses include: Depression, Anxiety, Alcohol Use Disorder, Opioid Use Disorder  Current medications include: has a current medication list which includes the following prescription(s): amlodipine, buspirone, flunisolide, hydroxyzine, methylphenidate, metoprolol succinate er, naltrexone, nicotine, nicotine, and lithium er, and the following Facility-Administered Medications: medroxyprogesterone and medroxyprogesterone.   Medication side effects: Denies  Current stressors include: see HPI  Coping mechanisms and supports include: Family, Hobbies and Friends    Psychiatric Review of Symptoms:  Depression: see HPI   PHQ-9 scores:   PHQ-9 SCORE 3/31/2020 4/6/2020 4/23/2020   PHQ-9 Total Score - - -   PHQ-9 Total Score MyChart 18 (Moderately severe depression) - -   PHQ-9 Total Score 18 12 17     Sary:  No symptoms   MDQ Score: not completed  Anxiety: Feeling nervous, anxious, or on edge  Uncontrolled worrying  Worrying too much about different things  Trouble relaxing  Restlessness  Easily annoyed or irritable  Thoughts of impending doom    ROMI-7 scores:     ROMI-7 SCORE 3/31/2020 4/6/2020 4/23/2020   Total Score - - -   Total Score 21 (severe anxiety) - -   Total Score 21 13 17     Panic:  Sweating  Palpitations  Tremors  Shortness of Breath  Sense of Impending Doom   Agoraphobia:  No   PTSD:  No symptoms   OCD:  No symptoms   Psychosis: No symptoms   ADD / ADHD: No symptoms  Gambling or shoplifting: No   Eating Disorder:  No symptoms  Sleep:   sleeping too much due to hypersomnia     A 12-item WHODAS 2.0 assessment was not completed.    Psychiatric History:   Hospitalizations: one in 2018 when postpartum  History of Commitment? No   Past Treatment: counseling, medication(s) from physician / PCP and psychiatry  Suicide Attempts: No   Current Suicide Risk: Suicide Assessment Completed Today.  Self-injurious Behavior: Denies  Electroconvulsive Therapy (ECT) or Transcranial Magnetic Stimulation (TMS): No   GeneSight Genetic Testing: No     Past medication trials include but are not limited to:   BusPar  Lithium  Paxil  Zoloft  Lamotrigine    Substance Use History:  Current Use of Drugs/Alcohol: Denies   Past Use of Drugs/Alcohol: Hx mainly of alcohol and opioid abuse  Patient reported the following problems as a result of drinking and drug use: relationship and occupational issues (caught stealing pain meds from work per chart review).   Patient has received chemical dependency treatment in the past at many places. Last beg 2018 after giving birth. Maybe about 2005. Really liked [facility]. Has been to [facility] 2002 for ETOH, 2003 she was at [facility], 2007 she was [facility] 2008.   Recovery Programming Involvement: Alcoholics Anonymous and Sponsor, [facility]    Tobacco use: Yes    Based on the clinical interview, there  are not indications of drug or alcohol abuse. Continue to monitor.   Discussed effect of substance use on overall health.     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     REPAIR TENDON PERONEAL  11/15/2013    Procedure: REPAIR TENDON PERONEAL;  right peroneal tendon  transfer;  Surgeon: Jesus Manuel Oden DPM;  Location: [facility] OR     Food and Medicine Allergies:     Allergies   Allergen Reactions     Bupropion Other (See Comments)     seizures     Lisinopril Swelling     Angioedema      Penicillins      hives     Phenytoin      rash,puritis (Dilantin)     Prednisone Itching     Face itching, unable to concentrate      Seasonal Allergies      Seizures or Head Injury: seizures from Wellbutrin  Diet: No Restrictions  Exercise: No regular exercise program  Supplements: Reviewed per Electronic Medical Record Today    Current Medications:    Current Outpatient Medications:      amLODIPine (NORVASC) 10 MG tablet, Take 1 tablet (10 mg) by mouth daily, Disp: 90 tablet, Rfl: 3     busPIRone (BUSPAR) 5 MG tablet, Take 1 tablet (5 mg) by mouth 3 times daily as needed, Disp: 90 tablet, Rfl: 1     flunisolide (NASALIDE) 25 MCG/ACT (0.025%) SOLN spray, Spray 2 sprays into both nostrils every 12 hours, Disp: 1 Bottle, Rfl: 5     hydrOXYzine (ATARAX) 25 MG tablet, Take 1-2 tablets (25-50 mg) by mouth 3 times daily  as needed for anxiety, Disp: 60 tablet, Rfl: 1     methylphenidate (RITALIN) 10 MG tablet, Take 1 tablet (10 mg) by mouth 2 times daily, Disp: 60 tablet, Rfl: 0     metoprolol succinate ER (TOPROL-XL) 50 MG 24 hr tablet, Take 1 tablet (50 mg) by mouth daily, Disp: 90 tablet, Rfl: 1     naltrexone (VIVITROL) 380 MG SUSR, Inject 380 mg into the muscle every 30 days, Disp: , Rfl:      nicotine (NICODERM CQ) 21 MG/24HR 24 hr patch, Place 1 patch onto the skin every 24 hours, Disp: 28 patch, Rfl: 3     nicotine (NICORETTE) 2 MG gum, Place 1 each (2 mg) inside cheek as needed for smoking cessation, Disp: 100 each, Rfl: 3     lithium ER (LITHOBID) 300 MG CR tablet, , Disp: , Rfl: 2    Current Facility-Administered Medications:      medroxyPROGESTERone (DEPO-PROVERA) injection 150 mg, 150 mg, Intramuscular, Q90 Days, Enid Mansfield PA-C, 150 mg at 11/18/19 1022     medroxyPROGESTERone (DEPO-PROVERA) injection 150 mg, 150 mg, Intramuscular, Q90 Days, Enid Mansfield PA-C, 150 mg at 08/16/19 0939    The [place] Prescription Monitoring Program has been reviewed and there are no concerns about diversionary activity for controlled substances at this time.      Vital Signs:  None since this is a phone visit.     Labs:  Most recent laboratory results reviewed and the pertinent results include:   Office Visit on 11/18/2019   Component Date Value Ref Range Status     Pain Drug SCR UR W RPTD Meds 11/18/2019 FINAL   Final    Comment: (Note)  ====================================================================  [facility] COMP DRUG ANALYSIS,UR  ====================================================================  Test                             Result       Flag       Units        Drug Present and Declared for Prescription Verification   Naltrexone                     PRESENT      EXPECTED                Drug Present not Declared for Prescription Verification   Naproxen                       PRESENT       UNEXPECTED               Metoprolol                     PRESENT      UNEXPECTED              Drug Absent but Declared for Prescription Verification   Methylphenidate                Not Detected UNEXPECTED              ====================================================================  Test                      Result    Flag   Units      Ref Range        Creatinine              159              mg/dL      >=20            ====================================================================  Declared Medications:  T                           he flagging and interpretation on this report are based on the  following declared medications.  Unexpected results may arise from  inaccuracies in the declared medications.  **Note: The testing scope of this panel includes these medications:  Methylphenidate (Ritalin)  Naltrexone  **Note: The testing scope of this panel does not include following  reported medications:  Buspirone (BuSpar)  Lithium  ====================================================================  For clinical consultation, please call [phone].  ====================================================================  Analysis performed by [facility] [place] [address]       HCG Qual Urine 11/18/2019 Negative  NEG^Negative Final    Comment: This test is for screening purposes.  Results should be interpreted along with   the clinical picture.  Confirmation testing is available if warranted by   ordering [id number], HCG Quantitative Pregnancy.       Color Urine 11/18/2019 Yellow   Final     Appearance Urine 11/18/2019 Clear   Final     Glucose Urine 11/18/2019 Negative  NEG^Negative mg/dL Final     Bilirubin Urine 11/18/2019 Negative  NEG^Negative Final     Ketones Urine 11/18/2019 Negative  NEG^Negative mg/dL Final     Specific Gravity Urine 11/18/2019 1.025  1.003 - 1.035 Final     Blood Urine 11/18/2019 Large* NEG^Negative Final     pH Urine 11/18/2019 6.0  5.0 - 7.0 pH Final     Protein  Albumin Urine 11/18/2019 100* NEG^Negative mg/dL Final     Urobilinogen Urine 11/18/2019 0.2  0.2 - 1.0 EU/dL Final     Nitrite Urine 11/18/2019 Negative  NEG^Negative Final     Leukocyte Esterase Urine 11/18/2019 Negative  NEG^Negative Final     Source 11/18/2019 Unspecified Urine   Final     Specimen Description 11/18/2019 Vagina   Final     Wet Prep 11/18/2019 No Trichomonas seen   Final     Wet Prep 11/18/2019 No clue cells seen   Final     Wet Prep 11/18/2019 No yeast seen   Final     Wet Prep 11/18/2019 No WBCs seen   Final     Specimen Descrip 11/18/2019 Urine   Final     N Gonorrhea PCR 11/18/2019 Negative  NEG^Negative Final    Comment: Negative for N. gonorrhoeae rRNA by transcription mediated amplification.  A negative result by transcription mediated amplification does not preclude   the presence of N. gonorrhoeae infection because results are dependent on   proper and adequate collection, absence of inhibitors, and sufficient rRNA to   be detected.       Specimen Description 11/18/2019 Urine   Final     Chlamydia Trachomatis PCR 11/18/2019 Negative  NEG^Negative Final    Comment: Negative for C. trachomatis rRNA by transcription mediated amplification.  A negative result by transcription mediated amplification does not preclude   the presence of C. trachomatis infection because results are dependent on   proper and adequate collection, absence of inhibitors, and sufficient rRNA to   be detected.       WBC 11/18/2019 8.4  4.0 - 11.0 10e9/L Final     RBC Count 11/18/2019 4.15  3.8 - 5.2 10e12/L Final     Hemoglobin 11/18/2019 13.7  11.7 - 15.7 g/dL Final     Hematocrit 11/18/2019 39.8  35.0 - 47.0 % Final     MCV 11/18/2019 96  78 - 100 fl Final     MCH 11/18/2019 33.0  26.5 - 33.0 pg Final     MCHC 11/18/2019 34.4  31.5 - 36.5 g/dL Final     RDW 11/18/2019 14.6  10.0 - 15.0 % Final     Platelet Count 11/18/2019 154  150 - 450 10e9/L Final     WBC Urine 11/18/2019 0 - 5  OTO5^0 - 5 /HPF Final     RBC  Urine 11/18/2019 O - 2  OTO2^O - 2 /HPF Final     Squamous Epithelial /LPF Urine 11/18/2019 Few  FEW^Few /LPF Final   Orders Only on 07/19/2019   Component Date Value Ref Range Status     Lithium Level 07/19/2019 0.32* 0.60 - 1.20 mmol/L Final     WBC 07/19/2019 6.9  4.0 - 11.0 10e9/L Final     RBC Count 07/19/2019 3.85  3.8 - 5.2 10e12/L Final     Hemoglobin 07/19/2019 13.0  11.7 - 15.7 g/dL Final     Hematocrit 07/19/2019 37.0  35.0 - 47.0 % Final     MCV 07/19/2019 96  78 - 100 fl Final     MCH 07/19/2019 33.8* 26.5 - 33.0 pg Final     MCHC 07/19/2019 35.1  31.5 - 36.5 g/dL Final     RDW 07/19/2019 13.5  10.0 - 15.0 % Final     Platelet Count 07/19/2019 170  150 - 450 10e9/L Final     % Neutrophils 07/19/2019 72.7  % Final     % Lymphocytes 07/19/2019 15.5  % Final     % Monocytes 07/19/2019 9.3  % Final     % Eosinophils 07/19/2019 1.9  % Final     % Basophils 07/19/2019 0.6  % Final     Absolute Neutrophil 07/19/2019 5.0  1.6 - 8.3 10e9/L Final     Absolute Lymphocytes 07/19/2019 1.1  0.8 - 5.3 10e9/L Final     Absolute Monocytes 07/19/2019 0.6  0.0 - 1.3 10e9/L Final     Absolute Eosinophils 07/19/2019 0.1  0.0 - 0.7 10e9/L Final     Absolute Basophils 07/19/2019 0.0  0.0 - 0.2 10e9/L Final     Diff Method 07/19/2019 Automated Method   Final     Bilirubin Direct 07/19/2019 0.1  0.0 - 0.2 mg/dL Final     Bilirubin Total 07/19/2019 0.5  0.2 - 1.3 mg/dL Final     Albumin 07/19/2019 4.2  3.4 - 5.0 g/dL Final     Protein Total 07/19/2019 7.2  6.8 - 8.8 g/dL Final     Alkaline Phosphatase 07/19/2019 75  40 - 150 U/L Final     ALT 07/19/2019 59* 0 - 50 U/L Final     AST 07/19/2019 50* 0 - 45 U/L Final     Magnesium 07/19/2019 1.7  1.6 - 2.3 mg/dL Final     TSH 07/19/2019 1.02  0.40 - 4.00 mU/L Final     Cholesterol 07/19/2019 194  <200 mg/dL Final     Triglycerides 07/19/2019 78  <150 mg/dL Final     HDL Cholesterol 07/19/2019 59  >49 mg/dL Final     LDL Cholesterol Calculated 07/19/2019 119* <100 mg/dL Final     Comment: Above desirable:  100-129 mg/dl  Borderline High:  130-159 mg/dL  High:             160-189 mg/dL  Very high:       >189 mg/dl       Non HDL Cholesterol 07/19/2019 135* <130 mg/dL Final    Comment: Above Desirable:  130-159 mg/dl  Borderline high:  160-189 mg/dl  High:             190-219 mg/dl  Very high:       >219 mg/dl       Sodium 07/19/2019 137  133 - 144 mmol/L Final     Potassium 07/19/2019 3.7  3.4 - 5.3 mmol/L Final     Chloride 07/19/2019 105  94 - 109 mmol/L Final     Carbon Dioxide 07/19/2019 24  20 - 32 mmol/L Final     Anion Gap 07/19/2019 8  3 - 14 mmol/L Final     Glucose 07/19/2019 95  70 - 99 mg/dL Final     Urea Nitrogen 07/19/2019 14  7 - 30 mg/dL Final     Creatinine 07/19/2019 0.75  0.52 - 1.04 mg/dL Final     GFR Estimate 07/19/2019 >90  >60 mL/min/[1.73_m2] Final    Comment: Non  GFR Calc  Starting 12/18/2018, serum creatinine based estimated GFR (eGFR) will be   calculated using the Chronic Kidney Disease Epidemiology Collaboration   (CKD-EPI) equation.       GFR Estimate If Black 07/19/2019 >90  >60 mL/min/[1.73_m2] Final    Comment:  GFR Calc  Starting 12/18/2018, serum creatinine based estimated GFR (eGFR) will be   calculated using the Chronic Kidney Disease Epidemiology Collaboration   (CKD-EPI) equation.       Calcium 07/19/2019 8.5  8.5 - 10.1 mg/dL Final     Most recent EKG from 6/3/20 reviewed. QTc interval 435.      Review of Systems:  10 systems (general, cardiovascular, respiratory, eyes, ENT, endocrine, GI, , M/S, neurological) were reviewed. Most pertinent finding(s) is/are: . The remaining systems are all unremarkable.    Family History:   Patient reported family history includes:   Family History   Problem Relation Age of Onset     Depression Mother      Arthritis Paternal Grandmother      Hypertension Paternal Grandfather         birth FF     Asthma No family hx of      C.A.D. No family hx of      Diabetes No family hx of       Cancer - colorectal No family hx of      Prostate Cancer No family hx of      Coronary Artery Disease No family hx of      Ovarian Cancer No family hx of      Mental Illness No family hx of      Cerebrovascular Disease No family hx of      Anesthesia Reaction No family hx of      Other Cancer No family hx of      Osteoporosis No family hx of      Known Genetic Syndrome No family hx of      Obesity No family hx of      Unknown/Adopted No family hx of      Mental Illness History: Yes: sister-depression/anxiety; maternal grandma and dad with depression  Substance Abuse History: Yes: ETOH both sides  Suicide History: Denies  Medications: Unknown     Social History:   Birth place: [place], [place]  Childhood: Reported as parents  when pt 3 yo; mom remarried and step-dad adopted pt; now  about 4 years ago  Siblings:  Half-sis 9 yrs younger, not super close  Highest education level was associate degree / vocational certificate.   Employment Status: Full-Time  Current Living situation:  Lives w/1 yo daughter.  Feels safe at home.  Children: one   Firearms/Weapons Access: No: Patient denies   Service: No    Legal History:  Yes: DUI    Significant Losses / Trauma / Abuse / Neglect Issues:  There are indications or report of significant loss, trauma, abuse or neglect issues related to: death of fiance in 2016 (unexpected).   Issues of possible neglect are not present.   Recommended that patient call 911 or go to the local ED should there be a change in any of these risk factors.    Mental Status Examination (limited as this is by phone):     Attitude:  cooperative   Oriented to:  person, place, time, and situation  Attention Span and Concentration:  normal  Speech:  clear, coherent, regular rate, rhythm, and volume  Language: intact  Mood:  anxious and depressed  Affect:  appropriate and in normal range  Associations:  no loose associations  Thought Process:  logical, linear and goal oriented  Thought  Content:  no evidence of suicidal ideation or homicidal ideation, no evidence of psychotic thought, no auditory hallucinations present and no visual hallucinations present  Recent and Remote Memory:  Intact to interview. Not formally assessed. No amnesia.  Fund of Knowledge: appropriate  Insight:  fair  Judgment:  intact, adequate for safety  Impulse Control:  intact    Strengths and Opportunities:   Tasha Short identified the following strengths or resources that may help she succeed in counseling: commitment to health and well being and motivation. Things that may interfere with the patient's success include:  financial hardship.    There are no language or communication issues or need for modification in treatment.   There are no ethnic, cultural or Cheondoism factors that may be relevant for therapy.  Client identified their preferred language to be English.  Client does not need the assistance of an  or other support involved in therapy.    Suicide Risk Assessment:  Today Tasha Short reports no suicidal ideation. Based on all available evidence including the factors cited above, Tasha Short does not appear to be at imminent risk for self-harm, does not meet criteria for a 72-hr hold, and therefore remains appropriate for ongoing outpatient level of care.  A thorough assessment of risk factors related to suicide and self-harm have been reviewed and are noted above. The patient convincingly denies acute suicidality on several occasions. Local community safety resources reviewed and printed for patient to use if needed. There was no deceit detected, and the patient presented in a manner that was believable.     DSM5  Diagnosis:  296.32 (F33.1) Major Depressive Disorder, Recurrent Episode, Moderate _  300.02 (F41.1) Generalized Anxiety Disorder   Opioid use disorder in sustained remission  Alcohol use disorder in sustained remission    Medical Comorbidities Include:   Patient Active  Problem List    Diagnosis Date Noted     Health Care Home 06/08/2011     Priority: High     x  DX V65.8 REPLACED WITH [facility]ALTH CARE HOME (04/08/2013)       Encounter for surveillance of injectable contraceptive 08/08/2019     Priority: Medium     Alcohol dependence in remission (H) 04/03/2019     Priority: Medium     Hypertriglyceridemia 05/08/2018     Priority: Medium     Chronic seasonal allergic rhinitis due to pollen 05/08/2018     Priority: Medium     Gastroesophageal reflux disease without esophagitis 06/19/2017     Priority: Medium     H/O ETOH abuse 05/15/2017     Priority: Medium     Irregular heartbeat 04/18/2016     Priority: Medium     Tobacco use disorder 04/18/2016     Priority: Medium     HTN, goal below 140/90 11/24/2014     Priority: Medium     Major depressive disorder, recurrent episode, moderate (H) 10/22/2014     Priority: Medium     Generalized anxiety disorder 10/22/2014     Priority: Medium     Hypersomnia 04/12/2013     Priority: Medium     Calcific tendonitis peroneals 06/21/2012     Priority: Medium     Narcolepsy and cataplexy      Priority: Medium     S/P LEEP 11/17/2010     Priority: Medium     12/14/09 ASCUS + HPV 18, 53, 58 (high risk)  1/13/10 colposcopy- bx (dysplasia) ECC (negative) recommend repeat pap at 6 and 12 months  6/28/10 pap ASCUS + HPV 16 (high risk) recommend colpo  7/21/10 colposcopy- BX ( ROSA 2/3 with gland involvement)  8/2/10 clinic appt to discuss LEEP  10/26/10 tele enc: LEEP scheduled for 11/4/10  11/4/10 LEEP- (ROSA 2/3) not extending to margins.  ECC (negative). Plan: pap in 6 mo.   5/9/11- NIL pap. Plan: pap in 6 mo  10/8/13 NIL pap, neg HR HPV. Plan: pap in 3 years.  5/22/15 NIL pap, neg HR HPV. Plan: pap in 3 years.  4/2/19 NIL pap, + HR HPV (not 16 or 18). Plan: cotest in 1 yr.  4/7/20 COVID 19 interim plan updated to: Plan unchanged              Impression:  Tasha Short is a 39-year-old female with past psychiatric history including depression,  anxiety, opioid and alcohol use disorder in remission who presents today for worsening depression and anxiety symptoms.  She had most recently been treated by psychiatric provider in the community and is placed on lithium.  She decreased appetite and increased agitation and anxiety and so stopped this therapy.  She is looking to restart an antidepressant today.  She has had multiple failed medication trials but is pretty sure she recalls Cymbalta being helpful.  She is willing to start anything at all that may been helpful in the past.  She is agreeable to reinitiation of therapy with Cymbalta.  She continues to maintain her sobriety and attends regular AA meetings, virtual at this time due to the COVID-19 pandemic.  She is encouraged to continue with her support groups and to continue to maintain her sobriety.  She is also encouraged to continue her Ritalin for hypersomnia and also hydroxyzine for anxiety.     Medication side effects and alternatives reviewed. Health promotion activities recommended and reviewed today. All questions addressed. Education and counseling completed regarding risks and benefits of medications and psychotherapy options.     Treatment Plan:    Continue Ritalin as prescribed by primary care provider for narcolepsy/hypersomnia    Continue hydroxyzine 25 to 50 mg up to 3 times daily as needed for anxiety and sleep    Start duloxetine 20 mg twice a day for mood and anxiety.  Can start taking 20 mg once a day for up to week before starting 20 mg twice daily.    Consider individual psychotherapy for additional support.    Continue all other medications as reviewed per electronic medical record today.     Safety plan reviewed. To the Emergency Department as needed or call after hours crisis line at [phone] or [phone]. [place] Crisis Text Line: Text [place] to 411477  or  Suicide LifeLine Chat: suicidepreventionlifeline.org/chat    Schedule an appointment with me in 4 weeks or sooner as  needed.  Call [facility] at [phone] to schedule.    Follow up with primary care provider as planned or sooner if needed for acute medical concerns.    Call the psychiatric nurse line with medication questions or concerns at [phone].    [employer] may be used to communicate with your provider, but this is not intended to be used for emergencies.    Community Resources:    National Suicide Prevention Lifeline: 158.452.6303 (TTY: 155.661.1037). Call anytime for help.  (www.suicidepreventionlifeline.org)  National [place] on Mental Illness (www.dayron.org): 647-305-7789 or 524-974-2276.   Mental Health Association (www.mentalhealth.org): 752.369.3454 or 451-286-9390.  [place] Crisis Text Line: Text [place] to 420559  Suicide LifeLine Chat: suicideAC Holdcoline.org/chat    Administrative Billing:   Phone Call Duration: 28 Minutes  Start: 11:30am  Stop: 11:58am    Patient Status:  Patient will continue to be seen for ongoing consultation and stabilization.    Signed:   Yanelis Richardson DO  [facility]

## 2020-04-23 ENCOUNTER — VIRTUAL VISIT (OUTPATIENT)
Dept: PSYCHIATRY | Facility: OTHER | Age: 40
End: 2020-04-23
Attending: PHYSICIAN ASSISTANT
Payer: COMMERCIAL

## 2020-04-23 DIAGNOSIS — F33.1 MODERATE EPISODE OF RECURRENT MAJOR DEPRESSIVE DISORDER (H): Primary | ICD-10-CM

## 2020-04-23 DIAGNOSIS — F41.1 GAD (GENERALIZED ANXIETY DISORDER): ICD-10-CM

## 2020-04-23 PROCEDURE — 90792 PSYCH DIAG EVAL W/MED SRVCS: CPT | Mod: 95 | Performed by: PSYCHIATRY & NEUROLOGY

## 2020-04-23 RX ORDER — DULOXETIN HYDROCHLORIDE 20 MG/1
20 CAPSULE, DELAYED RELEASE ORAL 2 TIMES DAILY
Qty: 60 CAPSULE | Refills: 1 | Status: SHIPPED | OUTPATIENT
Start: 2020-04-23 | End: 2020-04-28 | Stop reason: SINTOL

## 2020-04-23 ASSESSMENT — ANXIETY QUESTIONNAIRES
2. NOT BEING ABLE TO STOP OR CONTROL WORRYING: SEVERAL DAYS
6. BECOMING EASILY ANNOYED OR IRRITABLE: NEARLY EVERY DAY
GAD7 TOTAL SCORE: 17
5. BEING SO RESTLESS THAT IT IS HARD TO SIT STILL: SEVERAL DAYS
7. FEELING AFRAID AS IF SOMETHING AWFUL MIGHT HAPPEN: NEARLY EVERY DAY
IF YOU CHECKED OFF ANY PROBLEMS ON THIS QUESTIONNAIRE, HOW DIFFICULT HAVE THESE PROBLEMS MADE IT FOR YOU TO DO YOUR WORK, TAKE CARE OF THINGS AT HOME, OR GET ALONG WITH OTHER PEOPLE: SOMEWHAT DIFFICULT
1. FEELING NERVOUS, ANXIOUS, OR ON EDGE: NEARLY EVERY DAY
3. WORRYING TOO MUCH ABOUT DIFFERENT THINGS: NEARLY EVERY DAY

## 2020-04-23 ASSESSMENT — PATIENT HEALTH QUESTIONNAIRE - PHQ9
SUM OF ALL RESPONSES TO PHQ QUESTIONS 1-9: 17
5. POOR APPETITE OR OVEREATING: NEARLY EVERY DAY

## 2020-04-24 ASSESSMENT — ANXIETY QUESTIONNAIRES: GAD7 TOTAL SCORE: 17

## 2020-04-28 ENCOUNTER — TELEPHONE (OUTPATIENT)
Dept: PSYCHIATRY | Facility: CLINIC | Age: 40
End: 2020-04-28

## 2020-04-28 DIAGNOSIS — F33.1 MODERATE EPISODE OF RECURRENT MAJOR DEPRESSIVE DISORDER (H): Primary | ICD-10-CM

## 2020-04-28 DIAGNOSIS — F41.1 GAD (GENERALIZED ANXIETY DISORDER): ICD-10-CM

## 2020-04-28 RX ORDER — ONDANSETRON 4 MG/1
4 TABLET, FILM COATED ORAL EVERY 8 HOURS PRN
Qty: 15 TABLET | Refills: 0 | Status: SHIPPED | OUTPATIENT
Start: 2020-04-28 | End: 2020-09-22

## 2020-04-28 RX ORDER — VENLAFAXINE HYDROCHLORIDE 37.5 MG/1
37.5 CAPSULE, EXTENDED RELEASE ORAL DAILY
Qty: 30 CAPSULE | Refills: 1 | Status: SHIPPED | OUTPATIENT
Start: 2020-04-28 | End: 2020-09-14

## 2020-04-28 NOTE — TELEPHONE ENCOUNTER
"Spoke with patient-- states that she is nauseous, throwing up often and \"feels crappy\", unable to work.     Patient reports that she looked up her meds online and Cymbalta has an interaction with 2 of her BP meds and ibuprofen.     Patient would like to know how to proceed.   Routing to provider for review.     Lula Mcmullen, RN    Nurse Liaison  [facility]  "

## 2020-04-28 NOTE — TELEPHONE ENCOUNTER
I spoke with patient via phone.  Patient unfortunately not tolerating duloxetine well, even at lowest dose.  I do not think it has anything to do with drug drug interactions, but simply that the GI side effects are more severe.  We discussed switching to Effexor-XR versus Pristiq.  It appears her insurance prefers Effexor-XR over Pristiq and so we will move forward with this medication.  Discussed she may have similar side effects, but hopefully because it is once daily extended release they will be less severe.    To help ease some of her current symptoms and the transition onto Effexor, I prescribed a small supply of Zofran.  Zofran is a 5 HT-3 antagonist and there is been some literature to suggest it increases the risk of serotonin toxicity when given with other serotonergic agents.  There is also literature to suggest its mechanism does not contribute much at all, if anything, to serotonin syndrome.  Given she is starting at the lowest dose of an SNRI and is on a fairly low dose of BuSpar, I do not think she will have many issues on Zofran.  We did discuss the possibility of serotonin syndrome and also what signs and symptoms to look out for.    Yanelis Richardson, DO on 4/28/2020 at 1:24 PM

## 2020-04-28 NOTE — TELEPHONE ENCOUNTER
Reason for call:  Patient reporting a symptom    Symptom or request: throwing up not able to work     Duration (how long have symptoms been present): since starting:   DULoxetine (CYMBALTA) 20 MG capsule    Cur pharmacy:  [facility] [place] - [address] [phone] (Phone)  [phone] (Fax)         Have you been treated for this before? No    Additional comments: req call back      Phone Number patient can be reached at:  Home number on file [phone] (home)    Best Time:  ASAP     Can we leave a detailed message on this number:  YES    Call taken on 4/28/2020 at 9:20 AM by Dong James

## 2020-04-28 NOTE — TELEPHONE ENCOUNTER
Patient had a virtual visit on 4/23/20. Documentation still in progress, unable to view plan.      Outreach attempted to patient. I reacher her voicemail.     Please attempt to connect to nursing if available.

## 2020-04-29 NOTE — PATIENT INSTRUCTIONS
Treatment Plan:    Continue Ritalin as prescribed by primary care provider for narcolepsy/hypersomnia    Continue hydroxyzine 25 to 50 mg up to 3 times daily as needed for anxiety and sleep    Start duloxetine 20 mg twice a day for mood and anxiety.  Can start taking 20 mg once a day for up to week before starting 20 mg twice daily.    Consider individual psychotherapy for additional support.    Continue all other medications as reviewed per electronic medical record today.     Safety plan reviewed. To the Emergency Department as needed or call after hours crisis line at [phone] or [phone]. [place] Crisis Text [place]: Text [place] to 163629  or  Suicide LifeLine Chat: suicideTIBCO Software.org/chat    Schedule an appointment with me in 4 weeks or sooner as needed.  Call [facility] at [phone] to schedule.    Follow up with primary care provider as planned or sooner if needed for acute medical concerns.    Call the psychiatric nurse line with medication questions or concerns at [phone].    [employer] may be used to communicate with your provider, but this is not intended to be used for emergencies.    Community Resources:    National Suicide Prevention Lifeline: 655.237.7480 (TTY: 635.517.4581). Call anytime for help.  (www.suicidepreventionlifeline.org)  National [place] on Mental Illness (www.dayron.org): 639.820.4458 or 673-880-3676.   Mental Health Association (www.mentalhealth.org): 222.550.1319 or 930-602-5752.  [place] Crisis Text Line: Text [place] to 201861  Suicide LifeLine Chat: suicideTIBCO Software.org/chat

## 2020-05-12 ENCOUNTER — MYC MEDICAL ADVICE (OUTPATIENT)
Dept: FAMILY MEDICINE | Facility: OTHER | Age: 40
End: 2020-05-12

## 2020-05-12 NOTE — TELEPHONE ENCOUNTER
Schedule her for an appointment with me to discuss BP and get her an appointment with ortho for her foot/knee pain. [facility] for the rest    Donnie Whiting-MARTHA Block  [facility]

## 2020-05-12 NOTE — TELEPHONE ENCOUNTER
Provider wanted update on BP, but she has other concerns as well. Will advise [facility] for cough and SOB, please advise on BP.     Yaquelin Haynes RN, BSN

## 2020-05-16 ENCOUNTER — OFFICE VISIT (OUTPATIENT)
Dept: ORTHOPEDICS | Facility: CLINIC | Age: 40
End: 2020-05-16
Payer: COMMERCIAL

## 2020-05-16 VITALS
SYSTOLIC BLOOD PRESSURE: 122 MMHG | DIASTOLIC BLOOD PRESSURE: 68 MMHG | WEIGHT: 108 LBS | BODY MASS INDEX: 18.44 KG/M2 | HEIGHT: 64 IN

## 2020-05-16 DIAGNOSIS — G89.29 CHRONIC PAIN OF RIGHT ANKLE: Primary | ICD-10-CM

## 2020-05-16 DIAGNOSIS — M25.571 CHRONIC PAIN OF RIGHT ANKLE: Primary | ICD-10-CM

## 2020-05-16 PROCEDURE — 99203 OFFICE O/P NEW LOW 30 MIN: CPT | Performed by: FAMILY MEDICINE

## 2020-05-16 RX ORDER — NABUMETONE 500 MG/1
500 TABLET, FILM COATED ORAL 2 TIMES DAILY
Qty: 30 TABLET | Refills: 0 | Status: SHIPPED | OUTPATIENT
Start: 2020-05-16 | End: 2020-09-10

## 2020-05-16 ASSESSMENT — MIFFLIN-ST. JEOR: SCORE: 1149.88

## 2020-05-16 NOTE — PATIENT INSTRUCTIONS
Diagnosis: Right ankle pain acute on chronic after surgery  Image Findings: none  Treatment: CAM boot, be out and work on range of motion at home, referral to podiatry  Medications: Relafen  Follow-up: 2 weeks wit podiatry    It was great seeing you today!    Larry Oleary

## 2020-05-16 NOTE — PROGRESS NOTES
ASSESSMENT & PLAN  Tasha was seen today for pain, pain and pain.    Diagnoses and all orders for this visit:    Chronic pain of right ankle  -     PODIATRY/FOOT & ANKLE SURGERY REFERRAL  -     nabumetone (RELAFEN) 500 MG tablet; Take 1 tablet (500 mg) by mouth 2 times daily      Patient is a 39 year old female presenting for evaluation of   Chief Complaint   Patient presents with     Right Foot - Pain     Left Knee - Pain     Right Knee - Pain      # Chronic Right Ankle Pain: Pt has complex right ankle history including peroneal tendon transfer in 2013 with reported rupture afterwards and currently noticing 3 weeks of lateral ankle pain with sx limiting daily activities.  Pt has mild TTP over peroneal tendon near the foot with some pain with eversion, plantar flexion.  Counseled patient on nature of condition and treatment options.  Given this plan as below, follow-up as needed  Treatment: CAM boot, referral to podiatry for possible treatment  Physical Therapy cont home exercises, if not improved can refer for formal PT  Injection Defer till after eval from podiatry  Medications  Relafen for pain    Concerning signs/sx that would warrant urgent evaluation were discussed.  All questions were answered, patient understands and agrees with plan.      Return if symptoms worsen or fail to improve.      -----    SUBJECTIVE  Tasha Short is a/an 39 year old female who is seen as a self referral for evaluation of right foot pain. The patient is seen by themselves.    Onset: 3 week(s) ago. Reports insidious onset without acute precipitating event.  No new injury or activities.  Pt has been trying some weight lifting.   Location of Pain: right lateral  Rating of Pain at worst: 8/10  Rating of Pain Currently: 8/10  Worsened by: everything   Better with: ACE wrap   Treatments tried: heat and Aleve  Associated symptoms: throbbing   Orthopedic history: YES -multiple fractures in foot. Last treated with Dr. Madera 3/2019  for closd nondisplaced fx of 5th metatarsal of right foot.   Pt went to [facility] for tx and had steroid injections x3 now  Relevant surgical history: YES - right peroneal tendon transfer by Dr. 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     Hypersomnia with sleep apnea      Irregular menstrual cycle 05/12/1997     Metrorrhagia 02/08/2001     Other acne      Other and unspecified adverse effect of drug, medicinal and biological substance 02/19/2001    Allergic and adverse reaction to Dilantin     Substance abuse (H) 10/2/2009    see 9/23/2009 confidential report     Social History     Socioeconomic History     Marital status: Single     Spouse name: None     Number of children: 0     Years of education: 14     Highest education level: None   Occupational History     Occupation: RN     Comment: [employer]     Employer: [email or web address]     Employer: [employer]     Employer: [employer]   Social Needs     Financial resource strain: None     Food insecurity     Worry: None     Inability: None     Transportation needs     Medical: None     Non-medical: None   Tobacco Use     Smoking status: Current Every Day Smoker     Packs/day: 0.50     Smokeless tobacco: Never Used   Substance and Sexual Activity     Alcohol use: No     Drug use: No     Sexual activity: Yes     Partners: Male     Birth control/protection: Condom, Pill   Lifestyle     Physical activity     Days per week: None     Minutes per session: None     Stress: None   Relationships     Social connections     Talks on phone: None     Gets together: None     Attends Latter day service: None     Active member of club or organization: None      "Attends meetings of clubs or organizations: None     Relationship status: None     Intimate partner violence     Fear of current or ex partner: None     Emotionally abused: None     Physically abused: None     Forced sexual activity: None   Other Topics Concern      Service Not Asked     Blood Transfusions Not Asked     Caffeine Concern Not Asked     Occupational Exposure Not Asked     Hobby Hazards Not Asked     Sleep Concern Not Asked     Stress Concern Not Asked     Weight Concern Not Asked     Special Diet Not Asked     Back Care Not Asked     Exercise Not Asked     Bike Helmet Not Asked     Seat Belt Not Asked     Self-Exams Not Asked     Parent/sibling w/ CABG, MI or angioplasty before 65F 55M? No   Social History Narrative    Lives with significan other. Denies domestic  violence issues.         Patient's past medical, surgical, social, and family histories were reviewed today and no changes are noted.  No family history pertinent to the patient's problem today      REVIEW OF SYSTEMS:  10 point ROS is negative other than symptoms noted above in HPI, Past Medical History or as stated below  Constitutional: NEGATIVE for fever, chills, change in weight  Skin: NEGATIVE for worrisome rashes, moles or lesions  GI/: NEGATIVE for bowel or bladder changes  Neuro: NEGATIVE for weakness, dizziness or paresthesias    OBJECTIVE:  /68   Ht 1.626 m (5' 4\")   Wt 49 kg (108 lb)   BMI 18.54 kg/m     General: healthy, alert and in no distress  HEENT: no scleral icterus or conjunctival erythema  Skin: no suspicious lesions or rash. No jaundice.  CV:  no pedal edema  Resp: normal respiratory effort without conversational dyspnea   Psych: normal mood and affect  Gait: normal steady gait with appropriate coordination and balance  Neuro: Normal light sensory exam of lower extremity  MSK:  RIGHT FOOT  Inspection:    no swelling or ecchymosis  Palpation:    Tender about the lateral foot Otherwise remainder of " bony/ligamentous landmarks are non-tender.  Range of Motion:      Grossly intact and non-painful  Strength:    Grossly intact in all planes  Special Tests:    Positive: None    Negative: calcaneal squeeze, MTP stress and Lisfranc joint tenderness    RIGHT ANKLE  Inspection:    No swelling or ecchymosis is observed  Palpation:    Tender about the 5th metatarsal base. Remainder of bony and ligamentous landmarks are nontender.  Range of Motion:     Plantarflexion full / dorsiflexion full / inversion limited slightly by pain / eversion full  Strength:    full  Special Tests:    negative anterior drawer, positive talar tilt, negative valgus stress, negative forced external rotation/eversion, negative Rosado sign, negative squeeze test. Able to perform heel raise and unable to hop.    Independent visualization of the below image:  No results found for this or any previous visit (from the past 24 hour(s)).       Larry Oleary MD, [facility]

## 2020-05-16 NOTE — LETTER
5/16/2020         RE: Tasha Short  [address] [place] [address]        Dear Colleague,    Thank you for referring your patient, Tasha Short, to the [facility] [place]. Please see a copy of my visit note below.    ASSESSMENT & PLAN  Tasha was seen today for pain, pain and pain.    Diagnoses and all orders for this visit:    Chronic pain of right ankle  -     PODIATRY/FOOT & ANKLE SURGERY REFERRAL  -     nabumetone (RELAFEN) 500 MG tablet; Take 1 tablet (500 mg) by mouth 2 times daily      Patient is a 39 year old female presenting for evaluation of   Chief Complaint   Patient presents with     Right Foot - Pain     Left Knee - Pain     Right Knee - Pain      # Chronic Right Ankle Pain: Pt has complex right ankle history including peroneal tendon transfer in 2013 with reported rupture afterwards and currently noticing 3 weeks of lateral ankle pain with sx limiting daily activities.  Pt has mild TTP over peroneal tendon near the foot with some pain with eversion, plantar flexion.  Counseled patient on nature of condition and treatment options.  Given this plan as below, follow-up as needed  Treatment: CAM boot, referral to podiatry for possible treatment  Physical Therapy cont home exercises, if not improved can refer for formal PT  Injection Defer till after eval from podiatry  Medications  Relafen for pain    Concerning signs/sx that would warrant urgent evaluation were discussed.  All questions were answered, patient understands and agrees with plan.      Return if symptoms worsen or fail to improve.      -----    SUBJECTIVE  Tasha Short is a/an 39 year old female who is seen as a self referral for evaluation of right foot pain. The patient is seen by themselves.    Onset: 3 week(s) ago. Reports insidious onset without acute precipitating event.  No new injury or activities.  Pt has been trying some weight lifting.   Location of Pain: right  lateral  Rating of Pain at worst: 8/10  Rating of Pain Currently: 8/10  Worsened by: everything   Better with: ACE wrap   Treatments tried: heat and Aleve  Associated symptoms: throbbing   Orthopedic history: YES -multiple fractures in foot. Last treated with Dr. Madera 3/2019 for closd nondisplaced fx of 5th metatarsal of right foot.   Pt went to [facility] for tx and had steroid injections x3 now  Relevant surgical history: YES - right peroneal tendon transfer by Dr. 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     Hypersomnia with sleep apnea      Irregular menstrual cycle 05/12/1997     Metrorrhagia 02/08/2001     Other acne      Other and unspecified adverse effect of drug, medicinal and biological substance 02/19/2001    Allergic and adverse reaction to Dilantin     Substance abuse (H) 10/2/2009    see 9/23/2009 confidential report     Social History     Socioeconomic History     Marital status: Single     Spouse name: None     Number of children: 0     Years of education: 14     Highest education level: None   Occupational History     Occupation: RN     Comment: [employer]     Employer: [employer]     Employer: [employer]     Employer: [employer]   Social Needs     Financial resource strain: None     Food insecurity     Worry: None     Inability: None     Transportation needs     Medical: None     Non-medical: None   Tobacco Use     Smoking status: Current Every Day Smoker     Packs/day: 0.50     Smokeless tobacco: Never Used   Substance and Sexual Activity     Alcohol use: No     Drug use: No     Sexual activity: Yes     Partners: Male     Birth control/protection: Condom, Pill  "  Lifestyle     Physical activity     Days per week: None     Minutes per session: None     Stress: None   Relationships     Social connections     Talks on phone: None     Gets together: None     Attends Gnosticist service: None     Active member of club or organization: None     Attends meetings of clubs or organizations: None     Relationship status: None     Intimate partner violence     Fear of current or ex partner: None     Emotionally abused: None     Physically abused: None     Forced sexual activity: None   Other Topics Concern      Service Not Asked     Blood Transfusions Not Asked     Caffeine Concern Not Asked     Occupational Exposure Not Asked     Hobby Hazards Not Asked     Sleep Concern Not Asked     Stress Concern Not Asked     Weight Concern Not Asked     Special Diet Not Asked     Back Care Not Asked     Exercise Not Asked     Bike Helmet Not Asked     Seat Belt Not Asked     Self-Exams Not Asked     Parent/sibling w/ CABG, MI or angioplasty before 65F 55M? No   Social History Narrative    Lives with significan other. Denies domestic  violence issues.         Patient's past medical, surgical, social, and family histories were reviewed today and no changes are noted.  No family history pertinent to the patient's problem today      REVIEW OF SYSTEMS:  10 point ROS is negative other than symptoms noted above in HPI, Past Medical History or as stated below  Constitutional: NEGATIVE for fever, chills, change in weight  Skin: NEGATIVE for worrisome rashes, moles or lesions  GI/: NEGATIVE for bowel or bladder changes  Neuro: NEGATIVE for weakness, dizziness or paresthesias    OBJECTIVE:  /68   Ht 1.626 m (5' 4\")   Wt 49 kg (108 lb)   BMI 18.54 kg/m     General: healthy, alert and in no distress  HEENT: no scleral icterus or conjunctival erythema  Skin: no suspicious lesions or rash. No jaundice.  CV:  no pedal edema  Resp: normal respiratory effort without conversational dyspnea "   Psych: normal mood and affect  Gait: normal steady gait with appropriate coordination and balance  Neuro: Normal light sensory exam of lower extremity  MSK:  RIGHT FOOT  Inspection:    no swelling or ecchymosis  Palpation:    Tender about the lateral foot Otherwise remainder of bony/ligamentous landmarks are non-tender.  Range of Motion:      Grossly intact and non-painful  Strength:    Grossly intact in all planes  Special Tests:    Positive: None    Negative: calcaneal squeeze, MTP stress and Lisfranc joint tenderness    RIGHT ANKLE  Inspection:    No swelling or ecchymosis is observed  Palpation:    Tender about the 5th metatarsal base. Remainder of bony and ligamentous landmarks are nontender.  Range of Motion:     Plantarflexion full / dorsiflexion full / inversion limited slightly by pain / eversion full  Strength:    full  Special Tests:    negative anterior drawer, positive talar tilt, negative valgus stress, negative forced external rotation/eversion, negative Rosado sign, negative squeeze test. Able to perform heel raise and unable to hop.    Independent visualization of the below image:  No results found for this or any previous visit (from the past 24 hour(s)).       Larry Oleary MD, [facility]        Again, thank you for allowing me to participate in the care of your patient.        Sincerely,        Larry Oleary MD

## 2020-05-22 ENCOUNTER — ANCILLARY PROCEDURE (OUTPATIENT)
Dept: GENERAL RADIOLOGY | Facility: CLINIC | Age: 40
End: 2020-05-22
Attending: PODIATRIST
Payer: COMMERCIAL

## 2020-05-22 ENCOUNTER — OFFICE VISIT (OUTPATIENT)
Dept: PODIATRY | Facility: CLINIC | Age: 40
End: 2020-05-22
Payer: COMMERCIAL

## 2020-05-22 VITALS
WEIGHT: 108 LBS | DIASTOLIC BLOOD PRESSURE: 80 MMHG | BODY MASS INDEX: 18.54 KG/M2 | HEART RATE: 90 BPM | SYSTOLIC BLOOD PRESSURE: 122 MMHG

## 2020-05-22 DIAGNOSIS — S92.901G: ICD-10-CM

## 2020-05-22 DIAGNOSIS — M79.671 RIGHT FOOT PAIN: Primary | ICD-10-CM

## 2020-05-22 DIAGNOSIS — M79.671 RIGHT FOOT PAIN: ICD-10-CM

## 2020-05-22 PROCEDURE — 99244 OFF/OP CNSLTJ NEW/EST MOD 40: CPT | Performed by: PODIATRIST

## 2020-05-22 PROCEDURE — 73630 X-RAY EXAM OF FOOT: CPT | Mod: RT

## 2020-05-22 NOTE — PATIENT INSTRUCTIONS
We wish you continued good healing. If you have any questions or concerns, please do not hesitate to contact us at [phone]    Please remember to call and schedule a follow up appointment if one was recommended at your earliest convenience.   PODIATRY CLINIC HOURS  TELEPHONE NUMBER    Dr. Eric Madera D.P.M [facility]    Clinics:  [facility]    [facility]mmy Blackman [facility]   Tuesday 1PM-6PM  [place]  Wednesday 7AM-2PM  [facility]  Thursday 10AM-6PM  [place]  Friday 7AM-3PM  [place]  Specialty schedulers:   [phone] to make an appointment with any Specialty Provider.        Urgent Care locations:    [facility] Monday-Friday 5 pm - 9 pm. Saturday-Sunday 9 am -5pm    Monday-Friday 11 am - 9 pm Saturday 9 am - 5 pm     Monday-Sunday 12 noon-8PM [phone]     If you need a medication refill, please contact us you may need lab work and/or a follow up visit prior to your refill (i.e. Antifungal medications).    [employer] (secure e-mail communication and access to your chart) to send a message or to make an appointment.    If MRI needed please call [facility] Sequeira at [phone]

## 2020-05-22 NOTE — NURSING NOTE
Body mass index is 18.54 kg/m .      SMOKING CESSATION  What's in cigarette smoke? - Cigarette smoke contains over 4,000 chemicals. Nicotine is one of the main ingredients which is an insecticide/herbicide. It is poisonous to our nervous system, increases blood clotting risk, and decreases the body's defenses to fight off infection. Another chemical is Carbon Monoxide is an asphyxiating gas that permanently binds to blood cells and blocks the transport of oxygen. This leads to tissue death and decreases your metabolism. Tar is a chemical that coats your lungs and trachea which impairs new oxygen coming in and carbon dioxide getting out of your body.   How does smoking impact surgery? - Smoking is particularly hazardous with regards to surgery. Surgery puts stress on the body and a smoker's body is already under strain from these chemicals. Putting the two together, especially for an elective surgery, could be a recipe for disaster. Smoking before and after surgery increases your risk of heart problems, slow wound healing, delayed bone healing, blood clots, wound infection and anesthesia complications.   What are the benefits of quitting? - In 20 minutes your blood pressure will drop back down to normal. In 8 hours the carbon monoxide (a toxic gas) levels in your blood stream will drop by half, and oxygen levels will return to normal. In 48 hours your chance of having a heart attack will have decreased. All nicotine will have left your body. Your sense of taste and smell will return to a normal level. In 72 hours your bronchial tubes will relax, and your energy levels will increase. In 2 weeks your circulation will increase, and it will continue to improve for the next 10 weeks.    Recommendations for elective surgery - Ideally, patients should quit smoking 8 weeks before and at least 2 weeks after elective surgery in order to avoid complications. Simply cutting back on the amount of cigarettes smoked per day does  not offer any benefit or decrease the risk of poor wound healing, heart problems, and infection. Smokers should also start taking Vitamin C and B for two weeks before surgery and two weeks after surgery.    Ways to Stop Smokin. Nicotine patches, lozenges, or gum  2. Support Groups  3. Medications (see below)    List of Medications:  1. Varenicline Tartrate (CHANTIX)   2. Bupropion HCL (WELLBUTRIN, ZYBAN) - note: make sure Wellbutrin is for smoking cessation and not other issues   3. Nicotine Patch (NICODERM)   4. Nicotine Inhaler (NICOTROL)   5. Nicotine Gum Nicotine Polacrilex   6. Nicotine Lozenge: Nicotine Polacrilex (COMMIT)   * [facility] offers a smoking support group as well!  Please visit: [email or web address]  If you are interested in these, ask about getting a prescription or talk to your primary care doctor about what may be the best way for you to quit.

## 2020-05-22 NOTE — LETTER
5/22/2020         RE: Tasha Short  [address] [place] [address]        Dear Colleague,    Thank you for referring your patient, Tasha Short, to the [facility]. Please see a copy of my visit note below.    Subjective:    Pt is seen today by Dr. Oleary for pain in her right central lateral foot.  She has had this for 3 weeks.  Her pain is aggravated by activity and relieved by rest.  She is getting swelling here.  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  She is on her feet all day.  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  Patient has a cam walker which she has recently started wearing which has helped.  Patient has a history of a right fifth metatarsal avulsion fracture at the base in March 2015.  She was seen once in the clinic but never followed up.  Patient also has a history of peroneal tendon repair on the right foot.  She smokes daily.  She has a history of alcohol abuse.      ROS:  A 10-point review of systems was performed and is positive for that noted in the HPI and as seen above.  All other areas are negative.          Allergies   Allergen Reactions     Bupropion Other (See Comments)     seizures     Lisinopril Swelling     Angioedema      Penicillins      hives     Phenytoin      rash,puritis (Dilantin)     Prednisone Itching     Face itching, unable to concentrate      Seasonal Allergies        Current Outpatient Medications   Medication Sig Dispense Refill     amLODIPine (NORVASC) 10 MG tablet Take 1 tablet (10 mg) by mouth daily 90 tablet 3     busPIRone (BUSPAR) 5 MG tablet Take 1 tablet (5 mg) by mouth 3 times daily as needed 90 tablet 1     flunisolide (NASALIDE) 25 MCG/ACT (0.025%) SOLN spray Spray 2 sprays into both nostrils every 12 hours 1 Bottle 5     hydrOXYzine (ATARAX) 25 MG tablet Take 1-2 tablets (25-50 mg) by mouth 3 times daily as needed for anxiety 60 tablet 1     methylphenidate (RITALIN) 10 MG tablet Take 1 tablet (10 mg) by mouth 2  times daily 60 tablet 0     metoprolol succinate ER (TOPROL-XL) 50 MG 24 hr tablet Take 1 tablet (50 mg) by mouth daily 90 tablet 1     nabumetone (RELAFEN) 500 MG tablet Take 1 tablet (500 mg) by mouth 2 times daily 30 tablet 0     naltrexone (VIVITROL) 380 MG SUSR Inject 380 mg into the muscle every 30 days       nicotine (NICODERM CQ) 21 MG/24HR 24 hr patch Place 1 patch onto the skin every 24 hours 28 patch 3     nicotine (NICORETTE) 2 MG gum Place 1 each (2 mg) inside cheek as needed for smoking cessation 100 each 3     ondansetron (ZOFRAN) 4 MG tablet Take 1 tablet (4 mg) by mouth every 8 hours as needed for nausea 15 tablet 0     venlafaxine (EFFEXOR-XR) 37.5 MG 24 hr capsule Take 1 capsule (37.5 mg) by mouth daily 30 capsule 1       Patient Active Problem List   Diagnosis     S/P LEEP     Narcolepsy and cataplexy     Health Care Home     Calcific tendonitis peroneals     Hypersomnia     Major depressive disorder, recurrent episode, moderate (H)     Generalized anxiety disorder     HTN, goal below 140/90     Irregular heartbeat     Tobacco use disorder     H/O ETOH abuse     Gastroesophageal reflux disease without esophagitis     Hypertriglyceridemia     Chronic seasonal allergic rhinitis due to pollen     Alcohol dependence in remission (H)     Encounter for surveillance of injectable contraceptive       Past Medical History:   Diagnosis Date     ALCOHOL ABUSE-IN REMISS 7/30/2007     Cataplexy and narcolepsy 2002    tried Provigil, Straterra, Ritalin (works)     ROSA 3 - cervical intraepithelial neoplasia grade 3 1/4/10    ROSA 2/3  on LEEP biopsy     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     REPAIR TENDON PERONEAL  11/15/2013    Procedure: REPAIR TENDON PERONEAL;  right peroneal tendon  transfer;  Surgeon: Jesus Manuel Oden DPM;  Location: [facility] OR       Family History   Problem Relation Age of Onset     Depression Mother      Arthritis Paternal Grandmother      Hypertension Paternal Grandfather         birth FF     Asthma No family hx of      C.A.D. No family hx of      Diabetes No family hx of      Cancer - colorectal No family hx of      Prostate Cancer No family hx of      Coronary Artery Disease No family hx of      Ovarian Cancer No family hx of      Mental Illness No family hx of      Cerebrovascular Disease No family hx of      Anesthesia Reaction No family hx of      Other Cancer No family hx of      Osteoporosis No family hx of      Known Genetic Syndrome No family hx of      Obesity No family hx of      Unknown/Adopted No family hx of        Social History     Tobacco Use     Smoking status: Current Every Day Smoker     Packs/day: 0.50     Smokeless tobacco: Never Used   Substance Use Topics     Alcohol use: No         Exam:    Vitals: /80   Pulse 90   Wt 49 kg (108 lb)   BMI 18.54 kg/m    BMI: Body mass index is 18.54 kg/m .  Height: Data Unavailable    Constitutional/ general:  Pt is in no apparent distress, appears well-nourished.  Cooperative with history and physical exam.     Psych:  The patient answered questions appropriately.  Normal affect.  Seems to have reasonable expectations, in terms of treatment.     Eyes:  Visual scanning/ tracking without deficit.     Ears:  Response to auditory stimuli is normal.  negative hearing aid devices.  Auricles in proper alignment.     Lymphatic:  Popliteal lymph nodes not  enlarged.     Lungs:  Non labored breathing, non labored speech. No cough.  No audible wheezing. Even, quiet breathing.       Vascular:  positive pedal pulses bilaterally for both the DP and PT arteries.  CFT < 3 sec.  negative ankle edema.  positive pedal hair growth.    Neuro:  Alert and oriented x 3. Coordinated gait.  Light touch sensation is intact to the L4, L5, S1 distributions. No obvious deficits.  No evidence of neurological-based weakness, spasticity, or contracture in the lower extremities.      Derm: Normal texture and turgor.  No erythema, ecchymosis, or cyanosis.      Musculoskeletal:    Lower extremity muscle strength is normal.  Patient is ambulatory without an assistive device or brace.  No gross deformities.  Normal arch.       Decent muscle strength and ROM to all areas tested.  Pain upon palpation to base of right 5th metatarsal.  Edema  noted.  No erythema.  No sign of ulceration, infection, or drainage.  Pain decreases as we get further from this area.  No pain on palpation of her peroneal tendon course.    Radiographic Exam:  X-Ray Findings:  I personally reviewed the films.  Non-united bone at the base of the right fifth metatarsal.    Assessment:  Fifth metatarsal styloid process avulsion fracture right foot with delayed union    Plan:  X-rays personally reviewed from the past.  X-rays taken of the right foot today.  Discussed with the patient that the fracture at the base of right fifth metatarsal has not healed.  We discussed how the patient's noncompliance with not following up has contributed to this.  We discussed how smoking slows bone healing.  We explained her this probably came more painful as she started her job again working.  She has a cam walker and will continue to wear this.  She states she has minimal pain in this.  We note that it is plantarflexed.  Will order CT scan for further evaluation.  Will call patient with results.  We encourage smoking cessation.  Thanks allowing  me to participate in the care of this patient.      Eric Madera DPM DPM, FACFAS      Again, thank you for allowing me to participate in the care of your patient.        Sincerely,        Eric Madera DPM

## 2020-05-22 NOTE — PROGRESS NOTES
Subjective:    Pt is seen today by Dr. Oleary for pain in her right central lateral foot.  She has had this for 3 weeks.  Her pain is aggravated by activity and relieved by rest.  She is getting swelling here.  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  She is on her feet all day.  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  Patient has a cam walker which she has recently started wearing which has helped.  Patient has a history of a right fifth metatarsal avulsion fracture at the base in March 2015.  She was seen once in the clinic but never followed up.  Patient also has a history of peroneal tendon repair on the right foot.  She smokes daily.  She has a history of alcohol abuse.      ROS:  A 10-point review of systems was performed and is positive for that noted in the HPI and as seen above.  All other areas are negative.          Allergies   Allergen Reactions     Bupropion Other (See Comments)     seizures     Lisinopril Swelling     Angioedema      Penicillins      hives     Phenytoin      rash,puritis (Dilantin)     Prednisone Itching     Face itching, unable to concentrate      Seasonal Allergies        Current Outpatient Medications   Medication Sig Dispense Refill     amLODIPine (NORVASC) 10 MG tablet Take 1 tablet (10 mg) by mouth daily 90 tablet 3     busPIRone (BUSPAR) 5 MG tablet Take 1 tablet (5 mg) by mouth 3 times daily as needed 90 tablet 1     flunisolide (NASALIDE) 25 MCG/ACT (0.025%) SOLN spray Spray 2 sprays into both nostrils every 12 hours 1 Bottle 5     hydrOXYzine (ATARAX) 25 MG tablet Take 1-2 tablets (25-50 mg) by mouth 3 times daily as needed for anxiety 60 tablet 1     methylphenidate (RITALIN) 10 MG tablet Take 1 tablet (10 mg) by mouth 2 times daily 60 tablet 0     metoprolol succinate ER (TOPROL-XL) 50 MG 24 hr tablet Take 1 tablet (50 mg) by mouth daily 90 tablet 1     nabumetone (RELAFEN) 500 MG tablet Take 1 tablet (500 mg) by mouth 2 times daily 30 tablet 0     naltrexone (VIVITROL)  380 MG SUSR Inject 380 mg into the muscle every 30 days       nicotine (NICODERM CQ) 21 MG/24HR 24 hr patch Place 1 patch onto the skin every 24 hours 28 patch 3     nicotine (NICORETTE) 2 MG gum Place 1 each (2 mg) inside cheek as needed for smoking cessation 100 each 3     ondansetron (ZOFRAN) 4 MG tablet Take 1 tablet (4 mg) by mouth every 8 hours as needed for nausea 15 tablet 0     venlafaxine (EFFEXOR-XR) 37.5 MG 24 hr capsule Take 1 capsule (37.5 mg) by mouth daily 30 capsule 1       Patient Active Problem List   Diagnosis     S/P LEEP     Narcolepsy and cataplexy     Health Care Home     Calcific tendonitis peroneals     Hypersomnia     Major depressive disorder, recurrent episode, moderate (H)     Generalized anxiety disorder     HTN, goal below 140/90     Irregular heartbeat     Tobacco use disorder     H/O ETOH abuse     Gastroesophageal reflux disease without esophagitis     Hypertriglyceridemia     Chronic seasonal allergic rhinitis due to pollen     Alcohol dependence in remission (H)     Encounter for surveillance of injectable contraceptive       Past Medical History:   Diagnosis Date     ALCOHOL ABUSE-IN REMISS 2007     Cataplexy and narcolepsy 2002    tried Provigil, Straterra, Ritalin (works)     ROSA 3 - cervical intraepithelial neoplasia grade 3 1/4/10    ROSA 2/3  on LEEP biopsy     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     REPAIR TENDON PERONEAL  11/15/2013    Procedure: REPAIR TENDON PERONEAL;  right peroneal tendon  transfer;  Surgeon: Jesus Manuel Oden DPM;  Location: [facility] OR       Family History   Problem Relation Age of Onset     Depression Mother      Arthritis Paternal Grandmother      Hypertension Paternal Grandfather         birth FF     Asthma No family hx of      C.A.D. No family hx of      Diabetes No family hx of      Cancer - colorectal No family hx of      Prostate Cancer No family hx of      Coronary Artery Disease No family hx of      Ovarian Cancer No family hx of      Mental Illness No family hx of      Cerebrovascular Disease No family hx of      Anesthesia Reaction No family hx of      Other Cancer No family hx of      Osteoporosis No family hx of      Known Genetic Syndrome No family hx of      Obesity No family hx of      Unknown/Adopted No family hx of        Social History     Tobacco Use     Smoking status: Current Every Day Smoker     Packs/day: 0.50     Smokeless tobacco: Never Used   Substance Use Topics     Alcohol use: No         Exam:    Vitals: /80   Pulse 90   Wt 49 kg (108 lb)   BMI 18.54 kg/m    BMI: Body mass index is 18.54 kg/m .  Height: Data Unavailable    Constitutional/ general:  Pt is in no apparent distress, appears well-nourished.  Cooperative with history and physical exam.     Psych:  The patient answered questions appropriately.  Normal affect.  Seems to have reasonable expectations, in terms of treatment.     Eyes:  Visual scanning/ tracking without deficit.     Ears:  Response to auditory stimuli is normal.  negative hearing aid devices.  Auricles in proper alignment.     Lymphatic:  Popliteal lymph nodes not enlarged.     Lungs:  Non labored breathing, non labored speech. No cough.  No audible wheezing. Even, quiet breathing.       Vascular:  positive pedal pulses bilaterally for both the DP and PT arteries.  CFT < 3 sec.  negative ankle edema.  positive pedal  hair growth.    Neuro:  Alert and oriented x 3. Coordinated gait.  Light touch sensation is intact to the L4, L5, S1 distributions. No obvious deficits.  No evidence of neurological-based weakness, spasticity, or contracture in the lower extremities.      Derm: Normal texture and turgor.  No erythema, ecchymosis, or cyanosis.      Musculoskeletal:    Lower extremity muscle strength is normal.  Patient is ambulatory without an assistive device or brace.  No gross deformities.  Normal arch.       Decent muscle strength and ROM to all areas tested.  Pain upon palpation to base of right 5th metatarsal.  Edema  noted.  No erythema.  No sign of ulceration, infection, or drainage.  Pain decreases as we get further from this area.  No pain on palpation of her peroneal tendon course.    Radiographic Exam:  X-Ray Findings:  I personally reviewed the films.  Non-united bone at the base of the right fifth metatarsal.    Assessment:  Fifth metatarsal styloid process avulsion fracture right foot with delayed union    Plan:  X-rays personally reviewed from the past.  X-rays taken of the right foot today.  Discussed with the patient that the fracture at the base of right fifth metatarsal has not healed.  We discussed how the patient's noncompliance with not following up has contributed to this.  We discussed how smoking slows bone healing.  We explained her this probably came more painful as she started her job again working.  She has a cam walker and will continue to wear this.  She states she has minimal pain in this.  We note that it is plantarflexed.  Will order CT scan for further evaluation.  Will call patient with results.  We encourage smoking cessation.  Thanks allowing me to participate in the care of this patient.      Eric Madera, RUEL LIPSCOMB, FACFAS

## 2020-05-29 ENCOUNTER — TELEPHONE (OUTPATIENT)
Dept: PODIATRY | Facility: CLINIC | Age: 40
End: 2020-05-29

## 2020-05-29 NOTE — TELEPHONE ENCOUNTER
Called pt back and let her know that she will need to complete her imaging first, then MD with review and follow up with her. She verbalized understanding.    Cayla Upton RN Specialty Triage 5/29/2020 1:33 PM

## 2020-05-29 NOTE — TELEPHONE ENCOUNTER
Reason for call:  Other   Patient called regarding (reason for call): call back  Additional comments: Patient is calling wondering when Dr. Madera wanted her to follow up on her foot injury, she couldn't remember if it was 3 or 4 weeks. Please call back to advise.    Phone number to reach patient:  Home number on file [phone] (home)    Best Time:  any    Can we leave a detailed message on this number?  YES    Travel screening: Negative

## 2020-06-02 ENCOUNTER — MYC MEDICAL ADVICE (OUTPATIENT)
Dept: FAMILY MEDICINE | Facility: OTHER | Age: 40
End: 2020-06-02

## 2020-06-05 ENCOUNTER — ANCILLARY PROCEDURE (OUTPATIENT)
Dept: CT IMAGING | Facility: CLINIC | Age: 40
End: 2020-06-05
Attending: PODIATRIST
Payer: COMMERCIAL

## 2020-06-05 ENCOUNTER — TELEPHONE (OUTPATIENT)
Dept: PODIATRY | Facility: CLINIC | Age: 40
End: 2020-06-05

## 2020-06-05 PROCEDURE — 73700 CT LOWER EXTREMITY W/O DYE: CPT | Mod: TC

## 2020-06-05 NOTE — TELEPHONE ENCOUNTER
Reason for Call:  Other call back    Detailed comments: Patient calling, states she received a call from Dr. Madera and was returning the phone call. Please call back.    Phone Number Patient can be reached at: Home number on file [phone] (home)    Best Time: any    Can we leave a detailed message on this number? YES    Call taken on 6/5/2020 at 2:29 PM by Jesus Manuel Maradiaga

## 2020-07-22 ENCOUNTER — MYC MEDICAL ADVICE (OUTPATIENT)
Dept: FAMILY MEDICINE | Facility: OTHER | Age: 40
End: 2020-07-22

## 2020-07-24 ENCOUNTER — OFFICE VISIT (OUTPATIENT)
Dept: PODIATRY | Facility: CLINIC | Age: 40
End: 2020-07-24
Payer: COMMERCIAL

## 2020-07-24 VITALS
BODY MASS INDEX: 17.16 KG/M2 | WEIGHT: 100 LBS | SYSTOLIC BLOOD PRESSURE: 107 MMHG | HEART RATE: 98 BPM | DIASTOLIC BLOOD PRESSURE: 77 MMHG

## 2020-07-24 DIAGNOSIS — M79.671 RIGHT FOOT PAIN: Primary | ICD-10-CM

## 2020-07-24 DIAGNOSIS — S92.351K CLOSED DISPLACED FRACTURE OF FIFTH METATARSAL BONE OF RIGHT FOOT WITH NONUNION, SUBSEQUENT ENCOUNTER: ICD-10-CM

## 2020-07-24 PROCEDURE — 99213 OFFICE O/P EST LOW 20 MIN: CPT | Performed by: PODIATRIST

## 2020-07-24 RX ORDER — CYCLOBENZAPRINE HCL 10 MG
TABLET ORAL
COMMUNITY
Start: 2020-06-15 | End: 2020-09-14

## 2020-07-24 RX ORDER — NAPROXEN 500 MG/1
TABLET ORAL
COMMUNITY
Start: 2020-06-03 | End: 2020-09-22

## 2020-07-24 NOTE — PATIENT INSTRUCTIONS
We wish you continued good healing. If you have any questions or concerns, please do not hesitate to contact us at [phone]    Please remember to call and schedule a follow up appointment if one was recommended at your earliest convenience.   PODIATRY CLINIC HOURS  TELEPHONE NUMBER    Dr. Eric [facility]era [facility]    Clinics:  [facility]    [facility]   Tuesday 1PM-6PM  [place]  Wednesday 7AM-2PM  [facility]  Thursday 10AM-6PM  [place]  Friday 7AM-3PM  [place]  Specialty schedulers:   [phone] to make an appointment with any Specialty Provider.        Urgent Care locations:    [place] Monday-Friday 5 pm - 9 pm. Saturday-Sunday 9 am -5pm    Monday-Friday 11 am - 9 pm Saturday 9 am - 5 pm     Monday-Sunday 12 noon-8PM [phone]     If you need a medication refill, please contact us you may need lab work and/or a follow up visit prior to your refill (i.e. Antifungal medications).    [facility] (secure e-mail communication and access to your chart) to send a message or to make an appointment.    If MRI needed please call [facility] Sequeira at [phone]

## 2020-07-24 NOTE — NURSING NOTE
Weight management plan Patient was referred to their PCP to discuss a diet and exercise plan.     SMOKING CESSATION  What's in cigarette smoke? - Cigarette smoke contains over 4,000 chemicals. Nicotine is one of the main ingredients which is an insecticide/herbicide. It is poisonous to our nervous system, increases blood clotting risk, and decreases the body's defenses to fight off infection. Another chemical is Carbon Monoxide is an asphyxiating gas that permanently binds to blood cells and blocks the transport of oxygen. This leads to tissue death and decreases your metabolism. Tar is a chemical that coats your lungs and trachea which impairs new oxygen coming in and carbon dioxide getting out of your body.   How does smoking impact surgery? - Smoking is particularly hazardous with regards to surgery. Surgery puts stress on the body and a smoker's body is already under strain from these chemicals. Putting the two together, especially for an elective surgery, could be a recipe for disaster. Smoking before and after surgery increases your risk of heart problems, slow wound healing, delayed bone healing, blood clots, wound infection and anesthesia complications.   What are the benefits of quitting? - In 20 minutes your blood pressure will drop back down to normal. In 8 hours the carbon monoxide (a toxic gas) levels in your blood stream will drop by half, and oxygen levels will return to normal. In 48 hours your chance of having a heart attack will have decreased. All nicotine will have left your body. Your sense of taste and smell will return to a normal level. In 72 hours your bronchial tubes will relax, and your energy levels will increase. In 2 weeks your circulation will increase, and it will continue to improve for the next 10 weeks.    Recommendations for elective surgery - Ideally, patients should quit smoking 8 weeks before and at least 2 weeks after elective surgery in order to avoid complications. Simply  cutting back on the amount of cigarettes smoked per day does not offer any benefit or decrease the risk of poor wound healing, heart problems, and infection. Smokers should also start taking Vitamin C and B for two weeks before surgery and two weeks after surgery.    Ways to Stop Smokin. Nicotine patches, lozenges, or gum  2. Support Groups  3. Medications (see below)    List of Medications:  1. Varenicline Tartrate (CHANTIX)   2. Bupropion HCL (WELLBUTRIN, ZYBAN) - note: make sure Wellbutrin is for smoking cessation and not other issues   3. Nicotine Patch (NICODERM)   4. Nicotine Inhaler (NICOTROL)   5. Nicotine Gum Nicotine Polacrilex   6. Nicotine Lozenge: Nicotine Polacrilex (COMMIT)   * [facility] offers a smoking support group as well!  Please visit: [email or web address]  If you are interested in these, ask about getting a prescription or talk to your primary care doctor about what may be the best way for you to quit.

## 2020-07-24 NOTE — LETTER
7/24/2020         RE: Tasha Short  [address] [place] [address]        Dear Colleague,    Thank you for referring your patient, Tasha Short, to the [facility]. Please see a copy of my visit note below.    Subjective:    5/22/20   Pt is seen today by Dr. Oleary for pain in her right central lateral foot.  She has had this for 3 weeks.  Her pain is aggravated by activity and relieved by rest.  She is getting swelling here.  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  She is on her feet all day.  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  Patient has a cam walker which she has recently started wearing which has helped.  Patient has a history of a right fifth metatarsal avulsion fracture at the base in March 2015.  She was seen once in the clinic but never followed up.  Patient also has a history of peroneal tendon repair on the right foot.  She smokes daily.  She has a history of alcohol abuse.    7/24/20   patient returns for evaluation of her right foot.  Since I saw her last she came down with Lyme's disease.  She lost 10 pounds at this.  Because of her weight loss the cam walker is not fitting her and she would like a smaller one.  She states she wears the cam walker intermittently.  She is still working [employer].  She gets slight swelling here.  She denies erythema or ecchymosis.  She denies weakness.         ROS:  See above          Allergies   Allergen Reactions     Bupropion Other (See Comments)     seizures     Lisinopril Swelling     Angioedema      Penicillins      hives     Phenytoin      rash,puritis (Dilantin)     Prednisone Itching     Face itching, unable to concentrate      Seasonal Allergies        Current Outpatient Medications   Medication Sig Dispense Refill     amLODIPine (NORVASC) 10 MG tablet Take 1 tablet (10 mg) by mouth daily 90 tablet 3     busPIRone (BUSPAR) 5 MG tablet Take 1 tablet (5 mg) by mouth 3 times daily as needed 90 tablet 1      cyclobenzaprine (FLEXERIL) 10 MG tablet        flunisolide (NASALIDE) 25 MCG/ACT (0.025%) SOLN spray Spray 2 sprays into both nostrils every 12 hours 1 Bottle 5     hydrOXYzine (ATARAX) 25 MG tablet Take 1-2 tablets (25-50 mg) by mouth 3 times daily as needed for anxiety 60 tablet 1     methylphenidate (RITALIN) 10 MG tablet Take 1 tablet (10 mg) by mouth 2 times daily 60 tablet 0     metoprolol succinate ER (TOPROL-XL) 50 MG 24 hr tablet Take 1 tablet (50 mg) by mouth daily 90 tablet 1     nabumetone (RELAFEN) 500 MG tablet Take 1 tablet (500 mg) by mouth 2 times daily 30 tablet 0     naltrexone (VIVITROL) 380 MG SUSR Inject 380 mg into the muscle every 30 days       naproxen (NAPROSYN) 500 MG tablet        nicotine (NICODERM CQ) 21 MG/24HR 24 hr patch Place 1 patch onto the skin every 24 hours 28 patch 3     nicotine (NICORETTE) 2 MG gum Place 1 each (2 mg) inside cheek as needed for smoking cessation 100 each 3     ondansetron (ZOFRAN) 4 MG tablet Take 1 tablet (4 mg) by mouth every 8 hours as needed for nausea 15 tablet 0     venlafaxine (EFFEXOR-XR) 37.5 MG 24 hr capsule Take 1 capsule (37.5 mg) by mouth daily 30 capsule 1       Patient Active Problem List   Diagnosis     S/P LEEP     Narcolepsy and cataplexy     Health Care Home     Calcific tendonitis peroneals     Hypersomnia     Major depressive disorder, recurrent episode, moderate (H)     Generalized anxiety disorder     HTN, goal below 140/90     Irregular heartbeat     Tobacco use disorder     H/O ETOH abuse     Gastroesophageal reflux disease without esophagitis     Hypertriglyceridemia     Chronic seasonal allergic rhinitis due to pollen     Alcohol dependence in remission (H)     Encounter for surveillance of injectable contraceptive       Past Medical History:   Diagnosis Date     ALCOHOL ABUSE-IN REMISS 7/30/2007     Cataplexy and narcolepsy 2002    tried Provigil Stratermarc Ritalin (works)     ROSA 3 - cervical intraepithelial neoplasia grade 3  1/4/10    ROSA 2/3  on LEEP biopsy     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     REPAIR TENDON PERONEAL  11/15/2013    Procedure: REPAIR TENDON PERONEAL;  right peroneal tendon  transfer;  Surgeon: Jesus Manuel Oden DPM;  Location: [facility] OR       Family History   Problem Relation Age of Onset     Depression Mother      Arthritis Paternal Grandmother      Hypertension Paternal Grandfather         birth FF     Asthma No family hx of      C.A.D. No family hx of      Diabetes No family hx of      Cancer - colorectal No family hx of      Prostate Cancer No family hx of      Coronary Artery Disease No family hx of      Ovarian Cancer No family hx of      Mental Illness No family hx of      Cerebrovascular Disease No family hx of      Anesthesia Reaction No family hx of      Other Cancer No family hx of      Osteoporosis No family hx of      Known Genetic Syndrome No family hx of      Obesity No family hx of      Unknown/Adopted No family hx of        Social History     Tobacco Use     Smoking status: Current Every Day Smoker     Packs/day: 0.50     Smokeless tobacco: Never Used   Substance Use Topics     Alcohol use: No         Exam:    Vitals: /77   Pulse 98   Wt 48.5 kg (107 lb)   BMI 18.37 kg/m    BMI: Body mass index is 18.37 kg/m .  Height: Data Unavailable    Constitutional/ general:  Pt is in no apparent distress, appears  well-nourished.  Cooperative with history and physical exam.     Psych:  The patient answered questions appropriately.  Normal affect.  Seems to have reasonable expectations, in terms of treatment.     Eyes:  Visual scanning/ tracking without deficit.     Ears:  Response to auditory stimuli is normal.  negative hearing aid devices.  Auricles in proper alignment.     Lymphatic:  Popliteal lymph nodes not enlarged.     Lungs:  Non labored breathing, non labored speech. No cough.  No audible wheezing. Even, quiet breathing.       Vascular:  positive pedal pulses bilaterally for both the DP and PT arteries.  CFT < 3 sec.  negative ankle edema.  positive pedal hair growth.    Neuro:  Alert and oriented x 3. Coordinated gait.  Light touch sensation is intact to the L4, L5, S1 distributions. No obvious deficits.  No evidence of neurological-based weakness, spasticity, or contracture in the lower extremities.      Derm: Normal texture and turgor.  No erythema, ecchymosis, or cyanosis.      Musculoskeletal:    Lower extremity muscle strength is normal.  Patient is ambulatory without an assistive device or brace.  No gross deformities.  Normal arch.       Decent muscle strength and ROM to all areas tested.  Pain upon palpation to base of right 5th metatarsal.  Edema  noted.  No erythema.  No sign of ulceration, infection, or drainage.  Pain decreases as we get further from this area.  No pain on palpation of her peroneal tendon course.    Radiographic Exam:  X-Ray Findings:  I personally reviewed the films.  Non-united bone at the base of the right fifth metatarsal with fracture gap less than 2 mm    History: Fracture non-union, foot; right midfoot CT scan to evaluate  styloid process fracture     Techniques: Helical acquisition of images through the right foot was  obtained without administration of contrast.     DLP: 145 mGy-cm     Comparison: Radiograph 5/22/2020, 3/28/2019     Findings:     A marker is placed at the level  of the base of the fifth metatarsal.      Corresponding to the marker is fracture deformity of the base of the  fifth metatarsal with substantial bony bridging.There is a subtle  fracture line still visible.     6 mm ossific fragment plantar to the cuboid likely os peroneum.  Bipartite tibial hallux sesamoid. Tiny ossific fragments at the head  of the fifth proximal phalanx may be from remote trauma. There are 2  well-corticated ossific fragments at the distal tip of the medial  malleolus measuring 3 and 5 mm likely likely from prior trauma. 3 mm  corticated ossific fragment at the distal tip of the lateral malleolus  likely remote trauma.     The Lisfranc joint is congruent. Bone islands in the calcaneus.     CT is limited for evaluation of internal derangement. The ligaments  and tendons appear grossly unremarkable.     Soft tissues are unremarkable without mass or fluid collection.                                                                      Impression: Incompletely healed/ongoing healing of fracture of the  base of the fifth metatarsal with subtle fracture line still remain  visible.    Assessment:  Fifth metatarsal styloid process avulsion fracture right foot with non union    Plan:  X-rays personally reviewed from the past.  Reviewed CT scan with patient.  We gave her a new cam walker.  I discussed with her that would be difficult to heal this with patient walking a lot.  She will try to minimize her activities.  We discussed a bone stimulator.  She would like to try this.  We wrote an order for this.  We will have her back to the clinic in 4 to 6 weeks.      Eric Madera DPM DPM, FACFAS      Again, thank you for allowing me to participate in the care of your patient.        Sincerely,        Eric Madera DPM

## 2020-07-24 NOTE — PROGRESS NOTES
Subjective:    5/22/20   Pt is seen today by Dr. Oleary for pain in her right central lateral foot.  She has had this for 3 weeks.  Her pain is aggravated by activity and relieved by rest.  She is getting swelling here.  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  She is on her feet all day.  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  Patient has a cam walker which she has recently started wearing which has helped.  Patient has a history of a right fifth metatarsal avulsion fracture at the base in March 2015.  She was seen once in the clinic but never followed up.  Patient also has a history of peroneal tendon repair on the right foot.  She smokes daily.  She has a history of alcohol abuse.    7/24/20   patient returns for evaluation of her right foot.  Since I saw her last she came down with Lyme's disease.  She lost 10 pounds at this.  Because of her weight loss the cam walker is not fitting her and she would like a smaller one.  She states she wears the cam walker intermittently.  She is still working [employer].  She gets slight swelling here.  She denies erythema or ecchymosis.  She denies weakness.         ROS:  See above          Allergies   Allergen Reactions     Bupropion Other (See Comments)     seizures     Lisinopril Swelling     Angioedema      Penicillins      hives     Phenytoin      rash,puritis (Dilantin)     Prednisone Itching     Face itching, unable to concentrate      Seasonal Allergies        Current Outpatient Medications   Medication Sig Dispense Refill     amLODIPine (NORVASC) 10 MG tablet Take 1 tablet (10 mg) by mouth daily 90 tablet 3     busPIRone (BUSPAR) 5 MG tablet Take 1 tablet (5 mg) by mouth 3 times daily as needed 90 tablet 1     cyclobenzaprine (FLEXERIL) 10 MG tablet        flunisolide (NASALIDE) 25 MCG/ACT (0.025%) SOLN spray Spray 2 sprays into both nostrils every 12 hours 1 Bottle 5     hydrOXYzine (ATARAX) 25 MG tablet Take 1-2 tablets (25-50 mg) by mouth 3 times daily as needed  for anxiety 60 tablet 1     methylphenidate (RITALIN) 10 MG tablet Take 1 tablet (10 mg) by mouth 2 times daily 60 tablet 0     metoprolol succinate ER (TOPROL-XL) 50 MG 24 hr tablet Take 1 tablet (50 mg) by mouth daily 90 tablet 1     nabumetone (RELAFEN) 500 MG tablet Take 1 tablet (500 mg) by mouth 2 times daily 30 tablet 0     naltrexone (VIVITROL) 380 MG SUSR Inject 380 mg into the muscle every 30 days       naproxen (NAPROSYN) 500 MG tablet        nicotine (NICODERM CQ) 21 MG/24HR 24 hr patch Place 1 patch onto the skin every 24 hours 28 patch 3     nicotine (NICORETTE) 2 MG gum Place 1 each (2 mg) inside cheek as needed for smoking cessation 100 each 3     ondansetron (ZOFRAN) 4 MG tablet Take 1 tablet (4 mg) by mouth every 8 hours as needed for nausea 15 tablet 0     venlafaxine (EFFEXOR-XR) 37.5 MG 24 hr capsule Take 1 capsule (37.5 mg) by mouth daily 30 capsule 1       Patient Active Problem List   Diagnosis     S/P LEEP     Narcolepsy and cataplexy     Health Care Home     Calcific tendonitis peroneals     Hypersomnia     Major depressive disorder, recurrent episode, moderate (H)     Generalized anxiety disorder     HTN, goal below 140/90     Irregular heartbeat     Tobacco use disorder     H/O ETOH abuse     Gastroesophageal reflux disease without esophagitis     Hypertriglyceridemia     Chronic seasonal allergic rhinitis due to pollen     Alcohol dependence in remission (H)     Encounter for surveillance of injectable contraceptive       Past Medical History:   Diagnosis Date     ALCOHOL ABUSE-IN REMISS 7/30/2007     Cataplexy and narcolepsy 2002    tried Provigil, Straterra, Ritalin (works)     ROSA 3 - cervical intraepithelial neoplasia grade 3 1/4/10    ROSA 2/3  on LEEP biopsy     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     REPAIR TENDON PERONEAL  11/15/2013    Procedure: REPAIR TENDON PERONEAL;  right peroneal tendon  transfer;  Surgeon: Jesus Manuel Oden DPM;  Location: [facility] OR       Family History   Problem Relation Age of Onset     Depression Mother      Arthritis Paternal Grandmother      Hypertension Paternal Grandfather         birth FF     Asthma No family hx of      C.A.D. No family hx of      Diabetes No family hx of      Cancer - colorectal No family hx of      Prostate Cancer No family hx of      Coronary Artery Disease No family hx of      Ovarian Cancer No family hx of      Mental Illness No family hx of      Cerebrovascular Disease No family hx of      Anesthesia Reaction No family hx of      Other Cancer No family hx of      Osteoporosis No family hx of      Known Genetic Syndrome No family hx of      Obesity No family hx of      Unknown/Adopted No family hx of        Social History     Tobacco Use     Smoking status: Current Every Day Smoker     Packs/day: 0.50     Smokeless tobacco: Never Used   Substance Use Topics     Alcohol use: No         Exam:    Vitals: /77   Pulse 98   Wt 48.5 kg (107 lb)   BMI 18.37 kg/m    BMI: Body mass index is 18.37 kg/m .  Height: Data Unavailable    Constitutional/ general:  Pt is in no apparent distress, appears well-nourished.  Cooperative with history and physical exam.     Psych:  The patient answered questions appropriately.  Normal affect.  Seems to have reasonable expectations, in terms of treatment.     Eyes:  Visual scanning/ tracking without deficit.      Ears:  Response to auditory stimuli is normal.  negative hearing aid devices.  Auricles in proper alignment.     Lymphatic:  Popliteal lymph nodes not enlarged.     Lungs:  Non labored breathing, non labored speech. No cough.  No audible wheezing. Even, quiet breathing.       Vascular:  positive pedal pulses bilaterally for both the DP and PT arteries.  CFT < 3 sec.  negative ankle edema.  positive pedal hair growth.    Neuro:  Alert and oriented x 3. Coordinated gait.  Light touch sensation is intact to the L4, L5, S1 distributions. No obvious deficits.  No evidence of neurological-based weakness, spasticity, or contracture in the lower extremities.      Derm: Normal texture and turgor.  No erythema, ecchymosis, or cyanosis.      Musculoskeletal:    Lower extremity muscle strength is normal.  Patient is ambulatory without an assistive device or brace.  No gross deformities.  Normal arch.       Decent muscle strength and ROM to all areas tested.  Pain upon palpation to base of right 5th metatarsal.  Edema  noted.  No erythema.  No sign of ulceration, infection, or drainage.  Pain decreases as we get further from this area.  No pain on palpation of her peroneal tendon course.    Radiographic Exam:  X-Ray Findings:  I personally reviewed the films.  Non-united bone at the base of the right fifth metatarsal with fracture gap less than 2 mm    History: Fracture non-union, foot; right midfoot CT scan to evaluate  styloid process fracture     Techniques: Helical acquisition of images through the right foot was  obtained without administration of contrast.     DLP: 145 mGy-cm     Comparison: Radiograph 5/22/2020, 3/28/2019     Findings:     A marker is placed at the level of the base of the fifth metatarsal.      Corresponding to the marker is fracture deformity of the base of the  fifth metatarsal with substantial bony bridging.There is a subtle  fracture line still visible.     6 mm ossific fragment plantar to the  cuboid likely os peroneum.  Bipartite tibial hallux sesamoid. Tiny ossific fragments at the head  of the fifth proximal phalanx may be from remote trauma. There are 2  well-corticated ossific fragments at the distal tip of the medial  malleolus measuring 3 and 5 mm likely likely from prior trauma. 3 mm  corticated ossific fragment at the distal tip of the lateral malleolus  likely remote trauma.     The Lisfranc joint is congruent. Bone islands in the calcaneus.     CT is limited for evaluation of internal derangement. The ligaments  and tendons appear grossly unremarkable.     Soft tissues are unremarkable without mass or fluid collection.                                                                      Impression: Incompletely healed/ongoing healing of fracture of the  base of the fifth metatarsal with subtle fracture line still remain  visible.    Assessment:  Fifth metatarsal styloid process avulsion fracture right foot with non union    Plan:  X-rays personally reviewed from the past.  Reviewed CT scan with patient.  We gave her a new cam walker.  I discussed with her that would be difficult to heal this with patient walking a lot.  She will try to minimize her activities.  We discussed a bone stimulator.  She would like to try this.  We wrote an order for this.  We will have her back to the clinic in 4 to 6 weeks.      Eric Madera, RUEL LIPSCOMB, FACFAS

## 2020-07-28 DIAGNOSIS — S92.351K: Primary | ICD-10-CM

## 2020-08-20 ENCOUNTER — TELEPHONE (OUTPATIENT)
Dept: PODIATRY | Facility: CLINIC | Age: 40
End: 2020-08-20

## 2020-08-20 NOTE — TELEPHONE ENCOUNTER
Reason for call:  Other   Patient called regarding (reason for call): call back  Additional comments: Patient is calling wanting a note to be able to drive. Please call back     Phone number to reach patient:  Other phone number:  [phone]    Best Time:  any    Can we leave a detailed message on this number?  YES    Travel screening: Not Applicable

## 2020-08-20 NOTE — LETTER
[address]  [place] [address]  Phone: [phone]    August 20, 2020        Tasha Short  [address] [place] [address]          To whom it may concern:    RE: Tasha Short    Patient was seen and treated at our clinic and missed work.    Tasha Short is not able to drive since 5/22/20  due to foot fracture of her Right foot.      Please contact me for questions or concerns.      Sincerely,        Dr. Eric Madera D.P.M FAC FAS/lld

## 2020-08-20 NOTE — TELEPHONE ENCOUNTER
Pt states she is NOT currently driving. Would like to have a letter sent to her stating that.     She is not looking for a note for to her to be able to drive at this time.    Letter written mailed to pt's house.    Emmy Blackman CMA

## 2020-09-08 ENCOUNTER — MYC MEDICAL ADVICE (OUTPATIENT)
Dept: FAMILY MEDICINE | Facility: OTHER | Age: 40
End: 2020-09-08

## 2020-09-10 ENCOUNTER — ANCILLARY PROCEDURE (OUTPATIENT)
Dept: GENERAL RADIOLOGY | Facility: CLINIC | Age: 40
End: 2020-09-10
Attending: PODIATRIST
Payer: COMMERCIAL

## 2020-09-10 ENCOUNTER — OFFICE VISIT (OUTPATIENT)
Dept: PODIATRY | Facility: CLINIC | Age: 40
End: 2020-09-10
Payer: COMMERCIAL

## 2020-09-10 VITALS
BODY MASS INDEX: 17.34 KG/M2 | WEIGHT: 101 LBS | DIASTOLIC BLOOD PRESSURE: 62 MMHG | SYSTOLIC BLOOD PRESSURE: 92 MMHG | HEART RATE: 100 BPM

## 2020-09-10 DIAGNOSIS — S92.351K CLOSED DISPLACED FRACTURE OF FIFTH METATARSAL BONE OF RIGHT FOOT WITH NONUNION, SUBSEQUENT ENCOUNTER: Primary | ICD-10-CM

## 2020-09-10 DIAGNOSIS — S92.351K CLOSED DISPLACED FRACTURE OF FIFTH METATARSAL BONE OF RIGHT FOOT WITH NONUNION, SUBSEQUENT ENCOUNTER: ICD-10-CM

## 2020-09-10 PROBLEM — F10.939 ALCOHOL WITHDRAWAL SEIZURE (H): Status: ACTIVE | Noted: 2020-09-10

## 2020-09-10 PROBLEM — O14.93 PRE-ECLAMPSIA IN THIRD TRIMESTER: Status: ACTIVE | Noted: 2017-11-28

## 2020-09-10 PROBLEM — R56.9 ALCOHOL WITHDRAWAL SEIZURE (H): Status: ACTIVE | Noted: 2020-09-10

## 2020-09-10 PROBLEM — F11.20 OPIATE ADDICTION (H): Status: ACTIVE | Noted: 2020-09-10

## 2020-09-10 PROCEDURE — 73630 X-RAY EXAM OF FOOT: CPT | Mod: RT

## 2020-09-10 PROCEDURE — 99213 OFFICE O/P EST LOW 20 MIN: CPT | Performed by: PODIATRIST

## 2020-09-10 RX ORDER — EPINEPHRINE 0.3 MG/.3ML
0.3 INJECTION SUBCUTANEOUS
Status: ON HOLD | COMMUNITY
Start: 2020-08-10 | End: 2020-10-02

## 2020-09-10 RX ORDER — NALTREXONE HYDROCHLORIDE 50 MG/1
50 TABLET, FILM COATED ORAL EVERY MORNING
Status: ON HOLD | COMMUNITY
Start: 2020-09-08 | End: 2020-10-06

## 2020-09-10 RX ORDER — PRENATAL WITH FERROUS FUM AND FOLIC ACID 3080; 920; 120; 400; 22; 1.84; 3; 20; 10; 1; 12; 200; 27; 25; 2 [IU]/1; [IU]/1; MG/1; [IU]/1; MG/1; MG/1; MG/1; MG/1; MG/1; MG/1; UG/1; MG/1; MG/1; MG/1; MG/1
1 TABLET ORAL EVERY MORNING
Status: ON HOLD | COMMUNITY
Start: 2020-09-08 | End: 2020-10-06

## 2020-09-10 RX ORDER — LITHIUM CARBONATE 300 MG/1
300 TABLET, FILM COATED, EXTENDED RELEASE ORAL AT BEDTIME
Status: ON HOLD | COMMUNITY
Start: 2020-09-08 | End: 2020-10-06

## 2020-09-10 NOTE — NURSING NOTE
Weight management plan Patient was referred to their PCP to discuss a diet and exercise plan.     SMOKING CESSATION  What's in cigarette smoke? - Cigarette smoke contains over 4,000 chemicals. Nicotine is one of the main ingredients which is an insecticide/herbicide. It is poisonous to our nervous system, increases blood clotting risk, and decreases the body's defenses to fight off infection. Another chemical is Carbon Monoxide is an asphyxiating gas that permanently binds to blood cells and blocks the transport of oxygen. This leads to tissue death and decreases your metabolism. Tar is a chemical that coats your lungs and trachea which impairs new oxygen coming in and carbon dioxide getting out of your body.   How does smoking impact surgery? - Smoking is particularly hazardous with regards to surgery. Surgery puts stress on the body and a smoker's body is already under strain from these chemicals. Putting the two together, especially for an elective surgery, could be a recipe for disaster. Smoking before and after surgery increases your risk of heart problems, slow wound healing, delayed bone healing, blood clots, wound infection and anesthesia complications.   What are the benefits of quitting? - In 20 minutes your blood pressure will drop back down to normal. In 8 hours the carbon monoxide (a toxic gas) levels in your blood stream will drop by half, and oxygen levels will return to normal. In 48 hours your chance of having a heart attack will have decreased. All nicotine will have left your body. Your sense of taste and smell will return to a normal level. In 72 hours your bronchial tubes will relax, and your energy levels will increase. In 2 weeks your circulation will increase, and it will continue to improve for the next 10 weeks.    Recommendations for elective surgery - Ideally, patients should quit smoking 8 weeks before and at least 2 weeks after elective surgery in order to avoid complications. Simply  cutting back on the amount of cigarettes smoked per day does not offer any benefit or decrease the risk of poor wound healing, heart problems, and infection. Smokers should also start taking Vitamin C and B for two weeks before surgery and two weeks after surgery.    Ways to Stop Smokin. Nicotine patches, lozenges, or gum  2. Support Groups  3. Medications (see below)    List of Medications:  1. Varenicline Tartrate (CHANTIX)   2. Bupropion HCL (WELLBUTRIN, ZYBAN) - note: make sure Wellbutrin is for smoking cessation and not other issues   3. Nicotine Patch (NICODERM)   4. Nicotine Inhaler (NICOTROL)   5. Nicotine Gum Nicotine Polacrilex   6. Nicotine Lozenge: Nicotine Polacrilex (COMMIT)   * [facility] offers a smoking support group as well!  Please visit: [email or web address]  If you are interested in these, ask about getting a prescription or talk to your primary care doctor about what may be the best way for you to quit.

## 2020-09-10 NOTE — PATIENT INSTRUCTIONS
We wish you continued good healing. If you have any questions or concerns, please do not hesitate to contact us at [phone]    Please remember to call and schedule a follow up appointment if one was recommended at your earliest convenience.   PODIATRY CLINIC HOURS  TELEPHONE NUMBER    Dr. Eric Madera D.P.M [facility]    Clinics:  [facility]    [facility]   Tuesday 1PM-6PM  [place]  Wednesday 7AM-2PM  [facility]  Thursday 10AM-6PM  [place]  Friday 7AM-3PM  [place]  Specialty schedulers:   [phone] to make an appointment with any Specialty Provider.        Urgent Care locations:    [facility] Monday-Friday 5 pm - 9 pm. Saturday-Sunday 9 am -5pm    Monday-Friday 11 am - 9 pm Saturday 9 am - 5 pm     Monday-Sunday 12 noon-8PM [phone]     If you need a medication refill, please contact us you may need lab work and/or a follow up visit prior to your refill (i.e. Antifungal medications).    [employer] (secure e-mail communication and access to your chart) to send a message or to make an appointment.    If MRI needed please call [facility] Sequeira at [phone]

## 2020-09-10 NOTE — LETTER
9/10/2020         RE: Tasha Short  [address] [place] [address]        Dear Colleague,    Thank you for referring your patient, Tasha Short, to the [facility]. Please see a copy of my visit note below.    Subjective:    5/22/20   Pt is seen today by Dr. Oleary for pain in her right central lateral foot.  She has had this for 3 weeks.  Her pain is aggravated by activity and relieved by rest.  She is getting swelling here.  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  She is on her feet all day.  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  Patient has a cam walker which she has recently started wearing which has helped.  Patient has a history of a right fifth metatarsal avulsion fracture at the base in March 2015.  She was seen once in the clinic but never followed up.  Patient also has a history of peroneal tendon repair on the right foot.  She smokes daily.  She has a history of alcohol abuse.    7/24/20   patient returns for evaluation of her right foot.  Since I saw her last she came down with Lyme's disease.  She lost 10 pounds at this.  Because of her weight loss the cam walker is not fitting her and she would like a smaller one.  She states she wears the cam walker intermittently.  She is still working [employer].  She gets slight swelling here.  She denies erythema or ecchymosis.  She denies weakness.    9/10/20   Patient has finally gotten a bone stimulator and has been using this for 2 weeks.  She still getting pain at the fracture site.  Her back is bothering her as well.  She still working at least 8 hours a day in [employer].  She is wondering about additional pain pills.  She gets edema at the fracture site.  She denies calf pain shortness of breath.              ROS:  See above          Allergies   Allergen Reactions     Bupropion Other (See Comments)     seizures     Lisinopril Swelling     Angioedema      Penicillins      hives     Phenytoin       rash,puritis (Dilantin)     Prednisone Itching     Face itching, unable to concentrate      Seasonal Allergies        Current Outpatient Medications   Medication Sig Dispense Refill     amLODIPine (NORVASC) 10 MG tablet Take 1 tablet (10 mg) by mouth daily 90 tablet 3     busPIRone (BUSPAR) 5 MG tablet Take 1 tablet (5 mg) by mouth 3 times daily as needed 90 tablet 1     cyclobenzaprine (FLEXERIL) 10 MG tablet        flunisolide (NASALIDE) 25 MCG/ACT (0.025%) SOLN spray Spray 2 sprays into both nostrils every 12 hours 1 Bottle 5     hydrOXYzine (ATARAX) 25 MG tablet Take 1-2 tablets (25-50 mg) by mouth 3 times daily as needed for anxiety 60 tablet 1     methylphenidate (RITALIN) 10 MG tablet Take 1 tablet (10 mg) by mouth 2 times daily 60 tablet 0     metoprolol succinate ER (TOPROL-XL) 50 MG 24 hr tablet Take 1 tablet (50 mg) by mouth daily 90 tablet 1     nabumetone (RELAFEN) 500 MG tablet Take 1 tablet (500 mg) by mouth 2 times daily 30 tablet 0     naltrexone (VIVITROL) 380 MG SUSR Inject 380 mg into the muscle every 30 days       naproxen (NAPROSYN) 500 MG tablet        nicotine (NICODERM CQ) 21 MG/24HR 24 hr patch Place 1 patch onto the skin every 24 hours 28 patch 3     nicotine (NICORETTE) 2 MG gum Place 1 each (2 mg) inside cheek as needed for smoking cessation 100 each 3     ondansetron (ZOFRAN) 4 MG tablet Take 1 tablet (4 mg) by mouth every 8 hours as needed for nausea 15 tablet 0     venlafaxine (EFFEXOR-XR) 37.5 MG 24 hr capsule Take 1 capsule (37.5 mg) by mouth daily 30 capsule 1       Patient Active Problem List   Diagnosis     S/P LEEP     Narcolepsy and cataplexy     Health Care Home     Calcific tendonitis peroneals     Hypersomnia     Major depressive disorder, recurrent episode, moderate (H)     Generalized anxiety disorder     HTN, goal below 140/90     Irregular heartbeat     Tobacco use disorder     H/O ETOH abuse     Gastroesophageal reflux disease without esophagitis      Hypertriglyceridemia     Chronic seasonal allergic rhinitis due to pollen     Alcohol dependence in remission (H)     Encounter for surveillance of injectable contraceptive       Past Medical History:   Diagnosis Date     ALCOHOL ABUSE-IN REMISS 2007     Cataplexy and narcolepsy 2002    tried Provigil, Straterra, Ritalin (works)     ROSA 3 - cervical intraepithelial neoplasia grade 3 1/4/10    ROSA 2/3  on LEEP biopsy     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     REPAIR TENDON PERONEAL  11/15/2013    Procedure: REPAIR TENDON PERONEAL;  right peroneal tendon  transfer;  Surgeon: Jesus Manuel Oden DPM;  Location: [facility] OR       Family History   Problem Relation Age of Onset     Depression Mother      Arthritis Paternal Grandmother      Hypertension Paternal Grandfather         birth FF     Asthma No family hx of      C.A.D. No family hx of      Diabetes No family hx of      Cancer - colorectal No family hx of      Prostate Cancer No family hx of      Coronary Artery Disease No family hx of      Ovarian Cancer No family hx of      Mental Illness No family hx of      Cerebrovascular Disease No family hx of      Anesthesia Reaction No family hx of      Other Cancer No family hx of      Osteoporosis No family hx of      Known Genetic Syndrome No family hx of      Obesity No family hx of      Unknown/Adopted No family hx of        Social  History     Tobacco Use     Smoking status: Current Every Day Smoker     Packs/day: 0.50     Smokeless tobacco: Never Used   Substance Use Topics     Alcohol use: No         Exam:    Vitals: There were no vitals taken for this visit.  BMI: There is no height or weight on file to calculate BMI.  Height: Data Unavailable    Constitutional/ general:  Pt is in no apparent distress, appears well-nourished.  Cooperative with history and physical exam.     Psych:  The patient answered questions appropriately.  Normal affect.  Seems to have reasonable expectations, in terms of treatment.  Patient is somewhat cachectic looking.  Patient walking in shoe today not using cam walker    Eyes:  Visual scanning/ tracking without deficit.     Ears:  Response to auditory stimuli is normal.  negative hearing aid devices.  Auricles in proper alignment.     Lymphatic:  Popliteal lymph nodes not enlarged.     Lungs:  Non labored breathing, non labored speech. No cough.  No audible wheezing. Even, quiet breathing.       Vascular:  positive pedal pulses bilaterally for both the DP and PT arteries.  CFT < 3 sec.  negative ankle edema.  positive pedal hair growth.    Neuro:  Alert and oriented x 3. Coordinated gait.  Light touch sensation is intact to the L4, L5, S1 distributions. No obvious deficits.  No evidence of neurological-based weakness, spasticity, or contracture in the lower extremities.      Derm: Normal texture and turgor.  No erythema, ecchymosis, or cyanosis.      Musculoskeletal:    Lower extremity muscle strength is normal.  Patient is ambulatory without an assistive device or brace.  No gross deformities.  Normal arch.       Decent muscle strength and ROM to all areas tested.  Pain upon palpation to base of right 5th metatarsal.  Edema  noted.  No erythema.  No sign of ulceration, infection, or drainage.  Pain decreases as we get further from this area.  No pain on palpation of her peroneal tendon course.    Radiographic  Exam:            History: Fracture non-union, foot; right midfoot CT scan to evaluate  styloid process fracture     Techniques: Helical acquisition of images through the right foot was  obtained without administration of contrast.     DLP: 145 mGy-cm     Comparison: Radiograph 5/22/2020, 3/28/2019     Findings:     A marker is placed at the level of the base of the fifth metatarsal.      Corresponding to the marker is fracture deformity of the base of the  fifth metatarsal with substantial bony bridging.There is a subtle  fracture line still visible.     6 mm ossific fragment plantar to the cuboid likely os peroneum.  Bipartite tibial hallux sesamoid. Tiny ossific fragments at the head  of the fifth proximal phalanx may be from remote trauma. There are 2  well-corticated ossific fragments at the distal tip of the medial  malleolus measuring 3 and 5 mm likely likely from prior trauma. 3 mm  corticated ossific fragment at the distal tip of the lateral malleolus  likely remote trauma.     The Lisfranc joint is congruent. Bone islands in the calcaneus.     CT is limited for evaluation of internal derangement. The ligaments  and tendons appear grossly unremarkable.     Soft tissues are unremarkable without mass or fluid collection.                                                                      Impression: Incompletely healed/ongoing healing of fracture of the  base of the fifth metatarsal with subtle fracture line still remain  visible.    Assessment:  Fifth metatarsal styloid process avulsion fracture right foot with non union    Plan:  X-rays taken today of right foot.  Help her back pain we gave her a heel lift.  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  We discussed reducing her hours but she declines.  We also discussed that she is still somewhat thin from her Lyme's disease.  We encouraged her to eat a good diet as this will help in  bone healing.  We again discussed that between her smoking, recent weight loss, working as a  all day will make this slow to heal.  Encourage cessation of these activities.  We will have her back to the clinic in  6 weeks.      Eric Madera DPM DPM, FACFAS      Again, thank you for allowing me to participate in the care of your patient.        Sincerely,        Eric Madera DPM

## 2020-09-10 NOTE — PROGRESS NOTES
Subjective:    5/22/20   Pt is seen today by Dr. Oleary for pain in her right central lateral foot.  She has had this for 3 weeks.  Her pain is aggravated by activity and relieved by rest.  She is getting swelling here.  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  She is on her feet all day.  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  Patient has a cam walker which she has recently started wearing which has helped.  Patient has a history of a right fifth metatarsal avulsion fracture at the base in March 2015.  She was seen once in the clinic but never followed up.  Patient also has a history of peroneal tendon repair on the right foot.  She smokes daily.  She has a history of alcohol abuse.    7/24/20   patient returns for evaluation of her right foot.  Since I saw her last she came down with Lyme's disease.  She lost 10 pounds at this.  Because of her weight loss the cam walker is not fitting her and she would like a smaller one.  She states she wears the cam walker intermittently.  She is still working [employer].  She gets slight swelling here.  She denies erythema or ecchymosis.  She denies weakness.    9/10/20   Patient has finally gotten a bone stimulator and has been using this for 2 weeks.  She still getting pain at the fracture site.  Her back is bothering her as well.  She still working at least 8 hours a day in [employer].  She is wondering about additional pain pills.  She gets edema at the fracture site.  She denies calf pain shortness of breath.              ROS:  See above          Allergies   Allergen Reactions     Bupropion Other (See Comments)     seizures     Lisinopril Swelling     Angioedema      Penicillins      hives     Phenytoin      rash,puritis (Dilantin)     Prednisone Itching     Face itching, unable to concentrate      Seasonal Allergies        Current Outpatient Medications   Medication Sig Dispense Refill     amLODIPine (NORVASC) 10 MG tablet Take 1 tablet (10 mg) by mouth daily 90  tablet 3     busPIRone (BUSPAR) 5 MG tablet Take 1 tablet (5 mg) by mouth 3 times daily as needed 90 tablet 1     cyclobenzaprine (FLEXERIL) 10 MG tablet        flunisolide (NASALIDE) 25 MCG/ACT (0.025%) SOLN spray Spray 2 sprays into both nostrils every 12 hours 1 Bottle 5     hydrOXYzine (ATARAX) 25 MG tablet Take 1-2 tablets (25-50 mg) by mouth 3 times daily as needed for anxiety 60 tablet 1     methylphenidate (RITALIN) 10 MG tablet Take 1 tablet (10 mg) by mouth 2 times daily 60 tablet 0     metoprolol succinate ER (TOPROL-XL) 50 MG 24 hr tablet Take 1 tablet (50 mg) by mouth daily 90 tablet 1     nabumetone (RELAFEN) 500 MG tablet Take 1 tablet (500 mg) by mouth 2 times daily 30 tablet 0     naltrexone (VIVITROL) 380 MG SUSR Inject 380 mg into the muscle every 30 days       naproxen (NAPROSYN) 500 MG tablet        nicotine (NICODERM CQ) 21 MG/24HR 24 hr patch Place 1 patch onto the skin every 24 hours 28 patch 3     nicotine (NICORETTE) 2 MG gum Place 1 each (2 mg) inside cheek as needed for smoking cessation 100 each 3     ondansetron (ZOFRAN) 4 MG tablet Take 1 tablet (4 mg) by mouth every 8 hours as needed for nausea 15 tablet 0     venlafaxine (EFFEXOR-XR) 37.5 MG 24 hr capsule Take 1 capsule (37.5 mg) by mouth daily 30 capsule 1       Patient Active Problem List   Diagnosis     S/P LEEP     Narcolepsy and cataplexy     Health Care Home     Calcific tendonitis peroneals     Hypersomnia     Major depressive disorder, recurrent episode, moderate (H)     Generalized anxiety disorder     HTN, goal below 140/90     Irregular heartbeat     Tobacco use disorder     H/O ETOH abuse     Gastroesophageal reflux disease without esophagitis     Hypertriglyceridemia     Chronic seasonal allergic rhinitis due to pollen     Alcohol dependence in remission (H)     Encounter for surveillance of injectable contraceptive       Past Medical History:   Diagnosis Date     ALCOHOL ABUSE-IN REMISS 7/30/2007     Cataplexy and  narcolepsy 2002    tried Provigil, Straterra, Ritalin (works)     ROSA 3 - cervical intraepithelial neoplasia grade 3 1/4/10    ROSA 2/3  on LEEP biopsy     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     REPAIR TENDON PERONEAL  11/15/2013    Procedure: REPAIR TENDON PERONEAL;  right peroneal tendon  transfer;  Surgeon: Jesus Manuel Oden DPM;  Location: [facility] OR       Family History   Problem Relation Age of Onset     Depression Mother      Arthritis Paternal Grandmother      Hypertension Paternal Grandfather         birth FF     Asthma No family hx of      C.A.D. No family hx of      Diabetes No family hx of      Cancer - colorectal No family hx of      Prostate Cancer No family hx of      Coronary Artery Disease No family hx of      Ovarian Cancer No family hx of      Mental Illness No family hx of      Cerebrovascular Disease No family hx of      Anesthesia Reaction No family hx of      Other Cancer No family hx of      Osteoporosis No family hx of      Known Genetic Syndrome No family hx of      Obesity No family hx of      Unknown/Adopted No family hx of        Social History     Tobacco Use     Smoking status: Current Every Day Smoker     Packs/day: 0.50     Smokeless tobacco: Never Used   Substance Use Topics     Alcohol use: No         Exam:    Vitals: There were no vitals taken for this visit.  BMI: There is no height or weight on file  to calculate BMI.  Height: Data Unavailable    Constitutional/ general:  Pt is in no apparent distress, appears well-nourished.  Cooperative with history and physical exam.     Psych:  The patient answered questions appropriately.  Normal affect.  Seems to have reasonable expectations, in terms of treatment.  Patient is somewhat cachectic looking.  Patient walking in shoe today not using cam walker    Eyes:  Visual scanning/ tracking without deficit.     Ears:  Response to auditory stimuli is normal.  negative hearing aid devices.  Auricles in proper alignment.     Lymphatic:  Popliteal lymph nodes not enlarged.     Lungs:  Non labored breathing, non labored speech. No cough.  No audible wheezing. Even, quiet breathing.       Vascular:  positive pedal pulses bilaterally for both the DP and PT arteries.  CFT < 3 sec.  negative ankle edema.  positive pedal hair growth.    Neuro:  Alert and oriented x 3. Coordinated gait.  Light touch sensation is intact to the L4, L5, S1 distributions. No obvious deficits.  No evidence of neurological-based weakness, spasticity, or contracture in the lower extremities.      Derm: Normal texture and turgor.  No erythema, ecchymosis, or cyanosis.      Musculoskeletal:    Lower extremity muscle strength is normal.  Patient is ambulatory without an assistive device or brace.  No gross deformities.  Normal arch.       Decent muscle strength and ROM to all areas tested.  Pain upon palpation to base of right 5th metatarsal.  Edema  noted.  No erythema.  No sign of ulceration, infection, or drainage.  Pain decreases as we get further from this area.  No pain on palpation of her peroneal tendon course.    Radiographic Exam:            History: Fracture non-union, foot; right midfoot CT scan to evaluate  styloid process fracture     Techniques: Helical acquisition of images through the right foot was  obtained without administration of contrast.     DLP: 145 mGy-cm     Comparison: Radiograph  5/22/2020, 3/28/2019     Findings:     A marker is placed at the level of the base of the fifth metatarsal.      Corresponding to the marker is fracture deformity of the base of the  fifth metatarsal with substantial bony bridging.There is a subtle  fracture line still visible.     6 mm ossific fragment plantar to the cuboid likely os peroneum.  Bipartite tibial hallux sesamoid. Tiny ossific fragments at the head  of the fifth proximal phalanx may be from remote trauma. There are 2  well-corticated ossific fragments at the distal tip of the medial  malleolus measuring 3 and 5 mm likely likely from prior trauma. 3 mm  corticated ossific fragment at the distal tip of the lateral malleolus  likely remote trauma.     The Lisfranc joint is congruent. Bone islands in the calcaneus.     CT is limited for evaluation of internal derangement. The ligaments  and tendons appear grossly unremarkable.     Soft tissues are unremarkable without mass or fluid collection.                                                                      Impression: Incompletely healed/ongoing healing of fracture of the  base of the fifth metatarsal with subtle fracture line still remain  visible.    Assessment:  Fifth metatarsal styloid process avulsion fracture right foot with non union    Plan:  X-rays taken today of right foot.  Help her back pain we gave her a heel lift.  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  We discussed reducing her hours but she declines.  We also discussed that she is still somewhat thin from her Lyme's disease.  We encouraged her to eat a good diet as this will help in bone healing.  We again discussed that between her smoking, recent weight loss, working as a  all day will make this slow to heal.  Encourage cessation of these activities.  We will have her back to the clinic in  6 weeks.      Eric Madera, RUEL DPM, FACFAS

## 2020-09-11 NOTE — PROGRESS NOTES
"Tasha Short is a 39 year old female who is being evaluated via a billable telephone visit.      The patient has been notified of following:     \"This telephone visit will be conducted via a call between you and your physician/provider. We have found that certain health care needs can be provided without the need for a physical exam.  This service lets us provide the care you need with a short phone conversation.  If a prescription is necessary we can send it directly to your pharmacy.  If lab work is needed we can place an order for that and you can then stop by our lab to have the test done at a later time.    Telephone visits are billed at different rates depending on your insurance coverage. During this emergency period, for some insurers they may be billed the same as an in-person visit.  Please reach out to your insurance provider with any questions.    If during the course of the call the physician/provider feels a telephone visit is not appropriate, you will not be charged for this service.\"    Patient has given verbal consent for Telephone visit?  {YES-NO  Default Yes:4444::\"Yes\"}    What phone number would you like to be contacted at? ***    How would you like to obtain your AVS? {AVS Preference:394015}    Subjective     Tasha Short is a 39 year old female who presents via phone visit today for the following health issues:    HPI    {SUPERLIST (Optional):820649}  {PEDS Chronic and Acute Problems (Optional):711938}     {additonal problems for provider to add (Optional):306499}    Review of Systems   {ROS COMP (Optional):933876}       Objective          Vitals:  No vitals were obtained today due to virtual visit.    {GENERAL APPEARANCE:50::\"healthy\",\"alert\",\"no distress\"}  PSYCH: Alert and oriented times 3; coherent speech, normal   rate and volume, able to articulate logical thoughts, able   to abstract reason, no tangential thoughts, no hallucinations   or delusions  Her affect is { " ":9697636::\"normal\"}  RESP: No cough, no audible wheezing, able to talk in full sentences  Remainder of exam unable to be completed due to telephone visits    {Diagnostic Test Results (Optional):384258}        Assessment/Plan:    {PROVIDER CHARTING PREFERENCE SOAPO:636149}    Phone call duration:  *** minutes              "

## 2020-09-14 ENCOUNTER — OFFICE VISIT (OUTPATIENT)
Dept: FAMILY MEDICINE | Facility: OTHER | Age: 40
End: 2020-09-14
Payer: COMMERCIAL

## 2020-09-14 VITALS
DIASTOLIC BLOOD PRESSURE: 64 MMHG | OXYGEN SATURATION: 100 % | HEIGHT: 64 IN | BODY MASS INDEX: 17.04 KG/M2 | SYSTOLIC BLOOD PRESSURE: 102 MMHG | WEIGHT: 99.8 LBS | TEMPERATURE: 98.4 F | RESPIRATION RATE: 14 BRPM | HEART RATE: 98 BPM

## 2020-09-14 DIAGNOSIS — G89.29 CHRONIC BILATERAL LOW BACK PAIN WITHOUT SCIATICA: ICD-10-CM

## 2020-09-14 DIAGNOSIS — R63.4 WEIGHT LOSS: ICD-10-CM

## 2020-09-14 DIAGNOSIS — M54.50 CHRONIC BILATERAL LOW BACK PAIN WITHOUT SCIATICA: ICD-10-CM

## 2020-09-14 DIAGNOSIS — F40.240 CLAUSTROPHOBIA: ICD-10-CM

## 2020-09-14 DIAGNOSIS — R19.7 DIARRHEA, UNSPECIFIED TYPE: ICD-10-CM

## 2020-09-14 DIAGNOSIS — R10.9 FLANK PAIN: ICD-10-CM

## 2020-09-14 DIAGNOSIS — S92.301G CLOSED DISPLACED FRACTURE OF METATARSAL BONE OF RIGHT FOOT WITH DELAYED HEALING, UNSPECIFIED METATARSAL, SUBSEQUENT ENCOUNTER: Primary | ICD-10-CM

## 2020-09-14 DIAGNOSIS — I10 HTN, GOAL BELOW 140/90: ICD-10-CM

## 2020-09-14 LAB
ALBUMIN UR-MCNC: 30 MG/DL
APPEARANCE UR: CLEAR
BACTERIA #/AREA URNS HPF: ABNORMAL /HPF
BASOPHILS # BLD AUTO: 0.1 10E9/L (ref 0–0.2)
BASOPHILS NFR BLD AUTO: 1.3 %
BILIRUB UR QL STRIP: ABNORMAL
COLOR UR AUTO: YELLOW
DIFFERENTIAL METHOD BLD: ABNORMAL
EOSINOPHIL # BLD AUTO: 0.1 10E9/L (ref 0–0.7)
EOSINOPHIL NFR BLD AUTO: 2.3 %
ERYTHROCYTE [DISTWIDTH] IN BLOOD BY AUTOMATED COUNT: 18.4 % (ref 10–15)
GLUCOSE UR STRIP-MCNC: NEGATIVE MG/DL
HCT VFR BLD AUTO: 25.8 % (ref 35–47)
HGB BLD-MCNC: 9.4 G/DL (ref 11.7–15.7)
HGB UR QL STRIP: NEGATIVE
KETONES UR STRIP-MCNC: NEGATIVE MG/DL
LEUKOCYTE ESTERASE UR QL STRIP: NEGATIVE
LYMPHOCYTES # BLD AUTO: 1.4 10E9/L (ref 0.8–5.3)
LYMPHOCYTES NFR BLD AUTO: 34.9 %
MCH RBC QN AUTO: 42.2 PG (ref 26.5–33)
MCHC RBC AUTO-ENTMCNC: 36.4 G/DL (ref 31.5–36.5)
MCV RBC AUTO: 116 FL (ref 78–100)
MONOCYTES # BLD AUTO: 0.5 10E9/L (ref 0–1.3)
MONOCYTES NFR BLD AUTO: 12.1 %
NEUTROPHILS # BLD AUTO: 2 10E9/L (ref 1.6–8.3)
NEUTROPHILS NFR BLD AUTO: 49.4 %
NITRATE UR QL: NEGATIVE
NON-SQ EPI CELLS #/AREA URNS LPF: ABNORMAL /LPF
PH UR STRIP: 6 PH (ref 5–7)
PLATELET # BLD AUTO: 156 10E9/L (ref 150–450)
RBC # BLD AUTO: 2.23 10E12/L (ref 3.8–5.2)
RBC #/AREA URNS AUTO: ABNORMAL /HPF
SOURCE: ABNORMAL
SP GR UR STRIP: 1.01 (ref 1–1.03)
UROBILINOGEN UR STRIP-ACNC: 2 EU/DL (ref 0.2–1)
WBC # BLD AUTO: 4 10E9/L (ref 4–11)
WBC #/AREA URNS AUTO: ABNORMAL /HPF

## 2020-09-14 PROCEDURE — 81001 URINALYSIS AUTO W/SCOPE: CPT | Performed by: PHYSICIAN ASSISTANT

## 2020-09-14 PROCEDURE — 99215 OFFICE O/P EST HI 40 MIN: CPT | Performed by: PHYSICIAN ASSISTANT

## 2020-09-14 PROCEDURE — 80053 COMPREHEN METABOLIC PANEL: CPT | Performed by: PHYSICIAN ASSISTANT

## 2020-09-14 PROCEDURE — 36415 COLL VENOUS BLD VENIPUNCTURE: CPT | Performed by: PHYSICIAN ASSISTANT

## 2020-09-14 PROCEDURE — 84443 ASSAY THYROID STIM HORMONE: CPT | Performed by: PHYSICIAN ASSISTANT

## 2020-09-14 PROCEDURE — 80050 GENERAL HEALTH PANEL: CPT | Performed by: PHYSICIAN ASSISTANT

## 2020-09-14 RX ORDER — DIAZEPAM 10 MG
10 TABLET ORAL ONCE
Qty: 1 TABLET | Refills: 0 | Status: SHIPPED | OUTPATIENT
Start: 2020-09-14 | End: 2020-09-14

## 2020-09-14 RX ORDER — CYCLOBENZAPRINE HCL 10 MG
10 TABLET ORAL 3 TIMES DAILY PRN
Qty: 90 TABLET | Refills: 1 | Status: SHIPPED | OUTPATIENT
Start: 2020-09-14 | End: 2021-07-21

## 2020-09-14 ASSESSMENT — ANXIETY QUESTIONNAIRES
1. FEELING NERVOUS, ANXIOUS, OR ON EDGE: SEVERAL DAYS
GAD7 TOTAL SCORE: 13
6. BECOMING EASILY ANNOYED OR IRRITABLE: NEARLY EVERY DAY
4. TROUBLE RELAXING: SEVERAL DAYS
7. FEELING AFRAID AS IF SOMETHING AWFUL MIGHT HAPPEN: SEVERAL DAYS
5. BEING SO RESTLESS THAT IT IS HARD TO SIT STILL: SEVERAL DAYS
GAD7 TOTAL SCORE: 13
7. FEELING AFRAID AS IF SOMETHING AWFUL MIGHT HAPPEN: SEVERAL DAYS
GAD7 TOTAL SCORE: 13
3. WORRYING TOO MUCH ABOUT DIFFERENT THINGS: NEARLY EVERY DAY
2. NOT BEING ABLE TO STOP OR CONTROL WORRYING: NEARLY EVERY DAY

## 2020-09-14 ASSESSMENT — PATIENT HEALTH QUESTIONNAIRE - PHQ9
SUM OF ALL RESPONSES TO PHQ QUESTIONS 1-9: 17
10. IF YOU CHECKED OFF ANY PROBLEMS, HOW DIFFICULT HAVE THESE PROBLEMS MADE IT FOR YOU TO DO YOUR WORK, TAKE CARE OF THINGS AT HOME, OR GET ALONG WITH OTHER PEOPLE: VERY DIFFICULT
SUM OF ALL RESPONSES TO PHQ QUESTIONS 1-9: 17

## 2020-09-14 ASSESSMENT — PAIN SCALES - GENERAL: PAINLEVEL: EXTREME PAIN (8)

## 2020-09-14 ASSESSMENT — MIFFLIN-ST. JEOR: SCORE: 1115.44

## 2020-09-14 NOTE — PATIENT INSTRUCTIONS
1. They will call you to schedule with podiatry     2. Labs today, await results     3. Back pain       Call to schedule MRI       You may call any office from below to schedule an appointment for radiology.  [place]  [phone]  [place]  [phone]     4. Stop Norvasc, monitor BP      Restart Norvasc if blood pressure >130/80 consistently     5. Start daily over the counter antihistamine like Claritin, Allegra, Zyrtec, or Zyzal     6. Recheck 1 month

## 2020-09-14 NOTE — PROGRESS NOTES
Subjective     Tasha Short is a 39 year old female who presents to clinic today for the following health issues:    History of Present Illness        Back Pain:  She presents for follow up of back pain. Patient's back pain is a new problem.    Original cause of back pain: not sure  First noticed back pain: 1-4 weeks ago  Patient feels back pain: constantlyLocation of back pain:  Right lower back, left lower back, right middle of back and left middle of back  Description of back pain: sharp and shooting  Back pain spreads: right knee    Since patient first noticed back pain, pain is: rapidly worsening  Does back pain interfere with her job:  Yes  On a scale of 1-10 (10 being the worst), patient describes pain as:  9  What makes back pain worse: bending and standing  Acupuncture: not tried  Acetaminophen: not helpful  Activity or exercise: not helpful  Chiropractor:  Not tried  Cold: not helpful  Heat: not helpful  Massage: not helpful  Muscle relaxants: not helpful  NSAIDS: not helpful  Opioids: not tried  Physical Therapy: not tried  Rest: helpful  Steroid Injection: not tried  Stretching: not helpful  Surgery: not tried  TENS unit: not tried  Topical pain relievers: not helpful  Other healthcare providers patient is seeing for back pain: None    Mental Health Follow-up:  Patient presents to follow-up on Depression & Anxiety.Patient's depression since last visit has been:  Worse  The patient is having other symptoms associated with depression.  Patient's anxiety since last visit has been:  Worse  The patient is not having other symptoms associated with anxiety.  Any significant life events: financial concerns and housing concerns  Patient is not feeling anxious or having panic attacks.  Patient has no concerns about alcohol or drug use.     Social History  Tobacco Use    Smoking status: Current Every Day Smoker      Packs/day: 0.50    Smokeless tobacco: Never Used  Alcohol use: No  Drug use: No      Today's  "PHQ-9         PHQ-9 Total Score:     (P) 17   PHQ-9 Q9 Thoughts of better off dead/self-harm past 2 weeks :   (P) Not at all   Thoughts of suicide or self harm:      Self-harm Plan:        Self-harm Action:          Safety concerns for self or others:           Hypertension: She presents for follow up of hypertension.  She does check blood pressure  regularly outside of the clinic. Outpatient blood pressures have not been over 140/90. She follows a low salt diet.     She eats 0-1 servings of fruits and vegetables daily.She consumes 0 sweetened beverage(s) daily.She exercises with enough effort to increase her heart rate 60 or more minutes per day.  She exercises with enough effort to increase her heart rate 7 days per week.   She is taking medications regularly.       Patient woke up with facial swelling, onset 5 days ago unknown reason. Back pain onset a couple weeks now, lower radiates into right leg/knee. Patient thinks it has to do with broken foot from last year.     - Not wearing boot, ace wrap      Boot makes back hurt ?   - Flexeril on and off     - Just restarted on Lithium , goes back in 2 weeks   - Pressure in ear   - Dizzy on standing       Review of Systems   Constitutional, HEENT, cardiovascular, pulmonary, gi , neurological, psychological, and gu systems are negative, except as otherwise noted.      Objective    /64   Pulse 98   Temp 98.4  F (36.9  C)   Resp 14   Ht 1.63 m (5' 4.17\")   Wt 45.3 kg (99 lb 12.8 oz)   LMP  (LMP Unknown)   SpO2 100%   Breastfeeding No   BMI 17.04 kg/m    Body mass index is 17.04 kg/m .  Physical Exam   GENERAL APPEARANCE: healthy, alert and no distress, cachetic appearing   EYES: Eyes grossly normal to inspection, PERRLA, conjunctivae and sclerae without injection or discharge, EOM intact   RESP: Lungs clear to auscultation - no rales, rhonchi or wheezes   CV: Regular rates and rhythm, normal S1 S2, no S3 or S4, no murmur, click or rub, no peripheral edema " and peripheral pulses strong and symmetric bilaterally    MS: No musculoskeletal defects are noted and gait is age appropriate without ataxia   SKIN:  Dry, some signs of dehydration, no suspicious lesions or rashes, hydration status appears adeuqate with normal skin turgor   NEURO: Strength 5+ bilateral upper and lower extremities, sensation intact in distal bilateral upper and lower extremities, mentation- intact, speech- normal, reflexes- symmetric in bilateral upper and lower extremities, cranial nerves II-XII tested and are intact   BACK: No CVA tenderness, bilateral lower thoracic and lumbar tenderness, no midline tenderness, decreased ROM with all movements due to pain   PSYCH: Alert and oriented x3; speech- coherent , normal rate and volume; able to articulate logical thoughts, able to abstract reason, no tangential thoughts, no hallucinations or delusions, mentation appears normal, Mood is euthymic. Affect is appropriate for this mood state and bright. Thought content is free of suicidal ideation, hallucinations, and delusions. Dress is adequate and upkept. Eye contact is good during conversation.       Diagnostics  See orders pending in Epic           Assessment & Plan     ICD-10-CM    1. Closed displaced fracture of metatarsal bone of right foot with delayed healing, unspecified metatarsal, subsequent encounter  S92.301G Orthopedic [facility] Spine  Referral     Urine Microscopic   2. Weight loss  R63.4 Comprehensive metabolic panel (BMP + Alb, Alk Phos, ALT, AST, Total. Bili, TP)     TSH with free T4 reflex     CBC with platelets and differential   3. Flank pain  R10.9 *UA reflex to Microscopic and Culture ([place] [facility] [place] [facility] (except [place] and [place])   4. Chronic bilateral low back pain without sciatica  M54.5 cyclobenzaprine (FLEXERIL) 10 MG tablet    G89.29 MR Lumbar Spine w/o Contrast   5. Claustrophobia  F40.240 diazepam (VALIUM) 10 MG tablet   6. HTN, goal below 140/90  I10 Home Blood  Pressure Monitor Order for DME - ONLY FOR DME   7. Diarrhea, unspecified type  R19.7      - Patient here for many issues, feels like she is just falling apart   - Did agenda setting with her and did the following assessment and plan     1. Foot fracture   - Doesn't heal, can't wear the boot while working, can't stop working or won't have money   - Reviewed podiatry note with her      Delayed healing due to smoking, poor nutrition/weight loss, and failure to wear boot      They recommended time off work, which she still refuses to do   - She wants a second opinion, this was given, though discussed unlikely to find a different answer than above     2. Weight loss & diarrhea     Wt Readings from Last 4 Encounters:   09/14/20 45.3 kg (99 lb 12.8 oz)   09/10/20 45.8 kg (101 lb)   07/24/20 45.4 kg (100 lb)   05/22/20 49 kg (108 lb)     - 1 year was 122, now 99   - ~20 lbs unintentional weight loss over past year   - States eats all the time, does have diarrhea but can't remember when that started      No blood in stool, no GERD   - Recommend labs to check for possible etiology of weight loss including possible infectious or malignant etiologies    Await results   - Discussed with diarrhea, would like to get colonoscopy and stool cultures (though no risk factors for this), patient declined all this, did agree to labs today     3. Back pain   - Has had issues with back in the past, podiatry is saying it is due to her foot not healing and not wearing boot, she felt the boot made her back pain worse   - No signs of acute urological issue but patient would like UA checked due to UTIs in the past   - Seems to be musculoskeletal in origin, patient already using muscle relaxer, APAP/NSAIDs, daily stretching, doesn't have any acute neurological disorders   - Recommend we get lumbar MRI    - Await results   - Continue with conservative cares, did let her know likely exacerbated by foot fracture     4. Dizziness/HTN   - Dizzy upon  standing, no signs of neurological issue, no signs of BPPV  - Ears are clear but does have allergies and some congestion lately   - Start daily over the counter antihistamine like Claritin, Allegra, Zyrtec, or Zyzal   - Has been out of Norvasc for 3-4 days now and BP still low, likely cause of dizziness and likely due to her severe weight loss   - Recommend continue to stay off this, just stay on beta blocker   - Recommend monitoring, restart Norvasc at 1/2 tab if BP consistently >130/80     - Discussed warning signs that would warrant return to clinic/ED    The patient indicates understanding of these issues and agrees with the plan.    Return in about 1 month (around 10/14/2020).      A total of 50 minutes was spent with the patient today, with greater than 50% of the visit involving counseling and coordination of care.      Enid Mansfield PA-C  [facility]

## 2020-09-15 ENCOUNTER — TELEPHONE (OUTPATIENT)
Dept: FAMILY MEDICINE | Facility: OTHER | Age: 40
End: 2020-09-15

## 2020-09-15 ENCOUNTER — HOSPITAL ENCOUNTER (EMERGENCY)
Facility: CLINIC | Age: 40
End: 2020-09-15
Payer: COMMERCIAL

## 2020-09-15 DIAGNOSIS — D64.9 ANEMIA, UNSPECIFIED TYPE: ICD-10-CM

## 2020-09-15 DIAGNOSIS — R63.4 WEIGHT LOSS: Primary | ICD-10-CM

## 2020-09-15 DIAGNOSIS — R19.7 DIARRHEA, UNSPECIFIED TYPE: ICD-10-CM

## 2020-09-15 DIAGNOSIS — R71.0 HEMOGLOBIN DECREASED: ICD-10-CM

## 2020-09-15 LAB
ALBUMIN SERPL-MCNC: 3.1 G/DL (ref 3.4–5)
ALP SERPL-CCNC: 189 U/L (ref 40–150)
ALT SERPL W P-5'-P-CCNC: 46 U/L (ref 0–50)
ANION GAP SERPL CALCULATED.3IONS-SCNC: 10 MMOL/L (ref 3–14)
AST SERPL W P-5'-P-CCNC: 213 U/L (ref 0–45)
BILIRUB SERPL-MCNC: 0.8 MG/DL (ref 0.2–1.3)
BUN SERPL-MCNC: 5 MG/DL (ref 7–30)
CALCIUM SERPL-MCNC: 7.3 MG/DL (ref 8.5–10.1)
CHLORIDE SERPL-SCNC: 94 MMOL/L (ref 94–109)
CO2 SERPL-SCNC: 27 MMOL/L (ref 20–32)
CREAT SERPL-MCNC: 0.99 MG/DL (ref 0.52–1.04)
GFR SERPL CREATININE-BSD FRML MDRD: 72 ML/MIN/{1.73_M2}
GLUCOSE SERPL-MCNC: 141 MG/DL (ref 70–99)
POTASSIUM SERPL-SCNC: 2.4 MMOL/L (ref 3.4–5.3)
PROT SERPL-MCNC: 5.7 G/DL (ref 6.8–8.8)
SODIUM SERPL-SCNC: 131 MMOL/L (ref 133–144)
TSH SERPL DL<=0.005 MIU/L-ACNC: 2.78 MU/L (ref 0.4–4)

## 2020-09-15 ASSESSMENT — PATIENT HEALTH QUESTIONNAIRE - PHQ9: SUM OF ALL RESPONSES TO PHQ QUESTIONS 1-9: 17

## 2020-09-15 ASSESSMENT — ANXIETY QUESTIONNAIRES: GAD7 TOTAL SCORE: 13

## 2020-09-15 NOTE — TELEPHONE ENCOUNTER
Ferdinand for patient to call us back- [address] [place] [phone] and can schedule her labs at [facility] as well.

## 2020-09-15 NOTE — TELEPHONE ENCOUNTER
Discussed with patient. But in light of critical lab, was sent to the ED     Donnie Mansfield PA-C  [facility]

## 2020-09-15 NOTE — TELEPHONE ENCOUNTER
Please call patient     Her hemoglobin is very low. This with her weight loss is concerning. We should get a CT scan right away. I also want her to come in today for more labs and we should get stool cultures as well.     Team please schedule her for CT scan today as well as a lab appointment here.     Donnie Mansfield PA-C  [facility]

## 2020-09-16 ENCOUNTER — MYC MEDICAL ADVICE (OUTPATIENT)
Dept: FAMILY MEDICINE | Facility: OTHER | Age: 40
End: 2020-09-16

## 2020-09-16 ENCOUNTER — HOSPITAL ENCOUNTER (INPATIENT)
Facility: CLINIC | Age: 40
LOS: 2 days | Discharge: LEFT BEFORE DISCHARGE | End: 2020-09-18
Attending: EMERGENCY MEDICINE | Admitting: INTERNAL MEDICINE
Payer: COMMERCIAL

## 2020-09-16 DIAGNOSIS — K92.2 GASTROINTESTINAL HEMORRHAGE, UNSPECIFIED GASTROINTESTINAL HEMORRHAGE TYPE: ICD-10-CM

## 2020-09-16 DIAGNOSIS — Z20.828 EXPOSURE TO SARS-ASSOCIATED CORONAVIRUS: ICD-10-CM

## 2020-09-16 LAB
ABO + RH BLD: NORMAL
ABO + RH BLD: NORMAL
ALBUMIN SERPL-MCNC: 3.1 G/DL (ref 3.4–5)
ALP SERPL-CCNC: 193 U/L (ref 40–150)
ALT SERPL W P-5'-P-CCNC: 47 U/L (ref 0–50)
ANION GAP SERPL CALCULATED.3IONS-SCNC: 11 MMOL/L (ref 3–14)
AST SERPL W P-5'-P-CCNC: 221 U/L (ref 0–45)
BASOPHILS # BLD AUTO: 0 10E9/L (ref 0–0.2)
BASOPHILS NFR BLD AUTO: 1.1 %
BILIRUB SERPL-MCNC: 0.8 MG/DL (ref 0.2–1.3)
BLD GP AB SCN SERPL QL: NORMAL
BLOOD BANK CMNT PATIENT-IMP: NORMAL
BUN SERPL-MCNC: 2 MG/DL (ref 7–30)
CALCIUM SERPL-MCNC: 7.1 MG/DL (ref 8.5–10.1)
CHLORIDE SERPL-SCNC: 97 MMOL/L (ref 94–109)
CK SERPL-CCNC: 82 U/L (ref 30–225)
CO2 SERPL-SCNC: 27 MMOL/L (ref 20–32)
CREAT SERPL-MCNC: 0.66 MG/DL (ref 0.52–1.04)
DIFFERENTIAL METHOD BLD: ABNORMAL
EOSINOPHIL NFR BLD AUTO: 1.4 %
ERYTHROCYTE [DISTWIDTH] IN BLOOD BY AUTOMATED COUNT: 17.7 % (ref 10–15)
GFR SERPL CREATININE-BSD FRML MDRD: >90 ML/MIN/{1.73_M2}
GLUCOSE SERPL-MCNC: 122 MG/DL (ref 70–99)
HCT VFR BLD AUTO: 27.6 % (ref 35–47)
HEMOCCULT STL QL: POSITIVE
HGB BLD-MCNC: 10.2 G/DL (ref 11.7–15.7)
HGB BLD-MCNC: 8.1 G/DL (ref 11.7–15.7)
IMM GRANULOCYTES # BLD: 0.1 10E9/L (ref 0–0.4)
IMM GRANULOCYTES NFR BLD: 1.4 %
LACTATE BLD-SCNC: 5.3 MMOL/L (ref 0.7–2)
LACTATE BLD-SCNC: 5.4 MMOL/L (ref 0.7–2)
LYMPHOCYTES # BLD AUTO: 1 10E9/L (ref 0.8–5.3)
LYMPHOCYTES NFR BLD AUTO: 29 %
MCH RBC QN AUTO: 42.1 PG (ref 26.5–33)
MCHC RBC AUTO-ENTMCNC: 37 G/DL (ref 31.5–36.5)
MCV RBC AUTO: 114 FL (ref 78–100)
MONOCYTES # BLD AUTO: 0.3 10E9/L (ref 0–1.3)
MONOCYTES NFR BLD AUTO: 8.6 %
NEUTROPHILS # BLD AUTO: 2.1 10E9/L (ref 1.6–8.3)
NEUTROPHILS NFR BLD AUTO: 58.5 %
NRBC # BLD AUTO: 0 10*3/UL
NRBC BLD AUTO-RTO: 0 /100
PLATELET # BLD AUTO: 134 10E9/L (ref 150–450)
POTASSIUM SERPL-SCNC: 2.4 MMOL/L (ref 3.4–5.3)
POTASSIUM SERPL-SCNC: 3.6 MMOL/L (ref 3.4–5.3)
PROT SERPL-MCNC: 6 G/DL (ref 6.8–8.8)
RBC # BLD AUTO: 2.42 10E12/L (ref 3.8–5.2)
SODIUM SERPL-SCNC: 135 MMOL/L (ref 133–144)
SPECIMEN EXP DATE BLD: NORMAL
WBC # BLD AUTO: 3.6 10E9/L (ref 4–11)

## 2020-09-16 PROCEDURE — 80053 COMPREHEN METABOLIC PANEL: CPT | Performed by: EMERGENCY MEDICINE

## 2020-09-16 PROCEDURE — 85018 HEMOGLOBIN: CPT | Performed by: INTERNAL MEDICINE

## 2020-09-16 PROCEDURE — 25000128 H RX IP 250 OP 636: Performed by: EMERGENCY MEDICINE

## 2020-09-16 PROCEDURE — 99223 1ST HOSP IP/OBS HIGH 75: CPT | Mod: AI | Performed by: INTERNAL MEDICINE

## 2020-09-16 PROCEDURE — 25800030 ZZH RX IP 258 OP 636: Performed by: EMERGENCY MEDICINE

## 2020-09-16 PROCEDURE — 96366 THER/PROPH/DIAG IV INF ADDON: CPT | Performed by: EMERGENCY MEDICINE

## 2020-09-16 PROCEDURE — 99285 EMERGENCY DEPT VISIT HI MDM: CPT | Mod: 25 | Performed by: EMERGENCY MEDICINE

## 2020-09-16 PROCEDURE — U0003 INFECTIOUS AGENT DETECTION BY NUCLEIC ACID (DNA OR RNA); SEVERE ACUTE RESPIRATORY SYNDROME CORONAVIRUS 2 (SARS-COV-2) (CORONAVIRUS DISEASE [COVID-19]), AMPLIFIED PROBE TECHNIQUE, MAKING USE OF HIGH THROUGHPUT TECHNOLOGIES AS DESCRIBED BY CMS-2020-01-R: HCPCS | Performed by: EMERGENCY MEDICINE

## 2020-09-16 PROCEDURE — 25000128 H RX IP 250 OP 636: Performed by: INTERNAL MEDICINE

## 2020-09-16 PROCEDURE — C9803 HOPD COVID-19 SPEC COLLECT: HCPCS | Performed by: EMERGENCY MEDICINE

## 2020-09-16 PROCEDURE — 99207 ZZC CDG-MDM COMPONENT: MEETS MODERATE - UP CODED: CPT | Performed by: INTERNAL MEDICINE

## 2020-09-16 PROCEDURE — 96375 TX/PRO/DX INJ NEW DRUG ADDON: CPT | Performed by: EMERGENCY MEDICINE

## 2020-09-16 PROCEDURE — 84132 ASSAY OF SERUM POTASSIUM: CPT | Performed by: INTERNAL MEDICINE

## 2020-09-16 PROCEDURE — C9113 INJ PANTOPRAZOLE SODIUM, VIA: HCPCS | Performed by: EMERGENCY MEDICINE

## 2020-09-16 PROCEDURE — 12000000 ZZH R&B MED SURG/OB

## 2020-09-16 PROCEDURE — 96365 THER/PROPH/DIAG IV INF INIT: CPT | Performed by: EMERGENCY MEDICINE

## 2020-09-16 PROCEDURE — 87506 IADNA-DNA/RNA PROBE TQ 6-11: CPT | Performed by: INTERNAL MEDICINE

## 2020-09-16 PROCEDURE — 93005 ELECTROCARDIOGRAM TRACING: CPT | Performed by: EMERGENCY MEDICINE

## 2020-09-16 PROCEDURE — 82550 ASSAY OF CK (CPK): CPT | Performed by: INTERNAL MEDICINE

## 2020-09-16 PROCEDURE — G0378 HOSPITAL OBSERVATION PER HR: HCPCS

## 2020-09-16 PROCEDURE — 86900 BLOOD TYPING SEROLOGIC ABO: CPT | Performed by: EMERGENCY MEDICINE

## 2020-09-16 PROCEDURE — 25800030 ZZH RX IP 258 OP 636: Performed by: INTERNAL MEDICINE

## 2020-09-16 PROCEDURE — 86850 RBC ANTIBODY SCREEN: CPT | Performed by: EMERGENCY MEDICINE

## 2020-09-16 PROCEDURE — 85025 COMPLETE CBC W/AUTO DIFF WBC: CPT | Performed by: EMERGENCY MEDICINE

## 2020-09-16 PROCEDURE — 82272 OCCULT BLD FECES 1-3 TESTS: CPT | Performed by: EMERGENCY MEDICINE

## 2020-09-16 PROCEDURE — 25000132 ZZH RX MED GY IP 250 OP 250 PS 637: Performed by: INTERNAL MEDICINE

## 2020-09-16 PROCEDURE — 36415 COLL VENOUS BLD VENIPUNCTURE: CPT | Performed by: INTERNAL MEDICINE

## 2020-09-16 PROCEDURE — 83605 ASSAY OF LACTIC ACID: CPT | Performed by: INTERNAL MEDICINE

## 2020-09-16 PROCEDURE — 86901 BLOOD TYPING SEROLOGIC RH(D): CPT | Performed by: EMERGENCY MEDICINE

## 2020-09-16 PROCEDURE — 93010 ELECTROCARDIOGRAM REPORT: CPT | Mod: Z6 | Performed by: EMERGENCY MEDICINE

## 2020-09-16 RX ORDER — NALTREXONE HYDROCHLORIDE 50 MG/1
50 TABLET, FILM COATED ORAL AT BEDTIME
Status: DISCONTINUED | OUTPATIENT
Start: 2020-09-16 | End: 2020-09-18 | Stop reason: HOSPADM

## 2020-09-16 RX ORDER — POTASSIUM CHLORIDE 1500 MG/1
20-40 TABLET, EXTENDED RELEASE ORAL
Status: DISCONTINUED | OUTPATIENT
Start: 2020-09-16 | End: 2020-09-17

## 2020-09-16 RX ORDER — ALUMINA, MAGNESIA, AND SIMETHICONE 2400; 2400; 240 MG/30ML; MG/30ML; MG/30ML
30 SUSPENSION ORAL EVERY 4 HOURS PRN
Status: DISCONTINUED | OUTPATIENT
Start: 2020-09-16 | End: 2020-09-18 | Stop reason: HOSPADM

## 2020-09-16 RX ORDER — POTASSIUM CL/LIDO/0.9 % NACL 10MEQ/0.1L
10 INTRAVENOUS SOLUTION, PIGGYBACK (ML) INTRAVENOUS
Status: DISCONTINUED | OUTPATIENT
Start: 2020-09-16 | End: 2020-09-16

## 2020-09-16 RX ORDER — METHYLPHENIDATE HYDROCHLORIDE 5 MG/1
10 TABLET ORAL 2 TIMES DAILY
Status: DISCONTINUED | OUTPATIENT
Start: 2020-09-17 | End: 2020-09-18 | Stop reason: HOSPADM

## 2020-09-16 RX ORDER — POTASSIUM CHLORIDE 7.45 MG/ML
10 INJECTION INTRAVENOUS
Status: DISCONTINUED | OUTPATIENT
Start: 2020-09-16 | End: 2020-09-18 | Stop reason: HOSPADM

## 2020-09-16 RX ORDER — POTASSIUM CL/LIDO/0.9 % NACL 10MEQ/0.1L
10 INTRAVENOUS SOLUTION, PIGGYBACK (ML) INTRAVENOUS
Status: DISCONTINUED | OUTPATIENT
Start: 2020-09-16 | End: 2020-09-18 | Stop reason: HOSPADM

## 2020-09-16 RX ORDER — NALOXONE HYDROCHLORIDE 0.4 MG/ML
.1-.4 INJECTION, SOLUTION INTRAMUSCULAR; INTRAVENOUS; SUBCUTANEOUS
Status: DISCONTINUED | OUTPATIENT
Start: 2020-09-16 | End: 2020-09-18 | Stop reason: HOSPADM

## 2020-09-16 RX ORDER — PANTOPRAZOLE SODIUM 40 MG/1
40 TABLET, DELAYED RELEASE ORAL DAILY
Status: DISCONTINUED | OUTPATIENT
Start: 2020-09-16 | End: 2020-09-18 | Stop reason: HOSPADM

## 2020-09-16 RX ORDER — SODIUM CHLORIDE, SODIUM LACTATE, POTASSIUM CHLORIDE, CALCIUM CHLORIDE 600; 310; 30; 20 MG/100ML; MG/100ML; MG/100ML; MG/100ML
INJECTION, SOLUTION INTRAVENOUS CONTINUOUS
Status: DISCONTINUED | OUTPATIENT
Start: 2020-09-16 | End: 2020-09-18 | Stop reason: HOSPADM

## 2020-09-16 RX ORDER — CYCLOBENZAPRINE HCL 10 MG
10 TABLET ORAL 3 TIMES DAILY PRN
Status: DISCONTINUED | OUTPATIENT
Start: 2020-09-16 | End: 2020-09-18 | Stop reason: HOSPADM

## 2020-09-16 RX ORDER — ACETAMINOPHEN 325 MG/1
650 TABLET ORAL EVERY 4 HOURS PRN
Status: DISCONTINUED | OUTPATIENT
Start: 2020-09-16 | End: 2020-09-18 | Stop reason: HOSPADM

## 2020-09-16 RX ORDER — POTASSIUM CHLORIDE 29.8 MG/ML
20 INJECTION INTRAVENOUS
Status: DISCONTINUED | OUTPATIENT
Start: 2020-09-16 | End: 2020-09-16 | Stop reason: CLARIF

## 2020-09-16 RX ORDER — FLUTICASONE PROPIONATE 50 MCG
2 SPRAY, SUSPENSION (ML) NASAL DAILY
Status: DISCONTINUED | OUTPATIENT
Start: 2020-09-16 | End: 2020-09-18 | Stop reason: HOSPADM

## 2020-09-16 RX ORDER — POTASSIUM CHLORIDE 1500 MG/1
20-40 TABLET, EXTENDED RELEASE ORAL
Status: DISCONTINUED | OUTPATIENT
Start: 2020-09-16 | End: 2020-09-18 | Stop reason: HOSPADM

## 2020-09-16 RX ORDER — ONDANSETRON 4 MG/1
4 TABLET, ORALLY DISINTEGRATING ORAL EVERY 8 HOURS PRN
Status: DISCONTINUED | OUTPATIENT
Start: 2020-09-16 | End: 2020-09-18 | Stop reason: HOSPADM

## 2020-09-16 RX ORDER — POTASSIUM CHLORIDE 7.45 MG/ML
10 INJECTION INTRAVENOUS
Status: DISCONTINUED | OUTPATIENT
Start: 2020-09-16 | End: 2020-09-16

## 2020-09-16 RX ORDER — POTASSIUM CHLORIDE 1.5 G/1.58G
20-40 POWDER, FOR SOLUTION ORAL
Status: DISCONTINUED | OUTPATIENT
Start: 2020-09-16 | End: 2020-09-17

## 2020-09-16 RX ORDER — NALTREXONE HYDROCHLORIDE 50 MG/1
50 TABLET, FILM COATED ORAL DAILY
Status: DISCONTINUED | OUTPATIENT
Start: 2020-09-16 | End: 2020-09-16

## 2020-09-16 RX ORDER — POTASSIUM CHLORIDE 1.5 G/1.58G
20-40 POWDER, FOR SOLUTION ORAL
Status: DISCONTINUED | OUTPATIENT
Start: 2020-09-16 | End: 2020-09-18 | Stop reason: HOSPADM

## 2020-09-16 RX ORDER — BUSPIRONE HYDROCHLORIDE 5 MG/1
5 TABLET ORAL 3 TIMES DAILY PRN
Status: DISCONTINUED | OUTPATIENT
Start: 2020-09-16 | End: 2020-09-18 | Stop reason: HOSPADM

## 2020-09-16 RX ORDER — METOPROLOL SUCCINATE 50 MG/1
50 TABLET, EXTENDED RELEASE ORAL DAILY
Status: DISCONTINUED | OUTPATIENT
Start: 2020-09-16 | End: 2020-09-18 | Stop reason: HOSPADM

## 2020-09-16 RX ORDER — NICOTINE 21 MG/24HR
1 PATCH, TRANSDERMAL 24 HOURS TRANSDERMAL EVERY 24 HOURS
Status: DISCONTINUED | OUTPATIENT
Start: 2020-09-16 | End: 2020-09-18 | Stop reason: HOSPADM

## 2020-09-16 RX ORDER — POTASSIUM CHLORIDE 29.8 MG/ML
20 INJECTION INTRAVENOUS
Status: DISCONTINUED | OUTPATIENT
Start: 2020-09-16 | End: 2020-09-18 | Stop reason: HOSPADM

## 2020-09-16 RX ORDER — LIDOCAINE 40 MG/G
CREAM TOPICAL
Status: DISCONTINUED | OUTPATIENT
Start: 2020-09-16 | End: 2020-09-18 | Stop reason: HOSPADM

## 2020-09-16 RX ORDER — POTASSIUM CHLORIDE 7.45 MG/ML
10 INJECTION INTRAVENOUS
Status: DISCONTINUED | OUTPATIENT
Start: 2020-09-16 | End: 2020-09-17

## 2020-09-16 RX ORDER — POTASSIUM CL/LIDO/0.9 % NACL 10MEQ/0.1L
10 INTRAVENOUS SOLUTION, PIGGYBACK (ML) INTRAVENOUS
Status: DISCONTINUED | OUTPATIENT
Start: 2020-09-16 | End: 2020-09-17

## 2020-09-16 RX ORDER — MAGNESIUM SULFATE HEPTAHYDRATE 40 MG/ML
4 INJECTION, SOLUTION INTRAVENOUS EVERY 4 HOURS PRN
Status: DISCONTINUED | OUTPATIENT
Start: 2020-09-16 | End: 2020-09-18 | Stop reason: HOSPADM

## 2020-09-16 RX ADMIN — SODIUM CHLORIDE, POTASSIUM CHLORIDE, SODIUM LACTATE AND CALCIUM CHLORIDE 1000 ML: 600; 310; 30; 20 INJECTION, SOLUTION INTRAVENOUS at 16:12

## 2020-09-16 RX ADMIN — SODIUM CHLORIDE, POTASSIUM CHLORIDE, SODIUM LACTATE AND CALCIUM CHLORIDE: 600; 310; 30; 20 INJECTION, SOLUTION INTRAVENOUS at 16:55

## 2020-09-16 RX ADMIN — SODIUM CHLORIDE 1000 ML: 9 INJECTION, SOLUTION INTRAVENOUS at 10:40

## 2020-09-16 RX ADMIN — ALUMINUM HYDROXIDE, MAGNESIUM HYDROXIDE, AND DIMETHICONE 30 ML: 400; 400; 40 SUSPENSION ORAL at 20:19

## 2020-09-16 RX ADMIN — FLUTICASONE PROPIONATE 2 SPRAY: 50 SPRAY, METERED NASAL at 17:01

## 2020-09-16 RX ADMIN — Medication 10 MEQ: at 12:04

## 2020-09-16 RX ADMIN — NALTREXONE HYDROCHLORIDE 50 MG: 50 TABLET, FILM COATED ORAL at 20:34

## 2020-09-16 RX ADMIN — Medication 10 MEQ: at 13:10

## 2020-09-16 RX ADMIN — METOPROLOL SUCCINATE 50 MG: 50 TABLET, EXTENDED RELEASE ORAL at 17:01

## 2020-09-16 RX ADMIN — Medication 10 MEQ: at 11:03

## 2020-09-16 RX ADMIN — ACETAMINOPHEN 650 MG: 325 TABLET, FILM COATED ORAL at 16:16

## 2020-09-16 RX ADMIN — Medication 10 MEQ: at 16:12

## 2020-09-16 RX ADMIN — Medication 10 MEQ: at 17:11

## 2020-09-16 RX ADMIN — ONDANSETRON 4 MG: 4 TABLET, ORALLY DISINTEGRATING ORAL at 16:16

## 2020-09-16 RX ADMIN — NICOTINE 1 PATCH: 21 PATCH TRANSDERMAL at 17:01

## 2020-09-16 RX ADMIN — PANTOPRAZOLE SODIUM 40 MG: 40 TABLET, DELAYED RELEASE ORAL at 17:01

## 2020-09-16 RX ADMIN — PANTOPRAZOLE SODIUM 40 MG: 40 INJECTION, POWDER, FOR SOLUTION INTRAVENOUS at 11:00

## 2020-09-16 RX ADMIN — Medication 10 MEQ: at 14:17

## 2020-09-16 ASSESSMENT — ACTIVITIES OF DAILY LIVING (ADL)
ADLS_ACUITY_SCORE: 11
ADLS_ACUITY_SCORE: 13

## 2020-09-16 NOTE — PROGRESS NOTES
Hospitalist Addendum    Sepsis Evaluation Progress Note    I was called to see Tasha Short due to abnormal vital signs triggering the Sepsis SIRS screening alert. She is not known to have an infection.     Physical Exam   Vital Signs:  Temp: 98.9  F (37.2  C) Temp src: Oral BP: (!) 138/96 Pulse: 107   Resp: 17 SpO2: 100 % O2 Device: None (Room air)    @[facility]([id number],1,,1)@  General: in no acute distress  Mental Status: AAOx4.     Remainder of physical exam is significant for paleness and mild luq pain and back pain.     Data   Lactic Acid   Date Value Ref Range Status   09/16/2020 5.4 (HH) 0.7 - 2.0 mmol/L Final     Comment:     Critical Value called to and read back by  HERIBERTO BRANDT ON [facility]G ON 9.16.2020 AT 1817 SC     02/19/2016 1.3 0.7 - 2.1 mmol/L Final     Lactate for Sepsis Protocol   Date Value Ref Range Status   09/16/2020 5.3 (HH) 0.7 - 2.0 mmol/L Final     Comment:     Critical Value called to and read back by  HERIBERTO BRANDT ON MED SURG ON 9.16.2020 AT 1541 SC         Assessment & Plan   NO EVIDENCE OF SEPSIS at this time.  Vital sign, physical exam, and lab findings are likely due to anemia and GI bleeding..    Disposition: The patient will remain on the current unit. We will continue to monitor this patient closely.. Could be related to alcohol although patient denies use.  Will check ck.  Rene Serna MD    Sepsis Criteria   Sepsis: 2+ SIRS criteria due to infection  Severe Sepsis: Sepsis AND 1+ new sign of acute organ dysfunction (Note: lactate >2 or acute encephalopathy each qualify as organ dysfunction)  Septic Shock: Sepsis AND hypotension despite volume resuscitation with 30 ml/kg crystalloid or lactate >=4  Note: HYPOTENSION is defined as 2 BP readings measured 3 hrs apart that have a SBP <90, MAP <65, or decrease >40 mmHg, occurring 6 hrs before or after t-zero

## 2020-09-16 NOTE — ED PROVIDER NOTES
History     Chief Complaint   Patient presents with     Abnormal Labs     HPI  Tasha Short is a 39 year old female who presents from home after being called by her clinic to present after some abnormal labs.  Her potassium is 2.4 and hemoglobin is 9.  She has had some dark loose stools for 3 weeks duration.  She does take Aleve, 2 pills twice a day for a right foot injury.  She has no history of previous GI bleeds.  She does not have any abdominal discomfort with eating.  She has felt short of air with any activity.  No recent fever or cough.  No history of hypokalemia.    Allergies:  Allergies   Allergen Reactions     Bupropion Other (See Comments)     seizures     Lisinopril Swelling     Angioedema      Penicillins      hives     Phenytoin      rash,puritis (Dilantin)     Prednisone Itching     Face itching, unable to concentrate      Seasonal Allergies        Problem List:    Patient Active Problem List    Diagnosis Date Noted     Health Care Home 2011     Priority: High     x  DX V65.8 REPLACED WITH [facility] CARE HOME (2013)       Weight loss 09/15/2020     Priority: Medium     Anemia, unspecified type 09/15/2020     Priority: Medium     Hemoglobin decreased 09/15/2020     Priority: Medium     Closed displaced fracture of metatarsal bone of right foot with delayed healing, unspecified metatarsal, subsequent encounter 2020     Priority: Medium     Opiate addiction (H) 09/10/2020     Priority: Medium     Alcohol withdrawal seizure (H) 09/10/2020     Priority: Medium     Encounter for surveillance of injectable contraceptive 2019     Priority: Medium     Alcohol dependence in remission (H) 2019     Priority: Medium     Hypertriglyceridemia 2018     Priority: Medium     Chronic seasonal allergic rhinitis due to pollen 2018     Priority: Medium     Pre-eclampsia in third trimester 2017     Priority: Medium     Delivery by  section for breech  presentation 11/28/2017     Priority: Medium     Gastroesophageal reflux disease without esophagitis 06/19/2017     Priority: Medium     H/O ETOH abuse 05/15/2017     Priority: Medium     Sprain of right shoulder 10/20/2016     Priority: Medium     Right hip pain 10/20/2016     Priority: Medium     Narcolepsy 10/20/2016     Priority: Medium     Alcohol-induced mood disorder (H) 08/04/2016     Priority: Medium     Irregular heartbeat 04/18/2016     Priority: Medium     Tobacco use disorder 04/18/2016     Priority: Medium     Acute coronary syndrome (H) 04/14/2016     Priority: Medium     HTN, goal below 140/90 11/24/2014     Priority: Medium     Major depressive disorder, recurrent episode, moderate (H) 10/22/2014     Priority: Medium     Generalized anxiety disorder 10/22/2014     Priority: Medium     Hypersomnia 04/12/2013     Priority: Medium     Calcific tendonitis peroneals 06/21/2012     Priority: Medium     Narcolepsy and cataplexy      Priority: Medium     S/P LEEP 11/17/2010     Priority: Medium     12/14/09 ASCUS + HPV 18, 53, 58 (high risk)  1/13/10 colposcopy- bx (dysplasia) ECC (negative) recommend repeat pap at 6 and 12 months  6/28/10 pap ASCUS + HPV 16 (high risk) recommend colpo  7/21/10 colposcopy- BX ( ROSA 2/3 with gland involvement)  8/2/10 clinic appt to discuss LEEP  10/26/10 tele enc: LEEP scheduled for 11/4/10  11/4/10 LEEP- (ROSA 2/3) not extending to margins.  ECC (negative). Plan: pap in 6 mo.   5/9/11- NIL pap. Plan: pap in 6 mo  10/8/13 NIL pap, neg HR HPV. Plan: pap in 3 years.  5/22/15 NIL pap, neg HR HPV. Plan: pap in 3 years.  4/2/19 NIL pap, + HR HPV (not 16 or 18). Plan: cotest in 1 yr.  4/8/19 left msg   CCT tracking.               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     REPAIR TENDON PERONEAL  11/15/2013    Procedure: REPAIR TENDON PERONEAL;  right peroneal tendon  transfer;  Surgeon: Jesus Manuel Oden DPM;  Location: [facility] OR       Family History:    Family History   Problem Relation Age of Onset     Depression Mother      Arthritis Paternal Grandmother      Hypertension Paternal Grandfather         birth FF     Asthma No family hx of      C.A.D. No family hx of      Diabetes No family hx of      Cancer - colorectal No family hx of      Prostate Cancer No family hx of      Coronary Artery Disease No family hx of      Ovarian Cancer No family hx of      Mental Illness No family hx of      Cerebrovascular Disease No family hx of      Anesthesia Reaction No family hx of      Other Cancer No family hx of      Osteoporosis No family hx of      Known Genetic Syndrome No family hx of      Obesity No family hx of      Unknown/Adopted No family hx of        Social History:  Marital Status:  Single [1]  Social History     Tobacco Use     Smoking status: Current Every Day Smoker     Packs/day: 0.25     Smokeless tobacco: Never Used   Substance Use Topics     Alcohol use: No     Drug use: No        Medications:    amLODIPine (NORVASC) 10 MG tablet  busPIRone (BUSPAR) 5 MG tablet  cyclobenzaprine (FLEXERIL) 10 MG tablet  EPINEPHrine (ANY BX GENERIC EQUIV) 0.3 MG/0.3ML injection 2-pack  flunisolide (NASALIDE) 25 MCG/ACT (0.025%) SOLN spray  lithium ER (LITHOBID) 300 MG CR  tablet  methylphenidate (RITALIN) 10 MG tablet  metoprolol succinate ER (TOPROL-XL) 50 MG 24 hr tablet  naltrexone (DEPADE/REVIA) 50 MG tablet  naproxen (NAPROSYN) 500 MG tablet  nicotine (NICODERM CQ) 21 MG/24HR 24 hr patch  nicotine (NICORETTE) 2 MG gum  ondansetron (ZOFRAN) 4 MG tablet  Prenatal 27-1 MG TABS          Review of Systems  All other systems are reviewed and are negative    Physical Exam   BP: (!) 119/93  Pulse: 115  Temp: 98.4  F (36.9  C)  Resp: 18  Weight: 45.4 kg (100 lb)  SpO2: 100 %      Physical Exam  Vitals signs and nursing note reviewed.   Constitutional:       General: She is not in acute distress.     Appearance: She is well-developed. She is not diaphoretic.   HENT:      Head: Normocephalic and atraumatic.   Eyes:      General: No scleral icterus.     Pupils: Pupils are equal, round, and reactive to light.   Neck:      Musculoskeletal: Normal range of motion and neck supple.   Cardiovascular:      Rate and Rhythm: Regular rhythm. Tachycardia present.      Heart sounds: Normal heart sounds. No murmur.   Pulmonary:      Effort: No respiratory distress.      Breath sounds: No stridor. No wheezing or rales.   Abdominal:      Palpations: Abdomen is soft.      Tenderness: There is abdominal tenderness (mild) in the epigastric area. There is no guarding or rebound.   Genitourinary:     Rectum: Normal. No tenderness or external hemorrhoid.      Comments: Small amount of brown stool on rectal exam  Musculoskeletal:         General: No tenderness.   Skin:     General: Skin is warm and dry.      Coloration: Skin is not pale.      Findings: No erythema or rash.   Neurological:      Mental Status: She is alert.         ED Course        Procedures          EKG: Normal sinus rhythm, normal axis.  Rate of 90 beats per minute.  No acute ST or T wave changes.  Interpreted by myself.                      Results for orders placed or performed during the hospital encounter of 09/16/20 (from the past 24  hour(s))   CBC with platelets differential   Result Value Ref Range    WBC 3.6 (L) 4.0 - 11.0 10e9/L    RBC Count 2.42 (L) 3.8 - 5.2 10e12/L    Hemoglobin 10.2 (L) 11.7 - 15.7 g/dL    Hematocrit 27.6 (L) 35.0 - 47.0 %     (H) 78 - 100 fl    MCH 42.1 (H) 26.5 - 33.0 pg    MCHC 37.0 (H) 31.5 - 36.5 g/dL    RDW 17.7 (H) 10.0 - 15.0 %    Platelet Count 134 (L) 150 - 450 10e9/L    Diff Method Automated Method     % Neutrophils 58.5 %    % Lymphocytes 29.0 %    % Monocytes 8.6 %    % Eosinophils 1.4 %    % Basophils 1.1 %    % Immature Granulocytes 1.4 %    Nucleated RBCs 0 0 /100    Absolute Neutrophil 2.1 1.6 - 8.3 10e9/L    Absolute Lymphocytes 1.0 0.8 - 5.3 10e9/L    Absolute Monocytes 0.3 0.0 - 1.3 10e9/L    Absolute Basophils 0.0 0.0 - 0.2 10e9/L    Abs Immature Granulocytes 0.1 0 - 0.4 10e9/L    Absolute Nucleated RBC 0.0    Comprehensive metabolic panel   Result Value Ref Range    Sodium 135 133 - 144 mmol/L    Potassium 2.4 (LL) 3.4 - 5.3 mmol/L    Chloride 97 94 - 109 mmol/L    Carbon Dioxide 27 20 - 32 mmol/L    Anion Gap 11 3 - 14 mmol/L    Glucose 122 (H) 70 - 99 mg/dL    Urea Nitrogen 2 (L) 7 - 30 mg/dL    Creatinine 0.66 0.52 - 1.04 mg/dL    GFR Estimate >90 >60 mL/min/[1.73_m2]    GFR Estimate If Black >90 >60 mL/min/[1.73_m2]    Calcium 7.1 (L) 8.5 - 10.1 mg/dL    Bilirubin Total 0.8 0.2 - 1.3 mg/dL    Albumin 3.1 (L) 3.4 - 5.0 g/dL    Protein Total 6.0 (L) 6.8 - 8.8 g/dL    Alkaline Phosphatase 193 (H) 40 - 150 U/L    ALT 47 0 - 50 U/L     (H) 0 - 45 U/L   ABO/Rh type and screen   Result Value Ref Range    ABO B     RH(D) Pos     Antibody Screen Neg     Test Valid Only At [facility]        Specimen Expires 09/19/2020    Occult blood stool   Result Value Ref Range    Occult Blood Positive (A) NEG^Negative       Medications   potassium chloride 10 mEq in 100 mL sterile water intermittent infusion (premix) (has no administration in time range)   potassium chloride 10 mEq in  100 mL intermittent infusion with 10 mg lidocaine (10 mEq Intravenous New Bag 9/16/20 1310)   0.9% sodium chloride BOLUS (0 mLs Intravenous Stopped 9/16/20 1140)   pantoprazole (PROTONIX) 40 mg IV push injection (40 mg Intravenous Given 9/16/20 1100)       Assessments & Plan (with Medical Decision Making)  39-year-old female with GI bleed and hypokalemia with potassium of 2.4.  Suspect GI bleed is related to NSAID use and multifactorial otherwise.  She uses Aleve 2 pills twice a day.  Hemoglobin of 10.4 and Hemoccult positive.  She has been having ongoing loose dark stools.  She has been dyspneic over the last couple of weeks and suspect this is due to her anemia.  She is typed and screened.  Potassium replacement protocol has been initiated in the emergency department.  She was given IV pantoprazole.  I did discuss the case with GI, Dr. Pelayo.  He was not available to scope her today or tomorrow but would be available here Friday if needed.  I also discussed the case with our hospitalist, Dr. Serna.  He accepted her for admission here to telemetry.     I have reviewed the nursing notes.    I have reviewed the findings, diagnosis, plan and need for follow up with the patient.       New Prescriptions    No medications on file       Final diagnoses:   Gastrointestinal hemorrhage, unspecified gastrointestinal hemorrhage type       9/16/2020   [facility] EMERGENCY DEPARTMENT     Gary Orozco MD  09/16/20 [id number]

## 2020-09-16 NOTE — H&P
[facility]    History and Physical  Hospitalist       Date of Admission:  9/16/2020  Date of Service (when I saw the patient): 09/16/20    Assessment & Plan   Tasha Short is a 39 year old female with a past medical history of anemia, opioid addiction, closed displaced fracture of right metatarsal with delayed healing, history of alcohol dependence in remission, GERD, tobacco use disorder, hypertension, depression, narcolepsy and cataplexy who was advised to come to the ED for abnormal labs.  Patient has been having diarrhea and has been feeling more tired.  Potassium found to be 2.4 and hemoglobin of 9.    Summary:      Acute GI bleeding and acute blood loss anemia: Hemoglobin of 10.2 on recheck with platelets of 134,000.  Indices include an MCV of 114.  With iron deficiency would expect this to be lower however patient may have an elevated retake count with premature cells.  Patient denies alcohol use.  Has been taking Aleve for several months.  Have concerned about NSAID induced gastritis or ulceration.    Will initiate Protonix    Clear liquid diet    IV fluids    Iron studies    B12 and folic acid    Evaluate for GI consult for upper endoscopy tomorrow.    Serial hemoglobins, transfuse if hemoglobin less than 7 or if actively bleeding less than 8.        Hypokalemia:   Patient on no meds that would typically cause low potassium.  Suspect this is due to her increased stooling of 4 times per day.  No over-the-counter use of meds or laxatives.    Replace potassium per protocol    Check magnesium and replace          Elevated Lactate:  No metabolic acidosis and not sepsis.      Give IV bolus and IV fluids repeat lactic acid.  Suspect due to GI bleeding        Fracture right foot:    Stop Aleve    Tylenol for pain     Hypertension    On no meds will follow    DVT Prophylaxis: Pneumatic Compression Devices  Code Status: Full Code    Disposition Plan   Expected discharge in  2 days to prior living arrangement once hemoglobin stable and no bleeding.     Entered: Rene Serna 09/16/2020, 4:15 PM           Rene Serna MD    Primary Care Physician   [place]    Chief Complaint   Weakness. Abnormal labs.  Diarrhea for 3 weeks.    History is obtained from the patient, electronic health record and emergency department physician    History of Present Illness   Tasha Short is a 39 year old female with a past medical history of anemia, opioid addiction, closed displaced fracture of right metatarsal with delayed healing, history of alcohol dependence in remission, GERD, tobacco use disorder, hypertension, depression, narcolepsy and cataplexy who was advised to come to the ED for abnormal labs.  Patient has been having diarrhea and has been feeling more tired.  Potassium found to be 2.4 and hemoglobin of 9.   Patient has been having diarrhea for the last 3 weeks.  4 times a day and is loose to semi-formed.  She states the stool was very dark brown almost black.  No bright red blood.  No fever sweats chills or recent travel.  No recent antibiotics.  She states she has some lower back pain which is been ongoing.  She has been taking Aleve twice a day for almost 3 months for her foot fracture.  She denies vomiting but does have some occasional nausea.  No shortness of breath no chest pain.  Mild abdominal discomfort in just below the xiphoid.    Past Medical History    I have reviewed this patient's medical history and updated it with pertinent information if needed.   Past Medical History:   Diagnosis Date     ALCOHOL ABUSE-IN REMISS 7/30/2007     Cataplexy and narcolepsy 2002    tried Provigil, Straterra, Ritalin (works)     ROSA 3 - cervical intraepithelial neoplasia grade 3 1/4/10    ROSA 2/3  on LEEP biopsy     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 02/19/2001     Hypersomnia with sleep apnea      Irregular menstrual cycle  1997     Metrorrhagia 2001     Other acne      Other and unspecified adverse effect of drug, medicinal and biological substance 2001    Allergic and adverse reaction to Dilantin     Substance abuse (H) 10/2/2009    see 2009 confidential report       Past Surgical History   I have reviewed this patient's surgical history and updated it with pertinent information if needed.  Past Surgical History:   Procedure Laterality Date      SECTION       COLPOSCOPY CERVIX, LOOP ELECTRODE BIOPSY, COMBINED  2010    ROSA 2/3     HC REMOVAL OF TONSILS,12+ Y/O      Tonsils 12+y.o.     REPAIR TENDON PERONEAL  11/15/2013    Procedure: REPAIR TENDON PERONEAL;  right peroneal tendon  transfer;  Surgeon: Jesus Manuel Oden DPM;  Location: [facility] OR       Prior to Admission Medications   Prior to Admission Medications   Prescriptions Last Dose Informant Patient Reported? Taking?   EPINEPHrine (ANY BX GENERIC EQUIV) 0.3 MG/0.3ML injection 2-pack   Yes No   Sig: Inject 0.3 mg into the muscle   Prenatal 27-1 MG TABS Past Week at Unknown time  Yes Yes   busPIRone (BUSPAR) 5 MG tablet Past Month at Unknown time  No Yes   Sig: Take 1 tablet (5 mg) by mouth 3 times daily as needed   cyclobenzaprine (FLEXERIL) 10 MG tablet 9/15/2020 at Unknown time  No Yes   Sig: Take 1 tablet (10 mg) by mouth 3 times daily as needed for muscle spasms   flunisolide (NASALIDE) 25 MCG/ACT (0.025%) SOLN spray Past Month at Unknown time  No Yes   Sig: Spray 2 sprays into both nostrils every 12 hours   lithium ER (LITHOBID) 300 MG CR tablet 9/15/2020 at evening  Yes Yes   methylphenidate (RITALIN) 10 MG tablet 9/15/2020 at morning  No Yes   Sig: Take 1 tablet (10 mg) by mouth 2 times daily   metoprolol succinate ER (TOPROL-XL) 50 MG 24 hr tablet 9/15/2020 at morning  No Yes   Sig: Take 1 tablet (50 mg) by mouth daily   naltrexone (DEPADE/REVIA) 50 MG tablet 9/15/2020 at evening  Yes Yes   naproxen (NAPROSYN) 500 MG tablet 9/15/2020 at  morning  Yes Yes   Sig: Aleeve   nicotine (NICODERM CQ) 21 MG/24HR 24 hr patch   No No   Sig: Place 1 patch onto the skin every 24 hours   nicotine (NICORETTE) 2 MG gum Past Month at Unknown time  No Yes   Sig: Place 1 each (2 mg) inside cheek as needed for smoking cessation   ondansetron (ZOFRAN) 4 MG tablet Unknown at Unknown time  No No   Sig: Take 1 tablet (4 mg) by mouth every 8 hours as needed for nausea      Facility-Administered Medications: None     Allergies   Allergies   Allergen Reactions     Bupropion Other (See Comments)     seizures     Lisinopril Swelling     Angioedema      Penicillins      hives     Phenytoin      rash,puritis (Dilantin)     Prednisone Itching     Face itching, unable to concentrate      Seasonal Allergies        Social History   I have reviewed this patient's social history and updated it with pertinent information if needed. Tasha Short  reports that she has been smoking. She has been smoking about 0.25 packs per day. She has never used smokeless tobacco. She reports that she does not drink alcohol or use drugs.    Family History   I have reviewed this patient's family history and updated it with pertinent information if needed.   Family History   Problem Relation Age of Onset     Depression Mother      Arthritis Paternal Grandmother      Hypertension Paternal Grandfather         birth FF     Asthma No family hx of      C.A.D. No family hx of      Diabetes No family hx of      Cancer - colorectal No family hx of      Prostate Cancer No family hx of      Coronary Artery Disease No family hx of      Ovarian Cancer No family hx of      Mental Illness No family hx of      Cerebrovascular Disease No family hx of      Anesthesia Reaction No family hx of      Other Cancer No family hx of      Osteoporosis No family hx of      Known Genetic Syndrome No family hx of      Obesity No family hx of      Unknown/Adopted No family hx of        Review of Systems   The 10 point Review of  Systems is negative other than noted in the HPI or here.     Physical Exam   Temp: 99.5  F (37.5  C) Temp src: Oral BP: 107/69 Pulse: 112   Resp: 17 SpO2: 99 % O2 Device: None (Room air)    Vital Signs with Ranges  Temp:  [98.4  F (36.9  C)-99.5  F (37.5  C)] 99.5  F (37.5  C)  Pulse:  [] 112  Resp:  [11-22] 17  BP: (107-125)/(69-96) 107/69  SpO2:  [99 %-100 %] 99 %  100 lbs 0 oz    Constitutional: Awake, alert, cooperative, no apparent distress, slightly pale in appearance.  Good capillary refill  HEENT: Moist mucous membranes, throat normal, Conjunctiva and pupils normal.  Respiratory: Clear to auscultation bilaterally, no crackles or wheezing.  Cardiovascular: Regular rate and rhythm, normal S1 and S2, and no murmur noted.  GI: Soft, non-distended, non-tender, normal bowel sounds, no hepatosplenomegaly.  Slight tenderness in the left upper quadrant and subxiphoid area.  See ED exam for rectal, heme positive  Skin: No rashes, no cyanosis, no edema.  Musculoskeletal: No joint swelling, erythema or tenderness.  Neurologic: Alert, oriented to person, place and time, Cranial nerves 2-12 intact, normal strength and sensation.  Psychiatric:  no obvious anxiety or depression. Normal affect    Data   Data reviewed today:  I personally reviewed no images or EKG's today.  Recent Labs   Lab 09/16/20  1003 09/14/20  1747   WBC 3.6* 4.0   HGB 10.2* 9.4*   * 116*   * 156    131*   POTASSIUM 2.4* 2.4*   CHLORIDE 97 94   CO2 27 27   BUN 2* 5*   CR 0.66 0.99   ANIONGAP 11 10   JANET 7.1* 7.3*   * 141*   ALBUMIN 3.1* 3.1*   PROTTOTAL 6.0* 5.7*   BILITOTAL 0.8 0.8   ALKPHOS 193* 189*   ALT 47 46   * 213*       Imaging:  No results found for this or any previous visit (from the past 24 hour(s)).

## 2020-09-16 NOTE — ED NOTES
ED Nursing criteria listed below was addressed during verbal handoff:     Abnormal vitals: No  Abnormal results: Yes  Med Reconciliation completed: Yes  Meds given in ED: Yes  Any Overdue Meds: No  Core Measures: No  Isolation: No  Special needs: No  Skin assessment: Yes No issues    Observation Patient  Education provided: Yes

## 2020-09-16 NOTE — ED NOTES
Patient reports she can't stay in hospital due to being a single mom with a young daughter.  Provider notified.

## 2020-09-16 NOTE — PLAN OF CARE
S-(situation): Patient arrives to room 270 via cart from ED    B-(background): anemia, hypokalemia     A-(assessment):HR 's, temp 99.5 oral, bowel sounds active, lung sounds clear throughout, denies any nausea    R-(recommendations): Orders reviewed with patient. Will monitor patient per MD orders.     Inpatient nursing criteria listed below were met:    Health care directives status obtained and documented: Yes  SCD's Documented: Yes  Skin issues/needs documented:NA  Isolation addressed and Signage used: NA  Fall Prevention: Care plan updated, Education given and documented NA  Care Plan initiated: Yes  Education Assessment documented:Yes  Education Documented (Pre-existing chronic infection such as, MRSA/VRE need education on admission): Yes  Allergies Reviewed: Yes  Admission Medication Reconciliation completed: Yes  New medication patient education completed and documented (Possible Side Effects of Common Medications handout): Yes  Home medications if not able to send immediately home with family stored here: NA  Reminder note placed in discharge instructions: NA  Discharge planning review completed (admission navigator) Yes

## 2020-09-17 ENCOUNTER — APPOINTMENT (OUTPATIENT)
Dept: ULTRASOUND IMAGING | Facility: CLINIC | Age: 40
End: 2020-09-17
Attending: INTERNAL MEDICINE
Payer: COMMERCIAL

## 2020-09-17 ENCOUNTER — APPOINTMENT (OUTPATIENT)
Dept: CT IMAGING | Facility: CLINIC | Age: 40
End: 2020-09-17
Attending: INTERNAL MEDICINE
Payer: COMMERCIAL

## 2020-09-17 ENCOUNTER — APPOINTMENT (OUTPATIENT)
Dept: CARDIOLOGY | Facility: CLINIC | Age: 40
End: 2020-09-17
Attending: INTERNAL MEDICINE
Payer: COMMERCIAL

## 2020-09-17 LAB
ALBUMIN SERPL-MCNC: 2.6 G/DL (ref 3.4–5)
ALP SERPL-CCNC: 193 U/L (ref 40–150)
ALT SERPL W P-5'-P-CCNC: 47 U/L (ref 0–50)
AMYLASE SERPL-CCNC: 16 U/L (ref 30–110)
ANION GAP SERPL CALCULATED.3IONS-SCNC: 14 MMOL/L (ref 3–14)
AST SERPL W P-5'-P-CCNC: 221 U/L (ref 0–45)
BASE EXCESS BLDV CALC-SCNC: 2.9 MMOL/L
BASOPHILS # BLD AUTO: 0 10E9/L (ref 0–0.2)
BASOPHILS # BLD AUTO: 0 10E9/L (ref 0–0.2)
BASOPHILS NFR BLD AUTO: 0.5 %
BASOPHILS NFR BLD AUTO: 0.6 %
BILIRUB DIRECT SERPL-MCNC: 0.6 MG/DL (ref 0–0.2)
BILIRUB SERPL-MCNC: 1.3 MG/DL (ref 0.2–1.3)
BUN SERPL-MCNC: 2 MG/DL (ref 7–30)
C COLI+JEJUNI+LARI FUSA STL QL NAA+PROBE: NOT DETECTED
CALCIUM SERPL-MCNC: 7.4 MG/DL (ref 8.5–10.1)
CHLORIDE SERPL-SCNC: 102 MMOL/L (ref 94–109)
CHLORIDE UR-SCNC: 41 MMOL/L
CO2 SERPL-SCNC: 23 MMOL/L (ref 20–32)
CREAT SERPL-MCNC: 0.6 MG/DL (ref 0.52–1.04)
DIFFERENTIAL METHOD BLD: ABNORMAL
DIFFERENTIAL METHOD BLD: ABNORMAL
EC STX1 GENE STL QL NAA+PROBE: NOT DETECTED
EC STX2 GENE STL QL NAA+PROBE: NOT DETECTED
ENTERIC PATHOGEN COMMENT: NORMAL
EOSINOPHIL NFR BLD AUTO: 0.2 %
EOSINOPHIL NFR BLD AUTO: 1 %
ERYTHROCYTE [DISTWIDTH] IN BLOOD BY AUTOMATED COUNT: 18 % (ref 10–15)
ERYTHROCYTE [DISTWIDTH] IN BLOOD BY AUTOMATED COUNT: 18.3 % (ref 10–15)
GFR SERPL CREATININE-BSD FRML MDRD: >90 ML/MIN/{1.73_M2}
GLUCOSE SERPL-MCNC: 172 MG/DL (ref 70–99)
HCO3 BLDV-SCNC: 27 MMOL/L (ref 21–28)
HCT VFR BLD AUTO: 27.3 % (ref 35–47)
HCT VFR BLD AUTO: 27.6 % (ref 35–47)
HGB BLD-MCNC: 9.6 G/DL (ref 11.7–15.7)
HGB BLD-MCNC: 9.9 G/DL (ref 11.7–15.7)
IMM GRANULOCYTES # BLD: 0 10E9/L (ref 0–0.4)
IMM GRANULOCYTES # BLD: 0.1 10E9/L (ref 0–0.4)
IMM GRANULOCYTES NFR BLD: 0.8 %
IMM GRANULOCYTES NFR BLD: 1 %
LABORATORY COMMENT REPORT: NORMAL
LACTATE BLD-SCNC: 10.8 MMOL/L (ref 0.7–2)
LIPASE SERPL-CCNC: 200 U/L (ref 73–393)
LYMPHOCYTES # BLD AUTO: 0.7 10E9/L (ref 0.8–5.3)
LYMPHOCYTES # BLD AUTO: 0.8 10E9/L (ref 0.8–5.3)
LYMPHOCYTES NFR BLD AUTO: 10.8 %
LYMPHOCYTES NFR BLD AUTO: 19.8 %
MAGNESIUM SERPL-MCNC: 1 MG/DL (ref 1.6–2.3)
MAGNESIUM SERPL-MCNC: 2.6 MG/DL (ref 1.6–2.3)
MCH RBC QN AUTO: 41.9 PG (ref 26.5–33)
MCH RBC QN AUTO: 42.5 PG (ref 26.5–33)
MCHC RBC AUTO-ENTMCNC: 34.8 G/DL (ref 31.5–36.5)
MCHC RBC AUTO-ENTMCNC: 35.2 G/DL (ref 31.5–36.5)
MCV RBC AUTO: 121 FL (ref 78–100)
MCV RBC AUTO: 121 FL (ref 78–100)
MONOCYTES # BLD AUTO: 0.4 10E9/L (ref 0–1.3)
MONOCYTES # BLD AUTO: 0.6 10E9/L (ref 0–1.3)
MONOCYTES NFR BLD AUTO: 8.4 %
MONOCYTES NFR BLD AUTO: 8.4 %
NEUTROPHILS # BLD AUTO: 2.9 10E9/L (ref 1.6–8.3)
NEUTROPHILS # BLD AUTO: 5.2 10E9/L (ref 1.6–8.3)
NEUTROPHILS NFR BLD AUTO: 69.3 %
NEUTROPHILS NFR BLD AUTO: 79.2 %
NOROV GI+II ORF1-ORF2 JNC STL QL NAA+PR: NOT DETECTED
NRBC # BLD AUTO: 0 10*3/UL
NRBC # BLD AUTO: 0 10*3/UL
NRBC BLD AUTO-RTO: 0 /100
NRBC BLD AUTO-RTO: 0 /100
O2/TOTAL GAS SETTING VFR VENT: 21 %
OSMOLALITY UR: 120 MMOL/KG (ref 100–1200)
PCO2 BLDV: 37 MM HG (ref 40–50)
PH BLDV: 7.46 PH (ref 7.32–7.43)
PHOSPHATE SERPL-MCNC: 2 MG/DL (ref 2.5–4.5)
PLATELET # BLD AUTO: 127 10E9/L (ref 150–450)
PLATELET # BLD AUTO: 156 10E9/L (ref 150–450)
PO2 BLDV: 24 MM HG (ref 25–47)
POTASSIUM SERPL-SCNC: 3.5 MMOL/L (ref 3.4–5.3)
POTASSIUM UR-SCNC: <1 MMOL/L
PROT SERPL-MCNC: 5.4 G/DL (ref 6.8–8.8)
RBC # BLD AUTO: 2.26 10E12/L (ref 3.8–5.2)
RBC # BLD AUTO: 2.29 10E12/L (ref 3.8–5.2)
RVA NSP5 STL QL NAA+PROBE: NOT DETECTED
SALMONELLA SP RPOD STL QL NAA+PROBE: NOT DETECTED
SARS-COV-2 RNA SPEC QL NAA+PROBE: NEGATIVE
SARS-COV-2 RNA SPEC QL NAA+PROBE: NORMAL
SHIGELLA SP+EIEC IPAH STL QL NAA+PROBE: NOT DETECTED
SODIUM SERPL-SCNC: 139 MMOL/L (ref 133–144)
SODIUM UR-SCNC: 53 MMOL/L
SPECIMEN SOURCE: NORMAL
SPECIMEN SOURCE: NORMAL
V CHOL+PARA RFBL+TRKH+TNAA STL QL NAA+PR: NOT DETECTED
WBC # BLD AUTO: 4.2 10E9/L (ref 4–11)
WBC # BLD AUTO: 6.6 10E9/L (ref 4–11)
Y ENTERO RECN STL QL NAA+PROBE: NOT DETECTED

## 2020-09-17 PROCEDURE — 74177 CT ABD & PELVIS W/CONTRAST: CPT

## 2020-09-17 PROCEDURE — 83735 ASSAY OF MAGNESIUM: CPT | Performed by: INTERNAL MEDICINE

## 2020-09-17 PROCEDURE — 25000132 ZZH RX MED GY IP 250 OP 250 PS 637: Performed by: INTERNAL MEDICINE

## 2020-09-17 PROCEDURE — 82803 BLOOD GASES ANY COMBINATION: CPT | Performed by: INTERNAL MEDICINE

## 2020-09-17 PROCEDURE — 40000275 ZZH STATISTIC RCP TIME EA 10 MIN

## 2020-09-17 PROCEDURE — 25000128 H RX IP 250 OP 636: Performed by: INTERNAL MEDICINE

## 2020-09-17 PROCEDURE — 82436 ASSAY OF URINE CHLORIDE: CPT | Performed by: INTERNAL MEDICINE

## 2020-09-17 PROCEDURE — 84100 ASSAY OF PHOSPHORUS: CPT | Performed by: INTERNAL MEDICINE

## 2020-09-17 PROCEDURE — 25800030 ZZH RX IP 258 OP 636: Performed by: INTERNAL MEDICINE

## 2020-09-17 PROCEDURE — 80048 BASIC METABOLIC PNL TOTAL CA: CPT | Performed by: INTERNAL MEDICINE

## 2020-09-17 PROCEDURE — 82150 ASSAY OF AMYLASE: CPT | Performed by: INTERNAL MEDICINE

## 2020-09-17 PROCEDURE — 80076 HEPATIC FUNCTION PANEL: CPT | Performed by: INTERNAL MEDICINE

## 2020-09-17 PROCEDURE — 83690 ASSAY OF LIPASE: CPT | Performed by: INTERNAL MEDICINE

## 2020-09-17 PROCEDURE — 25500064 ZZH RX 255 OP 636: Performed by: INTERNAL MEDICINE

## 2020-09-17 PROCEDURE — 93306 TTE W/DOPPLER COMPLETE: CPT | Mod: 26 | Performed by: INTERNAL MEDICINE

## 2020-09-17 PROCEDURE — 93005 ELECTROCARDIOGRAM TRACING: CPT

## 2020-09-17 PROCEDURE — 83605 ASSAY OF LACTIC ACID: CPT | Performed by: INTERNAL MEDICINE

## 2020-09-17 PROCEDURE — 36415 COLL VENOUS BLD VENIPUNCTURE: CPT | Performed by: INTERNAL MEDICINE

## 2020-09-17 PROCEDURE — 85018 HEMOGLOBIN: CPT | Performed by: INTERNAL MEDICINE

## 2020-09-17 PROCEDURE — 76700 US EXAM ABDOM COMPLETE: CPT

## 2020-09-17 PROCEDURE — 85025 COMPLETE CBC W/AUTO DIFF WBC: CPT | Performed by: INTERNAL MEDICINE

## 2020-09-17 PROCEDURE — 83935 ASSAY OF URINE OSMOLALITY: CPT | Performed by: INTERNAL MEDICINE

## 2020-09-17 PROCEDURE — 12000000 ZZH R&B MED SURG/OB

## 2020-09-17 PROCEDURE — 99233 SBSQ HOSP IP/OBS HIGH 50: CPT | Performed by: INTERNAL MEDICINE

## 2020-09-17 PROCEDURE — 84133 ASSAY OF URINE POTASSIUM: CPT | Performed by: INTERNAL MEDICINE

## 2020-09-17 PROCEDURE — 25000125 ZZHC RX 250: Performed by: INTERNAL MEDICINE

## 2020-09-17 PROCEDURE — 84300 ASSAY OF URINE SODIUM: CPT | Performed by: INTERNAL MEDICINE

## 2020-09-17 RX ORDER — SODIUM CHLORIDE 9 MG/ML
INJECTION, SOLUTION INTRAVENOUS CONTINUOUS
Status: DISCONTINUED | OUTPATIENT
Start: 2020-09-17 | End: 2020-09-17

## 2020-09-17 RX ORDER — LORAZEPAM 0.5 MG/1
0.5 TABLET ORAL EVERY 4 HOURS PRN
Status: DISPENSED | OUTPATIENT
Start: 2020-09-17 | End: 2020-09-18

## 2020-09-17 RX ORDER — IOPAMIDOL 755 MG/ML
500 INJECTION, SOLUTION INTRAVASCULAR ONCE
Status: COMPLETED | OUTPATIENT
Start: 2020-09-17 | End: 2020-09-17

## 2020-09-17 RX ADMIN — HUMAN ALBUMIN MICROSPHERES AND PERFLUTREN 3 ML: 10; .22 INJECTION, SOLUTION INTRAVENOUS at 11:48

## 2020-09-17 RX ADMIN — ONDANSETRON 4 MG: 4 TABLET, ORALLY DISINTEGRATING ORAL at 09:00

## 2020-09-17 RX ADMIN — LORAZEPAM 0.5 MG: 0.5 TABLET ORAL at 13:38

## 2020-09-17 RX ADMIN — IOPAMIDOL 60 ML: 755 INJECTION, SOLUTION INTRAVENOUS at 10:02

## 2020-09-17 RX ADMIN — SODIUM CHLORIDE 60 ML: 9 INJECTION, SOLUTION INTRAVENOUS at 10:02

## 2020-09-17 RX ADMIN — LORAZEPAM 0.5 MG: 0.5 TABLET ORAL at 20:34

## 2020-09-17 RX ADMIN — NICOTINE POLACRILEX 2 MG: 2 GUM, CHEWING ORAL at 12:44

## 2020-09-17 RX ADMIN — METOPROLOL SUCCINATE 50 MG: 50 TABLET, EXTENDED RELEASE ORAL at 08:56

## 2020-09-17 RX ADMIN — METHYLPHENIDATE HYDROCHLORIDE 10 MG: 5 TABLET ORAL at 14:25

## 2020-09-17 RX ADMIN — FLUTICASONE PROPIONATE 2 SPRAY: 50 SPRAY, METERED NASAL at 09:00

## 2020-09-17 RX ADMIN — ONDANSETRON 4 MG: 4 TABLET, ORALLY DISINTEGRATING ORAL at 19:10

## 2020-09-17 RX ADMIN — LORAZEPAM 0.5 MG: 0.5 TABLET ORAL at 05:13

## 2020-09-17 RX ADMIN — PANTOPRAZOLE SODIUM 40 MG: 40 TABLET, DELAYED RELEASE ORAL at 08:56

## 2020-09-17 RX ADMIN — POTASSIUM PHOSPHATE, MONOBASIC AND POTASSIUM PHOSPHATE, DIBASIC 15 MMOL: 224; 236 INJECTION, SOLUTION, CONCENTRATE INTRAVENOUS at 18:17

## 2020-09-17 RX ADMIN — NALTREXONE HYDROCHLORIDE 50 MG: 50 TABLET, FILM COATED ORAL at 19:09

## 2020-09-17 RX ADMIN — METHYLPHENIDATE HYDROCHLORIDE 10 MG: 5 TABLET ORAL at 10:49

## 2020-09-17 RX ADMIN — LORAZEPAM 0.5 MG: 0.5 TABLET ORAL at 01:02

## 2020-09-17 RX ADMIN — ONDANSETRON 4 MG: 4 TABLET, ORALLY DISINTEGRATING ORAL at 00:18

## 2020-09-17 RX ADMIN — MAGNESIUM SULFATE IN WATER 4 G: 40 INJECTION, SOLUTION INTRAVENOUS at 06:48

## 2020-09-17 RX ADMIN — LORAZEPAM 0.5 MG: 0.5 TABLET ORAL at 08:56

## 2020-09-17 RX ADMIN — SODIUM CHLORIDE: 9 INJECTION, SOLUTION INTRAVENOUS at 05:13

## 2020-09-17 RX ADMIN — SODIUM CHLORIDE, POTASSIUM CHLORIDE, SODIUM LACTATE AND CALCIUM CHLORIDE 1000 ML: 600; 310; 30; 20 INJECTION, SOLUTION INTRAVENOUS at 21:16

## 2020-09-17 ASSESSMENT — ACTIVITIES OF DAILY LIVING (ADL)
DRESS: 0-->INDEPENDENT
TOILETING: 0-->INDEPENDENT
SWALLOWING: 0-->SWALLOWS FOODS/LIQUIDS WITHOUT DIFFICULTY
ADLS_ACUITY_SCORE: 11
RETIRED_COMMUNICATION: 0-->UNDERSTANDS/COMMUNICATES WITHOUT DIFFICULTY
COGNITION: 0 - NO COGNITION ISSUES REPORTED
RETIRED_EATING: 0-->INDEPENDENT
ADLS_ACUITY_SCORE: 11
TRANSFERRING: 0-->INDEPENDENT
BATHING: 0-->INDEPENDENT
ADLS_ACUITY_SCORE: 11
ADLS_ACUITY_SCORE: 11
AMBULATION: 0-->INDEPENDENT
ADLS_ACUITY_SCORE: 11
FALL_HISTORY_WITHIN_LAST_SIX_MONTHS: NO
ADLS_ACUITY_SCORE: 11

## 2020-09-17 NOTE — PROGRESS NOTES
[facility]    Hospitalist Progress Note    Date of Service (when I saw the patient): 09/17/2020    Assessment & Plan   Tasha Short is a 39 year old female with a past medical history of anemia, opioid addiction, closed displaced fracture of right metatarsal with delayed healing, history of alcohol dependence in remission, GERD, tobacco use disorder, hypertension, depression, narcolepsy and cataplexy who was advised to come to the ED for abnormal labs.  Patient has been having diarrhea and has been feeling more tired.  Potassium found to be 2.4 and hemoglobin of 9.     Summary:       Acute  blood loss anemia, suspect upper GI source: Hemoglobin of 10.2 with platelets of 134,000.  Indices include an MCV of 114.  With iron deficiency would expect this to be lower however patient may have an elevated retake count with premature cells.  Could also be megaloblastic anemia.  Patient denies alcohol use.  Has been taking Aleve for several months.  Have concerned about NSAID induced gastritis or ulceration.  Hemoglobin stable around 10.    ? Will initiate Protonix  ? Clear liquid diet  ? IV fluids  ? Iron studies  ? B12 and folic acid  ? Evaluate for GI consult for upper endoscopy tomorrow.  ? Stool panel negative    Lactic Acid elevation, non-anion gap metabolic acidosis: Suspect due to diarrhea.  No metabolic acidosis and patient not toxic in appearance.  No clear explanation.  CT of abdomen and pelvis rather unremarkable, possible GB wall thickening but not definitive.  Really not hyperchloremic like we would typically see with diarrhea  ? Continue fluids  ? Continue to look for etiology, check ultrasound of gallbladder with CT.  Liver function enzymes with elevated  with normal ALT, slight elevated TB 1.3 and alk phos 221, normal lipase and amylase  ? Consider surgery consult if ultrasound gallbladder consistent with cholecystitis.    ? Add serum phos (would expect to be  high) and VBG to confirm acidosis with normal anion gap  ? With urine pH 6 could be distal (type 1) RTA.    ? Elevated lactate does not fit with current picture  ? Consider Chest CT to rule out pulmonary embolus?     Addendum:  Ultrasound normal.  VBG 7.46/37/24.  Awaiting urine lytes.         Hypokalemia and hypomagnesemia:   Patient on no meds that would typically cause low potassium.  Suspect this is due to her increased stooling of 4 times per day.  No over-the-counter use of meds or laxatives.  ? Replace potassium per protocol  ? Check magnesium and replace  ? Stores are likely low so would prescribe at discharge with follow up.       Sinus Tachycardia     at rest, with any activity 160-180, does not appear to be SVT    EKG normal     Possibly related to above    Echocardiogram     TSH is normal     On Ritalin but this is not new.   discussed with cardiology as well and physiologic response, repeat ekg with sinus tachycardia.  No flutter or fib, delta waves.  No evidence of re-entry tachycardia    Check walking oximetry if desats then CT chest for pulmonary embolus        Fracture right foot:  ? Stop Aleve  ? Tylenol for pain     Hypertension  ? On no meds will follow     DVT Prophylaxis: Pneumatic Compression Devices  Code Status: Full Code        Disposition Plan     Expected discharge in 2 days to prior living arrangement once hemoglobin stable and no bleeding, resolve elevated lactate.       Rene Serna MD  Hospitalist  Pager [phone]  Text Page    Interval History   Feeling better, still with mid abdominal pain, vomited times 2 last night.  Gets short of breath with activity.  stooling better, no blood and not black.      -Data reviewed today: I reviewed all new labs and imaging results over the last 24 hours. I personally reviewed no images or EKG's today.    Medications reviewed.     Physical Exam   Temp: 98.6  F (37  C) Temp src: Oral BP: (!) 134/90 Pulse: 113   Resp: 18 SpO2: 99 % O2  Device: None (Room air)    Vitals:    09/16/20 0950   Weight: 45.4 kg (100 lb)     Vital Signs with Ranges  Temp:  [98.6  F (37  C)-99.5  F (37.5  C)] 98.6  F (37  C)  Pulse:  [] 113  Resp:  [16-19] 18  BP: (107-146)/(69-96) 134/90  SpO2:  [99 %-100 %] 99 %  I/O last 3 completed shifts:  In: 1118.75 [P.O.:160; I.V.:958.75]  Out: -     Constitutional: Awake, alert, cooperative, no apparent distress     Respiratory: Clear to auscultation bilaterally, no crackles or wheezing   Cardiovascular: Regular rate and rhythm to tachy, normal S1 and S2, and no murmur noted   Abdomen: Normal bowel sounds, soft, non-distended, tender epigastric area, no hepatosplenomegaly    Skin: No rashes, no cyanosis, dry to touch   Neuro: Alert and oriented x3,    Extremities: No edema, normal range of motion   Other(s):        All other systems: Negative       Medications     lactated ringers Stopped (09/17/20 0513)     sodium chloride 75 mL/hr at 09/17/20 0513       fluticasone  2 spray Both Nostrils Daily     methylphenidate  10 mg Oral BID     metoprolol succinate ER  50 mg Oral Daily     naltrexone  50 mg Oral At Bedtime     nicotine  1 patch Transdermal Q24H     pantoprazole  40 mg Oral Daily     sodium chloride (PF)  3 mL Intracatheter Q8H       Data   Recent Labs   Lab 09/17/20  1308 09/17/20  0544 09/16/20  2037 09/16/20  1808 09/16/20  1003 09/14/20  1747   WBC  --  6.6  --   --  3.6* 4.0   HGB 9.9* 9.6*  9.6*  --  8.1* 10.2* 9.4*   MCV  --  121*  --   --  114* 116*   PLT  --  156  --   --  134* 156   NA  --  139  --   --  135 131*   POTASSIUM  --  3.5 3.6  --  2.4* 2.4*   CHLORIDE  --  102  --   --  97 94   CO2  --  23  --   --  27 27   BUN  --  2*  --   --  2* 5*   CR  --  0.60  --   --  0.66 0.99   ANIONGAP  --  14  --   --  11 10   JANET  --  7.4*  --   --  7.1* 7.3*   GLC  --  172*  --   --  122* 141*   ALBUMIN  --  2.6*  --   --  3.1* 3.1*   PROTTOTAL  --  5.4*  --   --  6.0* 5.7*   BILITOTAL  --  1.3  --   --  0.8 0.8    ALKPHOS  --  193*  --   --  193* 189*   ALT  --  47  --   --  47 46   AST  --  221*  --   --  221* 213*   LIPASE  --  200  --   --   --   --        Imaging:  Recent Results (from the past 24 hour(s))   CT Abdomen pelvis w contrast*    Alexandra    CT ABDOMEN AND PELVIS WITH CONTRAST 2020 10:11 AM    CLINICAL HISTORY: Nausea, vomiting. Abdominal pain, acute,  generalized. History of .    TECHNIQUE: CT scan of the abdomen and pelvis was performed following  injection of IV contrast. Multiplanar reformats were obtained. Dose  reduction techniques were used.  CONTRAST: Isovue-370, 60 mL    COMPARISON: None.    FINDINGS:   LOWER CHEST: Normal.    HEPATOBILIARY: There is diffuse severe fatty infiltration of the  liver. Focal hyperenhancement of the liver adjacent to the falciform  ligament is consistent with capsular enhancement. There is mild  enhancement and thickening of the gallbladder wall of uncertain  clinical significance and etiology. No definite cholelithiasis is seen  on this study. No intrahepatic or extrahepatic biliary ductal  dilatation.    PANCREAS: Normal.    SPLEEN: Normal.    ADRENAL GLANDS: Normal.    KIDNEYS/BLADDER: Normal. No hydronephrosis, nephrolithiasis,  hydroureter, or ureteral calculus is identified.    BOWEL: The colon is mostly decompressed from the distal transverse  colon through the rectum. Mild thickening of the wall of the sigmoid  colon could be due to incomplete distention but could also represent  colitis. No obvious pericolonic inflammatory change to suggest acute  diverticulitis. The small bowel is grossly of normal caliber. The  stomach is mostly decompressed but otherwise unremarkable. A structure  which could represent a normal appendix extends posteriorly and  superiorly from the cecum. No pericecal inflammatory change to suggest  acute appendicitis.    PELVIC ORGANS: The uterus and ovaries are grossly unremarkable.    ADDITIONAL FINDINGS: There is a small  amount of free fluid in the  pelvis which is likely physiologic in etiology. This is more seen in  the right adnexal region. No adenopathy or free air is seen in the  peritoneal cavity. Aorta is normal in appearance.    MUSCULOSKELETAL: No aggressive osseous lesions or acute osseous  fractures are identified.      Impression    IMPRESSION:   1.  Severe diffuse fatty infiltration of the liver with some capsular  enhancement (and focal fatty sparing) of the left lobe of the liver  adjacent to the falciform ligament  2.  The colon is mostly decompressed but no evidence for bowel  obstruction is seen. Mild thickening of the wall of the sigmoid colon  could be due to incomplete distention but could also represent mild  colitis.  3.  Physiologic amount of free fluid in the pelvis could be secondary  to recent ovulation.  4.  Enhancement and questionable mild thickening of the gallbladder  wall could be associated with cholecystitis in the appropriate  clinical setting. This more likely represents a normal appearance of  the gallbladder due to the adjacent fatty infiltration of liver.  Recommend clinical correlation. Gallbladder ultrasound can be obtained  if clinically indicated.  5.  Etiology for patient's symptoms is otherwise not definitely seen.    TRISTAN MCKEE MD

## 2020-09-17 NOTE — PROGRESS NOTES
S: Patient has indicated intent to leave the unit to smoke.    B: GI Bleed/ High heart rate    A: Patient has indicated intent to leave the unit to smoke.    Has been notified that smoking is not allowed on the hospital premises.    Has been offered nicotine replacement therapy and smoking cessation resources, but has declined.    Has been notified that, according to policy, staff will not accompany patient off the unit for the purpose of smoking.    Has been advised of the potential safety concerns related to leaving the unit unsupervised by staff    R: Patient verbalizes understanding of education and chooses to leave the unit to smoke in spite of being advised against it, and has been requested to return within 30 minutes.    Writer educated patient on the dangers of leaving the floor to smoke. Patient's heart rate has been in the 160s-180s with any amount of activity. Patient reported that she would like to take the risk. Nicotine patch was removed. Patient left the floor at 1430. Returned at 1440

## 2020-09-17 NOTE — PLAN OF CARE
Patient complaints of abdominal discomfort and some nausea, improved after zofran. VSS. Sinus Tachycardia. Temp. 99.5.  Just had a large loose stool with blood in it per patient, requested to not flush and allow staff to visualize. Up with assist, complaints of dizziness at shift change with position change. Instructed need to have assist with ambulation.  Rohit Resendez RN

## 2020-09-17 NOTE — PLAN OF CARE
Pt has been tachycardic on the monitor in the low 100's to 110's. Pt does get very short of breath with an increase in HR (max of 180) when she is up to the bathroom or moving around. All other vitals stable. Pt has complaints of chronic back pain. Pt also has had some nausea and vomiting throughout the shift with 1 episode of emesis at the start of the shift. Pt is also having loose/watery diarrhea (1 episode overnight). Will await AM labs to trend hgb. Will continue to monitor the pt per POC. Report will be given to the oncoming RN.

## 2020-09-17 NOTE — PROVIDER NOTIFICATION
DATE:  9/17/2020   TIME OF RECEIPT FROM LAB:  0550  LAB TEST:  lact  LAB VALUE:  10.8  RESULTS GIVEN WITH READ-BACK TO (PROVIDER):  Dr. Kim  TIME LAB VALUE REPORTED TO PROVIDER:   0519

## 2020-09-17 NOTE — DISCHARGE INSTRUCTIONS
Nutrition Recommendations:  Follow a high protein/high calorie diet for weight gain.    Nutritional Resource- Supplements    Medical Food Supplements can support your nutrition needs after leaving the hospital. To ensure adequate calorie and protein intake for healing, recovery, or in place of consuming food we often recommend a commercial beverage.     Brands include: Boost, Ensure, Equate, Premier Protein, Orgain, or Jensen Breakfast Essential. There are other brands available that have similar nutrition profiles. During your hospital stay, you may have had a Boost Plus which has a high calorie, high protein nutrition profile.      In your area, there are supplements available for selection at [employer] or [employer]. If you would like a more detailed handout to shop these retailers, please request one before discharge. Additionally, you can utilize the [facility] outpatient pharmacy on-site to order a medical food supplement or order these products via an online platform: [employer] or [employer] for example.      There are numerous product types, if you need additional support in selecting an appropriate product, please contact the Dietitian at [phone].

## 2020-09-17 NOTE — PROGRESS NOTES
CLINICAL NUTRITION SERVICES  -  ASSESSMENT NOTE      RECOMMENDATIONS FOR MD/PROVIDER TO ORDER:   None   Recommendations Ordered by Registered Dietitian (RD):   Start Boost Breeze BID  Daily weights   Future/Additional Recommendations:   Continue to monitor and encourage PO intake   Malnutrition: Non-Severe malnutrition  In Context of:  Chronic illness or disease      REASON FOR ASSESSMENT  Tasha Short is a 39 year old female seen by Registered Dietitian for Admission Nutrition Risk Screen for unintentional loss of 10# or more in the past two months      NUTRITION HISTORY  Information obtained from EMR and IDT Rounds:  -Pt admitted for GI bleed.   -Pt has had some nausea/vomiting and loose/watery diarrhea  -PMH of anemia, opioid addiction, alcohol dependence in remission, GERD, tobacco use, HTN, depression    Information obtained from pt:  -Pt reports that her appetite has been poor for about six months. She believes this is r/t Lyme's disease that she had last March. She was on doxycycline for this and states that it caused her to become nauseated. Even though she finished this treatment, she still feels nauseated every day and vomits after eating almost daily. Pt reports that she is only able to eat small amounts of food at a time and she grazes throughout the day. All food items cause her to feel nauseated. She does not feel nauseated when drinking water though, and she drinks this throughout the day. Usual intake:  B: Skips  L: Protein shakes (protein powder mixed w/2% milk), meat and cheese sandwich with chips or crackers  D: Mac & cheese, grilled cheese, or pasta  Fluids: water throughout the day      CURRENT NUTRITION ORDERS  Diet Order:     Clear Liquid     Current Intake/Tolerance:  25% dinner last night (clear liquid)      NUTRITION FOCUSED PHYSICAL ASSESSMENT FOR DIAGNOSING MALNUTRITION)    Yes:  See malnutrition section below     Obtained from Chart/Interdisciplinary Team:  None  "noted    ANTHROPOMETRICS  Height: 5' 4\"  Weight: 100 lbs 0 oz  Body mass index is 17.07 kg/m .  Weight Status:  Underweight BMI <18.5  IBW: 120 lb/55 kg  % IBW: 83%  Weight History:   Wt Readings from Last 10 Encounters:   09/16/20 45.4 kg (100 lb)   09/14/20 45.3 kg (99 lb 12.8 oz)   09/10/20 45.8 kg (101 lb)   07/24/20 45.4 kg (100 lb)   05/22/20 49 kg (108 lb)   05/16/20 49 kg (108 lb)   03/05/20 49.2 kg (108 lb 6.4 oz)   12/04/19 51 kg (112 lb 8 oz)   11/18/19 53 kg (116 lb 12.8 oz)   08/08/19 55.7 kg (122 lb 12.8 oz)   -Pt has lost 8 lb/7.4% BW in the past 4 months and 22 lb/18% BW over the past 1 year which is not clinically significant    LABS  Magnesium   Date Value Ref Range Status   09/17/2020 1.0 (L) 1.6 - 2.3 mg/dL Final     AST   Date Value Ref Range Status   09/17/2020 221 (H) 0 - 45 U/L Final     Urea Nitrogen   Date Value Ref Range Status   09/17/2020 2 (L) 7 - 30 mg/dL Final     Glucose   Date Value Ref Range Status   09/17/2020 172 (H) 70 - 99 mg/dL Final   09/16/2020 122 (H) 70 - 99 mg/dL Final   09/14/2020 141 (H) 70 - 99 mg/dL Final     Hemoglobin   Date Value Ref Range Status   09/17/2020 9.6 (L) 11.7 - 15.7 g/dL Final   09/17/2020 9.6 (L) 11.7 - 15.7 g/dL Final       MEDICATIONS  Ritalin  Naltrexone  Protonix  IVF: NS @ 75 mL/hr continuous     ASSESSED NUTRITION NEEDS PER APPROVED PRACTICE GUIDELINES:    Dosing Weight 45 kg (current BW)  Estimated Energy Needs: 1303-7926 kcals (30-35 Kcal/Kg)  Justification: repletion and underweight  Estimated Protein Needs: 54-68 grams protein (1.2-1.5 g pro/Kg)  Justification: Repletion   Estimated Fluid Needs: 1 mL/Kcal  Justification: maintenance    MALNUTRITION:  % Weight Loss:  Up to 20% in 1 year (non-severe malnutrition)  % Intake:  </= 75% for >/= 1 month (severe malnutrition)  Subcutaneous Fat Loss:  Upper arm region moderate depletion  Muscle Loss:  Clavicle bone region mild depletion and Acromion bone region mild depletion  Fluid Retention:  None " noted    Malnutrition Diagnosis: Non-Severe malnutrition  In Context of:  Chronic illness or disease (addiction)      NUTRITION DIAGNOSIS:  Inadequate oral intake related to nausea as evidenced by pt report of having nausea and vomiting after eating for the past six months, BMI 17.1, usual dietary intake meeting <75% estimated needs and unintended wt loss of 22 lb/18% BW over the past 1 year      NUTRITION INTERVENTIONS  Recommendations / Nutrition Prescription  Start Boost Breeze BID between meals  ADAT per provider discretion    Implementation  Nutrition education: Provided education on ways to increase protein and calories in the diet and nutrition supplement options  Medical Food Supplement      Nutrition Goals  PO intake of >75% meals and supplements       MONITORING AND EVALUATION:  Progress towards goals will be monitored and evaluated per protocol and Practice Guidelines, Diet Order, Food intake, Fluid/beverage intake, Weight and Nutrition-focused physical findings        Lucinda Spencer RD, LD  Clinical Dietitian  [facility]: [phone]  [facility]: [phone]

## 2020-09-17 NOTE — PROGRESS NOTES
Pt remains alert and oriented , cooperative , and steady on her feet with stand by assist of one.   Enteric stool sample sent this shift.   Last potassium draw was 3.6 with not further replacement required at this time.   Maylox given follow by emesis.   SCD's in place.   IV is patent and infusing.   Vitals remain stable.   BP (!) 146/94 (BP Location: Left arm)   Pulse 109   Temp 98.6  F (37  C) (Oral)   Resp 18   Wt 45.4 kg (100 lb)   LMP  (LMP Unknown)   SpO2 99%   BMI 17.07 kg/m    Telemetry is ST with at rate in the 100-120's.   Will continue to monitor and promote comfort as care continues.

## 2020-09-17 NOTE — PROGRESS NOTES
Ambulated patient in the [place]cdowell up and down her hallway x 2.  HR was 160-176, sat's were 98-99% and no complaints of SOB.    Devan Liu RT on 9/17/2020 at 6:45 PM

## 2020-09-18 VITALS
BODY MASS INDEX: 16.63 KG/M2 | SYSTOLIC BLOOD PRESSURE: 144 MMHG | OXYGEN SATURATION: 100 % | TEMPERATURE: 98.1 F | WEIGHT: 97.4 LBS | DIASTOLIC BLOOD PRESSURE: 106 MMHG | RESPIRATION RATE: 20 BRPM | HEART RATE: 133 BPM

## 2020-09-18 LAB
ANION GAP SERPL CALCULATED.3IONS-SCNC: 5 MMOL/L (ref 3–14)
BUN SERPL-MCNC: 2 MG/DL (ref 7–30)
CALCIUM SERPL-MCNC: 7.9 MG/DL (ref 8.5–10.1)
CHLORIDE SERPL-SCNC: 101 MMOL/L (ref 94–109)
CO2 SERPL-SCNC: 32 MMOL/L (ref 20–32)
COHGB MFR BLD: 3.2 % (ref 0–2)
CREAT SERPL-MCNC: 0.71 MG/DL (ref 0.52–1.04)
GFR SERPL CREATININE-BSD FRML MDRD: >90 ML/MIN/{1.73_M2}
GLUCOSE SERPL-MCNC: 99 MG/DL (ref 70–99)
LACTATE BLD-SCNC: 1.9 MMOL/L (ref 0.7–2)
MAGNESIUM SERPL-MCNC: 1.8 MG/DL (ref 1.6–2.3)
METHGB MFR BLD: 0.7 % (ref 0–3)
POTASSIUM SERPL-SCNC: 4 MMOL/L (ref 3.4–5.3)
SODIUM SERPL-SCNC: 138 MMOL/L (ref 133–144)

## 2020-09-18 PROCEDURE — 80048 BASIC METABOLIC PNL TOTAL CA: CPT | Performed by: INTERNAL MEDICINE

## 2020-09-18 PROCEDURE — 99207 ZZC CDG-CHARGE REQUIRED MANUAL ENTRY: CPT | Performed by: FAMILY MEDICINE

## 2020-09-18 PROCEDURE — 99238 HOSP IP/OBS DSCHRG MGMT 30/<: CPT | Performed by: FAMILY MEDICINE

## 2020-09-18 PROCEDURE — 83050 HGB METHEMOGLOBIN QUAN: CPT | Performed by: INTERNAL MEDICINE

## 2020-09-18 PROCEDURE — 83735 ASSAY OF MAGNESIUM: CPT | Performed by: INTERNAL MEDICINE

## 2020-09-18 PROCEDURE — 82375 ASSAY CARBOXYHB QUANT: CPT | Performed by: INTERNAL MEDICINE

## 2020-09-18 PROCEDURE — 36415 COLL VENOUS BLD VENIPUNCTURE: CPT | Performed by: INTERNAL MEDICINE

## 2020-09-18 PROCEDURE — 83605 ASSAY OF LACTIC ACID: CPT | Performed by: INTERNAL MEDICINE

## 2020-09-18 ASSESSMENT — ACTIVITIES OF DAILY LIVING (ADL)
ADLS_ACUITY_SCORE: 11
ADLS_ACUITY_SCORE: 11

## 2020-09-18 NOTE — PROGRESS NOTES
S: Patient has indicated intent to leave the unit to smoke.    B: Tachycardia/GI Bleed    A: Patient has indicated intent to leave the unit to smoke.    Has been notified that smoking is not allowed on the hospital premises.    Has been offered nicotine replacement therapy and smoking cessation resources, but has declined.    Has been notified that, according to policy, staff will not accompany patient off the unit for the purpose of smoking.    Has been advised of the potential safety concerns related to leaving the unit unsupervised by staff    R: Patient verbalizes understanding of education and chooses to leave the unit to smoke in spite of being advised against it, and has been requested to return within 30 minutes.      Patient left at 1720 and returned at 1735. Heart rate elevated to 180s with ambulation

## 2020-09-18 NOTE — PROVIDER NOTIFICATION
Notified MD at 2300 PM regarding patient refusal of telemetry and IV.      Spoke with: Dr Parekh    Orders were obtained.    Comments: Patients IV came out during IV fluid bolus. Of 1000 ml she received 300 ml. She refuses to have another IV placed and refuses telemetry monitoring any further.

## 2020-09-18 NOTE — PROGRESS NOTES
S-(situation): increased aggitation    B-(background): gi bleed    A-(assessment): patient up ambulating in the [address] she has been at time stating that she sees children and dogs in the [address]cd[address] asking if there are people who live here and stated that there are parties and hollering. She is able to then to state that she is in the hospital and why and oriented to place time and situation. She is trimerous, anxious, and voicing frustration as to why she needs to stay. Stating that she wants to go home. Patient informed that she would need to sign out AMA and she then changed her mind stating that she needed to go have a smoke. Then she left but returned less than a minute later.     R-(recommendations): continue to round frequently. Monitor agitation and notify MD of any changes.

## 2020-09-18 NOTE — PROGRESS NOTES
Tasha Short  Gender: female  : 1980  [address]  [place] [address] [phone] (home)     Medical Record: [id number]  Pharmacy:    [facility] [place], [place] - [address] [place] [address] [facility]  [facility] [place], [place] - [address]  Primary Care Provider: Enid Mansfield    Parent's names are: Data Unavailable (mother) and Juna Carlos Short (father).      [facility]  2020     Discharge Phone Call:  Patient left AMA

## 2020-09-18 NOTE — PROVIDER NOTIFICATION
Dr. Peterson called to bedside after patient stating she was going leave AMA.  Attempts to explain rationale for hospitalization ineffective from multiple staff members.  Pt finally agreeable to speak with Dr. Oden after lengthy discussion.  AMA paperwork produced and signed in presence of writer and Dr. Oden.

## 2020-09-18 NOTE — PLAN OF CARE
Patient is alert and oriented 4. Patient's heart rate at rest was 110s and with any amount of activity, it would elevate to as high as 190s. Patient was anxious and shaking. Patient received ativan for anxiety and zofran for nausea. Vital signs were otherwise stable, afebrile and on room air.

## 2020-09-18 NOTE — PROGRESS NOTES
S-(situation): aggitation    B-(background): GI bleed,     A-(assessment): patient continues to have occational visual and auditory hallucinations stating that she saw a mouse eating on the wires behind the television. And when told that that was not the case she became upset with staff stating that that was true. Then saying that she wanted to go home and when would the doctor be in. She continues to be agitated and has tremors notes to body. She is stable on her feet and she seems to be confused but when asked she is able to state MD's plan for test and why she is here.  CIWA scored 18, monitored only by this nurse.     R-(recommendations): charge nurse and MD updated on situation.

## 2020-09-18 NOTE — DISCHARGE SUMMARY
"AGAINST MEDICAL ADVICE DISCHARGE    Patient has become more anxious to leave in the past 12 hours and this morning she reports she is leaving even if it is against medical advice.  There has been some concern for illicit drug use vs withdrawal symptoms per nursing staff as patient had some bizarre behaviors overnight including some restlessness and agitation and possible visual hallucination at one point but has been oriented x4.   Currently she is still oriented x4.  She is able to state she was hospitalized for concerns of a GI bleed but her hemoglobin is stable and she wouldn't want an scope done by a surgeon now - she would rather take her PPI, avoid NSAID products and see if she really needs one going forward.  She is also not concerned about her fluctuating heart rates and reports she knows she can see a heart specialist in the future if she wants this worked up further.  She reports she is having to care for her granddaughter today and she has someone coming to pick her up now so she can go do that.  She is not willing to wait for evaluation and any further testing, is not willing to wait to allow me to evaluate to see if she can safely leave with medical approval.  She needs to go now because of her committment her her family and can  a \"stomach protective medication anywhere so I don't need a script.  I am leaving now - where is the paperwork for me to sign.\"  She states if her symptoms worsen again, she would return to the ED.  At this time is appears to be capable of making her own medical decisions and therefore will be allowed to leave AGAINST MEDICAL ADVICE.  Encouraged her to call and set up an appointment with her PCP in the near future to review and discuss the anemia and intermittent significant tachycardia further.      Electronically Signed:  Rosalie Oden MD    "

## 2020-09-21 ENCOUNTER — TELEPHONE (OUTPATIENT)
Dept: FAMILY MEDICINE | Facility: OTHER | Age: 40
End: 2020-09-21

## 2020-09-21 DIAGNOSIS — R71.0 HEMOGLOBIN DECREASED: ICD-10-CM

## 2020-09-21 DIAGNOSIS — D64.9 ANEMIA, UNSPECIFIED TYPE: ICD-10-CM

## 2020-09-21 DIAGNOSIS — R19.7 DIARRHEA, UNSPECIFIED TYPE: ICD-10-CM

## 2020-09-21 DIAGNOSIS — R63.4 WEIGHT LOSS: ICD-10-CM

## 2020-09-21 DIAGNOSIS — K92.2 ACUTE GI BLEEDING: Primary | ICD-10-CM

## 2020-09-21 NOTE — TELEPHONE ENCOUNTER
LM for patient to return call back to the clinic. Please transfer the patient to any triage RN.   Taylor Mayo RN  September 21, 2020

## 2020-09-21 NOTE — TELEPHONE ENCOUNTER
"Reason for Call:  Other call back    Detailed comments: Patient mycharted an apt for [facility] F/U. We made an apt for Tuesday at 11:15. Per mycelishat \"I just got out of the hospital on Friday and need to see you Monday. I'm still short of breath with a high blood pressure, 130/160\" Can she get fit in today?    Phone Number Patient can be reached at: Home number on file [phone] (home)    Best Time: anyitme    Can we leave a detailed message on this number? YES    Call taken on 9/21/2020 at 7:30 AM by Michelle Kuhn      "

## 2020-09-21 NOTE — TELEPHONE ENCOUNTER
Left message for patient to return call to any triage RN. Will leave flagged RN due to continued need for close follow up contacts.  Nicole Garrison RN

## 2020-09-21 NOTE — TELEPHONE ENCOUNTER
Reason for follow up call: Tasha PORTILLO Michelleuriel appeared on our list for being seen in and recenlty discharge from the Emergency Room/In Patient Hospital Admission.    Chief Complaint   Patient presents with     Nurse Advice Line     Triage if necessary hosptial F/U SOB        Encounter routed for Clinic Triage RN to call for follow up    Taylor Mayo RN  September 21, 2020

## 2020-09-21 NOTE — TELEPHONE ENCOUNTER
"Triage RN (writer) contacted patient regarding note below. Patient informed of Enid \"Donnie\" MARTHA Mansfield's message below in its entirety. Patient verbalized understanding but not in agreement with plan. Patient reports, \"I will just keep my appointment for tomorrow with her, they didn't do nothing for me at the ER.\" Triage RN advised patient on the importance of being evaluated in the ER based on symptoms and per physician recommendations, patient still declined. Will route to PCP to review/acknowledge encounter.    Celia Salinas RN on 9/21/2020 at 10:39 AM    "

## 2020-09-21 NOTE — TELEPHONE ENCOUNTER
Patient left the hospital AMA    They did not find the source of her bleeding because she left prior to getting scope. She is going to continue to feel bad until we stop the bleeding. I recommend she return to the ED     Donnie Mansfield PA-C   [facility] [place]

## 2020-09-21 NOTE — TELEPHONE ENCOUNTER
"Will forward to PCP for review/to advise as a high priority. Please see below. No AVS summary available for post-hospital f/u call. Patient was seen/admitted at [facility] on 09/16/2020. Patient returned call to clinic regarding below. Patient would like to see PCP today for post hospital f/u, no appointments available. PCP, can you work in today? Patient declined to see another provider but reports continued symptoms since discharge. Patient has a scheduled appointment with PCP for tomorrow. Please see below.    Patient reports the following symptoms during call:    1. Discharged from [facility] on 09/18/2020.Patient reports several tests completed and stated  \"they didn't figure out what is going on with me\"   2. Continued intermittent shortness of breath during ambulation, not at rest. Reports, \"I feel better when laying down.\" Denied shortness of breath during call. Reports higher blood pressure readings at home with diastolic this morning of around 160 using home blood pressure cuff during ambulation. Patient denies symptoms during blood pressure reading except shortness of breath when ambulating. Triage requested patient to recheck blood pressure during call, BP reading with home blood pressure cuff @ around 9:30am was 120/90 today. Patient reports PCP recently discontinued Norvasc.  3. Denies history of asthma or other respiratory diagnoses.   4. Productive cough x 2 weeks, small amount of yellow mucus. Patient reports, \"I thought I had bronchitis and is why I went in to the hospital.\"  5. Denies the following also: chest pain, dizziness, lightheadedness, abdominal pain, headaches, blurred or double vision, headaches, history of blood clots, or recent blood thinners, vomiting up blood, fever, one-sided weakness, slurred speech, or any other symptoms  6. Continued loose stools x 1-2 times daily. Denies blood in stool that she is able to see at home. Patient reports rectal exam in " "hospital-blood was found in stool.   7. Ongoing intermittent vomiting in the morning, \"only after eating and happens all the time.\" Intermittent ongoing nausea.  8. Patient denied home oximeter to measure O2 sats. Patient reports O2 sats: % in hospital. Denies present wheezing or respiratory distress during call.     9. Nasal congestion present reporting, \"I have allergies too.\"    Triage RN (writer) advised patient to continue to monitor blood pressure and symptoms at home until PCP reviews/advises. Triage advised patient if blood pressure reading becomes high or new/worsening symptoms develop prior to PCP review, to return call to clinic. Patient verbalized understanding, in agreement with plan of care, had no further questions for triage during call. Will route to provider as a high priority who is in clinic today.    Celia Salinas RN on 9/21/2020 at 10:17 AM  "

## 2020-09-21 NOTE — TELEPHONE ENCOUNTER
While I would still like her to go to the ED. What she is going to need is labs and an upper and lower endoscopy to find the source of bleeding.     Please have her come in for labs today including stool cultures. I also placed GI procedure order. Please assist patient in getting this scheduled ASAP due to possible GI bleeding.     I will still see her tomorrow    Donnie Mansfield PA-C   [facility]

## 2020-09-22 ENCOUNTER — TELEPHONE (OUTPATIENT)
Dept: FAMILY MEDICINE | Facility: OTHER | Age: 40
End: 2020-09-22

## 2020-09-22 ENCOUNTER — OFFICE VISIT (OUTPATIENT)
Dept: FAMILY MEDICINE | Facility: OTHER | Age: 40
End: 2020-09-22
Payer: COMMERCIAL

## 2020-09-22 VITALS
SYSTOLIC BLOOD PRESSURE: 86 MMHG | OXYGEN SATURATION: 99 % | HEIGHT: 64 IN | HEART RATE: 106 BPM | WEIGHT: 102.6 LBS | DIASTOLIC BLOOD PRESSURE: 70 MMHG | RESPIRATION RATE: 20 BRPM | TEMPERATURE: 97.8 F | BODY MASS INDEX: 17.52 KG/M2

## 2020-09-22 DIAGNOSIS — K92.2 ACUTE GI BLEEDING: ICD-10-CM

## 2020-09-22 DIAGNOSIS — D62 ANEMIA DUE TO BLOOD LOSS, ACUTE: ICD-10-CM

## 2020-09-22 DIAGNOSIS — R19.7 DIARRHEA, UNSPECIFIED TYPE: ICD-10-CM

## 2020-09-22 DIAGNOSIS — M54.50 ACUTE BILATERAL LOW BACK PAIN, UNSPECIFIED WHETHER SCIATICA PRESENT: ICD-10-CM

## 2020-09-22 DIAGNOSIS — S92.301G CLOSED DISPLACED FRACTURE OF METATARSAL BONE OF RIGHT FOOT WITH DELAYED HEALING, UNSPECIFIED METATARSAL, SUBSEQUENT ENCOUNTER: ICD-10-CM

## 2020-09-22 DIAGNOSIS — F10.11 H/O ETOH ABUSE: ICD-10-CM

## 2020-09-22 DIAGNOSIS — Z11.59 ENCOUNTER FOR SCREENING FOR OTHER VIRAL DISEASES: ICD-10-CM

## 2020-09-22 DIAGNOSIS — R71.0 HEMOGLOBIN DECREASED: ICD-10-CM

## 2020-09-22 DIAGNOSIS — I10 HTN, GOAL BELOW 140/90: ICD-10-CM

## 2020-09-22 DIAGNOSIS — F33.1 MAJOR DEPRESSIVE DISORDER, RECURRENT EPISODE, MODERATE (H): ICD-10-CM

## 2020-09-22 DIAGNOSIS — Z11.59 ENCOUNTER FOR SCREENING FOR OTHER VIRAL DISEASES: Primary | ICD-10-CM

## 2020-09-22 DIAGNOSIS — K92.2 GASTROINTESTINAL HEMORRHAGE, UNSPECIFIED GASTROINTESTINAL HEMORRHAGE TYPE: ICD-10-CM

## 2020-09-22 DIAGNOSIS — K92.2 ACUTE GI BLEEDING: Primary | ICD-10-CM

## 2020-09-22 DIAGNOSIS — R63.4 WEIGHT LOSS: ICD-10-CM

## 2020-09-22 DIAGNOSIS — E87.6 HYPOKALEMIA: ICD-10-CM

## 2020-09-22 DIAGNOSIS — D64.9 ANEMIA, UNSPECIFIED TYPE: ICD-10-CM

## 2020-09-22 LAB
ALBUMIN SERPL-MCNC: 3 G/DL (ref 3.4–5)
ALP SERPL-CCNC: 200 U/L (ref 40–150)
ALT SERPL W P-5'-P-CCNC: 42 U/L (ref 0–50)
ANION GAP SERPL CALCULATED.3IONS-SCNC: 12 MMOL/L (ref 3–14)
AST SERPL W P-5'-P-CCNC: 150 U/L (ref 0–45)
BILIRUB SERPL-MCNC: 0.7 MG/DL (ref 0.2–1.3)
BUN SERPL-MCNC: 4 MG/DL (ref 7–30)
CALCIUM SERPL-MCNC: 7.8 MG/DL (ref 8.5–10.1)
CHLORIDE SERPL-SCNC: 92 MMOL/L (ref 94–109)
CO2 SERPL-SCNC: 25 MMOL/L (ref 20–32)
CREAT SERPL-MCNC: 0.67 MG/DL (ref 0.52–1.04)
CRP SERPL-MCNC: <2.9 MG/L (ref 0–8)
ERYTHROCYTE [DISTWIDTH] IN BLOOD BY AUTOMATED COUNT: 15.8 % (ref 10–15)
ERYTHROCYTE [SEDIMENTATION RATE] IN BLOOD BY WESTERGREN METHOD: 8 MM/H (ref 0–20)
FOLATE SERPL-MCNC: 1.9 NG/ML
GFR SERPL CREATININE-BSD FRML MDRD: >90 ML/MIN/{1.73_M2}
GLUCOSE SERPL-MCNC: 132 MG/DL (ref 70–99)
HCT VFR BLD AUTO: 29.8 % (ref 35–47)
HGB BLD-MCNC: 10.7 G/DL (ref 11.7–15.7)
IRON SATN MFR SERPL: 15 % (ref 15–46)
IRON SERPL-MCNC: 32 UG/DL (ref 35–180)
MCH RBC QN AUTO: 42 PG (ref 26.5–33)
MCHC RBC AUTO-ENTMCNC: 35.9 G/DL (ref 31.5–36.5)
MCV RBC AUTO: 117 FL (ref 78–100)
PLATELET # BLD AUTO: 222 10E9/L (ref 150–450)
POTASSIUM SERPL-SCNC: 3.1 MMOL/L (ref 3.4–5.3)
PROT SERPL-MCNC: 5.7 G/DL (ref 6.8–8.8)
RBC # BLD AUTO: 2.55 10E12/L (ref 3.8–5.2)
SODIUM SERPL-SCNC: 129 MMOL/L (ref 133–144)
TIBC SERPL-MCNC: 208 UG/DL (ref 240–430)
VIT B12 SERPL-MCNC: 1901 PG/ML (ref 193–986)
WBC # BLD AUTO: 4.4 10E9/L (ref 4–11)

## 2020-09-22 PROCEDURE — 83540 ASSAY OF IRON: CPT | Performed by: PHYSICIAN ASSISTANT

## 2020-09-22 PROCEDURE — 36415 COLL VENOUS BLD VENIPUNCTURE: CPT | Performed by: PHYSICIAN ASSISTANT

## 2020-09-22 PROCEDURE — 86038 ANTINUCLEAR ANTIBODIES: CPT | Performed by: PHYSICIAN ASSISTANT

## 2020-09-22 PROCEDURE — 83550 IRON BINDING TEST: CPT | Performed by: PHYSICIAN ASSISTANT

## 2020-09-22 PROCEDURE — 99215 OFFICE O/P EST HI 40 MIN: CPT | Performed by: PHYSICIAN ASSISTANT

## 2020-09-22 PROCEDURE — 80053 COMPREHEN METABOLIC PANEL: CPT | Performed by: PHYSICIAN ASSISTANT

## 2020-09-22 PROCEDURE — U0003 INFECTIOUS AGENT DETECTION BY NUCLEIC ACID (DNA OR RNA); SEVERE ACUTE RESPIRATORY SYNDROME CORONAVIRUS 2 (SARS-COV-2) (CORONAVIRUS DISEASE [COVID-19]), AMPLIFIED PROBE TECHNIQUE, MAKING USE OF HIGH THROUGHPUT TECHNOLOGIES AS DESCRIBED BY CMS-2020-01-R: HCPCS | Performed by: INTERNAL MEDICINE

## 2020-09-22 PROCEDURE — 86140 C-REACTIVE PROTEIN: CPT | Performed by: PHYSICIAN ASSISTANT

## 2020-09-22 PROCEDURE — 87338 HPYLORI STOOL AG IA: CPT | Performed by: PHYSICIAN ASSISTANT

## 2020-09-22 PROCEDURE — 82607 VITAMIN B-12: CPT | Performed by: PHYSICIAN ASSISTANT

## 2020-09-22 PROCEDURE — 85652 RBC SED RATE AUTOMATED: CPT | Performed by: PHYSICIAN ASSISTANT

## 2020-09-22 PROCEDURE — 85027 COMPLETE CBC AUTOMATED: CPT | Performed by: PHYSICIAN ASSISTANT

## 2020-09-22 PROCEDURE — 82746 ASSAY OF FOLIC ACID SERUM: CPT | Performed by: PHYSICIAN ASSISTANT

## 2020-09-22 PROCEDURE — 83516 IMMUNOASSAY NONANTIBODY: CPT | Mod: 59 | Performed by: PHYSICIAN ASSISTANT

## 2020-09-22 PROCEDURE — 83516 IMMUNOASSAY NONANTIBODY: CPT | Performed by: PHYSICIAN ASSISTANT

## 2020-09-22 RX ORDER — PANTOPRAZOLE SODIUM 40 MG/1
40 TABLET, DELAYED RELEASE ORAL DAILY
Qty: 30 TABLET | Refills: 1 | Status: ON HOLD | OUTPATIENT
Start: 2020-09-22 | End: 2020-10-06

## 2020-09-22 RX ORDER — GABAPENTIN 100 MG/1
100-200 CAPSULE ORAL 3 TIMES DAILY PRN
Qty: 90 CAPSULE | Refills: 1 | Status: SHIPPED | OUTPATIENT
Start: 2020-09-22 | End: 2020-10-20

## 2020-09-22 RX ORDER — PROCHLORPERAZINE MALEATE 5 MG
5-10 TABLET ORAL EVERY 6 HOURS PRN
Qty: 60 TABLET | Refills: 1 | Status: ON HOLD | OUTPATIENT
Start: 2020-09-22 | End: 2020-10-02

## 2020-09-22 ASSESSMENT — MIFFLIN-ST. JEOR: SCORE: 1128.09

## 2020-09-22 ASSESSMENT — PAIN SCALES - GENERAL: PAINLEVEL: EXTREME PAIN (8)

## 2020-09-22 NOTE — TELEPHONE ENCOUNTER
Spoke to patient, she is feeling the same as yesterday when triaged with no change in symptoms. Patient denies worsening symptoms and will plan on coming to appointment as scheduled. Routing to provider to advise, we were unable to get in contact with this patient yesterday to come in for labs prior to appt. Would you like labs drawn prior to her appt today?    Nicole Garrison RN

## 2020-09-22 NOTE — PATIENT INSTRUCTIONS
- Gabapentin 1-2 tablets up to three times a day as needed     - Procedure on Friday     - Schedule MRI     - Start Protonix one per day     - Compazine as needed     - Recheck as scheduled

## 2020-09-22 NOTE — TELEPHONE ENCOUNTER
Please let patient know of below   - EGD/Colonoscopy on Friday, she will get prep today and we will COVID test her today.     I would like her to come into clinic as soon as possible (leave at scheduled time but have her come in). Let her know when she gets here she will get labs and the COVID test.    Donnie Mansfield PA-C  [facility] [place] [facility]

## 2020-09-22 NOTE — PROGRESS NOTES
"Subjective     Tasha Short is a 39 year old female who presents to clinic today for the following health issues:    HPI     Hospital Follow-up Visit:    Hospital/Nursing Home/IP Rehab Facility: [facility]  Date of Admission: 9/16  Date of Discharge: 9/18  Reason(s) for Admission: GI bleeding, low electrolytes       Was your hospitalization related to COVID-19? No   Problems taking medications regularly:  None  Medication changes since discharge: None  Problems adhering to non-medication therapy:  None    Summary of hospitalization:  [facility] discharge summary reviewed  Diagnostic Tests/Treatments reviewed.  Follow up needed: EGD/Colonoscopy   Other Healthcare Providers Involved in Patient s Care:         None  Update since discharge: fluctuating course.    - Blood pressure was high in the hospital  - Didn't feel like they were helping her, very angry and emotional  - States felt better at first but now really not doing well again  - Can't eat, nausea and vomiting all the time   - Did stop NSAIDs   - Did not  OTC PPI   - Saw her psychiatrist this AM, mental health is not doing well lately   - Is going to AA, finds very helpful   - States taking her Naltrexone and is still sober and denies any illicit drug use     Post Discharge Medication Reconciliation: discharge medications reconciled and changed, per note/orders.  Plan of care communicated with patient and family (grandmother)        Review of Systems   Constitutional, HEENT, cardiovascular, pulmonary, gi, msk, neurological, psychological, and gu systems are negative, except as otherwise noted.      Objective    BP (!) 86/70   Pulse 106   Temp 97.8  F (36.6  C) (Temporal)   Resp 20   Ht 1.63 m (5' 4.17\")   Wt 46.5 kg (102 lb 9.6 oz)   LMP  (LMP Unknown)   SpO2 99%   BMI 17.52 kg/m    Body mass index is 17.52 kg/m .  Physical Exam   GENERAL APPEARANCE: healthy, alert and no distress, cachetic   EYES: Eyes grossly " normal to inspection, PERRLA, conjunctivae and sclerae without injection or discharge, EOM intact   RESP: Lungs clear to auscultation - no rales, rhonchi or wheezes    CV: Regular rates and rhythm (though occasionally more tachycardic), normal S1 S2, no S3 or S4, no murmur, click or rub, no peripheral edema and peripheral pulses strong and symmetric bilaterally   MS: No musculoskeletal defects are noted and gait is age appropriate without ataxia   SKIN: No suspicious lesions or rashes, hydration status appears adeuqate with normal skin turgor   PSYCH: Alert and oriented x3; speech- coherent , normal rate and volume; able to articulate logical thoughts, able to abstract reason, no tangential thoughts, no hallucinations or delusions, mentation appears normal, Mood is euthymic. Affect is depressed and tearful. Thought content is free of suicidal ideation, hallucinations, and delusions. Dress is adequate and upkept. Eye contact is good during conversation.       Diagnostics  See orders pending in Epic         Assessment & Plan     ICD-10-CM    1. Acute GI bleeding  K92.2 GASTROENTEROLOGY ADULT REF CONSULT ONLY     prochlorperazine (COMPAZINE) 5 MG tablet     pantoprazole (PROTONIX) 40 MG EC tablet   2. Gastrointestinal hemorrhage, unspecified gastrointestinal hemorrhage type  K92.2 CBC with platelets     Comprehensive metabolic panel (BMP + Alb, Alk Phos, ALT, AST, Total. Bili, TP)     GASTROENTEROLOGY ADULT REF CONSULT ONLY     prochlorperazine (COMPAZINE) 5 MG tablet   3. Anemia due to blood loss, acute  D62    4. Hypokalemia  E87.6 CBC with platelets     Comprehensive metabolic panel (BMP + Alb, Alk Phos, ALT, AST, Total. Bili, TP)   5. Diarrhea, unspecified type  R19.7 GASTROENTEROLOGY ADULT REF CONSULT ONLY     prochlorperazine (COMPAZINE) 5 MG tablet     pantoprazole (PROTONIX) 40 MG EC tablet   6. Acute bilateral low back pain, unspecified whether sciatica present  M54.5 gabapentin (NEURONTIN) 100 MG capsule      prochlorperazine (COMPAZINE) 5 MG tablet   7. Encounter for screening for other viral diseases  Z11.59 Asymptomatic COVID-19 Virus (Coronavirus) by PCR   8. HTN, goal below 140/90  I10    9. Closed displaced fracture of metatarsal bone of right foot with delayed healing, unspecified metatarsal, subsequent encounter  S92.301G    10. Major depressive disorder, recurrent episode, moderate (H)  F33.1    11. H/O ETOH abuse  F10.11    12. Weight loss  R63.4      - Patient with severe weight loss, diarrhea, back pain, delayed healing of foot fracture, left hospital AMA after labs were found to have hypokalemia (potassium at 2.4) and hemoglobin of 9.4  - I had instructed her to go right to the ED, she didn't go until the next day     Reviewed risks of hypokalemia, that while she stated she didn't have  her mom and grandmother would have helped out, she could have had an acute arrhythmia or stroke that could have taken her life   - Discussed hospitalization at length      Discussed this would have been resolved by now if she had stay, they had scheduled her EGD and now the soonest I can get her in is Friday      Potassium was corrected while she was in      Recommend recheck labs   - Discussed her poor mood and attitude, mom and grandmother reports some of the same thing that the hospital did with wild mood swings and anger      Adamantly denies alcohol or drug use        Patient states its because she is in pain all the time, mainly back but also her foot, coupled with money/job, caring for her daughter, and now these serious health concerns have her on edge   - Did see psychiatrist this AM, recommended increasing her Lithium but she wanted to wait until her GI bleed is figured out, recommend she go ahead and increase   - Discussed extreme risks to her health, discussed her not taking care of herself     Grandmother states all she needs to do is ask and she will be there to help her     - Together made the  following plan     1. GI bleed, diarrhea, hypokalemia, acute blood loss  - Thought to be due to NSAID use due to chronic Naproxen for her broken foot      However can't rule out alcoholic varices, H.Pylori, or other   - Recheck labs today, may need additional medication support      Await results   - EGD/Colonoscopy scheduled for Friday, she MUST keep this appointment   - Will also need GI consult as likely if does have ulcer will need to be followed to resolution, order for this placed  - Start PPI - Protonix, one tablet per day   - Zofran didn't work, will do Compazine, reviewed use and side effects  - Recheck when I am back in office, scheduled for 10/6/20     2. Pain (foot and back)   - Advised needs to be off work, previously would not due this, stated per podiatry, this is the only way her foot will heal   - Work note given, not to return until I see her back   - Needs to follow podiatry instructions and since will be home, stay off her foot and wear her boot   - States boot makes her back hurt, doesn't have history of back issues, we had previously recommended MRI, this has not been scheduled yet, recommend getting scheduled      Does have some radicular pain   - Will also do trial of Gabapentin 100 mg     Can take 1-2 tablets up to 3x/day as needed for her pain   - Avoid all NSAIDs, ok to continue Tylenol if helpful   - Avoid opioids in this patient due to history of substance abuse (alcohol)     3. Mood   - Call psychiatrist back and agree to Lithium increase  - Work on relaxation techniques   - Rely on others for help   - Continue counseling and AA     4. HTN   - High in hospital because was so agitated, was low in clinic last visit and today   - Continue to hold Norvasc      Continue to TAKE Metoprolol as pulses are still >100 consistently, likely due to agitation   - Monitor at home and keep a log     - Discussed warning signs that would warrant return to ED     The patient indicates understanding of  these issues and agrees with the plan.    Return in about 13 days (around 10/5/2020).    Enid Mansfield PA-C  [facility]

## 2020-09-22 NOTE — TELEPHONE ENCOUNTER
Patient scheduled Friday for EGD and colonoscopy, she has covid testing today while in clinic. Needs to get prep info to  for tomorrow.   Angie Corral MA

## 2020-09-22 NOTE — TELEPHONE ENCOUNTER
Yes, labs first. Will put in appointment note.     What patient needs is to get in for EGD/Colonoscopy ASAP. We need to get this scheduled. Will route to team     Donnie Whiting-MARTHA Block  [facility]

## 2020-09-22 NOTE — TELEPHONE ENCOUNTER
[facility] called to schedule patient for an EGD and Colonoscopy. Patient was put on schedule for 9/25 as urgent. She will have covid test today and will be given prep instructions in clinic.

## 2020-09-22 NOTE — RESULT ENCOUNTER NOTE
Josefina Cordova    Your results so far show that your hemoglobin is a little better at 10.7. I am still awaiting the rest.     The results are attached for your review.       Donnie Mansfield PA-C

## 2020-09-22 NOTE — LETTER
[facility]  [address]  [place] [address]  Phone: [phone]    September 22, 2020        Tasha Short  [address]  [place] [address]          To whom it may concern:    RE: Tasha Short    Patient was seen and treated today at our clinic and missed work. Due to severe acute health issues (acute GI bleed, acute severe electrolyte imbalance, severe drop in red blood cells (hemoglobin/anemia)), patient is to remain out of work until further notice. Patient is scheduled for a procedure on Friday to identify the cause of her bleeding and proceed with treatment. She is also scheduled for further imaging and consults with specialists.     We will reassess her work restrictions/leave of absence at follow up visit schedule for Tuesday, 10/6/20 at 8:30 am.         Please contact me for questions or concerns.      Sincerely,                Enid Mansfield PA-C  September 22, 2020

## 2020-09-23 ENCOUNTER — MYC MEDICAL ADVICE (OUTPATIENT)
Dept: FAMILY MEDICINE | Facility: OTHER | Age: 40
End: 2020-09-23

## 2020-09-23 ENCOUNTER — ANESTHESIA EVENT (OUTPATIENT)
Dept: GASTROENTEROLOGY | Facility: CLINIC | Age: 40
End: 2020-09-23
Payer: COMMERCIAL

## 2020-09-23 ENCOUNTER — TELEPHONE (OUTPATIENT)
Dept: FAMILY MEDICINE | Facility: OTHER | Age: 40
End: 2020-09-23

## 2020-09-23 DIAGNOSIS — E87.6 HYPOKALEMIA: Primary | ICD-10-CM

## 2020-09-23 DIAGNOSIS — K92.2 ACUTE GI BLEEDING: ICD-10-CM

## 2020-09-23 DIAGNOSIS — D50.0 IRON DEFICIENCY ANEMIA DUE TO CHRONIC BLOOD LOSS: ICD-10-CM

## 2020-09-23 PROBLEM — D62 ANEMIA DUE TO BLOOD LOSS, ACUTE: Status: ACTIVE | Noted: 2020-09-23

## 2020-09-23 PROBLEM — R19.7 DIARRHEA, UNSPECIFIED TYPE: Status: ACTIVE | Noted: 2020-09-23

## 2020-09-23 PROBLEM — O14.93 PRE-ECLAMPSIA IN THIRD TRIMESTER: Status: RESOLVED | Noted: 2017-11-28 | Resolved: 2020-09-23

## 2020-09-23 LAB
ANA SER QL IF: NEGATIVE
H PYLORI AG STL QL IA: NEGATIVE
SARS-COV-2 RNA SPEC QL NAA+PROBE: NOT DETECTED
SPECIMEN SOURCE: NORMAL
TTG IGA SER-ACNC: 1 U/ML
TTG IGG SER-ACNC: <1 U/ML

## 2020-09-23 RX ORDER — LANOLIN ALCOHOL/MO/W.PET/CERES
400 CREAM (GRAM) TOPICAL DAILY
Qty: 30 TABLET | Refills: 2 | Status: ON HOLD | OUTPATIENT
Start: 2020-09-23 | End: 2020-10-02

## 2020-09-23 RX ORDER — POTASSIUM CHLORIDE 1500 MG/1
20 TABLET, EXTENDED RELEASE ORAL DAILY
Qty: 30 TABLET | Refills: 2 | Status: ON HOLD | OUTPATIENT
Start: 2020-09-23 | End: 2020-10-02

## 2020-09-23 NOTE — TELEPHONE ENCOUNTER
Please call patient     Iron, Folate, and potassium are low, but not critical. She should work hard on getting in food.     I am going to start her on a potassium tablet and a folate tablet.     We can't start any iron until after the colonoscopy/EGD that is on Friday.     TEAM - anything regarding this patient please route to Dr. Garcia while I am out. She is aware of the situation.     Donnie Mansfield PA-C  [facility]

## 2020-09-23 NOTE — TELEPHONE ENCOUNTER
Contacted patient and gave her phone number to speak with [facility] [phone] to get information on her procedure for Friday.

## 2020-09-24 ENCOUNTER — TELEPHONE (OUTPATIENT)
Dept: FAMILY MEDICINE | Facility: OTHER | Age: 40
End: 2020-09-24

## 2020-09-24 ENCOUNTER — HOSPITAL ENCOUNTER (OUTPATIENT)
Dept: MRI IMAGING | Facility: CLINIC | Age: 40
Discharge: HOME | End: 2020-09-24
Attending: PHYSICIAN ASSISTANT | Admitting: PHYSICIAN ASSISTANT
Payer: COMMERCIAL

## 2020-09-24 DIAGNOSIS — G89.29 CHRONIC BILATERAL LOW BACK PAIN WITHOUT SCIATICA: ICD-10-CM

## 2020-09-24 DIAGNOSIS — E27.8 ADRENAL MASS, RIGHT (H): Primary | ICD-10-CM

## 2020-09-24 DIAGNOSIS — M54.50 CHRONIC BILATERAL LOW BACK PAIN WITHOUT SCIATICA: ICD-10-CM

## 2020-09-24 PROCEDURE — 72148 MRI LUMBAR SPINE W/O DYE: CPT

## 2020-09-24 SDOH — HEALTH STABILITY: MENTAL HEALTH: CURRENT SMOKER: 1

## 2020-09-24 ASSESSMENT — LIFESTYLE VARIABLES: TOBACCO_USE: 1

## 2020-09-24 NOTE — TELEPHONE ENCOUNTER
The codes given are ICD9 codes do not match the ones in the system. I attempted to call [employer] but they could not respond as they are already outside the normal business hours. I called patient and discussed that she should keep her appointment as I do not see a better diagnosis to cover her procedures. She voiced understanding of the plan

## 2020-09-24 NOTE — TELEPHONE ENCOUNTER
Spoke with Dr. Ballesteros. MRI showed a right adrenal mass and a right kidney cyst along with an enlarged liver. He missed this on the recent abdominal CT so is addending this information. Recommend MRI adrenal for further evaluation of the adrenal mass so please help schedule. Will forward to CDL to review when she returns regarding kidney lesion (likely a benign cyst) ad liver enlargement.    Domo Bradford PA-C

## 2020-09-24 NOTE — TELEPHONE ENCOUNTER
[facility] calling regarding the patient's CT from 9/17/20. Requesting to speak with covering provider for CDL.  Routed call to RN team.

## 2020-09-24 NOTE — TELEPHONE ENCOUNTER
Spoke with Dr. Ballesteros.  He is addending the CT from 09/17/2020.  Please review.    Please look at MR lumbar and US and CT from Sept.    There is an  adrenal lesion, simple cysts on the right kidney and enlarged liver that clinic will need to follow up on.      CDL is out.  Will forward to covering provider. CDL is out of clinic.  RK is gone for the day will send to JM for review.  MA had note for JM to review as well.    Philipp Weldon, JERRELLN, RN, PHN

## 2020-09-25 ENCOUNTER — SURGERY (OUTPATIENT)
Age: 40
End: 2020-09-25
Payer: COMMERCIAL

## 2020-09-25 ENCOUNTER — ANESTHESIA (OUTPATIENT)
Dept: GASTROENTEROLOGY | Facility: CLINIC | Age: 40
End: 2020-09-25
Payer: COMMERCIAL

## 2020-09-25 ENCOUNTER — HOSPITAL ENCOUNTER (OUTPATIENT)
Facility: CLINIC | Age: 40
Discharge: HOME | End: 2020-09-25
Attending: INTERNAL MEDICINE | Admitting: INTERNAL MEDICINE
Payer: COMMERCIAL

## 2020-09-25 ENCOUNTER — TELEPHONE (OUTPATIENT)
Dept: FAMILY MEDICINE | Facility: OTHER | Age: 40
End: 2020-09-25

## 2020-09-25 ENCOUNTER — HOSPITAL ENCOUNTER (EMERGENCY)
Facility: CLINIC | Age: 40
Discharge: HOME | End: 2020-09-25
Attending: EMERGENCY MEDICINE | Admitting: EMERGENCY MEDICINE
Payer: COMMERCIAL

## 2020-09-25 VITALS
OXYGEN SATURATION: 100 % | DIASTOLIC BLOOD PRESSURE: 97 MMHG | TEMPERATURE: 99 F | HEART RATE: 104 BPM | RESPIRATION RATE: 20 BRPM | SYSTOLIC BLOOD PRESSURE: 134 MMHG

## 2020-09-25 VITALS
TEMPERATURE: 97.7 F | SYSTOLIC BLOOD PRESSURE: 110 MMHG | DIASTOLIC BLOOD PRESSURE: 80 MMHG | OXYGEN SATURATION: 99 % | RESPIRATION RATE: 18 BRPM | HEART RATE: 112 BPM

## 2020-09-25 DIAGNOSIS — E83.42 HYPOMAGNESEMIA: ICD-10-CM

## 2020-09-25 DIAGNOSIS — E87.6 HYPOKALEMIA: ICD-10-CM

## 2020-09-25 DIAGNOSIS — F10.21 ALCOHOL DEPENDENCE IN REMISSION (H): Primary | ICD-10-CM

## 2020-09-25 LAB
ALBUMIN SERPL-MCNC: 2.6 G/DL (ref 3.4–5)
ALP SERPL-CCNC: 178 U/L (ref 40–150)
ALT SERPL W P-5'-P-CCNC: 38 U/L (ref 0–50)
ANION GAP SERPL CALCULATED.3IONS-SCNC: 11 MMOL/L (ref 3–14)
AST SERPL W P-5'-P-CCNC: 168 U/L (ref 0–45)
BILIRUB SERPL-MCNC: 0.7 MG/DL (ref 0.2–1.3)
BUN SERPL-MCNC: 2 MG/DL (ref 7–30)
CALCIUM SERPL-MCNC: 7.9 MG/DL (ref 8.5–10.1)
CHLORIDE SERPL-SCNC: 96 MMOL/L (ref 94–109)
CO2 SERPL-SCNC: 28 MMOL/L (ref 20–32)
CREAT SERPL-MCNC: 0.71 MG/DL (ref 0.52–1.04)
GFR SERPL CREATININE-BSD FRML MDRD: >90 ML/MIN/{1.73_M2}
GLUCOSE SERPL-MCNC: 86 MG/DL (ref 70–99)
MAGNESIUM SERPL-MCNC: 1.3 MG/DL (ref 1.6–2.3)
PHOSPHATE SERPL-MCNC: 3.8 MG/DL (ref 2.5–4.5)
POTASSIUM SERPL-SCNC: 2.5 MMOL/L (ref 3.4–5.3)
PROT SERPL-MCNC: 5.2 G/DL (ref 6.8–8.8)
SODIUM SERPL-SCNC: 135 MMOL/L (ref 133–144)
UPPER GI ENDOSCOPY: NORMAL

## 2020-09-25 PROCEDURE — 37000008 ZZH ANESTHESIA TECHNICAL FEE, 1ST 30 MIN: Performed by: INTERNAL MEDICINE

## 2020-09-25 PROCEDURE — 84100 ASSAY OF PHOSPHORUS: CPT | Performed by: NURSE ANESTHETIST, CERTIFIED REGISTERED

## 2020-09-25 PROCEDURE — 93005 ELECTROCARDIOGRAM TRACING: CPT | Performed by: NURSE ANESTHETIST, CERTIFIED REGISTERED

## 2020-09-25 PROCEDURE — 88305 TISSUE EXAM BY PATHOLOGIST: CPT | Performed by: INTERNAL MEDICINE

## 2020-09-25 PROCEDURE — 25000128 H RX IP 250 OP 636: Performed by: EMERGENCY MEDICINE

## 2020-09-25 PROCEDURE — 43239 EGD BIOPSY SINGLE/MULTIPLE: CPT | Performed by: INTERNAL MEDICINE

## 2020-09-25 PROCEDURE — 80053 COMPREHEN METABOLIC PANEL: CPT | Performed by: NURSE ANESTHETIST, CERTIFIED REGISTERED

## 2020-09-25 PROCEDURE — 99284 EMERGENCY DEPT VISIT MOD MDM: CPT | Mod: Z6 | Performed by: EMERGENCY MEDICINE

## 2020-09-25 PROCEDURE — 88305 TISSUE EXAM BY PATHOLOGIST: CPT | Mod: 26 | Performed by: INTERNAL MEDICINE

## 2020-09-25 PROCEDURE — 96367 TX/PROPH/DG ADDL SEQ IV INF: CPT | Performed by: EMERGENCY MEDICINE

## 2020-09-25 PROCEDURE — 25000132 ZZH RX MED GY IP 250 OP 250 PS 637: Performed by: EMERGENCY MEDICINE

## 2020-09-25 PROCEDURE — 83735 ASSAY OF MAGNESIUM: CPT | Performed by: NURSE ANESTHETIST, CERTIFIED REGISTERED

## 2020-09-25 PROCEDURE — 96365 THER/PROPH/DIAG IV INF INIT: CPT | Performed by: EMERGENCY MEDICINE

## 2020-09-25 PROCEDURE — G0500 MOD SEDAT ENDO SERVICE >5YRS: HCPCS | Performed by: INTERNAL MEDICINE

## 2020-09-25 PROCEDURE — 25000125 ZZHC RX 250: Performed by: INTERNAL MEDICINE

## 2020-09-25 PROCEDURE — 99284 EMERGENCY DEPT VISIT MOD MDM: CPT | Mod: 25 | Performed by: EMERGENCY MEDICINE

## 2020-09-25 PROCEDURE — 37000009 ZZH ANESTHESIA TECHNICAL FEE, EACH ADDTL 15 MIN: Performed by: INTERNAL MEDICINE

## 2020-09-25 PROCEDURE — 25000128 H RX IP 250 OP 636: Performed by: NURSE ANESTHETIST, CERTIFIED REGISTERED

## 2020-09-25 PROCEDURE — 40000809 ZZH STATISTIC NO DOCUMENTATION TO SUPPORT CHARGE: Performed by: INTERNAL MEDICINE

## 2020-09-25 PROCEDURE — 36415 COLL VENOUS BLD VENIPUNCTURE: CPT | Performed by: NURSE ANESTHETIST, CERTIFIED REGISTERED

## 2020-09-25 PROCEDURE — 40000296 ZZH STATISTIC ENDO RECOVERY CLASS 1:2 FIRST HOUR: Performed by: INTERNAL MEDICINE

## 2020-09-25 RX ORDER — POTASSIUM CL/LIDO/0.9 % NACL 10MEQ/0.1L
10 INTRAVENOUS SOLUTION, PIGGYBACK (ML) INTRAVENOUS
Status: DISCONTINUED | OUTPATIENT
Start: 2020-09-25 | End: 2020-09-25 | Stop reason: HOSPADM

## 2020-09-25 RX ORDER — POTASSIUM CHLORIDE 1500 MG/1
20 TABLET, EXTENDED RELEASE ORAL DAILY
Qty: 5 TABLET | Refills: 0 | Status: SHIPPED | OUTPATIENT
Start: 2020-09-25 | End: 2020-09-30

## 2020-09-25 RX ORDER — LIDOCAINE 40 MG/G
CREAM TOPICAL
Status: CANCELLED | OUTPATIENT
Start: 2020-09-25

## 2020-09-25 RX ORDER — MULTIVITAMIN WITH IRON
1 TABLET ORAL DAILY
Qty: 5 TABLET | Refills: 0 | Status: SHIPPED | OUTPATIENT
Start: 2020-09-25 | End: 2020-09-30

## 2020-09-25 RX ORDER — LIDOCAINE 40 MG/G
CREAM TOPICAL
Status: DISCONTINUED | OUTPATIENT
Start: 2020-09-25 | End: 2020-09-25 | Stop reason: HOSPADM

## 2020-09-25 RX ORDER — NALOXONE HYDROCHLORIDE 0.4 MG/ML
.1-.4 INJECTION, SOLUTION INTRAMUSCULAR; INTRAVENOUS; SUBCUTANEOUS
Status: DISCONTINUED | OUTPATIENT
Start: 2020-09-25 | End: 2020-09-25 | Stop reason: HOSPADM

## 2020-09-25 RX ORDER — MAGNESIUM SULFATE 1 G/100ML
1 INJECTION INTRAVENOUS ONCE
Status: COMPLETED | OUTPATIENT
Start: 2020-09-25 | End: 2020-09-25

## 2020-09-25 RX ORDER — POTASSIUM CHLORIDE 1500 MG/1
20 TABLET, EXTENDED RELEASE ORAL ONCE
Status: COMPLETED | OUTPATIENT
Start: 2020-09-25 | End: 2020-09-25

## 2020-09-25 RX ORDER — SODIUM CHLORIDE, SODIUM LACTATE, POTASSIUM CHLORIDE, CALCIUM CHLORIDE 600; 310; 30; 20 MG/100ML; MG/100ML; MG/100ML; MG/100ML
INJECTION, SOLUTION INTRAVENOUS CONTINUOUS
Status: DISCONTINUED | OUTPATIENT
Start: 2020-09-25 | End: 2020-09-25 | Stop reason: HOSPADM

## 2020-09-25 RX ORDER — FLUMAZENIL 0.1 MG/ML
0.2 INJECTION, SOLUTION INTRAVENOUS
Status: DISCONTINUED | OUTPATIENT
Start: 2020-09-25 | End: 2020-09-25 | Stop reason: HOSPADM

## 2020-09-25 RX ORDER — LIDOCAINE HYDROCHLORIDE 20 MG/ML
INJECTION, SOLUTION INFILTRATION; PERINEURAL PRN
Status: DISCONTINUED | OUTPATIENT
Start: 2020-09-25 | End: 2020-09-25

## 2020-09-25 RX ORDER — FENTANYL CITRATE 50 UG/ML
INJECTION, SOLUTION INTRAMUSCULAR; INTRAVENOUS PRN
Status: DISCONTINUED | OUTPATIENT
Start: 2020-09-25 | End: 2020-09-25

## 2020-09-25 RX ORDER — LIDOCAINE HYDROCHLORIDE 20 MG/ML
SOLUTION OROPHARYNGEAL PRN
Status: DISCONTINUED | OUTPATIENT
Start: 2020-09-25 | End: 2020-09-25 | Stop reason: HOSPADM

## 2020-09-25 RX ORDER — ONDANSETRON 2 MG/ML
4 INJECTION INTRAMUSCULAR; INTRAVENOUS
Status: DISCONTINUED | OUTPATIENT
Start: 2020-09-25 | End: 2020-09-25 | Stop reason: HOSPADM

## 2020-09-25 RX ORDER — ONDANSETRON 2 MG/ML
4 INJECTION INTRAMUSCULAR; INTRAVENOUS EVERY 6 HOURS PRN
Status: DISCONTINUED | OUTPATIENT
Start: 2020-09-25 | End: 2020-09-25 | Stop reason: HOSPADM

## 2020-09-25 RX ORDER — ONDANSETRON 4 MG/1
4 TABLET, ORALLY DISINTEGRATING ORAL EVERY 6 HOURS PRN
Status: DISCONTINUED | OUTPATIENT
Start: 2020-09-25 | End: 2020-09-25 | Stop reason: HOSPADM

## 2020-09-25 RX ADMIN — MAGNESIUM SULFATE IN DEXTROSE 1 G: 10 INJECTION, SOLUTION INTRAVENOUS at 16:36

## 2020-09-25 RX ADMIN — LIDOCAINE HYDROCHLORIDE 5 ML: 20 SOLUTION ORAL; TOPICAL at 15:22

## 2020-09-25 RX ADMIN — FENTANYL CITRATE 100 MCG: 50 INJECTION, SOLUTION INTRAMUSCULAR; INTRAVENOUS at 14:56

## 2020-09-25 RX ADMIN — POTASSIUM CHLORIDE 20 MEQ: 1500 TABLET, EXTENDED RELEASE ORAL at 16:36

## 2020-09-25 RX ADMIN — Medication 10 MEQ: at 16:55

## 2020-09-25 NOTE — LETTER
[address] [address]           Tel [phone] [address] [place] [address]    September 29, 2020    Dear Tasha,     This letter is to inform you of the results of your pathology report on your stomach biopsies.    The ulcers were benign and probably caused by your use of ibuprofen or naproxen.  The bacteria that can cause ulcers was not detected.    The ulcers and inflammation should heal on the pantoprazole you are currently using.  You should take this indefinitely.    If you have any questions or concerns please do not hesitate to call my office at [phone].    Sincerely,         Ronan Pelayo MD  Gastroenterology

## 2020-09-25 NOTE — PROVIDER NOTIFICATION
Report given to SERA Quarles, prior to transferring patient to ED.  Grandmother with.  Plan for potassium replacement then patient would really like to go home this evening.

## 2020-09-25 NOTE — ED AVS SNAPSHOT
[facility]  [address] [place] [address]  Phone:  [phone]  Fax:  [phone]                                    Tasha Short   MRN: [id number]    Department:  [facility] Department   Date of Visit:  9/25/2020           After Visit Summary Signature Page    I have received my discharge instructions, and my questions have been answered. I have discussed any challenges I see with this plan with the nurse or doctor.    ..........................................................................................................................................  Patient/Patient Representative Signature      ..........................................................................................................................................  Patient Representative Print Name and Relationship to Patient    ..................................................               ................................................  Date                                   Time    ..........................................................................................................................................  Reviewed by Signature/Title    ...................................................              ..............................................  Date                                               Time          22EPIC Rev 08/18

## 2020-09-25 NOTE — DISCHARGE INSTRUCTIONS
The supplemental potassium and magnesium as directed.  See your doctor next week to recheck those values.  With your taking the PPI for your stomach issues you may need to be on a magnesium supplement during the treatment.

## 2020-09-25 NOTE — ANESTHESIA PREPROCEDURE EVALUATION
Anesthesia Pre-Procedure Evaluation    Patient: Tasha Short   MRN: [id number] : 1980          Preoperative Diagnosis: Acute GI bleeding [K92.2]  Hemoglobin decreased [R71.0]  Anemia [D64.9]    Procedure(s):  Colonoscopy with possible biopsy and/or polypectomy  Esophagogastroduodenoscopy with possible biopsy, polypectomy, dilation, and/or foreign body removal    Past Medical History:   Diagnosis Date     ALCOHOL ABUSE-IN REMISS 2007     Cataplexy and narcolepsy     tried Provigil, Straterra, Ritalin (works)     ROSA 3 - cervical intraepithelial neoplasia grade 3 1/4/10    ROSA 2/3  on LEEP biopsy     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     Hypersomnia with sleep apnea      Irregular menstrual cycle 1997     Metrorrhagia 2001     Other acne      Other and unspecified adverse effect of drug, medicinal and biological substance 2001    Allergic and adverse reaction to Dilantin     Substance abuse (H) 10/2/2009    see 2009 confidential report     Past Surgical History:   Procedure Laterality Date      SECTION       COLPOSCOPY CERVIX, LOOP ELECTRODE BIOPSY, COMBINED  2010    ROSA 2/3     HC REMOVAL OF TONSILS,12+ Y/O      Tonsils 12+y.o.     REPAIR TENDON PERONEAL  11/15/2013    Procedure: REPAIR TENDON PERONEAL;  right peroneal tendon  transfer;  Surgeon: Jesus Manuel Oden DPM;  Location: [facility] OR       Anesthesia Evaluation     . Pt has had prior anesthetic. Type: MAC, General and Regional    No history of anesthetic complications          ROS/MED HX    ENT/Pulmonary:  - neg pulmonary ROS   (+)tobacco use, Current use , . .    Neurologic:  - neg neurologic ROS     Cardiovascular:     (+) hypertension-range: < 140 / 90, ---. : . . . :. .       METS/Exercise Tolerance:     Hematologic:  - neg hematologic  ROS       Musculoskeletal: Comment: Right peroneal tendon damage - neg musculoskeletal ROS       GI/Hepatic:  - neg  GI/hepatic ROS      (-) GERD   Renal/Genitourinary:  - ROS Renal section negative       Endo:  - neg endo ROS       Psychiatric:  - neg psychiatric ROS   (+) psychiatric history (hx ETOH abuse ) depression      Infectious Disease:  - neg infectious disease ROS       Malignancy:      - no malignancy   Other:    - neg other ROS                      Physical Exam  Normal systems: cardiovascular and pulmonary    Airway   Mallampati: II  TM distance: >3 FB  Neck ROM: full    Dental   Comment: Cap upper right front tooth, crowns multiple posterior    Cardiovascular   Rhythm and rate: regular and normal      Pulmonary    breath sounds clear to auscultation            Lab Results   Component Value Date    WBC 4.4 09/22/2020    HGB 10.7 (L) 09/22/2020    HCT 29.8 (L) 09/22/2020     09/22/2020    CRP <2.9 09/22/2020    SED 8 09/22/2020     (L) 09/22/2020    POTASSIUM 3.1 (L) 09/22/2020    CHLORIDE 92 (L) 09/22/2020    CO2 25 09/22/2020    BUN 4 (L) 09/22/2020    CR 0.67 09/22/2020     (H) 09/22/2020    JANET 7.8 (L) 09/22/2020    PHOS 2.0 (L) 09/17/2020    MAG 1.8 09/18/2020    ALBUMIN 3.0 (L) 09/22/2020    PROTTOTAL 5.7 (L) 09/22/2020    ALT 42 09/22/2020     (H) 09/22/2020    GGT 22 04/18/2016    ALKPHOS 200 (H) 09/22/2020    BILITOTAL 0.7 09/22/2020    LIPASE 200 09/17/2020    AMYLASE 16 (L) 09/17/2020    PTT 25 06/03/2016    INR 0.97 06/03/2016    TSH 2.78 09/14/2020    T4 0.79 07/09/2009    HCG Negative 11/18/2019       Preop Vitals  BP Readings from Last 3 Encounters:   09/22/20 (!) 86/70   09/18/20 (!) 144/106   09/14/20 102/64    Pulse Readings from Last 3 Encounters:   09/22/20 106   09/18/20 133   09/14/20 98      Resp Readings from Last 3 Encounters:   09/22/20 20   09/18/20 20   09/14/20 14    SpO2 Readings from Last 3 Encounters:   09/22/20 99%   09/18/20 100%   09/14/20 100%      Temp Readings from Last 1 Encounters:   09/22/20 97.8  F (36.6  C) (Temporal)    Ht Readings from Last 1  "Encounters:   09/22/20 1.63 m (5' 4.17\")      Wt Readings from Last 1 Encounters:   09/22/20 46.5 kg (102 lb 9.6 oz)    Estimated body mass index is 17.52 kg/m  as calculated from the following:    Height as of 9/22/20: 1.63 m (5' 4.17\").    Weight as of 9/22/20: 46.5 kg (102 lb 9.6 oz).       Anesthesia Plan      History & Physical Review  History and physical reviewed and following examination; no interval change.    ASA Status:  2 .    NPO Status:  > 6 hours    Plan for MAC with Intravenous and Propofol induction. Maintenance will be TIVA.  Reason for MAC:  Deep or markedly invasive procedure (G8) and Procedure to face, neck, head or breast      The patient is a current Smoker     Postoperative Care      Consents  Anesthetic plan, risks, benefits and alternatives discussed with:  Patient.  Use of blood products discussed: No .   .                 FLORES Richardson CRNA  "

## 2020-09-25 NOTE — TELEPHONE ENCOUNTER
Called patient left message to call back. When call is returned please relay message below.    Rohit Kern MA on 9/25/2020 at 3:23 PM      Please call patient.  There are degenerative changes of her lumbar spine, there is a small disc extrusion at L5-S1 level, there are mild disc bulging and loss of height throughout, there is a 2 cm lesion on the right adrenal gland uncertain the significance of this, there are simple kidney cysts which are not likely concerning, there are stones in her gallbladder. Defer to PCP for follow-up, appears she already has a follow-up and can discuss these results in more detail at that time.     Ninfa Pena PA-C

## 2020-09-25 NOTE — ANESTHESIA CARE TRANSFER NOTE
Patient: Tasha Short    Procedure(s):  Esophagogastroduodenoscopy with biopsies    Diagnosis: Acute GI bleeding [K92.2]  Hemoglobin decreased [R71.0]  Anemia [D64.9]  Diagnosis Additional Information: No value filed.    Anesthesia Type:   MAC     Note:  Airway :Room Air  Destination: Procedure changed to local- report to OR RN.        Vitals: (Last set prior to Anesthesia Care Transfer)    CRNA VITALS  9/25/2020 1500 - 9/25/2020 1543      9/25/2020             Pulse:  103    SpO2:  100 %                Electronically Signed By: FLORES Castano CRNA  September 25, 2020  3:43 PM

## 2020-09-25 NOTE — ED PROVIDER NOTES
History     Chief Complaint   Patient presents with     Abnormal Labs     HPI  Tasha Short is a 39 year old female who presents from same-day surgery after upper endoscopy with concerns for low potassium.  Preoperatively her potassium was 3.1.  On check here today was down to 2.5.  After procedure brought for potassium replacement.  Magnesium was checked and is also low so that will be replaced.  She has a previous history of alcohol abuse.  She is a smoker.  No recent illness.  Was recently admitted to the hospital for suspected GI bleed.  At that time she did not want scoping but wanted to take a PPI and avoid NSAID products.  In department patient is without complaints.  No recent evidence of GI bleeding.  Hemoglobin from 3 days ago was 10.7.  Normal platelets.    Allergies:  Allergies   Allergen Reactions     Bupropion Other (See Comments)     seizures     Lisinopril Swelling     Angioedema      Penicillins      hives     Phenytoin      rash,puritis (Dilantin)     Prednisone Itching     Face itching, unable to concentrate      Seasonal Allergies        Problem List:    Patient Active Problem List    Diagnosis Date Noted     Health Care Home 06/08/2011     Priority: High     x  DX V65.8 REPLACED WITH 53755 HEALTH CARE HOME (04/08/2013)       Anemia due to blood loss, acute 09/23/2020     Priority: Medium     Diarrhea, unspecified type 09/23/2020     Priority: Medium     GI bleed 09/16/2020     Priority: Medium     Acute GI bleeding 09/16/2020     Priority: Medium     Weight loss 09/15/2020     Priority: Medium     Anemia, unspecified type 09/15/2020     Priority: Medium     Hemoglobin decreased 09/15/2020     Priority: Medium     Closed displaced fracture of metatarsal bone of right foot with delayed healing, unspecified metatarsal, subsequent encounter 09/14/2020     Priority: Medium     Opiate addiction (H) 09/10/2020     Priority: Medium     Alcohol withdrawal seizure (H) 09/10/2020     Priority:  Medium     Encounter for surveillance of injectable contraceptive 08/08/2019     Priority: Medium     Alcohol dependence in remission (H) 04/03/2019     Priority: Medium     Hypertriglyceridemia 05/08/2018     Priority: Medium     Chronic seasonal allergic rhinitis due to pollen 05/08/2018     Priority: Medium     Gastroesophageal reflux disease without esophagitis 06/19/2017     Priority: Medium     H/O ETOH abuse 05/15/2017     Priority: Medium     Right hip pain 10/20/2016     Priority: Medium     Narcolepsy 10/20/2016     Priority: Medium     Alcohol-induced mood disorder (H) 08/04/2016     Priority: Medium     Irregular heartbeat 04/18/2016     Priority: Medium     Tobacco use disorder 04/18/2016     Priority: Medium     Acute coronary syndrome (H) 04/14/2016     Priority: Medium     HTN, goal below 140/90 11/24/2014     Priority: Medium     Major depressive disorder, recurrent episode, moderate (H) 10/22/2014     Priority: Medium     Generalized anxiety disorder 10/22/2014     Priority: Medium     Hypersomnia 04/12/2013     Priority: Medium     Calcific tendonitis peroneals 06/21/2012     Priority: Medium     Narcolepsy and cataplexy      Priority: Medium     S/P LEEP 11/17/2010     Priority: Medium     12/14/09 ASCUS + HPV 18, 53, 58 (high risk)  1/13/10 colposcopy- bx (dysplasia) ECC (negative) recommend repeat pap at 6 and 12 months  6/28/10 pap ASCUS + HPV 16 (high risk) recommend colpo  7/21/10 colposcopy- BX ( ROSA 2/3 with gland involvement)  8/2/10 clinic appt to discuss LEEP  10/26/10 tele enc: LEEP scheduled for 11/4/10  11/4/10 LEEP- (ROSA 2/3) not extending to margins.  ECC (negative). Plan: pap in 6 mo.   5/9/11- NIL pap. Plan: pap in 6 mo  10/8/13 NIL pap, neg HR HPV. Plan: pap in 3 years.  5/22/15 NIL pap, neg HR HPV. Plan: pap in 3 years.  4/2/19 NIL pap, + HR HPV (not 16 or 18). Plan: cotest in 1 yr.  4/8/19 left msg   CCT tracking.               Past Medical History:    Past Medical History:    Diagnosis Date     ALCOHOL ABUSE-IN REMISS 2007     Cataplexy and narcolepsy      ROSA 3 - cervical intraepithelial neoplasia grade 3 1/4/10     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     Hypersomnia with sleep apnea      Irregular menstrual cycle 1997     Metrorrhagia 2001     Other acne      Other and unspecified adverse effect of drug, medicinal and biological substance 2001     Substance abuse (H) 10/2/2009       Past Surgical History:    Past Surgical History:   Procedure Laterality Date      SECTION       COLPOSCOPY CERVIX, LOOP ELECTRODE BIOPSY, COMBINED  2010    ROSA 2/3     HC REMOVAL OF TONSILS,12+ Y/O      Tonsils 12+y.o.     REPAIR TENDON PERONEAL  11/15/2013    Procedure: REPAIR TENDON PERONEAL;  right peroneal tendon  transfer;  Surgeon: Jesus Manuel Oden DPM;  Location: [facility]       Family History:    Family History   Problem Relation Age of Onset     Depression Mother      Arthritis Paternal Grandmother      Hypertension Paternal Grandfather         birth FF     Asthma No family hx of      C.A.D. No family hx of      Diabetes No family hx of      Cancer - colorectal No family hx of      Prostate Cancer No family hx of      Coronary Artery Disease No family hx of      Ovarian Cancer No family hx of      Mental Illness No family hx of      Cerebrovascular Disease No family hx of      Anesthesia Reaction No family hx of      Other Cancer No family hx of      Osteoporosis No family hx of      Known Genetic Syndrome No family hx of      Obesity No family hx of      Unknown/Adopted No family hx of        Social History:  Marital Status:  Single [1]  Social History     Tobacco Use     Smoking status: Current Every Day Smoker     Packs/day: 0.25     Smokeless tobacco: Never Used   Substance Use Topics     Alcohol use: No     Drug use: No        Medications:    magnesium 250 MG tablet  potassium chloride ER (KLOR-CON M) 20  MEQ CR tablet  busPIRone (BUSPAR) 5 MG tablet  cyclobenzaprine (FLEXERIL) 10 MG tablet  EPINEPHrine (ANY BX GENERIC EQUIV) 0.3 MG/0.3ML injection 2-pack  flunisolide (NASALIDE) 25 MCG/ACT (0.025%) SOLN spray  folic acid (FOLVITE) 400 MCG tablet  gabapentin (NEURONTIN) 100 MG capsule  lithium ER (LITHOBID) 300 MG CR tablet  methylphenidate (RITALIN) 10 MG tablet  metoprolol succinate ER (TOPROL-XL) 50 MG 24 hr tablet  naltrexone (DEPADE/REVIA) 50 MG tablet  nicotine (NICORETTE) 2 MG gum  pantoprazole (PROTONIX) 40 MG EC tablet  potassium chloride ER (K-TAB) 20 MEQ CR tablet  Prenatal 27-1 MG TABS  prochlorperazine (COMPAZINE) 5 MG tablet          Review of Systems all other systems are reviewed and are negative.    Physical Exam   BP: 114/83  Pulse: 120  Temp: 97.7  F (36.5  C)  Resp: 18  SpO2: 99 %      Physical Exam general alert female somewhat brusk requesting that she leave quickly.  Neurologically nonfocal exam.  No evidence of tetany.  No lag on DTRs.    ED Course        Procedures               Critical Care time:  none               Results for orders placed or performed during the hospital encounter of 09/25/20 (from the past 24 hour(s))   UPPER GI ENDOSCOPY   Result Value Ref Range    Upper GI Endoscopy       [address] [address] [phone]     Endoscopy Department  _______________________________________________________________________________  Patient Name: Tasha Short        Procedure Date: 9/25/2020 2:28 PM  MRN: [id number]                       Account Number: [id number]  YOB: 1980              Admit Type: Outpatient  Age: 39                               Room: Room 1  Gender: Female                        Note Status: Finalized  Attending MD: Ronan Pelayo MD Total Sedation Time:   _______________________________________________________________________________     Procedure:           Upper GI endoscopy  Indications:          Anemia secondary to chronic blood loss, Heme positive                        stool, Epigastric pain, Nausea  Providers:           Ronan Pelayo MD  Referring MD:        NING Metz  Medicines:           See the Anesthesia note for documentation of the                         administered medications, Lidocaine gel  Complications:       No immediate complications.  _______________________________________________________________________________  Procedure:           Pre-Anesthesia Assessment:                       - Prior to the procedure, a Historywas performed, and                        patient medications, allergies and sensitivities were                        reviewed. The patient's tolerance of previous anesthesia                        was reviewed.                       - ASA Grade Assessment: per anesthesia.                       After obtaining informed consent, the endoscope was                        passed under direct vision. Throughout the procedure,                        the patient's blood pressure, pulse, and oxygen                        saturations were monitored continuously. The Endoscope                        was introduced through the mouth, and advanced to the                        third part of duodenum.                                                                                    Findings:       Several long linear ulcerations were noted in the mid and distal        esophagus. Streaks of erythema and erosions were noted at the GE        junction.       Intense erythema was noted in the distal stomach. A 6-mm hgastric ulcer        was seen in the pre-pyloric antrum, as well as multiple heme-stained        erosions. Biopsies were taken.       Two duodenal bulb ulcers were seen, measuring 4 mm and 10 mm. These both        had clean white bases. The duodenal bulb and proximal 2nd portion of the        duodenum were erythematous, edematous, and  distorted.                                                                                   Impression:          - Ulcerative reflux esophagitis.                       - Gastric ulcer and erosive gastropathy.                       - Duodenal ulcers and duodenitis.  Recommendation:      - Await pathology results. Treat H pylori if positive.                        - Avoid aspirin, NSAIDs and alcohol.                       - Use Protonix (pantoprazole) 40 mg PO BID for 8 weeks.                       - Hypokalemia will be addressed. Disposition to be                        determined.                                                                                     Signed electronically by Ronan Pelayo  ______________________  Ronan Pelayo MD  9/25/2020 3:37:16 PM  I was physically present for the entire viewing portion of the exam.Ronan Pelayo MD  Number of Addenda: 0    Note Initiated On: 9/25/2020 2:28 PM     Comprehensive metabolic panel   Result Value Ref Range    Sodium 135 133 - 144 mmol/L    Potassium 2.5 (LL) 3.4 - 5.3 mmol/L    Chloride 96 94 - 109 mmol/L    Carbon Dioxide 28 20 - 32 mmol/L    Anion Gap 11 3 - 14 mmol/L    Glucose 86 70 - 99 mg/dL    Urea Nitrogen 2 (L) 7 - 30 mg/dL    Creatinine 0.71 0.52 - 1.04 mg/dL    GFR Estimate >90 >60 mL/min/[1.73_m2]    GFR Estimate If Black >90 >60 mL/min/[1.73_m2]    Calcium 7.9 (L) 8.5 - 10.1 mg/dL    Bilirubin Total 0.7 0.2 - 1.3 mg/dL    Albumin 2.6 (L) 3.4 - 5.0 g/dL    Protein Total 5.2 (L) 6.8 - 8.8 g/dL    Alkaline Phosphatase 178 (H) 40 - 150 U/L    ALT 38 0 - 50 U/L     (H) 0 - 45 U/L   Magnesium   Result Value Ref Range    Magnesium 1.3 (L) 1.6 - 2.3 mg/dL   Phosphorus   Result Value Ref Range    Phosphorus 3.8 2.5 - 4.5 mg/dL       Medications   potassium chloride 10 mEq in 100 mL intermittent infusion with 10 mg lidocaine (10 mEq Intravenous New Bag 9/25/20 2256)   potassium chloride ER (KLOR-CON M) CR tablet 20 mEq (20 mEq  Oral Given 9/25/20 1636)   magnesium sulfate 1 gm in 100mL D5W intermittent infusion (0 g Intravenous Stopped 9/25/20 1654)       Assessments & Plan (with Medical Decision Making)   Tasha Short is a 39 year old female who presents from same-day surgery after upper endoscopy with concerns for low potassium.  Preoperatively her potassium was 3.1.  On check here today was down to 2.5.  After procedure brought for potassium replacement.  Magnesium was checked and is also low so that will be replaced.  She has a previous history of alcohol abuse.  She is a smoker.  No recent illness.  Was recently admitted to the hospital for suspected GI bleed.  At that time she did not want scoping but wanted to take a PPI and avoid NSAID products.  In department patient is without complaints.  No recent evidence of GI bleeding.  Hemoglobin from 3 days ago was 10.7.  Normal platelets.  On presentation she is mildly tachycardic but vitally stable.  Exam was unremarkable.  Her potassium was indeed low as mentioned.  Her magnesium was also low at 1.3.  Normal phosphorus.  She was given both IV and oral potassium and IV magnesium.  She is given additional dosages for home use.  She will follow-up next week with her doctor to have these levels rechecked.  If she is going to be on a PPI twice daily for 8 weeks she may need some magnesium supplementation.  Reasons to return to emergency room were discussed.  I have reviewed the nursing notes.    I have reviewed the findings, diagnosis, plan and need for follow up with the patient.       New Prescriptions    MAGNESIUM 250 MG TABLET    Take 1 tablet (250 mg) by mouth daily for 5 days    POTASSIUM CHLORIDE ER (KLOR-CON M) 20 MEQ CR TABLET    Take 1 tablet (20 mEq) by mouth daily for 5 days       Final diagnoses:   Hypokalemia   Hypomagnesemia       9/25/2020   [facility] EMERGENCY DEPARTMENT     Live Serrato MD  09/25/20 9338

## 2020-09-25 NOTE — DISCHARGE INSTRUCTIONS
[facility]    Home Care Following Endoscopy          Activity:    You have just undergone an endoscopic procedure usually performed with conscious sedation.  Do not work or operate machinery (including a car) for at least 12 hours.      I encourage you to walk and attempt to pass this air as soon as possible.    Diet:    Return to the diet you were on before your procedure but eat lightly for the first 12-24 hours.    Drink plenty of water.    Resume any regular medications unless otherwise advised by your physician.  Please begin any new medication prescribed as a result of your procedure as directed by your physician.     If you had any biopsy or polyp removed please refrain from aspirin or aspirin products for 2 days.  If on Coumadin please restart as instructed by your physician.   Pain:    You may take Tylenol as needed for pain.  Expected Recovery:    You can expect some mild abdominal fullness and/or discomfort due to the air used to inflate your intestinal tract. It is also normal to have a mild sore throat after upper endoscopy.    Call Your Physician if You Have:    After Upper Endoscopy:  o Shoulder, back or chest pain.  o Difficulty breathing or swallowing.  o Vomiting blood.  Any questions or concerns about your recovery, please call [phone] or after hours [phone] Nurse Advice Line.    Follow-up Care:    You should receive a call or letter with your results within 1 week. Please call if you have not received a notification of your results.  If asked to return to clinic please make an appointment 1 week after your procedure.  Call [phone].

## 2020-09-25 NOTE — ANESTHESIA POSTPROCEDURE EVALUATION
Patient: Tasha Short    Procedure(s):  Esophagogastroduodenoscopy with biopsies    Diagnosis:Acute GI bleeding [K92.2]  Hemoglobin decreased [R71.0]  Anemia [D64.9]  Diagnosis Additional Information: No value filed.    Anesthesia Type:  MAC    Note:  Anesthesia Post Evaluation    Patient location during evaluation: Phase 2 and Bedside  Patient participation: Able to fully participate in evaluation  Level of consciousness: awake  Pain management: adequate  Airway patency: patent  Cardiovascular status: acceptable and hemodynamically stable  Respiratory status: acceptable, room air and nonlabored ventilation  Hydration status: stable  PONV: none     Anesthetic complications: None    Comments: Pt will be going to the ED for K+ replacement for a K+ of 2.5.  I will plan on ordering a EKG per request from Dr. Pacheco.        Last vitals:  Vitals:    09/25/20 1440 09/25/20 1521 09/25/20 1525   BP: 110/70 (!) 131/104 (!) 135/107   Pulse: 92     Resp: 18 20 20   Temp: 99  F (37.2  C)     SpO2:  100% 100%         Electronically Signed By: FLORES Castano CRNA  September 25, 2020  3:45 PM

## 2020-09-25 NOTE — CONSULTS
[facility] GI Pre-Procedure Physical Assessment    Tasha Short MRN# [id number]   Age: 39 year old YOB: 1980      Date of Surgery: 9/25/2020  Location [facility]      Date of Exam 9/25/2020 Facility (Same day)       Home clinic: [facility]  Primary care provider: Enid Mansfield         Active problem list:   Patient Active Problem List   Diagnosis     S/P LEEP     Narcolepsy and cataplexy     Health Care Home     Calcific tendonitis peroneals     Hypersomnia     Major depressive disorder, recurrent episode, moderate (H)     Generalized anxiety disorder     HTN, goal below 140/90     Irregular heartbeat     Tobacco use disorder     H/O ETOH abuse     Gastroesophageal reflux disease without esophagitis     Hypertriglyceridemia     Chronic seasonal allergic rhinitis due to pollen     Alcohol dependence in remission (H)     Encounter for surveillance of injectable contraceptive     Right hip pain     Opiate addiction (H)     Narcolepsy     Alcohol-induced mood disorder (H)     Alcohol withdrawal seizure (H)     Acute coronary syndrome (H)     Closed displaced fracture of metatarsal bone of right foot with delayed healing, unspecified metatarsal, subsequent encounter     Weight loss     Anemia, unspecified type     Hemoglobin decreased     GI bleed     Acute GI bleeding     Anemia due to blood loss, acute     Diarrhea, unspecified type            Medications (include herbals and vitamins):   Any Plavix use in the last 7 days?  No     Current Facility-Administered Medications   Medication     lactated ringers infusion     lidocaine (LMX4) kit     lidocaine 1 % 0.1-1 mL     ondansetron (ZOFRAN) injection 4 mg     sodium chloride (PF) 0.9% PF flush 3 mL     sodium chloride (PF) 0.9% PF flush 3 mL             Allergies:      Allergies   Allergen Reactions     Bupropion Other (See Comments)     seizures     Lisinopril Swelling     Angioedema       Penicillins      hives     Phenytoin      rash,puritis (Dilantin)     Prednisone Itching     Face itching, unable to concentrate      Seasonal Allergies      Allergy to Latex?  No  Allergy to tape?    No          Social History:     Social History     Tobacco Use     Smoking status: Current Every Day Smoker     Packs/day: 0.25     Smokeless tobacco: Never Used   Substance Use Topics     Alcohol use: No            Physical Exam:   All vitals have been reviewed    not currently breastfeeding.    Airway assessment:   Patient is able to open mouth wide  Patient is able to stick out tongue    Lungs:   No increased work of breathing, good air exchange, clear to auscultation bilaterally, no crackles or wheezing      Cardiovascular:   Normal apical impulse, regular rate and rhythm, normal S1 and S2, no S3 or S4, and no murmur noted           Lab / Radiology Results:   All laboratory data reviewed          Assessment:   Appropriately NPO  Chief complaint or anatomic assessment of involved area: GI bleed, anemia         Plan:   MAC     Patient's active problems diagnostically and therapeutically optimized for the planned procedure  Risks, benefits, alternatives to sedation and blood explained and consent obtained  Risks, benefits, alternatives to procedure explained and consent obtained  Orders and progress notes are in the chart  Discharge from Phase 1 and / or Phase 2 recovery when patient meets criteria    I have reviewed the history and physical, lab finding(s), diagnostic data, medicaitons, and the plan for sedation.  I have determined this patient to be an appropriate candidate for the planned sedation / procedure and have reassessed the patient immediately prior to sedation / procedure.    I have personally and medically directed the administration of medications used.    Ronan Pelayo MD

## 2020-09-27 ENCOUNTER — MYC MEDICAL ADVICE (OUTPATIENT)
Dept: FAMILY MEDICINE | Facility: OTHER | Age: 40
End: 2020-09-27

## 2020-09-28 ENCOUNTER — PATIENT OUTREACH (OUTPATIENT)
Dept: CARE COORDINATION | Facility: CLINIC | Age: 40
End: 2020-09-28

## 2020-09-28 NOTE — TELEPHONE ENCOUNTER
Contacted patient to notify her that Donnie Block is out of the office until next week but could have her speak or see one of the covering providers and she stated she would just wait until her scheduled appointment with Donnie on 10/06.

## 2020-09-28 NOTE — LETTER
[facility] CARE COORDINATION  [address] [facility] [address]  [facility] [address]    September 29, 2020    Tasha Short  [address] [place] [address]      Dear Tasha,    I am a clinic community health worker who works with Enid Mansfield PA-C at [facility]. I have been trying to reach you recently to introduce Clinic Care Coordination and to see if there was anything I could assist you with.  Below is a description of clinic care coordination and how I can further assist you.      The clinic care coordination team is made up of a registered nurse,  and community health worker who understand the health care system. The goal of clinic care coordination is to help you manage your health and improve access to the health care system in the most efficient manner. The team can assist you in meeting your health care goals by providing education, coordinating services, strengthening the communication among your providers and supporting you with any resource needs.    Please feel free to contact me at [phone] with any questions or concerns. We are focused on providing you with the highest-quality healthcare experience possible and that all starts with you.     Sincerely,     Irma GOMES, Community Health Worker  Clinic Care Coordination  [facility] : [facility] [place]  Phone: [phone]

## 2020-09-28 NOTE — PROGRESS NOTES
Clinic Care Coordination Contact  [facility]/Voicemail       Clinical Data: CHW Outreach  Outreach attempted x 1. Left message on patient's voicemail with call back information and requested return call.    Plan: CHW will try to reach patient again in 1-2 business days.    Irma GOMES Community Health Worker  Clinic Care Coordination  [facility] [place] [facility]  Phone: [phone]    Referral made off of discharge report.      Notes:    Seen you were recently in the ER - 09/25    Any questions for my CC RN?

## 2020-09-29 LAB — COPATH REPORT: NORMAL

## 2020-09-29 NOTE — PROGRESS NOTES
Clinic Care Coordination Contact  [facility]/Voicemail       Clinical Data: CHW Outreach  Outreach attempted x 2. Left message on patient's voicemail with call back information and requested return call.    Plan: CHW will send care coordination introduction letter with care coordinator contact information and explanation of care coordination services via [employer].     CHW will do no further outreaches at this time.    Irma GOMES Community Health Worker  Clinic Care Coordination  [facility] : [facility]  Phone: [phone]    Referral made off of discharge report.     Notes:     Seen you were recently in the ER - 09/25     Any questions for my CC RN?

## 2020-09-30 ENCOUNTER — TELEPHONE (OUTPATIENT)
Dept: FAMILY MEDICINE | Facility: OTHER | Age: 40
End: 2020-09-30

## 2020-09-30 NOTE — TELEPHONE ENCOUNTER
Reason for Call:  Other needs letter    Detailed comments: grandmother called and would like letter.  She said she spoke to 'vlad'.  Would not speak to grandmother, no release on file.    Phone Number Patient can be reached at: Other phone number:  jarod*    Best Time: any    Can we leave a detailed message on this number? YES    Call taken on 9/30/2020 at 11:16 AM by Paz Michael

## 2020-09-30 NOTE — TELEPHONE ENCOUNTER
Called Paige back, she sated she found the letter she needed.  No action needed on my part.    Emmy Blackman, CMA

## 2020-10-01 ENCOUNTER — HOSPITAL ENCOUNTER (EMERGENCY)
Facility: CLINIC | Age: 40
Discharge: HOME | End: 2020-10-01
Attending: EMERGENCY MEDICINE | Admitting: EMERGENCY MEDICINE
Payer: COMMERCIAL

## 2020-10-01 ENCOUNTER — NURSE TRIAGE (OUTPATIENT)
Dept: FAMILY MEDICINE | Facility: OTHER | Age: 40
End: 2020-10-01

## 2020-10-01 VITALS
SYSTOLIC BLOOD PRESSURE: 126 MMHG | RESPIRATION RATE: 12 BRPM | TEMPERATURE: 98.6 F | HEART RATE: 100 BPM | DIASTOLIC BLOOD PRESSURE: 95 MMHG | OXYGEN SATURATION: 100 %

## 2020-10-01 DIAGNOSIS — K25.3 ACUTE GASTRIC ULCER WITHOUT HEMORRHAGE OR PERFORATION: ICD-10-CM

## 2020-10-01 DIAGNOSIS — K85.90 ACUTE PANCREATITIS, UNSPECIFIED COMPLICATION STATUS, UNSPECIFIED PANCREATITIS TYPE: ICD-10-CM

## 2020-10-01 DIAGNOSIS — K26.9 DUODENAL ULCER: ICD-10-CM

## 2020-10-01 DIAGNOSIS — K21.00 GASTROESOPHAGEAL REFLUX DISEASE WITH ESOPHAGITIS WITHOUT HEMORRHAGE: ICD-10-CM

## 2020-10-01 LAB
ALBUMIN SERPL-MCNC: 2.7 G/DL (ref 3.4–5)
ALP SERPL-CCNC: 171 U/L (ref 40–150)
ALT SERPL W P-5'-P-CCNC: 36 U/L (ref 0–50)
ANION GAP SERPL CALCULATED.3IONS-SCNC: 8 MMOL/L (ref 3–14)
AST SERPL W P-5'-P-CCNC: 108 U/L (ref 0–45)
BASOPHILS # BLD AUTO: 0 10E9/L (ref 0–0.2)
BASOPHILS NFR BLD AUTO: 0.6 %
BILIRUB SERPL-MCNC: 1.4 MG/DL (ref 0.2–1.3)
BUN SERPL-MCNC: 12 MG/DL (ref 7–30)
CALCIUM SERPL-MCNC: 8.3 MG/DL (ref 8.5–10.1)
CHLORIDE SERPL-SCNC: 101 MMOL/L (ref 94–109)
CO2 SERPL-SCNC: 26 MMOL/L (ref 20–32)
CREAT SERPL-MCNC: 0.64 MG/DL (ref 0.52–1.04)
DIFFERENTIAL METHOD BLD: ABNORMAL
EOSINOPHIL NFR BLD AUTO: 0.6 %
ERYTHROCYTE [DISTWIDTH] IN BLOOD BY AUTOMATED COUNT: 14.5 % (ref 10–15)
GFR SERPL CREATININE-BSD FRML MDRD: >90 ML/MIN/{1.73_M2}
GLUCOSE SERPL-MCNC: 105 MG/DL (ref 70–99)
HCT VFR BLD AUTO: 29.9 % (ref 35–47)
HGB BLD-MCNC: 10 G/DL (ref 11.7–15.7)
IMM GRANULOCYTES # BLD: 0 10E9/L (ref 0–0.4)
IMM GRANULOCYTES NFR BLD: 0.8 %
LIPASE SERPL-CCNC: 658 U/L (ref 73–393)
LYMPHOCYTES # BLD AUTO: 0.8 10E9/L (ref 0.8–5.3)
LYMPHOCYTES NFR BLD AUTO: 14.9 %
MCH RBC QN AUTO: 41.2 PG (ref 26.5–33)
MCHC RBC AUTO-ENTMCNC: 33.4 G/DL (ref 31.5–36.5)
MCV RBC AUTO: 123 FL (ref 78–100)
MONOCYTES # BLD AUTO: 0.5 10E9/L (ref 0–1.3)
MONOCYTES NFR BLD AUTO: 10.1 %
NEUTROPHILS # BLD AUTO: 3.8 10E9/L (ref 1.6–8.3)
NEUTROPHILS NFR BLD AUTO: 73 %
NRBC # BLD AUTO: 0 10*3/UL
NRBC BLD AUTO-RTO: 0 /100
PLATELET # BLD AUTO: 178 10E9/L (ref 150–450)
POTASSIUM SERPL-SCNC: 5.3 MMOL/L (ref 3.4–5.3)
PROT SERPL-MCNC: 5.9 G/DL (ref 6.8–8.8)
RBC # BLD AUTO: 2.43 10E12/L (ref 3.8–5.2)
SODIUM SERPL-SCNC: 135 MMOL/L (ref 133–144)
WBC # BLD AUTO: 5.2 10E9/L (ref 4–11)

## 2020-10-01 PROCEDURE — 258N000003 HC RX IP 258 OP 636: Performed by: EMERGENCY MEDICINE

## 2020-10-01 PROCEDURE — 99284 EMERGENCY DEPT VISIT MOD MDM: CPT | Performed by: EMERGENCY MEDICINE

## 2020-10-01 PROCEDURE — 83690 ASSAY OF LIPASE: CPT | Performed by: EMERGENCY MEDICINE

## 2020-10-01 PROCEDURE — 85025 COMPLETE CBC W/AUTO DIFF WBC: CPT | Performed by: EMERGENCY MEDICINE

## 2020-10-01 PROCEDURE — 80053 COMPREHEN METABOLIC PANEL: CPT | Performed by: EMERGENCY MEDICINE

## 2020-10-01 PROCEDURE — 96360 HYDRATION IV INFUSION INIT: CPT | Performed by: EMERGENCY MEDICINE

## 2020-10-01 PROCEDURE — 99284 EMERGENCY DEPT VISIT MOD MDM: CPT | Mod: 25 | Performed by: EMERGENCY MEDICINE

## 2020-10-01 RX ORDER — METOCLOPRAMIDE 5 MG/1
5 TABLET ORAL 3 TIMES DAILY
Qty: 30 TABLET | Refills: 0 | Status: ON HOLD | OUTPATIENT
Start: 2020-10-01 | End: 2020-10-06

## 2020-10-01 RX ORDER — SUCRALFATE 1 G/1
1 TABLET ORAL 4 TIMES DAILY
Qty: 60 TABLET | Refills: 0 | Status: SHIPPED | OUTPATIENT
Start: 2020-10-01 | End: 2020-10-20

## 2020-10-01 RX ADMIN — SODIUM CHLORIDE, POTASSIUM CHLORIDE, SODIUM LACTATE AND CALCIUM CHLORIDE 1000 ML: 600; 310; 30; 20 INJECTION, SOLUTION INTRAVENOUS at 12:09

## 2020-10-01 ASSESSMENT — ENCOUNTER SYMPTOMS
ACTIVITY CHANGE: 1
ABDOMINAL PAIN: 1
ENDOCRINE NEGATIVE: 1
CARDIOVASCULAR NEGATIVE: 1
FEVER: 0
RESPIRATORY NEGATIVE: 1
BACK PAIN: 1
NEUROLOGICAL NEGATIVE: 1
BLOOD IN STOOL: 0
VOMITING: 1
NERVOUS/ANXIOUS: 1
EYES NEGATIVE: 1
APPETITE CHANGE: 1
NAUSEA: 1

## 2020-10-01 NOTE — ED PROVIDER NOTES
History     Chief Complaint   Patient presents with     Hematemesis     HPI  Tasha Short is a 40 year old female who presents with concerns for emesis social blood content.  She describes a hematemesis coughing up approximately 1 teaspoon to tablespoon of blood.  Had a few additional dry heaves with specks of blood.  The patient was recently hospitalized at the end of September for upper GI bleed.  Endoscopy completed on September 25 documented ulcerative esophagitis, gastric ulcer with erosive gastropathy, multiple duodenal ulcers and duodenitis.  She was advised to avoid all aspirin and NSAIDs and alcohol and was placed on Protonix 40 mg twice daily.  Her hemoglobin at the time of discharge was 10.7.  Describes no melena.  Has not been lightheaded or dizzy.  As of this morning the surgical pathology report is still pending.  Continues with epigastric discomfort.  Poor appetite.          Upper GI Endoscopy 09/25/2020  2:28 PM [place] [phone]     Endoscopy Department   _______________________________________________________________________________   Patient Name: Tasha Short        Procedure Date: 9/25/2020 2:28 PM   MRN: [id number]                       Account Number: [id number]   YOB: 1980              Admit Type: Outpatient   Age: 39                               Room: Room 1   Gender: Female                        Note Status: Finalized   Attending MD: Ronan Pelayo MD Total Sedation Time:   _______________________________________________________________________________       Procedure:           Upper GI endoscopy   Indications:         Anemia secondary to chronic blood loss, Heme positive                        stool, Epigastric pain, Nausea   Providers:           Ronan Pelayo MD   Referring MD:        NING Metz   Medicines:           See the Anesthesia note  for documentation of the                        administered medications, Lidocaine gel   Complications:       No immediate complications.   _______________________________________________________________________________   Procedure:           Pre-Anesthesia Assessment:                        - Prior to the procedure, a Historywas performed, and                        patient medications, allergies and sensitivities were                        reviewed. The patient's tolerance of previous anesthesia                        was reviewed.                        - ASA Grade Assessment: per anesthesia.                        After obtaining informed consent, the endoscope was                        passed under direct vision. Throughout the procedure,                        the patient's blood pressure, pulse, and oxygen                        saturations were monitored continuously. The Endoscope                        was introduced through the mouth, and advanced to the                        third part of duodenum.                                                                                     Findings:        Several long linear ulcerations were noted in the mid and distal        esophagus. Streaks of erythema and erosions were noted at the GE        junction.        Intense erythema was noted in the distal stomach. A 6-mm hgastric ulcer        was seen in the pre-pyloric antrum, as well as multiple heme-stained        erosions. Biopsies were taken.        Two duodenal bulb ulcers were seen, measuring 4 mm and 10 mm. These both        had clean white bases. The duodenal bulb and proximal 2nd portion of the        duodenum were erythematous, edematous, and distorted.                                                                                     Impression:          - Ulcerative reflux esophagitis.                        - Gastric ulcer and erosive gastropathy.                        - Duodenal ulcers and  duodenitis.   Recommendation:      - Await pathology results. Treat H pylori if positive.                        - Avoid aspirin, NSAIDs and alcohol.                        - Use Protonix (pantoprazole) 40 mg PO BID for 8 weeks.                        - Hypokalemia will be addressed. Disposition to be                        determined.                                                                                       Signed electronically by Ronan Pelayo   ______________________   Ronan Pelayo MD   9/25/2020 3:37:16 PM   I was physically present for the entire viewing portion of the exam.Ronan Pelayo MD   Number of Addenda: 0     Note Initiated On: 9/25/2020 2:28 PM          Allergies:  Allergies   Allergen Reactions     Bupropion Other (See Comments)     seizures     Lisinopril Swelling     Angioedema      Penicillins      hives     Phenytoin      rash,puritis (Dilantin)     Prednisone Itching     Face itching, unable to concentrate      Seasonal Allergies        Problem List:    Patient Active Problem List    Diagnosis Date Noted     Health Care Home 06/08/2011     Priority: High     x  DX V65.8 REPLACED WITH 365[facility] CARE [facility] (04/08/2013)       Anemia due to blood loss, acute 09/23/2020     Priority: Medium     Diarrhea, unspecified type 09/23/2020     Priority: Medium     GI bleed 09/16/2020     Priority: Medium     Acute GI bleeding 09/16/2020     Priority: Medium     Weight loss 09/15/2020     Priority: Medium     Anemia, unspecified type 09/15/2020     Priority: Medium     Hemoglobin decreased 09/15/2020     Priority: Medium     Closed displaced fracture of metatarsal bone of right foot with delayed healing, unspecified metatarsal, subsequent encounter 09/14/2020     Priority: Medium     Opiate addiction (H) 09/10/2020     Priority: Medium     Alcohol withdrawal seizure (H) 09/10/2020     Priority: Medium     Encounter for surveillance of injectable contraceptive 08/08/2019      Priority: Medium     Alcohol dependence in remission (H) 04/03/2019     Priority: Medium     Hypertriglyceridemia 05/08/2018     Priority: Medium     Chronic seasonal allergic rhinitis due to pollen 05/08/2018     Priority: Medium     Gastroesophageal reflux disease without esophagitis 06/19/2017     Priority: Medium     H/O ETOH abuse 05/15/2017     Priority: Medium     Right hip pain 10/20/2016     Priority: Medium     Narcolepsy 10/20/2016     Priority: Medium     Alcohol-induced mood disorder (H) 08/04/2016     Priority: Medium     Irregular heartbeat 04/18/2016     Priority: Medium     Tobacco use disorder 04/18/2016     Priority: Medium     Acute coronary syndrome (H) 04/14/2016     Priority: Medium     HTN, goal below 140/90 11/24/2014     Priority: Medium     Major depressive disorder, recurrent episode, moderate (H) 10/22/2014     Priority: Medium     Generalized anxiety disorder 10/22/2014     Priority: Medium     Hypersomnia 04/12/2013     Priority: Medium     Calcific tendonitis peroneals 06/21/2012     Priority: Medium     Narcolepsy and cataplexy      Priority: Medium     S/P LEEP 11/17/2010     Priority: Medium     12/14/09 ASCUS + HPV 18, 53, 58 (high risk)  1/13/10 colposcopy- bx (dysplasia) ECC (negative) recommend repeat pap at 6 and 12 months  6/28/10 pap ASCUS + HPV 16 (high risk) recommend colpo  7/21/10 colposcopy- BX ( ROSA 2/3 with gland involvement)  8/2/10 clinic appt to discuss LEEP  10/26/10 tele enc: LEEP scheduled for 11/4/10  11/4/10 LEEP- (ROSA 2/3) not extending to margins.  ECC (negative). Plan: pap in 6 mo.   5/9/11- NIL pap. Plan: pap in 6 mo  10/8/13 NIL pap, neg HR HPV. Plan: pap in 3 years.  5/22/15 NIL pap, neg HR HPV. Plan: pap in 3 years.  4/2/19 NIL pap, + HR HPV (not 16 or 18). Plan: cotest in 1 yr.  4/8/19 left msg   CCT tracking.               Past Medical History:    Past Medical History:   Diagnosis Date     ALCOHOL ABUSE-IN REMISS 7/30/2007     Cataplexy and narcolepsy       ROSA 3 - cervical intraepithelial neoplasia grade 3 1/4/10     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     Hypersomnia with sleep apnea      Irregular menstrual cycle 1997     Metrorrhagia 2001     Other acne      Other and unspecified adverse effect of drug, medicinal and biological substance 2001     Substance abuse (H) 10/2/2009       Past Surgical History:    Past Surgical History:   Procedure Laterality Date      SECTION       COLPOSCOPY CERVIX, LOOP ELECTRODE BIOPSY, COMBINED  2010    ROSA 2/3     ESOPHAGOSCOPY, GASTROSCOPY, DUODENOSCOPY (EGD), COMBINED N/A 2020    Procedure: Esophagogastroduodenoscopy with biopsies;  Surgeon: Ronan Pelayo MD;  Location: [facility]     HC REMOVAL OF TONSILS,12+ Y/O      Tonsils 12+y.o.     REPAIR TENDON PERONEAL  11/15/2013    Procedure: REPAIR TENDON PERONEAL;  right peroneal tendon  transfer;  Surgeon: Jesus Manuel Oden DPM;  Location: [facility]       Family History:    Family History   Problem Relation Age of Onset     Depression Mother      Arthritis Paternal Grandmother      Hypertension Paternal Grandfather         birth FF     Asthma No family hx of      C.A.D. No family hx of      Diabetes No family hx of      Cancer - colorectal No family hx of      Prostate Cancer No family hx of      Coronary Artery Disease No family hx of      Ovarian Cancer No family hx of      Mental Illness No family hx of      Cerebrovascular Disease No family hx of      Anesthesia Reaction No family hx of      Other Cancer No family hx of      Osteoporosis No family hx of      Known Genetic Syndrome No family hx of      Obesity No family hx of      Unknown/Adopted No family hx of        Social History:  Marital Status:  Single [1]  Social History     Tobacco Use     Smoking status: Current Every Day Smoker     Packs/day: 0.25     Smokeless tobacco: Never Used   Substance Use Topics     Alcohol use: No      Drug use: No        Medications:         busPIRone (BUSPAR) 5 MG tablet       cyclobenzaprine (FLEXERIL) 10 MG tablet       EPINEPHrine (ANY BX GENERIC EQUIV) 0.3 MG/0.3ML injection 2-pack       flunisolide (NASALIDE) 25 MCG/ACT (0.025%) SOLN spray       folic acid (FOLVITE) 400 MCG tablet       gabapentin (NEURONTIN) 100 MG capsule       lithium ER (LITHOBID) 300 MG CR tablet       methylphenidate (RITALIN) 10 MG tablet       metoprolol succinate ER (TOPROL-XL) 50 MG 24 hr tablet       naltrexone (DEPADE/REVIA) 50 MG tablet       nicotine (NICORETTE) 2 MG gum       pantoprazole (PROTONIX) 40 MG EC tablet       potassium chloride ER (K-TAB) 20 MEQ CR tablet       Prenatal 27-1 MG TABS       prochlorperazine (COMPAZINE) 5 MG tablet          Review of Systems   Constitutional: Positive for activity change and appetite change. Negative for fever.   HENT: Negative.    Eyes: Negative.    Respiratory: Negative.    Cardiovascular: Negative.    Gastrointestinal: Positive for abdominal pain, nausea and vomiting. Negative for blood in stool.   Endocrine: Negative.    Musculoskeletal: Positive for back pain.   Skin: Negative.    Neurological: Negative.    Psychiatric/Behavioral: The patient is nervous/anxious.    All other systems reviewed and are negative.      Physical Exam   BP: 104/84  Pulse: 106  Temp: 98.6  F (37  C)  Resp: 12  SpO2: 100 %      Physical Exam  Vitals signs and nursing note reviewed.   Constitutional:       Appearance: She is not ill-appearing.      Comments: Very thin appearing female   HENT:      Head: Normocephalic and atraumatic.      Right Ear: External ear normal.      Left Ear: External ear normal.      Nose: Nose normal.   Eyes:      General: Lids are normal. No scleral icterus.     Conjunctiva/sclera: Conjunctivae normal.      Pupils: Pupils are equal, round, and reactive to light.      Funduscopic exam:     Right eye: No hemorrhage.         Left eye: No hemorrhage.   Neck:      Musculoskeletal:  Neck supple.      Vascular: No carotid bruit or JVD.      Trachea: Trachea normal.   Cardiovascular:      Rate and Rhythm: Normal rate and regular rhythm.  No extrasystoles are present.     Heart sounds: S1 normal and S2 normal. No murmur.   Pulmonary:      Effort: Pulmonary effort is normal. No respiratory distress.      Breath sounds: Normal breath sounds.   Abdominal:      General: Bowel sounds are normal. There is no distension.      Palpations: Abdomen is soft. There is no hepatomegaly or splenomegaly.      Tenderness: There is no abdominal tenderness (Localized epigastric discomfort only). There is no rebound.      Hernia: No hernia is present.   Musculoskeletal: Normal range of motion.   Lymphadenopathy:      Cervical: No cervical adenopathy.   Skin:     General: Skin is warm and dry.      Capillary Refill: Capillary refill takes less than 2 seconds.      Coloration: Skin is not pale.      Findings: No rash.   Neurological:      General: No focal deficit present.      Mental Status: She is alert and oriented to person, place, and time.      Sensory: No sensory deficit.      Deep Tendon Reflexes: Reflexes are normal and symmetric.   Psychiatric:         Mood and Affect: Mood normal.         Speech: Speech normal.         Behavior: Behavior normal.         ED Course        Procedures                   Results for orders placed or performed during the hospital encounter of 10/01/20 (from the past 24 hour(s))   CBC with platelets differential   Result Value Ref Range    WBC 5.2 4.0 - 11.0 10e9/L    RBC Count 2.43 (L) 3.8 - 5.2 10e12/L    Hemoglobin 10.0 (L) 11.7 - 15.7 g/dL    Hematocrit 29.9 (L) 35.0 - 47.0 %     (H) 78 - 100 fl    MCH 41.2 (H) 26.5 - 33.0 pg    MCHC 33.4 31.5 - 36.5 g/dL    RDW 14.5 10.0 - 15.0 %    Platelet Count 178 150 - 450 10e9/L    Diff Method Automated Method     % Neutrophils 73.0 %    % Lymphocytes 14.9 %    % Monocytes 10.1 %    % Eosinophils 0.6 %    % Basophils 0.6 %    %  Immature Granulocytes 0.8 %    Nucleated RBCs 0 0 /100    Absolute Neutrophil 3.8 1.6 - 8.3 10e9/L    Absolute Lymphocytes 0.8 0.8 - 5.3 10e9/L    Absolute Monocytes 0.5 0.0 - 1.3 10e9/L    Absolute Basophils 0.0 0.0 - 0.2 10e9/L    Abs Immature Granulocytes 0.0 0 - 0.4 10e9/L    Absolute Nucleated RBC 0.0    Comprehensive metabolic panel   Result Value Ref Range    Sodium 135 133 - 144 mmol/L    Potassium 5.3 3.4 - 5.3 mmol/L    Chloride 101 94 - 109 mmol/L    Carbon Dioxide 26 20 - 32 mmol/L    Anion Gap 8 3 - 14 mmol/L    Glucose 105 (H) 70 - 99 mg/dL    Urea Nitrogen 12 7 - 30 mg/dL    Creatinine 0.64 0.52 - 1.04 mg/dL    GFR Estimate >90 >60 mL/min/[1.73_m2]    GFR Estimate If Black >90 >60 mL/min/[1.73_m2]    Calcium 8.3 (L) 8.5 - 10.1 mg/dL    Bilirubin Total 1.4 (H) 0.2 - 1.3 mg/dL    Albumin 2.7 (L) 3.4 - 5.0 g/dL    Protein Total 5.9 (L) 6.8 - 8.8 g/dL    Alkaline Phosphatase 171 (H) 40 - 150 U/L    ALT 36 0 - 50 U/L     (H) 0 - 45 U/L   Lipase   Result Value Ref Range    Lipase 658 (H) 73 - 393 U/L       Medications - No data to display    Assessments & Plan (with Medical Decision Making)  40-year-old female presents with emesis, epigastric pain, frequent dry heaves.  Recent hospitalization for upper GI bleed.  Confirmed on upper endoscopy to have erosive esophagitis, gastric and duodenal ulcers.  Her endoscopy was completed September 2015.  She was placed on Protonix 40 mg twice daily.  She is refrain from NSAIDs.  Has not resumed any alcohol.  Using Tylenol and gabapentin for discomfort.  Describes no melena.  One emesis I did have some blood which she would describe as equivalency of 1 teaspoon.  Reports Compazine nor Zofran has been helpful with her nausea.  Examination a thin cachectic appearing female.  She appears malnourished.  Abdomen is tender in the epigastric area without peritoneal signs.  Medical work-up identified continued concerns for malnourishment and a low protein status.  Her  hypokalemia has corrected.  Still has mild transaminase elevation along with total bilirubin.  This is unchanged.  Primary new concern is lipase = 658.  This was checked back in 19 September before her endoscopy and was = 200.  I suspect this is a combination of recent endoscopy with manipulation as well as with the recurrent retching triggering mild pancreatitis.  Plan:  While the patient is in the emergency department she will receive 1 L of LR over 1 to 2 hours.  This represents approximate 20 cc/kg.  She will stay on a clear liquid diet.  Plan to add Carafate.  Continue with Protonix 40 mg twice daily.  Stop Compazine and trial of Reglan.  ED follow-up visit tomorrow reassessment of her pancreatitis.     I have reviewed the nursing notes.    I have reviewed the findings, diagnosis, plan and need for follow up with the patient.      New Prescriptions    No medications on file       Final diagnoses:   Acute gastric ulcer without hemorrhage or perforation   Duodenal ulcer   Gastroesophageal reflux disease with esophagitis without hemorrhage   Acute pancreatitis, unspecified complication status, unspecified pancreatitis type       10/1/2020   [facility] EMERGENCY DEPT     Roberto Naidu,   10/01/20 [id number]

## 2020-10-01 NOTE — TELEPHONE ENCOUNTER
"Patient is calling this AM because she vomited blood three times this AM. The first time she vomited was over a tablespoon. The second and third time she vomited was about a teaspoon.Patient states that she feels shortness of breath and weak. She is coughing up yellow mucus. Denies history of heart, lung, and blood clot issues. States that she has a fever of 100.1, but took Tylenol. Patient was recently in the ED on 9/25/20 got a GI bleed. Denies pregnancy and travel outside the [place]. Due to her ulcer she has some abdominal discomfort.     PLAN:   Informed the patient since this is a new finding and with the fever she should be evaluated in the ED. Patient stated that she will head up to the [facility].   Taylor Mayo RN  October 1, 2020      Additional Information    Negative: Severe difficulty breathing (e.g., struggling for each breath, speaks in single words)    Negative: [1] Chest pain AND [2] difficulty breathing    Negative: Bluish (or gray) lips or face now    Negative: Passed out (i.e., lost consciousness, collapsed and was not responding)    Negative: Shock suspected (e.g., cold/pale/clammy skin, too weak to stand, low BP, rapid pulse)    Negative: Difficult to awaken or acting confused (e.g., disoriented, slurred speech)    Negative: Recent chest injury (i.e., past 24 hours)    Negative: [1] Coughed up blood AND [2] large amount (example: \"a cup of blood\")    Negative: Sounds like a life-threatening emergency to the triager    [1] Coughed up blood AND [2] > 1 tablespoon (15 ml) (Exception: blood-tinged sputum)    Protocols used: COUGHING UP BLOOD-A-AH      "

## 2020-10-01 NOTE — ED TRIAGE NOTES
Pt is throwing up blood and unable to keep anything down. She was hospitalized with GI bleed last week.

## 2020-10-01 NOTE — ED AVS SNAPSHOT
[facility] Emergency Dept  [address] [place] [address]  Phone: [phone]  Fax: [phone]                                    Tasha Short   MRN: [id number]    Department: [facility] Emergency Dept   Date of Visit: 10/1/2020           After Visit Summary Signature Page    I have received my discharge instructions, and my questions have been answered. I have discussed any challenges I see with this plan with the nurse or doctor.    ..........................................................................................................................................  Patient/Patient Representative Signature      ..........................................................................................................................................  Patient Representative Print Name and Relationship to Patient    ..................................................               ................................................  Date                                   Time    ..........................................................................................................................................  Reviewed by Signature/Title    ...................................................              ..............................................  Date                                               Time          22EPIC Rev 08/18

## 2020-10-01 NOTE — DISCHARGE INSTRUCTIONS
Evaluation for your current discomfort along with vomiting is consistent with continued irritation to the esophagus stomach and duodenum as result of exposure to acid and bile.  No signs for internal bleeding.  Hemoglobin remained stable.  Primary new concern is now showing early inflammation to the pancreas suggestive for early pancreatitis.    Plan:  1.  Remain on a clear liquid diet for the next 24 hours  2.  Arrange for emergency department follow-up visit tomorrow early afternoon  3.  Stop Compazine  4.  Reglan 5 mg 3 times daily  5.  Continue with Protonix 40 mg twice daily  6.  Carafate 1 g 4 times daily  7.  Tylenol and Neurontin may be continued

## 2020-10-02 ENCOUNTER — APPOINTMENT (OUTPATIENT)
Dept: GENERAL RADIOLOGY | Facility: CLINIC | Age: 40
End: 2020-10-02
Attending: PHYSICIAN ASSISTANT
Payer: COMMERCIAL

## 2020-10-02 ENCOUNTER — PATIENT OUTREACH (OUTPATIENT)
Dept: NURSING | Facility: CLINIC | Age: 40
End: 2020-10-02
Payer: COMMERCIAL

## 2020-10-02 ENCOUNTER — HOSPITAL ENCOUNTER (INPATIENT)
Facility: CLINIC | Age: 40
LOS: 4 days | Discharge: HOME | End: 2020-10-06
Attending: HOSPITALIST | Admitting: HOSPITALIST
Payer: COMMERCIAL

## 2020-10-02 ENCOUNTER — HOSPITAL ENCOUNTER (EMERGENCY)
Facility: CLINIC | Age: 40
Discharge: ANOTHER HOSPITAL | End: 2020-10-02
Attending: PHYSICIAN ASSISTANT | Admitting: PHYSICIAN ASSISTANT
Payer: COMMERCIAL

## 2020-10-02 VITALS
HEART RATE: 101 BPM | DIASTOLIC BLOOD PRESSURE: 93 MMHG | TEMPERATURE: 98.8 F | SYSTOLIC BLOOD PRESSURE: 128 MMHG | OXYGEN SATURATION: 98 % | BODY MASS INDEX: 17.07 KG/M2 | RESPIRATION RATE: 14 BRPM | WEIGHT: 100 LBS

## 2020-10-02 DIAGNOSIS — R19.7 DIARRHEA, UNSPECIFIED TYPE: ICD-10-CM

## 2020-10-02 DIAGNOSIS — F17.200 TOBACCO USE DISORDER: ICD-10-CM

## 2020-10-02 DIAGNOSIS — E53.8 FOLIC ACID DEFICIENCY: ICD-10-CM

## 2020-10-02 DIAGNOSIS — K25.4 GASTROINTESTINAL HEMORRHAGE ASSOCIATED WITH GASTRIC ULCER: ICD-10-CM

## 2020-10-02 DIAGNOSIS — K92.2 GASTROINTESTINAL HEMORRHAGE, UNSPECIFIED GASTROINTESTINAL HEMORRHAGE TYPE: ICD-10-CM

## 2020-10-02 DIAGNOSIS — R10.13 EPIGASTRIC PAIN: ICD-10-CM

## 2020-10-02 DIAGNOSIS — R00.0 TACHYCARDIA: ICD-10-CM

## 2020-10-02 DIAGNOSIS — K92.2 ACUTE GI BLEEDING: ICD-10-CM

## 2020-10-02 DIAGNOSIS — K21.9 GASTROESOPHAGEAL REFLUX DISEASE WITHOUT ESOPHAGITIS: ICD-10-CM

## 2020-10-02 DIAGNOSIS — K85.00 IDIOPATHIC ACUTE PANCREATITIS WITHOUT INFECTION OR NECROSIS: Primary | ICD-10-CM

## 2020-10-02 LAB
ALBUMIN SERPL-MCNC: 2.6 G/DL (ref 3.4–5)
ALP SERPL-CCNC: 153 U/L (ref 40–150)
ALT SERPL W P-5'-P-CCNC: 31 U/L (ref 0–50)
ANION GAP SERPL CALCULATED.3IONS-SCNC: 9 MMOL/L (ref 3–14)
AST SERPL W P-5'-P-CCNC: 98 U/L (ref 0–45)
BASOPHILS # BLD AUTO: 0 10E9/L (ref 0–0.2)
BASOPHILS NFR BLD AUTO: 0.5 %
BILIRUB SERPL-MCNC: 1 MG/DL (ref 0.2–1.3)
BUN SERPL-MCNC: 9 MG/DL (ref 7–30)
CALCIUM SERPL-MCNC: 8.4 MG/DL (ref 8.5–10.1)
CHLORIDE SERPL-SCNC: 101 MMOL/L (ref 94–109)
CO2 SERPL-SCNC: 23 MMOL/L (ref 20–32)
CREAT SERPL-MCNC: 0.63 MG/DL (ref 0.52–1.04)
DIFFERENTIAL METHOD BLD: ABNORMAL
EOSINOPHIL NFR BLD AUTO: 1 %
ERYTHROCYTE [DISTWIDTH] IN BLOOD BY AUTOMATED COUNT: 13.6 % (ref 10–15)
ETHANOL SERPL-MCNC: <0.01 G/DL
GFR SERPL CREATININE-BSD FRML MDRD: >90 ML/MIN/{1.73_M2}
GLUCOSE SERPL-MCNC: 85 MG/DL (ref 70–99)
HCT VFR BLD AUTO: 25.7 % (ref 35–47)
HGB BLD-MCNC: 8.7 G/DL (ref 11.7–15.7)
IMM GRANULOCYTES # BLD: 0 10E9/L (ref 0–0.4)
IMM GRANULOCYTES NFR BLD: 0.8 %
LIPASE SERPL-CCNC: 603 U/L (ref 73–393)
LYMPHOCYTES # BLD AUTO: 0.9 10E9/L (ref 0.8–5.3)
LYMPHOCYTES NFR BLD AUTO: 22.6 %
MCH RBC QN AUTO: 40.8 PG (ref 26.5–33)
MCHC RBC AUTO-ENTMCNC: 33.9 G/DL (ref 31.5–36.5)
MCV RBC AUTO: 121 FL (ref 78–100)
MONOCYTES # BLD AUTO: 0.5 10E9/L (ref 0–1.3)
MONOCYTES NFR BLD AUTO: 11.7 %
NEUTROPHILS # BLD AUTO: 2.4 10E9/L (ref 1.6–8.3)
NEUTROPHILS NFR BLD AUTO: 63.4 %
NRBC # BLD AUTO: 0 10*3/UL
NRBC BLD AUTO-RTO: 0 /100
PLATELET # BLD AUTO: 167 10E9/L (ref 150–450)
POTASSIUM SERPL-SCNC: 3.8 MMOL/L (ref 3.4–5.3)
PROT SERPL-MCNC: 5.8 G/DL (ref 6.8–8.8)
RBC # BLD AUTO: 2.13 10E12/L (ref 3.8–5.2)
SODIUM SERPL-SCNC: 133 MMOL/L (ref 133–144)
WBC # BLD AUTO: 3.9 10E9/L (ref 4–11)

## 2020-10-02 PROCEDURE — U0003 INFECTIOUS AGENT DETECTION BY NUCLEIC ACID (DNA OR RNA); SEVERE ACUTE RESPIRATORY SYNDROME CORONAVIRUS 2 (SARS-COV-2) (CORONAVIRUS DISEASE [COVID-19]), AMPLIFIED PROBE TECHNIQUE, MAKING USE OF HIGH THROUGHPUT TECHNOLOGIES AS DESCRIBED BY CMS-2020-01-R: HCPCS | Performed by: PHYSICIAN ASSISTANT

## 2020-10-02 PROCEDURE — C9113 INJ PANTOPRAZOLE SODIUM, VIA: HCPCS | Performed by: PHYSICIAN ASSISTANT

## 2020-10-02 PROCEDURE — 96375 TX/PRO/DX INJ NEW DRUG ADDON: CPT | Performed by: EMERGENCY MEDICINE

## 2020-10-02 PROCEDURE — 96374 THER/PROPH/DIAG INJ IV PUSH: CPT | Performed by: EMERGENCY MEDICINE

## 2020-10-02 PROCEDURE — 85025 COMPLETE CBC W/AUTO DIFF WBC: CPT | Performed by: PHYSICIAN ASSISTANT

## 2020-10-02 PROCEDURE — 99223 1ST HOSP IP/OBS HIGH 75: CPT | Mod: AI | Performed by: PHYSICIAN ASSISTANT

## 2020-10-02 PROCEDURE — 80053 COMPREHEN METABOLIC PANEL: CPT | Performed by: PHYSICIAN ASSISTANT

## 2020-10-02 PROCEDURE — 80320 DRUG SCREEN QUANTALCOHOLS: CPT | Performed by: PHYSICIAN ASSISTANT

## 2020-10-02 PROCEDURE — 83690 ASSAY OF LIPASE: CPT | Performed by: PHYSICIAN ASSISTANT

## 2020-10-02 PROCEDURE — 99285 EMERGENCY DEPT VISIT HI MDM: CPT | Mod: 25 | Performed by: EMERGENCY MEDICINE

## 2020-10-02 PROCEDURE — 96361 HYDRATE IV INFUSION ADD-ON: CPT | Performed by: EMERGENCY MEDICINE

## 2020-10-02 PROCEDURE — 258N000003 HC RX IP 258 OP 636: Performed by: PHYSICIAN ASSISTANT

## 2020-10-02 PROCEDURE — C9803 HOPD COVID-19 SPEC COLLECT: HCPCS

## 2020-10-02 PROCEDURE — 250N000011 HC RX IP 250 OP 636: Performed by: PHYSICIAN ASSISTANT

## 2020-10-02 PROCEDURE — 96376 TX/PRO/DX INJ SAME DRUG ADON: CPT | Performed by: EMERGENCY MEDICINE

## 2020-10-02 PROCEDURE — 99285 EMERGENCY DEPT VISIT HI MDM: CPT | Performed by: EMERGENCY MEDICINE

## 2020-10-02 PROCEDURE — 74019 RADEX ABDOMEN 2 VIEWS: CPT

## 2020-10-02 PROCEDURE — 120N000001 HC R&B MED SURG/OB

## 2020-10-02 RX ORDER — SODIUM CHLORIDE 9 MG/ML
INJECTION, SOLUTION INTRAVENOUS CONTINUOUS
Status: DISCONTINUED | OUTPATIENT
Start: 2020-10-02 | End: 2020-10-02 | Stop reason: HOSPADM

## 2020-10-02 RX ORDER — LIDOCAINE 40 MG/G
CREAM TOPICAL
Status: DISCONTINUED | OUTPATIENT
Start: 2020-10-02 | End: 2020-10-06 | Stop reason: HOSPADM

## 2020-10-02 RX ORDER — ONDANSETRON 2 MG/ML
4 INJECTION INTRAMUSCULAR; INTRAVENOUS EVERY 6 HOURS PRN
Status: DISCONTINUED | OUTPATIENT
Start: 2020-10-02 | End: 2020-10-06 | Stop reason: HOSPADM

## 2020-10-02 RX ORDER — MORPHINE SULFATE 2 MG/ML
1-2 INJECTION, SOLUTION INTRAMUSCULAR; INTRAVENOUS
Status: DISCONTINUED | OUTPATIENT
Start: 2020-10-02 | End: 2020-10-05

## 2020-10-02 RX ORDER — ONDANSETRON 2 MG/ML
4 INJECTION INTRAMUSCULAR; INTRAVENOUS EVERY 30 MIN PRN
Status: DISCONTINUED | OUTPATIENT
Start: 2020-10-02 | End: 2020-10-02 | Stop reason: HOSPADM

## 2020-10-02 RX ORDER — NALOXONE HYDROCHLORIDE 0.4 MG/ML
.1-.4 INJECTION, SOLUTION INTRAMUSCULAR; INTRAVENOUS; SUBCUTANEOUS
Status: DISCONTINUED | OUTPATIENT
Start: 2020-10-02 | End: 2020-10-06 | Stop reason: HOSPADM

## 2020-10-02 RX ORDER — ACETAMINOPHEN 325 MG/1
650 TABLET ORAL EVERY 4 HOURS PRN
Status: DISCONTINUED | OUTPATIENT
Start: 2020-10-02 | End: 2020-10-06 | Stop reason: HOSPADM

## 2020-10-02 RX ORDER — HYDROMORPHONE HYDROCHLORIDE 1 MG/ML
0.5 INJECTION, SOLUTION INTRAMUSCULAR; INTRAVENOUS; SUBCUTANEOUS
Status: COMPLETED | OUTPATIENT
Start: 2020-10-02 | End: 2020-10-02

## 2020-10-02 RX ORDER — DIPHENHYDRAMINE HYDROCHLORIDE 50 MG/ML
25 INJECTION INTRAMUSCULAR; INTRAVENOUS ONCE
Status: COMPLETED | OUTPATIENT
Start: 2020-10-02 | End: 2020-10-02

## 2020-10-02 RX ORDER — ONDANSETRON 4 MG/1
4 TABLET, ORALLY DISINTEGRATING ORAL EVERY 6 HOURS PRN
Status: DISCONTINUED | OUTPATIENT
Start: 2020-10-02 | End: 2020-10-06 | Stop reason: HOSPADM

## 2020-10-02 RX ORDER — SODIUM CHLORIDE AND POTASSIUM CHLORIDE 150; 900 MG/100ML; MG/100ML
INJECTION, SOLUTION INTRAVENOUS CONTINUOUS
Status: DISCONTINUED | OUTPATIENT
Start: 2020-10-02 | End: 2020-10-03

## 2020-10-02 RX ORDER — ACETAMINOPHEN 650 MG/1
650 SUPPOSITORY RECTAL EVERY 4 HOURS PRN
Status: DISCONTINUED | OUTPATIENT
Start: 2020-10-02 | End: 2020-10-06 | Stop reason: HOSPADM

## 2020-10-02 RX ORDER — PROCHLORPERAZINE 25 MG
25 SUPPOSITORY, RECTAL RECTAL EVERY 12 HOURS PRN
Status: DISCONTINUED | OUTPATIENT
Start: 2020-10-02 | End: 2020-10-06 | Stop reason: HOSPADM

## 2020-10-02 RX ORDER — PROCHLORPERAZINE MALEATE 10 MG
10 TABLET ORAL EVERY 6 HOURS PRN
Status: DISCONTINUED | OUTPATIENT
Start: 2020-10-02 | End: 2020-10-06 | Stop reason: HOSPADM

## 2020-10-02 RX ADMIN — SODIUM CHLORIDE 1000 ML: 9 INJECTION, SOLUTION INTRAVENOUS at 14:59

## 2020-10-02 RX ADMIN — POTASSIUM CHLORIDE AND SODIUM CHLORIDE: 900; 150 INJECTION, SOLUTION INTRAVENOUS at 22:38

## 2020-10-02 RX ADMIN — MORPHINE SULFATE 1 MG: 2 INJECTION, SOLUTION INTRAMUSCULAR; INTRAVENOUS at 22:37

## 2020-10-02 RX ADMIN — DIPHENHYDRAMINE HYDROCHLORIDE 25 MG: 50 INJECTION, SOLUTION INTRAMUSCULAR; INTRAVENOUS at 18:01

## 2020-10-02 RX ADMIN — HYDROMORPHONE HYDROCHLORIDE 0.5 MG: 1 INJECTION, SOLUTION INTRAMUSCULAR; INTRAVENOUS; SUBCUTANEOUS at 16:33

## 2020-10-02 RX ADMIN — HYDROMORPHONE HYDROCHLORIDE 0.5 MG: 1 INJECTION, SOLUTION INTRAMUSCULAR; INTRAVENOUS; SUBCUTANEOUS at 19:58

## 2020-10-02 RX ADMIN — PANTOPRAZOLE SODIUM 40 MG: 40 INJECTION, POWDER, FOR SOLUTION INTRAVENOUS at 18:43

## 2020-10-02 RX ADMIN — SODIUM CHLORIDE: 9 INJECTION, SOLUTION INTRAVENOUS at 16:37

## 2020-10-02 RX ADMIN — ONDANSETRON 4 MG: 4 TABLET, ORALLY DISINTEGRATING ORAL at 22:38

## 2020-10-02 RX ADMIN — ONDANSETRON 4 MG: 2 INJECTION INTRAMUSCULAR; INTRAVENOUS at 15:01

## 2020-10-02 RX ADMIN — HYDROMORPHONE HYDROCHLORIDE 0.5 MG: 1 INJECTION, SOLUTION INTRAMUSCULAR; INTRAVENOUS; SUBCUTANEOUS at 15:01

## 2020-10-02 NOTE — ED PROVIDER NOTES
"  History     Chief Complaint   Patient presents with     Pancreatitis     The history is provided by the patient.     Tasha Short is a 40 year old female who presents to the emergency department for evaluation of pancreatitis. The patient was seen in the ED one day ago for the same symptoms and was told to return today for a lab redraw. She was diagnosed with acute pancreatitis and told to be on a clear liquid diet. Today she notes feeling worse and being in more pain.  She was vomiting blood yesterday, but has not vomited since. She has central upper abdominal pain, frequent dark and tarry stools today. She notes a fever of 100.3 this morning. No nausea currently or urinary symptoms. No abdominal surgical history other than a . The patient smokes about 1-2 cigarettes a day. She has eaten popsicles and jello today. She has been alternating Reglan and Carafate. These help for a short amount of time, but eventually \"wear off\". She is also on protonix twice daily.    HPI from visit on 10/01/2020:    Tasha Short is a 40 year old female who presents with concerns for emesis social blood content.  She describes a hematemesis coughing up approximately 1 teaspoon to tablespoon of blood.  Had a few additional dry heaves with specks of blood.  The patient was recently hospitalized at the end of September for upper GI bleed.  Endoscopy completed on  documented ulcerative esophagitis, gastric ulcer with erosive gastropathy, multiple duodenal ulcers and duodenitis.  She was advised to avoid all aspirin and NSAIDs and alcohol and was placed on Protonix 40 mg twice daily.  Her hemoglobin at the time of discharge was 10.7.  Describes no melena.  Has not been lightheaded or dizzy.  As of this morning the surgical pathology report is still pending.  Continues with epigastric discomfort.  Poor appetite.       Allergies:  Allergies   Allergen Reactions     Bupropion Other (See Comments)     seizures "     Lisinopril Swelling     Angioedema      Penicillins      hives     Phenytoin      rash,puritis (Dilantin)     Prednisone Itching     Face itching, unable to concentrate      Seasonal Allergies        Problem List:    Patient Active Problem List    Diagnosis Date Noted     Health Care Home 06/08/2011     Priority: High     x  DX V65.8 REPLACED WITH 50753 [facility]ALTH CARE HOME (04/08/2013)       Anemia due to blood loss, acute 09/23/2020     Priority: Medium     Diarrhea, unspecified type 09/23/2020     Priority: Medium     GI bleed 09/16/2020     Priority: Medium     Acute GI bleeding 09/16/2020     Priority: Medium     Weight loss 09/15/2020     Priority: Medium     Anemia, unspecified type 09/15/2020     Priority: Medium     Hemoglobin decreased 09/15/2020     Priority: Medium     Closed displaced fracture of metatarsal bone of right foot with delayed healing, unspecified metatarsal, subsequent encounter 09/14/2020     Priority: Medium     Opiate addiction (H) 09/10/2020     Priority: Medium     Alcohol withdrawal seizure (H) 09/10/2020     Priority: Medium     Encounter for surveillance of injectable contraceptive 08/08/2019     Priority: Medium     Alcohol dependence in remission (H) 04/03/2019     Priority: Medium     Hypertriglyceridemia 05/08/2018     Priority: Medium     Chronic seasonal allergic rhinitis due to pollen 05/08/2018     Priority: Medium     Gastroesophageal reflux disease without esophagitis 06/19/2017     Priority: Medium     H/O ETOH abuse 05/15/2017     Priority: Medium     Right hip pain 10/20/2016     Priority: Medium     Narcolepsy 10/20/2016     Priority: Medium     Alcohol-induced mood disorder (H) 08/04/2016     Priority: Medium     Irregular heartbeat 04/18/2016     Priority: Medium     Tobacco use disorder 04/18/2016     Priority: Medium     Acute coronary syndrome (H) 04/14/2016     Priority: Medium     HTN, goal below 140/90 11/24/2014     Priority: Medium     Major depressive  disorder, recurrent episode, moderate (H) 10/22/2014     Priority: Medium     Generalized anxiety disorder 10/22/2014     Priority: Medium     Hypersomnia 2013     Priority: Medium     Calcific tendonitis peroneals 2012     Priority: Medium     Narcolepsy and cataplexy      Priority: Medium     S/P LEEP 2010     Priority: Medium     09 ASCUS + HPV 18, 53, 58 (high risk)  1/13/10 colposcopy- bx (dysplasia) ECC (negative) recommend repeat pap at 6 and 12 months  6/28/10 pap ASCUS + HPV 16 (high risk) recommend colpo  7/21/10 colposcopy- BX ( ROSA 2/3 with gland involvement)  8/2/10 clinic appt to discuss LEEP  10/26/10 tele enc: LEEP scheduled for 11/4/10  11/4/10 LEEP- (ROSA 2/3) not extending to margins.  ECC (negative). Plan: pap in 6 mo.   11- NIL pap. Plan: pap in 6 mo  10/8/13 NIL pap, neg HR HPV. Plan: pap in 3 years.  5/22/15 NIL pap, neg HR HPV. Plan: pap in 3 years.  19 NIL pap, + HR HPV (not 16 or 18). Plan: cotest in 1 yr.  19 left msg   CCT tracking.               Past Medical History:    Past Medical History:   Diagnosis Date     ALCOHOL ABUSE-IN REMISS 2007     Cataplexy and narcolepsy      ROSA 3 - cervical intraepithelial neoplasia grade 3 1/4/10     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     Hypersomnia with sleep apnea      Irregular menstrual cycle 1997     Metrorrhagia 2001     Other acne      Other and unspecified adverse effect of drug, medicinal and biological substance 2001     Substance abuse (H) 10/2/2009       Past Surgical History:    Past Surgical History:   Procedure Laterality Date      SECTION       COLPOSCOPY CERVIX, LOOP ELECTRODE BIOPSY, COMBINED  2010    ROSA 2/3     ESOPHAGOSCOPY, GASTROSCOPY, DUODENOSCOPY (EGD), COMBINED N/A 2020    Procedure: Esophagogastroduodenoscopy with biopsies;  Surgeon: Ronan Pelayo MD;  Location: [facility]     HC REMOVAL OF TONSILS,12+  Y/O  1999    Tonsils 12+y.o.     REPAIR TENDON PERONEAL  11/15/2013    Procedure: REPAIR TENDON PERONEAL;  right peroneal tendon  transfer;  Surgeon: Jesus Manuel Oden DPM;  Location: [facility]       Family History:    Family History   Problem Relation Age of Onset     Depression Mother      Arthritis Paternal Grandmother      Hypertension Paternal Grandfather         birth FF     Asthma No family hx of      C.A.D. No family hx of      Diabetes No family hx of      Cancer - colorectal No family hx of      Prostate Cancer No family hx of      Coronary Artery Disease No family hx of      Ovarian Cancer No family hx of      Mental Illness No family hx of      Cerebrovascular Disease No family hx of      Anesthesia Reaction No family hx of      Other Cancer No family hx of      Osteoporosis No family hx of      Known Genetic Syndrome No family hx of      Obesity No family hx of      Unknown/Adopted No family hx of        Social History:  Marital Status:  Single [1]  Social History     Tobacco Use     Smoking status: Current Every Day Smoker     Packs/day: 0.25     Smokeless tobacco: Never Used   Substance Use Topics     Alcohol use: No     Drug use: No        Medications:         busPIRone (BUSPAR) 5 MG tablet       cyclobenzaprine (FLEXERIL) 10 MG tablet       EPINEPHrine (ANY BX GENERIC EQUIV) 0.3 MG/0.3ML injection 2-pack       flunisolide (NASALIDE) 25 MCG/ACT (0.025%) SOLN spray       folic acid (FOLVITE) 400 MCG tablet       gabapentin (NEURONTIN) 100 MG capsule       lithium ER (LITHOBID) 300 MG CR tablet       methylphenidate (RITALIN) 10 MG tablet       metoclopramide (REGLAN) 5 MG tablet       metoprolol succinate ER (TOPROL-XL) 50 MG 24 hr tablet       naltrexone (DEPADE/REVIA) 50 MG tablet       nicotine (NICORETTE) 2 MG gum       pantoprazole (PROTONIX) 40 MG EC tablet       potassium chloride ER (K-TAB) 20 MEQ CR tablet       Prenatal 27-1 MG TABS       prochlorperazine (COMPAZINE) 5 MG tablet        sucralfate (CARAFATE) 1 GM tablet          Review of Systems   All other systems reviewed and are negative.      Physical Exam   BP: (!) 125/94  Pulse: 127  Temp: 98.8  F (37.1  C)  Resp: 14  Weight: 45.4 kg (100 lb)  SpO2: 100 %      Physical Exam  Vitals signs and nursing note reviewed.   Constitutional:       General: She is not in acute distress.     Appearance: Normal appearance. She is not diaphoretic.   HENT:      Head: Normocephalic and atraumatic.      Right Ear: External ear normal.      Left Ear: External ear normal.      Mouth/Throat:      Mouth: Mucous membranes are moist.   Eyes:      Extraocular Movements: Extraocular movements intact.      Conjunctiva/sclera: Conjunctivae normal.      Pupils: Pupils are equal, round, and reactive to light.   Neck:      Musculoskeletal: Normal range of motion and neck supple. No neck rigidity.   Cardiovascular:      Rate and Rhythm: Regular rhythm. Tachycardia present.      Heart sounds: Normal heart sounds.   Pulmonary:      Effort: Pulmonary effort is normal. No respiratory distress.      Breath sounds: Normal breath sounds.   Abdominal:      General: Abdomen is flat.      Tenderness: There is abdominal tenderness in the epigastric area. There is no right CVA tenderness, left CVA tenderness, guarding or rebound.   Musculoskeletal: Normal range of motion.         General: No deformity or signs of injury.   Skin:     General: Skin is warm and dry.      Findings: No rash.   Neurological:      Mental Status: She is alert and oriented to person, place, and time. Mental status is at baseline.   Psychiatric:         Mood and Affect: Mood normal.         Behavior: Behavior normal.         Thought Content: Thought content normal.         ED Course        Procedures      Results for orders placed or performed during the hospital encounter of 10/02/20 (from the past 24 hour(s))   CBC with platelets differential   Result Value Ref Range    WBC 3.9 (L) 4.0 - 11.0 10e9/L    RBC  Count 2.13 (L) 3.8 - 5.2 10e12/L    Hemoglobin 8.7 (L) 11.7 - 15.7 g/dL    Hematocrit 25.7 (L) 35.0 - 47.0 %     (H) 78 - 100 fl    MCH 40.8 (H) 26.5 - 33.0 pg    MCHC 33.9 31.5 - 36.5 g/dL    RDW 13.6 10.0 - 15.0 %    Platelet Count 167 150 - 450 10e9/L    Diff Method Automated Method     % Neutrophils 63.4 %    % Lymphocytes 22.6 %    % Monocytes 11.7 %    % Eosinophils 1.0 %    % Basophils 0.5 %    % Immature Granulocytes 0.8 %    Nucleated RBCs 0 0 /100    Absolute Neutrophil 2.4 1.6 - 8.3 10e9/L    Absolute Lymphocytes 0.9 0.8 - 5.3 10e9/L    Absolute Monocytes 0.5 0.0 - 1.3 10e9/L    Absolute Basophils 0.0 0.0 - 0.2 10e9/L    Abs Immature Granulocytes 0.0 0 - 0.4 10e9/L    Absolute Nucleated RBC 0.0    Comprehensive metabolic panel   Result Value Ref Range    Sodium 133 133 - 144 mmol/L    Potassium 3.8 3.4 - 5.3 mmol/L    Chloride 101 94 - 109 mmol/L    Carbon Dioxide 23 20 - 32 mmol/L    Anion Gap 9 3 - 14 mmol/L    Glucose 85 70 - 99 mg/dL    Urea Nitrogen 9 7 - 30 mg/dL    Creatinine 0.63 0.52 - 1.04 mg/dL    GFR Estimate >90 >60 mL/min/[1.73_m2]    GFR Estimate If Black >90 >60 mL/min/[1.73_m2]    Calcium 8.4 (L) 8.5 - 10.1 mg/dL    Bilirubin Total 1.0 0.2 - 1.3 mg/dL    Albumin 2.6 (L) 3.4 - 5.0 g/dL    Protein Total 5.8 (L) 6.8 - 8.8 g/dL    Alkaline Phosphatase 153 (H) 40 - 150 U/L    ALT 31 0 - 50 U/L    AST 98 (H) 0 - 45 U/L   Lipase   Result Value Ref Range    Lipase 603 (H) 73 - 393 U/L   Alcohol ethyl   Result Value Ref Range    Ethanol g/dL <0.01 <0.01 g/dL   KUB XR    Narrative    XR KUB 10/2/2020 3:51 PM     HISTORY: PUD, rule out perforation      Impression    IMPRESSION: No evidence of bowel obstruction. No evidence of free  peritoneal air under the right hemidiaphragm.    THO YEH MD       Medications   0.9% sodium chloride BOLUS (0 mLs Intravenous Stopped 10/2/20 1600)     Followed by   sodium chloride 0.9% infusion ( Intravenous New Bag 10/2/20 1637)   ondansetron (ZOFRAN)  injection 4 mg (4 mg Intravenous Given 10/2/20 1501)   HYDROmorphone (PF) (DILAUDID) injection 0.5 mg (0.5 mg Intravenous Given 10/2/20 1633)   diphenhydrAMINE (BENADRYL) injection 25 mg (25 mg Intravenous Given 10/2/20 1801)   pantoprazole (PROTONIX) IV push injection 40 mg (40 mg Intravenous Given 10/2/20 1843)       Assessments & Plan (with Medical Decision Making)  40 year old woman who presents with concerns of increasing abdominal pain after being diagnosed with acute pancreatitis one day ago. See detailed HPI for reason of her visit in the ED yesterday. Upon arrival today she is tachycardic with a heart rate of 127. She is otherwise afebrile and all vitals are within normal limits.   The patient received IV fluids, Zofran and Dilaudid to control her pain and nausea while here in the ED. Labs were collected and reviewed. CBC revealed a hemoglobin of 8.7, yesterday hemoglobin 10.0 so this is an acute drop. Lipase was elevated at 603 but improved since yesterday of 658.  Rest of labs without acute abnormalities.  An XR KUB was obtained to rule out a perforation given her complaint of epigastric pain and bloody stools. This showed no evidence of a bowel obstruction or free peritoneal air under the right hemidiaphragm.    At this point, I am not convinced her pain is related to pancreatitis but rather PUD.  She had an endoscopy completed on 9/25 which demonstrated rather severe gastric ulcers.  She had started Protonix and Carafate at that time.  I do think that given the acute drop in her hemoglobin, increased pain, tachycardia, tarry stools and vomiting blood yesterday, she is having bleeding from these ulcers that may require intervention if bleeding persists.  She was given IV Protonix here.  We do not have a bed at our facility today and also no GI here for consult so I called and spoke with hospitalist at [facility] Dr. De La Vega who accepted patient in transfer onto his service for further  management.  Patient is agreeable to plan to transfer.  She remained n.p.o. here in the ED.  She will go by EMS for continued monitoring.  Staffed in the ED with Dr. Oden.     I have reviewed the nursing notes.    I have reviewed the findings, diagnosis, plan and need for follow up with the patient.    New Prescriptions    No medications on file       Final diagnoses:   Gastrointestinal hemorrhage associated with gastric ulcer   Tachycardia   Epigastric pain       This document serves as a record of services personally performed by Aliyah Palmer PA*. It was created on their behalf by Linda Palmer, a trained medical scribe. The creation of this record is based on the provider's personal observations and the statements of the patient. This document has been checked and approved by the attending provider.  Note: Chart documentation done in part with Dragon Voice Recognition software. Although reviewed after completion, some word and grammatical errors may remain.  10/2/2020   [facility] EMERGENCY DEPT     Aliyah Palmer PA-C  10/02/20 1856

## 2020-10-02 NOTE — ED TRIAGE NOTES
Pt here yesterday with acute pancreatitis, sent home for a trial, with repeat labs today.    Patient's airway, breathing, circulation, and disability/mental status (ABCDs) intact/WDL during triage.

## 2020-10-02 NOTE — PROGRESS NOTES
Clinic Care Coordination Contact  Community Health Worker Initial Outreach       CHW Additional Questions  If ED/Hospital discharge, follow-up appointment scheduled as recommended?: Yes  Medication changes made following ED/Hospital discharge?: No  MyChart active?: Yes  Patient sent Social Determinants of Health questionnaire?: No    Patient accepts CC: No. Patient declined CCC.    The Clinic Community Health Worker spoke with the patient today at the request of the PCP to discuss possible Clinic Care Coordination enrollment. The service was described to the patient and immediate needs were discussed. The patient declined enrollment at this time. The PCP is encouraged to refer in the future if the patient's needs change.    Irma GOMSE Community Health Worker  Clinic Care Coordination  [facility] : [facility]  Phone: [phone]    Referral made off of discharge report

## 2020-10-03 LAB
ABO + RH BLD: NORMAL
ABO + RH BLD: NORMAL
ALBUMIN SERPL-MCNC: 2 G/DL (ref 3.4–5)
ALP SERPL-CCNC: 97 U/L (ref 40–150)
ALT SERPL W P-5'-P-CCNC: 24 U/L (ref 0–50)
AMPHETAMINES UR QL SCN: NEGATIVE
ANION GAP SERPL CALCULATED.3IONS-SCNC: 8 MMOL/L (ref 3–14)
AST SERPL W P-5'-P-CCNC: 79 U/L (ref 0–45)
BARBITURATES UR QL: NEGATIVE
BENZODIAZ UR QL: POSITIVE
BILIRUB SERPL-MCNC: 0.7 MG/DL (ref 0.2–1.3)
BLD GP AB SCN SERPL QL: NORMAL
BLD PROD TYP BPU: NORMAL
BLD PROD TYP BPU: NORMAL
BLD UNIT ID BPU: 0
BLOOD BANK CMNT PATIENT-IMP: NORMAL
BLOOD PRODUCT CODE: NORMAL
BPU ID: NORMAL
BUN SERPL-MCNC: 12 MG/DL (ref 7–30)
CALCIUM SERPL-MCNC: 6.7 MG/DL (ref 8.5–10.1)
CANNABINOIDS UR QL SCN: NEGATIVE
CHLORIDE SERPL-SCNC: 110 MMOL/L (ref 94–109)
CO2 SERPL-SCNC: 18 MMOL/L (ref 20–32)
COCAINE UR QL: NEGATIVE
CREAT SERPL-MCNC: 0.6 MG/DL (ref 0.52–1.04)
GFR SERPL CREATININE-BSD FRML MDRD: >90 ML/MIN/{1.73_M2}
GLUCOSE SERPL-MCNC: 59 MG/DL (ref 70–99)
HGB BLD-MCNC: 6.4 G/DL (ref 11.7–15.7)
HGB BLD-MCNC: 7.4 G/DL (ref 11.7–15.7)
HGB BLD-MCNC: 7.9 G/DL (ref 11.7–15.7)
HGB BLD-MCNC: 8.4 G/DL (ref 11.7–15.7)
LABORATORY COMMENT REPORT: NORMAL
LIPASE SERPL-CCNC: 513 U/L (ref 73–393)
NUM BPU REQUESTED: 2
OPIATES UR QL SCN: POSITIVE
PCP UR QL SCN: NEGATIVE
POTASSIUM SERPL-SCNC: 4.4 MMOL/L (ref 3.4–5.3)
PROT SERPL-MCNC: 4.3 G/DL (ref 6.8–8.8)
SARS-COV-2 RNA SPEC QL NAA+PROBE: NEGATIVE
SARS-COV-2 RNA SPEC QL NAA+PROBE: NORMAL
SODIUM SERPL-SCNC: 136 MMOL/L (ref 133–144)
SPECIMEN EXP DATE BLD: NORMAL
SPECIMEN SOURCE: NORMAL
SPECIMEN SOURCE: NORMAL
TRANSFUSION STATUS PATIENT QL: NORMAL
TRANSFUSION STATUS PATIENT QL: NORMAL
UPPER GI ENDOSCOPY: NORMAL

## 2020-10-03 PROCEDURE — 250N000011 HC RX IP 250 OP 636: Performed by: PHYSICIAN ASSISTANT

## 2020-10-03 PROCEDURE — 99207 PR CDG-MDM COMPONENT: MEETS HIGH - UP CODED: CPT | Performed by: HOSPITALIST

## 2020-10-03 PROCEDURE — 250N000009 HC RX 250: Performed by: HOSPITALIST

## 2020-10-03 PROCEDURE — 0DJ08ZZ INSPECTION OF UPPER INTESTINAL TRACT, VIA NATURAL OR ARTIFICIAL OPENING ENDOSCOPIC: ICD-10-PCS | Performed by: INTERNAL MEDICINE

## 2020-10-03 PROCEDURE — 250N000013 HC RX MED GY IP 250 OP 250 PS 637: Performed by: HOSPITALIST

## 2020-10-03 PROCEDURE — C9113 INJ PANTOPRAZOLE SODIUM, VIA: HCPCS | Performed by: HOSPITALIST

## 2020-10-03 PROCEDURE — 86900 BLOOD TYPING SEROLOGIC ABO: CPT | Performed by: PHYSICIAN ASSISTANT

## 2020-10-03 PROCEDURE — 36415 COLL VENOUS BLD VENIPUNCTURE: CPT | Performed by: HOSPITALIST

## 2020-10-03 PROCEDURE — 250N000013 HC RX MED GY IP 250 OP 250 PS 637: Performed by: INTERNAL MEDICINE

## 2020-10-03 PROCEDURE — 250N000009 HC RX 250: Performed by: INTERNAL MEDICINE

## 2020-10-03 PROCEDURE — G0500 MOD SEDAT ENDO SERVICE >5YRS: HCPCS | Performed by: INTERNAL MEDICINE

## 2020-10-03 PROCEDURE — 83690 ASSAY OF LIPASE: CPT | Performed by: PHYSICIAN ASSISTANT

## 2020-10-03 PROCEDURE — 250N000011 HC RX IP 250 OP 636: Performed by: HOSPITALIST

## 2020-10-03 PROCEDURE — 86923 COMPATIBILITY TEST ELECTRIC: CPT | Performed by: PHYSICIAN ASSISTANT

## 2020-10-03 PROCEDURE — 43235 EGD DIAGNOSTIC BRUSH WASH: CPT | Performed by: INTERNAL MEDICINE

## 2020-10-03 PROCEDURE — 85018 HEMOGLOBIN: CPT | Performed by: PHYSICIAN ASSISTANT

## 2020-10-03 PROCEDURE — 258N000002 HC RX IP 258 OP 250: Performed by: HOSPITALIST

## 2020-10-03 PROCEDURE — 86850 RBC ANTIBODY SCREEN: CPT | Performed by: PHYSICIAN ASSISTANT

## 2020-10-03 PROCEDURE — 120N000001 HC R&B MED SURG/OB

## 2020-10-03 PROCEDURE — 86901 BLOOD TYPING SEROLOGIC RH(D): CPT | Performed by: PHYSICIAN ASSISTANT

## 2020-10-03 PROCEDURE — 85018 HEMOGLOBIN: CPT | Performed by: HOSPITALIST

## 2020-10-03 PROCEDURE — 36415 COLL VENOUS BLD VENIPUNCTURE: CPT | Performed by: PHYSICIAN ASSISTANT

## 2020-10-03 PROCEDURE — C9113 INJ PANTOPRAZOLE SODIUM, VIA: HCPCS | Performed by: PHYSICIAN ASSISTANT

## 2020-10-03 PROCEDURE — 99233 SBSQ HOSP IP/OBS HIGH 50: CPT | Performed by: HOSPITALIST

## 2020-10-03 PROCEDURE — 250N000011 HC RX IP 250 OP 636: Performed by: INTERNAL MEDICINE

## 2020-10-03 PROCEDURE — 80307 DRUG TEST PRSMV CHEM ANLYZR: CPT | Performed by: HOSPITALIST

## 2020-10-03 PROCEDURE — 80053 COMPREHEN METABOLIC PANEL: CPT | Performed by: PHYSICIAN ASSISTANT

## 2020-10-03 PROCEDURE — P9016 RBC LEUKOCYTES REDUCED: HCPCS | Performed by: PHYSICIAN ASSISTANT

## 2020-10-03 RX ORDER — POLYETHYLENE GLYCOL 3350 17 G/17G
238 POWDER, FOR SOLUTION ORAL ONCE
Status: COMPLETED | OUTPATIENT
Start: 2020-10-03 | End: 2020-10-03

## 2020-10-03 RX ORDER — FOLIC ACID 5 MG/ML
1 INJECTION, SOLUTION INTRAMUSCULAR; INTRAVENOUS; SUBCUTANEOUS DAILY
Status: DISCONTINUED | OUTPATIENT
Start: 2020-10-03 | End: 2020-10-05

## 2020-10-03 RX ORDER — SODIUM CHLORIDE 450 MG/100ML
INJECTION, SOLUTION INTRAVENOUS CONTINUOUS
Status: DISCONTINUED | OUTPATIENT
Start: 2020-10-03 | End: 2020-10-04 | Stop reason: CLARIF

## 2020-10-03 RX ORDER — NICOTINE 21 MG/24HR
1 PATCH, TRANSDERMAL 24 HOURS TRANSDERMAL DAILY
Status: DISCONTINUED | OUTPATIENT
Start: 2020-10-03 | End: 2020-10-06 | Stop reason: HOSPADM

## 2020-10-03 RX ORDER — NICOTINE POLACRILEX 4 MG
15-30 LOZENGE BUCCAL
Status: DISCONTINUED | OUTPATIENT
Start: 2020-10-03 | End: 2020-10-06 | Stop reason: HOSPADM

## 2020-10-03 RX ORDER — FENTANYL CITRATE 50 UG/ML
INJECTION, SOLUTION INTRAMUSCULAR; INTRAVENOUS PRN
Status: DISCONTINUED | OUTPATIENT
Start: 2020-10-03 | End: 2020-10-03 | Stop reason: HOSPADM

## 2020-10-03 RX ORDER — NICOTINE 21 MG/24HR
1 PATCH, TRANSDERMAL 24 HOURS TRANSDERMAL DAILY
Status: DISCONTINUED | OUTPATIENT
Start: 2020-10-03 | End: 2020-10-03

## 2020-10-03 RX ORDER — THIAMINE HYDROCHLORIDE 100 MG/ML
100 INJECTION, SOLUTION INTRAMUSCULAR; INTRAVENOUS DAILY
Status: DISCONTINUED | OUTPATIENT
Start: 2020-10-03 | End: 2020-10-06 | Stop reason: HOSPADM

## 2020-10-03 RX ORDER — DEXTROSE MONOHYDRATE 25 G/50ML
25-50 INJECTION, SOLUTION INTRAVENOUS
Status: DISCONTINUED | OUTPATIENT
Start: 2020-10-03 | End: 2020-10-06 | Stop reason: HOSPADM

## 2020-10-03 RX ADMIN — MORPHINE SULFATE 2 MG: 2 INJECTION, SOLUTION INTRAMUSCULAR; INTRAVENOUS at 20:20

## 2020-10-03 RX ADMIN — PANTOPRAZOLE SODIUM 40 MG: 40 INJECTION, POWDER, FOR SOLUTION INTRAVENOUS at 08:20

## 2020-10-03 RX ADMIN — MORPHINE SULFATE 2 MG: 2 INJECTION, SOLUTION INTRAMUSCULAR; INTRAVENOUS at 08:20

## 2020-10-03 RX ADMIN — PANTOPRAZOLE SODIUM 40 MG: 40 INJECTION, POWDER, FOR SOLUTION INTRAVENOUS at 17:10

## 2020-10-03 RX ADMIN — MORPHINE SULFATE 1 MG: 2 INJECTION, SOLUTION INTRAMUSCULAR; INTRAVENOUS at 10:58

## 2020-10-03 RX ADMIN — FOLIC ACID 1 MG: 5 INJECTION, SOLUTION INTRAMUSCULAR; INTRAVENOUS; SUBCUTANEOUS at 12:35

## 2020-10-03 RX ADMIN — ONDANSETRON 4 MG: 2 INJECTION INTRAMUSCULAR; INTRAVENOUS at 22:44

## 2020-10-03 RX ADMIN — ONDANSETRON 4 MG: 2 INJECTION INTRAMUSCULAR; INTRAVENOUS at 10:58

## 2020-10-03 RX ADMIN — MORPHINE SULFATE 2 MG: 2 INJECTION, SOLUTION INTRAMUSCULAR; INTRAVENOUS at 22:44

## 2020-10-03 RX ADMIN — SODIUM CHLORIDE: 4.5 INJECTION, SOLUTION INTRAVENOUS at 18:09

## 2020-10-03 RX ADMIN — THIAMINE HYDROCHLORIDE 100 MG: 100 INJECTION, SOLUTION INTRAMUSCULAR; INTRAVENOUS at 12:35

## 2020-10-03 RX ADMIN — POLYETHYLENE GLYCOL 3350 238 G: 17 POWDER, FOR SOLUTION ORAL at 17:10

## 2020-10-03 RX ADMIN — MORPHINE SULFATE 1 MG: 2 INJECTION, SOLUTION INTRAMUSCULAR; INTRAVENOUS at 03:56

## 2020-10-03 RX ADMIN — NICOTINE 1 PATCH: 14 PATCH, EXTENDED RELEASE TRANSDERMAL at 18:07

## 2020-10-03 RX ADMIN — MORPHINE SULFATE 2 MG: 2 INJECTION, SOLUTION INTRAMUSCULAR; INTRAVENOUS at 17:10

## 2020-10-03 RX ADMIN — NICOTINE 1 PATCH: 21 PATCH, EXTENDED RELEASE TRANSDERMAL at 19:20

## 2020-10-03 RX ADMIN — POTASSIUM CHLORIDE AND SODIUM CHLORIDE: 900; 150 INJECTION, SOLUTION INTRAVENOUS at 11:10

## 2020-10-03 ASSESSMENT — ACTIVITIES OF DAILY LIVING (ADL)
ADLS_ACUITY_SCORE: 10

## 2020-10-03 NOTE — PLAN OF CARE
Pt is A/ox4, VSS on RA, abd pain with IV morphine given, denies nausea. IVF infusing, up IND, NPO ex) ice chips. 2 bloody (maroon) stools today, voiding okay. Hgb 8.4, next check at 1630. COVID Neg, CIWA = 3. GI- did EGD found ulcers but no bleeding- will plan for colonoscopy tomorrow 10/4; nursing will continue to monitor.

## 2020-10-03 NOTE — PROGRESS NOTES
[facility]    Medicine Progress Note - Hospitalist Service       Date of Admission:  10/2/2020  Assessment & Plan       Acute abdominal pain  Elevated lipase concerning for pancreatitis  Peptic ulcer disease with concern for GI bleed=  Acute blood loss anemia  : Initially admitted to [facility] 9/16-9/18 2020 for possible GI bleed.  Treated with IV Protonix.  Had planned endoscopy however patient left AMA.  Ultimately underwent outpatient endoscopy 9/25 which was notable for ulcerative reflux esophagitis, gastric ulcer, duodenal ulcers and erosive gastropathy. Pathology negative. Treated with Protonix twice daily for 8 weeks.  Presented to the ED yesterday with complaints of hematemesis  and abdominal pain.  Hemoglobin noted to be 10.1 down from 10.7 at discharge.  Lipase elevated at 658.  Treated with supportive cares and discharged home.  She presented today with worsening pain and nausea.  Repeat laboratory evaluation with hemoglobin of 8.7 and lipase of 603.  Transfer to [facility] due to concern for possible ongoing GI bleeding  -CT abd/pelvis with diffuse fatty infiltration, questionable thickening of gallbladder, and heterogeneously enhancing lesion of right adrenal gland. Consider repeat imaging pending course  plan  - Type and screen. Hgb q 6. Patient has been consented to receive blood products. Repeat Lipase in am  - IV Protonix bid  - NPO, IVF, pain control and antiemetics  - Resume Carafate with advancement of diet  -GI consult, planning egd when covid negative       Sinus Tachycardia: Ongoing recent issues with tachycardia during recent admission. Current HR 110s.  Echo 9/16 with normal EF.  - Treat above issues   - Patient notes she has not been taking her Metoprolol due to borderline BPs. Will hold for now    Folic acid deficiency  Macrocytic anemia  Likely acute blood loss anemia  Plan  - IV folic acid     Chronic Back and Foot Pain: H/o 5th metatarsal styloid  avulsion fracture with non-union. Following with podiatry. CAM walker causing back pain. Had been using NSAIDs but stopped due to EGD findings  - Continue PTA Gabapentin and Flexeril when verified by pharmacy     Hypersomnia   - Notes does not need Ritalin      Mood D/o  - Resume Lithium and Buspar     H/o ETOH in remission  - Sober x 3 years she reports but appears mildly tremulous  - CIWA for monitoring     Right Adrenal Gland: Noted on CT 9/16/20. F/u adrenal MRI recommended  - F/u as outpatient       Diet: NPO for Medical/Clinical Reasons Except for: Meds, Ice Chips    DVT Prophylaxis: Pneumatic Compression Devices  Luna Catheter: not present  Code Status: Full Code           Disposition Plan   Expected discharge: 2 - 3 days, recommended to prior living arrangement once hemoglobin stable.  Entered: Brice Mcgraw DO 10/03/2020, 10:26 AM       The patient's care was discussed with the Patient.    Brice Mcgraw DO  Hospitalist Service  [facility]  Contact information available via [facility] Paging/Directory    ______________________________________________________________________    Interval History   Has some continued nausea and epigastric pain. Had large melenotic bm this AM. Blood transfusion x 1 pending.     Data reviewed today: I reviewed all medications, new labs and imaging results over the last 24 hours. I personally reviewed no images or EKG's today.    Physical Exam   Vital Signs: Temp: 97.2  F (36.2  C) Temp src: Oral BP: (!) 130/93 Pulse: 104   Resp: 16 SpO2: 100 % O2 Device: None (Room air)    Weight: 0 lbs 0 oz  Constitutional: Alert, resting comfortably in NAD. Thin woman  HEENT: Head normocephalic, atraumatic. Eyes sclera non icteric.   Respiratory:     Normal effort, symmetric expansion, no crackles or wheezing  Cardiovascular: RRR no murmurs   GI: Non distended, normal bowels sounds, no tenderness or guarding. Epigastric tenderness noted  MSK: LE without  edema. Dorsalis pedis pulse palpated bilaterally.   Skin/Integumen: Clear  Neuro: CN II-XII grossly intact  Psych:  Alert and oriented x 3. Normal affect. Mild tremor noted    Data   Recent Labs   Lab 10/03/20  1051 10/03/20  0717 10/03/20  0018 10/02/20  1455 10/01/20  1041   WBC  --   --   --  3.9* 5.2   HGB 8.4*  --  6.4* 8.7* 10.0*   MCV  --   --   --  121* 123*   PLT  --   --   --  167 178   NA  --  136  --  133 135   POTASSIUM  --  4.4  --  3.8 5.3   CHLORIDE  --  110*  --  101 101   CO2  --  18*  --  23 26   BUN  --  12  --  9 12   CR  --  0.60  --  0.63 0.64   ANIONGAP  --  8  --  9 8   JANET  --  6.7*  --  8.4* 8.3*   GLC  --  59*  --  85 105*   ALBUMIN  --  2.0*  --  2.6* 2.7*   PROTTOTAL  --  4.3*  --  5.8* 5.9*   BILITOTAL  --  0.7  --  1.0 1.4*   ALKPHOS  --  97  --  153* 171*   ALT  --  24  --  31 36   AST  --  79*  --  98* 108*   LIPASE  --  513*  --  603* 658*     Recent Results (from the past 24 hour(s))   KUB XR    Narrative    XR KUB 10/2/2020 3:51 PM     HISTORY: PUD, rule out perforation      Impression    IMPRESSION: No evidence of bowel obstruction. No evidence of free  peritoneal air under the right hemidiaphragm.    THO YEH MD     Medications     0.9% sodium chloride + KCl 20 mEq/L 100 mL/hr at 10/03/20 1110       folic acid  1 mg Intravenous Daily     pantoprazole (PROTONIX) IV  40 mg Intravenous BID     sodium chloride (PF)  3 mL Intracatheter Q8H     thiamine  100 mg Intravenous Daily

## 2020-10-03 NOTE — PLAN OF CARE
Pt arrived from [facility] ED @ 2130. A/Ox4. VSS on RA. C/o abdominal and L flank pain managed with PRN IV morphine (x2) (pre transfusion). C/o nausea managed with PRN zofran (x1). No bloody stools or emesis since admission. Voiding adequately. Midnight Hgb 6.4, MD notified and blood transfusion initiated. Plan for GI consult, maintain NPO status, and manage pain.

## 2020-10-03 NOTE — PROGRESS NOTES
MD Notification    Notified Person: MD    Notified Person Name: Dr. Dumas    Notification Date/Time: 10/03/2020 @ 0035    Notification Interaction: web based page    Purpose of Notification:   Hgb 6.4. No transfusion orders.   Consent already signed. Type/screen in process.     Orders Received: Blood transfusion orders placed for Hgb <7    Comments:

## 2020-10-03 NOTE — H&P
[facility]    History and Physical - Hospitalist Service       Date of Admission:10/02/20      Assessment & Plan   Tasha Short is a 40 year old female transferred from [facility] for evaluation of abdominal pain in the setting of possible acute pancreatitis and possible GI bleed.    Acute abdominal pain  Elevated lipase concerning for pancreatitis  Peptic ulcer disease with concern for GI bleed: Initially admitted to [facility] 9/16-9/18 2020 for possible GI bleed.  Treated with IV Protonix.  Had planned endoscopy however patient left AMA.  Ultimately underwent outpatient endoscopy 9/25 which was notable for ulcerative reflux esophagitis, gastric ulcer, duodenal ulcers and erosive gastropathy. Pathology negative. Treated with Protonix twice daily for 8 weeks.  Presented to the ED yesterday with complaints of hematemesis  and abdominal pain.  Hemoglobin noted to be 10.1 down from 10.7 at discharge.  Lipase elevated at 658.  Treated with supportive cares and discharged home.  She presented today with worsening pain and nausea.  Repeat laboratory evaluation with hemoglobin of 8.7 and lipase of 603.  Transfer to [facility] due to concern for possible ongoing GI bleeding.  - Admit inpatient  - Type and screen. Hgb q 6. Patient has been consented to receive blood products. Repeat Lipase in am  - IV Protonix  - NPO, IVF, pain control and antiemetics  - Resume Carafate with advancement of diet  -GI consult   - CT abd/pelvis with diffuse fatty infiltration, questionable thickening of gallbladder, and heterogeneously enhancing lesion of right adrenal gland. Consider repeat imaging pending course    Sinus Tachycardia: Ongoing recent issues with tachycardia during recent admission. Current HR 110s.  Echo 9/16 with normal EF.  - Treat above issues  - Patient notes she has not been taking her Metoprolol due to borderline BPs. 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 Following with podiatry. CAM walker causing back pain. Had been using NSAIDs but stopped due to EGD findings  - Continue PTA Gabapentin and Flexeril when verified by pharmacy    Hypersomnia   - Notes does not need Ritalin     Mood D/o  - Resume Lithium and Buspar    H/o ETOH in remission  - Sober x 3 years    Right Adrenal Gland: Noted on CT 9/16/20. F/u adrenal MRI recommended  - F/u as outpatient    COVID-19: Asymptomatic swab pending       Diet:   NPO  DVT Prophylaxis: Pneumatic Compression Devices   Luna Catheter: not present  Code Status:   Full         Disposition Plan   Expected discharge: Admit inpatient  Entered: Mariana Rodriguez PA-C 10/02/2020, 8:49 PM     The patient's care was discussed with the Bedside Nurse and Patient.    Mariana Rodriguez PA-C  [facility]  Contact information available via [facility] Paging/Directory      ______________________________________________________________________    Chief Complaint   Abdominal Pain    History is obtained from the patient    History of Present Illness   Tasha Short is a 40 year old female who is transferred from [facility] for evaluation of abdominal and concern for GI bleed    Initially admitted to [facility] 9/16-9/18 for diarrhea, hypokalemia and anemia. Treated with protonix and had planned EGD but patient left AMA. Ultimately underwent EGD on 9/25 with evidence of gastric and duodenal ulcers and prescribed PPI x 8 weeks. Presented to ER yesterday with worsening abdominal pain and hematemesis. Hgb 10.1 and Lipase 658. Treated with supportive care for suspected pancreatitis and discharged home. Represented today with worsening abdominal pain. Hgb now 8.7 and transfer was requested due to concern for ongoing GI bleed.    Presently, patient is evaluated in her hosptial room. Continues to complain of epigastric abdominal pain. Notes 5 episodes of tarry stools  today. Minimal appetite. No ETOH use. Has been complaint with PRotonix. Denies fevers/chills. Notes SOB she attributes to anemia    Review of Systems    The 10 point Review of Systems is negative other than noted in the HPI or here.     Past Medical History    I have reviewed this patient's medical history and updated it with pertinent information if needed.   Past Medical History:   Diagnosis Date     ALCOHOL ABUSE-IN REMISS 2007     Cataplexy and narcolepsy     tried Provigil, Straterra, Ritalin (works)     ROSA 3 - cervical intraepithelial neoplasia grade 3 1/4/10    ROSA 2/3  on LEEP biopsy     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     Hypersomnia with sleep apnea      Irregular menstrual cycle 1997     Metrorrhagia 2001     Other acne      Other and unspecified adverse effect of drug, medicinal and biological substance 2001    Allergic and adverse reaction to Dilantin     Substance abuse (H) 10/2/2009    see 2009 confidential report       Past Surgical History   I have reviewed this patient's surgical history and updated it with pertinent information if needed.  Past Surgical History:   Procedure Laterality Date      SECTION       COLPOSCOPY CERVIX, LOOP ELECTRODE BIOPSY, COMBINED  2010    ROSA 2/3     ESOPHAGOSCOPY, GASTROSCOPY, DUODENOSCOPY (EGD), COMBINED N/A 2020    Procedure: Esophagogastroduodenoscopy with biopsies;  Surgeon: Ronan Pelayo MD;  Location:  GI     HC REMOVAL OF TONSILS,12+ Y/O      Tonsils 12+y.o.     REPAIR TENDON PERONEAL  11/15/2013    Procedure: REPAIR TENDON PERONEAL;  right peroneal tendon  transfer;  Surgeon: Jesus Manuel Oden DPM;  Location:  OR       Social History   I have reviewed this patient's social history and updated it with pertinent information if needed.  Social History     Tobacco Use     Smoking status: Current Every Day Smoker     Packs/day: 0.25     Smokeless  tobacco: Never Used   Substance Use Topics     Alcohol use: No     Drug use: No       Family History   I have reviewed this patient's family history and updated it with pertinent information if needed.  Family History   Problem Relation Age of Onset     Depression Mother      Arthritis Paternal Grandmother      Hypertension Paternal Grandfather         birth FF     Asthma No family hx of      C.A.D. No family hx of      Diabetes No family hx of      Cancer - colorectal No family hx of      Prostate Cancer No family hx of      Coronary Artery Disease No family hx of      Ovarian Cancer No family hx of      Mental Illness No family hx of      Cerebrovascular Disease No family hx of      Anesthesia Reaction No family hx of      Other Cancer No family hx of      Osteoporosis No family hx of      Known Genetic Syndrome No family hx of      Obesity No family hx of      Unknown/Adopted No family hx of        Prior to Admission Medications   Cannot display prior to admission medications because the patient has not been admitted in this contact.     Allergies   Allergies   Allergen Reactions     Bupropion Other (See Comments)     seizures     Lisinopril Swelling     Angioedema      Penicillins      hives     Phenytoin      rash,puritis (Dilantin)     Prednisone Itching     Face itching, unable to concentrate      Seasonal Allergies        Physical Exam   Vital Signs:                    Weight: 0 lbs 0 oz  Constitutional: Alert, resting comfortably in NAD  HEENT: Head normocephalic, atraumatic. Eyes sclera non icteric.   Respiratory: Normal effort, symmetric expansion, no crackles or wheezing  Cardiovascular: RRR no murmurs   GI: Non distended, normal bowels sounds, no tenderness or guarding. Epigastric tenderness noted  MSK: LE without edema. Dorsalis pedis pulse palpated bilaterally.   Skin/Integumen: Clear  Neuro: CN II-XII grossly intact  Psych:  Alert and oriented x 3. Normal  affect    ----------------------------------------------------------------------------------------------------------------------------------    Data   Data reviewed today: I reviewed all medications, new labs and imaging results over the last 24 hours. I personally reviewed no images or EKG's today.    Recent Labs   Lab 10/02/20  1455 10/01/20  1041   WBC 3.9* 5.2   HGB 8.7* 10.0*   * 123*    178    135   POTASSIUM 3.8 5.3   CHLORIDE 101 101   CO2 23 26   BUN 9 12   CR 0.63 0.64   ANIONGAP 9 8   JANET 8.4* 8.3*   GLC 85 105*   ALBUMIN 2.6* 2.7*   PROTTOTAL 5.8* 5.9*   BILITOTAL 1.0 1.4*   ALKPHOS 153* 171*   ALT 31 36   AST 98* 108*   LIPASE 603* 658*     Recent Results (from the past 24 hour(s))   KUB XR    Narrative    XR KUB 10/2/2020 3:51 PM     HISTORY: PUD, rule out perforation      Impression    IMPRESSION: No evidence of bowel obstruction. No evidence of free  peritoneal air under the right hemidiaphragm.    THO YEH MD

## 2020-10-03 NOTE — PHARMACY-ADMISSION MEDICATION HISTORY
"Pharmacy Medication History  Admission medication history interview status for the 10/2/2020  admission is complete. See EPIC admission navigator for prior to admission medications       Medication history sources: Patient and Surescripts  Location of interview: Phone  Medication history source reliability: Good  Adherence assessment: Hasn't been feeling well so has not taken all of medications    Significant changes made to the medication list:  D/C'd Buspar, Epi Pen, Folic acid, Compazine.  Increased Protonix to twice daily.  Added frequencies to Lithium, Naltrexone, Prenatal Vitamin.  10/4 Clarified with patient that lithium and naltrexone on hold PTA until she had follow up outpatient provider, she was anticipating may change therapy.    Additional medication history information:   none    Medication reconciliation completed by provider prior to medication history? No    Time spent in this activity: 20\"      Prior to Admission medications    Medication Sig Last Dose Taking? Auth Provider   gabapentin (NEURONTIN) 100 MG capsule Take 1-2 capsules (100-200 mg) by mouth 3 times daily as needed (pain) 10/2/2020 at am Yes Enid Mansfield PA-C   methylphenidate (RITALIN) 10 MG tablet Take 1 tablet (10 mg) by mouth 2 times daily  Patient taking differently: Take 10 mg by mouth 2 times daily At 08:00 and 13:00 Past Week at Unknown time Yes Enid Mansfield PA-C   metoclopramide (REGLAN) 5 MG tablet Take 1 tablet (5 mg) by mouth 3 times daily 10/2/2020 at 1200 Yes Roberto Naidu,    metoprolol succinate ER (TOPROL-XL) 50 MG 24 hr tablet Take 1 tablet (50 mg) by mouth daily  Patient taking differently: Take 50 mg by mouth daily HOLD for low BP 9/30/20 at am Yes Enid Mansfield PA-C   pantoprazole (PROTONIX) 40 MG EC tablet Take 1 tablet (40 mg) by mouth daily  Patient taking differently: Take 40 mg by mouth 2 times daily Just increased to twice daily last week per " Patient 10/2/2020 at 18:43 IV in ED Yes Enid Mansfield PA-C   sucralfate (CARAFATE) 1 GM tablet Take 1 tablet (1 g) by mouth 4 times daily 10/2/2020 at 1200 Yes Roberto Naidu DO   cyclobenzaprine (FLEXERIL) 10 MG tablet Take 1 tablet (10 mg) by mouth 3 times daily as needed for muscle spasms prn  Enid Mansfield PA-C   flunisolide (NASALIDE) 25 MCG/ACT (0.025%) SOLN spray Spray 2 sprays into both nostrils every 12 hours  Patient taking differently: Spray 2 sprays into both nostrils 2 times daily as needed  prn  Enid Mansfield PA-C   lithium ER (LITHOBID) 300 MG CR tablet Take 300 mg by mouth At Bedtime  On HOLD  Reported, Patient   naltrexone (DEPADE/REVIA) 50 MG tablet Take 50 mg by mouth every morning  On HOLD  Reported, Patient   nicotine (NICORETTE) 2 MG gum Place 1 each (2 mg) inside cheek as needed for smoking cessation prn  Enid Mansfield PA-C   Prenatal 27-1 MG TABS Take 1 tablet by mouth every morning  1 week AGO  Reported, Patient

## 2020-10-03 NOTE — PROVIDER NOTIFICATION
MD Notification    Person notified: DAVID ESPINO    Person Name: Dr. Siegel    Date/Time: 10/3/2020 at 1222    Interaction: web-based page    Purpose of Notification: COVID neg, NPO since midnight    Orders Received:    Comments:

## 2020-10-04 ENCOUNTER — APPOINTMENT (OUTPATIENT)
Dept: CT IMAGING | Facility: CLINIC | Age: 40
End: 2020-10-04
Attending: HOSPITALIST
Payer: COMMERCIAL

## 2020-10-04 LAB
ANION GAP SERPL CALCULATED.3IONS-SCNC: 2 MMOL/L (ref 3–14)
BUN SERPL-MCNC: 4 MG/DL (ref 7–30)
CALCIUM SERPL-MCNC: 6.9 MG/DL (ref 8.5–10.1)
CHLORIDE SERPL-SCNC: 110 MMOL/L (ref 94–109)
CO2 SERPL-SCNC: 24 MMOL/L (ref 20–32)
COLONOSCOPY: NORMAL
CREAT SERPL-MCNC: 0.58 MG/DL (ref 0.52–1.04)
ERYTHROCYTE [DISTWIDTH] IN BLOOD BY AUTOMATED COUNT: ABNORMAL % (ref 10–15)
GFR SERPL CREATININE-BSD FRML MDRD: >90 ML/MIN/{1.73_M2}
GLUCOSE BLDC GLUCOMTR-MCNC: 99 MG/DL (ref 70–99)
GLUCOSE SERPL-MCNC: 92 MG/DL (ref 70–99)
HCG SERPL QL: NEGATIVE
HCT VFR BLD AUTO: 22.1 % (ref 35–47)
HGB BLD-MCNC: 7.4 G/DL (ref 11.7–15.7)
HGB BLD-MCNC: 7.8 G/DL (ref 11.7–15.7)
HGB BLD-MCNC: 8.4 G/DL (ref 11.7–15.7)
LACTATE BLD-SCNC: 0.5 MMOL/L (ref 0.7–2)
LIPASE SERPL-CCNC: 567 U/L (ref 73–393)
MCH RBC QN AUTO: 34.9 PG (ref 26.5–33)
MCHC RBC AUTO-ENTMCNC: 33.5 G/DL (ref 31.5–36.5)
MCV RBC AUTO: 104 FL (ref 78–100)
PLATELET # BLD AUTO: 151 10E9/L (ref 150–450)
POTASSIUM SERPL-SCNC: 3.8 MMOL/L (ref 3.4–5.3)
RBC # BLD AUTO: 2.12 10E12/L (ref 3.8–5.2)
SODIUM SERPL-SCNC: 136 MMOL/L (ref 133–144)
TRIGL SERPL-MCNC: 199 MG/DL
WBC # BLD AUTO: 3.2 10E9/L (ref 4–11)

## 2020-10-04 PROCEDURE — 250N000009 HC RX 250: Performed by: HOSPITALIST

## 2020-10-04 PROCEDURE — 80048 BASIC METABOLIC PNL TOTAL CA: CPT | Performed by: HOSPITALIST

## 2020-10-04 PROCEDURE — 250N000011 HC RX IP 250 OP 636: Performed by: HOSPITALIST

## 2020-10-04 PROCEDURE — 0DBH8ZX EXCISION OF CECUM, VIA NATURAL OR ARTIFICIAL OPENING ENDOSCOPIC, DIAGNOSTIC: ICD-10-PCS | Performed by: INTERNAL MEDICINE

## 2020-10-04 PROCEDURE — G0500 MOD SEDAT ENDO SERVICE >5YRS: HCPCS | Performed by: INTERNAL MEDICINE

## 2020-10-04 PROCEDURE — 88305 TISSUE EXAM BY PATHOLOGIST: CPT | Mod: 26 | Performed by: PATHOLOGY

## 2020-10-04 PROCEDURE — C9113 INJ PANTOPRAZOLE SODIUM, VIA: HCPCS | Performed by: HOSPITALIST

## 2020-10-04 PROCEDURE — 250N000013 HC RX MED GY IP 250 OP 250 PS 637: Performed by: HOSPITALIST

## 2020-10-04 PROCEDURE — 85027 COMPLETE CBC AUTOMATED: CPT | Performed by: HOSPITALIST

## 2020-10-04 PROCEDURE — 999N001017 HC STATISTIC GLUCOSE BY METER IP

## 2020-10-04 PROCEDURE — 99232 SBSQ HOSP IP/OBS MODERATE 35: CPT | Performed by: HOSPITALIST

## 2020-10-04 PROCEDURE — 36415 COLL VENOUS BLD VENIPUNCTURE: CPT | Mod: 26 | Performed by: PATHOLOGY

## 2020-10-04 PROCEDURE — 83605 ASSAY OF LACTIC ACID: CPT | Performed by: HOSPITALIST

## 2020-10-04 PROCEDURE — 120N000001 HC R&B MED SURG/OB

## 2020-10-04 PROCEDURE — 88305 TISSUE EXAM BY PATHOLOGIST: CPT | Mod: TC | Performed by: INTERNAL MEDICINE

## 2020-10-04 PROCEDURE — 84478 ASSAY OF TRIGLYCERIDES: CPT | Performed by: HOSPITALIST

## 2020-10-04 PROCEDURE — 84703 CHORIONIC GONADOTROPIN ASSAY: CPT | Performed by: HOSPITALIST

## 2020-10-04 PROCEDURE — 250N000011 HC RX IP 250 OP 636: Performed by: PHYSICIAN ASSISTANT

## 2020-10-04 PROCEDURE — 250N000011 HC RX IP 250 OP 636: Performed by: INTERNAL MEDICINE

## 2020-10-04 PROCEDURE — 83690 ASSAY OF LIPASE: CPT | Performed by: HOSPITALIST

## 2020-10-04 PROCEDURE — 36415 COLL VENOUS BLD VENIPUNCTURE: CPT | Performed by: HOSPITALIST

## 2020-10-04 PROCEDURE — 85018 HEMOGLOBIN: CPT | Performed by: HOSPITALIST

## 2020-10-04 PROCEDURE — 45380 COLONOSCOPY AND BIOPSY: CPT | Performed by: INTERNAL MEDICINE

## 2020-10-04 PROCEDURE — 74177 CT ABD & PELVIS W/CONTRAST: CPT

## 2020-10-04 PROCEDURE — 258N000002 HC RX IP 258 OP 250: Performed by: HOSPITALIST

## 2020-10-04 RX ORDER — LITHIUM CARBONATE 300 MG/1
300 TABLET, FILM COATED, EXTENDED RELEASE ORAL AT BEDTIME
Status: DISCONTINUED | OUTPATIENT
Start: 2020-10-04 | End: 2020-10-04

## 2020-10-04 RX ORDER — CYCLOBENZAPRINE HCL 10 MG
10 TABLET ORAL 3 TIMES DAILY PRN
Status: DISCONTINUED | OUTPATIENT
Start: 2020-10-04 | End: 2020-10-06 | Stop reason: HOSPADM

## 2020-10-04 RX ORDER — NALOXONE HYDROCHLORIDE 0.4 MG/ML
.1-.4 INJECTION, SOLUTION INTRAMUSCULAR; INTRAVENOUS; SUBCUTANEOUS
Status: ACTIVE | OUTPATIENT
Start: 2020-10-04 | End: 2020-10-05

## 2020-10-04 RX ORDER — SUCRALFATE 1 G/1
1 TABLET ORAL 4 TIMES DAILY
Status: DISCONTINUED | OUTPATIENT
Start: 2020-10-04 | End: 2020-10-06 | Stop reason: HOSPADM

## 2020-10-04 RX ORDER — HYOSCYAMINE SULFATE 0.38 MG/1
375 TABLET, EXTENDED RELEASE ORAL EVERY 12 HOURS PRN
Status: DISCONTINUED | OUTPATIENT
Start: 2020-10-04 | End: 2020-10-05

## 2020-10-04 RX ORDER — LIDOCAINE 40 MG/G
CREAM TOPICAL
Status: DISCONTINUED | OUTPATIENT
Start: 2020-10-04 | End: 2020-10-04 | Stop reason: HOSPADM

## 2020-10-04 RX ORDER — IOPAMIDOL 755 MG/ML
68 INJECTION, SOLUTION INTRAVASCULAR ONCE
Status: COMPLETED | OUTPATIENT
Start: 2020-10-04 | End: 2020-10-04

## 2020-10-04 RX ORDER — GABAPENTIN 100 MG/1
100-200 CAPSULE ORAL 3 TIMES DAILY PRN
Status: DISCONTINUED | OUTPATIENT
Start: 2020-10-04 | End: 2020-10-06 | Stop reason: HOSPADM

## 2020-10-04 RX ORDER — FLUTICASONE PROPIONATE 50 MCG
2 SPRAY, SUSPENSION (ML) NASAL DAILY
Status: DISCONTINUED | OUTPATIENT
Start: 2020-10-04 | End: 2020-10-06 | Stop reason: HOSPADM

## 2020-10-04 RX ORDER — METOCLOPRAMIDE 5 MG/1
5 TABLET ORAL 3 TIMES DAILY
Status: DISCONTINUED | OUTPATIENT
Start: 2020-10-04 | End: 2020-10-05

## 2020-10-04 RX ORDER — FLUMAZENIL 0.1 MG/ML
0.2 INJECTION, SOLUTION INTRAVENOUS
Status: ACTIVE | OUTPATIENT
Start: 2020-10-04 | End: 2020-10-05

## 2020-10-04 RX ORDER — FENTANYL CITRATE 50 UG/ML
INJECTION, SOLUTION INTRAMUSCULAR; INTRAVENOUS PRN
Status: DISCONTINUED | OUTPATIENT
Start: 2020-10-04 | End: 2020-10-04 | Stop reason: HOSPADM

## 2020-10-04 RX ADMIN — THIAMINE HYDROCHLORIDE 100 MG: 100 INJECTION, SOLUTION INTRAMUSCULAR; INTRAVENOUS at 09:34

## 2020-10-04 RX ADMIN — MORPHINE SULFATE 2 MG: 2 INJECTION, SOLUTION INTRAMUSCULAR; INTRAVENOUS at 23:24

## 2020-10-04 RX ADMIN — SODIUM CHLORIDE: 4.5 INJECTION, SOLUTION INTRAVENOUS at 06:30

## 2020-10-04 RX ADMIN — MORPHINE SULFATE 1 MG: 2 INJECTION, SOLUTION INTRAMUSCULAR; INTRAVENOUS at 17:13

## 2020-10-04 RX ADMIN — FLUTICASONE PROPIONATE 2 SPRAY: 50 SPRAY, METERED NASAL at 18:34

## 2020-10-04 RX ADMIN — MORPHINE SULFATE 2 MG: 2 INJECTION, SOLUTION INTRAMUSCULAR; INTRAVENOUS at 07:26

## 2020-10-04 RX ADMIN — METOCLOPRAMIDE 5 MG: 5 TABLET ORAL at 23:23

## 2020-10-04 RX ADMIN — MORPHINE SULFATE 1 MG: 2 INJECTION, SOLUTION INTRAMUSCULAR; INTRAVENOUS at 09:49

## 2020-10-04 RX ADMIN — METOCLOPRAMIDE 5 MG: 5 TABLET ORAL at 17:12

## 2020-10-04 RX ADMIN — SODIUM CHLORIDE 60 ML: 9 INJECTION, SOLUTION INTRAVENOUS at 17:30

## 2020-10-04 RX ADMIN — PANTOPRAZOLE SODIUM 40 MG: 40 INJECTION, POWDER, FOR SOLUTION INTRAVENOUS at 15:13

## 2020-10-04 RX ADMIN — SUCRALFATE 1 G: 1 TABLET ORAL at 18:34

## 2020-10-04 RX ADMIN — SUCRALFATE 1 G: 1 TABLET ORAL at 23:24

## 2020-10-04 RX ADMIN — ONDANSETRON 4 MG: 2 INJECTION INTRAMUSCULAR; INTRAVENOUS at 20:18

## 2020-10-04 RX ADMIN — FOLIC ACID 1 MG: 5 INJECTION, SOLUTION INTRAMUSCULAR; INTRAVENOUS; SUBCUTANEOUS at 09:34

## 2020-10-04 RX ADMIN — MORPHINE SULFATE 2 MG: 2 INJECTION, SOLUTION INTRAMUSCULAR; INTRAVENOUS at 20:18

## 2020-10-04 RX ADMIN — MORPHINE SULFATE 1 MG: 2 INJECTION, SOLUTION INTRAMUSCULAR; INTRAVENOUS at 15:13

## 2020-10-04 RX ADMIN — IOPAMIDOL 68 ML: 755 INJECTION, SOLUTION INTRAVENOUS at 17:30

## 2020-10-04 RX ADMIN — PANTOPRAZOLE SODIUM 40 MG: 40 INJECTION, POWDER, FOR SOLUTION INTRAVENOUS at 07:25

## 2020-10-04 RX ADMIN — NICOTINE 1 PATCH: 21 PATCH, EXTENDED RELEASE TRANSDERMAL at 09:36

## 2020-10-04 ASSESSMENT — ACTIVITIES OF DAILY LIVING (ADL)
ADLS_ACUITY_SCORE: 10

## 2020-10-04 ASSESSMENT — MIFFLIN-ST. JEOR: SCORE: 1120.39

## 2020-10-04 NOTE — PLAN OF CARE
VSS.  IV morphine given for pain x1.  Patient currently doing colonoscopy prep, bloody stools.  Plan for colonoscopy tomorrow morning.  Monitoring hgb every 6 hours.  CIWA 2, fine tremor noted. Independent.  Will continue to monitor.

## 2020-10-04 NOTE — PLAN OF CARE
Pt A/Ox4. VSS on RA. NPO since midnight. C/o abdominal pain. Managed with PRN morphine (x2). C/o mild nause, managed with zofran (x1).  3 bloody stools since 1900. Finished colon prep, clear watery output. Hgb 7.9 and 7.4 . CIWA-2,0,0 PIV infusing @ 75/hr. Plan for colonoscopy today.

## 2020-10-04 NOTE — PROGRESS NOTES
[facility]    Medicine Progress Note - Hospitalist Service       Date of Admission:  10/2/2020  Assessment & Plan         Acute abdominal pain  Elevated lipase concerning for pancreatitis  Peptic ulcer disease with concern for GI bleed=  Acute blood loss anemia  : Initially admitted to [facility] 9/16-9/18 2020 for possible GI bleed.  Treated with IV Protonix.  Had planned endoscopy however patient left AMA.  Ultimately underwent outpatient endoscopy 9/25 which was notable for ulcerative reflux esophagitis, gastric ulcer, duodenal ulcers and erosive gastropathy. Pathology negative. Treated with Protonix twice daily for 8 weeks.  Presented to the ED yesterday with complaints of hematemesis  and abdominal pain.  Hemoglobin noted to be 10.1 down from 10.7 at discharge.  Lipase elevated at 658.  Treated with supportive cares and discharged home.  She presented today with worsening pain and nausea.  Repeat laboratory evaluation with hemoglobin of 8.7 and lipase of 603.  Transfer to [facility] due to concern for possible ongoing GI bleeding  CT abd/pelvis with diffuse fatty infiltration, questionable thickening of gallbladder, and heterogeneously enhancing lesion of right adrenal gland.  EGD with healing duodenal ulcerations and some mild esophagitis  Colonoscopy with congested mucosa of the sigmoid and ascending colon which was bx  plan  - given the continued pain today, will reimage the abdomen with CT scan of the abdomen  - lipase and TG levels  - IV Protonix bid  - started diet per GI  - Resumed Carafate  -GI consult, planning egd when covid negative  - she feels swollen and asking to stop the IVF  - morphine prn pain control       Sinus Tachycardia: Ongoing recent issues with tachycardia during recent admission. Current HR 110s.  Echo 9/16 with normal EF.  - Treat above issues   - Patient notes she has not been taking her Metoprolol due to borderline BPs. Will hold for now    Folic  acid deficiency  Macrocytic anemia  Likely acute blood loss anemia  Plan  - IV folic acid     Chronic Back and Foot Pain: H/o 5th metatarsal styloid avulsion fracture with non-union. Following with podiatry. CAM walker causing back pain. Had been using NSAIDs but stopped due to EGD findings  - Continue PTA Gabapentin and Flexeril      Hypersomnia   - Notes does not need Ritalin      Mood D/o  - Resume Lithium     H/o ETOH in remission  - Sober x 3 years, no withdrawal     Right Adrenal Gland: Noted on CT 9/16/20. F/u adrenal MRI recommended  - F/u as outpatient       Diet: Snacks/Supplements Adult: Boost Breeze; Between Meals  Mechanical/Dental Soft Diet    DVT Prophylaxis: Pneumatic Compression Devices  Luna Catheter: not present  Code Status: Full Code           Disposition Plan   Expected discharge: 2 - 3 days, recommended to prior living arrangement once hemoglobin stable.  Entered: Brice Mcgraw DO 10/04/2020, 4:03 PM       The patient's care was discussed with the Patient.  Brice Mcgraw DO  Hospitalist [facility]  Pager: [phone]      ______________________________________________________________________    Interval History   Completed colonoscopy, largely  Unremarkable. Very concerned about pain today, notes feels worse in the last few weeks. Noted minimal elevation of lipase yesterday    Data reviewed today: I reviewed all medications, new labs and imaging results over the last 24 hours. I personally reviewed no images or EKG's today.    Physical Exam   Vital Signs: Temp: 98.3  F (36.8  C) Temp src: Oral BP: (!) 140/97 Pulse: 89   Resp: 16 SpO2: 100 % O2 Device: None (Room air)    Weight: 102 lbs 9.6 oz  Constitutional: Alert, resting comfortably in NAD. Thin woman  HEENT: Head normocephalic, atraumatic. Eyes sclera non icteric.   Respiratory:     Normal effort, symmetric expansion, no crackles or wheezing  Cardiovascular: RRR no murmurs   GI: Non distended, normal bowels sounds, no tenderness or  guarding. Epigastric tenderness noted  MSK: LE without edema. Dorsalis pedis pulse palpated bilaterally.   Skin/Integumen: Clear  Neuro: CN II-XII grossly intact  Psych:  Alert and oriented x 3. Normal affect. Mild tremor noted    Data   Recent Labs   Lab 10/04/20  1022 10/04/20  0444 10/03/20  2234 10/03/20  0717 10/03/20  0717 10/02/20  1455 10/02/20  1455 10/01/20  1041   WBC  --  3.2*  --   --   --   --  3.9* 5.2   HGB 7.8* 7.4* 7.9*   < >  --    < > 8.7* 10.0*   MCV  --  104*  --   --   --   --  121* 123*   PLT  --  151  --   --   --   --  167 178   NA  --  136  --   --  136  --  133 135   POTASSIUM  --  3.8  --   --  4.4  --  3.8 5.3   CHLORIDE  --  110*  --   --  110*  --  101 101   CO2  --  24  --   --  18*  --  23 26   BUN  --  4*  --   --  12  --  9 12   CR  --  0.58  --   --  0.60  --  0.63 0.64   ANIONGAP  --  2*  --   --  8  --  9 8   JANET  --  6.9*  --   --  6.7*  --  8.4* 8.3*   GLC  --  92  --   --  59*  --  85 105*   ALBUMIN  --   --   --   --  2.0*  --  2.6* 2.7*   PROTTOTAL  --   --   --   --  4.3*  --  5.8* 5.9*   BILITOTAL  --   --   --   --  0.7  --  1.0 1.4*   ALKPHOS  --   --   --   --  97  --  153* 171*   ALT  --   --   --   --  24  --  31 36   AST  --   --   --   --  79*  --  98* 108*   LIPASE  --   --   --   --  513*  --  603* 658*    < > = values in this interval not displayed.     No results found for this or any previous visit (from the past 24 hour(s)).  Medications     - MEDICATION INSTRUCTIONS -         flunisolide  2 spray Both Nostrils Q12H     folic acid  1 mg Intravenous Daily     [START ON 10/5/2020] influenza quadrivalent (PF) vacc  0.5 mL Intramuscular Prior to discharge     metoclopramide  5 mg Oral TID     nicotine  1 patch Transdermal Daily     nicotine   Transdermal Q8H     nicotine   Transdermal Once     pantoprazole (PROTONIX) IV  40 mg Intravenous BID     sodium chloride (PF)  3 mL Intracatheter Q8H     sucralfate  1 g Oral 4x Daily     thiamine  100 mg Intravenous Daily

## 2020-10-05 LAB
ALBUMIN SERPL-MCNC: 2.2 G/DL (ref 3.4–5)
ALP SERPL-CCNC: 111 U/L (ref 40–150)
ALT SERPL W P-5'-P-CCNC: 37 U/L (ref 0–50)
ANION GAP SERPL CALCULATED.3IONS-SCNC: 4 MMOL/L (ref 3–14)
AST SERPL W P-5'-P-CCNC: 122 U/L (ref 0–45)
BILIRUB SERPL-MCNC: 0.4 MG/DL (ref 0.2–1.3)
BUN SERPL-MCNC: 1 MG/DL (ref 7–30)
CALCIUM SERPL-MCNC: 7.6 MG/DL (ref 8.5–10.1)
CHLORIDE SERPL-SCNC: 109 MMOL/L (ref 94–109)
CO2 SERPL-SCNC: 27 MMOL/L (ref 20–32)
CREAT SERPL-MCNC: 0.51 MG/DL (ref 0.52–1.04)
ERYTHROCYTE [DISTWIDTH] IN BLOOD BY AUTOMATED COUNT: ABNORMAL % (ref 10–15)
GFR SERPL CREATININE-BSD FRML MDRD: >90 ML/MIN/{1.73_M2}
GLUCOSE SERPL-MCNC: 94 MG/DL (ref 70–99)
HCT VFR BLD AUTO: 22.7 % (ref 35–47)
HGB BLD-MCNC: 7.6 G/DL (ref 11.7–15.7)
HGB BLD-MCNC: 8.2 G/DL (ref 11.7–15.7)
MCH RBC QN AUTO: 34.9 PG (ref 26.5–33)
MCHC RBC AUTO-ENTMCNC: 33.5 G/DL (ref 31.5–36.5)
MCV RBC AUTO: 104 FL (ref 78–100)
PLATELET # BLD AUTO: 181 10E9/L (ref 150–450)
POTASSIUM SERPL-SCNC: 3.8 MMOL/L (ref 3.4–5.3)
PROT SERPL-MCNC: 4.7 G/DL (ref 6.8–8.8)
RBC # BLD AUTO: 2.18 10E12/L (ref 3.8–5.2)
SODIUM SERPL-SCNC: 140 MMOL/L (ref 133–144)
WBC # BLD AUTO: 3.2 10E9/L (ref 4–11)

## 2020-10-05 PROCEDURE — 85018 HEMOGLOBIN: CPT | Performed by: PHYSICIAN ASSISTANT

## 2020-10-05 PROCEDURE — 80053 COMPREHEN METABOLIC PANEL: CPT | Performed by: HOSPITALIST

## 2020-10-05 PROCEDURE — 250N000013 HC RX MED GY IP 250 OP 250 PS 637: Performed by: PHYSICIAN ASSISTANT

## 2020-10-05 PROCEDURE — C9113 INJ PANTOPRAZOLE SODIUM, VIA: HCPCS | Performed by: HOSPITALIST

## 2020-10-05 PROCEDURE — 250N000009 HC RX 250: Performed by: HOSPITALIST

## 2020-10-05 PROCEDURE — 99232 SBSQ HOSP IP/OBS MODERATE 35: CPT | Performed by: HOSPITALIST

## 2020-10-05 PROCEDURE — 36415 COLL VENOUS BLD VENIPUNCTURE: CPT | Performed by: HOSPITALIST

## 2020-10-05 PROCEDURE — 36415 COLL VENOUS BLD VENIPUNCTURE: CPT | Performed by: PHYSICIAN ASSISTANT

## 2020-10-05 PROCEDURE — 250N000013 HC RX MED GY IP 250 OP 250 PS 637: Performed by: HOSPITALIST

## 2020-10-05 PROCEDURE — 250N000011 HC RX IP 250 OP 636: Performed by: HOSPITALIST

## 2020-10-05 PROCEDURE — 250N000011 HC RX IP 250 OP 636: Performed by: PHYSICIAN ASSISTANT

## 2020-10-05 PROCEDURE — 85027 COMPLETE CBC AUTOMATED: CPT | Performed by: HOSPITALIST

## 2020-10-05 PROCEDURE — 120N000001 HC R&B MED SURG/OB

## 2020-10-05 RX ORDER — FOLIC ACID 1 MG/1
2 TABLET ORAL DAILY
Status: DISCONTINUED | OUTPATIENT
Start: 2020-10-06 | End: 2020-10-06 | Stop reason: HOSPADM

## 2020-10-05 RX ORDER — DICYCLOMINE HCL 20 MG
20 TABLET ORAL
Status: DISCONTINUED | OUTPATIENT
Start: 2020-10-05 | End: 2020-10-06 | Stop reason: HOSPADM

## 2020-10-05 RX ORDER — HYDROCODONE BITARTRATE AND ACETAMINOPHEN 5; 325 MG/1; MG/1
1 TABLET ORAL EVERY 6 HOURS PRN
Status: DISCONTINUED | OUTPATIENT
Start: 2020-10-05 | End: 2020-10-06

## 2020-10-05 RX ORDER — PANTOPRAZOLE SODIUM 40 MG/1
40 TABLET, DELAYED RELEASE ORAL
Status: DISCONTINUED | OUTPATIENT
Start: 2020-10-05 | End: 2020-10-06 | Stop reason: HOSPADM

## 2020-10-05 RX ADMIN — DICYCLOMINE HYDROCHLORIDE 20 MG: 20 TABLET ORAL at 16:44

## 2020-10-05 RX ADMIN — SUCRALFATE 1 G: 1 TABLET ORAL at 21:00

## 2020-10-05 RX ADMIN — MORPHINE SULFATE 2 MG: 2 INJECTION, SOLUTION INTRAMUSCULAR; INTRAVENOUS at 09:20

## 2020-10-05 RX ADMIN — FOLIC ACID 1 MG: 5 INJECTION, SOLUTION INTRAMUSCULAR; INTRAVENOUS; SUBCUTANEOUS at 09:16

## 2020-10-05 RX ADMIN — SUCRALFATE 1 G: 1 TABLET ORAL at 13:03

## 2020-10-05 RX ADMIN — SUCRALFATE 1 G: 1 TABLET ORAL at 16:46

## 2020-10-05 RX ADMIN — METOCLOPRAMIDE 5 MG: 5 TABLET ORAL at 09:12

## 2020-10-05 RX ADMIN — NICOTINE 1 PATCH: 21 PATCH, EXTENDED RELEASE TRANSDERMAL at 09:17

## 2020-10-05 RX ADMIN — DICYCLOMINE HYDROCHLORIDE 20 MG: 20 TABLET ORAL at 21:00

## 2020-10-05 RX ADMIN — THIAMINE HYDROCHLORIDE 100 MG: 100 INJECTION, SOLUTION INTRAMUSCULAR; INTRAVENOUS at 09:12

## 2020-10-05 RX ADMIN — FLUTICASONE PROPIONATE 2 SPRAY: 50 SPRAY, METERED NASAL at 09:13

## 2020-10-05 RX ADMIN — HYDROCODONE BITARTRATE AND ACETAMINOPHEN 1 TABLET: 5; 325 TABLET ORAL at 20:51

## 2020-10-05 RX ADMIN — SUCRALFATE 1 G: 1 TABLET ORAL at 09:12

## 2020-10-05 RX ADMIN — PANTOPRAZOLE SODIUM 40 MG: 40 TABLET, DELAYED RELEASE ORAL at 16:44

## 2020-10-05 RX ADMIN — ACETAMINOPHEN 650 MG: 325 TABLET, FILM COATED ORAL at 07:42

## 2020-10-05 RX ADMIN — HYDROCODONE BITARTRATE AND ACETAMINOPHEN 1 TABLET: 5; 325 TABLET ORAL at 14:35

## 2020-10-05 RX ADMIN — PANTOPRAZOLE SODIUM 40 MG: 40 INJECTION, POWDER, FOR SOLUTION INTRAVENOUS at 07:42

## 2020-10-05 ASSESSMENT — ACTIVITIES OF DAILY LIVING (ADL)
ADLS_ACUITY_SCORE: 10

## 2020-10-05 NOTE — PLAN OF CARE
Pt A/ox4. VSS on RA ex tachy in the low 100s. C/o abdominal pain and managed with PRN morphine although encouraged patient to try PO the next time pain relief is needed and patient was agreeable. Denies nauea. PIV SL. No BM this shift. Hgb stable @ 8.4. Nicotine patch in place. Plan for discharge today.

## 2020-10-05 NOTE — PROGRESS NOTES
[facility]  Gastroenterology Progress Note     Tasha Short MRN# [id number]   YOB: 1980 Age: 40 year old          Assessment and Plan:    Interval History: Patient has ongoing abdominal pain. Worse with eating. Described as pressure and cramping. Tolerating diet.    Tasha Short is a pleasant 40 year old female with complaints of abdominal pain and findings concerning for pancreatitis and GI bleed. GI consulted on 10/3/2020.    Acute abdominal pain  Peptic ulcer disease  Elevated lipase with possible pancreatitis.  --Patient had complaints of hematemesis and abdominal pain- ongoing  --Lipase noted to be 603 and hemoglobin 8.7 on presentation. Lipase 567 on 10/4 and hemoglobin now at 7.6. Has ongoing melena  (Similar previous presentation on 9/25 and underwent EGD 9/25 noting gastric ulcer, esophagitis and biopsies taken - no H pylori noted)  -- EGD 10/3 noted esophagitis and non bleeding duodenal ulcers  -- Colonoscopy 10/4 noted congested mucosa and no active bleeding  -- CT of abdomen/pelvis 10/4 notes no acute process demonstrated. Does have improvement in hepatic steatosis. ( LFTs trending down and near normal)  -- Cause of elevated lipase likely related to gastritis and duodenitis  -- Biopsies from colon - possibly ischemic vs lymphocytic colitis vs from colon prep or unlikely IBD. (Await biopsies for treatment plan if any)  -- Hemoglobin now at 7.6 and unsure source of blood loss-likely PUD. Recommend supportive cares and transfuse with hemoglobin less than 7. Do not recommend repeat EGD or colonoscopy at this time.   -- Serial hemoglobin q 6 hours  -- Abdominal pain from PUD and probable IBS vs pancreatitis ( symptoms and finding on CT not consistent with pancreatitis)   -- Switch oral pantoprazole 40 mg BID  -- Continue on Carafate 1g QID  -- Diet mechanical soft  -- Stop reglan   -- Start on dicyclomine 20 mg QID           Interval History:   no new  complaints, denies chest pain, denies shortness of breath and alert, oriented to person, place and time              Review of Systems:   C: NEGATIVE for fever, chills, change in weight  E/M: NEGATIVE for ear, mouth and throat problems  R: NEGATIVE for significant cough or SOB  CV: NEGATIVE for chest pain, palpitations or peripheral edema             Medications:   I have reviewed this patient's current medications    fluticasone  2 spray Both Nostrils Daily     folic acid  1 mg Intravenous Daily     influenza quadrivalent (PF) vacc  0.5 mL Intramuscular Prior to discharge     metoclopramide  5 mg Oral TID     nicotine  1 patch Transdermal Daily     nicotine   Transdermal Q8H     nicotine   Transdermal Once     pantoprazole (PROTONIX) IV  40 mg Intravenous BID     sodium chloride (PF)  3 mL Intracatheter Q8H     sucralfate  1 g Oral 4x Daily     thiamine  100 mg Intravenous Daily                  Physical Exam:   Vitals were reviewed  Vital Signs with Ranges  Temp:  [97.5  F (36.4  C)-99.3  F (37.4  C)] 98.9  F (37.2  C)  Pulse:  [] 90  Resp:  [7-28] 16  BP: (124-149)/() 137/87  SpO2:  [98 %-100 %] 98 %  I/O last 3 completed shifts:  In: 240 [P.O.:240]  Out: -   Constitutional: healthy, alert, mild distress, cooperative and pale   Cardiovascular: negative, PMI normal. No lifts, heaves, or thrills. RRR. No murmurs, clicks gallops or rub  Respiratory: negative, Percussion normal. Good diaphragmatic excursion. Lungs clear  Head: Normocephalic. No masses, lesions, tenderness or abnormalities  Neck: Neck supple. No adenopathy. Thyroid symmetric, normal size,, Carotids without bruits.  Abdomen: Abdomen soft, tender. BS normal. No masses, organomegaly, positive findings: tenderness mild epigastric, RUQ and LUQ           Data:   I reviewed the patient's new clinical lab test results.   Recent Labs   Lab Test 10/05/20  0700 10/04/20  1608 10/04/20  1022 10/04/20  0444 10/02/20  1455 10/02/20  1455 06/03/16  1242  06/03/16  1242   WBC 3.2*  --   --  3.2*  --  3.9*   < > 9.4   HGB 7.6* 8.4* 7.8* 7.4*   < > 8.7*   < > 14.2   *  --   --  104*  --  121*   < > 93     --   --  151  --  167   < > 203   INR  --   --   --   --   --   --   --  0.97    < > = values in this interval not displayed.     Recent Labs   Lab Test 10/05/20  0700 10/04/20  0444 10/03/20  0717   POTASSIUM 3.8 3.8 4.4   CHLORIDE 109 110* 110*   CO2 27 24 18*   BUN 1* 4* 12   ANIONGAP 4 2* 8     Recent Labs   Lab Test 10/05/20  0700 10/04/20  1608 10/03/20  0717 10/02/20  1455 09/17/20  0544 09/17/20  0544 09/14/20  1748 09/14/20  1748 11/18/19  0920 11/18/19  0920 11/26/18  0840 11/26/18  0840   ALBUMIN 2.2*  --  2.0* 2.6*   < > 2.6*   < >  --    < >  --    < >  --    BILITOTAL 0.4  --  0.7 1.0   < > 1.3   < >  --    < >  --    < >  --    ALT 37  --  24 31   < > 47   < >  --    < >  --    < >  --    *  --  79* 98*   < > 221*   < >  --    < >  --    < >  --    PROTEIN  --   --   --   --   --   --   --  30*  --  100*  --  Negative   LIPASE  --  567* 513* 603*   < > 200  --   --   --   --   --   --    AMYLASE  --   --   --   --   --  16*  --   --   --   --   --   --     < > = values in this interval not displayed.       I reviewed the patient's new imaging results.    All laboratory data reviewed  All imaging studies reviewed by me.    Luma Jules PA-C,  10/5/2020  [facility]  Office : [phone]  Cell: [phone] (Dr. Siegel)  Cell: [phone] (Luma Jules PA-C)

## 2020-10-05 NOTE — PLAN OF CARE
VSS, tmax of 99.3, will continue to monitor.  IV morphine given for pain as needed.  Patient had colonoscopy today.  CT abd/pelvis done this evening also.  HGB up to 8.4, continuing to monitor, no bloody stools noted today.  CIWA discontinued.  Nicotine patch.  Independent.  Patient hopeful to discharge tomorrow.  Will continue to monitor.

## 2020-10-05 NOTE — PROGRESS NOTES
[facility]    Medicine Progress Note - Hospitalist Service       Date of Admission:  10/2/2020  Assessment & Plan           Acute abdominal pain  Elevated lipase concerning for pancreatitis  Peptic ulcer disease with concern for GI bleed=  Acute blood loss anemia  : Initially admitted to [facility] 9/16-9/18 2020 for possible GI bleed.  Treated with IV Protonix.  Had planned endoscopy however patient left AMA.  Ultimately underwent outpatient endoscopy 9/25 which was notable for ulcerative reflux esophagitis, gastric ulcer, duodenal ulcers and erosive gastropathy. Pathology negative. Treated with Protonix twice daily for 8 weeks.  Presented to the ED yesterday with complaints of hematemesis  and abdominal pain.  Hemoglobin noted to be 10.1 down from 10.7 at discharge.  Lipase elevated at 658.  Treated with supportive cares and discharged home.  She presented today with worsening pain and nausea.  Repeat laboratory evaluation with hemoglobin of 8.7 and lipase of 603.  Transfer to [facility] due to concern for possible ongoing GI bleeding  CT abd/pelvis with diffuse fatty infiltration, questionable thickening of gallbladder, and heterogeneously enhancing lesion of right adrenal gland.  EGD with healing duodenal ulcerations and some mild esophagitis  Colonoscopy with congested mucosa of the sigmoid and ascending colon which was bx  Repeat ct of the A/p negative  plan  - stop IV morphine, prn norco, try and wean off  - Protonix bid  - started diet per GI  - Resumed Carafate  -GI consult appreicated  - levmid and bentyl  - morphine prn pain control  - continue hemoglobin rechecks, but does not appear to be bleeding       Sinus Tachycardia: Ongoing recent issues with tachycardia during recent admission. Current HR 110s.  Echo 9/16 with normal EF.  - TSH previously negative    Folic acid deficiency  Macrocytic anemia  acute blood loss anemia  Plan  - folic acid and iron supplements on  discharge     Chronic Back and Foot Pain: H/o 5th metatarsal styloid avulsion fracture with non-union. Following with podiatry. CAM walker causing back pain. Had been using NSAIDs but stopped due to EGD findings  - Continue PTA Gabapentin and Flexeril      Hypersomnia   - Notes does not need Ritalin      Mood D/o  - no longer taking Lithium     H/o ETOH in remission  - Sober x 3 years, no withdrawal    Transaminitis elevation  Unclear etiology, does not endorse alcohol  Plan  - improving     Right Adrenal Gland: Noted on CT 9/16/20. F/u adrenal MRI recommended  - F/u as outpatient       Diet: Snacks/Supplements Adult: Boost Breeze; Between Meals  Mechanical/Dental Soft Diet    DVT Prophylaxis: Pneumatic Compression Devices  Luna Catheter: not present  Code Status: Full Code           Disposition Plan   Expected discharge: 2 - 3 days, recommended to prior living arrangement once hemoglobin stable.  Entered: Brice Mcgraw DO 10/05/2020, 4:08 PM       The patient's care was discussed with the Patient.  Brice Mcgraw DO  Hospitalist [facility]  Pager: [phone]      ______________________________________________________________________    Interval History   Still with abdominal pain, but tolerating diet.     Data reviewed today: I reviewed all medications, new labs and imaging results over the last 24 hours. I personally reviewed no images or EKG's today.    Physical Exam   Vital Signs: Temp: 99.3  F (37.4  C) Temp src: Oral BP: (!) 138/92 Pulse: 97   Resp: 16 SpO2: 99 % O2 Device: None (Room air)    Weight: 102 lbs 9.6 oz  Constitutional: Alert, resting comfortably in NAD. Thin woman  HEENT: Head normocephalic, atraumatic. Eyes sclera non icteric.   Respiratory:     Normal effort, symmetric expansion, no crackles or wheezing  Cardiovascular: RRR no murmurs   GI: Non distended, normal bowels sounds, no tenderness or guarding. Epigastric tenderness noted  MSK: LE without edema. Dorsalis pedis pulse palpated  bilaterally.   Skin/Integumen: Clear  Neuro: CN II-XII grossly intact  Psych:  Alert and oriented x 3. Normal affect. Mild tremor noted    Data   Recent Labs   Lab 10/05/20  0700 10/04/20  1608 10/04/20  1022 10/04/20  0444 10/03/20  0717 10/03/20  0717 10/02/20  1455 10/02/20  1455   WBC 3.2*  --   --  3.2*  --   --   --  3.9*   HGB 7.6* 8.4* 7.8* 7.4*   < >  --    < > 8.7*   *  --   --  104*  --   --   --  121*     --   --  151  --   --   --  167     --   --  136  --  136  --  133   POTASSIUM 3.8  --   --  3.8  --  4.4  --  3.8   CHLORIDE 109  --   --  110*  --  110*  --  101   CO2 27  --   --  24  --  18*  --  23   BUN 1*  --   --  4*  --  12  --  9   CR 0.51*  --   --  0.58  --  0.60  --  0.63   ANIONGAP 4  --   --  2*  --  8  --  9   JANET 7.6*  --   --  6.9*  --  6.7*  --  8.4*   GLC 94  --   --  92  --  59*  --  85   ALBUMIN 2.2*  --   --   --   --  2.0*  --  2.6*   PROTTOTAL 4.7*  --   --   --   --  4.3*  --  5.8*   BILITOTAL 0.4  --   --   --   --  0.7  --  1.0   ALKPHOS 111  --   --   --   --  97  --  153*   ALT 37  --   --   --   --  24  --  31   *  --   --   --   --  79*  --  98*   LIPASE  --  567*  --   --   --  513*  --  603*    < > = values in this interval not displayed.     Recent Results (from the past 24 hour(s))   CT Abdomen Pelvis w Contrast    Narrative    CT ABDOMEN AND PELVIS WITH CONTRAST 10/4/2020 5:59 PM    CLINICAL HISTORY: Abdominal pain.    TECHNIQUE: CT scan of the abdomen and pelvis was performed following  injection of IV contrast. Multiplanar reformats were obtained. Dose  reduction techniques were used.    CONTRAST: 68mL Isovue-370    COMPARISON: September 17, 2020    FINDINGS:   LOWER CHEST: No infiltrates or effusions.    HEPATOBILIARY: No significant mass or bile duct dilatation. No  calcified gallstones. Marked hepatic steatosis, improved from  previous.    PANCREAS: No significant mass, duct dilatation, or inflammatory  change.    SPLEEN: Normal  size.    ADRENAL GLANDS: Nodule on the right measuring 1.9 cm not significantly  changed from previous.    KIDNEYS/BLADDER: Stable hypoenhancement in the upper right kidney  compatible with small area of focal infarction. No significant mass,  stones, or hydronephrosis.    BOWEL: No obstruction or inflammatory change.    PELVIC ORGANS: No pelvic masses.    ADDITIONAL FINDINGS: Small amount of pelvic free fluid.    MUSCULOSKELETAL: Unremarkable.      Impression    IMPRESSION:   1.  Improved hepatic steatosis.  2.  Stable right adrenal nodule.  3.  Otherwise no acute process demonstrated in the abdomen and pelvis.    ERIC HUYNH MD     Medications     - MEDICATION INSTRUCTIONS -         dicyclomine  20 mg Oral 4x Daily AC & HS     fluticasone  2 spray Both Nostrils Daily     [START ON 10/6/2020] folic acid  2 mg Oral Daily     influenza quadrivalent (PF) vacc  0.5 mL Intramuscular Prior to discharge     nicotine  1 patch Transdermal Daily     nicotine   Transdermal Q8H     nicotine   Transdermal Once     pantoprazole  40 mg Oral BID AC     sodium chloride (PF)  3 mL Intracatheter Q8H     sucralfate  1 g Oral 4x Daily     thiamine  100 mg Intravenous Daily

## 2020-10-05 NOTE — PROGRESS NOTES
[facility]  Gastroenterology Progress Note     Tasha Short MRN# [id number]   YOB: 1980 Age: 40 year old          Assessment and Plan:     GI bleed  Patient is clinically doing well patient did well with colonoscopy prep patient stools have cleared with prep.  Patient's hemoglobin has been stable since patient is here patient also have her labs improving.  Patient continues to complain of abdominal pain patient's minimally elevated lipase is likely due to gastritis and duodenitis which may be contributing into her abdominal pain.  Colonoscopy findings today were consistent with mild mucosal congestion in the sigmoid colon and some friability in the ascending colon and cecum area biopsy was done.  I will recommend to restart patient on soft diet continue on current treatment and start her on Levbid twice a day.         Data Unavailable      Interval History:   doing well; no cp, sob, n/v/d, or abd pain.              Review of Systems:   C: NEGATIVE for fever, chills, change in weight  E/M: NEGATIVE for ear, mouth and throat problems  R: NEGATIVE for significant cough or SOB  CV: NEGATIVE for chest pain, palpitations or peripheral edema             Medications:   I have reviewed this patient's current medications    fluticasone  2 spray Both Nostrils Daily     folic acid  1 mg Intravenous Daily     [START ON 10/5/2020] influenza quadrivalent (PF) vacc  0.5 mL Intramuscular Prior to discharge     metoclopramide  5 mg Oral TID     nicotine  1 patch Transdermal Daily     nicotine   Transdermal Q8H     nicotine   Transdermal Once     pantoprazole (PROTONIX) IV  40 mg Intravenous BID     sodium chloride (PF)  3 mL Intracatheter Q8H     sucralfate  1 g Oral 4x Daily     thiamine  100 mg Intravenous Daily                  Physical Exam:   Vitals were reviewed  Vital Signs with Ranges  Temp:  [96.5  F (35.8  C)-99.3  F (37.4  C)] 99.3  F (37.4  C)  Pulse:  [] 101  Resp:   [7-28] 16  BP: (124-149)/() 128/86  SpO2:  [99 %-100 %] 100 %  No intake/output data recorded.  Constitutional: healthy, alert and no distress   Cardiovascular: negative, PMI normal. No lifts, heaves, or thrills. RRR. No murmurs, clicks gallops or rub  Respiratory: negative, Percussion normal. Good diaphragmatic excursion. Lungs clear  Neck: Neck supple. No adenopathy. Thyroid symmetric, normal size,, Carotids without bruits.  Abdomen: Abdomen soft, non-tender. BS normal. No masses, organomegaly  SKIN: no suspicious lesions or rashes           Data:   I reviewed the patient's new clinical lab test results.   Recent Labs   Lab Test 10/04/20  1608 10/04/20  1022 10/04/20  0444 10/02/20  1455 10/02/20  1455 10/01/20  1041 06/03/16  1242 06/03/16  1242   WBC  --   --  3.2*  --  3.9* 5.2   < > 9.4   HGB 8.4* 7.8* 7.4*   < > 8.7* 10.0*   < > 14.2   MCV  --   --  104*  --  121* 123*   < > 93   PLT  --   --  151  --  167 178   < > 203   INR  --   --   --   --   --   --   --  0.97    < > = values in this interval not displayed.     Recent Labs   Lab Test 10/04/20  0444 10/03/20  0717 10/02/20  1455   POTASSIUM 3.8 4.4 3.8   CHLORIDE 110* 110* 101   CO2 24 18* 23   BUN 4* 12 9   ANIONGAP 2* 8 9     Recent Labs   Lab Test 10/04/20  1608 10/03/20  0717 10/02/20  1455 10/01/20  1041 09/17/20  0544 09/17/20  0544 09/14/20  1748 09/14/20  1748 11/18/19  0920 11/18/19  0920 11/26/18  0840 11/26/18  0840   ALBUMIN  --  2.0* 2.6* 2.7*   < > 2.6*   < >  --    < >  --    < >  --    BILITOTAL  --  0.7 1.0 1.4*   < > 1.3   < >  --    < >  --    < >  --    ALT  --  24 31 36   < > 47   < >  --    < >  --    < >  --    AST  --  79* 98* 108*   < > 221*   < >  --    < >  --    < >  --    PROTEIN  --   --   --   --   --   --   --  30*  --  100*  --  Negative   LIPASE 567* 513* 603* 658*  --  200  --   --   --   --   --   --    AMYLASE  --   --   --   --   --  16*  --   --   --   --   --   --     < > = values in this interval not  displayed.       I reviewed the patient's new imaging results.    All laboratory data reviewed  All imaging studies reviewed by me.    Mando Siegel MD,  10/4/2020  [facility]  Office : [phone]  Cell: [phone]

## 2020-10-05 NOTE — PLAN OF CARE
Pt A/Ox4, VSS on RA ex slightly hypertensive. C/o 7/10 abdominal pain, tylenol not effective, IV morphine covering, Bentyl ordered for before meals/bedtime.  Reglan discontinued.  Plan to continue to monitor Hgb Q6, continue mech/soft diet, see if pain can be controlled with Bentyl. Continue to monitor.

## 2020-10-06 VITALS
RESPIRATION RATE: 16 BRPM | SYSTOLIC BLOOD PRESSURE: 131 MMHG | HEIGHT: 64 IN | DIASTOLIC BLOOD PRESSURE: 99 MMHG | OXYGEN SATURATION: 99 % | TEMPERATURE: 98.4 F | WEIGHT: 102.6 LBS | HEART RATE: 89 BPM | BODY MASS INDEX: 17.52 KG/M2

## 2020-10-06 LAB
COPATH REPORT: NORMAL
GLUCOSE BLDC GLUCOMTR-MCNC: 91 MG/DL (ref 70–99)
GLUCOSE BLDC GLUCOMTR-MCNC: 92 MG/DL (ref 70–99)
HGB BLD-MCNC: 8 G/DL (ref 11.7–15.7)
HGB BLD-MCNC: 8.7 G/DL (ref 11.7–15.7)

## 2020-10-06 PROCEDURE — 99238 HOSP IP/OBS DSCHRG MGMT 30/<: CPT | Performed by: HOSPITALIST

## 2020-10-06 PROCEDURE — 999N001017 HC STATISTIC GLUCOSE BY METER IP

## 2020-10-06 PROCEDURE — 250N000013 HC RX MED GY IP 250 OP 250 PS 637: Performed by: HOSPITALIST

## 2020-10-06 PROCEDURE — 250N000013 HC RX MED GY IP 250 OP 250 PS 637: Performed by: PHYSICIAN ASSISTANT

## 2020-10-06 PROCEDURE — 250N000011 HC RX IP 250 OP 636: Performed by: HOSPITALIST

## 2020-10-06 PROCEDURE — 85018 HEMOGLOBIN: CPT | Performed by: PHYSICIAN ASSISTANT

## 2020-10-06 PROCEDURE — 36415 COLL VENOUS BLD VENIPUNCTURE: CPT | Performed by: PHYSICIAN ASSISTANT

## 2020-10-06 RX ORDER — NICOTINE 21 MG/24HR
1 PATCH, TRANSDERMAL 24 HOURS TRANSDERMAL DAILY
Qty: 30 PATCH | Refills: 0 | Status: SHIPPED | OUTPATIENT
Start: 2020-10-07 | End: 2021-01-28

## 2020-10-06 RX ORDER — FOLIC ACID 1 MG/1
1 TABLET ORAL DAILY
Qty: 30 TABLET | Refills: 0 | Status: SHIPPED | OUTPATIENT
Start: 2020-10-06 | End: 2020-10-20

## 2020-10-06 RX ORDER — HYDROCODONE BITARTRATE AND ACETAMINOPHEN 5; 325 MG/1; MG/1
1-2 TABLET ORAL EVERY 6 HOURS PRN
Qty: 24 TABLET | Refills: 0 | Status: SHIPPED | OUTPATIENT
Start: 2020-10-06 | End: 2020-11-02

## 2020-10-06 RX ORDER — DICYCLOMINE HCL 20 MG
20 TABLET ORAL
Qty: 120 TABLET | Refills: 0 | Status: SHIPPED | OUTPATIENT
Start: 2020-10-06 | End: 2020-10-20

## 2020-10-06 RX ORDER — HYDROCODONE BITARTRATE AND ACETAMINOPHEN 5; 325 MG/1; MG/1
1-2 TABLET ORAL EVERY 6 HOURS PRN
Status: DISCONTINUED | OUTPATIENT
Start: 2020-10-06 | End: 2020-10-06 | Stop reason: HOSPADM

## 2020-10-06 RX ORDER — PANTOPRAZOLE SODIUM 40 MG/1
40 TABLET, DELAYED RELEASE ORAL 2 TIMES DAILY
Start: 2020-10-06 | End: 2020-10-20

## 2020-10-06 RX ORDER — FERROUS ASPARTO GLYCINATE, IRON, ASCORBIC ACID, FOLIC ACID, CYANOCOBALAMIN, ZINC, SUCCINIC ACID, AND INTRINSIC FACTOR 50; 100; 60; 750; 250; 25; 15; 50; 100 MG/1; MG/1; MG/1; UG/1; UG/1; UG/1; MG/1; MG/1; MG/1
1 TABLET ORAL DAILY
Qty: 30 TABLET | Refills: 0 | Status: SHIPPED | OUTPATIENT
Start: 2020-10-06 | End: 2020-10-20

## 2020-10-06 RX ADMIN — PANTOPRAZOLE SODIUM 40 MG: 40 TABLET, DELAYED RELEASE ORAL at 07:54

## 2020-10-06 RX ADMIN — DICYCLOMINE HYDROCHLORIDE 20 MG: 20 TABLET ORAL at 10:56

## 2020-10-06 RX ADMIN — FLUTICASONE PROPIONATE 2 SPRAY: 50 SPRAY, METERED NASAL at 08:39

## 2020-10-06 RX ADMIN — SUCRALFATE 1 G: 1 TABLET ORAL at 08:40

## 2020-10-06 RX ADMIN — FOLIC ACID 2 MG: 1 TABLET ORAL at 08:39

## 2020-10-06 RX ADMIN — HYDROCODONE BITARTRATE AND ACETAMINOPHEN 1 TABLET: 5; 325 TABLET ORAL at 10:56

## 2020-10-06 RX ADMIN — DICYCLOMINE HYDROCHLORIDE 20 MG: 20 TABLET ORAL at 07:54

## 2020-10-06 RX ADMIN — NICOTINE 1 PATCH: 21 PATCH, EXTENDED RELEASE TRANSDERMAL at 08:40

## 2020-10-06 RX ADMIN — HYDROCODONE BITARTRATE AND ACETAMINOPHEN 1 TABLET: 5; 325 TABLET ORAL at 06:19

## 2020-10-06 RX ADMIN — THIAMINE HYDROCHLORIDE 100 MG: 100 INJECTION, SOLUTION INTRAMUSCULAR; INTRAVENOUS at 08:40

## 2020-10-06 ASSESSMENT — ACTIVITIES OF DAILY LIVING (ADL)
ADLS_ACUITY_SCORE: 10

## 2020-10-06 NOTE — DISCHARGE SUMMARY
"[facility]  Hospitalist Discharge Summary      Date of Admission:  10/2/2020  Date of Discharge:  10/6/2020  Discharging Provider: Kang Buckner MD  Discharge Diagnoses    1. Acute upper gastrointestinal hemorrhage due peptic ulcer disease   2. Duodenal ulcers  3. LA grade C reflux esophagitis   4. Acute blood loss anemia   5. SIRS- without Identified Infection   6. Macrocytosis   7. Folic acid deficiency   8. Abnormal liver transaminases   9. Adrenal nodule   10. Non-severe malnutrition    History of     Mood disorder    Alcohol and opioid dependence     Hypersomnia     Hypertension     Follow-ups Needed After Discharge   Follow-up Appointments     Follow-up and recommended labs and tests       Follow up with primary care provider, Enid Mansfield,   within 7 days for hospital follow- up.  The following labs/tests are   recommended: CMP and CBC. Primary care provider to consider MRI to follow   up adrenal nodule. Follow up with Dr. Siegel as scheduled.             Unresulted Labs Ordered in the Past 30 Days of this Admission     No orders found from 9/2/2020 to 10/3/2020.          Discharge Disposition   Discharged to home  Condition at discharge: Stable    Hospital Course   HPI:  \"Tasha Short is a 40 year old female who is transferred from [facility] for evaluation of abdominal and concern for GI bleed     Initially admitted to [facility] 9/16-9/18 for diarrhea, hypokalemia and anemia. Treated with protonix and had planned EGD but patient left AMA. Ultimately underwent EGD on 9/25 with evidence of gastric and duodenal ulcers and prescribed PPI x 8 weeks. Presented to ER yesterday with worsening abdominal pain and hematemesis. Hgb 10.1 and Lipase 658. Treated with supportive care for suspected pancreatitis and discharged home. Represented today with worsening abdominal pain. Hgb now 8.7 and transfer was requested due to concern for ongoing GI " "bleed.  Presently, patient is evaluated in her hosptial room. Continues to complain of epigastric abdominal pain. Notes 5 episodes of tarry stools today. Minimal appetite. No ETOH use. Has been complaint with PRotonix. Denies fevers/chills. Notes SOB she attributes to anemia.\"    Acute abdominal pain  Elevated lipase concerning for pancreatitis  Peptic ulcer disease with concern for GI bleed=  Acute blood loss anemia  Initially admitted to [facility] 9/16-9/18 2020 for possible GI bleed.  Treated with IV Protonix.  Had planned endoscopy however patient left AMA.  Ultimately underwent outpatient endoscopy 9/25 which was notable for ulcerative reflux esophagitis, gastric ulcer, duodenal ulcers and erosive gastropathy. Pathology negative. Treated with Protonix twice daily for 8 weeks.  Presented to the ED yesterday with complaints of hematemesis  and abdominal pain.  Hemoglobin noted to be 10.1 down from 10.7 at discharge.  Lipase elevated at 658.  Treated with supportive cares and discharged home.  She presented today with worsening pain and nausea.  Repeat laboratory evaluation with hemoglobin of 8.7 and lipase of 603.  Transfer to [facility] due to concern for possible ongoing GI bleeding  CT abd/pelvis with diffuse fatty infiltration, questionable thickening of gallbladder, and heterogeneously enhancing lesion of right adrenal gland.  EGD with healing duodenal ulcerations and some mild esophagitis  Colonoscopy with congested mucosa of the sigmoid and ascending colon which was bx  Repeat ct of the A/p negative.    Discharge home on proton pump inhibitor and Bentyl    Diet as tolerated     Follow up hemoglobin with primary care provider     Follow up with Dr. Siegel for possible capsule endoscopy and pathology results     Sinus Tachycardia  Ongoing recent issues with tachycardia during recent admission. Current HR 110s.  Echo 9/16 with normal EF.  Resolved      Folic acid deficiency  Macrocytic anemia  acute " blood loss anemia    Folic acid and iron supplements on discharge     Chronic Back and Foot Pain  H/o 5th metatarsal styloid avulsion fracture with non-union. Following with podiatry. CAM walker causing back pain. Had been using NSAIDs but stopped due to EGD findings    Continue PTA Gabapentin and Flexeril      Hypersomnia   Notes does not need Ritalin      Mood D/o  No longer taking Lithium     H/o ETOH in remission  Sober x 3 years, no withdrawal     Transaminitis elevation  Unclear etiology, does not endorse alcohol  Improving    Follow up with primary care provider      Right adrenal nodule on CT    Primary care provider to consider MRI     Tobacco use     Can continue nicotine patch at discharge     Hypertension   No longer on medications and blood pressure good here     Malnutrition:    - Level of malnutrition: Non-Severe   - Based on: weight loss, reduced intake, mild (or greater) subcutaneous fat loss, mild (or greater) muscle loss       Consultations This Hospital Stay   GASTROENTEROLOGY IP CONSULT  SOCIAL WORK IP CONSULT    Code Status   Full Code    Time Spent on this Encounter   I, Kang Buckner MD, personally saw the patient today and spent less than or equal to 30 minutes discharging this patient.       Kang Buckner MD  [place] [address] [facility]COL[facility]  [address] [place] [address], [address]  [facility] [address]  Phone: [phone]  ______________________________________________________________________    Physical Exam   Vital Signs: Temp: 98.4  F (36.9  C) Temp src: Oral BP: (!) 131/99 Pulse: 89   Resp: 16 SpO2: 99 % O2 Device: None (Room air)    Weight: 102 lbs 9.6 oz  Constitutional: awake, alert, cooperative, no apparent distress  Respiratory: No increased work of breathing, good air exchange, clear to auscultation bilaterally, no crackles or wheezing  Cardiovascular: regular rate and rhythm, normal S1 and S2, no S3 or S4, and no murmur noted  GI:  normal bowel sounds, soft,  non-distended, non-tender       Primary Care Physician   Enid Mansfield    Discharge Orders      Reason for your hospital stay    Probable pancreatitis and bleeding uclers     Activity    Your activity upon discharge: activity as tolerated     Follow-up and recommended labs and tests     Follow up with primary care provider, Enid Mansfield, within 7 days for hospital follow- up.  The following labs/tests are recommended: CMP and CBC. Primary care provider to consider MRI to follow up adrenal nodule. Follow up with Dr. Siegel as scheduled.     Diet    Follow this diet upon discharge:       Snacks/Supplements Adult: Boost Breeze; Between Meals      Mechanical/Dental Soft Diet       Significant Results and Procedures   Most Recent 3 CBC's:  Recent Labs   Lab Test 10/06/20  0611 10/06/20  0008 10/05/20  1815 10/05/20  0700 10/04/20  0444 10/04/20  0444 10/02/20  1455 10/02/20  1455   WBC  --   --   --  3.2*  --  3.2*  --  3.9*   HGB 8.7* 8.0* 8.2* 7.6*   < > 7.4*   < > 8.7*   MCV  --   --   --  104*  --  104*  --  121*   PLT  --   --   --  181  --  151  --  167    < > = values in this interval not displayed.     Most Recent 3 BMP's:  Recent Labs   Lab Test 10/05/20  0700 10/04/20  0444 10/03/20  0717    136 136   POTASSIUM 3.8 3.8 4.4   CHLORIDE 109 110* 110*   CO2 27 24 18*   BUN 1* 4* 12   CR 0.51* 0.58 0.60   ANIONGAP 4 2* 8   JANET 7.6* 6.9* 6.7*   GLC 94 92 59*     Most Recent 2 LFT's:  Recent Labs   Lab Test 10/05/20  0700 10/03/20  0717   * 79*   ALT 37 24   ALKPHOS 111 97   BILITOTAL 0.4 0.7     Most Recent 3 Hemoglobins:  Recent Labs   Lab Test 10/06/20  0611 10/06/20  0008 10/05/20  1815   HGB 8.7* 8.0* 8.2*   ,   Results for orders placed or performed during the hospital encounter of 10/02/20   CT Abdomen Pelvis w Contrast    Narrative    CT ABDOMEN AND PELVIS WITH CONTRAST 10/4/2020 5:59 PM    CLINICAL HISTORY: Abdominal pain.    TECHNIQUE: CT scan of the abdomen  and pelvis was performed following  injection of IV contrast. Multiplanar reformats were obtained. Dose  reduction techniques were used.    CONTRAST: 68mL Isovue-370    COMPARISON: September 17, 2020    FINDINGS:   LOWER CHEST: No infiltrates or effusions.    HEPATOBILIARY: No significant mass or bile duct dilatation. No  calcified gallstones. Marked hepatic steatosis, improved from  previous.    PANCREAS: No significant mass, duct dilatation, or inflammatory  change.    SPLEEN: Normal size.    ADRENAL GLANDS: Nodule on the right measuring 1.9 cm not significantly  changed from previous.    KIDNEYS/BLADDER: Stable hypoenhancement in the upper right kidney  compatible with small area of focal infarction. No significant mass,  stones, or hydronephrosis.    BOWEL: No obstruction or inflammatory change.    PELVIC ORGANS: No pelvic masses.    ADDITIONAL FINDINGS: Small amount of pelvic free fluid.    MUSCULOSKELETAL: Unremarkable.      Impression    IMPRESSION:   1.  Improved hepatic steatosis.  2.  Stable right adrenal nodule.  3.  Otherwise no acute process demonstrated in the abdomen and pelvis.    ERIC HUYNH MD       Discharge Medications   Current Discharge Medication List      START taking these medications    Details   dicyclomine (BENTYL) 20 MG tablet Take 1 tablet (20 mg) by mouth 4 times daily (before meals and nightly)  Qty: 120 tablet, Refills: 0    Associated Diagnoses: Gastroesophageal reflux disease without esophagitis      folic acid (FOLVITE) 1 MG tablet Take 1 tablet (1 mg) by mouth daily  Qty: 30 tablet, Refills: 0    Associated Diagnoses: Folic acid deficiency      HYDROcodone-acetaminophen (NORCO) 5-325 MG tablet Take 1-2 tablets by mouth every 6 hours as needed for moderate to severe pain  Qty: 24 tablet, Refills: 0    Associated Diagnoses: Idiopathic acute pancreatitis without infection or necrosis      Iron Combinations (NIFEREX) TABS Take 1 tablet (150 mg) by mouth daily  Qty: 30 tablet,  Refills: 0    Associated Diagnoses: Acute GI bleeding      nicotine (NICODERM CQ) 21 MG/24HR 24 hr patch Place 1 patch onto the skin daily Do not start before October 7, 2020.  Qty: 30 patch, Refills: 0    Associated Diagnoses: Tobacco use disorder         CONTINUE these medications which have CHANGED    Details   pantoprazole (PROTONIX) 40 MG EC tablet Take 1 tablet (40 mg) by mouth 2 times daily Just increased to twice daily last week per Patient    Associated Diagnoses: Acute GI bleeding; Diarrhea, unspecified type         CONTINUE these medications which have NOT CHANGED    Details   gabapentin (NEURONTIN) 100 MG capsule Take 1-2 capsules (100-200 mg) by mouth 3 times daily as needed (pain)  Qty: 90 capsule, Refills: 1    Associated Diagnoses: Acute bilateral low back pain, unspecified whether sciatica present      sucralfate (CARAFATE) 1 GM tablet Take 1 tablet (1 g) by mouth 4 times daily  Qty: 60 tablet, Refills: 0      cyclobenzaprine (FLEXERIL) 10 MG tablet Take 1 tablet (10 mg) by mouth 3 times daily as needed for muscle spasms  Qty: 90 tablet, Refills: 1    Associated Diagnoses: Chronic bilateral low back pain without sciatica      flunisolide (NASALIDE) 25 MCG/ACT (0.025%) SOLN spray Spray 2 sprays into both nostrils every 12 hours  Qty: 1 Bottle, Refills: 5    Associated Diagnoses: Chronic seasonal allergic rhinitis due to pollen         STOP taking these medications       lithium ER (LITHOBID) 300 MG CR tablet Comments:   Reason for Stopping:         methylphenidate (RITALIN) 10 MG tablet Comments:   Reason for Stopping:         metoclopramide (REGLAN) 5 MG tablet Comments:   Reason for Stopping:         metoprolol succinate ER (TOPROL-XL) 50 MG 24 hr tablet Comments:   Reason for Stopping:         naltrexone (DEPADE/REVIA) 50 MG tablet Comments:   Reason for Stopping:         nicotine (NICORETTE) 2 MG gum Comments:   Reason for Stopping:         Prenatal 27-1 MG TABS Comments:   Reason for Stopping:              Allergies   Allergies   Allergen Reactions     Bupropion Other (See Comments)     seizures     Lisinopril Swelling     Angioedema      Penicillins      hives     Phenytoin      rash,puritis (Dilantin)     Prednisone Itching     Face itching, unable to concentrate      Seasonal Allergies

## 2020-10-06 NOTE — PLAN OF CARE
A&O, VSS on RA, pain managed with Norco with relief, Independent in transfers, continent x 2, discharge instruction done, patient acknowledged understanding.

## 2020-10-06 NOTE — PLAN OF CARE
"A&Ox4, pleasant. Tmax 99.6. OVSS. C/o lower ABD pain around a \"6-7\". PRN 1 tab Norco given; slightly effective. No c/o nausea. PIV SL. Serial hemoglobin checks q6: 8.0 last @ 0000. Independent. No S/S of bleeding noted overnight. Discharge pending pain control and HGB.   "

## 2020-10-06 NOTE — CONSULTS
Care Management Discharge Note    Discharge Planning:  Expected Discharge Date:  10/06/2020  Concerns to be Addressed:  SW to coordinate blue plus transportation ride        Anticipated Discharge Disposition: home   Anticipated Discharge Services: PCP follow up with labs and specialty follow up with GI    Anticipated Discharge DME:  n/a    Patient/family educated on Medicare website which has current facility and service quality ratings:  n/a  Referrals Placed by CM/SW:  CW  Education Provided on the Discharge Plan:  no  Patient/Family in Agreement with the Plan:  yes  Disposition Comments:    Writer met with the patient at the bedside she is A+Ox4 independent in the room.  Patient was recently admitted at [facility] for abn pain and GI work up and left AMA prior to GI interventions.    Outpatient EGD with gastric/duodenal ulcers started on protonix 8 weeks.   Patient readmitted this stay for abdominal pain for possible acute pancreatitis and GI Bleed. GI performed EGD/Colonoscopy and PUD probable IBS started on PO Protonix, carafate, dicyclomine and recommended follow up with GI.  Writer met with patient to go over scope and role in safe discharge planning.  Patient declines this writer to schedule PCP follow and is asking for Writer to place contact information for Dr. Christal HERNANDEZ.    Patient is possibly looking at closer GI referral and will discuss further with her PCP office.  Patient did ask for assistance with transportation home SW assisted and bedside updated for discharge time.  Patient to discharge to home with PCP and lab follow up and GI follow up.  Patient identifies no further needs for discharge.      Carrol Bueno RN

## 2020-10-07 ENCOUNTER — TELEPHONE (OUTPATIENT)
Dept: FAMILY MEDICINE | Facility: OTHER | Age: 40
End: 2020-10-07

## 2020-10-07 ENCOUNTER — PATIENT OUTREACH (OUTPATIENT)
Dept: CARE COORDINATION | Facility: CLINIC | Age: 40
End: 2020-10-07

## 2020-10-07 LAB
BLD PROD TYP BPU: NORMAL
BLD UNIT ID BPU: 0
BLOOD PRODUCT CODE: NORMAL
BPU ID: NORMAL
TRANSFUSION STATUS PATIENT QL: NORMAL
TRANSFUSION STATUS PATIENT QL: NORMAL

## 2020-10-07 NOTE — PROGRESS NOTES
Clinic Care Coordination Contact  [facility]/Voicemail       Clinical Data: CHW Outreach  Outreach attempted x 1.Left message on patient's voicemail with call back information and requested return call.    Plan: CHW will try to reach patient again in 1-2 business days.    Irma GOMES Community Health Worker  Clinic Care Coordination  [facility] : [facility] & Preet[facility]  Phone: [phone]    Reason for Referral: Care Transition: unplanned readmission risk

## 2020-10-07 NOTE — TELEPHONE ENCOUNTER
Reason for follow up call: Tasha Short appeared on our list for being seen in and recenlty discharge from the Emergency Room/In Patient Hospital Admission.    Chief Complaint   Patient presents with     Hospital F/U     Acute Gi Bleeding, Idiopathic Acute Pancreatitis Without Infection Or Necrosis       Encounter routed for Clinic Triage RN to call for follow up    Philipp Weldon, JERRELLN, RN, PHN

## 2020-10-07 NOTE — TELEPHONE ENCOUNTER
Attempt 1  LM for the patient to return call to the clinic. Please transfer to any triage RN.   Taylor Mayo RN  October 7, 2020

## 2020-10-07 NOTE — LETTER
[facility] CARE COORDINATION  [address] [facility] [address]  [facility] [address]    October 8, 2020    Tasha Short  [address] [place] [address]      Dear Tasha,    I am a clinic community health worker who works with Enid Mansfield PA-C at [facility]. I have been trying to reach you recently to introduce Clinic Care Coordination and to see if there was anything I could assist you with.  Below is a description of clinic care coordination and how I can further assist you.      The clinic care coordination team is made up of a registered nurse,  and community health worker who understand the health care system. The goal of clinic care coordination is to help you manage your health and improve access to the health care system in the most efficient manner. The team can assist you in meeting your health care goals by providing education, coordinating services, strengthening the communication among your providers and supporting you with any resource needs.    Please feel free to contact me at [phone] with any questions or concerns. We are focused on providing you with the highest-quality healthcare experience possible and that all starts with you.     Sincerely,     Irma GOMES, Community Health Worker  Clinic Care Coordination  [facility] : [facility]  Phone: [phone]

## 2020-10-08 NOTE — TELEPHONE ENCOUNTER
Attempt 2  LM for the patient to return call to the clinic. Please transfer to any triage RN.   Taylor Mayo RN  October 8, 2020

## 2020-10-08 NOTE — PROGRESS NOTES
Clinic Care Coordination Contact  [facility]/Voicemail       Clinical Data: CHW Outreach  Outreach attempted x 2.. Left message on patient's voicemail with call back information and requested return call.    Plan: CHW will send care coordination introduction letter with care coordinator contact information and explanation of care coordination services via [employer].     CHW  will do no further outreaches at this time.    Irma GOMES Community Health Worker  Clinic Care Coordination  [facility] : [facility] & Preeti Jose  Phone: [phone]    Reason for Referral: Care Transition: unplanned readmission risk

## 2020-10-20 ENCOUNTER — OFFICE VISIT (OUTPATIENT)
Dept: FAMILY MEDICINE | Facility: OTHER | Age: 40
End: 2020-10-20
Payer: COMMERCIAL

## 2020-10-20 VITALS
BODY MASS INDEX: 17.75 KG/M2 | HEIGHT: 64 IN | RESPIRATION RATE: 14 BRPM | SYSTOLIC BLOOD PRESSURE: 120 MMHG | HEART RATE: 70 BPM | DIASTOLIC BLOOD PRESSURE: 68 MMHG | TEMPERATURE: 98.1 F | WEIGHT: 104 LBS

## 2020-10-20 DIAGNOSIS — M54.50 ACUTE BILATERAL LOW BACK PAIN, UNSPECIFIED WHETHER SCIATICA PRESENT: ICD-10-CM

## 2020-10-20 DIAGNOSIS — K27.9 PUD (PEPTIC ULCER DISEASE): ICD-10-CM

## 2020-10-20 DIAGNOSIS — K92.2 ACUTE GI BLEEDING: ICD-10-CM

## 2020-10-20 DIAGNOSIS — R19.7 DIARRHEA, UNSPECIFIED TYPE: ICD-10-CM

## 2020-10-20 DIAGNOSIS — K21.9 GASTROESOPHAGEAL REFLUX DISEASE WITHOUT ESOPHAGITIS: ICD-10-CM

## 2020-10-20 DIAGNOSIS — G47.10 HYPERSOMNIA: ICD-10-CM

## 2020-10-20 DIAGNOSIS — E53.8 FOLIC ACID DEFICIENCY: ICD-10-CM

## 2020-10-20 DIAGNOSIS — D62 ANEMIA DUE TO BLOOD LOSS, ACUTE: ICD-10-CM

## 2020-10-20 DIAGNOSIS — Z13.220 SCREENING FOR HYPERLIPIDEMIA: Primary | ICD-10-CM

## 2020-10-20 DIAGNOSIS — K85.90 ACUTE PANCREATITIS, UNSPECIFIED COMPLICATION STATUS, UNSPECIFIED PANCREATITIS TYPE: ICD-10-CM

## 2020-10-20 LAB
ALBUMIN SERPL-MCNC: 3.2 G/DL (ref 3.4–5)
ALP SERPL-CCNC: 92 U/L (ref 40–150)
ALT SERPL W P-5'-P-CCNC: 50 U/L (ref 0–50)
AMYLASE SERPL-CCNC: 139 U/L (ref 30–110)
ANION GAP SERPL CALCULATED.3IONS-SCNC: 7 MMOL/L (ref 3–14)
AST SERPL W P-5'-P-CCNC: 69 U/L (ref 0–45)
BASOPHILS # BLD AUTO: 0.1 10E9/L (ref 0–0.2)
BASOPHILS NFR BLD AUTO: 1 %
BILIRUB SERPL-MCNC: 0.4 MG/DL (ref 0.2–1.3)
BUN SERPL-MCNC: 9 MG/DL (ref 7–30)
CALCIUM SERPL-MCNC: 9.7 MG/DL (ref 8.5–10.1)
CHLORIDE SERPL-SCNC: 109 MMOL/L (ref 94–109)
CO2 SERPL-SCNC: 25 MMOL/L (ref 20–32)
CREAT SERPL-MCNC: 0.66 MG/DL (ref 0.52–1.04)
DIFFERENTIAL METHOD BLD: ABNORMAL
ERYTHROCYTE [DISTWIDTH] IN BLOOD BY AUTOMATED COUNT: 22.4 % (ref 10–15)
FOLATE SERPL-MCNC: 32.2 NG/ML
GFR SERPL CREATININE-BSD FRML MDRD: >90 ML/MIN/{1.73_M2}
GLUCOSE SERPL-MCNC: 79 MG/DL (ref 70–99)
HCT VFR BLD AUTO: 31.5 % (ref 35–47)
HGB BLD-MCNC: 9.7 G/DL (ref 11.7–15.7)
IRON SATN MFR SERPL: 7 % (ref 15–46)
IRON SERPL-MCNC: 24 UG/DL (ref 35–180)
LIPASE SERPL-CCNC: 1143 U/L (ref 73–393)
LYMPHOCYTES # BLD AUTO: 1.4 10E9/L (ref 0.8–5.3)
LYMPHOCYTES NFR BLD AUTO: 16 %
MCH RBC QN AUTO: 31.9 PG (ref 26.5–33)
MCHC RBC AUTO-ENTMCNC: 30.8 G/DL (ref 31.5–36.5)
MCV RBC AUTO: 104 FL (ref 78–100)
MONOCYTES # BLD AUTO: 0.5 10E9/L (ref 0–1.3)
MONOCYTES NFR BLD AUTO: 6 %
NEUTROPHILS # BLD AUTO: 6.8 10E9/L (ref 1.6–8.3)
NEUTROPHILS NFR BLD AUTO: 77 %
PLATELET # BLD AUTO: 353 10E9/L (ref 150–450)
POTASSIUM SERPL-SCNC: 4.3 MMOL/L (ref 3.4–5.3)
PROT SERPL-MCNC: 6.9 G/DL (ref 6.8–8.8)
RBC # BLD AUTO: 3.04 10E12/L (ref 3.8–5.2)
SODIUM SERPL-SCNC: 141 MMOL/L (ref 133–144)
TIBC SERPL-MCNC: 343 UG/DL (ref 240–430)
VIT B12 SERPL-MCNC: 676 PG/ML (ref 193–986)
WBC # BLD AUTO: 8.8 10E9/L (ref 4–11)

## 2020-10-20 PROCEDURE — 83540 ASSAY OF IRON: CPT | Performed by: PHYSICIAN ASSISTANT

## 2020-10-20 PROCEDURE — 83550 IRON BINDING TEST: CPT | Performed by: PHYSICIAN ASSISTANT

## 2020-10-20 PROCEDURE — 82746 ASSAY OF FOLIC ACID SERUM: CPT | Performed by: PHYSICIAN ASSISTANT

## 2020-10-20 PROCEDURE — 85025 COMPLETE CBC W/AUTO DIFF WBC: CPT | Performed by: PHYSICIAN ASSISTANT

## 2020-10-20 PROCEDURE — 82150 ASSAY OF AMYLASE: CPT | Performed by: PHYSICIAN ASSISTANT

## 2020-10-20 PROCEDURE — 83690 ASSAY OF LIPASE: CPT | Performed by: PHYSICIAN ASSISTANT

## 2020-10-20 PROCEDURE — 80053 COMPREHEN METABOLIC PANEL: CPT | Performed by: PHYSICIAN ASSISTANT

## 2020-10-20 PROCEDURE — 82607 VITAMIN B-12: CPT | Performed by: PHYSICIAN ASSISTANT

## 2020-10-20 PROCEDURE — 99214 OFFICE O/P EST MOD 30 MIN: CPT | Performed by: PHYSICIAN ASSISTANT

## 2020-10-20 PROCEDURE — 36415 COLL VENOUS BLD VENIPUNCTURE: CPT | Performed by: PHYSICIAN ASSISTANT

## 2020-10-20 RX ORDER — METHYLPHENIDATE HYDROCHLORIDE 10 MG/1
10 TABLET ORAL 2 TIMES DAILY
Qty: 60 TABLET | Refills: 0 | Status: SHIPPED | OUTPATIENT
Start: 2020-10-20 | End: 2020-11-17

## 2020-10-20 RX ORDER — FOLIC ACID 1 MG/1
1 TABLET ORAL DAILY
Qty: 90 TABLET | Refills: 3 | Status: SHIPPED | OUTPATIENT
Start: 2020-10-20 | End: 2021-07-21

## 2020-10-20 RX ORDER — FERROUS ASPARTO GLYCINATE, IRON, ASCORBIC ACID, FOLIC ACID, CYANOCOBALAMIN, ZINC, SUCCINIC ACID, AND INTRINSIC FACTOR 50; 100; 60; 750; 250; 25; 15; 50; 100 MG/1; MG/1; MG/1; UG/1; UG/1; UG/1; MG/1; MG/1; MG/1
1 TABLET ORAL DAILY
Qty: 90 TABLET | Refills: 3 | Status: SHIPPED | OUTPATIENT
Start: 2020-10-20 | End: 2021-07-21

## 2020-10-20 RX ORDER — GABAPENTIN 100 MG/1
100-200 CAPSULE ORAL 3 TIMES DAILY PRN
Qty: 90 CAPSULE | Refills: 1 | Status: SHIPPED | OUTPATIENT
Start: 2020-10-20 | End: 2020-11-02

## 2020-10-20 RX ORDER — DICYCLOMINE HCL 20 MG
20 TABLET ORAL
Qty: 120 TABLET | Refills: 1 | Status: SHIPPED | OUTPATIENT
Start: 2020-10-20 | End: 2021-02-01

## 2020-10-20 RX ORDER — SUCRALFATE 1 G/1
1 TABLET ORAL 4 TIMES DAILY
Qty: 120 TABLET | Refills: 1 | Status: SHIPPED | OUTPATIENT
Start: 2020-10-20 | End: 2022-01-01

## 2020-10-20 RX ORDER — PANTOPRAZOLE SODIUM 40 MG/1
40 TABLET, DELAYED RELEASE ORAL 2 TIMES DAILY
Qty: 180 TABLET | Refills: 3 | Status: SHIPPED | OUTPATIENT
Start: 2020-10-20 | End: 2022-01-01 | Stop reason: ALTCHOICE

## 2020-10-20 ASSESSMENT — MIFFLIN-ST. JEOR: SCORE: 1126.74

## 2020-10-20 ASSESSMENT — PAIN SCALES - GENERAL: PAINLEVEL: MILD PAIN (2)

## 2020-10-20 NOTE — PROGRESS NOTES
"Subjective     Tasha Short is a 40 year old female who presents to clinic today for the following health issues:    History of Present Illness       She eats 2-3 servings of fruits and vegetables daily.She consumes 1 sweetened beverage(s) daily.She exercises with enough effort to increase her heart rate 30 to 60 minutes per day.    She is taking medications regularly.             Hospital Follow-up Visit:    Hospital/Nursing Home/IP Rehab Facility: [facility]  Date of Admission: 10/02/20  Date of Discharge: 10/06/20  Reason(s) for Admission: Gastrointestinal hemorrhage      Was your hospitalization related to COVID-19? No   Problems taking medications regularly:  None  Medication changes since discharge: None  Problems adhering to non-medication therapy:  None    Summary of hospitalization:  [facility] discharge summary reviewed  Diagnostic Tests/Treatments reviewed.  Follow up needed: GI   Other Healthcare Providers Involved in Patient s Care:         Specialist appointment - GI - not set up yet   Update since discharge: improved.     Back pain   - Still hurts   - Mostly gabapentin (1 twice a day)  and tylenol   - Rare flexeril only at night because knocks her out   - Heating pad         Post Discharge Medication Reconciliation: discharge medications reconciled, continue medications without change.  Plan of care communicated with patient            Review of Systems   Constitutional, HEENT, cardiovascular, pulmonary, gi and gu systems are negative, except as otherwise noted.      Objective    /68   Pulse 70   Temp 98.1  F (36.7  C)   Resp 14   Ht 1.626 m (5' 4\")   Wt 47.2 kg (104 lb)   LMP  (LMP Unknown)   Breastfeeding No   BMI 17.85 kg/m    Body mass index is 17.85 kg/m .  Physical Exam   GENERAL APPEARANCE: healthy, alert and no distress  EYES: Eyes grossly normal to inspection, PERRLA, conjunctivae and sclerae without injection or discharge, EOM intact   RESP: Lungs clear to " auscultation - no rales, rhonchi or wheezes    CV: Regular rates and rhythm, normal S1 S2, no S3 or S4, no murmur, click or rub, no peripheral edema and peripheral pulses strong and symmetric bilaterally   MS: No musculoskeletal defects are noted and gait is age appropriate without ataxia   SKIN: No suspicious lesions or rashes, hydration status appears adeuqate with normal skin turgor   PSYCH: Alert and oriented x3; speech- coherent , normal rate and volume; able to articulate logical thoughts, able to abstract reason, no tangential thoughts, no hallucinations or delusions, mentation appears normal, Mood is euthymic. Affect is appropriate for this mood state and bright. Thought content is free of suicidal ideation, hallucinations, and delusions. Dress is adequate and upkept. Eye contact is good during conversation.       Diagnostics   See orders pending in Epic         Assessment & Plan     ICD-10-CM    1. Screening for hyperlipidemia  Z13.220    2. Acute GI bleeding  K92.2 Comprehensive metabolic panel (BMP + Alb, Alk Phos, ALT, AST, Total. Bili, TP)     CBC with platelets and differential     pantoprazole (PROTONIX) 40 MG EC tablet     Iron Combinations (NIFEREX) TABS     Iron and iron binding capacity     Folate     Vitamin B12   3. Acute pancreatitis, unspecified complication status, unspecified pancreatitis type  K85.90 Lipase     Amylase     sucralfate (CARAFATE) 1 GM tablet   4. Diarrhea, unspecified type  R19.7 pantoprazole (PROTONIX) 40 MG EC tablet   5. Gastroesophageal reflux disease without esophagitis  K21.9 dicyclomine (BENTYL) 20 MG tablet     sucralfate (CARAFATE) 1 GM tablet   6. Folic acid deficiency  E53.8 folic acid (FOLVITE) 1 MG tablet   7. Anemia due to blood loss, acute  D62 Iron Combinations (NIFEREX) TABS     Iron and iron binding capacity     Folate     Vitamin B12   8. PUD (peptic ulcer disease)  K27.9 pantoprazole (PROTONIX) 40 MG EC tablet     dicyclomine (BENTYL) 20 MG tablet      sucralfate (CARAFATE) 1 GM tablet   9. Hypersomnia  G47.10 methylphenidate (RITALIN) 10 MG tablet   10. 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)      Feeling a lot better      Ankle fracture also feeling better since was laid up for so long   - Reviewed medication use and side effects, refilled as needed   - Back pain still there      Reviewed images with her from MRI      Discussed referral to neurosurgery for treatment options but she wants to wait on this until rest of her health is better   - Discussed need for recheck of labs      Will await these results      Discussed for now continue on Iron and B12      Discussed these two and the Bentyl, Protonix, and Carafate will not likely be forever   - Patient really didn't want to go back to GI     Discussed need for repeat EGD to assure all the ulcers have healed     She will check her paperwork, pretty sure that it is scheduled somewhere on there   - Wants to restart Ritalin     Back to work now and needing it     Discussed would fight against us with weight, she will work hard to make sure getting good meals in    Reviewed use and side effects, refill given for 1 month     The patient indicates understanding of these issues and agrees with the plan.    Return in about 1 month (around 11/20/2020).    MARTHA Metz [facility]

## 2020-10-20 NOTE — RESULT ENCOUNTER NOTE
Josefina Cordova    Your results were looking a lot better. Hemoglobin up to 9.7. Everything else trending normal except your pancreas is still elevated. We will just plan to check again in a month. For now, continue the iron and folate pills.     The results are attached for your review.       Donnie Mansfield PA-C

## 2020-10-22 ENCOUNTER — TELEPHONE (OUTPATIENT)
Dept: FAMILY MEDICINE | Facility: OTHER | Age: 40
End: 2020-10-22

## 2020-10-22 NOTE — TELEPHONE ENCOUNTER
Central Prior Authorization Team   Phone: [phone]      PA Initiation    Medication: pantoprazole (PROTONIX) 40 MG EC tablet-Initiated  Insurance Company: [employer] [facility] - Phone [phone] Fax [phone]  Pharmacy Filling the Rx: [facility] [place] - [address] [place] [address]  Filling Pharmacy Phone: [phone]  Filling Pharmacy Fax:    Start Date: 10/22/2020

## 2020-10-22 NOTE — TELEPHONE ENCOUNTER
Prior Authorization Retail Medication Request    Medication/Dose: pantoprazole (PROTONIX) 40 MG EC tablet  ICD code (if different than what is on RX):    Previously Tried and Failed:    Rationale:      Insurance Name:    Insurance ID:        Pharmacy Information (if different than what is on RX)  Name:    Phone:

## 2020-10-23 NOTE — TELEPHONE ENCOUNTER
Prior Authorization Approval    Authorization Effective Date: 7/22/2020  Authorization Expiration Date: 10/22/2021  Medication: pantoprazole (PROTONIX) 40 MG EC tablet-APPROVED  Approved Dose/Quantity:  Reference #:     Insurance Company: [employer] - Phone [phone] Fax [phone]  Expected CoPay:       CoPay Card Available:      Foundation Assistance Needed:    Which Pharmacy is filling the prescription (Not needed for infusion/clinic administered): [facility] [place] - [address] [address]  Pharmacy Notified: Yes  Patient Notified: No    Pharmacy will notify patient when medication is ready.

## 2020-10-26 ENCOUNTER — MYC MEDICAL ADVICE (OUTPATIENT)
Dept: FAMILY MEDICINE | Facility: OTHER | Age: 40
End: 2020-10-26

## 2020-10-26 DIAGNOSIS — M54.50 CHRONIC BILATERAL LOW BACK PAIN WITHOUT SCIATICA: Primary | ICD-10-CM

## 2020-10-26 DIAGNOSIS — G89.29 CHRONIC BILATERAL LOW BACK PAIN WITHOUT SCIATICA: Primary | ICD-10-CM

## 2020-10-26 NOTE — TELEPHONE ENCOUNTER
Referral for her spine placed. Can't give controlled substances like pain medications without an appointment.    Donnie Mansfield PA-C  [facility]

## 2020-10-26 NOTE — TELEPHONE ENCOUNTER
Left message for patient to return call to relay message below.  She can contact [phone] to set up appointment with Spine if they have not called her by tomorrow.

## 2020-10-26 NOTE — TELEPHONE ENCOUNTER
Sent mychart to patient to inform that we are unable to prescribe pain medications without an in office visit.   Taylor Mayo RN  October 26, 2020

## 2020-10-28 ENCOUNTER — HOSPITAL ENCOUNTER (OUTPATIENT)
Dept: MRI IMAGING | Facility: CLINIC | Age: 40
Discharge: HOME | End: 2020-10-28
Attending: PHYSICIAN ASSISTANT | Admitting: PHYSICIAN ASSISTANT
Payer: COMMERCIAL

## 2020-10-28 DIAGNOSIS — E27.8 ADRENAL MASS, RIGHT (H): ICD-10-CM

## 2020-10-28 PROCEDURE — 250N000009 HC RX 250: Performed by: PHYSICIAN ASSISTANT

## 2020-10-28 PROCEDURE — 74183 MRI ABD W/O CNTR FLWD CNTR: CPT

## 2020-10-28 PROCEDURE — 255N000002 HC RX 255 OP 636: Performed by: PHYSICIAN ASSISTANT

## 2020-10-28 PROCEDURE — A9585 GADOBUTROL INJECTION: HCPCS | Performed by: PHYSICIAN ASSISTANT

## 2020-10-28 RX ORDER — GADOBUTROL 604.72 MG/ML
7.5 INJECTION INTRAVENOUS ONCE
Status: COMPLETED | OUTPATIENT
Start: 2020-10-28 | End: 2020-10-28

## 2020-10-28 RX ADMIN — GADOBUTROL 7.5 ML: 604.72 INJECTION INTRAVENOUS at 09:24

## 2020-10-28 RX ADMIN — SODIUM CHLORIDE 50 ML: 9 INJECTION, SOLUTION INTRAVENOUS at 09:25

## 2020-10-29 NOTE — RESULT ENCOUNTER NOTE
Josefina Cordova    Your results were normal. Likely the mass is just benign growth. They recommend we just recheck the CT in 6 months.     The results are attached for your review.       Donnie Mansfield PA-C

## 2020-10-31 NOTE — PROGRESS NOTES
"Subjective     Tasha Short is a 40 year old female who presents to clinic today for the following health issues:    HPI         Back Pain  Onset/Duration: all summer long  Description:   Location of pain: low back both  Character of pain: throbbing and constant  Pain radiation: none  New numbness or weakness in legs, not attributed to pain: no   Intensity: Currently 6/10, At its worst 8/10  Progression of Symptoms: same  History:   Specific cause: none  Pain interferes with job: YES  History of back problems: no prior back problems  Any previous MRI or X-rays: Yes- at [facility].  Date 9/24 & 10/28  Sees a specialist for back pain: Yes  Alleviating factors:   Improved by: none    Precipitating factors:  Worsened by: working.   Therapies tried and outcome: cold, heat and muscle relaxants, tylenol  Flexeril not helping   - Gabapentin - 200 mg TID   -         Review of Systems   Constitutional, HEENT, cardiovascular, pulmonary, gi and gu systems are negative, except as otherwise noted.      Objective    BP (!) 140/100   Pulse 140   Temp 99.1  F (37.3  C) (Temporal)   Resp 16   Ht 1.65 m (5' 4.96\")   Wt 48 kg (105 lb 12.8 oz)   LMP  (LMP Unknown)   SpO2 98%   BMI 17.63 kg/m    Body mass index is 17.63 kg/m .  Physical Exam   GENERAL APPEARANCE: healthy, alert and no distress  EYES: Eyes grossly normal to inspection, PERRLA, conjunctivae and sclerae without injection or discharge, EOM intact   MS: No musculoskeletal defects are noted and gait is age appropriate without ataxia   BACK: Slightly decreased ROM with all movements, mild midline tenderness at base of lumbar spine, no edema, bilateral paralumbar tenderness with left sided spasm   SKIN: No suspicious lesions or rashes, hydration status appears adeuqate with normal skin turgor   PSYCH: Alert and oriented x3; speech- coherent , normal rate and volume; able to articulate logical thoughts, able to abstract reason, no tangential thoughts, no " hallucinations or delusions, mentation appears normal, Mood is euthymic. Affect is appropriate for this mood state and bright. Thought content is free of suicidal ideation, hallucinations, and delusions. Dress is adequate and upkept. Eye contact is good during conversation.       Diagnostics  None         Assessment & Plan     ICD-10-CM    1. Idiopathic acute pancreatitis without infection or necrosis  K85.00 HYDROcodone-acetaminophen (NORCO) 5-325 MG tablet     Hepatic panel (Albumin, ALT, AST, Bili, Alk Phos, TP)   2. Acute pancreatitis, unspecified complication status, unspecified pancreatitis type  K85.90 Amylase     Lipase     CBC with platelets and differential   3. PUD (peptic ulcer disease)  K27.9    4. Acute bilateral low back pain, unspecified whether sciatica present  M54.5 gabapentin (NEURONTIN) 300 MG capsule   5. Alcohol dependence in remission (H)  F10.21      1-3.   - Patient recovering from acute GI bleeding due to peptic ulcers   - Has not set up follow up with GI specialist, discussed ulcers need to be followed to resolution   - Symptoms are improving overall, trying to gain weight   - Recommend labs today to recheck hemoglobin (has been trending up)   - Also had pancreatitis while hospitalized for all of this, recommend we follow labs   - Await lab results   - Reviewed recent imaging at length, all reassuring     4. 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 to recent issues as above   - Reviewed MRI results, small disc herniation and DDD   - She already has appointment to discuss treatment plan with neurosurgery next week      Discussed there are many things that could be recommended from physical therapy, to injections, to surgery - but I am deferring to what might help her best to the specialist   - States nothing helps with the pain   - Recommend she start at home physical therapy, gave hand out with stretches ROM and strengthening  exercises (see patient instructions)      Discussed with her recent weight loss, physical therapy might be a good idea anyway to help her gain back some muscle mass   - For pain     Will increase Gabapentin from 200 mg TID to 300 mg TID    Continue Flexeril PRN      Will give her small amount of Norco - 1-2 at night only, reviewed use and side effects including risks of sedation, addiction, and constipation     5. As above, reviewed risks of using opioids in somone with history of addiction   - Patient maintains her sobriety       The patient and her grandmother indicates understanding of these issues and agrees with the plan.    Return in about 2 weeks (around 11/16/2020) for Recheck.    Enid Mansfield PA-C  [facility]

## 2020-11-02 ENCOUNTER — OFFICE VISIT (OUTPATIENT)
Dept: FAMILY MEDICINE | Facility: OTHER | Age: 40
End: 2020-11-02
Payer: COMMERCIAL

## 2020-11-02 VITALS
SYSTOLIC BLOOD PRESSURE: 140 MMHG | BODY MASS INDEX: 17.63 KG/M2 | OXYGEN SATURATION: 98 % | TEMPERATURE: 99.1 F | HEIGHT: 65 IN | DIASTOLIC BLOOD PRESSURE: 100 MMHG | HEART RATE: 140 BPM | RESPIRATION RATE: 16 BRPM | WEIGHT: 105.8 LBS

## 2020-11-02 DIAGNOSIS — M54.50 ACUTE BILATERAL LOW BACK PAIN, UNSPECIFIED WHETHER SCIATICA PRESENT: ICD-10-CM

## 2020-11-02 DIAGNOSIS — K85.90 ACUTE PANCREATITIS, UNSPECIFIED COMPLICATION STATUS, UNSPECIFIED PANCREATITIS TYPE: ICD-10-CM

## 2020-11-02 DIAGNOSIS — K92.2 GASTROINTESTINAL HEMORRHAGE, UNSPECIFIED GASTROINTESTINAL HEMORRHAGE TYPE: ICD-10-CM

## 2020-11-02 DIAGNOSIS — K85.00 IDIOPATHIC ACUTE PANCREATITIS WITHOUT INFECTION OR NECROSIS: Primary | ICD-10-CM

## 2020-11-02 DIAGNOSIS — K27.9 PUD (PEPTIC ULCER DISEASE): ICD-10-CM

## 2020-11-02 DIAGNOSIS — F10.21 ALCOHOL DEPENDENCE IN REMISSION (H): ICD-10-CM

## 2020-11-02 LAB
ALBUMIN SERPL-MCNC: 3.3 G/DL (ref 3.4–5)
ALP SERPL-CCNC: 98 U/L (ref 40–150)
ALT SERPL W P-5'-P-CCNC: 71 U/L (ref 0–50)
AMYLASE SERPL-CCNC: 65 U/L (ref 30–110)
AST SERPL W P-5'-P-CCNC: 80 U/L (ref 0–45)
BASOPHILS # BLD AUTO: 0 10E9/L (ref 0–0.2)
BASOPHILS NFR BLD AUTO: 0.5 %
BILIRUB DIRECT SERPL-MCNC: <0.1 MG/DL (ref 0–0.2)
BILIRUB SERPL-MCNC: 0.2 MG/DL (ref 0.2–1.3)
DIFFERENTIAL METHOD BLD: ABNORMAL
EOSINOPHIL # BLD AUTO: 0.1 10E9/L (ref 0–0.7)
EOSINOPHIL NFR BLD AUTO: 2.2 %
ERYTHROCYTE [DISTWIDTH] IN BLOOD BY AUTOMATED COUNT: 20.3 % (ref 10–15)
HCT VFR BLD AUTO: 29.4 % (ref 35–47)
HGB BLD-MCNC: 9.2 G/DL (ref 11.7–15.7)
LIPASE SERPL-CCNC: 584 U/L (ref 73–393)
LYMPHOCYTES # BLD AUTO: 1.1 10E9/L (ref 0.8–5.3)
LYMPHOCYTES NFR BLD AUTO: 19.1 %
MCH RBC QN AUTO: 29.9 PG (ref 26.5–33)
MCHC RBC AUTO-ENTMCNC: 31.3 G/DL (ref 31.5–36.5)
MCV RBC AUTO: 96 FL (ref 78–100)
MONOCYTES # BLD AUTO: 0.6 10E9/L (ref 0–1.3)
MONOCYTES NFR BLD AUTO: 10 %
NEUTROPHILS # BLD AUTO: 3.8 10E9/L (ref 1.6–8.3)
NEUTROPHILS NFR BLD AUTO: 68.2 %
PLATELET # BLD AUTO: 319 10E9/L (ref 150–450)
PROT SERPL-MCNC: 6.8 G/DL (ref 6.8–8.8)
RBC # BLD AUTO: 3.08 10E12/L (ref 3.8–5.2)
WBC # BLD AUTO: 5.5 10E9/L (ref 4–11)

## 2020-11-02 PROCEDURE — 36415 COLL VENOUS BLD VENIPUNCTURE: CPT | Performed by: PHYSICIAN ASSISTANT

## 2020-11-02 PROCEDURE — 85025 COMPLETE CBC W/AUTO DIFF WBC: CPT | Performed by: PHYSICIAN ASSISTANT

## 2020-11-02 PROCEDURE — 83690 ASSAY OF LIPASE: CPT | Performed by: PHYSICIAN ASSISTANT

## 2020-11-02 PROCEDURE — 99214 OFFICE O/P EST MOD 30 MIN: CPT | Performed by: PHYSICIAN ASSISTANT

## 2020-11-02 PROCEDURE — 80076 HEPATIC FUNCTION PANEL: CPT | Performed by: PHYSICIAN ASSISTANT

## 2020-11-02 PROCEDURE — 82150 ASSAY OF AMYLASE: CPT | Performed by: PHYSICIAN ASSISTANT

## 2020-11-02 RX ORDER — HYDROCODONE BITARTRATE AND ACETAMINOPHEN 5; 325 MG/1; MG/1
1-2 TABLET ORAL
Qty: 24 TABLET | Refills: 0 | Status: SHIPPED | OUTPATIENT
Start: 2020-11-02 | End: 2020-11-17

## 2020-11-02 RX ORDER — GABAPENTIN 300 MG/1
300 CAPSULE ORAL 3 TIMES DAILY PRN
Qty: 90 CAPSULE | Refills: 1 | Status: SHIPPED | OUTPATIENT
Start: 2020-11-02 | End: 2021-02-01

## 2020-11-02 ASSESSMENT — MIFFLIN-ST. JEOR: SCORE: 1150.17

## 2020-11-02 ASSESSMENT — ANXIETY QUESTIONNAIRES
2. NOT BEING ABLE TO STOP OR CONTROL WORRYING: MORE THAN HALF THE DAYS
GAD7 TOTAL SCORE: 10
7. FEELING AFRAID AS IF SOMETHING AWFUL MIGHT HAPPEN: SEVERAL DAYS
GAD7 TOTAL SCORE: 10
6. BECOMING EASILY ANNOYED OR IRRITABLE: NEARLY EVERY DAY
4. TROUBLE RELAXING: SEVERAL DAYS
7. FEELING AFRAID AS IF SOMETHING AWFUL MIGHT HAPPEN: SEVERAL DAYS
GAD7 TOTAL SCORE: 10
5. BEING SO RESTLESS THAT IT IS HARD TO SIT STILL: SEVERAL DAYS
3. WORRYING TOO MUCH ABOUT DIFFERENT THINGS: SEVERAL DAYS
1. FEELING NERVOUS, ANXIOUS, OR ON EDGE: SEVERAL DAYS

## 2020-11-02 ASSESSMENT — PATIENT HEALTH QUESTIONNAIRE - PHQ9
SUM OF ALL RESPONSES TO PHQ QUESTIONS 1-9: 8
SUM OF ALL RESPONSES TO PHQ QUESTIONS 1-9: 8
10. IF YOU CHECKED OFF ANY PROBLEMS, HOW DIFFICULT HAVE THESE PROBLEMS MADE IT FOR YOU TO DO YOUR WORK, TAKE CARE OF THINGS AT HOME, OR GET ALONG WITH OTHER PEOPLE: NOT DIFFICULT AT ALL

## 2020-11-02 NOTE — PATIENT INSTRUCTIONS
1. Gabapentin - increase to 300 mg, three times a day     2. Exercises - 2-3x/day     3. Norco - just 1 or 2 at night                              Back Pain: Self Care at Home Instructions  Conservative Treatment    Remaining active supports a quicker recovery, keep moving. (ex. Walking, increase time & speed as tolerated).    Bed rest is not recommended. Minimize sitting. Do not remain in one position for extended periods of time.    Maintain routine activity with attention to correct posture; stop aggravating activities.    Over-the-counter pain medications for short term symptom control. (See below)    Muscle relaxants are sometimes helpful for few days, but may cause drowsiness. (See below)    Cold packs or heat based upon your preference.    Most people improve within 2 weeks; most have significant improvement within 4 weeks.    If symptoms worsen or you experience numbness, contact your provider immediately.    Medications for Pain Relief  Recommended over-the-counter non-steroidal anti-inflammatories:     Ibuprofen (Advil, Motrin) 600 mg. three times a day as needed for pain      OR   Naproxen Sodium (Aleve) 220-440 mg. two times per day as needed for pain    If you have been prescribed a muscle relaxant (*cyclobenzapine 5 mg.)  Take    - 1 tablet at bedtime  *May cause drowsiness. Do not drive when taking this medication.    Back Pain Exercises   Exercises that stretch and strengthen the muscles of your abdomen and spine can help prevent back problems. If your back and abdominal muscles are strong, you can maintain good posture and keep your spine in its correct position.     If your muscles are tight, take a warm shower or bath before doing the exercises. Exercise on a rug or mat. Stop doing any exercise that causes pain until you have talked with your provider.     These exercises are intended only as suggestions. Ask your provider or physical therapist to help you develop an exercise program. Check with  your provider before starting the exercises. Ask your provider how many times a week you need to do the exercises.      Back Pain Exercises                                            Cat and camel: Get down on your hands and knees. Let your stomach sag, allowing your back to curve downward. Hold this position for 5 seconds. Then arch your back and hold for 5 seconds. Do 3 sets of 10.       Pelvic tilt: Lie on your back with your knees bent and your feet flat on the floor. Tighten your abdominal muscles and push your lower back into the floor. Hold this position for 5 seconds, then relax. Do 3 sets of 10.       Extension exercise: Lie face down on the floor for 5 minutes. If this hurts too much, lie face down with a pillow under your stomach. This should relieve your leg or back pain. When you can lie on your stomach for 5 minutes without a pillow, then you can continue with the rest of this exercise.     After lying on your stomach for 5 minutes, prop yourself up on your elbows for another 5 minutes. Lie flat again for 1 minute, then press down on your hands and extend your elbows while keeping your hips flat on the floor. Hold for 1 second and lower yourself to the floor. Repeat 10 times. Do 4 sets. Rest for 2 minutes between sets. You should have no pain in your legs when you do this, but it is normal to feel pain in your lower back. Do this several times a day.          Developed by [employer]   Published by [employer].   Last modified: 2009-02-08   Last reviewed: 2008-07-07   This content is reviewed periodically and is subject to change as new health information becomes available. The information is intended to inform and educate and is not a replacement for medical evaluation, advice, diagnosis or treatment by a healthcare professional.   Adult Health Advisor 2009.1 Index  Adult Health Advisor 2009.1 Credits     2009 c-[facility] and/or its affiliates. All Rights Reserved.

## 2020-11-03 ASSESSMENT — PATIENT HEALTH QUESTIONNAIRE - PHQ9: SUM OF ALL RESPONSES TO PHQ QUESTIONS 1-9: 8

## 2020-11-03 ASSESSMENT — ANXIETY QUESTIONNAIRES: GAD7 TOTAL SCORE: 10

## 2020-11-03 NOTE — RESULT ENCOUNTER NOTE
Josefina Cordova    Your results show the pancreatitis is improving, but your liver functions are up. This could be because of the pancreatitis or the drinking. So we will continue to recheck all these labs every 2 weeks until they are better. Your hemoglobin has gone down just a little bit. Make sure you are taking the iron and the folate. You also need to have that recheck with the GI specialist - you were going to check to see if this was already scheduled with them. I don't see any appointments in your chart so I will place a referral to return to Dr. Siegel. We are going to need to make sure that your ulcers aren't still bleeding.     The results are attached for your review.       Donnie Mansfield PA-C

## 2020-11-16 NOTE — PROGRESS NOTES
"Tasha Short is a 40 year old female who is being evaluated via a billable telephone visit.      The patient has been notified of following:     \"This telephone visit will be conducted via a call between you and your physician/provider. We have found that certain health care needs can be provided without the need for a physical exam.  This service lets us provide the care you need with a short phone conversation.  If a prescription is necessary we can send it directly to your pharmacy.  If lab work is needed we can place an order for that and you can then stop by our lab to have the test done at a later time.    Telephone visits are billed at different rates depending on your insurance coverage. During this emergency period, for some insurers they may be billed the same as an in-person visit.  Please reach out to your insurance provider with any questions.    If during the course of the call the physician/provider feels a telephone visit is not appropriate, you will not be charged for this service.\"    Patient has given verbal consent for Telephone visit?  Yes    What phone number would you like to be contacted at? [phone]    How would you like to obtain your AVS? MyChart    Subjective     Tasha Short is a 40 year old female who presents via phone visit today for the following health issues:    HPI     Chronic/Recurring Back Pain Follow Up      Where is your back pain located? (Select all that apply) low back bilateral    How would you describe your back pain?  constant pain     Where does your back pain spread? nowhere    Since your last clinic visit for back pain, how has your pain changed? gradually worsening    Does your back pain interfere with your job? Not applicable    Since your last visit, have you tried any new treatment? No    - Getting worse  - Hasn't worked for a week  - Every time stands up is like a pressure in lower back   - Sees Dr. 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 issues as above   - Reviewed MRI results, small disc herniation and DDD   - She already has appointment to discuss treatment plan with neurosurgery next week      Discussed there are many things that could be recommended from physical therapy, to injections, to surgery - but I am deferring to what might help her best to the specialist   - States nothing helps with the pain   - Recommend she start at home physical therapy, gave hand out with stretches ROM and strengthening exercises (see patient instructions)      Discussed with her recent weight loss, physical therapy might be a good idea anyway to help her gain back some muscle mass   - For pain     Will increase Gabapentin from 200 mg TID to 300 mg TID    Continue Flexeril PRN      Will give her small amount of Norco - 1-2 at night only, reviewed use and side effects including risks of sedation, addiction, and constipation         Review of Systems   Constitutional, HEENT, cardiovascular, pulmonary, gi and gu systems are negative, except as otherwise noted.       Objective     Vitals:  No vitals were obtained today due to virtual visit.    healthy, alert and no distress  PSYCH: Alert and oriented times 3; coherent speech, normal   rate and volume, able to articulate logical thoughts, able   to abstract reason, no tangential thoughts, no hallucinations   or delusions  Her affect is normal  RESP: No cough, no audible wheezing, able to talk in full sentences  Remainder of exam unable to be completed due to telephone  visits      Diagnostics  See orders pending in Epic         Assessment & Plan     ICD-10-CM    1. Chronic bilateral low back pain with left-sided sciatica  M54.42 [facility], [facility] CHIR[facility]RACT[facility] REFERRAL    G89.29 methylPREDNISolone (MEDROL DOSEPAK) 4 MG tablet therapy pack   2. Physical deconditioning  R53.81 [facility] HAND, AND CHIROPRACTIC REFERRAL   3. Gastrointestinal hemorrhage, unspecified gastrointestinal hemorrhage type  K92.2    4. Anemia due to blood loss, acute  D62 CBC with platelets     Lipase   5. PUD (peptic ulcer disease)  K27.9 CBC with platelets     Lipase   6. Idiopathic acute pancreatitis, unspecified complication status  K85.00    7. Elevated LFTs  R79.89 Hepatic panel (Albumin, ALT, AST, Bili, Alk Phos, TP)   8. Idiopathic acute pancreatitis without infection or necrosis  K85.00 Lipase     HYDROcodone-acetaminophen (NORCO) 5-325 MG tablet     1. & 2. 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 to recent issues (below)  - MRI was done - small disc herniation and DDD     Has appointment with Neurology on 12/10/20  - Has started home back stretches with no relief     Will proceed with formal physical therapy, referral placed   - Increasee Gabapentin from 200 mg TID to 300 mg TID but made too tired so had to go back to BID     Will continue this     Continue Flexeril PRN     Gave her small amount of Norco - 1-2 at night only, reports has helped a lot         Taking 2 almost every night         Will give another 2 week rx         Advised on risks including sedation and addiction  - OK for refill in 2 weeks if needed   - Recheck 1 month     3-8. Chronic issues   - Patient recovering from her GI bleeds due to peptic ulcer, has appetite back and feeling better, has a good appetite   - Has follow up with GI today  - Hemoglobin was 9.2, elevated lipase and LFTs      Discussed need to follow these until normalized   - Patient will schedule lab for later  this week   - Continue medications as prescribed       The patient indicates understanding of these issues and agrees with the plan.    Return in about 1 month (around 12/17/2020).    Enid Mansfield PA-C  [facility]    Phone call duration:  12 min and 30 seconds

## 2020-11-17 ENCOUNTER — MYC MEDICAL ADVICE (OUTPATIENT)
Dept: FAMILY MEDICINE | Facility: OTHER | Age: 40
End: 2020-11-17

## 2020-11-17 ENCOUNTER — VIRTUAL VISIT (OUTPATIENT)
Dept: FAMILY MEDICINE | Facility: OTHER | Age: 40
End: 2020-11-17
Payer: COMMERCIAL

## 2020-11-17 DIAGNOSIS — R53.81 PHYSICAL DECONDITIONING: ICD-10-CM

## 2020-11-17 DIAGNOSIS — K92.2 GASTROINTESTINAL HEMORRHAGE, UNSPECIFIED GASTROINTESTINAL HEMORRHAGE TYPE: ICD-10-CM

## 2020-11-17 DIAGNOSIS — R79.89 ELEVATED LFTS: ICD-10-CM

## 2020-11-17 DIAGNOSIS — K85.00 IDIOPATHIC ACUTE PANCREATITIS, UNSPECIFIED COMPLICATION STATUS: ICD-10-CM

## 2020-11-17 DIAGNOSIS — K85.00 IDIOPATHIC ACUTE PANCREATITIS WITHOUT INFECTION OR NECROSIS: ICD-10-CM

## 2020-11-17 DIAGNOSIS — D62 ANEMIA DUE TO BLOOD LOSS, ACUTE: ICD-10-CM

## 2020-11-17 DIAGNOSIS — M54.42 CHRONIC BILATERAL LOW BACK PAIN WITH LEFT-SIDED SCIATICA: Primary | ICD-10-CM

## 2020-11-17 DIAGNOSIS — G47.10 HYPERSOMNIA: ICD-10-CM

## 2020-11-17 DIAGNOSIS — K27.9 PUD (PEPTIC ULCER DISEASE): ICD-10-CM

## 2020-11-17 DIAGNOSIS — G89.29 CHRONIC BILATERAL LOW BACK PAIN WITH LEFT-SIDED SCIATICA: Primary | ICD-10-CM

## 2020-11-17 PROCEDURE — 99214 OFFICE O/P EST MOD 30 MIN: CPT | Mod: 95 | Performed by: PHYSICIAN ASSISTANT

## 2020-11-17 RX ORDER — METHYLPREDNISOLONE 4 MG
TABLET, DOSE PACK ORAL
Qty: 21 TABLET | Refills: 0 | Status: SHIPPED | OUTPATIENT
Start: 2020-11-17 | End: 2021-02-01

## 2020-11-17 RX ORDER — HYDROCODONE BITARTRATE AND ACETAMINOPHEN 5; 325 MG/1; MG/1
1-2 TABLET ORAL
Qty: 28 TABLET | Refills: 0 | Status: SHIPPED | OUTPATIENT
Start: 2020-11-17 | End: 2021-02-01

## 2020-11-17 RX ORDER — METHYLPHENIDATE HYDROCHLORIDE 10 MG/1
10 TABLET ORAL 2 TIMES DAILY
Qty: 60 TABLET | Refills: 0 | Status: SHIPPED | OUTPATIENT
Start: 2020-11-17 | End: 2021-02-01

## 2020-11-17 NOTE — PATIENT INSTRUCTIONS
Call for physical therapy   Call one number to schedule at any of the above locations: [phone].    Valery - I can only give 2 weeks at a time of pain medication. So please call when you need a refill     - Recheck with me in 1 month

## 2020-11-17 NOTE — TELEPHONE ENCOUNTER
Pending Prescriptions:                       Disp   Refills    methylphenidate (RITALIN) 10 MG tablet     60 tab*0        Sig: Take 1 tablet (10 mg) by mouth 2 times daily        Routing refill request to provider for review/approval because:  Drug not on the [facility] refill protocol   Last Seen: 11/17/20  Last Refilled: 10/20/20 60Tablets/Capsules  Refills: 0  Next Visit: LAKSHMI Mayo RN  November 17, 2020

## 2020-11-18 ENCOUNTER — MYC MEDICAL ADVICE (OUTPATIENT)
Dept: FAMILY MEDICINE | Facility: OTHER | Age: 40
End: 2020-11-18

## 2020-11-18 DIAGNOSIS — R79.89 ELEVATED LFTS: ICD-10-CM

## 2020-11-18 DIAGNOSIS — K27.9 PUD (PEPTIC ULCER DISEASE): ICD-10-CM

## 2020-11-18 DIAGNOSIS — D62 ANEMIA DUE TO BLOOD LOSS, ACUTE: ICD-10-CM

## 2020-11-18 DIAGNOSIS — K85.00 IDIOPATHIC ACUTE PANCREATITIS WITHOUT INFECTION OR NECROSIS: ICD-10-CM

## 2020-11-18 LAB
ALBUMIN SERPL-MCNC: 3.9 G/DL (ref 3.4–5)
ALP SERPL-CCNC: 94 U/L (ref 40–150)
ALT SERPL W P-5'-P-CCNC: 44 U/L (ref 0–50)
AST SERPL W P-5'-P-CCNC: 24 U/L (ref 0–45)
BILIRUB DIRECT SERPL-MCNC: 0.1 MG/DL (ref 0–0.2)
BILIRUB SERPL-MCNC: 0.3 MG/DL (ref 0.2–1.3)
ERYTHROCYTE [DISTWIDTH] IN BLOOD BY AUTOMATED COUNT: 19 % (ref 10–15)
HCT VFR BLD AUTO: 29.3 % (ref 35–47)
HGB BLD-MCNC: 8.9 G/DL (ref 11.7–15.7)
LIPASE SERPL-CCNC: 370 U/L (ref 73–393)
MCH RBC QN AUTO: 28.4 PG (ref 26.5–33)
MCHC RBC AUTO-ENTMCNC: 30.4 G/DL (ref 31.5–36.5)
MCV RBC AUTO: 94 FL (ref 78–100)
PLATELET # BLD AUTO: 289 10E9/L (ref 150–450)
PROT SERPL-MCNC: 7.8 G/DL (ref 6.8–8.8)
RBC # BLD AUTO: 3.13 10E12/L (ref 3.8–5.2)
WBC # BLD AUTO: 9.8 10E9/L (ref 4–11)

## 2020-11-18 PROCEDURE — 80076 HEPATIC FUNCTION PANEL: CPT | Performed by: PHYSICIAN ASSISTANT

## 2020-11-18 PROCEDURE — 36415 COLL VENOUS BLD VENIPUNCTURE: CPT | Performed by: PHYSICIAN ASSISTANT

## 2020-11-18 PROCEDURE — 85027 COMPLETE CBC AUTOMATED: CPT | Performed by: PHYSICIAN ASSISTANT

## 2020-11-18 PROCEDURE — 83690 ASSAY OF LIPASE: CPT | Performed by: PHYSICIAN ASSISTANT

## 2020-11-18 NOTE — TELEPHONE ENCOUNTER
Pasted from previous encounter:  If you can switch to [facility] in [place] that would be great. Let me now.     Thank You,  Tasha Short

## 2020-11-19 NOTE — RESULT ENCOUNTER NOTE
Josefina Tasha    Your results show that your liver functions and lipase are now normal. But your hemoglobin continues to fall, likely indicating that you are still bleeding from your ulcers. I am not able to see what happened at your GI visit. I recommend that we recheck your labs again in 1 week. If your hemoglobin dips to 7, we will need to get you a blood transfusion.     The results are attached for your review.       Donnie Mansfield PA-C

## 2020-11-24 ENCOUNTER — THERAPY VISIT (OUTPATIENT)
Dept: PHYSICAL THERAPY | Facility: CLINIC | Age: 40
End: 2020-11-24
Attending: PHYSICIAN ASSISTANT
Payer: COMMERCIAL

## 2020-11-24 DIAGNOSIS — M54.42 CHRONIC BILATERAL LOW BACK PAIN WITH LEFT-SIDED SCIATICA: ICD-10-CM

## 2020-11-24 DIAGNOSIS — M54.50 CHRONIC BILATERAL LOW BACK PAIN WITHOUT SCIATICA: ICD-10-CM

## 2020-11-24 DIAGNOSIS — R53.81 PHYSICAL DECONDITIONING: ICD-10-CM

## 2020-11-24 DIAGNOSIS — G89.29 CHRONIC BILATERAL LOW BACK PAIN WITHOUT SCIATICA: ICD-10-CM

## 2020-11-24 DIAGNOSIS — G89.29 CHRONIC BILATERAL LOW BACK PAIN WITH LEFT-SIDED SCIATICA: ICD-10-CM

## 2020-11-24 PROCEDURE — 97140 MANUAL THERAPY 1/> REGIONS: CPT | Mod: GP | Performed by: PHYSICAL THERAPIST

## 2020-11-24 PROCEDURE — 97110 THERAPEUTIC EXERCISES: CPT | Mod: GP | Performed by: PHYSICAL THERAPIST

## 2020-11-24 PROCEDURE — 97161 PT EVAL LOW COMPLEX 20 MIN: CPT | Mod: GP | Performed by: PHYSICAL THERAPIST

## 2020-11-24 NOTE — PROGRESS NOTES
[facility] Initial Evaluation  Subjective:  The history is provided by the patient. No  was used.   Patient Health History  Tasha Short being seen for back pain.     Problem began: 8/24/2020.   Problem occurred: unknown   Pain is reported as 8/10 on pain scale.  General health as reported by patient is good.  Pertinent medical history includes: depression, high blood pressure, history of fractures, sleep disorder/apnea and smoking.   Red flags:  Pain at rest/night.  Medical allergies: other (penicillin,dilantin, wellbutrin, sinopril).   Surgeries include:  Orthopedic surgery (R foot surgery).    Current medications:  High blood pressure medication, pain medication and other. Other medications details: medication for reflux .    Current occupation is lawn/landscaping .   Primary job tasks include:  Lifting/carrying, operating a machine/assembly, prolonged sitting, pushing/pulling and repetitive tasks.                  Therapist Generated HPI Evaluation  Problem details: Kind of happened slowly-a lot of deconditioning, multiple diseases lymes disease, throbbing constant pain. Only able to lift at most 35 pounds before pain starts, pain currently 6/10. Can only be bent over/hands and knees for 20 min   before pain gets to 7-8/10.         Type of problem:  Lumbar and thoracic.    This is a chronic condition.  Condition occurred with:  Repetition/overuse, lifting and twisting.  Where condition occurred: for unknown reasons.  Patient reports pain:  Mid thoracic spine and upper lumbar spine.  Pain is described as aching (throbbing ) and is constant (hurts to get out of bed ).  Radiates to: some radiation into shoulders  Pain is worse in the P.M. and worse during the night.  Since onset symptoms are gradually worsening.  Associated symptoms:  Loss of strength, loss of motion and fatigue. Symptoms are exacerbated by stress, sitting, bending, twisting, standing and lifting  and  relieved by heat, ice, analgesics, NSAID's and rest.    Past treatment: have tried muscle relaxants that did not help  There was none improvement following previous treatment.  Restrictions due to condition include:  Working in normal job with restrictions.  Home/work barriers: chores       Oswestry Score: 26.67 %                 Objective:    Tasha PORTILLO Michelleuriel , : 1980, MRN: [id number]    Physical Therapy Objective Findings  Subjective information, goals, clinical impression, daily documentation and other information found in EPISODES tab.  Objective:     Lumbar Pain    Posture: slight sway back and increased lordosis, can do a squat to 80 degrees before pain  Lumbar Range of Motion:  Flexion                                                       100%-  Some stiffness in mid back                                                                                                              Extension 55% with pain- some stretch    Right Side Bending 55%    Left Side Bending 60%   Repeated extension- standing    Repeated flexion- standing    Cervical ROM- stiffness with SB bilateral   Pelvic screen:                                                                         Positive                                            Negative                                             Standing Forward Bend +    Gillet(March)  -   Supine to sit     Sacroiliac provocation test +    Pubic symphysis provocation            -resisted hip add at 45     Other:       Hip Screen:                                                                  Positive                                             Negative                                             Hip ROM WNL pain with L hyperflexion     Jose PUTNAM     Other:     Manual Muscle Testing (graded 0-5, measured at 0 degrees unless otherwise noted):                                                                              Right                                  Left                                                      Transversus Abdominus     -Ramana Leg Lowering (deg)     Hip Flex L2 4 4-   Hip Abd     Hip Add     Hip Ext     Knee Flex 4 4   Knee Ext L3 4 4   Ankle Dorsiflexion L4     Great Toe Extension L5     Ankle Plantar Flexion S1     Other: shoulder ER 4/5 and painful bilateral      (+ mild pain, ++ moderate pain, +++ severe pain)    Special Tests:                                                                     Positive                                             Negative                                             Sign of Buttock     SLR L 45 deg, R 65 deg    Tae Test     Ely Test     Prone instability Test     Crossover SLR +    Repeated extension prone     Other:         Segmental Mobility: hypomobile and pain with T4-T6 T11-L5    Palpation: tenderness with R QL     -If symptoms past hip, must do neuro testing  System    Physical Exam    General     ROS    Assessment/Plan:    Patient is a 40 year old female with thoracic and lumbar complaints.    Patient has the following significant findings with corresponding treatment plan.                Diagnosis 1:  Mid to low back pain consistent with instability and disc irritation.  Pain -  manual therapy, self management, education, directional preference exercise and home program  Decreased ROM/flexibility - manual therapy, therapeutic exercise and home program  Decreased joint mobility - manual therapy, therapeutic exercise and home program  Decreased strength - therapeutic exercise, therapeutic activities and home program  Impaired muscle performance - neuro re-education and home program  Decreased function - therapeutic activities and home program  Instability -  Therapeutic Activity  Therapeutic Exercise  Neuromuscular Re-education  home program    Therapy Evaluation Codes:   1) History comprised of:   Personal factors that impact the plan of care:      None.    Comorbidity factors that impact the plan of care are:       Depression, High blood pressure, Pain at night/rest, Sleep disorder/apnea and Smoking.     Medications impacting care: High blood pressure, Pain and medication for reflux .  2) Examination of Body Systems comprised of:   Body structures and functions that impact the plan of care:      Lumbar spine and Thoracic Spine.   Activity limitations that impact the plan of care are:      Bending, Lifting, Reading/Computer work, Sitting, Squatting/kneeling, Standing, Working and Laying down.  3) Clinical presentation characteristics are:   Stable/Uncomplicated.  4) Decision-Making    Low complexity using standardized patient assessment instrument and/or measureable assessment of functional outcome.  Cumulative Therapy Evaluation is: Low complexity.    Previous and current functional limitations:  (See Goal Flow Sheet for this information)    Short term and Long term goals: (See Goal Flow Sheet for this information)     Communication ability:  Patient appears to be able to clearly communicate and understand verbal and written communication and follow directions correctly.  Treatment Explanation - The following has been discussed with the patient:   RX ordered/plan of care  Anticipated outcomes  Possible risks and side effects  This patient would benefit from PT intervention to resume normal activities.   Rehab potential is good.    Frequency:  1 X week, once daily  Duration:  for 6 weeks  Discharge Plan:  Achieve all LTG.  Independent in home treatment program.  Reach maximal therapeutic benefit.    Please refer to the daily flowsheet for treatment today, total treatment time and time spent performing 1:1 timed codes.     Fernando Murray SPT, Luis Felipe Lorenzana DPT OCS

## 2020-12-10 ENCOUNTER — TRANSFERRED RECORDS (OUTPATIENT)
Dept: HEALTH INFORMATION MANAGEMENT | Facility: CLINIC | Age: 40
End: 2020-12-10

## 2020-12-10 ENCOUNTER — OFFICE VISIT (OUTPATIENT)
Dept: NEUROSURGERY | Facility: CLINIC | Age: 40
End: 2020-12-10
Payer: COMMERCIAL

## 2020-12-10 VITALS
SYSTOLIC BLOOD PRESSURE: 124 MMHG | OXYGEN SATURATION: 98 % | TEMPERATURE: 97.9 F | HEART RATE: 111 BPM | WEIGHT: 105 LBS | BODY MASS INDEX: 17.93 KG/M2 | HEIGHT: 64 IN | DIASTOLIC BLOOD PRESSURE: 74 MMHG

## 2020-12-10 DIAGNOSIS — M54.16 LUMBAR RADICULOPATHY: Primary | ICD-10-CM

## 2020-12-10 PROCEDURE — 99203 OFFICE O/P NEW LOW 30 MIN: CPT | Performed by: NURSE PRACTITIONER

## 2020-12-10 ASSESSMENT — MIFFLIN-ST. JEOR: SCORE: 1131.28

## 2020-12-10 ASSESSMENT — PAIN SCALES - GENERAL: PAINLEVEL: SEVERE PAIN (6)

## 2020-12-10 NOTE — PATIENT INSTRUCTIONS
Referral for injection. They will call to schedule.     Please call our clinic if symptoms persist, change, or worsen at any time.    Shalonda Vinson CNP  [facility] Neurosurgery  [address] [address]  Tel [phone]  Pager [phone]

## 2020-12-10 NOTE — PROGRESS NOTES
Dr. Balbir Kamara  [facility] Neurosurgery Clinic Visit      CC: low back pain    Primary care Provider: Enid Mansfield    Reason For Visit:   I was asked by Enid Mansfield PA-C to consult on the patient for chronic bilateral low back pain.      HPI: Tasha Short is a 40 year old female who presents for evaluation of low back pain. Reports symptoms started this summer. Does physical job of lawn care. Pain is located in the low back and radiates to bilateral thighs. Describes as constant and throbbing. She feels weak at times. Pain is alleviated when lying flat. Also using ice, heat, NSAIDS, Tylenol, and Gabapentin. Recently saw PT. Denies any falls, foot drop, coordination/gait changes, or bladder/bowel incontinence.       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    Past Surgical History:   Procedure Laterality Date      SECTION       COLONOSCOPY N/A 10/4/2020    Procedure: COLONOSCOPY, WITH POLYPECTOMY AND BIOPSY;  Surgeon: Mando Siegel MD;  Location:  GI     COLPOSCOPY CERVIX, LOOP ELECTRODE BIOPSY, COMBINED  2010    ROSA 2/3     ESOPHAGOSCOPY, GASTROSCOPY, DUODENOSCOPY (EGD), COMBINED N/A 2020    Procedure: Esophagogastroduodenoscopy with biopsies;   Surgeon: Ronan Pelayo MD;  Location:  GI     ESOPHAGOSCOPY, GASTROSCOPY, DUODENOSCOPY (EGD), COMBINED N/A 10/3/2020    Procedure: ESOPHAGOGASTRODUODENOSCOPY (EGD);  Surgeon: Mando Siegel MD;  Location:  GI     HC REMOVAL OF TONSILS,12+ Y/O  1999    Tonsils 12+y.o.     REPAIR TENDON PERONEAL  11/15/2013    Procedure: REPAIR TENDON PERONEAL;  right peroneal tendon  transfer;  Surgeon: Jesus Manuel Oden DPM;  Location: [facility] OR     Past Surgical History reviewed with patient during visit.    Current Outpatient Medications   Medication     cyclobenzaprine (FLEXERIL) 10 MG tablet     dicyclomine (BENTYL) 20 MG tablet     flunisolide (NASALIDE) 25 MCG/ACT (0.025%) SOLN spray     folic acid (FOLVITE) 1 MG tablet     gabapentin (NEURONTIN) 300 MG capsule     HYDROcodone-acetaminophen (NORCO) 5-325 MG tablet     Iron Combinations (NIFEREX) TABS     methylphenidate (RITALIN) 10 MG tablet     methylPREDNISolone (MEDROL DOSEPAK) 4 MG tablet therapy pack     nicotine (NICODERM CQ) 21 MG/24HR 24 hr patch     pantoprazole (PROTONIX) 40 MG EC tablet     sucralfate (CARAFATE) 1 GM tablet     No current facility-administered medications for this visit.        Allergies   Allergen Reactions     Bupropion Other (See Comments)     seizures     Lisinopril Swelling     Angioedema      Penicillins      hives     Phenytoin      rash,puritis (Dilantin)     Prednisone Itching     Face itching, unable to concentrate      Seasonal Allergies        Social History     Socioeconomic History     Marital status: Single     Spouse name: None     Number of children: 0     Years of education: 14     Highest education level: None   Occupational History     Occupation: RN     Comment: [employer]     Employer: [employer]     Employer: [employer]     Employer: [employer]   Social Needs     Financial resource strain: None     Food insecurity     Worry: None     Inability: None     Transportation needs      Medical: None     Non-medical: None   Tobacco Use     Smoking status: Current Every Day Smoker     Packs/day: 0.25     Smokeless tobacco: Never Used   Substance and Sexual Activity     Alcohol use: No     Drug use: No     Sexual activity: Yes     Partners: Male     Birth control/protection: Condom, Pill   Lifestyle     Physical activity     Days per week: None     Minutes per session: None     Stress: None   Relationships     Social connections     Talks on phone: None     Gets together: None     Attends Zoroastrian service: None     Active member of club or organization: None     Attends meetings of clubs or organizations: None     Relationship status: None     Intimate partner violence     Fear of current or ex partner: None     Emotionally abused: None     Physically abused: None     Forced sexual activity: None   Other Topics Concern      Service Not Asked     Blood Transfusions Not Asked     Caffeine Concern Not Asked     Occupational Exposure Not Asked     Hobby Hazards Not Asked     Sleep Concern Not Asked     Stress Concern Not Asked     Weight Concern Not Asked     Special Diet Not Asked     Back Care Not Asked     Exercise Not Asked     Bike Helmet Not Asked     Seat Belt Not Asked     Self-Exams Not Asked     Parent/sibling w/ CABG, MI or angioplasty before 65F 55M? No   Social History Narrative    Lives with significan other. Denies domestic  violence issues.       Family History   Problem Relation Age of Onset     Depression Mother      Arthritis Paternal Grandmother      Hypertension Paternal Grandfather         birth FF     Asthma No family hx of      C.A.D. No family hx of      Diabetes No family hx of      Cancer - colorectal No family hx of      Prostate Cancer No family hx of      Coronary Artery Disease No family hx of      Ovarian Cancer No family hx of      Mental Illness No family hx of      Cerebrovascular Disease No family hx of      Anesthesia Reaction No family hx of      Other  "Cancer No family hx of      Osteoporosis No family hx of      Known Genetic Syndrome No family hx of      Obesity No family hx of      Unknown/Adopted No family hx of          ROS: 10 point ROS neg other than the symptoms noted above in the HPI.    Vital Signs: /74   Pulse 111   Temp 97.9  F (36.6  C)   Ht 5' 4\" (1.626 m)   Wt 105 lb (47.6 kg)   SpO2 98%   BMI 18.02 kg/m      Examination:  Constitutional:  Alert, well nourished, NAD.  HEENT: Normocephalic, atraumatic.   Pulmonary:  Without shortness of breath, normal effort.   Lymph: No lymphadenopathy to low back or LE.   Integumentary: Skin is free of rashes or lesions.   Cardiovascular:  No pitting edema of BLE.      Neurological:  Awake  Alert  Oriented x 3  Speech clear  Cranial nerves II - XII grossly intact  PERRL  EOMI  Face symmetric  Tongue midline  Motor exam   Hip Flexor:                 Right: 5/5  Left:  5/5  Quadriceps:               Right:  5/5  Left:  5/5  Hamstrings:               Right:  5/5  Left:  5/5  Gastroc Soleus:         Right:  5/5  Left:  5/5  Tib/Ant:                      Right:  5/5  Left:  5/5  EHL:                          Right:  5/5  Left:  5/5         Sensation normal to bilateral lower extremities.    Reflexes are 2+ in the patellar and Achilles. There is no clonus.     Musculoskeletal:  Gait: Able to stand from a seated position. Normal non-antalgic, non-myelopathic gait. Able to heel/toe walk without loss of balance.    Straight leg raise is negative bilaterally.      Imaging:   MRI of the lumbar spine from was reviewed in the office today. Reveals a small central disc extrusion at L5-S1. No significant central or foraminal stenosis.     Assessment/Plan:   40 year old female who presents for evaluation of low back pain. Reports symptoms started this summer. Does physical job of lawn care. Pain is located in the low back and radiates to bilateral thighs. Describes as constant and throbbing. MRI reviewed in clinic. We " discussed conservative options. She is interested in trying an injection. Would recommend to continue with PT as well. If symptoms persist, could try SIJ injection as possible next step.     Advised patient to call our clinic if symptoms persist, change, or worsen. Patient voiced understanding and agreement with this plan.       Shalonda Vinson CNP  [facility] Neurosurgery  [address] [address]  Tel [phone]  Pager [phone]

## 2020-12-10 NOTE — LETTER
"    12/10/2020         RE: Tasha Short  [address] [place] [address]        Dear Colleague,    Thank you for referring your patient, Tasha Short, to the [facility] [place]. Please see a copy of my visit note below.    Tasha Short is a 40 year old female who presents for:  Chief Complaint   Patient presents with     Neurologic Problem     Chronic bilateral low back pain         Initial Vitals:  /74   Pulse 111   Temp 97.9  F (36.6  C)   Ht 5' 4\" (1.626 m)   Wt 105 lb (47.6 kg)   SpO2 98%   BMI 18.02 kg/m   Estimated body mass index is 18.02 kg/m  as calculated from the following:    Height as of this encounter: 5' 4\" (1.626 m).    Weight as of this encounter: 105 lb (47.6 kg).. Body surface area is 1.47 meters squared. BP completed using cuff size: regular  Severe Pain (6)    Nursing Comments:     Alannah Kamara  [facility] Visit      CC: low back pain    Primary care Provider: Enid Mansfield    Reason For Visit:   I was asked by Enid Mansfield PA-C to consult on the patient for chronic bilateral low back pain.      HPI: Tasha Short is a 40 year old female who presents for evaluation of low back pain. Reports symptoms started this summer. Does physical job of lawn care. Pain is located in the low back and radiates to bilateral thighs. Describes as constant and throbbing. She feels weak at times. Pain is alleviated when lying flat. Also using ice, heat, NSAIDS, Tylenol, and Gabapentin. Recently saw PT. Denies any falls, foot drop, coordination/gait changes, or bladder/bowel incontinence.       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    Past Surgical History:   Procedure Laterality Date      SECTION       COLONOSCOPY N/A 10/4/2020    Procedure: COLONOSCOPY, WITH POLYPECTOMY AND BIOPSY;  Surgeon: Mando Siegel MD;  Location:  GI     COLPOSCOPY CERVIX, LOOP ELECTRODE BIOPSY, COMBINED  2010    ROSA 2/3     ESOPHAGOSCOPY, GASTROSCOPY, DUODENOSCOPY (EGD), COMBINED N/A 2020    Procedure: Esophagogastroduodenoscopy with biopsies;  Surgeon: Ronan Pelayo MD;  Location:  GI     ESOPHAGOSCOPY, GASTROSCOPY, DUODENOSCOPY (EGD), COMBINED N/A 10/3/2020    Procedure: ESOPHAGOGASTRODUODENOSCOPY (EGD);  Surgeon: Mando Siegel MD;  Location:  GI     HC REMOVAL OF TONSILS,12+ Y/O  1999    Tonsils 12+y.o.     REPAIR TENDON PERONEAL  11/15/2013    Procedure: REPAIR TENDON PERONEAL;  right peroneal tendon  transfer;  Surgeon: Jesus Manuel Oden DPM;  Location:  OR     Past Surgical History reviewed with patient during visit.    Current Outpatient Medications   Medication     cyclobenzaprine (FLEXERIL) 10 MG tablet     dicyclomine (BENTYL) 20 MG tablet     flunisolide (NASALIDE) 25 MCG/ACT (0.025%) SOLN spray     folic acid (FOLVITE) 1 MG tablet     gabapentin (NEURONTIN) 300 MG capsule     HYDROcodone-acetaminophen (NORCO) 5-325 MG tablet     Iron Combinations (NIFEREX) TABS     methylphenidate (RITALIN) 10 MG tablet     methylPREDNISolone (MEDROL DOSEPAK) 4 MG tablet therapy pack     nicotine (NICODERM CQ) 21 MG/24HR 24 hr patch     pantoprazole  (PROTONIX) 40 MG EC tablet     sucralfate (CARAFATE) 1 GM tablet     No current facility-administered medications for this visit.        Allergies   Allergen Reactions     Bupropion Other (See Comments)     seizures     Lisinopril Swelling     Angioedema      Penicillins      hives     Phenytoin      rash,puritis (Dilantin)     Prednisone Itching     Face itching, unable to concentrate      Seasonal Allergies        Social History     Socioeconomic History     Marital status: Single     Spouse name: None     Number of children: 0     Years of education: 14     Highest education level: None   Occupational History     Occupation: RN     Comment: [employer]     Employer: [employer]     Employer: [employer]     Employer: [employer]   Social Needs     Financial resource strain: None     Food insecurity     Worry: None     Inability: None     Transportation needs     Medical: None     Non-medical: None   Tobacco Use     Smoking status: Current Every Day Smoker     Packs/day: 0.25     Smokeless tobacco: Never Used   Substance and Sexual Activity     Alcohol use: No     Drug use: No     Sexual activity: Yes     Partners: Male     Birth control/protection: Condom, Pill   Lifestyle     Physical activity     Days per week: None     Minutes per session: None     Stress: None   Relationships     Social connections     Talks on phone: None     Gets together: None     Attends Protestant service: None     Active member of club or organization: None     Attends meetings of clubs or organizations: None     Relationship status: None     Intimate partner violence     Fear of current or ex partner: None     Emotionally abused: None     Physically abused: None     Forced sexual activity: None   Other Topics Concern      Service Not Asked     Blood Transfusions Not Asked     Caffeine Concern Not Asked     Occupational Exposure Not Asked     Hobby Hazards Not Asked     Sleep Concern Not Asked     Stress  "Concern Not Asked     Weight Concern Not Asked     Special Diet Not Asked     Back Care Not Asked     Exercise Not Asked     Bike Helmet Not Asked     Seat Belt Not Asked     Self-Exams Not Asked     Parent/sibling w/ CABG, MI or angioplasty before 65F 55M? No   Social History Narrative    Lives with significan other. Denies domestic  violence issues.       Family History   Problem Relation Age of Onset     Depression Mother      Arthritis Paternal Grandmother      Hypertension Paternal Grandfather         birth FF     Asthma No family hx of      C.A.D. No family hx of      Diabetes No family hx of      Cancer - colorectal No family hx of      Prostate Cancer No family hx of      Coronary Artery Disease No family hx of      Ovarian Cancer No family hx of      Mental Illness No family hx of      Cerebrovascular Disease No family hx of      Anesthesia Reaction No family hx of      Other Cancer No family hx of      Osteoporosis No family hx of      Known Genetic Syndrome No family hx of      Obesity No family hx of      Unknown/Adopted No family hx of          ROS: 10 point ROS neg other than the symptoms noted above in the HPI.    Vital Signs: /74   Pulse 111   Temp 97.9  F (36.6  C)   Ht 5' 4\" (1.626 m)   Wt 105 lb (47.6 kg)   SpO2 98%   BMI 18.02 kg/m      Examination:  Constitutional:  Alert, well nourished, NAD.  HEENT: Normocephalic, atraumatic.   Pulmonary:  Without shortness of breath, normal effort.   Lymph: No lymphadenopathy to low back or LE.   Integumentary: Skin is free of rashes or lesions.   Cardiovascular:  No pitting edema of BLE.      Neurological:  Awake  Alert  Oriented x 3  Speech clear  Cranial nerves II - XII grossly intact  PERRL  EOMI  Face symmetric  Tongue midline  Motor exam   Hip Flexor:                 Right: 5/5  Left:  5/5  Quadriceps:               Right:  5/5  Left:  5/5  Hamstrings:               Right:  5/5  Left:  5/5  Gastroc Soleus:         Right:  5/5  Left:  " 5/5  Tib/Ant:                      Right:  5/5  Left:  5/5  EHL:                          Right:  5/5  Left:  5/5         Sensation normal to bilateral lower extremities.    Reflexes are 2+ in the patellar and Achilles. There is no clonus.     Musculoskeletal:  Gait: Able to stand from a seated position. Normal non-antalgic, non-myelopathic gait. Able to heel/toe walk without loss of balance.    Straight leg raise is negative bilaterally.      Imaging:   MRI of the lumbar spine from was reviewed in the office today. Reveals a small central disc extrusion at L5-S1. No significant central or foraminal stenosis.     Assessment/Plan:   40 year old female who presents for evaluation of low back pain. Reports symptoms started this summer. Does physical job of lawn care. Pain is located in the low back and radiates to bilateral thighs. Describes as constant and throbbing. MRI reviewed in clinic. We discussed conservative options. She is interested in trying an injection. Would recommend to continue with PT as well. If symptoms persist, could try SIJ injection as possible next step.     Advised patient to call our clinic if symptoms persist, change, or worsen. Patient voiced understanding and agreement with this plan.       Shalonda Vinson, LEOANRD  [facility] [place] [address]  Tel [phone]  Pager [phone]        Again, thank you for allowing me to participate in the care of your patient.        Sincerely,        Shalonda Vinson, TRISTAN

## 2020-12-10 NOTE — PROGRESS NOTES
"Tasha Short is a 40 year old female who presents for:  Chief Complaint   Patient presents with     Neurologic Problem     Chronic bilateral low back pain         Initial Vitals:  /74   Pulse 111   Temp 97.9  F (36.6  C)   Ht 5' 4\" (1.626 m)   Wt 105 lb (47.6 kg)   SpO2 98%   BMI 18.02 kg/m   Estimated body mass index is 18.02 kg/m  as calculated from the following:    Height as of this encounter: 5' 4\" (1.626 m).    Weight as of this encounter: 105 lb (47.6 kg).. Body surface area is 1.47 meters squared. BP completed using cuff size: regular  Severe Pain (6)    Nursing Comments:     Alannah Alexander    "

## 2020-12-18 ENCOUNTER — TELEPHONE (OUTPATIENT)
Dept: FAMILY MEDICINE | Facility: OTHER | Age: 40
End: 2020-12-18

## 2020-12-18 NOTE — TELEPHONE ENCOUNTER
Summary:    Patient is due/failing the following:   LDL, PAP and PHYSICAL    Action needed:   Patient needs office visit for Physical and PAP., Patient needs fasting lab only appointment     Type of outreach:    Phone, left message for patient to call back.     Questions for provider review:    None                                                                                                                                    Halle Clifford CMA     Chart routed to Care Team .          Panel Management Review      Patient has the following on her problem list:     Depression / Dysthymia review    Measure:  Needs PHQ-9 score of 4 or less during index window.  Administer PHQ-9 and if score is 5 or more, send encounter to provider for next steps.        PHQ-9 SCORE 4/23/2020 9/14/2020 11/2/2020   PHQ-9 Total Score - - -   PHQ-9 Total Score MyChart - 17 (Moderately severe depression) 8 (Mild depression)   PHQ-9 Total Score 17 17 8       If PHQ-9 recheck is 5 or more, route to provider for next steps.    Patient is due for:  PHQ9    Hypertension   Last three blood pressure readings:  BP Readings from Last 3 Encounters:   12/10/20 124/74   11/02/20 (!) 140/100   10/20/20 120/68     Blood pressure: Passed    HTN Guidelines:  Less than 140/90      Composite cancer screening  Chart review shows that this patient is due/due soon for the following Pap Smear

## 2021-01-05 ENCOUNTER — TELEPHONE (OUTPATIENT)
Dept: NEUROLOGY | Facility: CLINIC | Age: 41
End: 2021-01-05

## 2021-01-05 NOTE — TELEPHONE ENCOUNTER
Reason for Call: Request for an order or referral: Order    Order or referral being requested: back injection for pain    Date needed: as soon as possible    Has the patient been seen by the PCP for this problem? Not Applicable    Additional comments: Pt is requesting to schedule an injection in her back, order placed for pain management consult. Please call pt to advise.    Phone number Patient can be reached at:  Cell number on file:    Telephone Information:   Mobile [phone]       Best Time:  Anytime    Can we leave a detailed message on this number?  YES    Call taken on 1/5/2021 at 5:00 PM by Patricia English

## 2021-01-06 NOTE — TELEPHONE ENCOUNTER
Attempted to reach out to patient, no answer. Left voice message for patient to call clinic back to further discuss.     Order for Lumbar ELSY was placed 12/10/20 by Shalonda Vinson NP.   Please give the patient the Central Scheduling number to call   [phone].

## 2021-01-09 ENCOUNTER — HEALTH MAINTENANCE LETTER (OUTPATIENT)
Age: 41
End: 2021-01-09

## 2021-01-12 PROBLEM — M54.50 CHRONIC BILATERAL LOW BACK PAIN WITHOUT SCIATICA: Status: RESOLVED | Noted: 2020-11-24 | Resolved: 2021-01-12

## 2021-01-12 PROBLEM — R53.81 PHYSICAL DECONDITIONING: Status: RESOLVED | Noted: 2020-11-24 | Resolved: 2021-01-12

## 2021-01-12 PROBLEM — G89.29 CHRONIC BILATERAL LOW BACK PAIN WITHOUT SCIATICA: Status: RESOLVED | Noted: 2020-11-24 | Resolved: 2021-01-12

## 2021-01-12 NOTE — PROGRESS NOTES
Patient seen for one time evaluation and treatment.  Patient did not return for further treatment and current status is unknown.  Please see initial evaluation for further information.    Luis Felipe Lorenzana,PT, DPT, OCS

## 2021-01-14 ENCOUNTER — TELEPHONE (OUTPATIENT)
Dept: PALLIATIVE MEDICINE | Facility: CLINIC | Age: 41
End: 2021-01-14

## 2021-01-14 NOTE — TELEPHONE ENCOUNTER
Screening Questions for Radiology Injections:    Injection to be done at which interventional clinic site? [facility]    If [facility] location, tell patient that this procedure requires a COVID-19 lab test be done within 4 days of the procedure. Would you still like to move forward with scheduling the procedure?  Not Applicable   If YES, let patient know that someone will call them to schedule the COVID-19 test and that they will only receive a call back if the result is positive. Route to nursing to enter order.     Instruct patient to arrive as directed prior to the scheduled appointment time:    [place] minutes before      [place]: 30 minutes before; if IV needed 1 hour before     Procedure ordered by [facility]    Procedure ordered? LESI      Transforaminal Cervical ELSY - no pain provider currently performing    As a reminder, receiving steroids can decrease your body's ability to fight infection.   Would you still like to move forward with scheduling the injection?  Yes    What insurance would patient like us to bill for this procedure? Blue Plus      Worker's comp or MVA (motor vehicle accident) -Any injection DO NOT SCHEDULE and route to Marce Pizano.      [employer] insurance - For SI joint injections, DO NOT SCHEDULE and route Marce Pizano.       ALL BCBS, [employer] and [employer] CIGNA-Route to Marce for review DO NOT SCHEDULE      IF SCHEDULING IN [place] AND NEEDS A PA, IT IS OKAY TO SCHEDULE. [place] HANDLES THEIR OWN PA'S AFTER THE PATIENT IS SCHEDULED. PLEASE SCHEDULE AT LEAST 1 WEEK OUT SO A PA CAN BE OBTAINED.    Any chance of pregnancy? NO   If YES, do NOT schedule and route to RN [place]    Is an  needed? No     Patient has a drive home? (mandatory) YES: INFORMED    Is patient taking any blood thinners (i.e. plavix, coumadin, jantoven, warfarin, heparin, pradaxa or dabigatran, etc)? No   If hold needed, do NOT schedule, route to RN pool     Is patient  taking any aspirin products (includes Excedrin and Fiorinal)? No     If more than 325mg/day, OK to schedule; Instruct pt to decrease to less than 325 mg for 7 days AND route to RN pool    For CERVICAL procedures, hold all aspirin products for 6 days.     Tell pt that if aspirin product is not held for 6 days, the procedure WILL BE cancelled.      Does the patient have a bleeding or clotting disorder? No     If YES, okay to schedule AND route to RN nurse pool    For any patients with platelet count <100, must be forwarded to provider    Is patient diabetic?  No  If YES, instruct them to bring their glucometer.    Does patient have an active infection or treated for one within the past week? No     Is patient currently taking any antibiotics?  No     For patients on chronic, preventative, or prophylactic antibiotics, procedures may be scheduled.     For patients on antibiotics for active or recent infection:antibiotic course must have been completed for 4 days    Is patient currently taking any steroid medications? (i.e. Prednisone, Medrol)  No     For patients on steroid medications, course must have been completed for 4 days    Is patient actively being treated for cancer or immunocompromised? No  If YES, do NOT schedule and route to RN pool     Are you able to get on and off an exam table with minimal or no assistance? Yes  If NO, do NOT schedule and route to RN pool    Are you able to roll over and lay on your stomach with minimal or no assistance? Yes  If NO, do NOT schedule and route to RN pool     Any allergies to contrast dye, iodine, shellfish, or numbing and steroid medications? No  If YES, route to RN pool AND add allergy information to appointment notes    Allergies: Bupropion, Lisinopril, Penicillins, Phenytoin, Prednisone, and Seasonal allergies      Has the patient had a flu shot or any other vaccinations within 7 days before or after the procedure.  Yes - Steroids     Does patient have an MRI/CT?  YES:  2020  Check Procedure Scheduling Grid to see if required.      Was the MRI done within the last 3 years?  Yes    If yes, where was the MRI done i.e.[facility], [facility], [facility], [facility] etc? [facility]      If no, do not schedule and route to RN pool    If MRI was not done at [facility], [facility] or [facility] do NOT schedule and route to RN pool.      If pt has an imaging disc, the injection MAY be scheduled but pt has to bring disc to appt.     If they show up without the disc the injection cannot be done    Procedure Specific Instructions:      If celiac plexus block, informed patient NPO for 6 hours and that it is okay to take medications with sips of water, especially blood pressure medications  Not Applicable         If this is for a cervical procedure, informed patient that aspirin needs to be held for 6 days.   Not Applicable      If IV needed:    Do not schedule procedures requiring IV placement in the first appointment of the day or first appointment after lunch. Do NOT schedule at 0745, 0815 or 1245.     Instructed pt to arrive 30 minutes early for IV start if required. (Check Procedure Scheduling Grid)  Not Applicable    Reminders:      If you are started on any steroids or antibiotics between now and your appointment, you must contact us because the procedure may need to be cancelled.  Yes      For all procedures except radiofrequency ablations (RFAs) and spinal cord stimulator (SCS) trials, informed patient:    IV sedation is not provided for this procedure.  If you feel that an oral anti-anxiety medication is needed, you can discuss this further with your referring provider or primary care provider.  The Pain Clinic provider will discuss specifics of what the procedure includes at your appointment.  Most procedures last 10-20 minutes.  We use numbing medications to help with any discomfort during the procedure.  Not Applicable      For patients 85 or older we recommend having an adult stay w/  them for the remainder of the day.       Does the patient have any questions?  NO  Seema Garcia  [facility]

## 2021-01-15 NOTE — TELEPHONE ENCOUNTER
PA pending additional information - please specify exact level, laterality and approach of injection.        Marce ORR    [facility] [place] [facility]

## 2021-01-18 ENCOUNTER — TELEPHONE (OUTPATIENT)
Dept: FAMILY MEDICINE | Facility: OTHER | Age: 41
End: 2021-01-18

## 2021-01-18 ENCOUNTER — NURSE TRIAGE (OUTPATIENT)
Dept: NURSING | Facility: CLINIC | Age: 41
End: 2021-01-18

## 2021-01-18 NOTE — TELEPHONE ENCOUNTER
PA and clinicals submitted via web portal for ILESI to [facility] WITH DR GOMEZ.              Marce ORR    [place] [facility]

## 2021-01-18 NOTE — TELEPHONE ENCOUNTER
Reason for Call:  Other pancreatitis episode    Detailed comments: Patient is vomiting and not doing well. They want to know what to do.    Phone Number Patient can be reached at: [phone]      Best Time: As soon as possible    Can we leave a detailed message on this number? YES    Call taken on 1/18/2021 at 1:33 PM by Martina Rosales

## 2021-01-18 NOTE — TELEPHONE ENCOUNTER
Grandmother calling.  She is reporting her granddaughter , the patient,  was seen in [facility] with her pancreatitis, and treated, and released, and now she has begun vomiting again.Vomiting since Friday.      She is also reporting , she may be dehydrated, and need IV fluids.  Grandmother states she would like her to go to [place], because that is where she has gone in the past.    She was advised that patient needs to go   To ER now.   She agreed , and will get her over there now.      Reason for Disposition    SEVERE vomiting (e.g., 6 or more times/day)    Protocols used: VOMITING-A-OH

## 2021-01-18 NOTE — TELEPHONE ENCOUNTER
Is this transforminal or interlaminar approach?        Marce ORR    [place] [facility]

## 2021-01-26 ENCOUNTER — PATIENT OUTREACH (OUTPATIENT)
Dept: FAMILY MEDICINE | Facility: OTHER | Age: 41
End: 2021-01-26

## 2021-01-27 ENCOUNTER — MYC MEDICAL ADVICE (OUTPATIENT)
Dept: FAMILY MEDICINE | Facility: OTHER | Age: 41
End: 2021-01-27

## 2021-01-27 DIAGNOSIS — F17.200 TOBACCO USE DISORDER: ICD-10-CM

## 2021-01-28 RX ORDER — NICOTINE 21 MG/24HR
1 PATCH, TRANSDERMAL 24 HOURS TRANSDERMAL DAILY
Qty: 30 PATCH | Refills: 0 | Status: SHIPPED | OUTPATIENT
Start: 2021-01-28 | End: 2021-03-12

## 2021-01-28 NOTE — PROGRESS NOTES
SUBJECTIVE:   CC: Tasha Short is an 40 year old woman who presents for preventive health visit.       Patient has been advised of split billing requirements and indicates understanding: Yes  Healthy Habits:     Getting at least 3 servings of Calcium per day:  Yes    Bi-annual eye exam:  Yes    Dental care twice a year:  Yes    Sleep apnea or symptoms of sleep apnea:  None    Diet:  Regular (no restrictions)    Frequency of exercise:  4-5 days/week    Duration of exercise:  15-30 minutes    Taking medications regularly:  No    Barriers to taking medications:  Not applicable    Medication side effects:  None    PHQ-2 Total Score: 3    Additional concerns today:  No    Mood & ETOH   - Doing really well, has psychiatry and counseling   - Some recent changes to Lithium and added in Cymbalta     GI Issues  - Doing much better   - No pancreatitis issues as of late   - Finally gaining some weight     Tobacco   - Doing well with nicotine replacement         Today's PHQ-2 Score:   PHQ-2 ( 1999 [employer]) 10/20/2020   Q1: Little interest or pleasure in doing things 1   Q2: Feeling down, depressed or hopeless 1   PHQ-2 Score 2   Q1: Little interest or pleasure in doing things Several days   Q2: Feeling down, depressed or hopeless Several days   PHQ-2 Score 2       Abuse: Current or Past (Physical, Sexual or Emotional) - No  Do you feel safe in your environment? Yes        Social History     Tobacco Use     Smoking status: Current Every Day Smoker     Packs/day: 0.25     Smokeless tobacco: Never Used   Substance Use Topics     Alcohol use: No         Alcohol Use 4/2/2019   Prescreen: >3 drinks/day or >7 drinks/week? Not Applicable   Prescreen: >3 drinks/day or >7 drinks/week? -       Any new diagnosis of family breast, ovarian, or bowel cancer? No     Reviewed orders with patient.  Reviewed health maintenance and updated orders accordingly - Yes  Lab work is in process  Labs reviewed in EPIC  BP Readings from Last 3  Encounters:   02/01/21 (!) 152/100   12/10/20 124/74   11/02/20 (!) 140/100    Wt Readings from Last 3 Encounters:   02/01/21 51.3 kg (113 lb)   12/10/20 47.6 kg (105 lb)   11/02/20 48 kg (105 lb 12.8 oz)                    Breast CA Risk Screening:  No flowsheet data found.      Mammogram Screening - Offered annual screening and updated Health Maintenance for mutual plan based on risk factor consideration    Pertinent mammograms are reviewed under the imaging tab.    History of abnormal Pap smear: YES - updated in Problem List and Health Maintenance accordingly  PAP / HPV Latest Ref Rng & Units 4/2/2019 5/22/2015 10/8/2013   PAP - NIL NIL NIL   HPV 16 DNA NEG:Negative Negative Negative -   HPV 18 DNA NEG:Negative Negative Negative -   OTHER HR HPV NEG:Negative Positive(A) Negative -     Reviewed and updated as needed this visit by clinical staff  Tobacco  Allergies  Meds  Problems             Reviewed and updated as needed this visit by Provider   Allergies  Meds  Problems                Review of Systems   Constitutional: Negative for chills and fever.   HENT: Negative for congestion, ear pain, hearing loss and sore throat.    Eyes: Negative for pain and visual disturbance.   Respiratory: Negative for shortness of breath.    Cardiovascular: Negative for chest pain, palpitations and peripheral edema.   Gastrointestinal: Positive for abdominal pain and diarrhea. Negative for constipation, heartburn, hematochezia and nausea.   Breasts:  Negative for tenderness, breast mass and discharge.   Genitourinary: Negative for dysuria, frequency, genital sores, hematuria, pelvic pain, urgency, vaginal bleeding and vaginal discharge.   Musculoskeletal: Positive for myalgias. Negative for arthralgias and joint swelling.   Skin: Negative for rash.   Neurological: Negative for dizziness, weakness, headaches and paresthesias.   Psychiatric/Behavioral: Positive for mood changes. The patient is nervous/anxious.        "  OBJECTIVE:   BP (!) 152/100   Pulse 113   Temp 99.3  F (37.4  C)   Resp 16   Ht 1.626 m (5' 4\")   Wt 51.3 kg (113 lb)   SpO2 100%   BMI 19.40 kg/m    Physical Exam      Diagnostic Test Results:  Labs reviewed in Epic  See orders pending in Epic     ASSESSMENT/PLAN:       ICD-10-CM    1. Routine history and physical examination of adult  Z00.00    2. Screening for malignant neoplasm of cervix  Z12.4 Pap imaged thin layer screen with HPV - recommended age 30 - 65 years (select HPV order below)     HPV High Risk Types DNA Cervical   3. Gastroesophageal reflux disease without esophagitis  K21.9 dicyclomine (BENTYL) 20 MG tablet   4. PUD (peptic ulcer disease)  K27.9 dicyclomine (BENTYL) 20 MG tablet   5. Hypersomnia  G47.10 methylphenidate (RITALIN) 10 MG tablet   6. Major depressive disorder, recurrent episode, moderate (H)  F33.1 DULoxetine (CYMBALTA) 60 MG capsule     WBC Differential   7. HTN, goal below 140/90  I10 metoprolol succinate ER (TOPROL-XL) 50 MG 24 hr tablet   8. Anemia, unspecified type  D64.9 CBC with platelets   9. Encounter for screening mammogram for breast cancer  Z12.31 MA Screen Bilateral w/Jason   10. Alcohol dependence in remission (H)  F10.21    11. Adrenal mass, right (H)  E27.8    12. Anemia due to blood loss, acute  D62      - Discussed mammogram now that she is 40   - This will be scheduled       1. Mood & ETOH   - Had a set back in Dec, something about this time of year   - Medications were adjusted and feels better   - Following with psychiatry and counseling     2. GI issues   - Doing well as of late   - Reviewed medications use and side effects, refilled as needed    - Hemoglobin not yet up to normal, recommend recheck today   - Await results     3. Adrenal issue   - Patient wondering if contributing to her lack of period, has been about 9-10 months since stopped Depo with no period   - Recommend we continue to monitor, if still no period at follow up when has been about 1 year " "since shot, will get further labs vs trying birth control vs. Other   - Will be due in 2 months for MRI recheck of mass on adrenal gland     4. HTN   - Elevated today, needs medication, reviewed use and side effects, refilled   - Should plan to recheck BP     5. Ritalin   - Doing well on this   - Hasn't filled in awhile  - Reviewed use and side effects, refilled   - Plan to update CSA and UTox at follow up       Patient has been advised of split billing requirements and indicates understanding: Yes  COUNSELING:  Reviewed preventive health counseling, as reflected in patient instructions  Special attention given to:        Regular exercise       Healthy diet/nutrition       Contraception    Estimated body mass index is 18.02 kg/m  as calculated from the following:    Height as of 12/10/20: 1.626 m (5' 4\").    Weight as of 12/10/20: 47.6 kg (105 lb).      She reports that she has been smoking. She has been smoking about 0.25 packs per day. She has never used smokeless tobacco.  Tobacco Cessation Action Plan:   Pharmacotherapies : other Nicotine replacement      Counseling Resources:  ATP IV Guidelines  Pooled Cohorts Equation Calculator  Breast Cancer Risk Calculator  BRCA-Related Cancer Risk Assessment: FHS-7 Tool  FRAX Risk Assessment  ICSI Preventive Guidelines  Dietary Guidelines for Americans, 2010  [employer] MyPlate  ASA Prophylaxis  Lung CA Screening      Review of the result(s) of each unique test - See list      MRI Adrenal - 10/28/20       All labs from 12/31/20 including CBC      Pap 4/2/2019      Lipid panel 7/19/19   Diagnosis or treatment significantly limited by social determinants of health - Tobacco use     60 min spent on the date of the encounter in chart review, patient visit, review of tests, documentation and/or discussion with other providers about the issues documented above.     In addition to the preventive visit, 25  minutes of the appointment were spent evaluating and developing a treatment " plan for her additional concern(s).    Est 1    5  Est 2    10  Est 3    15  Est 4    25  Est 5    40        Enid Mansfield PA-C  [facility]

## 2021-02-01 ENCOUNTER — OFFICE VISIT (OUTPATIENT)
Dept: FAMILY MEDICINE | Facility: OTHER | Age: 41
End: 2021-02-01
Payer: COMMERCIAL

## 2021-02-01 VITALS
SYSTOLIC BLOOD PRESSURE: 152 MMHG | OXYGEN SATURATION: 100 % | TEMPERATURE: 99.3 F | DIASTOLIC BLOOD PRESSURE: 100 MMHG | WEIGHT: 113 LBS | BODY MASS INDEX: 19.29 KG/M2 | RESPIRATION RATE: 16 BRPM | HEART RATE: 113 BPM | HEIGHT: 64 IN

## 2021-02-01 DIAGNOSIS — F10.21 ALCOHOL DEPENDENCE IN REMISSION (H): ICD-10-CM

## 2021-02-01 DIAGNOSIS — K21.9 GASTROESOPHAGEAL REFLUX DISEASE WITHOUT ESOPHAGITIS: ICD-10-CM

## 2021-02-01 DIAGNOSIS — E27.8 ADRENAL MASS, RIGHT (H): ICD-10-CM

## 2021-02-01 DIAGNOSIS — G47.10 HYPERSOMNIA: ICD-10-CM

## 2021-02-01 DIAGNOSIS — I10 HTN, GOAL BELOW 140/90: ICD-10-CM

## 2021-02-01 DIAGNOSIS — D62 ANEMIA DUE TO BLOOD LOSS, ACUTE: ICD-10-CM

## 2021-02-01 DIAGNOSIS — D64.9 ANEMIA, UNSPECIFIED TYPE: ICD-10-CM

## 2021-02-01 DIAGNOSIS — K27.9 PUD (PEPTIC ULCER DISEASE): ICD-10-CM

## 2021-02-01 DIAGNOSIS — F33.1 MAJOR DEPRESSIVE DISORDER, RECURRENT EPISODE, MODERATE (H): ICD-10-CM

## 2021-02-01 DIAGNOSIS — Z12.31 ENCOUNTER FOR SCREENING MAMMOGRAM FOR BREAST CANCER: ICD-10-CM

## 2021-02-01 DIAGNOSIS — Z00.00 ROUTINE HISTORY AND PHYSICAL EXAMINATION OF ADULT: Primary | ICD-10-CM

## 2021-02-01 DIAGNOSIS — Z12.4 SCREENING FOR MALIGNANT NEOPLASM OF CERVIX: ICD-10-CM

## 2021-02-01 LAB
BASOPHILS # BLD AUTO: 0 10E9/L (ref 0–0.2)
BASOPHILS NFR BLD AUTO: 1 %
DIFFERENTIAL METHOD BLD: NORMAL
EOSINOPHIL # BLD AUTO: 0 10E9/L (ref 0–0.7)
EOSINOPHIL NFR BLD AUTO: 1 %
ERYTHROCYTE [DISTWIDTH] IN BLOOD BY AUTOMATED COUNT: 17.4 % (ref 10–15)
HCT VFR BLD AUTO: 35.8 % (ref 35–47)
HGB BLD-MCNC: 10.9 G/DL (ref 11.7–15.7)
LYMPHOCYTES # BLD AUTO: 1.1 10E9/L (ref 0.8–5.3)
LYMPHOCYTES NFR BLD AUTO: 27 %
MCH RBC QN AUTO: 27.3 PG (ref 26.5–33)
MCHC RBC AUTO-ENTMCNC: 30.4 G/DL (ref 31.5–36.5)
MCV RBC AUTO: 90 FL (ref 78–100)
MONOCYTES # BLD AUTO: 0.7 10E9/L (ref 0–1.3)
MONOCYTES NFR BLD AUTO: 17 %
NEUTROPHILS # BLD AUTO: 2.4 10E9/L (ref 1.6–8.3)
NEUTROPHILS NFR BLD AUTO: 54 %
PLATELET # BLD AUTO: 186 10E9/L (ref 150–450)
PLATELET # BLD EST: NORMAL 10*3/UL
RBC # BLD AUTO: 3.99 10E12/L (ref 3.8–5.2)
WBC # BLD AUTO: 4.2 10E9/L (ref 4–11)

## 2021-02-01 PROCEDURE — 85027 COMPLETE CBC AUTOMATED: CPT | Performed by: PHYSICIAN ASSISTANT

## 2021-02-01 PROCEDURE — 99396 PREV VISIT EST AGE 40-64: CPT | Performed by: PHYSICIAN ASSISTANT

## 2021-02-01 PROCEDURE — 87624 HPV HI-RISK TYP POOLED RSLT: CPT | Performed by: PHYSICIAN ASSISTANT

## 2021-02-01 PROCEDURE — G0145 SCR C/V CYTO,THINLAYER,RESCR: HCPCS | Performed by: PHYSICIAN ASSISTANT

## 2021-02-01 PROCEDURE — 99213 OFFICE O/P EST LOW 20 MIN: CPT | Mod: 25 | Performed by: PHYSICIAN ASSISTANT

## 2021-02-01 PROCEDURE — 36415 COLL VENOUS BLD VENIPUNCTURE: CPT | Performed by: PHYSICIAN ASSISTANT

## 2021-02-01 RX ORDER — ACAMPROSATE CALCIUM 333 MG/1
TABLET, DELAYED RELEASE ORAL
COMMUNITY
Start: 2021-01-02 | End: 2021-02-01 | Stop reason: ALTCHOICE

## 2021-02-01 RX ORDER — LITHIUM CARBONATE 300 MG/1
300 TABLET, FILM COATED, EXTENDED RELEASE ORAL
COMMUNITY
Start: 2020-12-15 | End: 2021-12-21

## 2021-02-01 RX ORDER — DULOXETIN HYDROCHLORIDE 60 MG/1
60 CAPSULE, DELAYED RELEASE ORAL
COMMUNITY
Start: 2021-01-01 | End: 2021-02-01

## 2021-02-01 RX ORDER — DICYCLOMINE HCL 20 MG
20 TABLET ORAL
Qty: 120 TABLET | Refills: 3 | Status: SHIPPED | OUTPATIENT
Start: 2021-02-01 | End: 2022-01-01

## 2021-02-01 RX ORDER — METHYLPHENIDATE HYDROCHLORIDE 10 MG/1
10 TABLET ORAL 2 TIMES DAILY
Qty: 60 TABLET | Refills: 0 | Status: SHIPPED | OUTPATIENT
Start: 2021-02-01 | End: 2021-03-09

## 2021-02-01 RX ORDER — DULOXETIN HYDROCHLORIDE 60 MG/1
60 CAPSULE, DELAYED RELEASE ORAL DAILY
Qty: 30 CAPSULE | Refills: 1 | Status: SHIPPED | OUTPATIENT
Start: 2021-02-01 | End: 2021-07-21

## 2021-02-01 RX ORDER — METOPROLOL SUCCINATE 50 MG/1
50 TABLET, EXTENDED RELEASE ORAL DAILY
Qty: 90 TABLET | Refills: 3 | Status: SHIPPED | OUTPATIENT
Start: 2021-02-01 | End: 2021-07-15

## 2021-02-01 RX ORDER — ACAMPROSATE CALCIUM 333 MG/1
666 TABLET, DELAYED RELEASE ORAL
COMMUNITY
Start: 2021-01-01 | End: 2021-07-21

## 2021-02-01 RX ORDER — ASCORBIC ACID 500 MG
500 TABLET ORAL
COMMUNITY
Start: 2021-01-01 | End: 2022-01-01

## 2021-02-01 RX ORDER — TRAZODONE HYDROCHLORIDE 50 MG/1
50 TABLET, FILM COATED ORAL
COMMUNITY
Start: 2020-12-31 | End: 2022-01-01

## 2021-02-01 RX ORDER — METOPROLOL SUCCINATE 50 MG/1
50 TABLET, EXTENDED RELEASE ORAL
COMMUNITY
End: 2021-02-01

## 2021-02-01 ASSESSMENT — ENCOUNTER SYMPTOMS
CHILLS: 0
MYALGIAS: 1
HEADACHES: 0
SORE THROAT: 0
CONSTIPATION: 0
NERVOUS/ANXIOUS: 1
SHORTNESS OF BREATH: 0
DIZZINESS: 0
BREAST MASS: 0
DIARRHEA: 1
DYSURIA: 0
FEVER: 0
HEARTBURN: 0
PARESTHESIAS: 0
JOINT SWELLING: 0
FREQUENCY: 0
ABDOMINAL PAIN: 1
HEMATOCHEZIA: 0
WEAKNESS: 0
PALPITATIONS: 0
ARTHRALGIAS: 0
EYE PAIN: 0
HEMATURIA: 0
NAUSEA: 0

## 2021-02-01 ASSESSMENT — ANXIETY QUESTIONNAIRES
1. FEELING NERVOUS, ANXIOUS, OR ON EDGE: SEVERAL DAYS
GAD7 TOTAL SCORE: 11
7. FEELING AFRAID AS IF SOMETHING AWFUL MIGHT HAPPEN: MORE THAN HALF THE DAYS
3. WORRYING TOO MUCH ABOUT DIFFERENT THINGS: MORE THAN HALF THE DAYS
GAD7 TOTAL SCORE: 11
5. BEING SO RESTLESS THAT IT IS HARD TO SIT STILL: NOT AT ALL
7. FEELING AFRAID AS IF SOMETHING AWFUL MIGHT HAPPEN: MORE THAN HALF THE DAYS
2. NOT BEING ABLE TO STOP OR CONTROL WORRYING: MORE THAN HALF THE DAYS
4. TROUBLE RELAXING: SEVERAL DAYS
GAD7 TOTAL SCORE: 11
6. BECOMING EASILY ANNOYED OR IRRITABLE: NEARLY EVERY DAY

## 2021-02-01 ASSESSMENT — MIFFLIN-ST. JEOR: SCORE: 1167.56

## 2021-02-01 ASSESSMENT — PAIN SCALES - GENERAL: PAINLEVEL: MODERATE PAIN (4)

## 2021-02-02 ASSESSMENT — ANXIETY QUESTIONNAIRES: GAD7 TOTAL SCORE: 11

## 2021-02-02 ASSESSMENT — PATIENT HEALTH QUESTIONNAIRE - PHQ9: SUM OF ALL RESPONSES TO PHQ QUESTIONS 1-9: 8

## 2021-02-02 NOTE — TELEPHONE ENCOUNTER
JEF to schedule Left L4-L5 lumbar epidural steroid injection.    Seema NEGRETE    [facility]

## 2021-02-02 NOTE — TELEPHONE ENCOUNTER
PA approved.  Effective date: 1/18/21-3/18/21  PA reference #: [id number]  Pt. notified:   OKAY TO SCHEDULE WITH DR PATRICIA ORR    [place] [facility]

## 2021-02-02 NOTE — RESULT ENCOUNTER NOTE
Josefina Cordova    Your hemoglobin is coming up! Great news. We will recheck again at our follow up in a couple months.     The results are attached for your review.       Donnie Mansfield PA-C

## 2021-02-03 LAB
COPATH REPORT: NORMAL
PAP: NORMAL

## 2021-02-05 LAB
FINAL DIAGNOSIS: NORMAL
HPV HR 12 DNA CVX QL NAA+PROBE: NEGATIVE
HPV16 DNA SPEC QL NAA+PROBE: NEGATIVE
HPV18 DNA SPEC QL NAA+PROBE: NEGATIVE
SPECIMEN DESCRIPTION: NORMAL
SPECIMEN SOURCE CVX/VAG CYTO: NORMAL

## 2021-02-08 ENCOUNTER — PATIENT OUTREACH (OUTPATIENT)
Dept: FAMILY MEDICINE | Facility: OTHER | Age: 41
End: 2021-02-08

## 2021-03-08 ENCOUNTER — MYC MEDICAL ADVICE (OUTPATIENT)
Dept: FAMILY MEDICINE | Facility: OTHER | Age: 41
End: 2021-03-08

## 2021-03-08 DIAGNOSIS — G47.10 HYPERSOMNIA: ICD-10-CM

## 2021-03-08 NOTE — TELEPHONE ENCOUNTER
Pending Prescriptions:                       Disp   Refills    methylphenidate (RITALIN) 10 MG tablet     60 tab*0        Sig: Take 1 tablet (10 mg) by mouth 2 times daily    Routing refill request to provider for review/approval because:  Drug not on the [facility] refill protocol

## 2021-03-09 RX ORDER — METHYLPHENIDATE HYDROCHLORIDE 10 MG/1
10 TABLET ORAL 2 TIMES DAILY
Qty: 60 TABLET | Refills: 0 | Status: SHIPPED | OUTPATIENT
Start: 2021-03-09 | End: 2021-07-15

## 2021-03-12 ENCOUNTER — MYC REFILL (OUTPATIENT)
Dept: FAMILY MEDICINE | Facility: OTHER | Age: 41
End: 2021-03-12

## 2021-03-12 DIAGNOSIS — F17.200 TOBACCO USE DISORDER: ICD-10-CM

## 2021-03-12 DIAGNOSIS — G47.10 HYPERSOMNIA: ICD-10-CM

## 2021-03-12 NOTE — TELEPHONE ENCOUNTER
Pending Prescriptions:                       Disp   Refills    nicotine (NICODERM CQ) 21 MG/24HR 24 hr pa*30 pat*0        Sig: Place 1 patch onto the skin daily    Routing refill request to provider for review/approval because:  Labs out of range:  BP    BP Readings from Last 2 Encounters:   02/01/21 (!) 152/100   12/10/20 124/74

## 2021-03-13 ENCOUNTER — HEALTH MAINTENANCE LETTER (OUTPATIENT)
Age: 41
End: 2021-03-13

## 2021-03-13 RX ORDER — METHYLPHENIDATE HYDROCHLORIDE 10 MG/1
10 TABLET ORAL 2 TIMES DAILY
Qty: 60 TABLET | Refills: 0 | OUTPATIENT
Start: 2021-03-13

## 2021-03-13 RX ORDER — NICOTINE 21 MG/24HR
1 PATCH, TRANSDERMAL 24 HOURS TRANSDERMAL DAILY
Qty: 30 PATCH | Refills: 3 | Status: SHIPPED | OUTPATIENT
Start: 2021-03-13 | End: 2021-12-21

## 2021-03-13 NOTE — TELEPHONE ENCOUNTER
Duplicate. This was just filled on 3/9/21.    Donnie Mansfield PA-C  [facility]

## 2021-03-18 ENCOUNTER — TRANSFERRED RECORDS (OUTPATIENT)
Dept: HEALTH INFORMATION MANAGEMENT | Facility: CLINIC | Age: 41
End: 2021-03-18

## 2021-04-07 ENCOUNTER — HOSPITAL ENCOUNTER (OUTPATIENT)
Dept: MRI IMAGING | Facility: CLINIC | Age: 41
Discharge: HOME | End: 2021-04-07
Attending: PHYSICIAN ASSISTANT | Admitting: PHYSICIAN ASSISTANT
Payer: COMMERCIAL

## 2021-04-07 DIAGNOSIS — E27.8 ADRENAL MASS, RIGHT (H): ICD-10-CM

## 2021-04-07 PROCEDURE — 250N000009 HC RX 250: Performed by: PHYSICIAN ASSISTANT

## 2021-04-07 PROCEDURE — 74183 MRI ABD W/O CNTR FLWD CNTR: CPT

## 2021-04-07 PROCEDURE — A9585 GADOBUTROL INJECTION: HCPCS | Performed by: PHYSICIAN ASSISTANT

## 2021-04-07 PROCEDURE — 255N000002 HC RX 255 OP 636: Performed by: PHYSICIAN ASSISTANT

## 2021-04-07 RX ORDER — GADOBUTROL 604.72 MG/ML
7.5 INJECTION INTRAVENOUS ONCE
Status: COMPLETED | OUTPATIENT
Start: 2021-04-07 | End: 2021-04-07

## 2021-04-07 RX ADMIN — GADOBUTROL 7.5 ML: 604.72 INJECTION INTRAVENOUS at 17:01

## 2021-04-07 RX ADMIN — SODIUM CHLORIDE 30 ML: 9 INJECTION, SOLUTION INTRAVENOUS at 17:02

## 2021-04-07 NOTE — TELEPHONE ENCOUNTER
Pt calling to schedule LESI. PA  in March.    Seema NEGRETE    [facility]

## 2021-04-08 NOTE — TELEPHONE ENCOUNTER
Left VM for  at [facility] to request extension on NING ORR    [place] [facility]

## 2021-04-19 NOTE — TELEPHONE ENCOUNTER
Never received a call back from [employer], started PA over and submitted it through the [employer] portal.     Did note that a previous request to extend the previous authorization dates was made but did not receive a call back. Also, that patient has not had this injection yet and is wanting to schedule      Marce ORR    [facility]

## 2021-04-30 NOTE — TELEPHONE ENCOUNTER
PA approved.  Effective date: 4/19/21-6/19/21  PA reference #: [id number]  Pt. notified:   OKAY TO SCHEDULE WITH DR PATRICIA ORR    [place] [facility]

## 2021-06-25 ENCOUNTER — TRANSFERRED RECORDS (OUTPATIENT)
Dept: HEALTH INFORMATION MANAGEMENT | Facility: CLINIC | Age: 41
End: 2021-06-25

## 2021-06-26 LAB
ALT SERPL-CCNC: 22 IU/L (ref 8–45)
AST SERPL-CCNC: 26 IU/L (ref 2–40)

## 2021-06-27 LAB
CREATININE (EXTERNAL): 0.67 MG/DL (ref 0.57–1.11)
GFR ESTIMATED (EXTERNAL): >60 ML/MIN/1.73M2
GFR ESTIMATED (IF AFRICAN AMERICAN) (EXTERNAL): >60 ML/MIN/1.73M2
GLUCOSE (EXTERNAL): 97 MG/DL (ref 65–100)
POTASSIUM (EXTERNAL): 4.2 MMOL/L (ref 3.5–5)

## 2021-07-14 ENCOUNTER — TELEPHONE (OUTPATIENT)
Dept: FAMILY MEDICINE | Facility: OTHER | Age: 41
End: 2021-07-14

## 2021-07-14 NOTE — TELEPHONE ENCOUNTER
Patient calling stating she missed her appt this morning due to car trouble and would still like to be seen today. States she believes she has shingles.     Patient scheduled for today in [place] to see DFA.     SERA [facility]

## 2021-07-15 ENCOUNTER — OFFICE VISIT (OUTPATIENT)
Dept: FAMILY MEDICINE | Facility: OTHER | Age: 41
End: 2021-07-15
Payer: COMMERCIAL

## 2021-07-15 VITALS
DIASTOLIC BLOOD PRESSURE: 78 MMHG | BODY MASS INDEX: 23.05 KG/M2 | RESPIRATION RATE: 16 BRPM | HEIGHT: 64 IN | WEIGHT: 135 LBS | SYSTOLIC BLOOD PRESSURE: 128 MMHG | OXYGEN SATURATION: 99 % | TEMPERATURE: 99.9 F | HEART RATE: 80 BPM

## 2021-07-15 DIAGNOSIS — I10 HTN, GOAL BELOW 140/90: ICD-10-CM

## 2021-07-15 DIAGNOSIS — F10.930 ALCOHOL WITHDRAWAL SEIZURE WITHOUT COMPLICATION (H): ICD-10-CM

## 2021-07-15 DIAGNOSIS — L03.312 CELLULITIS OF LOWER BACK: ICD-10-CM

## 2021-07-15 DIAGNOSIS — E87.8 ELECTROLYTE ABNORMALITY: ICD-10-CM

## 2021-07-15 DIAGNOSIS — F33.1 MAJOR DEPRESSIVE DISORDER, RECURRENT EPISODE, MODERATE (H): ICD-10-CM

## 2021-07-15 DIAGNOSIS — L73.9 FOLLICULITIS: Primary | ICD-10-CM

## 2021-07-15 DIAGNOSIS — G47.10 HYPERSOMNIA: ICD-10-CM

## 2021-07-15 DIAGNOSIS — R56.9 ALCOHOL WITHDRAWAL SEIZURE WITHOUT COMPLICATION (H): ICD-10-CM

## 2021-07-15 LAB
ALBUMIN SERPL-MCNC: 3.8 G/DL (ref 3.4–5)
ALP SERPL-CCNC: 89 U/L (ref 40–150)
ALT SERPL W P-5'-P-CCNC: 82 U/L (ref 0–50)
ANION GAP SERPL CALCULATED.3IONS-SCNC: 10 MMOL/L (ref 3–14)
AST SERPL W P-5'-P-CCNC: 77 U/L (ref 0–45)
BILIRUB SERPL-MCNC: 0.4 MG/DL (ref 0.2–1.3)
BUN SERPL-MCNC: 6 MG/DL (ref 7–30)
CALCIUM SERPL-MCNC: 8 MG/DL (ref 8.5–10.1)
CHLORIDE BLD-SCNC: 97 MMOL/L (ref 94–109)
CHOLEST SERPL-MCNC: 179 MG/DL
CO2 SERPL-SCNC: 28 MMOL/L (ref 20–32)
CREAT SERPL-MCNC: 0.65 MG/DL (ref 0.52–1.04)
ERYTHROCYTE [DISTWIDTH] IN BLOOD BY AUTOMATED COUNT: 14.3 % (ref 10–15)
FASTING STATUS PATIENT QL REPORTED: YES
GFR SERPL CREATININE-BSD FRML MDRD: >90 ML/MIN/1.73M2
GLUCOSE BLD-MCNC: 77 MG/DL (ref 70–99)
HCT VFR BLD AUTO: 36.8 % (ref 35–47)
HDLC SERPL-MCNC: 87 MG/DL
HGB BLD-MCNC: 12.3 G/DL (ref 11.7–15.7)
LDLC SERPL CALC-MCNC: 76 MG/DL
MCH RBC QN AUTO: 29 PG (ref 26.5–33)
MCHC RBC AUTO-ENTMCNC: 33.4 G/DL (ref 31.5–36.5)
MCV RBC AUTO: 87 FL (ref 78–100)
NONHDLC SERPL-MCNC: 92 MG/DL
PLATELET # BLD AUTO: 179 10E3/UL (ref 150–450)
POTASSIUM BLD-SCNC: 3.3 MMOL/L (ref 3.4–5.3)
PROT SERPL-MCNC: 7.5 G/DL (ref 6.8–8.8)
RBC # BLD AUTO: 4.24 10E6/UL (ref 3.8–5.2)
SODIUM SERPL-SCNC: 135 MMOL/L (ref 133–144)
TRIGL SERPL-MCNC: 82 MG/DL
WBC # BLD AUTO: 5.8 10E3/UL (ref 4–11)

## 2021-07-15 PROCEDURE — 36415 COLL VENOUS BLD VENIPUNCTURE: CPT | Performed by: PHYSICIAN ASSISTANT

## 2021-07-15 PROCEDURE — 99214 OFFICE O/P EST MOD 30 MIN: CPT | Performed by: PHYSICIAN ASSISTANT

## 2021-07-15 PROCEDURE — 80061 LIPID PANEL: CPT | Performed by: PHYSICIAN ASSISTANT

## 2021-07-15 PROCEDURE — 80053 COMPREHEN METABOLIC PANEL: CPT | Performed by: PHYSICIAN ASSISTANT

## 2021-07-15 PROCEDURE — 85027 COMPLETE CBC AUTOMATED: CPT | Performed by: PHYSICIAN ASSISTANT

## 2021-07-15 RX ORDER — CEPHALEXIN 500 MG/1
500 CAPSULE ORAL 3 TIMES DAILY
Qty: 30 CAPSULE | Refills: 0 | Status: SHIPPED | OUTPATIENT
Start: 2021-07-15 | End: 2021-07-25

## 2021-07-15 RX ORDER — METOPROLOL SUCCINATE 50 MG/1
50 TABLET, EXTENDED RELEASE ORAL DAILY
Qty: 90 TABLET | Refills: 0 | Status: SHIPPED | OUTPATIENT
Start: 2021-07-15 | End: 2021-08-23

## 2021-07-15 RX ORDER — METHYLPHENIDATE HYDROCHLORIDE 10 MG/1
10 TABLET ORAL 2 TIMES DAILY
Qty: 60 TABLET | Refills: 0 | Status: SHIPPED | OUTPATIENT
Start: 2021-07-15 | End: 2021-08-23

## 2021-07-15 ASSESSMENT — MIFFLIN-ST. JEOR: SCORE: 1267.36

## 2021-07-15 NOTE — PROGRESS NOTES
Assessment & Plan     Folliculitis  Cellulitis of lower back  Broad-based erythema with excoriation to the low back and upper buttocks is noted today.  She has a couple of other scattered spots as well.  Treat as noted below and follow-up as needed.  - cephALEXin (KEFLEX) 500 MG capsule; Take 1 capsule (500 mg) by mouth 3 times daily for 10 days    Hypersomnia  She request refills of medications at this point in time as she was not able to get into see her primary care provider yesterday.  Advised to follow-up with her primary care provider within the next 30 days.  - methylphenidate (RITALIN) 10 MG tablet; Take 1 tablet (10 mg) by mouth 2 times daily  - Comprehensive metabolic panel (BMP + Alb, Alk Phos, ALT, AST, Total. Bili, TP); Future  - Lipid panel reflex to direct LDL Fasting; Future  - CBC with platelets; Future    HTN, goal below 140/90  Blood pressures under good control she needs to follow-up with her primary care provider in the next 30 days for the above so we can extend this for her.  - metoprolol succinate ER (TOPROL-XL) 50 MG 24 hr tablet; Take 1 tablet (50 mg) by mouth daily  - Comprehensive metabolic panel (BMP + Alb, Alk Phos, ALT, AST, Total. Bili, TP); Future  - Lipid panel reflex to direct LDL Fasting; Future  - CBC with platelets; Future    Electrolyte abnormality  Recently in the hospital for dehydration and also some electrolyte abnormalities, alcohol intoxication and lithium toxicity which apparently was self-induced.   Rechecking labs related to this today.   - Comprehensive metabolic panel (BMP + Alb, Alk Phos, ALT, AST, Total. Bili, TP); Future  - Lipid panel reflex to direct LDL Fasting; Future  - CBC with platelets; Future    Major depressive disorder, recurrent episode, moderate (H)  Recommended follow-up as per the admission discharge instructions.  Follow-up with PCP in the next 30 days.  - Comprehensive metabolic panel (BMP + Alb, Alk Phos, ALT, AST, Total. Bili, TP); Future  -  "Lipid panel reflex to direct LDL Fasting; Future  - CBC with platelets; Future    Alcohol withdrawal seizure without complication (H)  Historically noted in a have had a recent episode through [facility].    I spent 5 minutes reviewing her discharge summary from the [employer] system. ([facility])    Return in about 4 weeks (around 8/12/2021) for Mental Health, Medication Recheck.    MARTHA [facility] BENJAMÍN Cordova is a 40 year old who presents for the following health issues     HPI     Concern - rash  Onset: 4 days ago  Description: raised itchy,   Intensity: moderate  Progression of Symptoms:  worsening  Accompanying Signs & Symptoms: none  Previous history of similar problem: no  Precipitating factors:        Worsened by: unknown  Alleviating factors:        Improved by: open air  Therapies tried and outcome: hycortisonecream      Review of Systems   Constitutional, HEENT, cardiovascular, pulmonary, GI, , musculoskeletal, neuro, skin, endocrine and psych systems are negative, except as otherwise noted.      Objective    /78   Pulse 80   Temp 99.9  F (37.7  C)   Resp 16   Ht 1.626 m (5' 4\")   Wt 61.2 kg (135 lb)   SpO2 99%   BMI 23.17 kg/m    Body mass index is 23.17 kg/m .  Physical Exam   GENERAL: healthy, alert and no distress  NECK: no adenopathy, no asymmetry, masses, or scars and thyroid normal to palpation  RESP: lungs clear to auscultation - no rales, rhonchi or wheezes  CV: regular rate and rhythm, normal S1 S2, no S3 or S4, no murmur, click or rub, no peripheral edema and peripheral pulses strong  ABDOMEN: soft, nontender, no hepatosplenomegaly, no masses and bowel sounds normal  MS: no gross musculoskeletal defects noted, no edema  SKIN: Broad-based erythema with excoriation to the bilateral low back and upper buttocks.  Most consistent with folliculitis and cellulitis.  PSYCH: mentation appears normal, affect normal/bright    No results " found for this or any previous visit (from the past 24 hour(s)).

## 2021-07-16 ENCOUNTER — TRANSFERRED RECORDS (OUTPATIENT)
Dept: HEALTH INFORMATION MANAGEMENT | Facility: CLINIC | Age: 41
End: 2021-07-16

## 2021-07-21 ENCOUNTER — HOSPITAL ENCOUNTER (EMERGENCY)
Facility: CLINIC | Age: 41
Discharge: HOME | End: 2021-07-21
Attending: EMERGENCY MEDICINE | Admitting: EMERGENCY MEDICINE
Payer: COMMERCIAL

## 2021-07-21 VITALS
BODY MASS INDEX: 22.35 KG/M2 | DIASTOLIC BLOOD PRESSURE: 77 MMHG | SYSTOLIC BLOOD PRESSURE: 106 MMHG | OXYGEN SATURATION: 100 % | WEIGHT: 130.2 LBS | RESPIRATION RATE: 18 BRPM | HEART RATE: 64 BPM | TEMPERATURE: 98.4 F

## 2021-07-21 DIAGNOSIS — R21 RASH AND NONSPECIFIC SKIN ERUPTION: ICD-10-CM

## 2021-07-21 PROCEDURE — 250N000013 HC RX MED GY IP 250 OP 250 PS 637: Performed by: EMERGENCY MEDICINE

## 2021-07-21 PROCEDURE — 99284 EMERGENCY DEPT VISIT MOD MDM: CPT | Performed by: EMERGENCY MEDICINE

## 2021-07-21 PROCEDURE — 99283 EMERGENCY DEPT VISIT LOW MDM: CPT | Performed by: EMERGENCY MEDICINE

## 2021-07-21 PROCEDURE — 250N000009 HC RX 250: Performed by: EMERGENCY MEDICINE

## 2021-07-21 RX ORDER — DEXAMETHASONE SODIUM PHOSPHATE 10 MG/ML
10 INJECTION, SOLUTION INTRAMUSCULAR; INTRAVENOUS ONCE
Status: COMPLETED | OUTPATIENT
Start: 2021-07-21 | End: 2021-07-21

## 2021-07-21 RX ORDER — TRIAMCINOLONE ACETONIDE 1 MG/G
CREAM TOPICAL
Qty: 80 G | Refills: 0 | Status: SHIPPED | OUTPATIENT
Start: 2021-07-21 | End: 2021-08-04

## 2021-07-21 RX ORDER — PREDNISONE 20 MG/1
TABLET ORAL
Qty: 20 TABLET | Refills: 0 | Status: SHIPPED | OUTPATIENT
Start: 2021-07-21 | End: 2021-08-23

## 2021-07-21 RX ORDER — CETIRIZINE HYDROCHLORIDE 10 MG/1
10 TABLET ORAL ONCE
Status: COMPLETED | OUTPATIENT
Start: 2021-07-21 | End: 2021-07-21

## 2021-07-21 RX ORDER — CETIRIZINE HYDROCHLORIDE 10 MG/1
10 TABLET ORAL 2 TIMES DAILY PRN
Qty: 20 TABLET | Refills: 0 | Status: SHIPPED | OUTPATIENT
Start: 2021-07-21 | End: 2021-07-31

## 2021-07-21 RX ADMIN — CETIRIZINE HYDROCHLORIDE 10 MG: 10 TABLET, FILM COATED ORAL at 09:30

## 2021-07-21 RX ADMIN — DEXAMETHASONE SODIUM PHOSPHATE 10 MG: 10 INJECTION, SOLUTION INTRAMUSCULAR; INTRAVENOUS at 09:30

## 2021-07-21 NOTE — ED TRIAGE NOTES
She has a rash that is itchy for the past 1 1/2 weeks. She was put on keflex Thursday and it's not helping.

## 2021-07-21 NOTE — DISCHARGE INSTRUCTIONS
You were given 1 dose of steroid, dexamethasone, in the ED today.  You can start the prednisone taper either tomorrow or the following day.  In the meantime, you can take Zyrtec 10 mg up to twice daily if needed for itching.    Take prednisone with food or milk.    Use the steroid cream sparingly to itchy areas as needed.    See the attached information regarding bleach baths.  This is used to decrease any chance of infection.  You can continue the antibiotics also.    Follow-up in clinic next week if not improving and return at anytime for worsening, changes or concerns.    I hope that this starts to clear up very quickly!!!

## 2021-07-22 NOTE — ED PROVIDER NOTES
History     Chief Complaint   Patient presents with     Rash     HPI  History per patient and medical records    This is a 40-year-old female, history as below, presenting with rash.  Patient has had an itching rash for about a week and a half.  She first noticed it on her back but since then has noticed the rash is spreading throughout her back and buttocks and onto her arms and legs.  She was initially seen in clinic on 7/15/2021 and at that time just had the rash on her back.  There is concerned that it might be due to folliculitis/cellulitis and she was started on Keflex.  She notes that this has not improved her symptoms but they may even be worsening.  She denies any fevers or chills.  She has not been placing anything on the itching areas except for cool packs which actually do help.  She is not taking any antihistamines.    Patient works as a  but despite my questioning does not think that she was exposed anything that could have triggered this itch.  No new soaps, lotions, creams, pets.  She denies any fevers or chills, tongue swelling, mouth sores, difficulty swallowing, chest pain, shortness of breath, bowel or bladder changes.    Allergies:  Allergies   Allergen Reactions     Bupropion Other (See Comments)     seizures     Lisinopril Swelling     Angioedema      Penicillins      hives     Phenytoin      rash,puritis (Dilantin)     Seasonal Allergies        Problem List:    Patient Active Problem List    Diagnosis Date Noted     Health Care Home 06/08/2011     Priority: High     x  DX V65.8 REPLACED WITH 84115 [facility]ALTH CARE HOME (04/08/2013)       Electrolyte abnormality 07/15/2021     Priority: Medium     Adrenal mass, right (H) 02/01/2021     Priority: Medium     PUD (peptic ulcer disease) 11/02/2020     Priority: Medium     Anemia due to blood loss, acute 09/23/2020     Priority: Medium     Diarrhea, unspecified type 09/23/2020     Priority: Medium     GI bleed 09/16/2020     Priority: Medium      Acute GI bleeding 09/16/2020     Priority: Medium     Weight loss 09/15/2020     Priority: Medium     Anemia, unspecified type 09/15/2020     Priority: Medium     Hemoglobin decreased 09/15/2020     Priority: Medium     Closed displaced fracture of metatarsal bone of right foot with delayed healing, unspecified metatarsal, subsequent encounter 09/14/2020     Priority: Medium     Opiate addiction (H) 09/10/2020     Priority: Medium     Alcohol withdrawal seizure (H) 09/10/2020     Priority: Medium     Encounter for surveillance of injectable contraceptive 08/08/2019     Priority: Medium     Alcohol dependence in remission (H) 04/03/2019     Priority: Medium     Hypertriglyceridemia 05/08/2018     Priority: Medium     Chronic seasonal allergic rhinitis due to pollen 05/08/2018     Priority: Medium     Gastroesophageal reflux disease without esophagitis 06/19/2017     Priority: Medium     H/O ETOH abuse 05/15/2017     Priority: Medium     Right hip pain 10/20/2016     Priority: Medium     Narcolepsy 10/20/2016     Priority: Medium     Alcohol-induced mood disorder (H) 08/04/2016     Priority: Medium     Irregular heartbeat 04/18/2016     Priority: Medium     Tobacco use disorder 04/18/2016     Priority: Medium     Acute coronary syndrome (H) 04/14/2016     Priority: Medium     HTN, goal below 140/90 11/24/2014     Priority: Medium     Major depressive disorder, recurrent episode, moderate (H) 10/22/2014     Priority: Medium     Generalized anxiety disorder 10/22/2014     Priority: Medium     Hypersomnia 04/12/2013     Priority: Medium     Calcific tendonitis peroneals 06/21/2012     Priority: Medium     Narcolepsy and cataplexy      Priority: Medium     S/P LEEP 11/17/2010     Priority: Medium     12/14/09 ASCUS + HPV 18, 53, 58 (high risk)  1/13/10 colposcopy- bx (dysplasia) ECC (negative) recommend repeat pap at 6 and 12 months  6/28/10 pap ASCUS + HPV 16 (high risk) recommend colpo  7/21/10 colposcopy- BX ( ROSA  2/3 with gland involvement)  8/2/10 clinic appt to discuss LEEP  10/26/10 tele enc: LEEP scheduled for 11/4/10  11/4/10 LEEP- (ROSA 2/3) not extending to margins.  ECC (negative). Plan: pap in 6 mo.   11- NIL pap. Plan: pap in 6 mo  10/8/13 NIL pap, neg HR HPV. Plan: pap in 3 years.  5/22/15 NIL pap, neg HR HPV. Plan: pap in 3 years.  19 NIL pap, + HR HPV (not 16 or 18). Plan: cotest in 1 yr.  19 left msg   21 Reminder MyChart  21 NIL Pap, Neg HR HPV. Plan: Cotest in 1 yr, due to hx of ROSA 2/3.              Past Medical History:    Past Medical History:   Diagnosis Date     ALCOHOL ABUSE-IN REMISS 2007     Cataplexy and narcolepsy      ROSA 3 - cervical intraepithelial neoplasia grade 3 1/4/10     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     Hypersomnia with sleep apnea      Hypertension      Irregular menstrual cycle 1997     Metrorrhagia 2001     Other acne      Other and unspecified adverse effect of drug, medicinal and biological substance 2001     Substance abuse (H) 10/2/2009       Past Surgical History:    Past Surgical History:   Procedure Laterality Date      SECTION       COLONOSCOPY N/A 10/4/2020    Procedure: COLONOSCOPY, WITH POLYPECTOMY AND BIOPSY;  Surgeon: Mando Siegel MD;  Location:  GI     COLPOSCOPY CERVIX, LOOP ELECTRODE BIOPSY, COMBINED  2010    ROSA 2/3     ESOPHAGOSCOPY, GASTROSCOPY, DUODENOSCOPY (EGD), COMBINED N/A 2020    Procedure: Esophagogastroduodenoscopy with biopsies;  Surgeon: Ronan Pelayo MD;  Location:  GI     ESOPHAGOSCOPY, GASTROSCOPY, DUODENOSCOPY (EGD), COMBINED N/A 10/3/2020    Procedure: ESOPHAGOGASTRODUODENOSCOPY (EGD);  Surgeon: aMndo Siegel MD;  Location:  GI     HC REMOVAL OF TONSILS,12+ Y/O      Tonsils 12+y.o.     REPAIR TENDON PERONEAL  11/15/2013    Procedure: REPAIR TENDON PERONEAL;  right peroneal tendon  transfer;  Surgeon:  Jesus Manuel Oden DPM;  Location: [facility] OR       Family History:    Family History   Problem Relation Age of Onset     Depression Mother      Arthritis Paternal Grandmother      Hypertension Paternal Grandfather         birth FF     Asthma No family hx of      C.A.D. No family hx of      Diabetes No family hx of      Cancer - colorectal No family hx of      Prostate Cancer No family hx of      Coronary Artery Disease No family hx of      Ovarian Cancer No family hx of      Mental Illness No family hx of      Cerebrovascular Disease No family hx of      Anesthesia Reaction No family hx of      Other Cancer No family hx of      Osteoporosis No family hx of      Known Genetic Syndrome No family hx of      Obesity No family hx of      Unknown/Adopted No family hx of        Social History:  Marital Status:  Single [1]  Social History     Tobacco Use     Smoking status: Current Every Day Smoker     Packs/day: 0.25     Smokeless tobacco: Never Used   Substance Use Topics     Alcohol use: No     Drug use: No        Medications:    cephALEXin (KEFLEX) 500 MG capsule  cetirizine (ZYRTEC) 10 MG tablet  dicyclomine (BENTYL) 20 MG tablet  flunisolide (NASALIDE) 25 MCG/ACT (0.025%) SOLN spray  lithium ER (LITHOBID) 300 MG CR tablet  methylphenidate (RITALIN) 10 MG tablet  metoprolol succinate ER (TOPROL-XL) 50 MG 24 hr tablet  nicotine (NICODERM CQ) 21 MG/24HR 24 hr patch  nicotine (NICORETTE) 2 MG gum  pantoprazole (PROTONIX) 40 MG EC tablet  predniSONE (DELTASONE) 20 MG tablet  sucralfate (CARAFATE) 1 GM tablet  traZODone (DESYREL) 50 MG tablet  triamcinolone (KENALOG) 0.1 % external cream  vitamin C (ASCORBIC ACID) 500 MG tablet          Review of Systems   All other ROS reviewed and are negative or non-contributory except as stated in HPI.     Physical Exam   BP: 106/77  Pulse: 64  Temp: 98.4  F (36.9  C)  Resp: 18  Weight: 59.1 kg (130 lb 3.2 oz)  SpO2: 100 %      Physical Exam  Vitals and nursing note reviewed.    Constitutional:       Appearance: Normal appearance.      Comments: Very pleasant, slightly anxious female sitting in the bed   HENT:      Head: Normocephalic.   Eyes:      Extraocular Movements: Extraocular movements intact.      Conjunctiva/sclera: Conjunctivae normal.   Cardiovascular:      Rate and Rhythm: Normal rate and regular rhythm.      Pulses: Normal pulses.      Heart sounds: Normal heart sounds.   Pulmonary:      Effort: Pulmonary effort is normal.      Breath sounds: Normal breath sounds.   Musculoskeletal:         General: Normal range of motion.      Cervical back: Normal range of motion.   Skin:     General: Skin is warm and dry.      Comments: Patient has a significant rash with some excoriations on her back, arms and on lower legs.  I do not notice anything in the webs of her fingers to suggest this is scabies but it could be.  Please see pictures   Neurological:      General: No focal deficit present.      Mental Status: She is alert.   Psychiatric:         Behavior: Behavior normal.                         ED Course (with Medical Decision Making)    Pt seen and examined by me.  RN and EPIC notes reviewed.       Patient with significant rash and itching as noted above to her back, arms, legs.  There is some parts of this that look like contact dermatitis, possibly poison ivy.  She is already not been successful with Keflex.  I am going to treat her for symptoms with Zyrtec up to twice daily.  I am going to also give her a dose of Decadron in the ED and a prednisone taper for home.  I also gave her an Rx for triamcinolone cream to use sparingly.    I do note that she is on lithium and we talked about her mood stabilization.  She states that she usually is down and has not had any kelsie episodes.  I explained to her that any steroid can change mood/elevated and if she is having any concerns she should return promptly at any time.          Procedures    No results found for this or any previous  visit (from the past 24 hour(s)).    Medications   dexamethasone (DECADRON) PF oral solution (inj used orally) 10 mg (10 mg Oral Given 7/21/21 0930)   cetirizine (zyrTEC) tablet 10 mg (10 mg Oral Given 7/21/21 0930)       Assessments & Plan      I have reviewed the findings, diagnosis, plan and need for follow up with the patient.    Discharge Medication List as of 7/21/2021  9:17 AM      START taking these medications    Details   cetirizine (ZYRTEC) 10 MG tablet Take 1 tablet (10 mg) by mouth 2 times daily as needed for allergies (1 tab up to twice a day as needed for itch, rash, hives, allergy), Disp-20 tablet, R-0, E-Prescribe      predniSONE (DELTASONE) 20 MG tablet Take 3 tabs by mouth daily x 2 days, then 2 tabs daily x 4 days, then 1 tab daily x 4 days, then 1/2 tab daily x 4 days., Disp-20 tablet, R-0, E-Prescribe      triamcinolone (KENALOG) 0.1 % external cream Use very sparingly to itchy areas up to twice daily.Disp-80 g, [id number]             Final diagnoses:   Rash and nonspecific skin eruption     Disposition: Patient discharged home in stable condition.  Plan as above.  Return for concerns.      Note: Chart documentation done in part with Dragon Voice Recognition software. Although reviewed after completion, some word and grammatical errors may remain.     7/21/2021   [facility] EMERGENCY DEPT     July Almanzar MD  07/22/21 0258

## 2021-07-26 ENCOUNTER — TRANSFERRED RECORDS (OUTPATIENT)
Dept: HEALTH INFORMATION MANAGEMENT | Facility: CLINIC | Age: 41
End: 2021-07-26

## 2021-07-28 NOTE — TELEPHONE ENCOUNTER
Called patient and relayed labs. Recommend immediately to the ED. Patient agreed.    Report called to Dr. MARISOL Badillo [facility] ED.    Donnie Mansfield PA-C  [facility]     
Hodan from [place] lab calling to report a critical lab value for patient. Hodan reports lab value as follows:    Potassium: 2.4    Triage RN (writer) huddled with Enid Mansfield PA-C, patient's PCP, and she is aware of critical value and will reach out to patient regarding plan of care.    Will forward to physician to acknowledge clinic huddle.    Celia Salinas RN on 9/15/2020 at 2:32 PM      
Name band;

## 2021-08-12 ENCOUNTER — OFFICE VISIT (OUTPATIENT)
Dept: FAMILY MEDICINE | Facility: OTHER | Age: 41
End: 2021-08-12
Payer: COMMERCIAL

## 2021-08-12 VITALS
OXYGEN SATURATION: 99 % | WEIGHT: 130 LBS | TEMPERATURE: 98.2 F | RESPIRATION RATE: 16 BRPM | HEART RATE: 83 BPM | BODY MASS INDEX: 22.31 KG/M2 | SYSTOLIC BLOOD PRESSURE: 128 MMHG | DIASTOLIC BLOOD PRESSURE: 72 MMHG

## 2021-08-12 DIAGNOSIS — Z11.59 NEED FOR HEPATITIS C SCREENING TEST: Primary | ICD-10-CM

## 2021-08-12 DIAGNOSIS — R19.7 DIARRHEA, UNSPECIFIED TYPE: ICD-10-CM

## 2021-08-12 DIAGNOSIS — L23.9 ALLERGIC CONTACT DERMATITIS, UNSPECIFIED TRIGGER: ICD-10-CM

## 2021-08-12 DIAGNOSIS — E87.8 ELECTROLYTE ABNORMALITY: ICD-10-CM

## 2021-08-12 DIAGNOSIS — F10.21 ALCOHOL DEPENDENCE IN REMISSION (H): ICD-10-CM

## 2021-08-12 LAB
ALBUMIN SERPL-MCNC: 3.9 G/DL (ref 3.4–5)
ALP SERPL-CCNC: 74 U/L (ref 40–150)
ALT SERPL W P-5'-P-CCNC: 64 U/L (ref 0–50)
ANION GAP SERPL CALCULATED.3IONS-SCNC: 4 MMOL/L (ref 3–14)
AST SERPL W P-5'-P-CCNC: 33 U/L (ref 0–45)
BILIRUB SERPL-MCNC: 0.3 MG/DL (ref 0.2–1.3)
BUN SERPL-MCNC: 10 MG/DL (ref 7–30)
CALCIUM SERPL-MCNC: 8.5 MG/DL (ref 8.5–10.1)
CHLORIDE BLD-SCNC: 106 MMOL/L (ref 94–109)
CO2 SERPL-SCNC: 28 MMOL/L (ref 20–32)
CREAT SERPL-MCNC: 0.74 MG/DL (ref 0.52–1.04)
GFR SERPL CREATININE-BSD FRML MDRD: >90 ML/MIN/1.73M2
GLUCOSE BLD-MCNC: 103 MG/DL (ref 70–99)
POTASSIUM BLD-SCNC: 3.5 MMOL/L (ref 3.4–5.3)
PROT SERPL-MCNC: 7.2 G/DL (ref 6.8–8.8)
SODIUM SERPL-SCNC: 138 MMOL/L (ref 133–144)

## 2021-08-12 PROCEDURE — 36415 COLL VENOUS BLD VENIPUNCTURE: CPT | Performed by: PHYSICIAN ASSISTANT

## 2021-08-12 PROCEDURE — 80053 COMPREHEN METABOLIC PANEL: CPT | Performed by: PHYSICIAN ASSISTANT

## 2021-08-12 PROCEDURE — 86803 HEPATITIS C AB TEST: CPT | Performed by: PHYSICIAN ASSISTANT

## 2021-08-12 PROCEDURE — 99214 OFFICE O/P EST MOD 30 MIN: CPT | Performed by: PHYSICIAN ASSISTANT

## 2021-08-12 RX ORDER — METHYLPREDNISOLONE 4 MG
TABLET, DOSE PACK ORAL
Qty: 21 TABLET | Refills: 0 | Status: SHIPPED | OUTPATIENT
Start: 2021-08-12 | End: 2021-08-12

## 2021-08-12 RX ORDER — METHYLPREDNISOLONE 4 MG
TABLET, DOSE PACK ORAL
Qty: 21 TABLET | Refills: 0 | Status: SHIPPED | OUTPATIENT
Start: 2021-08-12 | End: 2021-11-29

## 2021-08-12 NOTE — PROGRESS NOTES
Assessment & Plan     Allergic contact dermatitis, unspecified trigger  This appears to be a contact dermatitis of some sort.  I am uncertain as to the true etiology of this.  Further evaluation with dermatology is recommended based on poor resolution given medications that we have tried.  Follow-up as needed.  - Adult Dermatology Referral; Future  - methylPREDNISolone (MEDROL DOSEPAK) 4 MG tablet therapy pack; Follow Package Directions    Electrolyte abnormality  Diarrhea, unspecified type  Alcohol dependence in remission (H)  Need for hepatitis C screening test  Further evaluation based on last labs is discussed with the patient today.  Follow-up as needed.  - Comprehensive metabolic panel (BMP + Alb, Alk Phos, ALT, AST, Total. Bili, TP); Future  - Hepatitis C Screen Reflex to HCV RNA Quant and Genotype; Future    No follow-ups on file.    MARTHA Tyler [facility]    Josse Cordova is a 40 year old who presents for the following health issues     HPI     Wants labs done  Rash was gone but now is coming back again      Review of Systems   Constitutional, HEENT, cardiovascular, pulmonary, GI, , musculoskeletal, neuro, skin, endocrine and psych systems are negative, except as otherwise noted.      Objective    /72   Pulse 83   Temp 98.2  F (36.8  C) (Temporal)   Resp 16   Wt 59 kg (130 lb)   SpO2 99%   BMI 22.31 kg/m    Body mass index is 22.31 kg/m .  Physical Exam   GENERAL: healthy, alert and no distress  NECK: no adenopathy, no asymmetry, masses, or scars and thyroid normal to palpation  RESP: lungs clear to auscultation - no rales, rhonchi or wheezes  CV: regular rate and rhythm, normal S1 S2, no S3 or S4, no murmur, click or rub, no peripheral edema and peripheral pulses strong  ABDOMEN: soft, nontender, no hepatosplenomegaly, no masses and bowel sounds normal  MS: no gross musculoskeletal defects noted, no edema  SKIN: no suspicious lesions or rashes with  exception to the very low back over the belt line and to the bilateral lower anterior feet and legs.  This is improved from the visit to the ER that she had.  Still suspect some sort of contact dermatitis.  NEURO: Normal strength and tone, mentation intact and speech normal  PSYCH: mentation appears normal, affect normal/bright

## 2021-08-13 LAB — HCV AB SERPL QL IA: NONREACTIVE

## 2021-08-20 ENCOUNTER — TRANSFERRED RECORDS (OUTPATIENT)
Dept: HEALTH INFORMATION MANAGEMENT | Facility: CLINIC | Age: 41
End: 2021-08-20

## 2021-08-20 LAB
ALT SERPL-CCNC: 43 LU/L (ref 8–45)
AST SERPL-CCNC: 40 LU/L (ref 2–40)
CREATININE (EXTERNAL): 0.91 MG/DL (ref 0.57–1.11)
GFR ESTIMATED (EXTERNAL): >60 ML/MIN/1.73M2
GFR ESTIMATED (IF AFRICAN AMERICAN) (EXTERNAL): >60 ML/MIN/1.73M2
GLUCOSE (EXTERNAL): 93 MG/DL (ref 65–100)
POTASSIUM (EXTERNAL): 3.1 MMOL/L (ref 3.5–5)

## 2021-08-23 ENCOUNTER — OFFICE VISIT (OUTPATIENT)
Dept: FAMILY MEDICINE | Facility: OTHER | Age: 41
End: 2021-08-23
Payer: COMMERCIAL

## 2021-08-23 VITALS
SYSTOLIC BLOOD PRESSURE: 130 MMHG | OXYGEN SATURATION: 98 % | RESPIRATION RATE: 22 BRPM | DIASTOLIC BLOOD PRESSURE: 80 MMHG | HEART RATE: 108 BPM | TEMPERATURE: 98.7 F | BODY MASS INDEX: 22.18 KG/M2 | WEIGHT: 129.2 LBS

## 2021-08-23 DIAGNOSIS — F10.230 ALCOHOL DEPENDENCE WITH UNCOMPLICATED WITHDRAWAL (H): Primary | ICD-10-CM

## 2021-08-23 DIAGNOSIS — E87.8 ELECTROLYTE ABNORMALITY: ICD-10-CM

## 2021-08-23 DIAGNOSIS — I10 HTN, GOAL BELOW 140/90: ICD-10-CM

## 2021-08-23 DIAGNOSIS — F41.1 GENERALIZED ANXIETY DISORDER: ICD-10-CM

## 2021-08-23 DIAGNOSIS — G47.10 HYPERSOMNIA: ICD-10-CM

## 2021-08-23 DIAGNOSIS — F33.1 MAJOR DEPRESSIVE DISORDER, RECURRENT EPISODE, MODERATE (H): ICD-10-CM

## 2021-08-23 LAB
ALBUMIN SERPL-MCNC: 4.2 G/DL (ref 3.4–5)
ALP SERPL-CCNC: 77 U/L (ref 40–150)
ALT SERPL W P-5'-P-CCNC: 40 U/L (ref 0–50)
ANION GAP SERPL CALCULATED.3IONS-SCNC: 5 MMOL/L (ref 3–14)
AST SERPL W P-5'-P-CCNC: 27 U/L (ref 0–45)
BILIRUB SERPL-MCNC: 0.5 MG/DL (ref 0.2–1.3)
BUN SERPL-MCNC: 4 MG/DL (ref 7–30)
CALCIUM SERPL-MCNC: 8.8 MG/DL (ref 8.5–10.1)
CHLORIDE BLD-SCNC: 104 MMOL/L (ref 94–109)
CO2 SERPL-SCNC: 28 MMOL/L (ref 20–32)
CREAT SERPL-MCNC: 0.86 MG/DL (ref 0.52–1.04)
GFR SERPL CREATININE-BSD FRML MDRD: 85 ML/MIN/1.73M2
GLUCOSE BLD-MCNC: 110 MG/DL (ref 70–99)
MAGNESIUM SERPL-MCNC: 1.8 MG/DL (ref 1.6–2.3)
POTASSIUM BLD-SCNC: 3.1 MMOL/L (ref 3.4–5.3)
PROT SERPL-MCNC: 7.8 G/DL (ref 6.8–8.8)
SODIUM SERPL-SCNC: 137 MMOL/L (ref 133–144)

## 2021-08-23 PROCEDURE — 36415 COLL VENOUS BLD VENIPUNCTURE: CPT | Performed by: PHYSICIAN ASSISTANT

## 2021-08-23 PROCEDURE — 99214 OFFICE O/P EST MOD 30 MIN: CPT | Performed by: PHYSICIAN ASSISTANT

## 2021-08-23 PROCEDURE — 83735 ASSAY OF MAGNESIUM: CPT | Performed by: PHYSICIAN ASSISTANT

## 2021-08-23 PROCEDURE — 80053 COMPREHEN METABOLIC PANEL: CPT | Performed by: PHYSICIAN ASSISTANT

## 2021-08-23 RX ORDER — METHYLPHENIDATE HYDROCHLORIDE 10 MG/1
10 TABLET ORAL 2 TIMES DAILY
Qty: 60 TABLET | Refills: 0 | Status: SHIPPED | OUTPATIENT
Start: 2021-08-23 | End: 2021-11-29

## 2021-08-23 RX ORDER — METOPROLOL SUCCINATE 50 MG/1
50 TABLET, EXTENDED RELEASE ORAL DAILY
Qty: 90 TABLET | Refills: 3 | Status: SHIPPED | OUTPATIENT
Start: 2021-08-23 | End: 2021-11-29

## 2021-08-23 ASSESSMENT — ANXIETY QUESTIONNAIRES
5. BEING SO RESTLESS THAT IT IS HARD TO SIT STILL: MORE THAN HALF THE DAYS
GAD7 TOTAL SCORE: 19
8. IF YOU CHECKED OFF ANY PROBLEMS, HOW DIFFICULT HAVE THESE MADE IT FOR YOU TO DO YOUR WORK, TAKE CARE OF THINGS AT HOME, OR GET ALONG WITH OTHER PEOPLE?: SOMEWHAT DIFFICULT
7. FEELING AFRAID AS IF SOMETHING AWFUL MIGHT HAPPEN: NEARLY EVERY DAY
3. WORRYING TOO MUCH ABOUT DIFFERENT THINGS: NEARLY EVERY DAY
1. FEELING NERVOUS, ANXIOUS, OR ON EDGE: NEARLY EVERY DAY
7. FEELING AFRAID AS IF SOMETHING AWFUL MIGHT HAPPEN: NEARLY EVERY DAY
GAD7 TOTAL SCORE: 19
GAD7 TOTAL SCORE: 19
2. NOT BEING ABLE TO STOP OR CONTROL WORRYING: NEARLY EVERY DAY
6. BECOMING EASILY ANNOYED OR IRRITABLE: NEARLY EVERY DAY
4. TROUBLE RELAXING: MORE THAN HALF THE DAYS

## 2021-08-23 ASSESSMENT — PAIN SCALES - GENERAL: PAINLEVEL: NO PAIN (0)

## 2021-08-23 ASSESSMENT — PATIENT HEALTH QUESTIONNAIRE - PHQ9
SUM OF ALL RESPONSES TO PHQ QUESTIONS 1-9: 15
10. IF YOU CHECKED OFF ANY PROBLEMS, HOW DIFFICULT HAVE THESE PROBLEMS MADE IT FOR YOU TO DO YOUR WORK, TAKE CARE OF THINGS AT HOME, OR GET ALONG WITH OTHER PEOPLE: SOMEWHAT DIFFICULT
SUM OF ALL RESPONSES TO PHQ QUESTIONS 1-9: 15

## 2021-08-23 NOTE — PROGRESS NOTES
Assessment & Plan     ICD-10-CM    1. Alcohol dependence with uncomplicated withdrawal (H)  F10.230 Basic metabolic panel  (Ca, Cl, CO2, Creat, Gluc, K, Na, BUN)   2. Electrolyte abnormality  E87.8 Comprehensive metabolic panel (BMP + Alb, Alk Phos, ALT, AST, Total. Bili, TP)     Magnesium     Magnesium     Comprehensive metabolic panel (BMP + Alb, Alk Phos, ALT, AST, Total. Bili, TP)     Basic metabolic panel  (Ca, Cl, CO2, Creat, Gluc, K, Na, BUN)   3. Major depressive disorder, recurrent episode, moderate (H)  F33.1    4. Generalized anxiety disorder  F41.1    5. Hypersomnia  G47.10 methylphenidate (RITALIN) 10 MG tablet   6. HTN, goal below 140/90  I10 metoprolol succinate ER (TOPROL-XL) 50 MG 24 hr tablet     1-4. Mood and ETOH abuse   - Patient reports several relapses, just feels very alone and a deep sadness in her   - Is doing out patient treatment but feels needs to go back in-patient   - Has apt next week with Meka   - Discussed recommend telling them about what is going on   - Also encouraged her to call [facility] to restart her Naltrexone injections as she did the best when was on this   - Had hypokalemia in ED recently, recommend rechecking this again today  - Results will be communicated to patient when available and appropriate medication changes made if necessary     5. Hypersomnia   - Patient struggling as hasn't had medication in awhile and is now back at work   -  reviewed - last fill was 7/15/21   - Reviewed medication use and side effects, refilled   - Will need to plan on updating CSA and UTox at next visit   - Recommend following monthly for awhile     6. HTN   - Reviewed medication use and side effects, refilled   - Stable       Review of the result(s) of each unique test - See list     Care Everywhere 8/20/21 - BMP, Hepatic panel, Lipase, CBC     8/12/21 - CMP, CBC, Lipid      CareEverywhere 6/25/21 - Drug screen   Diagnosis or treatment significantly limited by social determinants of  health - Depression     35 minutes spent on the date of the encounter doing chart review, history and exam, documentation and further activities as noted above    The patient indicates understanding of these issues and agrees with the plan.    Return in about 1 month (around 9/23/2021).    MARTHA Metz [facility] BENJAMÍN Cordova is a 40 year old who presents for the following health issues.    Also discuss lab results from 8/20/21     History of Present Illness       Mental Health Follow-up:  Patient presents to follow-up on Depression & Anxiety.Patient's depression since last visit has been:  Worse  The patient is having other symptoms associated with depression.  Patient's anxiety since last visit has been:  Worse  The patient is having other symptoms associated with anxiety.  Any significant life events: other  Patient is feeling anxious or having panic attacks.  Patient has concerns about alcohol or drug use.     Social History  Tobacco Use    Smoking status: Current Every Day Smoker      Packs/day: 0.25    Smokeless tobacco: Never Used  Alcohol use: No  Drug use: No      Today's PHQ-9         PHQ-9 Total Score:     (P) 15   PHQ-9 Q9 Thoughts of better off dead/self-harm past 2 weeks :   (P) Not at all   Thoughts of suicide or self harm:      Self-harm Plan:        Self-harm Action:          Safety concerns for self or others:           Hypertension: She presents for follow up of hypertension.  She does check blood pressure  regularly outside of the clinic. Outpatient blood pressures have not been over 140/90. She follows a low salt diet.     She eats 2-3 servings of fruits and vegetables daily.She consumes 2 sweetened beverage(s) daily.She exercises with enough effort to increase her heart rate 60 or more minutes per day.  She exercises with enough effort to increase her heart rate 6 days per week.   She is taking medications regularly.       - Going  to out patient but not helping   - More underlying things to take care of   - No emotional support   - Meka tomorrow, switched lithium   - Deep sadness in her   - 3 weeks on this medication, no change   - Wants to go back to in-patient     - [facility] for Naltrexone         ROMI-7 SCORE 11/2/2020 2/1/2021 8/23/2021   Total Score - - -   Total Score 10 (moderate anxiety) 11 (moderate anxiety) 19 (severe anxiety)   Total Score 10 11 19       PHQ 11/2/2020 2/1/2021 8/23/2021   PHQ-9 Total Score 8 8 15   Q9: Thoughts of better off dead/self-harm past 2 weeks Not at all Not at all Not at all           Review of Systems   Constitutional, HEENT, cardiovascular, pulmonary, gi and gu systems are negative, except as otherwise noted.      Objective    /80   Pulse 108   Temp 98.7  F (37.1  C) (Temporal)   Resp 22   Wt 58.6 kg (129 lb 3.2 oz)   SpO2 98%   BMI 22.18 kg/m    Body mass index is 22.18 kg/m .  Physical Exam   GENERAL APPEARANCE: healthy, alert and no distress  EYES: Eyes grossly normal to inspection, PERRLA, conjunctivae and sclerae without injection or discharge, EOM intact   RESP: Lungs clear to auscultation - no rales, rhonchi or wheezes    CV: Regular rates and rhythm, normal S1 S2, no S3 or S4, no murmur, click or rub, no peripheral edema and peripheral pulses strong and symmetric bilaterally   MS: No musculoskeletal defects are noted and gait is age appropriate without ataxia   SKIN: No suspicious lesions or rashes, hydration status appears adeuqate with normal skin turgor   PSYCH: Alert and oriented x3; speech- coherent , normal rate and volume; able to articulate logical thoughts, able to abstract reason, no tangential thoughts, no hallucinations or delusions, mentation appears normal, Mood is euthymic. Affect is appropriate for this mood state and bright. Thought content is free of suicidal ideation, hallucinations, and delusions. Dress is adequate and upkept. Eye contact is good during  conversation.         Diagnostics  Reviewed in Epic  See orders pending in Epic

## 2021-08-24 ENCOUNTER — TRANSFERRED RECORDS (OUTPATIENT)
Dept: HEALTH INFORMATION MANAGEMENT | Facility: CLINIC | Age: 41
End: 2021-08-24

## 2021-08-24 LAB — PHQ9 SCORE: 11

## 2021-08-24 ASSESSMENT — ANXIETY QUESTIONNAIRES: GAD7 TOTAL SCORE: 19

## 2021-08-24 NOTE — RESULT ENCOUNTER NOTE
Josefina Cordova    Your results show your potassium is still low. I recommend recheck in 1 week.     The results are attached for your review.       Donnie Mansfield PA-C

## 2021-09-10 ENCOUNTER — LAB (OUTPATIENT)
Dept: LAB | Facility: OTHER | Age: 41
End: 2021-09-10
Payer: COMMERCIAL

## 2021-09-10 DIAGNOSIS — F10.230 ALCOHOL DEPENDENCE WITH UNCOMPLICATED WITHDRAWAL (H): ICD-10-CM

## 2021-09-10 DIAGNOSIS — E87.8 ELECTROLYTE ABNORMALITY: ICD-10-CM

## 2021-09-10 LAB
ANION GAP SERPL CALCULATED.3IONS-SCNC: 7 MMOL/L (ref 3–14)
BUN SERPL-MCNC: 14 MG/DL (ref 7–30)
CALCIUM SERPL-MCNC: 9 MG/DL (ref 8.5–10.1)
CHLORIDE BLD-SCNC: 97 MMOL/L (ref 94–109)
CO2 SERPL-SCNC: 29 MMOL/L (ref 20–32)
CREAT SERPL-MCNC: 0.72 MG/DL (ref 0.52–1.04)
GFR SERPL CREATININE-BSD FRML MDRD: >90 ML/MIN/1.73M2
GLUCOSE BLD-MCNC: 95 MG/DL (ref 70–99)
POTASSIUM BLD-SCNC: 3.8 MMOL/L (ref 3.4–5.3)
SODIUM SERPL-SCNC: 133 MMOL/L (ref 133–144)

## 2021-09-10 PROCEDURE — 80048 BASIC METABOLIC PNL TOTAL CA: CPT

## 2021-09-10 PROCEDURE — 36415 COLL VENOUS BLD VENIPUNCTURE: CPT

## 2021-09-13 NOTE — RESULT ENCOUNTER NOTE
MsFreddie Martinezclaudettegeoff    I am resulting your labs as Donnie is out of office. Your recent test results are attached. Improved potassium levels and normal kidney function.    If you have any questions or concerns please contact me via My Chart or call the clinic at [phone]     Thank You  Justine Larkin MD.

## 2021-10-23 ENCOUNTER — HEALTH MAINTENANCE LETTER (OUTPATIENT)
Age: 41
End: 2021-10-23

## 2021-11-29 ENCOUNTER — VIRTUAL VISIT (OUTPATIENT)
Dept: FAMILY MEDICINE | Facility: OTHER | Age: 41
End: 2021-11-29
Payer: COMMERCIAL

## 2021-11-29 DIAGNOSIS — R11.2 INTRACTABLE VOMITING WITH NAUSEA, UNSPECIFIED VOMITING TYPE: ICD-10-CM

## 2021-11-29 DIAGNOSIS — S06.0X9A CONCUSSION WITH LOSS OF CONSCIOUSNESS, INITIAL ENCOUNTER: Primary | ICD-10-CM

## 2021-11-29 DIAGNOSIS — R47.81 SLURRED SPEECH: ICD-10-CM

## 2021-11-29 DIAGNOSIS — I10 HTN, GOAL BELOW 140/90: ICD-10-CM

## 2021-11-29 DIAGNOSIS — G47.10 HYPERSOMNIA: ICD-10-CM

## 2021-11-29 PROCEDURE — 99214 OFFICE O/P EST MOD 30 MIN: CPT | Mod: 95 | Performed by: PHYSICIAN ASSISTANT

## 2021-11-29 RX ORDER — METOPROLOL SUCCINATE 50 MG/1
50 TABLET, EXTENDED RELEASE ORAL DAILY
Qty: 90 TABLET | Refills: 3 | Status: SHIPPED | OUTPATIENT
Start: 2021-11-29 | End: 2022-01-01

## 2021-11-29 RX ORDER — METHYLPHENIDATE HYDROCHLORIDE 10 MG/1
10 TABLET ORAL 2 TIMES DAILY
Qty: 60 TABLET | Refills: 0 | Status: SHIPPED | OUTPATIENT
Start: 2021-11-29 | End: 2022-01-01

## 2021-11-29 ASSESSMENT — PAIN SCALES - GENERAL: PAINLEVEL: NO PAIN (0)

## 2021-11-29 NOTE — PROGRESS NOTES
Tasha is a 41 year old who is being evaluated via a billable telephone visit.      What phone number would you like to be contacted at? [phone]  How would you like to obtain your AVS? MyChart    Assessment & Plan     ICD-10-CM    1. Concussion with loss of consciousness, initial encounter  S06.0X9A    2. Slurred speech  R47.81    3. Intractable vomiting with nausea, unspecified vomiting type  R11.2    4. Hypersomnia  G47.10 methylphenidate (RITALIN) 10 MG tablet   5. HTN, goal below 140/90  I10 metoprolol succinate ER (TOPROL-XL) 50 MG 24 hr tablet     1-3.   - Patient was very distraught on the phone, her speech was extremely slurred and she couldn't follow the conversation   - Reports fell and hit her head really hard today (or yesterday?) has been throwing up a lot   - Encouraged patient to immediately call 911, she was in agreement for this   - Concern for concussion/stroke/brain bleed     4 & 5.   - Was not able to discuss with patient today  - Rin refills sent   -  reviewed         Review of the result(s) of each unique test - None   Diagnosis or treatment significantly limited by social determinants of health - Depression     10 minutes spent on the date of the encounter doing chart review, history and exam, documentation and further activities as noted above    The patient indicates understanding of these issues and agrees with the plan.    Follow up: immediately call 911     Enid Whiting-MARTHA Block [facility]   Tasha is a 41 year old who presents for the following health issues     HPI     Fell and hit head really bad yesterday   - knocked out   - doesn't know how long   - Head hurts really bad  - Can't concentrate   - Been throwing up a lot       Hypertension Follow-up      Do you check your blood pressure regularly outside of the clinic? Yes     Are you following a low salt diet? Yes    Are your blood pressures ever more than 140 on the top  number (systolic) OR more   than 90 on the bottom number (diastolic), for example 140/90? No      Medication Followup of Ritalin    Taking Medication as prescribed: yes    Side Effects:  None    Medication Helping Symptoms:  yes         Review of Systems   Constitutional, HEENT, cardiovascular, pulmonary, gi and gu systems are negative, except as otherwise noted.      Objective    Vitals - Patient Reported  Pain Score: No Pain (0)    Physical Exam   healthy, alert and no distress  PSYCH: Alert but not oriente; slurred only partially coherent speech, normal   rate and volume  RESP: No cough, no audible wheezing, able to talk in full sentences  Remainder of exam unable to be completed due to telephone visits    Diagnostics: None         Phone call duration: 5 minutes

## 2021-12-20 NOTE — PROGRESS NOTES
Assessment & Plan     Malaise and fatigue  Fever, unspecified  Diarrhea, unspecified type  H/O ETOH abuse  Electrolyte disturbance  Unequal pupil diameter - L>R  Traumatic brain injury, without loss of consciousness, subsequent encounter  Alcohol dependence with uncomplicated withdrawal (H)  Patient states that she has not had any alcohol recently.  She is continue to struggle with diarrhea and potentially some electrolyte abnormalities related to this.  He has not had her lithium check in quite some time.  She has been exposed to Covid 19 recently and the confluence of symptoms that she has raises concern for possible COVID-19.  Advised labs on her way out and we will base treatment on results.  Follow-up with primary care provider in the near future strongly recommended.  - lithium ER (LITHOBID) 300 MG CR tablet; Take 1 tablet (300 mg) by mouth daily  - Comprehensive metabolic panel (BMP + Alb, Alk Phos, ALT, AST, Total. Bili, TP); Future  - Magnesium; Future  - Vitamin D Deficiency; Future  - Lipid panel reflex to direct LDL Fasting; Future  - TSH with free T4 reflex; Future  - Symptomatic; Yes; 12/18/2021 COVID-19 Virus (Coronavirus) by PCR Nose; Future  - Symptomatic; Yes; 12/18/2021 COVID-19 Virus (Coronavirus) by PCR Nose  - Comprehensive metabolic panel (BMP + Alb, Alk Phos, ALT, AST, Total. Bili, TP)  - Magnesium  - Vitamin D Deficiency  - Lipid panel reflex to direct LDL Fasting  - TSH with free T4 reflex    Tobacco use disorder  - nicotine (NICODERM CQ) 21 MG/24HR 24 hr patch; Place 1 patch onto the skin daily  - nicotine (NICORETTE) 2 MG gum; Place 1 each (2 mg) inside cheek every hour as needed for smoking cessation    Chronic seasonal allergic rhinitis due to pollen  - flunisolide (NASALIDE) 25 MCG/ACT (0.025%) SOLN spray; Spray 2 sprays into both nostrils every 12 hours    I did review the 2 ER reports within her chart one from her fall and another from follow-up from the fall and electrolyte  abnormalities.     No follow-ups on file.    Solitario Fernandes PA-C  [facility] BNEJAMÍN Cordova is a 41 year old who presents for the following health issues     History of Present Illness       Mental Health Follow-up:  Patient presents to follow-up on Depression & Anxiety.Patient's depression since last visit has been:  Good  The patient is not having other symptoms associated with depression.  Patient's anxiety since last visit has been:  Good  The patient is not having other symptoms associated with anxiety.  Any significant life events: No  Patient is not feeling anxious or having panic attacks.  Patient has no concerns about alcohol or drug use.     Social History  Tobacco Use    Smoking status: Current Every Day Smoker      Packs/day: 0.25      Types: Cigarettes    Smokeless tobacco: Never Used    Tobacco comment: trying to quit  Vaping Use    Vaping Use: Never used  Alcohol use: No  Drug use: No      Today's PHQ-9         PHQ-9 Total Score:     (P) 4   PHQ-9 Q9 Thoughts of better off dead/self-harm past 2 weeks :   (P) Not at all   Thoughts of suicide or self harm:      Self-harm Plan:        Self-harm Action:          Safety concerns for self or others:           Hypertension: She presents for follow up of hypertension.  She does check blood pressure  regularly outside of the clinic. Outpatient blood pressures have not been over 140/90. She follows a low salt diet.     She eats 2-3 servings of fruits and vegetables daily.She consumes 1 sweetened beverage(s) daily.She exercises with enough effort to increase her heart rate 30 to 60 minutes per day.  She exercises with enough effort to increase her heart rate 5 days per week.   She is taking medications regularly.         Concern for COVID-19  About how many days ago did these symptoms start? 2 days ago  Is this your first visit for this illness? Yes  In the 14 days before your symptoms started, have you had close contact with  someone with COVID-19 (Coronavirus)? Yes, I have been in contact with someone who has COVID-19/Coronavirus (confirmed by lab test).  Do you have a fever or chills? Yes, the highest temperature was 100.6  Are you having new or worsening difficulty breathing? Yes   Please describe what kind of difficulty you are having breathing:Mild dyspnea (able to do ADLs without difficulty, mild shortness of breath with activities such as climbing one or two flights of stairs or walking briskly)  Do you have new or worsening cough? No  Have you had any new or unexplained body aches? YES    Have you experienced any of the following NEW symptoms?    Headache: No    Sore throat: No    Loss of taste or smell: No    Chest pain: No    Diarrhea: YES    Rash: No  What treatments have you tried? tylenol   Who do you live with? alone  Are you, or a household member, a healthcare worker or a ? No  Do you live in a nursing home, group home, or shelter? No  Do you have a way to get food/medications if quarantined? Yes, I have a friend or family member who can help me.      Review of Systems   Constitutional, HEENT, cardiovascular, pulmonary, GI, , musculoskeletal, neuro, skin, endocrine and psych systems are negative, except as otherwise noted.      Objective    BP (!) 130/90   Pulse 103   Temp 99.2  F (37.3  C) (Temporal)   Resp 19   Wt 59.4 kg (131 lb)   SpO2 99%   BMI 22.49 kg/m    Body mass index is 22.49 kg/m .  Physical Exam   GENERAL: healthy, alert and no distress  EYES: pupils-left pupil is more dilated than the right today.  She is following up with an ophthalmologist here in the near future.  This was noted first after her recent head injury and thought to be related to potential traumatic brain injury.  No new injury since time of initial evaluation.  NECK: no adenopathy, no asymmetry, masses, or scars and thyroid normal to palpation  RESP: lungs clear to auscultation - no rales, rhonchi or wheezes  CV:  regular rate and rhythm, normal S1 S2, no S3 or S4, no murmur, click or rub, no peripheral edema and peripheral pulses strong  ABDOMEN: soft, nontender, no hepatosplenomegaly, no masses and bowel sounds normal  MS: no gross musculoskeletal defects noted, no edema  SKIN: no suspicious lesions or rashes to exposed visible skin today.  NEURO: Normal strength and tone, mentation intact and speech normal  PSYCH: mentation appears normal, affect normal/bright    No results found for this or any previous visit (from the past 24 hour(s)).            Answers for HPI/ROS submitted by the patient on 12/21/2021  If you checked off any problems, how difficult have these problems made it for you to do your work, take care of things at home, or get along with other people?: Not difficult at all  PHQ9 TOTAL SCORE: 4  ROMI 7 TOTAL SCORE: 5

## 2021-12-21 ENCOUNTER — OFFICE VISIT (OUTPATIENT)
Dept: FAMILY MEDICINE | Facility: OTHER | Age: 41
End: 2021-12-21
Payer: COMMERCIAL

## 2021-12-21 ENCOUNTER — TELEPHONE (OUTPATIENT)
Dept: FAMILY MEDICINE | Facility: OTHER | Age: 41
End: 2021-12-21

## 2021-12-21 VITALS
RESPIRATION RATE: 19 BRPM | HEART RATE: 103 BPM | DIASTOLIC BLOOD PRESSURE: 90 MMHG | OXYGEN SATURATION: 99 % | BODY MASS INDEX: 22.49 KG/M2 | SYSTOLIC BLOOD PRESSURE: 130 MMHG | WEIGHT: 131 LBS | TEMPERATURE: 99.2 F

## 2021-12-21 DIAGNOSIS — R53.83 MALAISE AND FATIGUE: Primary | ICD-10-CM

## 2021-12-21 DIAGNOSIS — F17.200 TOBACCO USE DISORDER: ICD-10-CM

## 2021-12-21 DIAGNOSIS — E87.8 ELECTROLYTE ABNORMALITY: ICD-10-CM

## 2021-12-21 DIAGNOSIS — R19.7 DIARRHEA, UNSPECIFIED TYPE: ICD-10-CM

## 2021-12-21 DIAGNOSIS — J30.1 CHRONIC SEASONAL ALLERGIC RHINITIS DUE TO POLLEN: ICD-10-CM

## 2021-12-21 DIAGNOSIS — F10.11 H/O ETOH ABUSE: ICD-10-CM

## 2021-12-21 DIAGNOSIS — S06.9X0D TRAUMATIC BRAIN INJURY, WITHOUT LOSS OF CONSCIOUSNESS, SUBSEQUENT ENCOUNTER: ICD-10-CM

## 2021-12-21 DIAGNOSIS — R50.9 FEVER, UNSPECIFIED: ICD-10-CM

## 2021-12-21 DIAGNOSIS — R53.81 MALAISE AND FATIGUE: Primary | ICD-10-CM

## 2021-12-21 DIAGNOSIS — H57.02 UNEQUAL PUPIL DIAMETER: ICD-10-CM

## 2021-12-21 DIAGNOSIS — E87.8 ELECTROLYTE DISTURBANCE: ICD-10-CM

## 2021-12-21 LAB
ALBUMIN SERPL-MCNC: 4.2 G/DL (ref 3.4–5)
ALP SERPL-CCNC: 90 U/L (ref 40–150)
ALT SERPL W P-5'-P-CCNC: 52 U/L (ref 0–50)
ANION GAP SERPL CALCULATED.3IONS-SCNC: 10 MMOL/L (ref 3–14)
AST SERPL W P-5'-P-CCNC: 43 U/L (ref 0–45)
BILIRUB SERPL-MCNC: 0.5 MG/DL (ref 0.2–1.3)
BUN SERPL-MCNC: 9 MG/DL (ref 7–30)
CALCIUM SERPL-MCNC: 8.7 MG/DL (ref 8.5–10.1)
CHLORIDE BLD-SCNC: 103 MMOL/L (ref 94–109)
CHOLEST SERPL-MCNC: 206 MG/DL
CO2 SERPL-SCNC: 23 MMOL/L (ref 20–32)
CREAT SERPL-MCNC: 0.58 MG/DL (ref 0.52–1.04)
DEPRECATED CALCIDIOL+CALCIFEROL SERPL-MC: 22 UG/L (ref 20–75)
FASTING STATUS PATIENT QL REPORTED: YES
GFR SERPL CREATININE-BSD FRML MDRD: >90 ML/MIN/1.73M2
GLUCOSE BLD-MCNC: 86 MG/DL (ref 70–99)
HDLC SERPL-MCNC: 42 MG/DL
LDLC SERPL CALC-MCNC: 85 MG/DL
MAGNESIUM SERPL-MCNC: 1.7 MG/DL (ref 1.6–2.3)
NONHDLC SERPL-MCNC: 164 MG/DL
POTASSIUM BLD-SCNC: 3.6 MMOL/L (ref 3.4–5.3)
PROT SERPL-MCNC: 8 G/DL (ref 6.8–8.8)
SARS-COV-2 RNA RESP QL NAA+PROBE: NEGATIVE
SODIUM SERPL-SCNC: 136 MMOL/L (ref 133–144)
TRIGL SERPL-MCNC: 395 MG/DL
TSH SERPL DL<=0.005 MIU/L-ACNC: 1.48 MU/L (ref 0.4–4)

## 2021-12-21 PROCEDURE — 99214 OFFICE O/P EST MOD 30 MIN: CPT | Performed by: PHYSICIAN ASSISTANT

## 2021-12-21 PROCEDURE — 84443 ASSAY THYROID STIM HORMONE: CPT | Performed by: PHYSICIAN ASSISTANT

## 2021-12-21 PROCEDURE — 80061 LIPID PANEL: CPT | Performed by: PHYSICIAN ASSISTANT

## 2021-12-21 PROCEDURE — 83735 ASSAY OF MAGNESIUM: CPT | Performed by: PHYSICIAN ASSISTANT

## 2021-12-21 PROCEDURE — 80053 COMPREHEN METABOLIC PANEL: CPT | Performed by: PHYSICIAN ASSISTANT

## 2021-12-21 PROCEDURE — 36415 COLL VENOUS BLD VENIPUNCTURE: CPT | Performed by: PHYSICIAN ASSISTANT

## 2021-12-21 PROCEDURE — 82306 VITAMIN D 25 HYDROXY: CPT | Performed by: PHYSICIAN ASSISTANT

## 2021-12-21 PROCEDURE — U0005 INFEC AGEN DETEC AMPLI PROBE: HCPCS | Performed by: PHYSICIAN ASSISTANT

## 2021-12-21 PROCEDURE — U0003 INFECTIOUS AGENT DETECTION BY NUCLEIC ACID (DNA OR RNA); SEVERE ACUTE RESPIRATORY SYNDROME CORONAVIRUS 2 (SARS-COV-2) (CORONAVIRUS DISEASE [COVID-19]), AMPLIFIED PROBE TECHNIQUE, MAKING USE OF HIGH THROUGHPUT TECHNOLOGIES AS DESCRIBED BY CMS-2020-01-R: HCPCS | Performed by: PHYSICIAN ASSISTANT

## 2021-12-21 RX ORDER — LITHIUM CARBONATE 300 MG/1
300 TABLET, FILM COATED, EXTENDED RELEASE ORAL DAILY
Qty: 90 TABLET | Refills: 1 | Status: SHIPPED | OUTPATIENT
Start: 2021-12-21 | End: 2022-01-01 | Stop reason: DRUGHIGH

## 2021-12-21 RX ORDER — ONDANSETRON 4 MG/1
4 TABLET, ORALLY DISINTEGRATING ORAL
COMMUNITY
End: 2022-01-01

## 2021-12-21 RX ORDER — NICOTINE 21 MG/24HR
1 PATCH, TRANSDERMAL 24 HOURS TRANSDERMAL DAILY
Qty: 30 PATCH | Refills: 3 | Status: SHIPPED | OUTPATIENT
Start: 2021-12-21 | End: 2022-01-01

## 2021-12-21 RX ORDER — MULTIVITAMIN,THER AND MINERALS
1 TABLET ORAL DAILY
COMMUNITY
Start: 2021-12-06

## 2021-12-21 RX ORDER — FLUNISOLIDE 0.25 MG/ML
2 SOLUTION NASAL EVERY 12 HOURS
Qty: 25 ML | Refills: 11 | Status: SHIPPED | OUTPATIENT
Start: 2021-12-21 | End: 2021-12-22

## 2021-12-21 ASSESSMENT — PATIENT HEALTH QUESTIONNAIRE - PHQ9
10. IF YOU CHECKED OFF ANY PROBLEMS, HOW DIFFICULT HAVE THESE PROBLEMS MADE IT FOR YOU TO DO YOUR WORK, TAKE CARE OF THINGS AT HOME, OR GET ALONG WITH OTHER PEOPLE: NOT DIFFICULT AT ALL
SUM OF ALL RESPONSES TO PHQ QUESTIONS 1-9: 4
SUM OF ALL RESPONSES TO PHQ QUESTIONS 1-9: 4

## 2021-12-21 ASSESSMENT — ANXIETY QUESTIONNAIRES
3. WORRYING TOO MUCH ABOUT DIFFERENT THINGS: SEVERAL DAYS
5. BEING SO RESTLESS THAT IT IS HARD TO SIT STILL: NOT AT ALL
4. TROUBLE RELAXING: NOT AT ALL
6. BECOMING EASILY ANNOYED OR IRRITABLE: SEVERAL DAYS
7. FEELING AFRAID AS IF SOMETHING AWFUL MIGHT HAPPEN: SEVERAL DAYS
GAD7 TOTAL SCORE: 5
1. FEELING NERVOUS, ANXIOUS, OR ON EDGE: SEVERAL DAYS
GAD7 TOTAL SCORE: 5
7. FEELING AFRAID AS IF SOMETHING AWFUL MIGHT HAPPEN: SEVERAL DAYS
GAD7 TOTAL SCORE: 5
2. NOT BEING ABLE TO STOP OR CONTROL WORRYING: SEVERAL DAYS

## 2021-12-21 NOTE — TELEPHONE ENCOUNTER
Flunisolide 25 mcg/act is not covered by ins, is there another medication they can use? If so, please send another script for something else. If not, this medication will need a prior auth.     On behalf of [facility]  Thank You~  Twyla Llanos, [facility]

## 2021-12-22 ENCOUNTER — MYC MEDICAL ADVICE (OUTPATIENT)
Dept: FAMILY MEDICINE | Facility: OTHER | Age: 41
End: 2021-12-22
Payer: COMMERCIAL

## 2021-12-22 DIAGNOSIS — J30.1 CHRONIC SEASONAL ALLERGIC RHINITIS DUE TO POLLEN: ICD-10-CM

## 2021-12-22 RX ORDER — FLUTICASONE PROPIONATE 50 MCG
1 SPRAY, SUSPENSION (ML) NASAL DAILY
Qty: 16 G | Refills: 11 | Status: SHIPPED | OUTPATIENT
Start: 2021-12-22 | End: 2022-01-01

## 2021-12-22 ASSESSMENT — PATIENT HEALTH QUESTIONNAIRE - PHQ9: SUM OF ALL RESPONSES TO PHQ QUESTIONS 1-9: 4

## 2021-12-22 ASSESSMENT — ANXIETY QUESTIONNAIRES: GAD7 TOTAL SCORE: 5

## 2021-12-23 ENCOUNTER — MYC MEDICAL ADVICE (OUTPATIENT)
Dept: FAMILY MEDICINE | Facility: OTHER | Age: 41
End: 2021-12-23
Payer: COMMERCIAL

## 2021-12-23 DIAGNOSIS — H93.233 HYPERACUSIA, BILATERAL: ICD-10-CM

## 2021-12-23 DIAGNOSIS — S06.9X0D TRAUMATIC BRAIN INJURY, WITHOUT LOSS OF CONSCIOUSNESS, SUBSEQUENT ENCOUNTER: Primary | ICD-10-CM

## 2021-12-23 NOTE — TELEPHONE ENCOUNTER
Provider already responded via [employer]. Closing encounter.    Nuvia Gramajo, BSN, RN, PHN  Registered Nurse-Clinic Triage  [facility]/[place]  12/23/2021 at 1:40 PM

## 2021-12-23 NOTE — TELEPHONE ENCOUNTER
Routing to provider-    Patient was seen on 12/21    SERA [facility]  December 23, 2021

## 2021-12-29 ENCOUNTER — E-VISIT (OUTPATIENT)
Dept: URGENT CARE | Facility: URGENT CARE | Age: 41
End: 2021-12-29
Payer: COMMERCIAL

## 2021-12-29 DIAGNOSIS — R05.9 COUGH: Primary | ICD-10-CM

## 2021-12-29 PROCEDURE — 99421 OL DIG E/M SVC 5-10 MIN: CPT | Performed by: FAMILY MEDICINE

## 2021-12-29 NOTE — PATIENT INSTRUCTIONS
Tasha,      Based on your responses, you may have influenza or coronavirus (COVID-19). This illness can cause fever, cough and trouble breathing. Many people get a mild case and get better on their own. Some people can get very sick.    We typically would not use antibiotics for cough symptoms like this for 3-4 days as they can be harmful.     Will I be tested for COVID-19?  We would like to test you for COVID-19 virus and influenza. I have placed orders for this test.     To schedule: go to your [employer] home page and scroll down to the section that says  You have an appointment that needs to be scheduled  and click the large green button that says  Schedule Now  and follow the steps to find the next available openings. Also let them know we want to test you for influenza.     If you are unable to complete these [employer] scheduling steps, please call [phone] to schedule your testing.     Return to work/school/ guidance:  Please let your workplace manager and staffing office know when your isolation ends.       If you receive a positive COVID-19 test result, follow the guidance of the those who are giving you the results. Usually the return to work is 10 days from symptom onset or positive test date, (or in some cases 20 days if you are immunocompromised). If your symptoms started after your positive test, the 10 days should start when your symptoms started.   o If you work at [facility], you must also be cleared by Employee Occupational Health and Safety to return to work.      If you receive a negative COVID-19 test result and did not have a high risk exposure to someone with a known positive COVID-19 test, you can return to work once you're free of fever for 24 hours without fever-reducing medication and your symptoms are improving or resolved.    If you receive a negative COVID-19 test and had a high-risk exposure to someone who has tested positive for COVID-19 then you can return to work 14  days after your last contact with the positive individual. Follow quarantine guidance given by your doctor or public health officials.     Sign up for [employer]:  We know it's scary to hear that you might have COVID-19. We want to track your symptoms to make sure you're okay over the next 2 weeks. Please look for an email from [employer]--this is a free, online program that we'll use to keep in touch. To sign up, follow the link in the email you will receive. Learn more at [email or web address]    How can I take care of myself?  Over the counter medications may help with your symptoms like congestion, cough, chills, or fever.    There are not many effective prescription treatments for early COVID-19. Hydroxychloroquine, ivermectin, and azithromycin are not effective or recommended for COVID-19.    If your symptoms started in the last 10 days, you may be able to receive a treatment with monoclonal antibodies. This treatment can lower your risk of severe illness and going to the hospital. It is given through an IV or under your skin (subcutaneous) and must be given at an infusion center. You must be 12 or older, weight at least 88 pounds, and have a positive COVID-19 test.     If you would like to sign up to be considered to receive the monoclonal antibody medicine, please complete a participation form through the [facility] here: MNRAP ([email or web address]). You may also call the [facility] COVID-19 Public Hotline at 1-188.213.3462 (open Mon-Fri: 9am-7pm and Sat: 10am-6pm).     Not all people who are eligible will receive the medicine, since supply is limited. You will be contacted in the next 1 to 2 business days only if you are selected. If you do not receive a call, you have not been selected to receive the medicine. If you have any questions about this medication, please contact your primary care provider. For more information, see  [email or web address]      Get lots of rest. Drink extra fluids (unless a doctor has told you not to)    Take Tylenol (acetaminophen) or ibuprofen for fever or pain. If you have liver or kidney problems, ask your family doctor if it's okay to take Tylenol o ibuprofen    Take over the counter medications for your symptoms, as directed by your doctor. You may also talk to your pharmacist.      If you have other health problems (like cancer, heart failure, an organ transplant or severe kidney disease): Call your specialty clinic if you don't feel better in the next 2 days.    Know when to call 911. Emergency warning signs include:  o Trouble breathing or shortness of breath  o Pain or pressure in the chest that doesn't go away  o Feeling confused like you haven't felt before, or not being able to wake up  o Bluish-colored lips or face    Where can I get more information?    [facility] - About COVID-19: [email or web address]     CDC - What to Do If You're Sick:     www.cdc.gov/coronavirus/2019-ncov/about/steps-when-sick.html    CDC - Ending Home Isolation:  https://www.cdc.gov/coronavirus/2019-ncov/your-health/quarantine-isolation.html    CDC - Caring for Someone:  www.cdc.gov/coronavirus/2019-ncov/if-you-are-sick/care-for-someone.html    [facility] clinical trials (COVID-19 research studies): clinicalaffairs.[facility]-clinical-trials    Below are the COVID-19 hotlines at the [facility] of Health ([facility]). Interpreters are available.  o For health questions: Call 997-285-0688 or 1-858.507.4636 (7 a.m. to 7 p.m.)  o For questions about schools and childcare: Call 845-064-1875 or 1-404.349.2241 (7 a.m. to 7 p.m.)

## 2022-01-01 ENCOUNTER — VIRTUAL VISIT (OUTPATIENT)
Dept: GASTROENTEROLOGY | Facility: CLINIC | Age: 42
End: 2022-01-01
Attending: PHYSICIAN ASSISTANT
Payer: COMMERCIAL

## 2022-01-01 ENCOUNTER — DOCUMENTATION ONLY (OUTPATIENT)
Dept: LAB | Facility: OTHER | Age: 42
End: 2022-01-01
Payer: COMMERCIAL

## 2022-01-01 ENCOUNTER — TELEPHONE (OUTPATIENT)
Dept: GASTROENTEROLOGY | Facility: CLINIC | Age: 42
End: 2022-01-01

## 2022-01-01 ENCOUNTER — LAB (OUTPATIENT)
Dept: LAB | Facility: OTHER | Age: 42
End: 2022-01-01
Payer: COMMERCIAL

## 2022-01-01 ENCOUNTER — ANESTHESIA (OUTPATIENT)
Dept: SURGERY | Facility: CLINIC | Age: 42
End: 2022-01-01
Payer: COMMERCIAL

## 2022-01-01 ENCOUNTER — PATIENT OUTREACH (OUTPATIENT)
Dept: FAMILY MEDICINE | Facility: OTHER | Age: 42
End: 2022-01-01
Payer: COMMERCIAL

## 2022-01-01 ENCOUNTER — APPOINTMENT (OUTPATIENT)
Dept: CT IMAGING | Facility: CLINIC | Age: 42
End: 2022-01-01
Attending: FAMILY MEDICINE
Payer: COMMERCIAL

## 2022-01-01 ENCOUNTER — PRE VISIT (OUTPATIENT)
Dept: ORTHOPEDICS | Facility: CLINIC | Age: 42
End: 2022-01-01
Payer: COMMERCIAL

## 2022-01-01 ENCOUNTER — APPOINTMENT (OUTPATIENT)
Dept: GENERAL RADIOLOGY | Facility: CLINIC | Age: 42
End: 2022-01-01
Attending: FAMILY MEDICINE
Payer: COMMERCIAL

## 2022-01-01 ENCOUNTER — VIRTUAL VISIT (OUTPATIENT)
Dept: ENDOCRINOLOGY | Facility: CLINIC | Age: 42
End: 2022-01-01
Attending: PHYSICIAN ASSISTANT
Payer: COMMERCIAL

## 2022-01-01 ENCOUNTER — TELEPHONE (OUTPATIENT)
Dept: FAMILY MEDICINE | Facility: OTHER | Age: 42
End: 2022-01-01
Payer: COMMERCIAL

## 2022-01-01 ENCOUNTER — MYC MEDICAL ADVICE (OUTPATIENT)
Dept: FAMILY MEDICINE | Facility: OTHER | Age: 42
End: 2022-01-01

## 2022-01-01 ENCOUNTER — HOSPITAL ENCOUNTER (OUTPATIENT)
Dept: GENERAL RADIOLOGY | Facility: CLINIC | Age: 42
Discharge: HOME | End: 2022-05-12
Attending: ANESTHESIOLOGY | Admitting: ANESTHESIOLOGY
Payer: COMMERCIAL

## 2022-01-01 ENCOUNTER — HOSPITAL ENCOUNTER (OUTPATIENT)
Facility: CLINIC | Age: 42
Discharge: HOME | End: 2022-05-12
Attending: ANESTHESIOLOGY | Admitting: ANESTHESIOLOGY
Payer: COMMERCIAL

## 2022-01-01 ENCOUNTER — DOCUMENTATION ONLY (OUTPATIENT)
Dept: LAB | Facility: OTHER | Age: 42
End: 2022-01-01

## 2022-01-01 ENCOUNTER — OFFICE VISIT (OUTPATIENT)
Dept: NEUROSURGERY | Facility: CLINIC | Age: 42
End: 2022-01-01
Attending: PHYSICIAN ASSISTANT
Payer: COMMERCIAL

## 2022-01-01 ENCOUNTER — LAB (OUTPATIENT)
Dept: LAB | Facility: OTHER | Age: 42
End: 2022-01-01
Attending: ANESTHESIOLOGY
Payer: COMMERCIAL

## 2022-01-01 ENCOUNTER — VIRTUAL VISIT (OUTPATIENT)
Dept: FAMILY MEDICINE | Facility: OTHER | Age: 42
End: 2022-01-01
Payer: COMMERCIAL

## 2022-01-01 ENCOUNTER — HEALTH MAINTENANCE LETTER (OUTPATIENT)
Age: 42
End: 2022-01-01

## 2022-01-01 ENCOUNTER — HOSPITAL ENCOUNTER (OUTPATIENT)
Dept: MRI IMAGING | Facility: CLINIC | Age: 42
End: 2022-03-07
Attending: PHYSICIAN ASSISTANT
Payer: COMMERCIAL

## 2022-01-01 ENCOUNTER — HOSPITAL ENCOUNTER (OUTPATIENT)
Facility: AMBULATORY SURGERY CENTER | Age: 42
Discharge: HOME | End: 2022-08-25
Attending: INTERNAL MEDICINE | Admitting: INTERNAL MEDICINE
Payer: COMMERCIAL

## 2022-01-01 ENCOUNTER — TELEPHONE (OUTPATIENT)
Dept: PALLIATIVE MEDICINE | Facility: CLINIC | Age: 42
End: 2022-01-01
Payer: COMMERCIAL

## 2022-01-01 ENCOUNTER — OFFICE VISIT (OUTPATIENT)
Dept: FAMILY MEDICINE | Facility: OTHER | Age: 42
End: 2022-01-01
Payer: COMMERCIAL

## 2022-01-01 ENCOUNTER — PRE VISIT (OUTPATIENT)
Dept: GASTROENTEROLOGY | Facility: CLINIC | Age: 42
End: 2022-01-01

## 2022-01-01 ENCOUNTER — PRE VISIT (OUTPATIENT)
Dept: ENDOCRINOLOGY | Facility: CLINIC | Age: 42
End: 2022-01-01

## 2022-01-01 ENCOUNTER — TELEPHONE (OUTPATIENT)
Dept: ENDOCRINOLOGY | Facility: CLINIC | Age: 42
End: 2022-01-01
Payer: COMMERCIAL

## 2022-01-01 ENCOUNTER — THERAPY VISIT (OUTPATIENT)
Dept: PHYSICAL THERAPY | Facility: CLINIC | Age: 42
End: 2022-01-01
Attending: PHYSICIAN ASSISTANT
Payer: COMMERCIAL

## 2022-01-01 ENCOUNTER — OFFICE VISIT (OUTPATIENT)
Dept: PODIATRY | Facility: CLINIC | Age: 42
End: 2022-01-01
Payer: COMMERCIAL

## 2022-01-01 ENCOUNTER — OFFICE VISIT (OUTPATIENT)
Dept: FAMILY MEDICINE | Facility: OTHER | Age: 42
End: 2022-01-01
Attending: FAMILY MEDICINE
Payer: COMMERCIAL

## 2022-01-01 ENCOUNTER — MYC REFILL (OUTPATIENT)
Dept: FAMILY MEDICINE | Facility: OTHER | Age: 42
End: 2022-01-01

## 2022-01-01 ENCOUNTER — OFFICE VISIT (OUTPATIENT)
Dept: ORTHOPEDICS | Facility: CLINIC | Age: 42
End: 2022-01-01
Payer: COMMERCIAL

## 2022-01-01 ENCOUNTER — HOSPITAL ENCOUNTER (OUTPATIENT)
Dept: MRI IMAGING | Facility: CLINIC | Age: 42
Setting detail: OBSERVATION
Discharge: HOME | End: 2022-04-26
Attending: PODIATRIST | Admitting: PODIATRIST
Payer: COMMERCIAL

## 2022-01-01 ENCOUNTER — HOSPITAL ENCOUNTER (EMERGENCY)
Facility: CLINIC | Age: 42
Discharge: HOME | End: 2022-01-24
Attending: EMERGENCY MEDICINE | Admitting: EMERGENCY MEDICINE
Payer: COMMERCIAL

## 2022-01-01 ENCOUNTER — HOSPITAL ENCOUNTER (OUTPATIENT)
Facility: AMBULATORY SURGERY CENTER | Age: 42
Discharge: HOME | End: 2022-11-04
Attending: INTERNAL MEDICINE
Payer: COMMERCIAL

## 2022-01-01 ENCOUNTER — ANESTHESIA EVENT (OUTPATIENT)
Dept: SURGERY | Facility: AMBULATORY SURGERY CENTER | Age: 42
End: 2022-01-01
Payer: COMMERCIAL

## 2022-01-01 ENCOUNTER — HOSPITAL ENCOUNTER (EMERGENCY)
Facility: CLINIC | Age: 42
Discharge: HOME | End: 2022-09-21
Attending: FAMILY MEDICINE | Admitting: FAMILY MEDICINE
Payer: COMMERCIAL

## 2022-01-01 ENCOUNTER — PATIENT OUTREACH (OUTPATIENT)
Dept: CARE COORDINATION | Facility: CLINIC | Age: 42
End: 2022-01-01

## 2022-01-01 ENCOUNTER — HOSPITAL ENCOUNTER (OUTPATIENT)
Facility: CLINIC | Age: 42
Discharge: HOME | End: 2022-03-24
Attending: ANESTHESIOLOGY | Admitting: ANESTHESIOLOGY
Payer: COMMERCIAL

## 2022-01-01 ENCOUNTER — ANESTHESIA EVENT (OUTPATIENT)
Dept: SURGERY | Facility: CLINIC | Age: 42
End: 2022-01-01
Payer: COMMERCIAL

## 2022-01-01 ENCOUNTER — APPOINTMENT (OUTPATIENT)
Dept: ULTRASOUND IMAGING | Facility: CLINIC | Age: 42
End: 2022-01-01
Attending: FAMILY MEDICINE
Payer: COMMERCIAL

## 2022-01-01 ENCOUNTER — PATIENT OUTREACH (OUTPATIENT)
Dept: CARE COORDINATION | Facility: CLINIC | Age: 42
End: 2022-01-01
Payer: COMMERCIAL

## 2022-01-01 ENCOUNTER — ANESTHESIA (OUTPATIENT)
Dept: SURGERY | Facility: AMBULATORY SURGERY CENTER | Age: 42
End: 2022-01-01
Payer: COMMERCIAL

## 2022-01-01 ENCOUNTER — HOSPITAL ENCOUNTER (EMERGENCY)
Facility: CLINIC | Age: 42
Discharge: HOME | End: 2022-04-09
Attending: FAMILY MEDICINE | Admitting: FAMILY MEDICINE
Payer: COMMERCIAL

## 2022-01-01 ENCOUNTER — HOSPITAL ENCOUNTER (EMERGENCY)
Facility: CLINIC | Age: 42
Discharge: HOME | End: 2022-03-24
Attending: FAMILY MEDICINE | Admitting: FAMILY MEDICINE
Payer: COMMERCIAL

## 2022-01-01 ENCOUNTER — TELEPHONE (OUTPATIENT)
Dept: FAMILY MEDICINE | Facility: OTHER | Age: 42
End: 2022-01-01

## 2022-01-01 ENCOUNTER — HOSPITAL ENCOUNTER (EMERGENCY)
Facility: CLINIC | Age: 42
Discharge: HOME | End: 2022-07-19
Attending: FAMILY MEDICINE | Admitting: FAMILY MEDICINE
Payer: COMMERCIAL

## 2022-01-01 ENCOUNTER — TELEPHONE (OUTPATIENT)
Dept: ORTHOPEDICS | Facility: CLINIC | Age: 42
End: 2022-01-01

## 2022-01-01 ENCOUNTER — E-CONSULT (OUTPATIENT)
Dept: ENDOCRINOLOGY | Facility: CLINIC | Age: 42
End: 2022-01-01
Payer: COMMERCIAL

## 2022-01-01 VITALS
DIASTOLIC BLOOD PRESSURE: 106 MMHG | BODY MASS INDEX: 19.38 KG/M2 | TEMPERATURE: 98.1 F | RESPIRATION RATE: 16 BRPM | SYSTOLIC BLOOD PRESSURE: 141 MMHG | HEIGHT: 64 IN | WEIGHT: 113.5 LBS | HEART RATE: 94 BPM | OXYGEN SATURATION: 98 %

## 2022-01-01 VITALS
WEIGHT: 133.25 LBS | SYSTOLIC BLOOD PRESSURE: 124 MMHG | DIASTOLIC BLOOD PRESSURE: 82 MMHG | BODY MASS INDEX: 22.75 KG/M2 | HEIGHT: 64 IN

## 2022-01-01 VITALS
RESPIRATION RATE: 20 BRPM | SYSTOLIC BLOOD PRESSURE: 110 MMHG | TEMPERATURE: 98.6 F | OXYGEN SATURATION: 97 % | WEIGHT: 133 LBS | BODY MASS INDEX: 22.83 KG/M2 | DIASTOLIC BLOOD PRESSURE: 74 MMHG | HEART RATE: 93 BPM

## 2022-01-01 VITALS
TEMPERATURE: 98.8 F | DIASTOLIC BLOOD PRESSURE: 101 MMHG | OXYGEN SATURATION: 99 % | HEART RATE: 121 BPM | BODY MASS INDEX: 21.97 KG/M2 | RESPIRATION RATE: 43 BRPM | WEIGHT: 128 LBS | SYSTOLIC BLOOD PRESSURE: 130 MMHG

## 2022-01-01 VITALS
TEMPERATURE: 98.2 F | HEART RATE: 96 BPM | OXYGEN SATURATION: 96 % | DIASTOLIC BLOOD PRESSURE: 100 MMHG | SYSTOLIC BLOOD PRESSURE: 143 MMHG | RESPIRATION RATE: 18 BRPM

## 2022-01-01 VITALS
SYSTOLIC BLOOD PRESSURE: 136 MMHG | BODY MASS INDEX: 22.75 KG/M2 | HEIGHT: 64 IN | OXYGEN SATURATION: 100 % | RESPIRATION RATE: 16 BRPM | DIASTOLIC BLOOD PRESSURE: 82 MMHG | WEIGHT: 133.25 LBS | HEART RATE: 77 BPM | TEMPERATURE: 98.2 F

## 2022-01-01 VITALS
WEIGHT: 134 LBS | DIASTOLIC BLOOD PRESSURE: 84 MMHG | BODY MASS INDEX: 23 KG/M2 | TEMPERATURE: 97.3 F | RESPIRATION RATE: 18 BRPM | SYSTOLIC BLOOD PRESSURE: 114 MMHG | HEART RATE: 82 BPM | OXYGEN SATURATION: 100 %

## 2022-01-01 VITALS
HEART RATE: 82 BPM | TEMPERATURE: 99.4 F | BODY MASS INDEX: 18.95 KG/M2 | DIASTOLIC BLOOD PRESSURE: 94 MMHG | HEIGHT: 64 IN | RESPIRATION RATE: 18 BRPM | SYSTOLIC BLOOD PRESSURE: 138 MMHG | OXYGEN SATURATION: 99 % | WEIGHT: 111 LBS

## 2022-01-01 VITALS
WEIGHT: 137 LBS | TEMPERATURE: 98.6 F | BODY MASS INDEX: 23.39 KG/M2 | SYSTOLIC BLOOD PRESSURE: 128 MMHG | HEIGHT: 64 IN | DIASTOLIC BLOOD PRESSURE: 88 MMHG

## 2022-01-01 VITALS
SYSTOLIC BLOOD PRESSURE: 120 MMHG | DIASTOLIC BLOOD PRESSURE: 90 MMHG | OXYGEN SATURATION: 100 % | RESPIRATION RATE: 16 BRPM | TEMPERATURE: 97.2 F

## 2022-01-01 VITALS
OXYGEN SATURATION: 97 % | DIASTOLIC BLOOD PRESSURE: 84 MMHG | SYSTOLIC BLOOD PRESSURE: 130 MMHG | WEIGHT: 128 LBS | HEART RATE: 100 BPM | TEMPERATURE: 98.1 F | RESPIRATION RATE: 18 BRPM | BODY MASS INDEX: 21.97 KG/M2

## 2022-01-01 VITALS
DIASTOLIC BLOOD PRESSURE: 80 MMHG | WEIGHT: 114 LBS | OXYGEN SATURATION: 99 % | SYSTOLIC BLOOD PRESSURE: 132 MMHG | BODY MASS INDEX: 19.69 KG/M2 | TEMPERATURE: 98.4 F | HEART RATE: 139 BPM

## 2022-01-01 VITALS — RESPIRATION RATE: 16 BRPM | TEMPERATURE: 99.9 F | DIASTOLIC BLOOD PRESSURE: 89 MMHG | SYSTOLIC BLOOD PRESSURE: 139 MMHG

## 2022-01-01 VITALS
WEIGHT: 117 LBS | RESPIRATION RATE: 16 BRPM | OXYGEN SATURATION: 100 % | TEMPERATURE: 100.2 F | DIASTOLIC BLOOD PRESSURE: 114 MMHG | SYSTOLIC BLOOD PRESSURE: 161 MMHG | BODY MASS INDEX: 20.21 KG/M2 | HEART RATE: 86 BPM

## 2022-01-01 VITALS
SYSTOLIC BLOOD PRESSURE: 108 MMHG | TEMPERATURE: 97.4 F | DIASTOLIC BLOOD PRESSURE: 82 MMHG | HEART RATE: 85 BPM | RESPIRATION RATE: 16 BRPM | OXYGEN SATURATION: 99 %

## 2022-01-01 VITALS
HEART RATE: 86 BPM | DIASTOLIC BLOOD PRESSURE: 78 MMHG | OXYGEN SATURATION: 98 % | BODY MASS INDEX: 23.02 KG/M2 | WEIGHT: 133.25 LBS | RESPIRATION RATE: 17 BRPM | TEMPERATURE: 98 F | SYSTOLIC BLOOD PRESSURE: 104 MMHG

## 2022-01-01 VITALS
WEIGHT: 134 LBS | BODY MASS INDEX: 22.88 KG/M2 | DIASTOLIC BLOOD PRESSURE: 94 MMHG | SYSTOLIC BLOOD PRESSURE: 148 MMHG | HEIGHT: 64 IN

## 2022-01-01 VITALS — HEART RATE: 96 BPM

## 2022-01-01 DIAGNOSIS — E27.8 ADRENAL MASS, RIGHT (H): ICD-10-CM

## 2022-01-01 DIAGNOSIS — J30.1 CHRONIC SEASONAL ALLERGIC RHINITIS DUE TO POLLEN: ICD-10-CM

## 2022-01-01 DIAGNOSIS — K76.6 PORTAL HYPERTENSIVE GASTROPATHY (H): ICD-10-CM

## 2022-01-01 DIAGNOSIS — K92.0 HEMATEMESIS WITH NAUSEA: ICD-10-CM
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
## 2022-01-01 DIAGNOSIS — E27.8 ADRENAL MASS (H): Primary | ICD-10-CM

## 2022-01-01 DIAGNOSIS — M54.50 CHRONIC BILATERAL LOW BACK PAIN WITHOUT SCIATICA: ICD-10-CM

## 2022-01-01 DIAGNOSIS — M79.671 RIGHT FOOT PAIN: ICD-10-CM

## 2022-01-01 DIAGNOSIS — Z11.59 ENCOUNTER FOR SCREENING FOR OTHER VIRAL DISEASES: ICD-10-CM

## 2022-01-01 DIAGNOSIS — R79.89 ELEVATED LFTS: Primary | ICD-10-CM

## 2022-01-01 DIAGNOSIS — G89.29 CHRONIC PAIN IN RIGHT FOOT: ICD-10-CM

## 2022-01-01 DIAGNOSIS — I10 HTN, GOAL BELOW 140/90: ICD-10-CM

## 2022-01-01 DIAGNOSIS — K21.9 GASTROESOPHAGEAL REFLUX DISEASE WITHOUT ESOPHAGITIS: ICD-10-CM

## 2022-01-01 DIAGNOSIS — E27.8 ADRENAL MASS (H): ICD-10-CM

## 2022-01-01 DIAGNOSIS — G47.10 HYPERSOMNIA: ICD-10-CM

## 2022-01-01 DIAGNOSIS — R53.81 PHYSICAL DECONDITIONING: ICD-10-CM

## 2022-01-01 DIAGNOSIS — K76.6 PORTAL HYPERTENSIVE GASTROPATHY (H): Primary | ICD-10-CM

## 2022-01-01 DIAGNOSIS — F10.20 CHRONIC ALCOHOLISM (H): ICD-10-CM

## 2022-01-01 DIAGNOSIS — G89.29 CHRONIC PAIN OF BOTH KNEES: ICD-10-CM

## 2022-01-01 DIAGNOSIS — E83.42 HYPOMAGNESEMIA: ICD-10-CM

## 2022-01-01 DIAGNOSIS — F33.1 MAJOR DEPRESSIVE DISORDER, RECURRENT EPISODE, MODERATE (H): ICD-10-CM

## 2022-01-01 DIAGNOSIS — F41.1 GENERALIZED ANXIETY DISORDER: ICD-10-CM

## 2022-01-01 DIAGNOSIS — E78.1 HYPERTRIGLYCERIDEMIA: ICD-10-CM

## 2022-01-01 DIAGNOSIS — Z11.59 ENCOUNTER FOR SCREENING FOR OTHER VIRAL DISEASES: Primary | ICD-10-CM

## 2022-01-01 DIAGNOSIS — S10.93XA CONTUSION OF FACE, SCALP AND NECK, INITIAL ENCOUNTER: ICD-10-CM

## 2022-01-01 DIAGNOSIS — E27.8 ADRENAL MASS, RIGHT (H): Primary | ICD-10-CM

## 2022-01-01 DIAGNOSIS — F10.21 ALCOHOL DEPENDENCE IN REMISSION (H): Primary | ICD-10-CM

## 2022-01-01 DIAGNOSIS — T14.8XXA BRUISING: ICD-10-CM

## 2022-01-01 DIAGNOSIS — E27.9 ADRENAL NODULE (H): ICD-10-CM

## 2022-01-01 DIAGNOSIS — F10.21 ALCOHOL DEPENDENCE IN REMISSION (H): ICD-10-CM

## 2022-01-01 DIAGNOSIS — K21.9 GASTROESOPHAGEAL REFLUX DISEASE WITHOUT ESOPHAGITIS: Primary | ICD-10-CM

## 2022-01-01 DIAGNOSIS — S93.601A SPRAIN OF RIGHT FOOT, INITIAL ENCOUNTER: ICD-10-CM

## 2022-01-01 DIAGNOSIS — G89.29 CHRONIC BILATERAL LOW BACK PAIN WITHOUT SCIATICA: ICD-10-CM

## 2022-01-01 DIAGNOSIS — D69.6 THROMBOCYTOPENIA (H): ICD-10-CM

## 2022-01-01 DIAGNOSIS — Z01.818 PREOP GENERAL PHYSICAL EXAM: Primary | ICD-10-CM

## 2022-01-01 DIAGNOSIS — F10.20 ALCOHOL USE DISORDER, SEVERE, DEPENDENCE (H): ICD-10-CM

## 2022-01-01 DIAGNOSIS — S00.83XA CONTUSION OF FACE, SCALP AND NECK, INITIAL ENCOUNTER: ICD-10-CM

## 2022-01-01 DIAGNOSIS — M79.671 CHRONIC PAIN IN RIGHT FOOT: ICD-10-CM

## 2022-01-01 DIAGNOSIS — E27.9 ADRENAL NODULE (H): Primary | ICD-10-CM

## 2022-01-01 DIAGNOSIS — R74.8 ELEVATED LIVER ENZYMES: ICD-10-CM

## 2022-01-01 DIAGNOSIS — Z71.89 OTHER SPECIFIED COUNSELING: ICD-10-CM

## 2022-01-01 DIAGNOSIS — E55.9 VITAMIN D DEFICIENCY: ICD-10-CM

## 2022-01-01 DIAGNOSIS — M25.562 CHRONIC PAIN OF BOTH KNEES: ICD-10-CM

## 2022-01-01 DIAGNOSIS — E87.1 HYPONATREMIA: ICD-10-CM

## 2022-01-01 DIAGNOSIS — M54.16 LUMBAR RADICULOPATHY: ICD-10-CM

## 2022-01-01 DIAGNOSIS — Z98.890 HISTORY OF ANKLE SURGERY: ICD-10-CM

## 2022-01-01 DIAGNOSIS — D64.9 ANEMIA, UNSPECIFIED TYPE: ICD-10-CM

## 2022-01-01 DIAGNOSIS — M79.671 RIGHT FOOT PAIN: Primary | ICD-10-CM

## 2022-01-01 DIAGNOSIS — D53.9 MACROCYTIC ANEMIA: ICD-10-CM

## 2022-01-01 DIAGNOSIS — K31.89 PORTAL HYPERTENSIVE GASTROPATHY (H): ICD-10-CM

## 2022-01-01 DIAGNOSIS — D53.9 MACROCYTIC ANEMIA: Primary | ICD-10-CM

## 2022-01-01 DIAGNOSIS — M84.374A STRESS FRACTURE OF METATARSAL BONE OF RIGHT FOOT, INITIAL ENCOUNTER: ICD-10-CM

## 2022-01-01 DIAGNOSIS — A49.02 MRSA INFECTION: ICD-10-CM

## 2022-01-01 DIAGNOSIS — R11.2 NAUSEA AND VOMITING, INTRACTABILITY OF VOMITING NOT SPECIFIED, UNSPECIFIED VOMITING TYPE: ICD-10-CM

## 2022-01-01 DIAGNOSIS — F17.200 TOBACCO USE DISORDER: ICD-10-CM

## 2022-01-01 DIAGNOSIS — M76.71 PERONEAL TENDINITIS OF RIGHT LOWER EXTREMITY: Primary | ICD-10-CM

## 2022-01-01 DIAGNOSIS — M25.561 CHRONIC PAIN OF BOTH KNEES: ICD-10-CM

## 2022-01-01 DIAGNOSIS — Z01.818 PREOP GENERAL PHYSICAL EXAM: ICD-10-CM

## 2022-01-01 DIAGNOSIS — F10.10 ALCOHOL ABUSE: Primary | ICD-10-CM

## 2022-01-01 DIAGNOSIS — A69.20 LYME DISEASE: ICD-10-CM

## 2022-01-01 DIAGNOSIS — F10.920 ALCOHOLIC INTOXICATION WITHOUT COMPLICATION (H): ICD-10-CM

## 2022-01-01 DIAGNOSIS — K70.30 ALCOHOLIC CIRRHOSIS OF LIVER WITHOUT ASCITES (H): Primary | ICD-10-CM

## 2022-01-01 DIAGNOSIS — G47.411 NARCOLEPSY AND CATAPLEXY: ICD-10-CM

## 2022-01-01 DIAGNOSIS — S00.03XA CONTUSION OF FACE, SCALP AND NECK, INITIAL ENCOUNTER: ICD-10-CM

## 2022-01-01 DIAGNOSIS — K31.89 PORTAL HYPERTENSIVE GASTROPATHY (H): Primary | ICD-10-CM

## 2022-01-01 DIAGNOSIS — I10 HTN, GOAL BELOW 140/90: Primary | ICD-10-CM

## 2022-01-01 DIAGNOSIS — Z12.31 ENCOUNTER FOR SCREENING MAMMOGRAM FOR BREAST CANCER: ICD-10-CM

## 2022-01-01 DIAGNOSIS — M76.71 PERONEAL TENDONITIS, RIGHT: ICD-10-CM

## 2022-01-01 DIAGNOSIS — R79.0 LOW MAGNESIUM LEVEL: ICD-10-CM

## 2022-01-01 DIAGNOSIS — M76.71 PERONEAL TENDINITIS OF RIGHT LOWER EXTREMITY: ICD-10-CM

## 2022-01-01 DIAGNOSIS — E87.6 HYPOKALEMIA: ICD-10-CM

## 2022-01-01 DIAGNOSIS — R10.13 EPIGASTRIC DISCOMFORT: ICD-10-CM

## 2022-01-01 DIAGNOSIS — K92.0 HEMATEMESIS WITH NAUSEA: Primary | ICD-10-CM

## 2022-01-01 DIAGNOSIS — I10 BENIGN ESSENTIAL HYPERTENSION: ICD-10-CM

## 2022-01-01 DIAGNOSIS — R74.8 ELEVATED LIVER ENZYMES: Primary | ICD-10-CM

## 2022-01-01 DIAGNOSIS — F10.11 H/O ETOH ABUSE: ICD-10-CM

## 2022-01-01 DIAGNOSIS — M54.16 LUMBAR RADICULOPATHY: Primary | ICD-10-CM

## 2022-01-01 DIAGNOSIS — K20.0 EOSINOPHILIC ESOPHAGITIS: Primary | ICD-10-CM

## 2022-01-01 LAB
ACTH PLAS-MCNC: <10 PG/ML
ALBUMIN SERPL-MCNC: 3.6 G/DL (ref 3.4–5)
ALBUMIN SERPL-MCNC: 3.7 G/DL (ref 3.4–5)
ALBUMIN SERPL-MCNC: 3.9 G/DL (ref 3.4–5)
ALBUMIN SERPL-MCNC: 3.9 G/DL (ref 3.4–5)
ALBUMIN SERPL-MCNC: 4 G/DL (ref 3.4–5)
ALBUMIN SERPL-MCNC: 4 G/DL (ref 3.4–5)
ALBUMIN SERPL-MCNC: 4.1 G/DL (ref 3.4–5)
ALBUMIN SERPL-MCNC: 4.2 G/DL (ref 3.4–5)
ALBUMIN SERPL-MCNC: 4.3 G/DL (ref 3.4–5)
ALBUMIN UR-MCNC: 100 MG/DL
ALDOST SERPL-MCNC: 12.7 NG/DL (ref 0–31)
ALDOST/RENIN PLAS-RTO: 1.1 {RATIO} (ref 0–25)
ALP SERPL-CCNC: 104 U/L (ref 40–150)
ALP SERPL-CCNC: 106 U/L (ref 40–150)
ALP SERPL-CCNC: 109 U/L (ref 40–150)
ALP SERPL-CCNC: 110 U/L (ref 40–150)
ALP SERPL-CCNC: 118 U/L (ref 40–150)
ALP SERPL-CCNC: 118 U/L (ref 40–150)
ALP SERPL-CCNC: 121 U/L (ref 40–150)
ALP SERPL-CCNC: 133 U/L (ref 40–150)
ALP SERPL-CCNC: 61 U/L (ref 40–150)
ALP SERPL-CCNC: 68 U/L (ref 40–150)
ALP SERPL-CCNC: 79 U/L (ref 40–150)
ALT SERPL W P-5'-P-CCNC: 104 U/L (ref 0–50)
ALT SERPL W P-5'-P-CCNC: 162 U/L (ref 0–50)
ALT SERPL W P-5'-P-CCNC: 200 U/L (ref 0–50)
ALT SERPL W P-5'-P-CCNC: 209 U/L (ref 0–50)
ALT SERPL W P-5'-P-CCNC: 262 U/L (ref 0–50)
ALT SERPL W P-5'-P-CCNC: 28 U/L (ref 0–50)
ALT SERPL W P-5'-P-CCNC: 30 U/L (ref 0–50)
ALT SERPL W P-5'-P-CCNC: 50 U/L (ref 0–50)
ALT SERPL W P-5'-P-CCNC: 53 U/L (ref 0–50)
ALT SERPL W P-5'-P-CCNC: 67 U/L (ref 0–50)
ALT SERPL W P-5'-P-CCNC: 90 U/L (ref 0–50)
ANION GAP SERPL CALCULATED.3IONS-SCNC: 10 MMOL/L (ref 3–14)
ANION GAP SERPL CALCULATED.3IONS-SCNC: 10 MMOL/L (ref 3–14)
ANION GAP SERPL CALCULATED.3IONS-SCNC: 11 MMOL/L (ref 3–14)
ANION GAP SERPL CALCULATED.3IONS-SCNC: 12 MMOL/L (ref 3–14)
ANION GAP SERPL CALCULATED.3IONS-SCNC: 6 MMOL/L (ref 3–14)
ANION GAP SERPL CALCULATED.3IONS-SCNC: 6 MMOL/L (ref 3–14)
ANION GAP SERPL CALCULATED.3IONS-SCNC: 7 MMOL/L (ref 3–14)
ANION GAP SERPL CALCULATED.3IONS-SCNC: 8 MMOL/L (ref 3–14)
ANION GAP SERPL CALCULATED.3IONS-SCNC: 9 MMOL/L (ref 3–14)
ANION GAP SERPL CALCULATED.3IONS-SCNC: 9 MMOL/L (ref 3–14)
ANNOTATION COMMENT IMP: ABNORMAL
APPEARANCE UR: CLEAR
AST SERPL W P-5'-P-CCNC: 147 U/L (ref 0–45)
AST SERPL W P-5'-P-CCNC: 147 U/L (ref 0–45)
AST SERPL W P-5'-P-CCNC: 17 U/L (ref 0–45)
AST SERPL W P-5'-P-CCNC: 198 U/L (ref 0–45)
AST SERPL W P-5'-P-CCNC: 224 U/L (ref 0–45)
AST SERPL W P-5'-P-CCNC: 226 U/L (ref 0–45)
AST SERPL W P-5'-P-CCNC: 233 U/L (ref 0–45)
AST SERPL W P-5'-P-CCNC: 24 U/L (ref 0–45)
AST SERPL W P-5'-P-CCNC: 326 U/L (ref 0–45)
AST SERPL W P-5'-P-CCNC: 339 U/L (ref 0–45)
AST SERPL W P-5'-P-CCNC: 59 U/L (ref 0–45)
B BURGDOR IGG+IGM SER QL: 0.27
B BURGDOR IGG+IGM SER QL: 0.58
BACTERIA BLD CULT: NO GROWTH
BACTERIA BLD CULT: NO GROWTH
BACTERIA UR CULT: NO GROWTH
BASOPHILS # BLD AUTO: 0 10E3/UL (ref 0–0.2)
BASOPHILS # BLD AUTO: 0.1 10E3/UL (ref 0–0.2)
BASOPHILS NFR BLD AUTO: 0 %
BASOPHILS NFR BLD AUTO: 1 %
BILIRUB DIRECT SERPL-MCNC: 0.2 MG/DL (ref 0–0.2)
BILIRUB DIRECT SERPL-MCNC: 0.5 MG/DL (ref 0–0.2)
BILIRUB SERPL-MCNC: 0.2 MG/DL (ref 0.2–1.3)
BILIRUB SERPL-MCNC: 0.4 MG/DL (ref 0.2–1.3)
BILIRUB SERPL-MCNC: 0.5 MG/DL (ref 0.2–1.3)
BILIRUB SERPL-MCNC: 0.6 MG/DL (ref 0.2–1.3)
BILIRUB SERPL-MCNC: 1 MG/DL (ref 0.2–1.3)
BILIRUB SERPL-MCNC: 1.2 MG/DL (ref 0.2–1.3)
BILIRUB SERPL-MCNC: 1.6 MG/DL (ref 0.2–1.3)
BILIRUB SERPL-MCNC: 1.7 MG/DL (ref 0.2–1.3)
BILIRUB UR QL STRIP: NEGATIVE
BUN SERPL-MCNC: 11 MG/DL (ref 7–30)
BUN SERPL-MCNC: 13 MG/DL (ref 7–30)
BUN SERPL-MCNC: 15 MG/DL (ref 7–30)
BUN SERPL-MCNC: 19 MG/DL (ref 7–30)
BUN SERPL-MCNC: 2 MG/DL (ref 7–30)
BUN SERPL-MCNC: 4 MG/DL (ref 7–30)
BUN SERPL-MCNC: 5 MG/DL (ref 7–30)
BUN SERPL-MCNC: 6 MG/DL (ref 7–30)
BUN SERPL-MCNC: 7 MG/DL (ref 7–30)
BUN SERPL-MCNC: 7 MG/DL (ref 7–30)
BUN SERPL-MCNC: 8 MG/DL (ref 7–30)
BUN SERPL-MCNC: 8 MG/DL (ref 7–30)
CALCIUM SERPL-MCNC: 8.1 MG/DL (ref 8.5–10.1)
CALCIUM SERPL-MCNC: 8.4 MG/DL (ref 8.5–10.1)
CALCIUM SERPL-MCNC: 8.4 MG/DL (ref 8.5–10.1)
CALCIUM SERPL-MCNC: 8.6 MG/DL (ref 8.5–10.1)
CALCIUM SERPL-MCNC: 8.7 MG/DL (ref 8.5–10.1)
CALCIUM SERPL-MCNC: 8.8 MG/DL (ref 8.5–10.1)
CALCIUM SERPL-MCNC: 8.9 MG/DL (ref 8.5–10.1)
CALCIUM SERPL-MCNC: 8.9 MG/DL (ref 8.5–10.1)
CALCIUM SERPL-MCNC: 9.2 MG/DL (ref 8.5–10.1)
CALCIUM SERPL-MCNC: 9.7 MG/DL (ref 8.5–10.1)
CATECHOLS PLAS-IMP: ABNORMAL
CATECHOLS UR-IMP: ABNORMAL
CATECHOLS UR-IMP: ABNORMAL
CATECHOLS UR-IMP: NORMAL
CHLORIDE BLD-SCNC: 102 MMOL/L (ref 94–109)
CHLORIDE BLD-SCNC: 103 MMOL/L (ref 94–109)
CHLORIDE BLD-SCNC: 105 MMOL/L (ref 94–109)
CHLORIDE BLD-SCNC: 108 MMOL/L (ref 94–109)
CHLORIDE BLD-SCNC: 93 MMOL/L (ref 94–109)
CHLORIDE BLD-SCNC: 94 MMOL/L (ref 94–109)
CHLORIDE BLD-SCNC: 95 MMOL/L (ref 94–109)
CHLORIDE BLD-SCNC: 96 MMOL/L (ref 94–109)
CHLORIDE BLD-SCNC: 97 MMOL/L (ref 94–109)
CHLORIDE BLD-SCNC: 98 MMOL/L (ref 94–109)
CHOLEST SERPL-MCNC: 176 MG/DL
CO2 SERPL-SCNC: 22 MMOL/L (ref 20–32)
CO2 SERPL-SCNC: 25 MMOL/L (ref 20–32)
CO2 SERPL-SCNC: 26 MMOL/L (ref 20–32)
CO2 SERPL-SCNC: 27 MMOL/L (ref 20–32)
CO2 SERPL-SCNC: 28 MMOL/L (ref 20–32)
CO2 SERPL-SCNC: 29 MMOL/L (ref 20–32)
CO2 SERPL-SCNC: 29 MMOL/L (ref 20–32)
CO2 SERPL-SCNC: 31 MMOL/L (ref 20–32)
COLOR UR AUTO: YELLOW
CORTIS SERPL-MCNC: 13.3 UG/DL
CREAT 24H UR-MRATE: 105 MG/D
CREAT 24H UR-MRATE: 112 MG/D
CREAT 24H UR-MRATE: 385 MG/D
CREAT 24H UR-MRATE: 867 MG/D
CREAT 24H UR-MRATE: 901 MG/D
CREAT 24H UR-MRATE: NORMAL MG/D
CREAT SERPL-MCNC: 0.49 MG/DL (ref 0.52–1.04)
CREAT SERPL-MCNC: 0.52 MG/DL (ref 0.52–1.04)
CREAT SERPL-MCNC: 0.53 MG/DL (ref 0.52–1.04)
CREAT SERPL-MCNC: 0.54 MG/DL (ref 0.52–1.04)
CREAT SERPL-MCNC: 0.57 MG/DL (ref 0.52–1.04)
CREAT SERPL-MCNC: 0.62 MG/DL (ref 0.52–1.04)
CREAT SERPL-MCNC: 0.66 MG/DL (ref 0.52–1.04)
CREAT SERPL-MCNC: 0.69 MG/DL (ref 0.52–1.04)
CREAT SERPL-MCNC: 0.72 MG/DL (ref 0.52–1.04)
CREAT SERPL-MCNC: 0.78 MG/DL (ref 0.52–1.04)
CREAT SERPL-MCNC: 0.82 MG/DL (ref 0.52–1.04)
CREAT SERPL-MCNC: 1.07 MG/DL (ref 0.52–1.04)
CREAT UR-MCNC: 14 MG/DL
CREAT UR-MCNC: 15 MG/DL
CREAT UR-MCNC: 51 MG/DL
CREAT UR-MCNC: 53 MG/DL
CREAT UR-MCNC: 67 MG/DL
CREAT UR-MCNC: 68 MG/DL
CRP SERPL-MCNC: 19.2 MG/L (ref 0–8)
CRP SERPL-MCNC: 4.1 MG/L (ref 0–8)
CRP SERPL-MCNC: <2.9 MG/L (ref 0–8)
DEPRECATED CALCIDIOL+CALCIFEROL SERPL-MC: 31 UG/L (ref 20–75)
DEPRECATED CALCIDIOL+CALCIFEROL SERPL-MC: <33 UG/L (ref 20–75)
DHEA-S SERPL-MCNC: 119 UG/DL (ref 35–430)
DOPAMINE 24H UR-MRATE: 289 UG/D
DOPAMINE 24H UR-MRATE: <2 UG/D
DOPAMINE 24H UR-MRATE: NORMAL UG/D
DOPAMINE SERPL-MCNC: <20 PG/ML
DOPAMINE UR-MCNC: 170 UG/L
DOPAMINE UR-MCNC: <2 UG/L
DOPAMINE UR-MCNC: NORMAL UG/L
DOPAMINE/CREAT UR: 321 UG/G CRT
DOPAMINE/CREAT UR: <14 UG/G CRT
DOPAMINE/CREAT UR: NORMAL UG/G CRT
EOSINOPHIL # BLD AUTO: 0 10E3/UL (ref 0–0.7)
EOSINOPHIL # BLD AUTO: 0 10E3/UL (ref 0–0.7)
EOSINOPHIL # BLD AUTO: 0.1 10E3/UL (ref 0–0.7)
EOSINOPHIL # BLD AUTO: 0.2 10E3/UL (ref 0–0.7)
EOSINOPHIL NFR BLD AUTO: 0 %
EOSINOPHIL NFR BLD AUTO: 1 %
EOSINOPHIL NFR BLD AUTO: 2 %
EOSINOPHIL NFR BLD AUTO: 2 %
EPINEPH 24H UR-MRATE: 15 UG/D
EPINEPH 24H UR-MRATE: <1 UG/D
EPINEPH 24H UR-MRATE: NORMAL
EPINEPH PLAS-MCNC: 82 PG/ML
EPINEPH UR-MCNC: 9 UG/L
EPINEPH UR-MCNC: <1 UG/L
EPINEPH UR-MCNC: NORMAL UG/L
EPINEPH/CREAT UR: 17 UG/G CRT
EPINEPH/CREAT UR: <7 UG/G CRT
EPINEPH/CREAT UR: NORMAL UG/G CRT
ERYTHROCYTE [DISTWIDTH] IN BLOOD BY AUTOMATED COUNT: 13.1 % (ref 10–15)
ERYTHROCYTE [DISTWIDTH] IN BLOOD BY AUTOMATED COUNT: 13.5 % (ref 10–15)
ERYTHROCYTE [DISTWIDTH] IN BLOOD BY AUTOMATED COUNT: 13.7 % (ref 10–15)
ERYTHROCYTE [DISTWIDTH] IN BLOOD BY AUTOMATED COUNT: 13.8 % (ref 10–15)
ERYTHROCYTE [DISTWIDTH] IN BLOOD BY AUTOMATED COUNT: 14.1 % (ref 10–15)
ERYTHROCYTE [DISTWIDTH] IN BLOOD BY AUTOMATED COUNT: 14.4 % (ref 10–15)
ERYTHROCYTE [DISTWIDTH] IN BLOOD BY AUTOMATED COUNT: 15.4 % (ref 10–15)
ERYTHROCYTE [DISTWIDTH] IN BLOOD BY AUTOMATED COUNT: 15.4 % (ref 10–15)
ERYTHROCYTE [DISTWIDTH] IN BLOOD BY AUTOMATED COUNT: 15.5 % (ref 10–15)
ERYTHROCYTE [SEDIMENTATION RATE] IN BLOOD BY WESTERGREN METHOD: 11 MM/HR (ref 0–20)
ERYTHROCYTE [SEDIMENTATION RATE] IN BLOOD BY WESTERGREN METHOD: 18 MM/HR (ref 0–20)
ETHANOL SERPL-MCNC: 0.24 G/DL
ETHANOL SERPL-MCNC: 0.34 G/DL
FASTING STATUS PATIENT QL REPORTED: YES
FLUAV RNA SPEC QL NAA+PROBE: NEGATIVE
FLUAV RNA SPEC QL NAA+PROBE: NEGATIVE
FLUBV RNA RESP QL NAA+PROBE: NEGATIVE
FLUBV RNA RESP QL NAA+PROBE: NEGATIVE
FOLATE SERPL-MCNC: 28.8 NG/ML (ref 4.6–34.8)
GFR SERPL CREATININE-BSD FRML MDRD: 67 ML/MIN/1.73M2
GFR SERPL CREATININE-BSD FRML MDRD: >90 ML/MIN/1.73M2
GGT SERPL-CCNC: 1467 U/L (ref 0–40)
GGT SERPL-CCNC: 163 U/L (ref 0–40)
GGT SERPL-CCNC: 507 U/L (ref 0–40)
GLUCOSE BLD-MCNC: 101 MG/DL (ref 70–99)
GLUCOSE BLD-MCNC: 106 MG/DL (ref 70–99)
GLUCOSE BLD-MCNC: 111 MG/DL (ref 70–99)
GLUCOSE BLD-MCNC: 115 MG/DL (ref 70–99)
GLUCOSE BLD-MCNC: 152 MG/DL (ref 70–99)
GLUCOSE BLD-MCNC: 185 MG/DL (ref 70–99)
GLUCOSE BLD-MCNC: 90 MG/DL (ref 70–99)
GLUCOSE BLD-MCNC: 97 MG/DL (ref 70–99)
GLUCOSE BLD-MCNC: 99 MG/DL (ref 70–99)
GLUCOSE BLD-MCNC: 99 MG/DL (ref 70–99)
GLUCOSE UR STRIP-MCNC: 100 MG/DL
H PYLORI AG STL QL IA: NEGATIVE
HAV IGG SER QL IA: NONREACTIVE
HBV CORE AB SERPL QL IA: NONREACTIVE
HBV SURFACE AB SERPL IA-ACNC: 2.87 M[IU]/ML
HBV SURFACE AB SERPL IA-ACNC: NONREACTIVE M[IU]/ML
HBV SURFACE AG SERPL QL IA: NONREACTIVE
HCT VFR BLD AUTO: 34 % (ref 35–47)
HCT VFR BLD AUTO: 34.2 % (ref 35–47)
HCT VFR BLD AUTO: 35.4 % (ref 35–47)
HCT VFR BLD AUTO: 36.4 % (ref 35–47)
HCT VFR BLD AUTO: 36.4 % (ref 35–47)
HCT VFR BLD AUTO: 40.4 % (ref 35–47)
HCT VFR BLD AUTO: 41 % (ref 35–47)
HCT VFR BLD AUTO: 41.2 % (ref 35–47)
HCT VFR BLD AUTO: 41.6 % (ref 35–47)
HDLC SERPL-MCNC: 59 MG/DL
HGB BLD-MCNC: 11.3 G/DL (ref 11.7–15.7)
HGB BLD-MCNC: 11.8 G/DL (ref 11.7–15.7)
HGB BLD-MCNC: 12.5 G/DL (ref 11.7–15.7)
HGB BLD-MCNC: 12.6 G/DL (ref 11.7–15.7)
HGB BLD-MCNC: 13 G/DL (ref 11.7–15.7)
HGB BLD-MCNC: 13.5 G/DL (ref 11.7–15.7)
HGB BLD-MCNC: 13.9 G/DL (ref 11.7–15.7)
HGB BLD-MCNC: 14.4 G/DL (ref 11.7–15.7)
HGB BLD-MCNC: 14.5 G/DL (ref 11.7–15.7)
HGB BLD-MCNC: 14.8 G/DL (ref 11.7–15.7)
HGB UR QL STRIP: ABNORMAL
HOLD SPECIMEN: NORMAL
IMM GRANULOCYTES # BLD: 0 10E3/UL
IMM GRANULOCYTES # BLD: 0.1 10E3/UL
IMM GRANULOCYTES # BLD: 0.1 10E3/UL
IMM GRANULOCYTES NFR BLD: 0 %
IMM GRANULOCYTES NFR BLD: 1 %
IMM GRANULOCYTES NFR BLD: 1 %
IMM GRANULOCYTES NFR BLD: 2 %
INR PPP: 0.97 (ref 0.85–1.15)
KETONES UR STRIP-MCNC: ABNORMAL MG/DL
LACTATE SERPL-SCNC: 1 MMOL/L (ref 0.7–2)
LACTATE SERPL-SCNC: 1.3 MMOL/L (ref 0.7–2)
LDLC SERPL CALC-MCNC: 98 MG/DL
LEUKOCYTE ESTERASE UR QL STRIP: NEGATIVE
LIPASE SERPL-CCNC: 266 U/L (ref 73–393)
LIPASE SERPL-CCNC: 303 U/L (ref 73–393)
LIPASE SERPL-CCNC: 312 U/L (ref 73–393)
LYMPHOCYTES # BLD AUTO: 0.4 10E3/UL (ref 0.8–5.3)
LYMPHOCYTES # BLD AUTO: 0.5 10E3/UL (ref 0.8–5.3)
LYMPHOCYTES # BLD AUTO: 0.7 10E3/UL (ref 0.8–5.3)
LYMPHOCYTES # BLD AUTO: 0.8 10E3/UL (ref 0.8–5.3)
LYMPHOCYTES # BLD AUTO: 1.5 10E3/UL (ref 0.8–5.3)
LYMPHOCYTES # BLD AUTO: 1.8 10E3/UL (ref 0.8–5.3)
LYMPHOCYTES NFR BLD AUTO: 10 %
LYMPHOCYTES NFR BLD AUTO: 11 %
LYMPHOCYTES NFR BLD AUTO: 12 %
LYMPHOCYTES NFR BLD AUTO: 13 %
LYMPHOCYTES NFR BLD AUTO: 23 %
LYMPHOCYTES NFR BLD AUTO: 35 %
MAGNESIUM SERPL-MCNC: 1.1 MG/DL (ref 1.6–2.3)
MAGNESIUM SERPL-MCNC: 1.2 MG/DL (ref 1.6–2.3)
MAGNESIUM SERPL-MCNC: 1.8 MG/DL (ref 1.6–2.3)
MCH RBC QN AUTO: 30.8 PG (ref 26.5–33)
MCH RBC QN AUTO: 31.5 PG (ref 26.5–33)
MCH RBC QN AUTO: 31.8 PG (ref 26.5–33)
MCH RBC QN AUTO: 32 PG (ref 26.5–33)
MCH RBC QN AUTO: 33.6 PG (ref 26.5–33)
MCH RBC QN AUTO: 34.7 PG (ref 26.5–33)
MCH RBC QN AUTO: 35.1 PG (ref 26.5–33)
MCH RBC QN AUTO: 35.4 PG (ref 26.5–33)
MCH RBC QN AUTO: 35.9 PG (ref 26.5–33)
MCHC RBC AUTO-ENTMCNC: 33.2 G/DL (ref 31.5–36.5)
MCHC RBC AUTO-ENTMCNC: 33.9 G/DL (ref 31.5–36.5)
MCHC RBC AUTO-ENTMCNC: 34.3 G/DL (ref 31.5–36.5)
MCHC RBC AUTO-ENTMCNC: 34.5 G/DL (ref 31.5–36.5)
MCHC RBC AUTO-ENTMCNC: 35.2 G/DL (ref 31.5–36.5)
MCHC RBC AUTO-ENTMCNC: 35.6 G/DL (ref 31.5–36.5)
MCHC RBC AUTO-ENTMCNC: 35.7 G/DL (ref 31.5–36.5)
MCV RBC AUTO: 101 FL (ref 78–100)
MCV RBC AUTO: 108 FL (ref 78–100)
MCV RBC AUTO: 87 FL (ref 78–100)
MCV RBC AUTO: 90 FL (ref 78–100)
MCV RBC AUTO: 93 FL (ref 78–100)
MCV RBC AUTO: 94 FL (ref 78–100)
MCV RBC AUTO: 94 FL (ref 78–100)
MCV RBC AUTO: 98 FL (ref 78–100)
MCV RBC AUTO: 99 FL (ref 78–100)
METANEPH 24H UR-MCNC: 261 UG/L
METANEPH 24H UR-MCNC: 329 UG/L
METANEPH 24H UR-MCNC: 80 UG/L
METANEPH 24H UR-MRATE: 189 UG/D
METANEPH 24H UR-MRATE: 444 UG/D
METANEPH 24H UR-MRATE: 60 UG/D
METANEPH+NORMETANEPH UR-IMP: ABNORMAL
METANEPH/CREAT 24H UR: 491 UG/G CRT
METANEPH/CREAT 24H UR: 512 UG/G CRT
METANEPH/CREAT 24H UR: 533 UG/G CRT
METANEPHS SERPL-SCNC: 1.14 NMOL/L
METANEPHS SERPL-SCNC: 1.24 NMOL/L
METANEPHS SERPL-SCNC: 1.39 NMOL/L
MONOCYTES # BLD AUTO: 0.3 10E3/UL (ref 0–1.3)
MONOCYTES # BLD AUTO: 0.4 10E3/UL (ref 0–1.3)
MONOCYTES # BLD AUTO: 0.5 10E3/UL (ref 0–1.3)
MONOCYTES # BLD AUTO: 0.5 10E3/UL (ref 0–1.3)
MONOCYTES # BLD AUTO: 0.6 10E3/UL (ref 0–1.3)
MONOCYTES # BLD AUTO: 1 10E3/UL (ref 0–1.3)
MONOCYTES NFR BLD AUTO: 10 %
MONOCYTES NFR BLD AUTO: 11 %
MONOCYTES NFR BLD AUTO: 14 %
MONOCYTES NFR BLD AUTO: 7 %
MONOCYTES NFR BLD AUTO: 8 %
MONOCYTES NFR BLD AUTO: 9 %
NEUTROPHILS # BLD AUTO: 1.8 10E3/UL (ref 1.6–8.3)
NEUTROPHILS # BLD AUTO: 12.2 10E3/UL (ref 1.6–8.3)
NEUTROPHILS # BLD AUTO: 2.5 10E3/UL (ref 1.6–8.3)
NEUTROPHILS # BLD AUTO: 2.7 10E3/UL (ref 1.6–8.3)
NEUTROPHILS # BLD AUTO: 2.7 10E3/UL (ref 1.6–8.3)
NEUTROPHILS # BLD AUTO: 5.8 10E3/UL (ref 1.6–8.3)
NEUTROPHILS NFR BLD AUTO: 53 %
NEUTROPHILS NFR BLD AUTO: 63 %
NEUTROPHILS NFR BLD AUTO: 72 %
NEUTROPHILS NFR BLD AUTO: 73 %
NEUTROPHILS NFR BLD AUTO: 79 %
NEUTROPHILS NFR BLD AUTO: 81 %
NITRATE UR QL: NEGATIVE
NONHDLC SERPL-MCNC: 117 MG/DL
NOREPINEPH 24H UR-MRATE: 2 UG/D
NOREPINEPH 24H UR-MRATE: 68 UG/D
NOREPINEPH 24H UR-MRATE: NORMAL UG/D
NOREPINEPH PLAS-MCNC: 760 PG/ML
NOREPINEPH UR-MCNC: 3 UG/L
NOREPINEPH UR-MCNC: 40 UG/L
NOREPINEPH UR-MCNC: NORMAL UG/L
NOREPINEPH/CREAT UR: 21 UG/G CRT
NOREPINEPH/CREAT UR: 75 UG/G CRT
NOREPINEPH/CREAT UR: NORMAL UG/G CRT
NORMETANEPHRINE 24H UR-MCNC: 213 UG/L
NORMETANEPHRINE 24H UR-MCNC: 273 UG/L
NORMETANEPHRINE 24H UR-MCNC: 59 UG/L
NORMETANEPHRINE 24H UR-MRATE: 122 UG/D
NORMETANEPHRINE 24H UR-MRATE: 44 UG/D
NORMETANEPHRINE 24H UR-MRATE: 464 UG/D
NORMETANEPHRINE SERPL-SCNC: 1.22 NMOL/L
NORMETANEPHRINE SERPL-SCNC: 1.4 NMOL/L
NORMETANEPHRINE SERPL-SCNC: 1.56 NMOL/L
NORMETANEPHRINE/CREAT 24H UR: 318 UG/G CRT
NORMETANEPHRINE/CREAT 24H UR: 393 UG/G CRT
NORMETANEPHRINE/CREAT 24H UR: 535 UG/G CRT
NRBC # BLD AUTO: 0 10E3/UL
NRBC BLD AUTO-RTO: 0 /100
PATH REPORT.ADDENDUM SPEC: NORMAL
PATH REPORT.COMMENTS IMP SPEC: NORMAL
PATH REPORT.FINAL DX SPEC: NORMAL
PATH REPORT.GROSS SPEC: NORMAL
PATH REPORT.MICROSCOPIC SPEC OTHER STN: NORMAL
PATH REPORT.RELEVANT HX SPEC: NORMAL
PH UR STRIP: 7 [PH] (ref 5–7)
PHOTO IMAGE: NORMAL
PLATELET # BLD AUTO: 103 10E3/UL (ref 150–450)
PLATELET # BLD AUTO: 114 10E3/UL (ref 150–450)
PLATELET # BLD AUTO: 170 10E3/UL (ref 150–450)
PLATELET # BLD AUTO: 177 10E3/UL (ref 150–450)
PLATELET # BLD AUTO: 186 10E3/UL (ref 150–450)
PLATELET # BLD AUTO: 187 10E3/UL (ref 150–450)
PLATELET # BLD AUTO: 86 10E3/UL (ref 150–450)
PLATELET # BLD AUTO: 90 10E3/UL (ref 150–450)
PLATELET # BLD AUTO: 91 10E3/UL (ref 150–450)
POTASSIUM BLD-SCNC: 3.1 MMOL/L (ref 3.4–5.3)
POTASSIUM BLD-SCNC: 3.1 MMOL/L (ref 3.4–5.3)
POTASSIUM BLD-SCNC: 3.2 MMOL/L (ref 3.4–5.3)
POTASSIUM BLD-SCNC: 3.2 MMOL/L (ref 3.4–5.3)
POTASSIUM BLD-SCNC: 3.3 MMOL/L (ref 3.4–5.3)
POTASSIUM BLD-SCNC: 3.4 MMOL/L (ref 3.4–5.3)
POTASSIUM BLD-SCNC: 3.4 MMOL/L (ref 3.4–5.3)
POTASSIUM BLD-SCNC: 3.5 MMOL/L (ref 3.4–5.3)
POTASSIUM BLD-SCNC: 3.7 MMOL/L (ref 3.4–5.3)
POTASSIUM BLD-SCNC: 3.9 MMOL/L (ref 3.4–5.3)
POTASSIUM BLD-SCNC: 4 MMOL/L (ref 3.4–5.3)
POTASSIUM BLD-SCNC: 4.6 MMOL/L (ref 3.4–5.3)
PROT SERPL-MCNC: 6.7 G/DL (ref 6.8–8.8)
PROT SERPL-MCNC: 6.9 G/DL (ref 6.8–8.8)
PROT SERPL-MCNC: 7.1 G/DL (ref 6.8–8.8)
PROT SERPL-MCNC: 7.3 G/DL (ref 6.8–8.8)
PROT SERPL-MCNC: 7.6 G/DL (ref 6.8–8.8)
PROT SERPL-MCNC: 7.9 G/DL (ref 6.8–8.8)
PROT SERPL-MCNC: 7.9 G/DL (ref 6.8–8.8)
PROT SERPL-MCNC: 8.2 G/DL (ref 6.8–8.8)
PROT SERPL-MCNC: 8.3 G/DL (ref 6.8–8.8)
RBC # BLD AUTO: 3.15 10E6/UL (ref 3.8–5.2)
RBC # BLD AUTO: 3.4 10E6/UL (ref 3.8–5.2)
RBC # BLD AUTO: 3.56 10E6/UL (ref 3.8–5.2)
RBC # BLD AUTO: 3.7 10E6/UL (ref 3.8–5.2)
RBC # BLD AUTO: 3.91 10E6/UL (ref 3.8–5.2)
RBC # BLD AUTO: 4.37 10E6/UL (ref 3.8–5.2)
RBC # BLD AUTO: 4.41 10E6/UL (ref 3.8–5.2)
RBC # BLD AUTO: 4.6 10E6/UL (ref 3.8–5.2)
RBC # BLD AUTO: 4.67 10E6/UL (ref 3.8–5.2)
RBC URINE: <1 /HPF
RENIN PLAS-CCNC: 12 NG/ML/HR
SARS-COV-2 RNA RESP QL NAA+PROBE: NEGATIVE
SODIUM SERPL-SCNC: 128 MMOL/L (ref 133–144)
SODIUM SERPL-SCNC: 131 MMOL/L (ref 133–144)
SODIUM SERPL-SCNC: 132 MMOL/L (ref 133–144)
SODIUM SERPL-SCNC: 133 MMOL/L (ref 133–144)
SODIUM SERPL-SCNC: 134 MMOL/L (ref 133–144)
SODIUM SERPL-SCNC: 136 MMOL/L (ref 133–144)
SODIUM SERPL-SCNC: 136 MMOL/L (ref 133–144)
SODIUM SERPL-SCNC: 138 MMOL/L (ref 133–144)
SODIUM SERPL-SCNC: 138 MMOL/L (ref 133–144)
SODIUM SERPL-SCNC: 139 MMOL/L (ref 133–144)
SODIUM SERPL-SCNC: 139 MMOL/L (ref 133–144)
SODIUM SERPL-SCNC: 142 MMOL/L (ref 133–144)
SP GR UR STRIP: <=1.005 (ref 1–1.03)
SQUAMOUS EPITHELIAL: <1 /HPF
TRIGL SERPL-MCNC: 95 MG/DL
TROPONIN I SERPL HS-MCNC: 6 NG/L
TROPONIN I SERPL HS-MCNC: <3 NG/L
UPPER GI ENDOSCOPY: NORMAL
UPPER GI ENDOSCOPY: NORMAL
UROBILINOGEN UR STRIP-MCNC: NORMAL MG/DL
VIT B12 SERPL-MCNC: 845 PG/ML (ref 232–1245)
VITAMIN D2 SERPL-MCNC: <5 UG/L
VITAMIN D3 SERPL-MCNC: 28 UG/L
WBC # BLD AUTO: 15.1 10E3/UL (ref 4–11)
WBC # BLD AUTO: 2.9 10E3/UL (ref 4–11)
WBC # BLD AUTO: 3.4 10E3/UL (ref 4–11)
WBC # BLD AUTO: 3.7 10E3/UL (ref 4–11)
WBC # BLD AUTO: 3.7 10E3/UL (ref 4–11)
WBC # BLD AUTO: 4.9 10E3/UL (ref 4–11)
WBC # BLD AUTO: 5.2 10E3/UL (ref 4–11)
WBC # BLD AUTO: 7.1 10E3/UL (ref 4–11)
WBC # BLD AUTO: 7.3 10E3/UL (ref 4–11)
WBC URINE: 1 /HPF

## 2022-01-01 PROCEDURE — 87040 BLOOD CULTURE FOR BACTERIA: CPT | Mod: XS | Performed by: FAMILY MEDICINE

## 2022-01-01 PROCEDURE — 82088 ASSAY OF ALDOSTERONE: CPT

## 2022-01-01 PROCEDURE — 93010 ELECTROCARDIOGRAM REPORT: CPT | Performed by: FAMILY MEDICINE

## 2022-01-01 PROCEDURE — 85610 PROTHROMBIN TIME: CPT

## 2022-01-01 PROCEDURE — 258N000003 HC RX IP 258 OP 636: Performed by: EMERGENCY MEDICINE

## 2022-01-01 PROCEDURE — 36415 COLL VENOUS BLD VENIPUNCTURE: CPT | Performed by: FAMILY MEDICINE

## 2022-01-01 PROCEDURE — 97162 PT EVAL MOD COMPLEX 30 MIN: CPT | Mod: GP | Performed by: PHYSICAL THERAPIST

## 2022-01-01 PROCEDURE — 99285 EMERGENCY DEPT VISIT HI MDM: CPT | Mod: 25 | Performed by: EMERGENCY MEDICINE

## 2022-01-01 PROCEDURE — 36415 COLL VENOUS BLD VENIPUNCTURE: CPT

## 2022-01-01 PROCEDURE — 99284 EMERGENCY DEPT VISIT MOD MDM: CPT | Mod: 25 | Performed by: EMERGENCY MEDICINE

## 2022-01-01 PROCEDURE — 82077 ASSAY SPEC XCP UR&BREATH IA: CPT | Performed by: FAMILY MEDICINE

## 2022-01-01 PROCEDURE — 99214 OFFICE O/P EST MOD 30 MIN: CPT | Mod: 95 | Performed by: PHYSICIAN ASSISTANT

## 2022-01-01 PROCEDURE — 86140 C-REACTIVE PROTEIN: CPT | Performed by: FAMILY MEDICINE

## 2022-01-01 PROCEDURE — A9585 GADOBUTROL INJECTION: HCPCS | Performed by: PHYSICIAN ASSISTANT

## 2022-01-01 PROCEDURE — 73630 X-RAY EXAM OF FOOT: CPT | Mod: RT | Performed by: RADIOLOGY

## 2022-01-01 PROCEDURE — 82306 VITAMIN D 25 HYDROXY: CPT | Performed by: PHYSICIAN ASSISTANT

## 2022-01-01 PROCEDURE — 85025 COMPLETE CBC W/AUTO DIFF WBC: CPT | Performed by: FAMILY MEDICINE

## 2022-01-01 PROCEDURE — 83605 ASSAY OF LACTIC ACID: CPT | Performed by: FAMILY MEDICINE

## 2022-01-01 PROCEDURE — 93005 ELECTROCARDIOGRAM TRACING: CPT | Performed by: FAMILY MEDICINE

## 2022-01-01 PROCEDURE — 85027 COMPLETE CBC AUTOMATED: CPT | Performed by: PHYSICIAN ASSISTANT

## 2022-01-01 PROCEDURE — U0003 INFECTIOUS AGENT DETECTION BY NUCLEIC ACID (DNA OR RNA); SEVERE ACUTE RESPIRATORY SYNDROME CORONAVIRUS 2 (SARS-COV-2) (CORONAVIRUS DISEASE [COVID-19]), AMPLIFIED PROBE TECHNIQUE, MAKING USE OF HIGH THROUGHPUT TECHNOLOGIES AS DESCRIBED BY CMS-2020-01-R: HCPCS

## 2022-01-01 PROCEDURE — 96361 HYDRATE IV INFUSION ADD-ON: CPT | Performed by: FAMILY MEDICINE

## 2022-01-01 PROCEDURE — 99000 SPECIMEN HANDLING OFFICE-LAB: CPT

## 2022-01-01 PROCEDURE — 83735 ASSAY OF MAGNESIUM: CPT

## 2022-01-01 PROCEDURE — 82607 VITAMIN B-12: CPT

## 2022-01-01 PROCEDURE — 81001 URINALYSIS AUTO W/SCOPE: CPT | Performed by: FAMILY MEDICINE

## 2022-01-01 PROCEDURE — 93005 ELECTROCARDIOGRAM TRACING: CPT | Performed by: EMERGENCY MEDICINE

## 2022-01-01 PROCEDURE — 255N000002 HC RX 255 OP 636: Performed by: PHYSICIAN ASSISTANT

## 2022-01-01 PROCEDURE — 80053 COMPREHEN METABOLIC PANEL: CPT | Performed by: EMERGENCY MEDICINE

## 2022-01-01 PROCEDURE — 82384 ASSAY THREE CATECHOLAMINES: CPT | Mod: 90

## 2022-01-01 PROCEDURE — 87086 URINE CULTURE/COLONY COUNT: CPT | Performed by: FAMILY MEDICINE

## 2022-01-01 PROCEDURE — 99214 OFFICE O/P EST MOD 30 MIN: CPT | Performed by: PODIATRIST

## 2022-01-01 PROCEDURE — 93000 ELECTROCARDIOGRAM COMPLETE: CPT | Performed by: PHYSICIAN ASSISTANT

## 2022-01-01 PROCEDURE — 86618 LYME DISEASE ANTIBODY: CPT | Performed by: PHYSICIAN ASSISTANT

## 2022-01-01 PROCEDURE — 80053 COMPREHEN METABOLIC PANEL: CPT | Performed by: FAMILY MEDICINE

## 2022-01-01 PROCEDURE — 97110 THERAPEUTIC EXERCISES: CPT | Mod: GP | Performed by: PHYSICAL THERAPIST

## 2022-01-01 PROCEDURE — 99207 E-CONSULT TO ENDOCRINOLOGY (ADULT OUTPT PROVIDER TO SPECIALIST WRITTEN QUESTION & RESPONSE): CPT | Performed by: PHYSICIAN ASSISTANT

## 2022-01-01 PROCEDURE — 258N000003 HC RX IP 258 OP 636: Performed by: FAMILY MEDICINE

## 2022-01-01 PROCEDURE — 250N000011 HC RX IP 250 OP 636: Performed by: EMERGENCY MEDICINE

## 2022-01-01 PROCEDURE — 84484 ASSAY OF TROPONIN QUANT: CPT | Performed by: EMERGENCY MEDICINE

## 2022-01-01 PROCEDURE — 86704 HEP B CORE ANTIBODY TOTAL: CPT

## 2022-01-01 PROCEDURE — 82627 DEHYDROEPIANDROSTERONE: CPT

## 2022-01-01 PROCEDURE — 96375 TX/PRO/DX INJ NEW DRUG ADDON: CPT | Performed by: EMERGENCY MEDICINE

## 2022-01-01 PROCEDURE — G8907 PT DOC NO EVENTS ON DISCHARG: HCPCS

## 2022-01-01 PROCEDURE — 86618 LYME DISEASE ANTIBODY: CPT | Performed by: FAMILY MEDICINE

## 2022-01-01 PROCEDURE — 82746 ASSAY OF FOLIC ACID SERUM: CPT

## 2022-01-01 PROCEDURE — 36415 COLL VENOUS BLD VENIPUNCTURE: CPT | Performed by: EMERGENCY MEDICINE

## 2022-01-01 PROCEDURE — 99214 OFFICE O/P EST MOD 30 MIN: CPT | Performed by: PHYSICIAN ASSISTANT

## 2022-01-01 PROCEDURE — 73610 X-RAY EXAM OF ANKLE: CPT | Mod: RT

## 2022-01-01 PROCEDURE — G8918 PT W/O PREOP ORDER IV AB PRO: HCPCS

## 2022-01-01 PROCEDURE — 96375 TX/PRO/DX INJ NEW DRUG ADDON: CPT | Performed by: FAMILY MEDICINE

## 2022-01-01 PROCEDURE — 83735 ASSAY OF MAGNESIUM: CPT | Performed by: PHYSICIAN ASSISTANT

## 2022-01-01 PROCEDURE — 96361 HYDRATE IV INFUSION ADD-ON: CPT | Performed by: EMERGENCY MEDICINE

## 2022-01-01 PROCEDURE — 99213 OFFICE O/P EST LOW 20 MIN: CPT | Performed by: PODIATRIST

## 2022-01-01 PROCEDURE — 99285 EMERGENCY DEPT VISIT HI MDM: CPT | Mod: 25 | Performed by: FAMILY MEDICINE

## 2022-01-01 PROCEDURE — 999N000141 HC STATISTIC PRE-PROCEDURE NURSING ASSESSMENT: Performed by: ANESTHESIOLOGY

## 2022-01-01 PROCEDURE — 73630 X-RAY EXAM OF FOOT: CPT | Mod: RT

## 2022-01-01 PROCEDURE — 80053 COMPREHEN METABOLIC PANEL: CPT

## 2022-01-01 PROCEDURE — 83835 ASSAY OF METANEPHRINES: CPT | Mod: 90

## 2022-01-01 PROCEDURE — 36415 COLL VENOUS BLD VENIPUNCTURE: CPT | Performed by: PHYSICIAN ASSISTANT

## 2022-01-01 PROCEDURE — 82024 ASSAY OF ACTH: CPT

## 2022-01-01 PROCEDURE — 80053 COMPREHEN METABOLIC PANEL: CPT | Performed by: PHYSICIAN ASSISTANT

## 2022-01-01 PROCEDURE — 93010 ELECTROCARDIOGRAM REPORT: CPT | Mod: 76 | Performed by: EMERGENCY MEDICINE

## 2022-01-01 PROCEDURE — 72148 MRI LUMBAR SPINE W/O DYE: CPT

## 2022-01-01 PROCEDURE — 82977 ASSAY OF GGT: CPT | Performed by: PHYSICIAN ASSISTANT

## 2022-01-01 PROCEDURE — 370N000017 HC ANESTHESIA TECHNICAL FEE, PER MIN: Performed by: ANESTHESIOLOGY

## 2022-01-01 PROCEDURE — 73721 MRI JNT OF LWR EXTRE W/O DYE: CPT | Mod: RT

## 2022-01-01 PROCEDURE — 43239 EGD BIOPSY SINGLE/MULTIPLE: CPT

## 2022-01-01 PROCEDURE — 99204 OFFICE O/P NEW MOD 45 MIN: CPT | Mod: 95 | Performed by: INTERNAL MEDICINE

## 2022-01-01 PROCEDURE — 70450 CT HEAD/BRAIN W/O DYE: CPT | Mod: XS

## 2022-01-01 PROCEDURE — 250N000011 HC RX IP 250 OP 636: Performed by: ANESTHESIOLOGY

## 2022-01-01 PROCEDURE — U0005 INFEC AGEN DETEC AMPLI PROBE: HCPCS

## 2022-01-01 PROCEDURE — 999N000179 XR SURGERY CARM FLUORO LESS THAN 5 MIN W STILLS: Mod: TC

## 2022-01-01 PROCEDURE — 83735 ASSAY OF MAGNESIUM: CPT | Performed by: FAMILY MEDICINE

## 2022-01-01 PROCEDURE — 87636 SARSCOV2 & INF A&B AMP PRB: CPT | Performed by: EMERGENCY MEDICINE

## 2022-01-01 PROCEDURE — 80048 BASIC METABOLIC PNL TOTAL CA: CPT | Performed by: FAMILY MEDICINE

## 2022-01-01 PROCEDURE — 64483 NJX AA&/STRD TFRM EPI L/S 1: CPT | Mod: 50 | Performed by: ANESTHESIOLOGY

## 2022-01-01 PROCEDURE — 99214 OFFICE O/P EST MOD 30 MIN: CPT | Performed by: FAMILY MEDICINE

## 2022-01-01 PROCEDURE — 82977 ASSAY OF GGT: CPT | Performed by: EMERGENCY MEDICINE

## 2022-01-01 PROCEDURE — 99284 EMERGENCY DEPT VISIT MOD MDM: CPT | Mod: CS | Performed by: FAMILY MEDICINE

## 2022-01-01 PROCEDURE — 83690 ASSAY OF LIPASE: CPT | Performed by: FAMILY MEDICINE

## 2022-01-01 PROCEDURE — 99451 NTRPROF PH1/NTRNET/EHR 5/>: CPT

## 2022-01-01 PROCEDURE — 99215 OFFICE O/P EST HI 40 MIN: CPT | Performed by: PHYSICIAN ASSISTANT

## 2022-01-01 PROCEDURE — 99284 EMERGENCY DEPT VISIT MOD MDM: CPT | Mod: 25 | Performed by: FAMILY MEDICINE

## 2022-01-01 PROCEDURE — 80061 LIPID PANEL: CPT | Performed by: PHYSICIAN ASSISTANT

## 2022-01-01 PROCEDURE — 250N000011 HC RX IP 250 OP 636: Performed by: FAMILY MEDICINE

## 2022-01-01 PROCEDURE — 84244 ASSAY OF RENIN: CPT | Mod: 90

## 2022-01-01 PROCEDURE — C9803 HOPD COVID-19 SPEC COLLECT: HCPCS | Performed by: EMERGENCY MEDICINE

## 2022-01-01 PROCEDURE — 82248 BILIRUBIN DIRECT: CPT | Performed by: FAMILY MEDICINE

## 2022-01-01 PROCEDURE — 96374 THER/PROPH/DIAG INJ IV PUSH: CPT | Performed by: FAMILY MEDICINE

## 2022-01-01 PROCEDURE — 85652 RBC SED RATE AUTOMATED: CPT | Performed by: FAMILY MEDICINE

## 2022-01-01 PROCEDURE — 99205 OFFICE O/P NEW HI 60 MIN: CPT | Mod: GC | Performed by: INTERNAL MEDICINE

## 2022-01-01 PROCEDURE — 88342 IMHCHEM/IMCYTCHM 1ST ANTB: CPT | Performed by: PATHOLOGY

## 2022-01-01 PROCEDURE — 86708 HEPATITIS A ANTIBODY: CPT

## 2022-01-01 PROCEDURE — 85025 COMPLETE CBC W/AUTO DIFF WBC: CPT | Performed by: EMERGENCY MEDICINE

## 2022-01-01 PROCEDURE — 84484 ASSAY OF TROPONIN QUANT: CPT | Performed by: FAMILY MEDICINE

## 2022-01-01 PROCEDURE — 99284 EMERGENCY DEPT VISIT MOD MDM: CPT | Performed by: FAMILY MEDICINE

## 2022-01-01 PROCEDURE — 250N000011 HC RX IP 250 OP 636: Performed by: NURSE ANESTHETIST, CERTIFIED REGISTERED

## 2022-01-01 PROCEDURE — 250N000009 HC RX 250: Performed by: NURSE ANESTHETIST, CERTIFIED REGISTERED

## 2022-01-01 PROCEDURE — 82040 ASSAY OF SERUM ALBUMIN: CPT | Performed by: FAMILY MEDICINE

## 2022-01-01 PROCEDURE — 82248 BILIRUBIN DIRECT: CPT

## 2022-01-01 PROCEDURE — 99243 OFF/OP CNSLTJ NEW/EST LOW 30: CPT | Performed by: PHYSICIAN ASSISTANT

## 2022-01-01 PROCEDURE — 85018 HEMOGLOBIN: CPT

## 2022-01-01 PROCEDURE — 85027 COMPLETE CBC AUTOMATED: CPT

## 2022-01-01 PROCEDURE — 83690 ASSAY OF LIPASE: CPT | Performed by: EMERGENCY MEDICINE

## 2022-01-01 PROCEDURE — 82533 TOTAL CORTISOL: CPT

## 2022-01-01 PROCEDURE — 99285 EMERGENCY DEPT VISIT HI MDM: CPT | Mod: CS,25 | Performed by: FAMILY MEDICINE

## 2022-01-01 PROCEDURE — 88305 TISSUE EXAM BY PATHOLOGIST: CPT | Performed by: PATHOLOGY

## 2022-01-01 PROCEDURE — 96365 THER/PROPH/DIAG IV INF INIT: CPT | Performed by: FAMILY MEDICINE

## 2022-01-01 PROCEDURE — 76705 ECHO EXAM OF ABDOMEN: CPT

## 2022-01-01 PROCEDURE — 73721 MRI JNT OF LWR EXTRE W/O DYE: CPT | Mod: 26 | Performed by: RADIOLOGY

## 2022-01-01 PROCEDURE — 74183 MRI ABD W/O CNTR FLWD CNTR: CPT

## 2022-01-01 PROCEDURE — 250N000013 HC RX MED GY IP 250 OP 250 PS 637: Performed by: EMERGENCY MEDICINE

## 2022-01-01 PROCEDURE — 83690 ASSAY OF LIPASE: CPT | Performed by: PHYSICIAN ASSISTANT

## 2022-01-01 PROCEDURE — 250N000009 HC RX 250: Performed by: FAMILY MEDICINE

## 2022-01-01 PROCEDURE — 250N000009 HC RX 250: Performed by: PHYSICIAN ASSISTANT

## 2022-01-01 PROCEDURE — 72125 CT NECK SPINE W/O DYE: CPT

## 2022-01-01 PROCEDURE — 70496 CT ANGIOGRAPHY HEAD: CPT

## 2022-01-01 PROCEDURE — 96374 THER/PROPH/DIAG INJ IV PUSH: CPT | Performed by: EMERGENCY MEDICINE

## 2022-01-01 PROCEDURE — 86706 HEP B SURFACE ANTIBODY: CPT

## 2022-01-01 PROCEDURE — C9803 HOPD COVID-19 SPEC COLLECT: HCPCS | Performed by: FAMILY MEDICINE

## 2022-01-01 PROCEDURE — 43235 EGD DIAGNOSTIC BRUSH WASH: CPT

## 2022-01-01 PROCEDURE — 87340 HEPATITIS B SURFACE AG IA: CPT

## 2022-01-01 PROCEDURE — 85025 COMPLETE CBC W/AUTO DIFF WBC: CPT | Performed by: PHYSICIAN ASSISTANT

## 2022-01-01 PROCEDURE — 87338 HPYLORI STOOL AG IA: CPT | Performed by: PHYSICIAN ASSISTANT

## 2022-01-01 PROCEDURE — 87636 SARSCOV2 & INF A&B AMP PRB: CPT | Performed by: FAMILY MEDICINE

## 2022-01-01 RX ORDER — NALOXONE HYDROCHLORIDE 0.4 MG/ML
0.2 INJECTION, SOLUTION INTRAMUSCULAR; INTRAVENOUS; SUBCUTANEOUS
Status: DISCONTINUED | OUTPATIENT
Start: 2022-01-01 | End: 2022-01-01 | Stop reason: HOSPADM

## 2022-01-01 RX ORDER — SUCRALFATE 1 G/1
1 TABLET ORAL 4 TIMES DAILY PRN
Qty: 60 TABLET | Refills: 0 | Status: SHIPPED | OUTPATIENT
Start: 2022-01-01 | End: 2022-01-01

## 2022-01-01 RX ORDER — OMEPRAZOLE 40 MG/1
40 CAPSULE, DELAYED RELEASE ORAL 2 TIMES DAILY
Qty: 60 CAPSULE | Refills: 5 | Status: SHIPPED | OUTPATIENT
Start: 2022-01-01

## 2022-01-01 RX ORDER — NIZATIDINE 150 MG/1
150 CAPSULE ORAL AT BEDTIME
COMMUNITY
Start: 2022-01-01 | End: 2022-01-01

## 2022-01-01 RX ORDER — FLUTICASONE PROPIONATE 50 MCG
1 SPRAY, SUSPENSION (ML) NASAL DAILY
Qty: 16 G | Refills: 11 | Status: CANCELLED | OUTPATIENT
Start: 2022-01-01

## 2022-01-01 RX ORDER — PROPOFOL 10 MG/ML
INJECTION, EMULSION INTRAVENOUS PRN
Status: DISCONTINUED | OUTPATIENT
Start: 2022-01-01 | End: 2022-01-01

## 2022-01-01 RX ORDER — FENTANYL CITRATE 50 UG/ML
INJECTION, SOLUTION INTRAMUSCULAR; INTRAVENOUS PRN
Status: DISCONTINUED | OUTPATIENT
Start: 2022-01-01 | End: 2022-01-01 | Stop reason: HOSPADM

## 2022-01-01 RX ORDER — ONDANSETRON 4 MG/1
4 TABLET, ORALLY DISINTEGRATING ORAL EVERY 6 HOURS PRN
Status: DISCONTINUED | OUTPATIENT
Start: 2022-01-01 | End: 2022-01-01 | Stop reason: HOSPADM

## 2022-01-01 RX ORDER — NALOXONE HYDROCHLORIDE 0.4 MG/ML
0.4 INJECTION, SOLUTION INTRAMUSCULAR; INTRAVENOUS; SUBCUTANEOUS
Status: DISCONTINUED | OUTPATIENT
Start: 2022-01-01 | End: 2022-01-01 | Stop reason: HOSPADM

## 2022-01-01 RX ORDER — SODIUM CHLORIDE 9 MG/ML
INJECTION, SOLUTION INTRAVENOUS CONTINUOUS
Status: DISCONTINUED | OUTPATIENT
Start: 2022-01-01 | End: 2022-01-01 | Stop reason: HOSPADM

## 2022-01-01 RX ORDER — PROCHLORPERAZINE MALEATE 10 MG
10 TABLET ORAL EVERY 6 HOURS PRN
Status: DISCONTINUED | OUTPATIENT
Start: 2022-01-01 | End: 2022-01-01 | Stop reason: HOSPADM

## 2022-01-01 RX ORDER — LITHIUM CARBONATE 450 MG
450 TABLET, EXTENDED RELEASE ORAL AT BEDTIME
COMMUNITY
Start: 2022-01-01

## 2022-01-01 RX ORDER — SULFAMETHOXAZOLE/TRIMETHOPRIM 800-160 MG
1 TABLET ORAL 2 TIMES DAILY
Qty: 20 TABLET | Refills: 0 | Status: SHIPPED | OUTPATIENT
Start: 2022-01-01 | End: 2022-01-01

## 2022-01-01 RX ORDER — METHYLPHENIDATE HYDROCHLORIDE 20 MG/1
20 TABLET ORAL 2 TIMES DAILY
Qty: 60 TABLET | Refills: 0 | Status: SHIPPED | OUTPATIENT
Start: 2022-01-01 | End: 2022-01-01

## 2022-01-01 RX ORDER — PANTOPRAZOLE SODIUM 40 MG/1
40 TABLET, DELAYED RELEASE ORAL DAILY
Qty: 14 TABLET | Refills: 0 | Status: SHIPPED | OUTPATIENT
Start: 2022-01-01 | End: 2022-01-01

## 2022-01-01 RX ORDER — FLUMAZENIL 0.1 MG/ML
0.2 INJECTION, SOLUTION INTRAVENOUS
Status: ACTIVE | OUTPATIENT
Start: 2022-01-01 | End: 2022-01-01

## 2022-01-01 RX ORDER — LIDOCAINE 40 MG/G
CREAM TOPICAL
Status: DISCONTINUED | OUTPATIENT
Start: 2022-01-01 | End: 2022-01-01 | Stop reason: HOSPADM

## 2022-01-01 RX ORDER — ONDANSETRON 2 MG/ML
4 INJECTION INTRAMUSCULAR; INTRAVENOUS EVERY 30 MIN PRN
Status: DISCONTINUED | OUTPATIENT
Start: 2022-01-01 | End: 2022-01-01 | Stop reason: HOSPADM

## 2022-01-01 RX ORDER — MULTIVITAMIN WITH IRON
1 TABLET ORAL DAILY
COMMUNITY

## 2022-01-01 RX ORDER — LIDOCAINE HYDROCHLORIDE 20 MG/ML
INJECTION, SOLUTION INFILTRATION; PERINEURAL PRN
Status: DISCONTINUED | OUTPATIENT
Start: 2022-01-01 | End: 2022-01-01

## 2022-01-01 RX ORDER — MAGNESIUM SULFATE 1 G/100ML
1 INJECTION INTRAVENOUS ONCE
Status: COMPLETED | OUTPATIENT
Start: 2022-01-01 | End: 2022-01-01

## 2022-01-01 RX ORDER — METOPROLOL SUCCINATE 50 MG/1
50 TABLET, EXTENDED RELEASE ORAL DAILY
Qty: 90 TABLET | Refills: 3 | Status: SHIPPED | OUTPATIENT
Start: 2022-01-01 | End: 2022-01-01

## 2022-01-01 RX ORDER — DIPHENHYDRAMINE HYDROCHLORIDE 50 MG/ML
INJECTION INTRAMUSCULAR; INTRAVENOUS PRN
Status: DISCONTINUED | OUTPATIENT
Start: 2022-01-01 | End: 2022-01-01 | Stop reason: HOSPADM

## 2022-01-01 RX ORDER — ONDANSETRON 2 MG/ML
4 INJECTION INTRAMUSCULAR; INTRAVENOUS EVERY 6 HOURS PRN
Status: DISCONTINUED | OUTPATIENT
Start: 2022-01-01 | End: 2022-01-01 | Stop reason: HOSPADM

## 2022-01-01 RX ORDER — METHYLPREDNISOLONE ACETATE 40 MG/ML
INJECTION, SUSPENSION INTRA-ARTICULAR; INTRALESIONAL; INTRAMUSCULAR; SOFT TISSUE PRN
Status: DISCONTINUED | OUTPATIENT
Start: 2022-01-01 | End: 2022-01-01 | Stop reason: HOSPADM

## 2022-01-01 RX ORDER — GADOBUTROL 604.72 MG/ML
7.5 INJECTION INTRAVENOUS ONCE
Status: COMPLETED | OUTPATIENT
Start: 2022-01-01 | End: 2022-01-01

## 2022-01-01 RX ORDER — PROPOFOL 10 MG/ML
INJECTION, EMULSION INTRAVENOUS CONTINUOUS PRN
Status: DISCONTINUED | OUTPATIENT
Start: 2022-01-01 | End: 2022-01-01

## 2022-01-01 RX ORDER — KETOROLAC TROMETHAMINE 30 MG/ML
30 INJECTION, SOLUTION INTRAMUSCULAR; INTRAVENOUS ONCE
Status: COMPLETED | OUTPATIENT
Start: 2022-01-01 | End: 2022-01-01

## 2022-01-01 RX ORDER — DOXYCYCLINE 100 MG/1
100 CAPSULE ORAL 2 TIMES DAILY
COMMUNITY
Start: 2022-01-01 | End: 2022-01-01

## 2022-01-01 RX ORDER — METHYLPHENIDATE HYDROCHLORIDE 10 MG/1
10 TABLET ORAL 2 TIMES DAILY
Qty: 60 TABLET | Refills: 0 | Status: SHIPPED | OUTPATIENT
Start: 2022-01-01 | End: 2022-01-01

## 2022-01-01 RX ORDER — METOPROLOL SUCCINATE 50 MG/1
50 TABLET, EXTENDED RELEASE ORAL DAILY
Qty: 90 TABLET | Refills: 3 | Status: SHIPPED | OUTPATIENT
Start: 2022-01-01

## 2022-01-01 RX ORDER — LORAZEPAM 2 MG/ML
1 INJECTION INTRAMUSCULAR ONCE
Status: COMPLETED | OUTPATIENT
Start: 2022-01-01 | End: 2022-01-01

## 2022-01-01 RX ORDER — FLUTICASONE PROPIONATE 50 MCG
1 SPRAY, SUSPENSION (ML) NASAL DAILY
Qty: 16 G | Refills: 11 | Status: SHIPPED | OUTPATIENT
Start: 2022-01-01

## 2022-01-01 RX ORDER — ONDANSETRON 4 MG/1
4 TABLET, FILM COATED ORAL EVERY 6 HOURS PRN
Qty: 10 TABLET | Refills: 0 | Status: SHIPPED | OUTPATIENT
Start: 2022-01-01 | End: 2022-01-01

## 2022-01-01 RX ORDER — NICOTINE 21 MG/24HR
1 PATCH, TRANSDERMAL 24 HOURS TRANSDERMAL DAILY
Qty: 30 PATCH | Refills: 3 | Status: SHIPPED | OUTPATIENT
Start: 2022-01-01 | End: 2022-01-01

## 2022-01-01 RX ORDER — CIMETIDINE 800 MG
800 TABLET ORAL AT BEDTIME
Qty: 90 TABLET | Refills: 3 | Status: SHIPPED | OUTPATIENT
Start: 2022-01-01 | End: 2022-01-01

## 2022-01-01 RX ORDER — SODIUM CHLORIDE, SODIUM LACTATE, POTASSIUM CHLORIDE, CALCIUM CHLORIDE 600; 310; 30; 20 MG/100ML; MG/100ML; MG/100ML; MG/100ML
INJECTION, SOLUTION INTRAVENOUS CONTINUOUS
Status: DISCONTINUED | OUTPATIENT
Start: 2022-01-01 | End: 2022-01-01 | Stop reason: HOSPADM

## 2022-01-01 RX ORDER — POTASSIUM CHLORIDE 1.5 G/1.58G
40 POWDER, FOR SOLUTION ORAL ONCE
Status: COMPLETED | OUTPATIENT
Start: 2022-01-01 | End: 2022-01-01

## 2022-01-01 RX ORDER — NIZATIDINE 300 MG
300 CAPSULE ORAL AT BEDTIME
Qty: 90 CAPSULE | Refills: 1 | Status: SHIPPED | OUTPATIENT
Start: 2022-01-01 | End: 2022-01-01

## 2022-01-01 RX ORDER — METHYLPHENIDATE HYDROCHLORIDE 20 MG/1
20 TABLET ORAL 2 TIMES DAILY
Qty: 60 TABLET | Refills: 0 | OUTPATIENT
Start: 2022-01-01

## 2022-01-01 RX ORDER — IOPAMIDOL 612 MG/ML
INJECTION, SOLUTION INTRATHECAL PRN
Status: DISCONTINUED | OUTPATIENT
Start: 2022-01-01 | End: 2022-01-01 | Stop reason: HOSPADM

## 2022-01-01 RX ORDER — NALTREXONE HYDROCHLORIDE 50 MG/1
50 TABLET, FILM COATED ORAL
COMMUNITY
Start: 2022-01-01

## 2022-01-01 RX ORDER — DULOXETIN HYDROCHLORIDE 20 MG/1
20 CAPSULE, DELAYED RELEASE ORAL DAILY
Qty: 30 CAPSULE | Refills: 1 | Status: SHIPPED | OUTPATIENT
Start: 2022-01-01 | End: 2022-01-01

## 2022-01-01 RX ORDER — ONDANSETRON 4 MG/1
4 TABLET, ORALLY DISINTEGRATING ORAL EVERY 30 MIN PRN
Status: DISCONTINUED | OUTPATIENT
Start: 2022-01-01 | End: 2022-01-01 | Stop reason: HOSPADM

## 2022-01-01 RX ORDER — OMEPRAZOLE 40 MG/1
40 CAPSULE, DELAYED RELEASE ORAL
Qty: 180 CAPSULE | Refills: 0 | Status: SHIPPED | OUTPATIENT
Start: 2022-01-01 | End: 2022-01-01

## 2022-01-01 RX ORDER — PROCHLORPERAZINE MALEATE 10 MG
10 TABLET ORAL EVERY 6 HOURS PRN
Qty: 15 TABLET | Refills: 0 | Status: SHIPPED | OUTPATIENT
Start: 2022-01-01 | End: 2022-01-01

## 2022-01-01 RX ORDER — FAMOTIDINE 40 MG/1
40 TABLET, FILM COATED ORAL DAILY
Qty: 90 TABLET | Refills: 3 | Status: SHIPPED | OUTPATIENT
Start: 2022-01-01 | End: 2022-01-01

## 2022-01-01 RX ORDER — LEVALBUTEROL TARTRATE 45 UG/1
AEROSOL, METERED ORAL
COMMUNITY
Start: 2022-01-04 | End: 2022-01-01

## 2022-01-01 RX ORDER — METOPROLOL TARTRATE 1 MG/ML
1-2 INJECTION, SOLUTION INTRAVENOUS EVERY 5 MIN PRN
Status: DISCONTINUED | OUTPATIENT
Start: 2022-01-01 | End: 2022-01-01 | Stop reason: HOSPADM

## 2022-01-01 RX ORDER — ONDANSETRON 2 MG/ML
4 INJECTION INTRAMUSCULAR; INTRAVENOUS
Status: DISCONTINUED | OUTPATIENT
Start: 2022-01-01 | End: 2022-01-01 | Stop reason: HOSPADM

## 2022-01-01 RX ORDER — METHYLPHENIDATE HYDROCHLORIDE 20 MG/1
20 TABLET ORAL 2 TIMES DAILY
Qty: 60 TABLET | Refills: 0 | Status: SHIPPED | OUTPATIENT
Start: 2022-01-01

## 2022-01-01 RX ORDER — IOPAMIDOL 755 MG/ML
500 INJECTION, SOLUTION INTRAVASCULAR ONCE
Status: COMPLETED | OUTPATIENT
Start: 2022-01-01 | End: 2022-01-01

## 2022-01-01 RX ORDER — ONDANSETRON 2 MG/ML
4 INJECTION INTRAMUSCULAR; INTRAVENOUS
Status: COMPLETED | OUTPATIENT
Start: 2022-01-01 | End: 2022-01-01

## 2022-01-01 RX ADMIN — SODIUM CHLORIDE, SODIUM LACTATE, POTASSIUM CHLORIDE, CALCIUM CHLORIDE: 600; 310; 30; 20 INJECTION, SOLUTION INTRAVENOUS at 11:32

## 2022-01-01 RX ADMIN — IOPAMIDOL 80 ML: 755 INJECTION, SOLUTION INTRAVENOUS at 23:41

## 2022-01-01 RX ADMIN — SODIUM CHLORIDE 1000 ML: 9 INJECTION, SOLUTION INTRAVENOUS at 15:56

## 2022-01-01 RX ADMIN — ONDANSETRON 4 MG: 2 INJECTION INTRAMUSCULAR; INTRAVENOUS at 08:27

## 2022-01-01 RX ADMIN — MAGNESIUM SULFATE HEPTAHYDRATE 1 G: 1 INJECTION, SOLUTION INTRAVENOUS at 12:56

## 2022-01-01 RX ADMIN — LORAZEPAM 1 MG: 2 INJECTION INTRAMUSCULAR; INTRAVENOUS at 15:56

## 2022-01-01 RX ADMIN — ONDANSETRON 4 MG: 2 INJECTION INTRAMUSCULAR; INTRAVENOUS at 11:10

## 2022-01-01 RX ADMIN — GADOBUTROL 6 ML: 604.72 INJECTION INTRAVENOUS at 10:14

## 2022-01-01 RX ADMIN — PROPOFOL 150 MCG/KG/MIN: 10 INJECTION, EMULSION INTRAVENOUS at 11:35

## 2022-01-01 RX ADMIN — PROPOFOL 30 MG: 10 INJECTION, EMULSION INTRAVENOUS at 08:32

## 2022-01-01 RX ADMIN — SODIUM CHLORIDE 1000 ML: 9 INJECTION, SOLUTION INTRAVENOUS at 11:10

## 2022-01-01 RX ADMIN — PROPOFOL 40 MG: 10 INJECTION, EMULSION INTRAVENOUS at 08:26

## 2022-01-01 RX ADMIN — SODIUM CHLORIDE 1000 ML: 9 INJECTION, SOLUTION INTRAVENOUS at 12:56

## 2022-01-01 RX ADMIN — LIDOCAINE HYDROCHLORIDE 60 MG: 20 INJECTION, SOLUTION INFILTRATION; PERINEURAL at 11:35

## 2022-01-01 RX ADMIN — KETOROLAC TROMETHAMINE 30 MG: 30 INJECTION, SOLUTION INTRAMUSCULAR; INTRAVENOUS at 11:49

## 2022-01-01 RX ADMIN — POTASSIUM CHLORIDE 40 MEQ: 1.5 FOR SOLUTION ORAL at 13:21

## 2022-01-01 RX ADMIN — PROPOFOL 20 MG: 10 INJECTION, EMULSION INTRAVENOUS at 08:30

## 2022-01-01 RX ADMIN — ONDANSETRON 4 MG: 2 INJECTION INTRAMUSCULAR; INTRAVENOUS at 11:52

## 2022-01-01 RX ADMIN — SODIUM CHLORIDE 100 ML: 9 INJECTION, SOLUTION INTRAVENOUS at 23:41

## 2022-01-01 RX ADMIN — KETOROLAC TROMETHAMINE 30 MG: 30 INJECTION, SOLUTION INTRAMUSCULAR; INTRAVENOUS at 11:12

## 2022-01-01 RX ADMIN — SODIUM CHLORIDE 30 ML: 9 INJECTION, SOLUTION INTRAVENOUS at 10:14

## 2022-01-01 RX ADMIN — LIDOCAINE HYDROCHLORIDE 40 MG: 20 INJECTION, SOLUTION INFILTRATION; PERINEURAL at 08:24

## 2022-01-01 RX ADMIN — PROPOFOL 50 MG: 10 INJECTION, EMULSION INTRAVENOUS at 11:36

## 2022-01-01 RX ADMIN — KETOROLAC TROMETHAMINE 30 MG: 30 INJECTION, SOLUTION INTRAMUSCULAR at 11:54

## 2022-01-01 RX ADMIN — PROPOFOL 50 MG: 10 INJECTION, EMULSION INTRAVENOUS at 08:24

## 2022-01-01 RX ADMIN — SODIUM CHLORIDE 1000 ML: 9 INJECTION, SOLUTION INTRAVENOUS at 13:42

## 2022-01-01 ASSESSMENT — ACTIVITIES OF DAILY LIVING (ADL)
STAND: ACTIVITY IS SOMEWHAT DIFFICULT
PAIN: THE SYMPTOM AFFECTS MY ACTIVITY MODERATELY
AS_A_RESULT_OF_YOUR_KNEE_INJURY,_HOW_WOULD_YOU_RATE_YOUR_CURRENT_LEVEL_OF_DAILY_ACTIVITY?: ABNORMAL
HOW_WOULD_YOU_RATE_THE_OVERALL_FUNCTION_OF_YOUR_KNEE_DURING_YOUR_USUAL_DAILY_ACTIVITIES?: NEARLY NORMAL
RISE FROM A CHAIR: ACTIVITY IS MINIMALLY DIFFICULT
LIMPING: I HAVE THE SYMPTOM BUT IT DOES NOT AFFECT MY ACTIVITY
WALK: ACTIVITY IS SOMEWHAT DIFFICULT
HOW_WOULD_YOU_RATE_THE_CURRENT_FUNCTION_OF_YOUR_KNEE_DURING_YOUR_USUAL_DAILY_ACTIVITIES_ON_A_SCALE_FROM_0_TO_100_WITH_100_BEING_YOUR_LEVEL_OF_KNEE_FUNCTION_PRIOR_TO_YOUR_INJURY_AND_0_BEING_THE_INABILITY_TO_PERFORM_ANY_OF_YOUR_USUAL_DAILY_ACTIVITIES?: 50
STIFFNESS: THE SYMPTOM AFFECTS MY ACTIVITY SLIGHTLY
KNEE_ACTIVITY_OF_DAILY_LIVING_SCORE: 58.57
SIT WITH YOUR KNEE BENT: ACTIVITY IS SOMEWHAT DIFFICULT
ADLS_ACUITY_SCORE: 35
GO UP STAIRS: ACTIVITY IS SOMEWHAT DIFFICULT
GO DOWN STAIRS: ACTIVITY IS SOMEWHAT DIFFICULT
WEAKNESS: THE SYMPTOM AFFECTS MY ACTIVITY MODERATELY
KNEEL ON THE FRONT OF YOUR KNEE: ACTIVITY IS FAIRLY DIFFICULT
SQUAT: ACTIVITY IS FAIRLY DIFFICULT
KNEE_ACTIVITY_OF_DAILY_LIVING_SUM: 41
ADLS_ACUITY_SCORE: 35
RAW_SCORE: 41
GIVING WAY, BUCKLING OR SHIFTING OF KNEE: THE SYMPTOM AFFECTS MY ACTIVITY SLIGHTLY
SWELLING: I HAVE THE SYMPTOM BUT IT DOES NOT AFFECT MY ACTIVITY

## 2022-01-01 ASSESSMENT — ANXIETY QUESTIONNAIRES
8. IF YOU CHECKED OFF ANY PROBLEMS, HOW DIFFICULT HAVE THESE MADE IT FOR YOU TO DO YOUR WORK, TAKE CARE OF THINGS AT HOME, OR GET ALONG WITH OTHER PEOPLE?: SOMEWHAT DIFFICULT
6. BECOMING EASILY ANNOYED OR IRRITABLE: SEVERAL DAYS
GAD7 TOTAL SCORE: 8
IF YOU CHECKED OFF ANY PROBLEMS ON THIS QUESTIONNAIRE, HOW DIFFICULT HAVE THESE PROBLEMS MADE IT FOR YOU TO DO YOUR WORK, TAKE CARE OF THINGS AT HOME, OR GET ALONG WITH OTHER PEOPLE: SOMEWHAT DIFFICULT
3. WORRYING TOO MUCH ABOUT DIFFERENT THINGS: SEVERAL DAYS
1. FEELING NERVOUS, ANXIOUS, OR ON EDGE: SEVERAL DAYS
4. TROUBLE RELAXING: SEVERAL DAYS
7. FEELING AFRAID AS IF SOMETHING AWFUL MIGHT HAPPEN: SEVERAL DAYS
GAD7 TOTAL SCORE: 8
7. FEELING AFRAID AS IF SOMETHING AWFUL MIGHT HAPPEN: SEVERAL DAYS
GAD7 TOTAL SCORE: 8
5. BEING SO RESTLESS THAT IT IS HARD TO SIT STILL: MORE THAN HALF THE DAYS
2. NOT BEING ABLE TO STOP OR CONTROL WORRYING: SEVERAL DAYS

## 2022-01-01 ASSESSMENT — ENCOUNTER SYMPTOMS
FEVER: 0
NERVOUS/ANXIOUS: 0
EYES NEGATIVE: 1
WEIGHT LOSS: 1
SMELL DISTURBANCE: 0
FATIGUE: 1
WEIGHT GAIN: 0
RECTAL PAIN: 0
CONSTIPATION: 0
HALLUCINATIONS: 0
INSOMNIA: 1
POLYDIPSIA: 0
VOMITING: 1
NAUSEA: 1
FATIGUE: 1
BLOATING: 1
NECK MASS: 0
TROUBLE SWALLOWING: 1
DIARRHEA: 0
DECREASED APPETITE: 1
PANIC: 0
HOARSE VOICE: 0
POLYPHAGIA: 0
HEARTBURN: 0
APPETITE CHANGE: 1
INCREASED ENERGY: 1
DEPRESSION: 1
FEVER: 0
JAUNDICE: 0
ABDOMINAL PAIN: 1
CHILLS: 0
BOWEL INCONTINENCE: 0
NIGHT SWEATS: 1
SINUS CONGESTION: 0
SINUS PAIN: 0
ALTERED TEMPERATURE REGULATION: 0
TASTE DISTURBANCE: 0
DECREASED CONCENTRATION: 0
BLOOD IN STOOL: 1
SORE THROAT: 0

## 2022-01-01 ASSESSMENT — PAIN SCALES - GENERAL
PAINLEVEL: MODERATE PAIN (5)
PAINLEVEL: SEVERE PAIN (6)
PAINLEVEL: EXTREME PAIN (8)
PAINLEVEL: MODERATE PAIN (5)
PAINLEVEL: EXTREME PAIN (8)
PAINLEVEL: SEVERE PAIN (6)
PAINLEVEL: SEVERE PAIN (6)
PAINLEVEL: EXTREME PAIN (8)

## 2022-01-01 ASSESSMENT — PATIENT HEALTH QUESTIONNAIRE - PHQ9
10. IF YOU CHECKED OFF ANY PROBLEMS, HOW DIFFICULT HAVE THESE PROBLEMS MADE IT FOR YOU TO DO YOUR WORK, TAKE CARE OF THINGS AT HOME, OR GET ALONG WITH OTHER PEOPLE: SOMEWHAT DIFFICULT
10. IF YOU CHECKED OFF ANY PROBLEMS, HOW DIFFICULT HAVE THESE PROBLEMS MADE IT FOR YOU TO DO YOUR WORK, TAKE CARE OF THINGS AT HOME, OR GET ALONG WITH OTHER PEOPLE: SOMEWHAT DIFFICULT
SUM OF ALL RESPONSES TO PHQ QUESTIONS 1-9: 7
SUM OF ALL RESPONSES TO PHQ QUESTIONS 1-9: 10
SUM OF ALL RESPONSES TO PHQ QUESTIONS 1-9: 10

## 2022-01-01 ASSESSMENT — LIFESTYLE VARIABLES: TOBACCO_USE: 1

## 2022-01-24 NOTE — ED PROVIDER NOTES
History     Chief Complaint   Patient presents with     Nausea & Vomiting     Shortness of Breath     Back Pain     HPI     Tasha Short is a 41 year old female who presents with a 3-day history for nausea, vomiting, anxious, shortness of breath, myalgias, low back pain, palpitations, epigastric pain.  No fever, diarrhea reported.  Mild sore throat.  Is vaccinated against Covid.  Patient concerned about electrolyte imbalance, dehydration, pancreatitis.  Previous alcohol dependence in remission, opiate overuse/addiction.  History for narcolepsy.      Allergies:  Allergies   Allergen Reactions     Bupropion Other (See Comments)     seizures     Lisinopril Swelling     Angioedema      Penicillins      hives     Phenytoin      rash,puritis (Dilantin)     Seasonal Allergies        Problem List:    Patient Active Problem List    Diagnosis Date Noted     Health Care Home 06/08/2011     Priority: High     x  DX V65.8 REPLACED WITH 13176 HEALTH CARE HOME (04/08/2013)       Unequal pupil diameter - L>R 12/21/2021     Priority: Medium     Traumatic brain injury, without loss of consciousness, subsequent encounter 12/21/2021     Priority: Medium     Allergic contact dermatitis, unspecified trigger 08/12/2021     Priority: Medium     Electrolyte disturbance 07/15/2021     Priority: Medium     Adrenal mass, right (H) 02/01/2021     Priority: Medium     PUD (peptic ulcer disease) 11/02/2020     Priority: Medium     Anemia due to blood loss, acute 09/23/2020     Priority: Medium     Diarrhea, unspecified type 09/23/2020     Priority: Medium     GI bleed 09/16/2020     Priority: Medium     Acute GI bleeding 09/16/2020     Priority: Medium     Weight loss 09/15/2020     Priority: Medium     Anemia, unspecified type 09/15/2020     Priority: Medium     Hemoglobin decreased 09/15/2020     Priority: Medium     Closed displaced fracture of metatarsal bone of right foot with delayed healing, unspecified metatarsal, subsequent  encounter 09/14/2020     Priority: Medium     Opiate addiction (H) 09/10/2020     Priority: Medium     Alcohol withdrawal seizure (H) 09/10/2020     Priority: Medium     Encounter for surveillance of injectable contraceptive 08/08/2019     Priority: Medium     Alcohol dependence in remission (H) 04/03/2019     Priority: Medium     Hypertriglyceridemia 05/08/2018     Priority: Medium     Chronic seasonal allergic rhinitis due to pollen 05/08/2018     Priority: Medium     Gastroesophageal reflux disease without esophagitis 06/19/2017     Priority: Medium     H/O ETOH abuse 05/15/2017     Priority: Medium     Right hip pain 10/20/2016     Priority: Medium     Narcolepsy 10/20/2016     Priority: Medium     Alcohol-induced mood disorder (H) 08/04/2016     Priority: Medium     Irregular heartbeat 04/18/2016     Priority: Medium     Tobacco use disorder 04/18/2016     Priority: Medium     Acute coronary syndrome (H) 04/14/2016     Priority: Medium     HTN, goal below 140/90 11/24/2014     Priority: Medium     Major depressive disorder, recurrent episode, moderate (H) 10/22/2014     Priority: Medium     Generalized anxiety disorder 10/22/2014     Priority: Medium     Hypersomnia 04/12/2013     Priority: Medium     Calcific tendonitis peroneals 06/21/2012     Priority: Medium     Narcolepsy and cataplexy      Priority: Medium     S/P LEEP 11/17/2010     Priority: Medium     12/14/09 ASCUS + HPV 18, 53, 58 (high risk)  1/13/10 colposcopy- bx (dysplasia) ECC (negative) recommend repeat pap at 6 and 12 months  6/28/10 pap ASCUS + HPV 16 (high risk) recommend colpo  7/21/10 colposcopy- BX ( ROSA 2/3 with gland involvement)  8/2/10 clinic appt to discuss LEEP  10/26/10 tele enc: LEEP scheduled for 11/4/10  11/4/10 LEEP- (ROSA 2/3) not extending to margins.  ECC (negative). Plan: pap in 6 mo.   5/9/11- NIL pap. Plan: pap in 6 mo  10/8/13 NIL pap, neg HR HPV. Plan: pap in 3 years.  5/22/15 NIL pap, neg HR HPV. Plan: pap in 3  years.  19 NIL pap, + HR HPV (not 16 or 18). Plan: cotest in 1 yr.  21 NIL Pap, Neg HR HPV. Plan: Cotest in 1 yr, due to hx of ROSA 2/3.  22 Reminder mychart              Past Medical History:    Past Medical History:   Diagnosis Date     ALCOHOL ABUSE-IN REMISS 2007     Cataplexy and narcolepsy      ROSA 3 - cervical intraepithelial neoplasia grade 3 1/4/10     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     Hypersomnia with sleep apnea      Hypertension      Irregular menstrual cycle 1997     Metrorrhagia 2001     Other acne      Other and unspecified adverse effect of drug, medicinal and biological substance 2001     Substance abuse (H) 10/2/2009       Past Surgical History:    Past Surgical History:   Procedure Laterality Date      SECTION       COLONOSCOPY N/A 10/4/2020    Procedure: COLONOSCOPY, WITH POLYPECTOMY AND BIOPSY;  Surgeon: Mando Siegel MD;  Location:  GI     COLPOSCOPY CERVIX, LOOP ELECTRODE BIOPSY, COMBINED  2010    ROSA 2/3     ESOPHAGOSCOPY, GASTROSCOPY, DUODENOSCOPY (EGD), COMBINED N/A 2020    Procedure: Esophagogastroduodenoscopy with biopsies;  Surgeon: Ronan Pelayo MD;  Location:  GI     ESOPHAGOSCOPY, GASTROSCOPY, DUODENOSCOPY (EGD), COMBINED N/A 10/3/2020    Procedure: ESOPHAGOGASTRODUODENOSCOPY (EGD);  Surgeon: Mando Siegel MD;  Location: [facility]     HC REMOVAL OF TONSILS,12+ Y/O      Tonsils 12+y.o.     REPAIR TENDON PERONEAL  11/15/2013    Procedure: REPAIR TENDON PERONEAL;  right peroneal tendon  transfer;  Surgeon: Jesus Manuel Oden DPM;  Location:  OR       Family History:    Family History   Problem Relation Age of Onset     Depression Mother      Arthritis Paternal Grandmother      Hypertension Paternal Grandfather         birth FF     Asthma No family hx of      C.A.D. No family hx of      Diabetes No family hx of      Cancer - colorectal No family hx of       Prostate Cancer No family hx of      Coronary Artery Disease No family hx of      Ovarian Cancer No family hx of      Mental Illness No family hx of      Cerebrovascular Disease No family hx of      Anesthesia Reaction No family hx of      Other Cancer No family hx of      Osteoporosis No family hx of      Known Genetic Syndrome No family hx of      Obesity No family hx of      Unknown/Adopted No family hx of        Social History:  Marital Status:  Single [1]  Social History     Tobacco Use     Smoking status: Light Tobacco Smoker     Packs/day: 0.25     Types: Cigarettes     Smokeless tobacco: Never Used     Tobacco comment: trying to quit   Vaping Use     Vaping Use: Never used   Substance Use Topics     Alcohol use: No     Drug use: No        Medications:    dicyclomine (BENTYL) 20 MG tablet  fluticasone (FLONASE) 50 MCG/ACT nasal spray  lithium ER (LITHOBID) 300 MG CR tablet  methylphenidate (RITALIN) 10 MG tablet  metoprolol succinate ER (TOPROL-XL) 50 MG 24 hr tablet  Multiple Vitamins-Minerals (SUPER THERA SUE M) TABS  nicotine (NICODERM CQ) 21 MG/24HR 24 hr patch  nicotine (NICORETTE) 2 MG gum  ondansetron (ZOFRAN-ODT) 4 MG ODT tab  pantoprazole (PROTONIX) 40 MG EC tablet  sucralfate (CARAFATE) 1 GM tablet  traZODone (DESYREL) 50 MG tablet  vitamin C (ASCORBIC ACID) 500 MG tablet          Review of Systems   Constitutional: Positive for appetite change and fatigue. Negative for fever.   HENT: Negative.    Eyes: Negative.    All other systems reviewed and are negative.      Physical Exam          Physical Exam  Vitals and nursing note reviewed.   Constitutional:       General: She is not in acute distress.     Appearance: She is not diaphoretic.      Comments: Anxious   HENT:      Head: Normocephalic and atraumatic.      Mouth/Throat:      Mouth: Mucous membranes are moist.      Pharynx: No pharyngeal swelling or oropharyngeal exudate.   Eyes:      General: No scleral icterus.     Pupils: Pupils are  equal, round, and reactive to light.   Neck:      Vascular: No JVD.   Cardiovascular:      Rate and Rhythm: Regular rhythm. Tachycardia present.  No extrasystoles are present.     Heart sounds: Normal heart sounds. No murmur heard.  No friction rub.   Pulmonary:      Effort: No respiratory distress.      Breath sounds: Normal breath sounds. No decreased breath sounds, wheezing or rhonchi.   Abdominal:      General: Bowel sounds are normal.      Palpations: Abdomen is soft.      Tenderness: There is no abdominal tenderness.   Musculoskeletal:         General: No tenderness.      Cervical back: Normal range of motion and neck supple.      Right lower leg: No tenderness.      Left lower leg: No tenderness.   Lymphadenopathy:      Cervical: No cervical adenopathy.   Skin:     General: Skin is warm.      Capillary Refill: Capillary refill takes less than 2 seconds.      Findings: No rash.   Neurological:      General: No focal deficit present.   Psychiatric:         Mood and Affect: Mood is anxious.         ED Course                 Procedures         EKG:  Interpretation by Roberto Naidu DO.   Indication:  Sinus tachycardia.  Rate 105 bpm.  No ectopy  Normal axis  Normal repolarization  Impressions: normal EKG         Results for orders placed or performed during the hospital encounter of 01/24/22 (from the past 24 hour(s))   CBC with platelets differential    Narrative    The following orders were created for panel order CBC with platelets differential.  Procedure                               Abnormality         Status                     ---------                               -----------         ------                     CBC with platelets and d...[[id number]]  Abnormal            Final result                 Please view results for these tests on the individual orders.   CBC with platelets and differential   Result Value Ref Range    WBC Count 7.3 4.0 - 11.0 10e3/uL    RBC Count 4.67 3.80 - 5.20 10e6/uL     Hemoglobin 14.4 11.7 - 15.7 g/dL    Hematocrit 40.4 35.0 - 47.0 %    MCV 87 78 - 100 fL    MCH 30.8 26.5 - 33.0 pg    MCHC 35.6 31.5 - 36.5 g/dL    RDW 15.5 (H) 10.0 - 15.0 %    Platelet Count 90 (L) 150 - 450 10e3/uL    % Neutrophils 79 %    % Lymphocytes 11 %    % Monocytes 8 %    % Eosinophils 1 %    % Basophils 1 %    % Immature Granulocytes 0 %    NRBCs per 100 WBC 0 <1 /100    Absolute Neutrophils 5.8 1.6 - 8.3 10e3/uL    Absolute Lymphocytes 0.8 0.8 - 5.3 10e3/uL    Absolute Monocytes 0.6 0.0 - 1.3 10e3/uL    Absolute Eosinophils 0.1 0.0 - 0.7 10e3/uL    Absolute Basophils 0.1 0.0 - 0.2 10e3/uL    Absolute Immature Granulocytes 0.0 <=0.4 10e3/uL    Absolute NRBCs 0.0 10e3/uL   Comprehensive metabolic panel   Result Value Ref Range    Sodium 128 (L) 133 - 144 mmol/L    Potassium 3.3 (L) 3.4 - 5.3 mmol/L    Chloride 94 94 - 109 mmol/L    Carbon Dioxide (CO2)      Anion Gap      Urea Nitrogen      Creatinine      Calcium      Glucose      Alkaline Phosphatase      AST      ALT      Protein Total      Albumin      Bilirubin Total      GFR Estimate     Lipase   Result Value Ref Range    Lipase 303 73 - 393 U/L       Medications   0.9% sodium chloride BOLUS (1,000 mLs Intravenous New Bag 1/24/22 1110)     Followed by   sodium chloride 0.9% infusion (has no administration in time range)   ondansetron (ZOFRAN) injection 4 mg (4 mg Intravenous Given 1/24/22 1110)   ketorolac (TORADOL) injection 30 mg (30 mg Intravenous Given 1/24/22 1112)       Assessments & Plan (with Medical Decision Making)  Tasha is 41 years of age.  She presents epigastric pain with nausea and vomiting.  No icteric sclera jaundice.  Cardiopulmonary semination unremarkable.  Abdomen is soft with no distention.  No hepatomegaly.  No pain upper quadrant with negative Stewart sign.  Did have localized subxiphoid epigastric discomfort.  No acute peritoneal findings.  Mildly fluid volume down.  Laboratory work identified elevated LFTs including ALT,  AST and GGT.  This along with low platelets around 90,000 may be question her as to returning to alcohol use.  She states that she has been sober for 4 months but her lab work would suggest otherwise.  She stuck to her Story and said that she was not drinking alcohol.  Her epigastric discomfort most like represents gastritis.  Let us start her on Protonix for milligrams daily for 2 weeks with recommendation of follow-up with a primary care doctor.  Advised to follow stools make sure she is not developing melena.  Prescription for Zofran is provided for nausea.  She received 1 L normal saline to correct her sodium = 128 and K-Yudith con 40 mg once for potassium = 3.3.     I have reviewed the nursing notes.    I have reviewed the findings, diagnosis, plan and need for follow up with the patient.      New Prescriptions    No medications on file       Final diagnoses:   Elevated liver enzymes   Nausea and vomiting, intractability of vomiting not specified, unspecified vomiting type   Epigastric discomfort - Suspect gastritis   Hyponatremia   Hypokalemia   Thrombocytopenia (H)       1/24/2022   [facility] EMERGENCY DEPT     Roberto Naidu,   01/24/22 0883

## 2022-01-24 NOTE — ED TRIAGE NOTES
Presents with nausea, vomiting, shortness of breath, back pain, feels shaky, with a racing heart. States she has been sick times three days.

## 2022-01-25 NOTE — PROGRESS NOTES
Clinic Care Coordination Contact  [facility]/Voicemail       Clinical Data: Care Coordinator Outreach  Reason for referral: TCM outreach  Outreach attempted x 1.  Left message on patient's voicemail with call back information and requested return call.  Plan:  Care Coordinator will try to reach patient again in 1-2 business days.       Kenzie Valerio  Community Health Worker  [facility] [place]  Ph: [phone]

## 2022-01-26 NOTE — PROGRESS NOTES
Clinic Care Coordination Contact  [facility]/Voicemail       Clinical Data: Care Coordinator Outreach  Reason for referral: TCM outreach  Outreach attempted x 2.  Left message on patient's voicemail with call back information and requested return call.  Plan:  Care Coordinator will do no further outreaches at this time.     Kenzie Valerio  Community Health Worker  [facility] [place]  Ph: [phone]

## 2022-02-24 PROBLEM — F10.939 ALCOHOL WITHDRAWAL SEIZURE (H): Status: RESOLVED | Noted: 2020-09-10 | Resolved: 2022-01-01

## 2022-02-24 PROBLEM — R56.9 ALCOHOL WITHDRAWAL SEIZURE (H): Status: RESOLVED | Noted: 2020-09-10 | Resolved: 2022-01-01

## 2022-02-24 NOTE — PROGRESS NOTES
Assessment & Plan     ICD-10-CM    1. Elevated liver enzymes  R74.8 Comprehensive metabolic panel (BMP + Alb, Alk Phos, ALT, AST, Total. Bili, TP)     GGT     Comprehensive metabolic panel (BMP + Alb, Alk Phos, ALT, AST, Total. Bili, TP)     GGT   2. Hyponatremia  E87.1 Comprehensive metabolic panel (BMP + Alb, Alk Phos, ALT, AST, Total. Bili, TP)     Comprehensive metabolic panel (BMP + Alb, Alk Phos, ALT, AST, Total. Bili, TP)   3. Hypokalemia  E87.6 Comprehensive metabolic panel (BMP + Alb, Alk Phos, ALT, AST, Total. Bili, TP)     Comprehensive metabolic panel (BMP + Alb, Alk Phos, ALT, AST, Total. Bili, TP)   4. Major depressive disorder, recurrent episode, moderate (H)  F33.1 Vitamin D Deficiency     Vitamin D Deficiency   5. Generalized anxiety disorder  F41.1 Vitamin D Deficiency     Vitamin D Deficiency   6. Hypomagnesemia  E83.42 Magnesium     Magnesium   7. Adrenal nodule (H)  E27.8 MR Adrenal wo and w Contrast   8. Chronic bilateral low back pain without sciatica  M54.50 MR Lumbar Spine w/o Contrast    G89.29 Neurosurgery Referral   9. Tobacco use disorder  F17.200 nicotine (NICODERM CQ) 21 MG/24HR 24 hr patch     nicotine (NICORETTE) 2 MG gum   10. HTN, goal below 140/90  I10 metoprolol succinate ER (TOPROL-XL) 50 MG 24 hr tablet   11. Gastroesophageal reflux disease without esophagitis  K21.9 famotidine (PEPCID) 40 MG tablet   12. Hypertriglyceridemia  E78.1 Lipid panel reflex to direct LDL Fasting     Lipid panel reflex to direct LDL Fasting   13. Hypersomnia  G47.10 methylphenidate (RITALIN) 10 MG tablet     DISCONTINUED: methylphenidate (RITALIN) 10 MG tablet   14. Encounter for screening mammogram for breast cancer  Z12.31 MA Screen Bilateral w/Jason   15. Alcohol use disorder, severe, dependence (H)  F10.20      - Patient reports starting to get her life back on tract   - Sober for 6 months but had 1 little slip in January which resulted in ED visit   - Recommend recheck of labs that were off in  that visit including liver function, GGT, and cholesterol   - Results will be communicated to patient when available and appropriate medication changes made if necessary   - Patient reports mental health finally really doing well, has counseling and groups (out patient addiction care, gets medications from that psychiatrist and Vivitrol injections)     - Extensive review of all medications including use and side effects, refilled as needed  - Will restart Ritalin due to sleep disorder      Plan CSA and Utox at next visit       reviewed no concerns      Patient reports her addiction counselor is ok with her re-starting this, her issue is always alcohol     - Adrenal nodule      Found on imaging last year, recommend MRI recheck as recommended by radiology      Await results     - Back pain      Continues, did well with injection in the past      Recommend MRI lumbar spine and then follow up with neurosurgery again     - Recommend schedule mammogram     Review of the result(s) of each unique test - See list       1/24/22 - GGT, Trop, Lipase, CMP, CBC       12/21/21 - Vitamin D, Lipid, TSH, Mag, CMP       4/7/21 - MRI Adrenal        9/24/20 - MRI lumbar spine   Diagnosis or treatment significantly limited by social determinants of health -     40 minutes spent on the date of the encounter doing chart review, history and exam, documentation and further activities as noted above    The patient indicates understanding of these issues and agrees with the plan.    Return in about 2 months (around 4/24/2022) for Recheck.    Enid Mansfield PA-C  [facility] is a 41 year old who presents for the following health issues     HPI     ED/UC Followup:    Facility:  [facility]   Date of visit: 1/24/22   Reason for visit: Nausea and vomiting   Current Status: alight, stable and tired     - Seeing outpatient psychiatry at [place]   - [facility]  therapist  - Going to AA   - Getting Vivitrol injection   - Lab recheck   - Trying to get new job   - Back pain      In lumbar spine   - Period off since got of depo shot      Every other months            ED NOTE   Assessments & Plan (with Medical Decision Making)  Tasha is 41 years of age.  She presents epigastric pain with nausea and vomiting.  No icteric sclera jaundice.  Cardiopulmonary semination unremarkable.  Abdomen is soft with no distention.  No hepatomegaly.  No pain upper quadrant with negative Stewart sign.  Did have localized subxiphoid epigastric discomfort.  No acute peritoneal findings.  Mildly fluid volume down.  Laboratory work identified elevated LFTs including ALT, AST and GGT.  This along with low platelets around 90,000 may be question her as to returning to alcohol use.  She states that she has been sober for 4 months but her lab work would suggest otherwise.  She stuck to her Story and said that she was not drinking alcohol.  Her epigastric discomfort most like represents gastritis.  Let us start her on Protonix for milligrams daily for 2 weeks with recommendation of follow-up with a primary care doctor.  Advised to follow stools make sure she is not developing melena.  Prescription for Zofran is provided for nausea.  She received 1 L normal saline to correct her sodium = 128 and K-Yudith con 40 mg once for potassium = 3.3.        Review of Systems   Constitutional, HEENT, cardiovascular, pulmonary, gi, MSK, neurological, psychological, and gu systems are negative, except as otherwise noted.      Objective    /84   Pulse 82   Temp 97.3  F (36.3  C) (Temporal)   Resp 18   Wt 60.8 kg (134 lb)   SpO2 100%   BMI 23.00 kg/m    Body mass index is 23 kg/m .  Physical Exam   GENERAL APPEARANCE: healthy, alert and no distress  EYES: Eyes grossly normal to inspection, PERRLA, conjunctivae and sclerae without injection or discharge, EOM intact   RESP: Lungs clear to auscultation - no rales,  rhonchi or wheezes    CV: Regular rates and rhythm, normal S1 S2, no S3 or S4, no murmur, click or rub, no peripheral edema and peripheral pulses strong and symmetric bilaterally   MS: No musculoskeletal defects are noted and gait is age appropriate without ataxia   SKIN: No suspicious lesions or rashes, hydration status appears adeuqate with normal skin turgor   PSYCH: Alert and oriented x3; speech- coherent , normal rate and volume; able to articulate logical thoughts, able to abstract reason, no tangential thoughts, no hallucinations or delusions, mentation appears normal, Mood is euthymic. Affect is appropriate for this mood state and bright. Thought content is free of suicidal ideation, hallucinations, and delusions. Dress is adequate and upkept. Eye contact is good during conversation.       Diagnostics: Reviewed in Epic

## 2022-02-24 NOTE — RESULT ENCOUNTER NOTE
Josefina Cordova    Your results are much improved. Cholesterol normal. Liver function and GGT almost normal but not quite yet. We should recheck these again in 1 month. Still awaiting vitamin D.     The results are attached for your review.       Donnie Mansfield PA-C

## 2022-02-24 NOTE — PATIENT INSTRUCTIONS
Call to schedule MRI Adrenal & MRI Lumbar spine        You may call any office from below to schedule an appointment for radiology.  [place]  [phone]  [place] [place]  [phone]     Then schedule follow up after MRI with neurosurgery     Mammogram     Recheck 2 months

## 2022-02-25 NOTE — RESULT ENCOUNTER NOTE
Josefina Cordova    Your vitamin D results were low end of normal. I recommend OTC supplement of 2,000 IU daily.     The results are attached for your review.       Donnie Mansfield PA-C

## 2022-03-07 NOTE — RESULT ENCOUNTER NOTE
Josefina Cordova    Your results do show that you have a disc bulge as well as some degenerative changes. Make sure to schedule with our neurosurgery team to go over these results and discuss a treatment plan.     The results are attached for your review.       Donnie Mansfield PA-C

## 2022-03-14 NOTE — PROGRESS NOTES
"Tasha Short is a 41 year old female who presents for:  Chief Complaint   Patient presents with     Consult     Chronic low back pain        Initial Vitals:  Temp 98.6  F (37  C) (Temporal)   Ht 5' 4\" (1.626 m)   Wt 137 lb (62.1 kg)   BMI 23.52 kg/m   Estimated body mass index is 23.52 kg/m  as calculated from the following:    Height as of this encounter: 5' 4\" (1.626 m).    Weight as of this encounter: 137 lb (62.1 kg).. Body surface area is 1.67 meters squared. BP completed using cuff size: regular  Severe Pain (6)    Nursing Comments:     Faye Lipscomb    "

## 2022-03-14 NOTE — PROGRESS NOTES
Dr. Balbir Kamara  [facility]  Neurosurgery Clinic Visit      CC: back pain    Primary care Provider: Enid Mansfield    Referring Provider:  Enid Mansfield, MARTHA      Reason For Visit:   I was asked to consult on the patient for back pain .      HPI: Tasha Short is a 41 year old female who presents for evaluation of her chief complaint of low back pain.  She has had gradually worsening low back pain for the last several years, with no particular event or injury.  She has completed extensive physical therapy and has also tried over-the-counter anti-inflammatories.  None of these have helped her obtain lasting symptomatic improvement.  She describes axial back pain, that seems to go up and down her entire lumbar spine.  She does not have any radicular leg pain or paresthesias.  No bowel or bladder changes, or any problems with balance or coordination.  She denies any focal weaknesses in the lower extremities.    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    Past Surgical History:   Procedure Laterality Date      SECTION       COLONOSCOPY N/A 10/4/2020    Procedure: COLONOSCOPY, WITH POLYPECTOMY AND BIOPSY;  Surgeon: Mando Siegel MD;  Location:  GI      COLPOSCOPY CERVIX, LOOP ELECTRODE BIOPSY, COMBINED  11/2010    ROSA 2/3     ESOPHAGOSCOPY, GASTROSCOPY, DUODENOSCOPY (EGD), COMBINED N/A 9/25/2020    Procedure: Esophagogastroduodenoscopy with biopsies;  Surgeon: Ronan Pelayo MD;  Location:  GI     ESOPHAGOSCOPY, GASTROSCOPY, DUODENOSCOPY (EGD), COMBINED N/A 10/3/2020    Procedure: ESOPHAGOGASTRODUODENOSCOPY (EGD);  Surgeon: Mando Siegel MD;  Location:  GI     HC REMOVAL OF TONSILS,12+ Y/O  1999    Tonsils 12+y.o.     REPAIR TENDON PERONEAL  11/15/2013    Procedure: REPAIR TENDON PERONEAL;  right peroneal tendon  transfer;  Surgeon: Jesus Manuel Oden DPM;  Location: [facility] OR     Past Surgical History reviewed with patient during visit.    Current Outpatient Medications   Medication     famotidine (PEPCID) 40 MG tablet     fluticasone (FLONASE) 50 MCG/ACT nasal spray     lithium ER (LITHOBID) 300 MG CR tablet     [START ON 3/26/2022] methylphenidate (RITALIN) 10 MG tablet     metoprolol succinate ER (TOPROL-XL) 50 MG 24 hr tablet     Multiple Vitamins-Minerals (SUPER THERA SUE M) TABS     nicotine (NICODERM CQ) 21 MG/24HR 24 hr patch     nicotine (NICORETTE) 2 MG gum     ondansetron (ZOFRAN-ODT) 4 MG ODT tab     vitamin C (ASCORBIC ACID) 500 MG tablet     No current facility-administered medications for this visit.       Allergies   Allergen Reactions     Bupropion Other (See Comments)     seizures     Lisinopril Swelling     Angioedema      Penicillins      hives     Phenytoin      rash,puritis (Dilantin)     Seasonal Allergies        Social History     Socioeconomic History     Marital status: Single     Spouse name: None     Number of children: 0     Years of education: 14     Highest education level: None   Occupational History     Occupation: RN     Comment: [place]     Employer: [employer]     Employer: [employer]     Employer: [employer]   Tobacco Use     Smoking status: Light Tobacco Smoker     Packs/day:  0.25     Types: Cigarettes     Smokeless tobacco: Never Used     Tobacco comment: trying to quit   Vaping Use     Vaping Use: Every day     Substances: Nicotine   Substance and Sexual Activity     Alcohol use: No     Drug use: No     Sexual activity: Yes     Partners: Male     Birth control/protection: Condom, Pill   Other Topics Concern      Service Not Asked     Blood Transfusions Not Asked     Caffeine Concern Not Asked     Occupational Exposure Not Asked     Hobby Hazards Not Asked     Sleep Concern Not Asked     Stress Concern Not Asked     Weight Concern Not Asked     Special Diet Not Asked     Back Care Not Asked     Exercise Not Asked     Bike Helmet Not Asked     Seat Belt Not Asked     Self-Exams Not Asked     Parent/sibling w/ CABG, MI or angioplasty before 65F 55M? No   Social History Narrative    Lives with significan other. Denies domestic  violence issues.     Social Determinants of Health     Financial Resource Strain: Not on file   Food Insecurity: Not on file   Transportation Needs: Not on file   Physical Activity: Not on file   Stress: Not on file   Social Connections: Not on file   Intimate Partner Violence: Not on file   Housing Stability: Not on file       Family History   Problem Relation Age of Onset     Depression Mother      Arthritis Paternal Grandmother      Hypertension Paternal Grandfather         birth FF     Asthma No family hx of      C.A.D. No family hx of      Diabetes No family hx of      Cancer - colorectal No family hx of      Prostate Cancer No family hx of      Coronary Artery Disease No family hx of      Ovarian Cancer No family hx of      Mental Illness No family hx of      Cerebrovascular Disease No family hx of      Anesthesia Reaction No family hx of      Other Cancer No family hx of      Osteoporosis No family hx of      Known Genetic Syndrome No family hx of      Obesity No family hx of      Unknown/Adopted No family hx of           ROS: 10 point ROS neg other  "than the symptoms noted above in the HPI.    Vital Signs: /88   Temp 98.6  F (37  C) (Temporal)   Ht 5' 4\" (1.626 m)   Wt 137 lb (62.1 kg)   BMI 23.52 kg/m      Examination:  Constitutional:  Alert, well nourished, NAD.  HEENT: Normocephalic, atraumatic.   Pulmonary:  Without shortness of breath, normal effort.   Lymph: no lymphadenopathy to low back or LE.   Integumentary: Skin is free of rashes or lesions.   Cardiovascular:  No pitting edema of BLE.    Psych: Normal affect, no apparent distress    Neurological:  Awake  Alert  Oriented x 3  Speech clear  Cranial nerves II - XII grossly intact  Motor exam   Hip Flexor:                Right: 5/5  Left:  5/5  Hip Adductor:             Right:  5/5  Left:  5/5  Hip Abductor:             Right:  5/5  Left:  5/5  Gastroc Soleus:        Right:  5/5  Left:  5/5  Tib/Ant:                      Right:  5/5  Left:  5/5  EHL:                          Right:  5/5  Left:  5/5       Sensation normal to bilateral upper and lower extremities.    Reflexes are 2+ in the patellar and Achilles. There is no clonus. Downgoing Babinski.      Musculoskeletal:  Gait: Able to stand from a seated position. Normal non-antalgic, non-myelopathic gait.  Lumbar examination reveals no tenderness of the spine or paraspinous muscles.  Hip height is symmetrical. Negative SI joint, sciatic notch or greater trochanteric tenderness to palpation bilaterally.  Straight leg raise is negative bilaterally.      Imaging:   MRI of the lumbar spine was reviewed in the office today.                                                                  IMPRESSION:  1.  At L5-S1 the disc bulge and central herniation abut the traversing  S1 nerve root sleeve similar to prior. Mild left and low-grade right  foraminal narrowing is also similar.  2.  Minor progression of degenerative changes at L4-L5 where there is  mild lateral recess and low-grade foraminal narrowing.  3.  Minor degenerative changes above this " without stenosis.    Assessment/Plan:     Low back pain  Lumbar disc degeneration    Tasha Short is a 41 year old female.  I did have a discussion the patient regarding her symptoms.  We did review her lumbar spine MRI in detail.  She has had several years of gradually worsening back pain, without any radicular pain or paresthesias.  She does have some mild disc degeneration at L4-5 and more moderately at L5-S1.  She has some mild facet arthropathy as well at both levels.  No disc herniations or nerve compression.  She is not having sciatica.  She has been through extensive physical therapy and over-the-counter anti-inflammatories.  She is interested in trying a cortisone injection.  I think this would be reasonable.  She is not interested in surgery at present.  We will order the injection and see how she does with that.  She voiced agreement and understanding.          Valeriy Lew PA-C  [facility] Neurosurgery  [address] [address]    Tel [phone]  Pager [phone]      The use of Dragon/Adeniosation services may have been used to construct the content in this note; any grammatical or spelling errors are non-intentional. Please contact the author of this note directly if you are in need of any clarification.

## 2022-03-14 NOTE — LETTER
"    3/14/2022         RE: Tasha Short  [address] [place] [address]        Dear Colleague,    Thank you for referring your patient, Tasha Short, to the [facility] [place]. Please see a copy of my visit note below.    Tasha Short is a 41 year old female who presents for:  Chief Complaint   Patient presents with     Consult     Chronic low back pain        Initial Vitals:  Temp 98.6  F (37  C) (Temporal)   Ht 5' 4\" (1.626 m)   Wt 137 lb (62.1 kg)   BMI 23.52 kg/m   Estimated body mass index is 23.52 kg/m  as calculated from the following:    Height as of this encounter: 5' 4\" (1.626 m).    Weight as of this encounter: 137 lb (62.1 kg).. Body surface area is 1.67 meters squared. BP completed using cuff size: regular  Severe Pain (6)    Nursing Comments:     Faye Kamara  [facility]  Neurosurgery Clinic Visit      CC: back pain    Primary care Provider: Enid Mansfield    Referring Provider:  Enid Mansfield PA-C      Reason For Visit:   I was asked to consult on the patient for back pain .      HPI: Tasha Short is a 41 year old female who presents for evaluation of her chief complaint of low back pain.  She has had gradually worsening low back pain for the last several years, with no particular event or injury.  She has completed extensive physical therapy and has also tried over-the-counter anti-inflammatories.  None of these have helped her obtain lasting symptomatic improvement.  She describes axial back pain, that seems to go up and down her entire lumbar spine.  She does not have any radicular leg pain or paresthesias.  No bowel or bladder changes, or any problems with balance or coordination.  She denies any focal weaknesses in the lower extremities.    Past Medical History:   Diagnosis Date     ALCOHOL ABUSE-IN REMISS 7/30/2007     Cataplexy and narcolepsy 2002 "    tried Provigil, Straterra, Ritalin (works)     ROSA 3 - cervical intraepithelial neoplasia grade 3 1/4/10    ROSA 2/3  on LEEP biopsy     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     Hypersomnia with sleep apnea      Hypertension      Irregular menstrual cycle 1997     Metrorrhagia 2001     Other acne      Other and unspecified adverse effect of drug, medicinal and biological substance 2001    Allergic and adverse reaction to Dilantin     Substance abuse (H) 10/2/2009    see 2009 confidential report       Past Medical History reviewed with patient during visit.    Past Surgical History:   Procedure Laterality Date      SECTION       COLONOSCOPY N/A 10/4/2020    Procedure: COLONOSCOPY, WITH POLYPECTOMY AND BIOPSY;  Surgeon: Mando Siegel MD;  Location:  GI     COLPOSCOPY CERVIX, LOOP ELECTRODE BIOPSY, COMBINED  2010    ROSA 2/3     ESOPHAGOSCOPY, GASTROSCOPY, DUODENOSCOPY (EGD), COMBINED N/A 2020    Procedure: Esophagogastroduodenoscopy with biopsies;  Surgeon: Ronan Pelayo MD;  [facility]     ESOPHAGOSCOPY, GASTROSCOPY, DUODENOSCOPY (EGD), COMBINED N/A 10/3/2020    Procedure: ESOPHAGOGASTRODUODENOSCOPY (EGD);  Surgeon: Mando Siegel MD;  Location: [facility]     HC REMOVAL OF TONSILS,12+ Y/O  1999    Tonsils 12+y.o.     REPAIR TENDON PERONEAL  11/15/2013    Procedure: REPAIR TENDON PERONEAL;  right peroneal tendon  transfer;  Surgeon: Jesus Manuel Oden DPM;  Location:  OR     Past Surgical History reviewed with patient during visit.    Current Outpatient Medications   Medication     famotidine (PEPCID) 40 MG tablet     fluticasone (FLONASE) 50 MCG/ACT nasal spray     lithium ER (LITHOBID) 300 MG CR tablet     [START ON 3/26/2022] methylphenidate (RITALIN) 10 MG tablet     metoprolol succinate ER (TOPROL-XL) 50 MG 24 hr tablet     Multiple Vitamins-Minerals (SUPER THERA SUE M) TABS     nicotine (NICODERM  CQ) 21 MG/24HR 24 hr patch     nicotine (NICORETTE) 2 MG gum     ondansetron (ZOFRAN-ODT) 4 MG ODT tab     vitamin C (ASCORBIC ACID) 500 MG tablet     No current facility-administered medications for this visit.       Allergies   Allergen Reactions     Bupropion Other (See Comments)     seizures     Lisinopril Swelling     Angioedema      Penicillins      hives     Phenytoin      rash,puritis (Dilantin)     Seasonal Allergies        Social History     Socioeconomic History     Marital status: Single     Spouse name: None     Number of children: 0     Years of education: 14     Highest education level: None   Occupational History     Occupation: RN     Comment: [place]     Employer: [employer]     Employer: [employer]     Employer: [employer]   Tobacco Use     Smoking status: Light Tobacco Smoker     Packs/day: 0.25     Types: Cigarettes     Smokeless tobacco: Never Used     Tobacco comment: trying to quit   Vaping Use     Vaping Use: Every day     Substances: Nicotine   Substance and Sexual Activity     Alcohol use: No     Drug use: No     Sexual activity: Yes     Partners: Male     Birth control/protection: Condom, Pill   Other Topics Concern      Service Not Asked     Blood Transfusions Not Asked     Caffeine Concern Not Asked     Occupational Exposure Not Asked     Hobby Hazards Not Asked     Sleep Concern Not Asked     Stress Concern Not Asked     Weight Concern Not Asked     Special Diet Not Asked     Back Care Not Asked     Exercise Not Asked     Bike Helmet Not Asked     Seat Belt Not Asked     Self-Exams Not Asked     Parent/sibling w/ CABG, MI or angioplasty before 65F 55M? No   Social History Narrative    Lives with significan other. Denies domestic  violence issues.     Social Determinants of Health     Financial Resource Strain: Not on file   Food Insecurity: Not on file   Transportation Needs: Not on file   Physical Activity: Not on file   Stress: Not on file  "  Social Connections: Not on file   Intimate Partner Violence: Not on file   Housing Stability: Not on file       Family History   Problem Relation Age of Onset     Depression Mother      Arthritis Paternal Grandmother      Hypertension Paternal Grandfather         birth FF     Asthma No family hx of      C.A.D. No family hx of      Diabetes No family hx of      Cancer - colorectal No family hx of      Prostate Cancer No family hx of      Coronary Artery Disease No family hx of      Ovarian Cancer No family hx of      Mental Illness No family hx of      Cerebrovascular Disease No family hx of      Anesthesia Reaction No family hx of      Other Cancer No family hx of      Osteoporosis No family hx of      Known Genetic Syndrome No family hx of      Obesity No family hx of      Unknown/Adopted No family hx of           ROS: 10 point ROS neg other than the symptoms noted above in the HPI.    Vital Signs: /88   Temp 98.6  F (37  C) (Temporal)   Ht 5' 4\" (1.626 m)   Wt 137 lb (62.1 kg)   BMI 23.52 kg/m      Examination:  Constitutional:  Alert, well nourished, NAD.  HEENT: Normocephalic, atraumatic.   Pulmonary:  Without shortness of breath, normal effort.   Lymph: no lymphadenopathy to low back or LE.   Integumentary: Skin is free of rashes or lesions.   Cardiovascular:  No pitting edema of BLE.    Psych: Normal affect, no apparent distress    Neurological:  Awake  Alert  Oriented x 3  Speech clear  Cranial nerves II - XII grossly intact  Motor exam   Hip Flexor:                Right: 5/5  Left:  5/5  Hip Adductor:             Right:  5/5  Left:  5/5  Hip Abductor:             Right:  5/5  Left:  5/5  Gastroc Soleus:        Right:  5/5  Left:  5/5  Tib/Ant:                      Right:  5/5  Left:  5/5  EHL:                          Right:  5/5  Left:  5/5       Sensation normal to bilateral upper and lower extremities.    Reflexes are 2+ in the patellar and Achilles. There is no clonus. Downgoing " Babinski.      Musculoskeletal:  Gait: Able to stand from a seated position. Normal non-antalgic, non-myelopathic gait.  Lumbar examination reveals no tenderness of the spine or paraspinous muscles.  Hip height is symmetrical. Negative SI joint, sciatic notch or greater trochanteric tenderness to palpation bilaterally.  Straight leg raise is negative bilaterally.      Imaging:   MRI of the lumbar spine was reviewed in the office today.                                                                  IMPRESSION:  1.  At L5-S1 the disc bulge and central herniation abut the traversing  S1 nerve root sleeve similar to prior. Mild left and low-grade right  foraminal narrowing is also similar.  2.  Minor progression of degenerative changes at L4-L5 where there is  mild lateral recess and low-grade foraminal narrowing.  3.  Minor degenerative changes above this without stenosis.    Assessment/Plan:     Low back pain  Lumbar disc degeneration    Tasha Short is a 41 year old female.  I did have a discussion the patient regarding her symptoms.  We did review her lumbar spine MRI in detail.  She has had several years of gradually worsening back pain, without any radicular pain or paresthesias.  She does have some mild disc degeneration at L4-5 and more moderately at L5-S1.  She has some mild facet arthropathy as well at both levels.  No disc herniations or nerve compression.  She is not having sciatica.  She has been through extensive physical therapy and over-the-counter anti-inflammatories.  She is interested in trying a cortisone injection.  I think this would be reasonable.  She is not interested in surgery at present.  We will order the injection and see how she does with that.  She voiced agreement and understanding.          Valeriy Lew PA-C  [facility] Neurosurgery  [address] [address]    Tel [phone]  Pager [phone]      The use of  Dragon/Race Nationation services may have been used to construct the content in this note; any grammatical or spelling errors are non-intentional. Please contact the author of this note directly if you are in need of any clarification.        Again, thank you for allowing me to participate in the care of your patient.        Sincerely,        Valeriy Lew PA-C

## 2022-03-17 NOTE — TELEPHONE ENCOUNTER
PA needed for: Cimetidine  Insurance: [employer] [facility]  Insur phone: [phone]  Patient ID: [id number]  Please let us know when PA is granted/denied. Thank you!  Emerita Damon, Pharmacy Tech, [facility] [place] [phone]

## 2022-03-17 NOTE — TELEPHONE ENCOUNTER
Pt scheduled for ELSY   Date: 3/24/22  Time: 230p  Dr. Steele    Instructed pt to have H&P and  for procedure.  Patient informed of COVID testing process.

## 2022-03-21 NOTE — TELEPHONE ENCOUNTER
[facility] Call Center    Phone Message    May a detailed message be left on voicemail: yes     Reason for Call: Appointment Intake    Referring Provider Name: Dr. Enid Mansfield  Diagnosis and/or Symptoms: Adrenal nodule     I did get pt scheduled with Dr. Mann on 06/28    Protocols state to send encounter if scheduled over two weeks.    Please triage      Action Taken: Message routed to:  [facility] Endo    Travel Screening: Not Applicable

## 2022-03-21 NOTE — TELEPHONE ENCOUNTER
Spoke with patient this afternoon. Scheduled for tomorrow.    Donnie Mansfield PA-C  [facility]

## 2022-03-22 NOTE — TELEPHONE ENCOUNTER
Central Prior Authorization Team   Phone: [phone]    PA Initiation    Medication: Cimetidine  Insurance Company: [employer] [place] - Phone [phone] Fax [phone]  Pharmacy Filling the Rx: [place], [place] - [address] [place] [address]  Filling Pharmacy Phone: [phone]  Filling Pharmacy Fax:    Start Date: 3/22/2022

## 2022-03-22 NOTE — PATIENT INSTRUCTIONS
- Morning of procedure, do not take Ritalin but can take the rest of medications as scheduled         Preparing for Your Surgery  Getting started  A nurse will call you to review your health history and instructions. They will give you an arrival time based on your scheduled surgery time. Please be ready to share:    Your doctor's clinic name and phone number    Your medical, surgical and anesthesia history    A list of allergies and sensitivities    A list of medicines, including herbal treatments and over-the-counter drugs    Whether the patient has a legal guardian (ask how to send us the papers in advance)  Please tell us if you're pregnant--or if there's any chance you might be pregnant. Some surgeries may injure a fetus (unborn baby), so they require a pregnancy test. Surgeries that are safe for a fetus don't always need a test, and you can choose whether to have one.   If you have a child who's having surgery, please ask for a copy of Preparing for Your Child's Surgery.    Preparing for surgery    Within 30 days of surgery: Have a pre-op exam (sometimes called an H&P, or History and Physical). This can be done at a clinic or pre-operative center.  ? If you're having a , you may not need this exam. Talk to your care team.    At your pre-op exam, talk to your care team about all medicines you take. If you need to stop any medicines before surgery, ask when to start taking them again.  ? We do this for your safety. Many medicines can make you bleed too much during surgery. Some change how well surgery (anesthesia) drugs work.    Call your insurance company to let them know you're having surgery. (If you don't have insurance, call [phone].)    Call your clinic if there's any change in your health. This includes signs of a cold or flu (sore throat, runny nose, cough, rash, fever). It also includes a scrape or scratch near the surgery site.    If you have questions on the day of surgery, call your  hospital or surgery center.  COVID testing  You may need to be tested for COVID-19 before having surgery. If so, your surgical team will give you instructions for scheduling this test, separate from your preoperative history and physical.  Eating and drinking guidelines  For your safety: Unless your surgeon tells you otherwise, follow the guidelines below.    Eat and drink as usual until 8 hours before surgery. After that, no food or milk.    Drink clear liquids until 2 hours before surgery. These are liquids you can see through, like water, Gatorade and Propel Water. You may also have black coffee and tea (no cream or milk).    Nothing by mouth within 2 hours of surgery. This includes gum, candy and breath mints.    If you drink alcohol: Stop drinking it the night before surgery.    If your care team tells you to take medicine on the morning of surgery, it's okay to take it with a sip of water.  Preventing infection    Shower or bathe the night before and morning of your surgery. Follow the instructions your clinic gave you. (If no instructions, use regular soap.)    Don't shave or clip hair near your surgery site. We'll remove the hair if needed.    Don't smoke or vape the morning of surgery. You may chew nicotine gum up to 2 hours before surgery. A nicotine patch is okay.  ? Note: Some surgeries require you to completely quit smoking and nicotine. Check with your surgeon.    Your care team will make every effort to keep you safe from infection. We will:  ? Clean our hands often with soap and water (or an alcohol-based hand rub).  ? Clean the skin at your surgery site with a special soap that kills germs.  ? Give you a special gown to keep you warm. (Cold raises the risk of infection.)  ? Wear special hair covers, masks, gowns and gloves during surgery.  ? Give antibiotic medicine, if prescribed. Not all surgeries need antibiotics.  What to bring on the day of surgery    Photo ID and insurance card    Copy of your  health care directive, if you have one    Glasses and hearing aides (bring cases)  ? You can't wear contacts during surgery    Inhaler and eye drops, if you use them (tell us about these when you arrive)    CPAP machine or breathing device, if you use them    A few personal items, if spending the night    If you have . . .  ? A pacemaker, ICD (cardiac defibrillator) or other implant: Bring the ID card.  ? An implanted stimulator: Bring the remote control.  ? A legal guardian: Bring a copy of the certified (court-stamped) guardianship papers.  Please remove any jewelry, including body piercings. Leave jewelry and other valuables at home.  If you're going home the day of surgery    You must have a responsible adult drive you home. They should stay with you overnight as well.    If you don't have someone to stay with you, and you aren't safe to go home alone, we may keep you overnight. Insurance often won't pay for this.  After surgery  If it's hard to control your pain or you need more pain medicine, please call your surgeon's office.  Questions?   If you have any questions for your care team, list them here: _________________________________________________________________________________________________________________________________________________________________________ ____________________________________ ____________________________________ ____________________________________  For informational purposes only. Not to replace the advice of your health care provider. Copyright   2003, 2019 [facility]. All rights reserved. Clinically reviewed by Steffi Hernandez MD. [employer] [id number] - REV 07/21.

## 2022-03-22 NOTE — LETTER
[address]  [facility] [address]  Phone: [phone]    March 22, 2022        Tasha Short  [address] [place] [address]          To whom it may concern:    RE: Tasha Short    Patient was seen and treated today at our clinic and missed treatment. Due to some acute and chronic health problems she should be excused from missed treatment for the week of 3/21/22.     Please contact me for questions or concerns.      Sincerely,        Enid Mansfield PA-C
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
## 2022-03-22 NOTE — LETTER
[facility]  -- Controlled Medication Agreement    3/22/2022   Tasha Saucedogeoff   1980   [id number]       I understand that my provider is prescribing controlled medications to assist me in managing my ADHD.  The risks, benefits, and side effects of these medications have been explained to me and I agree to the following conditions for this type of treatment.    Stimulant Medication Prescribed: Ritalin     1.  I will take my medications exactly as prescribed and will not change the medication dosage or schedule without my provider's approval.  Refills will not be given if I  runs out early.     2.  I will keep all regular appointments at this clinic.  If there are three or more missed appointments or appointments canceled less than 2 hours before the scheduled time, my medication may be discontinued.    3.  I understand that prescriptions may only be written for one month at a time, and a written prescription is required each month.  Prescriptions cannot be called in or faxed to the pharmacy.    4.  If the prescription is lost or stolen, replacement is at the discretion of my provider.  I understand that this may mean the prescription might not be replaced.    5.  If I am late for scheduled follow up, I understand that I must make an appointment and that another refill is at the discretion of my provider.  This may mean a prescription for only the amount required until the appointment, regardless of prescription co-pay.  For example, if an appointment is made in 1 week, a prescription might only be written for 7 pills.      I understand that if I violate any of the above conditions, my prescription medications and/or treatment may be terminated.  If the violation includes providing controlled substances to anyone other than to whom the medication is prescribed, a report may be made to my child's physician, pharmacy, and other authorities, including the police.    I have read this contract and it  has been explained to me.  I fully understand the consequences of violating this agreement.    _________________________________/____________  Patient signature/Date

## 2022-03-23 NOTE — TELEPHONE ENCOUNTER
PRIOR AUTHORIZATION DENIED    Medication: Cimetidine    Denial Date: 3/23/2022    Denial Rational:                Appeal Information:    If you would like to appeal, please supply P/A team with a letter of medical necessity with clinical reason.

## 2022-03-23 NOTE — RESULT ENCOUNTER NOTE
Josefina Cordova    Your results were normal. OK to proceed with injection.     The results are attached for your review.       Donnie Mansfield PA-C

## 2022-03-24 NOTE — PROGRESS NOTES
Dr Steele cancelled pt's procedure today related to HR:120; T: 99.9 and pt has new onset of right sided facial swelling. Pt advised to go to ER. Pt states understanding.

## 2022-03-24 NOTE — ANESTHESIA PREPROCEDURE EVALUATION
Anesthesia Pre-Procedure Evaluation    Patient: Tasha Short   MRN: [id number] : 1980        Procedure : Procedure(s):  INJECTION, SPINE, LUMBAR4-5  EPIDURAL TRANSLAMINAR          Past Medical History:   Diagnosis Date     ALCOHOL ABUSE-IN REMISS 2007     Cataplexy and narcolepsy 2002    tried Provigil, Straterra, Ritalin (works)     ROSA 3 - cervical intraepithelial neoplasia grade 3 1/4/10    ROSA 2/3  on LEEP biopsy     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     Hypersomnia with sleep apnea      Hypertension      Irregular menstrual cycle 1997     Metrorrhagia 2001     Other acne      Other and unspecified adverse effect of drug, medicinal and biological substance 2001    Allergic and adverse reaction to Dilantin     Substance abuse (H) 10/2/2009    see 2009 confidential report      Past Surgical History:   Procedure Laterality Date      SECTION       COLONOSCOPY N/A 10/4/2020    Procedure: COLONOSCOPY, WITH POLYPECTOMY AND BIOPSY;  Surgeon: Mando Siegel MD;  Location:  GI     COLPOSCOPY CERVIX, LOOP ELECTRODE BIOPSY, COMBINED  2010    ROSA 2/3     ESOPHAGOSCOPY, GASTROSCOPY, DUODENOSCOPY (EGD), COMBINED N/A 2020    Procedure: Esophagogastroduodenoscopy with biopsies;  Surgeon: Ronan Pelayo MD;  Location: [facility]     ESOPHAGOSCOPY, GASTROSCOPY, DUODENOSCOPY (EGD), COMBINED N/A 10/3/2020    Procedure: ESOPHAGOGASTRODUODENOSCOPY (EGD);  Surgeon: Mando Siegel MD;  Location: [facility]     HC REMOVAL OF TONSILS,12+ Y/O      Tonsils 12+y.o.     REPAIR TENDON PERONEAL  11/15/2013    Procedure: REPAIR TENDON PERONEAL;  right peroneal tendon  transfer;  Surgeon: Jesus Manuel Oden DPM;  Location: [facility]      Allergies   Allergen Reactions     Bupropion Other (See Comments)     seizures     Lisinopril Swelling     Angioedema      Penicillins      hives     Phenytoin      rash,puritis (Dilantin)      Seasonal Allergies       Social History     Tobacco Use     Smoking status: Light Tobacco Smoker     Packs/day: 0.25     Types: Cigarettes     Smokeless tobacco: Never Used     Tobacco comment: trying to quit   Substance Use Topics     Alcohol use: No      Wt Readings from Last 1 Encounters:   03/22/22 60.3 kg (133 lb)        Anesthesia Evaluation   Pt has had prior anesthetic. Type: General and MAC.        ROS/MED HX  ENT/Pulmonary:     (+) tobacco use, Current use,     Neurologic: Comment: TBI      Cardiovascular:     (+) Dyslipidemia hypertension-----    METS/Exercise Tolerance:     Hematologic:       Musculoskeletal:       GI/Hepatic: Comment: GI bleed    (+) GERD, Symptomatic,     Renal/Genitourinary:  - neg Renal ROS     Endo:  - neg endo ROS     Psychiatric/Substance Use:     (+) psychiatric history anxiety and depression alcohol abuse     Infectious Disease:  - neg infectious disease ROS     Malignancy:  - neg malignancy ROS     Other:      (+) , H/O Chronic Pain,        Physical Exam    Airway        Mallampati: II   TM distance: > 3 FB   Neck ROM: full   Mouth opening: > 3 cm    Respiratory Devices and Support         Dental           Cardiovascular   cardiovascular exam normal          Pulmonary   pulmonary exam normal                OUTSIDE LABS:  CBC:   Lab Results   Component Value Date    WBC 7.3 01/24/2022    WBC 5.8 07/15/2021    HGB 13.5 03/22/2022    HGB 14.4 01/24/2022    HCT 40.4 01/24/2022    HCT 36.8 07/15/2021    PLT 90 (L) 01/24/2022     07/15/2021     BMP:   Lab Results   Component Value Date     02/24/2022     (L) 01/24/2022    POTASSIUM 4.6 02/24/2022    POTASSIUM 3.3 (L) 01/24/2022    CHLORIDE 105 02/24/2022    CHLORIDE 94 01/24/2022    CO2 26 02/24/2022    CO2 22 01/24/2022    BUN 11 02/24/2022    BUN 7 01/24/2022    CR 0.66 02/24/2022    CR 0.54 01/24/2022    GLC 90 02/24/2022     (H) 01/24/2022     COAGS:   Lab Results   Component Value Date    PTT 25  06/03/2016    INR 0.97 06/03/2016     POC:   Lab Results   Component Value Date    BGM 92 10/06/2020    HCG Negative 11/18/2019    HCGS Negative 10/04/2020     HEPATIC:   Lab Results   Component Value Date    ALBUMIN 3.9 02/24/2022    PROTTOTAL 7.6 02/24/2022    ALT 90 (H) 02/24/2022    AST 59 (H) 02/24/2022     (H) 02/24/2022    ALKPHOS 79 02/24/2022    BILITOTAL 0.6 02/24/2022     OTHER:   Lab Results   Component Value Date    PH 6.5 06/09/2010    LACT 1.9 09/18/2020    JANET 9.7 02/24/2022    PHOS 3.8 09/25/2020    MAG 1.8 02/24/2022    LIPASE 303 01/24/2022    AMYLASE 65 11/02/2020    TSH 1.48 12/21/2021    T4 0.79 07/09/2009    CRP <2.9 09/22/2020    SED 8 09/22/2020       Anesthesia Plan    ASA Status:  3   NPO Status:  NPO Appropriate    Anesthesia Type: MAC.     - Reason for MAC: chronic cardiopulmonary disease, immobility needed, straight local not clinically adequate   Induction: Intravenous, Propofol.   Maintenance: TIVA.        Consents    Anesthesia Plan(s) and associated risks, benefits, and realistic alternatives discussed. Questions answered and patient/representative(s) expressed understanding.    - Discussed:     - Discussed with:  Patient      - Extended Intubation/Ventilatory Support Discussed: No.      - Patient is DNR/DNI Status: No    Use of blood products discussed: No .     Postoperative Care            Comments:    Other Comments: The risks and benefits of anesthesia, and the alternatives where applicable, have been discussed with the patient, and they wish to proceed.       H&P reviewed: Unable to attach H&P to encounter due to EHR limitations. H&P Update: appropriate H&P reviewed, patient examined. No interval changes since H&P (within 30 days).         FLORES Ratliff CRNA

## 2022-03-24 NOTE — ED TRIAGE NOTES
Scheduled to have lumbar cortisone injection today and developed facial swelling with elevated heart rate.  Negative COVID test.  Injection cancelled.

## 2022-03-24 NOTE — ED PROVIDER NOTES
History     Chief Complaint   Patient presents with     Facial Swelling     HPI  Tasha Short is a 41 year old female who presents after being canceled from surgery because of concerns of a rapid heart rate and some sores in her mouth and sores on her face.  Patient states that she also had a low-grade fever there.  She states that she has been having these bumps along her hairline for the last few days, they come on and drain at times.  Patient also feels like she has some lesions inside her mouth that some are painful.  Denies any cough or shortness of breath.  Denies any nausea or vomiting.  Patient has not eaten at all today because of the presumed surgery.  She did take her home medications except for her Ritalin.  Denies any new meds that she is started or tried.  Denies any new soaps lotions or detergents.    Allergies:  Allergies   Allergen Reactions     Bupropion Other (See Comments)     seizures     Lisinopril Swelling     Angioedema      Penicillins      hives     Phenytoin      rash,puritis (Dilantin)     Seasonal Allergies        Problem List:    Patient Active Problem List    Diagnosis Date Noted     Health Care Home 06/08/2011     Priority: High     x  DX V65.8 REPLACED WITH 47568 HEALTH CARE HOME (04/08/2013)       Unequal pupil diameter - L>R 12/21/2021     Priority: Medium     Traumatic brain injury, without loss of consciousness, subsequent encounter 12/21/2021     Priority: Medium     Allergic contact dermatitis, unspecified trigger 08/12/2021     Priority: Medium     Electrolyte disturbance 07/15/2021     Priority: Medium     Adrenal mass, right (H) 02/01/2021     Priority: Medium     PUD (peptic ulcer disease) 11/02/2020     Priority: Medium     Anemia due to blood loss, acute 09/23/2020     Priority: Medium     Diarrhea, unspecified type 09/23/2020     Priority: Medium     GI bleed 09/16/2020     Priority: Medium     Acute GI bleeding 09/16/2020     Priority: Medium     Weight loss  09/15/2020     Priority: Medium     Anemia, unspecified type 09/15/2020     Priority: Medium     Hemoglobin decreased 09/15/2020     Priority: Medium     Closed displaced fracture of metatarsal bone of right foot with delayed healing, unspecified metatarsal, subsequent encounter 09/14/2020     Priority: Medium     Opiate addiction (H) 09/10/2020     Priority: Medium     Encounter for surveillance of injectable contraceptive 08/08/2019     Priority: Medium     Alcohol dependence in remission (H) 04/03/2019     Priority: Medium     Hypertriglyceridemia 05/08/2018     Priority: Medium     Chronic seasonal allergic rhinitis due to pollen 05/08/2018     Priority: Medium     Gastroesophageal reflux disease without esophagitis 06/19/2017     Priority: Medium     H/O ETOH abuse 05/15/2017     Priority: Medium     Right hip pain 10/20/2016     Priority: Medium     Narcolepsy 10/20/2016     Priority: Medium     Alcohol-induced mood disorder (H) 08/04/2016     Priority: Medium     Irregular heartbeat 04/18/2016     Priority: Medium     Tobacco use disorder 04/18/2016     Priority: Medium     Acute coronary syndrome (H) 04/14/2016     Priority: Medium     HTN, goal below 140/90 11/24/2014     Priority: Medium     Major depressive disorder, recurrent episode, moderate (H) 10/22/2014     Priority: Medium     Generalized anxiety disorder 10/22/2014     Priority: Medium     Hypersomnia 04/12/2013     Priority: Medium     Calcific tendonitis peroneals 06/21/2012     Priority: Medium     Narcolepsy and cataplexy      Priority: Medium     S/P LEEP 11/17/2010     Priority: Medium     12/14/09 ASCUS + HPV 18, 53, 58 (high risk)  1/13/10 colposcopy- bx (dysplasia) ECC (negative) recommend repeat pap at 6 and 12 months  6/28/10 pap ASCUS + HPV 16 (high risk) recommend colpo  7/21/10 colposcopy- BX ( ROSA 2/3 with gland involvement)  11/4/10 LEEP- (ROSA 2/3) not extending to margins.  ECC (negative). Plan: pap in 6 mo.   5/9/11- NIL pap.  Plan: pap in 6 mo  10/8/13 NIL pap, neg HR HPV. Plan: pap in 3 years.  5/22/15 NIL pap, neg HR HPV. Plan: pap in 3 years.  19 NIL pap, + HR HPV (not 16 or 18). Plan: cotest in 1 yr.  21 NIL Pap, Neg HR HPV. Plan: Cotest in 1 yr, due to hx of ROSA 2/3.  22 Reminder mychart  22 Appt. Notes added              Past Medical History:    Past Medical History:   Diagnosis Date     ALCOHOL ABUSE-IN REMISS 2007     Cataplexy and narcolepsy      ROSA 3 - cervical intraepithelial neoplasia grade 3 1/4/10     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     Hypersomnia with sleep apnea      Hypertension      Irregular menstrual cycle 1997     Metrorrhagia 2001     Other acne      Other and unspecified adverse effect of drug, medicinal and biological substance 2001     Substance abuse (H) 10/2/2009       Past Surgical History:    Past Surgical History:   Procedure Laterality Date      SECTION       COLONOSCOPY N/A 10/4/2020    Procedure: COLONOSCOPY, WITH POLYPECTOMY AND BIOPSY;  Surgeon: Mando Siegel MD;  Location:  GI     COLPOSCOPY CERVIX, LOOP ELECTRODE BIOPSY, COMBINED  2010    ROSA 2/3     ESOPHAGOSCOPY, GASTROSCOPY, DUODENOSCOPY (EGD), COMBINED N/A 2020    Procedure: Esophagogastroduodenoscopy with biopsies;  Surgeon: Ronan Pelayo MD;  Location:  GI     ESOPHAGOSCOPY, GASTROSCOPY, DUODENOSCOPY (EGD), COMBINED N/A 10/3/2020    Procedure: ESOPHAGOGASTRODUODENOSCOPY (EGD);  Surgeon: Mando Siegel MD;  Location:  GI     HC REMOVAL OF TONSILS,12+ Y/O  1999    Tonsils 12+y.o.     REPAIR TENDON PERONEAL  11/15/2013    Procedure: REPAIR TENDON PERONEAL;  right peroneal tendon  transfer;  Surgeon: Jesus Manuel Oden DPM;  Location:  OR       Family History:    Family History   Problem Relation Age of Onset     Depression Mother      Arthritis Paternal Grandmother      Hypertension Paternal Grandfather          birth FF     Asthma No family hx of      C.A.D. No family hx of      Diabetes No family hx of      Cancer - colorectal No family hx of      Prostate Cancer No family hx of      Coronary Artery Disease No family hx of      Ovarian Cancer No family hx of      Mental Illness No family hx of      Cerebrovascular Disease No family hx of      Anesthesia Reaction No family hx of      Other Cancer No family hx of      Osteoporosis No family hx of      Known Genetic Syndrome No family hx of      Obesity No family hx of      Unknown/Adopted No family hx of        Social History:  Marital Status:  Single [1]  Social History     Tobacco Use     Smoking status: Light Tobacco Smoker     Packs/day: 0.25     Types: Cigarettes     Smokeless tobacco: Never Used     Tobacco comment: trying to quit   Vaping Use     Vaping Use: Former     Substances: Nicotine   Substance Use Topics     Alcohol use: No     Drug use: No        Medications:    cimetidine (TAGAMET) 800 MG tablet  fluticasone (FLONASE) 50 MCG/ACT nasal spray  lithium ER (LITHOBID) 300 MG CR tablet  methylphenidate (RITALIN) 20 MG tablet  metoprolol succinate ER (TOPROL-XL) 50 MG 24 hr tablet  Multiple Vitamins-Minerals (SUPER THERA SUE M) TABS  nicotine (NICODERM CQ) 21 MG/24HR 24 hr patch  nicotine (NICORETTE) 2 MG gum  nizatidine (AXID) 300 MG capsule  ondansetron (ZOFRAN-ODT) 4 MG ODT tab  vitamin C (ASCORBIC ACID) 500 MG tablet          Review of Systems   All other systems reviewed and are negative.      Physical Exam   BP: (!) 134/94  Pulse: (!) 134  Temp: 98.8  F (37.1  C)  Resp: 16  Weight: 58.1 kg (128 lb)  SpO2: 99 %      Physical Exam  Vitals and nursing note reviewed.   Constitutional:       General: She is not in acute distress.     Appearance: She is well-developed. She is not diaphoretic.   HENT:      Head: Normocephalic and atraumatic.      Nose: Nose normal.      Mouth/Throat:      Pharynx: No oropharyngeal exudate.      Comments: There is an area of  bruising in the back corner of her right side of her mouth, appears to be from previous trauma.  Patient also has aphthous ulcers along the inner lip area.  Eyes:      Conjunctiva/sclera: Conjunctivae normal.   Cardiovascular:      Rate and Rhythm: Normal rate and regular rhythm.      Heart sounds: Normal heart sounds. No murmur heard.    No friction rub.   Pulmonary:      Effort: Pulmonary effort is normal. No respiratory distress.      Breath sounds: Normal breath sounds. No stridor. No wheezing or rales.   Abdominal:      General: Bowel sounds are normal. There is no distension.      Palpations: Abdomen is soft. There is no mass.      Tenderness: There is no abdominal tenderness. There is no guarding.   Musculoskeletal:         General: No tenderness. Normal range of motion.      Cervical back: Normal range of motion and neck supple.   Skin:     General: Skin is warm and dry.      Capillary Refill: Capillary refill takes less than 2 seconds.      Findings: Rash present. No erythema.      Comments: Small erythematous maculopapular rash noted along the hairline.   Neurological:      Mental Status: She is alert and oriented to person, place, and time.   Psychiatric:         Judgment: Judgment normal.         ED Course                 Procedures            EKG Interpretation:      Interpreted by Christ Pacheco  Time reviewed: now   Symptoms at time of EKG: now   Rhythm: normal sinus tachy  Rate: normal tachy  Axis: NORMAL  Ectopy: none  Conduction: normal  ST Segments/ T Waves: No ST-T wave changes  Q Waves: none  Comparison to prior: No old EKG available    Clinical Impression: normal EKG, sinus tachy    Results for orders placed or performed during the hospital encounter of 03/24/22 (from the past 24 hour(s))   CBC with platelets differential    Narrative    The following orders were created for panel order CBC with platelets differential.  Procedure                               Abnormality         Status                      ---------                               -----------         ------                     CBC with platelets and d...[[id number]]  Abnormal            Final result                 Please view results for these tests on the individual orders.   Basic metabolic panel   Result Value Ref Range    Sodium 132 (L) 133 - 144 mmol/L    Potassium 3.4 3.4 - 5.3 mmol/L    Chloride 95 94 - 109 mmol/L    Carbon Dioxide (CO2) 27 20 - 32 mmol/L    Anion Gap 10 3 - 14 mmol/L    Urea Nitrogen 19 7 - 30 mg/dL    Creatinine 1.07 (H) 0.52 - 1.04 mg/dL    Calcium 8.7 8.5 - 10.1 mg/dL    Glucose 101 (H) 70 - 99 mg/dL    GFR Estimate 67 >60 mL/min/1.73m2   Lactic acid whole blood   Result Value Ref Range    Lactic Acid 1.0 0.7 - 2.0 mmol/L   CRP inflammation   Result Value Ref Range    CRP Inflammation 19.2 (H) 0.0 - 8.0 mg/L   CBC with platelets and differential   Result Value Ref Range    WBC Count 15.1 (H) 4.0 - 11.0 10e3/uL    RBC Count 4.60 3.80 - 5.20 10e6/uL    Hemoglobin 14.5 11.7 - 15.7 g/dL    Hematocrit 41.2 35.0 - 47.0 %    MCV 90 78 - 100 fL    MCH 31.5 26.5 - 33.0 pg    MCHC 35.2 31.5 - 36.5 g/dL    RDW 13.7 10.0 - 15.0 %    Platelet Count 177 150 - 450 10e3/uL    % Neutrophils 81 %    % Lymphocytes 10 %    % Monocytes 7 %    % Eosinophils 1 %    % Basophils 0 %    % Immature Granulocytes 1 %    NRBCs per 100 WBC 0 <1 /100    Absolute Neutrophils 12.2 (H) 1.6 - 8.3 10e3/uL    Absolute Lymphocytes 1.5 0.8 - 5.3 10e3/uL    Absolute Monocytes 1.0 0.0 - 1.3 10e3/uL    Absolute Eosinophils 0.2 0.0 - 0.7 10e3/uL    Absolute Basophils 0.1 0.0 - 0.2 10e3/uL    Absolute Immature Granulocytes 0.1 <=0.4 10e3/uL    Absolute NRBCs 0.0 10e3/uL       Medications   0.9% sodium chloride BOLUS (has no administration in time range)   LORazepam (ATIVAN) injection 1 mg (has no administration in time range)     Labs were reviewed and patient's white count and CRP were slightly elevated.  There is no obvious source of infection  though.  I am concerned with these lesions especially also have been draining could possibly be like MRSA lesions or some other type of staph skin infection.  We will start the patient on a course of Bactrim to see if this clears things up.  Recommend patient continue to push fluids, there is no signs of sepsis.  Patient will follow up if there is no improvement over the next 4 days.    Assessments & Plan (with Medical Decision Making)  mrsa infection     I have reviewed the nursing notes.    I have reviewed the findings, diagnosis, plan and need for follow up with the patient.          Final diagnoses:   MRSA infection       3/24/2022   [facility] EMERGENCY DEPT     Christ Pacheco MD  03/24/22 1348

## 2022-03-25 NOTE — PROGRESS NOTES
Clinic Care Coordination Contact  [facility]/Voicemail       Clinical Data: Care Coordinator Outreach  Outreach attempted x 1.  Left message on patient's voicemail with call back information and requested return call.  Plan: Care Coordinator will try to reach patient again in 1-2 business days.    BELIA Hernandez  [phone]  [facility]

## 2022-03-26 NOTE — PROGRESS NOTES
Clinic Care Coordination Contact  [facility]/Voicemail       Clinical Data: Care Coordinator Outreach    Outreach attempted x 2.  Left message on patient's voicemail with call back information and requested return call.    Plan:  Care Coordinator will do no further outreaches at this time.    Anamaria Murphy, [facility]  [phone]  [facility]

## 2022-03-28 NOTE — PROGRESS NOTES
Discussed with Caryn, only need catecholamines     Donnie Mansfield PA-C  [facility]

## 2022-03-28 NOTE — PROGRESS NOTES
Patient brought in a 25 hr urine for catacholamines and metanephrine.  The Catacholamines wasn't able to be ran due to ph.  My question is to do you want both of these tests run together.  The metanephrines are already resulted. She will have to recollect for the catacholamines.  Let us know  Caryn James (ML) [facility]

## 2022-03-29 NOTE — TELEPHONE ENCOUNTER
Endocrine triage  The scheduled first available endocrine appointment timeframe is acceptable  Nilam Cassidy MD

## 2022-03-29 NOTE — TELEPHONE ENCOUNTER
RECORDS RECEIVED FROM: internal /ce    DATE RECEIVED: 6.28.22    NOTES (FOR ALL VISITS) STATUS DETAILS   OFFICE NOTES from referring provider internal  Johanne-Enid Block PA-C   OFFICE NOTES from other specialist     ED NOTES     OPERATIVE REPORT  (thyroid, pituitary, adrenal, parathyroid)     MEDICATION LIST internal     IMAGING      DEXASCAN     MRI head/adrenal  internal /ce  internal -3.7.22, 4/7/21, 10.28.20   [facility]- 12.3.21    XR (Chest)     CT (HEAD/NECK/CHEST/ABDOMEN) internal  10.4.20, 9.17.20   NUCLEAR      ULTRASOUND (HEAD/NECK)     LABS     DIABETES: HBGA1C, CREATININE, FASTING LIPIDS, MICROALBUMIN URINE, POTASSIUM, TSH, T4    THYROID: TSH, T4, CBC, THYRODLONULIN, TOTAL T3, FREE T4, CALCITONIN, CEA internal /ce  internal /ce

## 2022-04-08 NOTE — Clinical Note
Tasha Short was seen and treated in our emergency department on 4/8/2022.  She may return to work on 04/11/2022.  If improved     If you have any questions or concerns, please don't hesitate to call.      Tripp Jeffers MD
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
## 2022-04-09 NOTE — ED PROVIDER NOTES
History     Chief Complaint   Patient presents with     Fall     HPI  Tasha Short is a 41 year old female who presents to the ED after falling down some steps this evening.  She states she was going out to the garage to smoke when her right leg went numb and gave out on her.  Was numb from her hip all the way down to her foot.  She lost her balance and fell down 7 steps.  Thinks she hit the back of her head and may have lost consciousness.  She lives alone so has no idea how long she may have been out.  Complains of pain in the right foot and ankle in the back of the head.    She does have some back issues and was supposed to have a lumbar spine corticosteroid injection for a bad disc.  That has not been done yet.  Denies any chest or abdominal pain.  She is alert oriented x3.  No headache other than the soreness from where she hit her head.  No speech difficulty.  Has been drinking tonight.      Allergies:  Allergies   Allergen Reactions     Bupropion Other (See Comments)     seizures     Lisinopril Swelling     Angioedema      Penicillins      hives     Phenytoin      rash,puritis (Dilantin)     Seasonal Allergies        Problem List:    Patient Active Problem List    Diagnosis Date Noted     Health Care Home 06/08/2011     Priority: High     x  DX V65.8 REPLACED WITH 83887 [facility] (04/08/2013)       Unequal pupil diameter - L>R 12/21/2021     Priority: Medium     Traumatic brain injury, without loss of consciousness, subsequent encounter 12/21/2021     Priority: Medium     Allergic contact dermatitis, unspecified trigger 08/12/2021     Priority: Medium     Electrolyte disturbance 07/15/2021     Priority: Medium     Adrenal mass, right (H) 02/01/2021     Priority: Medium     PUD (peptic ulcer disease) 11/02/2020     Priority: Medium     Anemia due to blood loss, acute 09/23/2020     Priority: Medium     Diarrhea, unspecified type 09/23/2020     Priority: Medium     GI bleed 09/16/2020      Priority: Medium     Acute GI bleeding 09/16/2020     Priority: Medium     Weight loss 09/15/2020     Priority: Medium     Anemia, unspecified type 09/15/2020     Priority: Medium     Hemoglobin decreased 09/15/2020     Priority: Medium     Closed displaced fracture of metatarsal bone of right foot with delayed healing, unspecified metatarsal, subsequent encounter 09/14/2020     Priority: Medium     Opiate addiction (H) 09/10/2020     Priority: Medium     Encounter for surveillance of injectable contraceptive 08/08/2019     Priority: Medium     Alcohol dependence in remission (H) 04/03/2019     Priority: Medium     Hypertriglyceridemia 05/08/2018     Priority: Medium     Chronic seasonal allergic rhinitis due to pollen 05/08/2018     Priority: Medium     Gastroesophageal reflux disease without esophagitis 06/19/2017     Priority: Medium     H/O ETOH abuse 05/15/2017     Priority: Medium     Right hip pain 10/20/2016     Priority: Medium     Narcolepsy 10/20/2016     Priority: Medium     Alcohol-induced mood disorder (H) 08/04/2016     Priority: Medium     Irregular heartbeat 04/18/2016     Priority: Medium     Tobacco use disorder 04/18/2016     Priority: Medium     Acute coronary syndrome (H) 04/14/2016     Priority: Medium     HTN, goal below 140/90 11/24/2014     Priority: Medium     Major depressive disorder, recurrent episode, moderate (H) 10/22/2014     Priority: Medium     Generalized anxiety disorder 10/22/2014     Priority: Medium     Hypersomnia 04/12/2013     Priority: Medium     Calcific tendonitis peroneals 06/21/2012     Priority: Medium     Narcolepsy and cataplexy      Priority: Medium     S/P LEEP 11/17/2010     Priority: Medium     12/14/09 ASCUS + HPV 18, 53, 58 (high risk)  1/13/10 colposcopy- bx (dysplasia) ECC (negative) recommend repeat pap at 6 and 12 months  6/28/10 pap ASCUS + HPV 16 (high risk) recommend colpo  7/21/10 colposcopy- BX ( ROSA 2/3 with gland involvement)  11/4/10 LEEP- (ROSA  2/3) not extending to margins.  ECC (negative). Plan: pap in 6 mo.   11- NIL pap. Plan: pap in 6 mo  10/8/13 NIL pap, neg HR HPV. Plan: pap in 3 years.  5/22/15 NIL pap, neg HR HPV. Plan: pap in 3 years.  19 NIL pap, + HR HPV (not 16 or 18). Plan: cotest in 1 yr.  21 NIL Pap, Neg HR HPV. Plan: Cotest in 1 yr, due to hx of ROSA 2/3.  22 Reminder mychart  22 Appt. Notes added - pap not done.   3/25/22 Reminder call - lm              Past Medical History:    Past Medical History:   Diagnosis Date     ALCOHOL ABUSE-IN REMISS 2007     Cataplexy and narcolepsy      ROSA 3 - cervical intraepithelial neoplasia grade 3 1/4/10     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     Hypersomnia with sleep apnea      Hypertension      Irregular menstrual cycle 1997     Metrorrhagia 2001     Other acne      Other and unspecified adverse effect of drug, medicinal and biological substance 2001     Substance abuse (H) 10/2/2009       Past Surgical History:    Past Surgical History:   Procedure Laterality Date      SECTION       COLONOSCOPY N/A 10/4/2020    Procedure: COLONOSCOPY, WITH POLYPECTOMY AND BIOPSY;  Surgeon: Mando Siegel MD;  Location: [facility]     COLPOSCOPY CERVIX, LOOP ELECTRODE BIOPSY, COMBINED  2010    ROSA 2/3     ESOPHAGOSCOPY, GASTROSCOPY, DUODENOSCOPY (EGD), COMBINED N/A 2020    Procedure: Esophagogastroduodenoscopy with biopsies;  Surgeon: Ronan Pelayo MD;  Location:  GI     ESOPHAGOSCOPY, GASTROSCOPY, DUODENOSCOPY (EGD), COMBINED N/A 10/3/2020    Procedure: ESOPHAGOGASTRODUODENOSCOPY (EGD);  Surgeon: Mando Siegel MD;  Location: [facility]     HC REMOVAL OF TONSILS,12+ Y/O      Tonsils 12+y.o.     REPAIR TENDON PERONEAL  11/15/2013    Procedure: REPAIR TENDON PERONEAL;  right peroneal tendon  transfer;  Surgeon: Jesus Manuel Oden DPM;  Location: [facility]       Family History:    Family History    Problem Relation Age of Onset     Depression Mother      Arthritis Paternal Grandmother      Hypertension Paternal Grandfather         birth FF     Asthma No family hx of      C.A.D. No family hx of      Diabetes No family hx of      Cancer - colorectal No family hx of      Prostate Cancer No family hx of      Coronary Artery Disease No family hx of      Ovarian Cancer No family hx of      Mental Illness No family hx of      Cerebrovascular Disease No family hx of      Anesthesia Reaction No family hx of      Other Cancer No family hx of      Osteoporosis No family hx of      Known Genetic Syndrome No family hx of      Obesity No family hx of      Unknown/Adopted No family hx of        Social History:  Marital Status:  Single [1]  Social History     Tobacco Use     Smoking status: Light Tobacco Smoker     Packs/day: 0.25     Types: Cigarettes     Smokeless tobacco: Never Used     Tobacco comment: trying to quit   Vaping Use     Vaping Use: Former     Substances: Nicotine   Substance Use Topics     Alcohol use: No     Drug use: No        Medications:    cimetidine (TAGAMET) 800 MG tablet  fluticasone (FLONASE) 50 MCG/ACT nasal spray  lithium ER (LITHOBID) 300 MG CR tablet  methylphenidate (RITALIN) 20 MG tablet  metoprolol succinate ER (TOPROL-XL) 50 MG 24 hr tablet  Multiple Vitamins-Minerals (SUPER THERA SUE M) TABS  nicotine (NICODERM CQ) 21 MG/24HR 24 hr patch  nicotine (NICORETTE) 2 MG gum  nizatidine (AXID) 300 MG capsule  ondansetron (ZOFRAN-ODT) 4 MG ODT tab  vitamin C (ASCORBIC ACID) 500 MG tablet          Review of Systems   All other systems reviewed and are negative.      Physical Exam   BP: (!) 142/104  Pulse: 110  Temp: 98.1  F (36.7  C)  Resp: 16  Weight: 58.1 kg (128 lb)  SpO2: 94 %      Physical Exam  Constitutional:       General: She is not in acute distress.     Appearance: Normal appearance.   HENT:      Head:      Comments: Tenderness over the occiput.  No associated swelling or ecchymosis  noted.     Right Ear: Tympanic membrane normal.      Left Ear: Tympanic membrane normal.      Mouth/Throat:      Mouth: Mucous membranes are moist.      Pharynx: Oropharynx is clear.   Eyes:      Extraocular Movements: Extraocular movements intact.      Comments: She does have anisocoria but this is chronic from a previous injury.  Left pupil is larger than the right but that is normal for her.   Cardiovascular:      Rate and Rhythm: Normal rate and regular rhythm.   Pulmonary:      Effort: Pulmonary effort is normal.      Breath sounds: Normal breath sounds.   Chest:      Chest wall: No tenderness.   Abdominal:      Palpations: Abdomen is soft.      Tenderness: There is no abdominal tenderness.   Musculoskeletal:        Feet:       Comments: She has some tenderness over the lateral ankle and the dorsum of the foot on the right.  Slight ecchymosis and swelling noted   Neurological:      Mental Status: She is alert.      GCS: GCS eye subscore is 4. GCS verbal subscore is 5. GCS motor subscore is 6.      Cranial Nerves: Cranial nerves are intact.      Sensory: Sensation is intact.      Motor: Weakness (right leg weaker than left at ankle/foot/knee/hip with flex/ext) present.      Comments: When she is distracted however she seems to move that right leg without difficulty and is able to walk on it later in the visit when she was fitted with a boot.         ED Course                 Procedures              Critical Care time:  none               Results for orders placed or performed during the hospital encounter of 04/08/22 (from the past 24 hour(s))   CBC with platelets differential    Narrative    The following orders were created for panel order CBC with platelets differential.  Procedure                               Abnormality         Status                     ---------                               -----------         ------                     CBC with platelets and d...[[id number]]                      Final  result                 Please view results for these tests on the individual orders.   Comprehensive metabolic panel   Result Value Ref Range    Sodium 142 133 - 144 mmol/L    Potassium 3.7 3.4 - 5.3 mmol/L    Chloride 108 94 - 109 mmol/L    Carbon Dioxide (CO2) 28 20 - 32 mmol/L    Anion Gap 6 3 - 14 mmol/L    Urea Nitrogen 13 7 - 30 mg/dL    Creatinine 0.69 0.52 - 1.04 mg/dL    Calcium 8.6 8.5 - 10.1 mg/dL    Glucose 101 (H) 70 - 99 mg/dL    Alkaline Phosphatase 68 40 - 150 U/L    AST 17 0 - 45 U/L    ALT 30 0 - 50 U/L    Protein Total 6.7 (L) 6.8 - 8.8 g/dL    Albumin 3.7 3.4 - 5.0 g/dL    Bilirubin Total 0.2 0.2 - 1.3 mg/dL    GFR Estimate >90 >60 mL/min/1.73m2   Troponin I   Result Value Ref Range    Troponin I High Sensitivity <3 <54 ng/L   Alcohol level blood   Result Value Ref Range    Alcohol ethyl 0.24 (H) <=0.01 g/dL   CBC with platelets and differential   Result Value Ref Range    WBC Count 5.2 4.0 - 11.0 10e3/uL    RBC Count 3.91 3.80 - 5.20 10e6/uL    Hemoglobin 12.5 11.7 - 15.7 g/dL    Hematocrit 36.4 35.0 - 47.0 %    MCV 93 78 - 100 fL    MCH 32.0 26.5 - 33.0 pg    MCHC 34.3 31.5 - 36.5 g/dL    RDW 14.1 10.0 - 15.0 %    Platelet Count 187 150 - 450 10e3/uL    % Neutrophils 53 %    % Lymphocytes 35 %    % Monocytes 9 %    % Eosinophils 2 %    % Basophils 1 %    % Immature Granulocytes 0 %    NRBCs per 100 WBC 0 <1 /100    Absolute Neutrophils 2.7 1.6 - 8.3 10e3/uL    Absolute Lymphocytes 1.8 0.8 - 5.3 10e3/uL    Absolute Monocytes 0.5 0.0 - 1.3 10e3/uL    Absolute Eosinophils 0.1 0.0 - 0.7 10e3/uL    Absolute Basophils 0.1 0.0 - 0.2 10e3/uL    Absolute Immature Granulocytes 0.0 <=0.4 10e3/uL    Absolute NRBCs 0.0 10e3/uL   CT Head w/o Contrast    Narrative    EXAM: CT HEAD W/O CONTRAST, CTA  HEAD NECK WITH CONTRAST  LOCATION: [facility]  DATE/TIME: 4/8/2022 11:39 PM    INDICATION: Right leg weakness along with loss of consciousness.  COMPARISON: 12/03/2021.  CONTRAST:  80 mL Isovue 370  TECHNIQUE: Head and neck CT angiogram with IV contrast. Noncontrast head CT followed by axial helical CT images of the head and neck vessels obtained during the arterial phase of intravenous contrast administration. Axial 2D reconstructed images and   multiplanar 3D MIP reconstructed images of the head and neck vessels were performed by the technologist. Dose reduction techniques were used. All stenosis measurements made according to NASCET criteria unless otherwise specified.    FINDINGS:   NONCONTRAST HEAD CT:   INTRACRANIAL CONTENTS: No intracranial hemorrhage, extraaxial collection, or mass effect.  No CT evidence of acute infarct. Normal parenchymal attenuation. Normal ventricles and sulci.     VISUALIZED ORBITS/SINUSES/MASTOIDS: No intraorbital abnormality. No paranasal sinus mucosal disease. No middle ear or mastoid effusion.    BONES/SOFT TISSUES: No acute abnormality.    HEAD CTA:  ANTERIOR CIRCULATION: No stenosis/occlusion, aneurysm, or high flow vascular malformation. There is a patent anterior communicating artery and patent right posterior communicating artery.    POSTERIOR CIRCULATION: No stenosis/occlusion, aneurysm, or high flow vascular malformation. Left vertebral artery is dominant but both vertebral arteries connect up to the basilar artery.     DURAL VENOUS SINUSES: Expected enhancement of the major dural venous sinuses.    NECK CTA:  RIGHT CAROTID: No measurable stenosis or dissection.    LEFT CAROTID: No measurable stenosis or dissection.    VERTEBRAL ARTERIES: No focal stenosis or dissection. Left vertebral artery is dominant but both vertebral arteries are patent throughout the neck region.    AORTIC ARCH: Bovine arch configuration of the great vessels off the aortic arch with no significant stenosis of their origins.    NONVASCULAR STRUCTURES: Lung apices are clear. Mild degenerative changes of the cervical spine.      Impression    IMPRESSION:   HEAD CT:  1.  No CT  finding of a mass, hemorrhage or focal area suggestive of acute infarct.    HEAD CTA:   1.  No discrete vessel occlusion, significant stenosis, aneurysm or high flow vascular malformation involving the arteries of the [place].    NECK CTA:  1.  Normal configuration bovine arch configuration of the great vessels off the aortic arch with no significant stenosis of their origins.  2.  No significant stenosis or irregularity involving the arteries of the neck by NASCET criteria.  3.  No radiographic evidence of dissection.   CT Cervical Spine w/o Contrast    Narrative    EXAM: CT CERVICAL SPINE W/O CONTRAST  LOCATION: [facility]  DATE/TIME: 4/8/2022 11:40 PM    INDICATION: Fall with neck pain.  COMPARISON: None.  TECHNIQUE: CT cervical spine without contrast. Dose reduction techniques were performed.    FINDINGS: The prevertebral soft tissues and the predental space are maintained. There is good anatomic alignment of the cervical spine with no evidence of subluxation. The vertebral body heights are well-maintained throughout. There is no evidence of an   acute cervical spine fracture. There is degenerative disc disease at the C5-C6 and the C6-C7 disc space levels. These levels have a moderate loss of disc space height, endplate changes along with small anterior and posterior osteophyte formations. There   is mild facet arthropathy. There is no significant canal compromise or significant neural foraminal narrowing noted throughout the cervical spine. The lung apices are clear and the paraspinal soft tissues are unremarkable.      Impression    IMPRESSION:  1. No evidence of an acute cervical spine fracture.  2. Good anatomic alignment and vertebral body heights maintained.  3. No significant canal compromise or neural foraminal narrowing throughout cervical spine.   CTA Head Neck with Contrast    Narrative    EXAM: CT HEAD W/O CONTRAST, CTA  HEAD NECK WITH CONTRAST  LOCATION:   [facility]  DATE/TIME: 4/8/2022 11:39 PM    INDICATION: Right leg weakness along with loss of consciousness.  COMPARISON: 12/03/2021.  CONTRAST: 80 mL Isovue 370  TECHNIQUE: Head and neck CT angiogram with IV contrast. Noncontrast head CT followed by axial helical CT images of the head and neck vessels obtained during the arterial phase of intravenous contrast administration. Axial 2D reconstructed images and   multiplanar 3D MIP reconstructed images of the head and neck vessels were performed by the technologist. Dose reduction techniques were used. All stenosis measurements made according to NASCET criteria unless otherwise specified.    FINDINGS:   NONCONTRAST HEAD CT:   INTRACRANIAL CONTENTS: No intracranial hemorrhage, extraaxial collection, or mass effect.  No CT evidence of acute infarct. Normal parenchymal attenuation. Normal ventricles and sulci.     VISUALIZED ORBITS/SINUSES/MASTOIDS: No intraorbital abnormality. No paranasal sinus mucosal disease. No middle ear or mastoid effusion.    BONES/SOFT TISSUES: No acute abnormality.    HEAD CTA:  ANTERIOR CIRCULATION: No stenosis/occlusion, aneurysm, or high flow vascular malformation. There is a patent anterior communicating artery and patent right posterior communicating artery.    POSTERIOR CIRCULATION: No stenosis/occlusion, aneurysm, or high flow vascular malformation. Left vertebral artery is dominant but both vertebral arteries connect up to the basilar artery.     DURAL VENOUS SINUSES: Expected enhancement of the major dural venous sinuses.    NECK CTA:  RIGHT CAROTID: No measurable stenosis or dissection.    LEFT CAROTID: No measurable stenosis or dissection.    VERTEBRAL ARTERIES: No focal stenosis or dissection. Left vertebral artery is dominant but both vertebral arteries are patent throughout the neck region.    AORTIC ARCH: Bovine arch configuration of the great vessels off the aortic arch with no significant stenosis  of their origins.    NONVASCULAR STRUCTURES: Lung apices are clear. Mild degenerative changes of the cervical spine.      Impression    IMPRESSION:   HEAD CT:  1.  No CT finding of a mass, hemorrhage or focal area suggestive of acute infarct.    HEAD CTA:   1.  No discrete vessel occlusion, significant stenosis, aneurysm or high flow vascular malformation involving the arteries of the [place].    NECK CTA:  1.  Normal configuration bovine arch configuration of the great vessels off the aortic arch with no significant stenosis of their origins.  2.  No significant stenosis or irregularity involving the arteries of the neck by NASCET criteria.  3.  No radiographic evidence of dissection.   Ankle XR, G/E 3 views, right    Narrative    EXAM: XR ANKLE RIGHT G/E 3 VIEWS  LOCATION: [facility]  DATE/TIME: 4/9/2022 12:03 AM    INDICATION: Pain after fall.  COMPARISON: None.      Impression    IMPRESSION: No definite evidence for fracture or dislocation. Well-corticated rounded ossific density seen just inferior to the distal right fibula and medial malleolus probably due to accessory ossicles and should be of doubtful significance. Ankle   mortise is intact. Otherwise unremarkable.   XR Foot Right 3 Views    Narrative    EXAM: XR FOOT RIGHT G/E 3 VIEWS  LOCATION: [facility]  DATE/TIME: 4/9/2022 12:03 AM    INDICATION: Pain after fall.  COMPARISON: 9/10/2020      Impression    IMPRESSION: There is some subtle lucency on the frontal view seen within the cuboid bone. This may be due to overlapping structures. Nondisplaced fracture felt to be less likely. Clinical correlation in this area would be helpful and follow-up CT if   indicated. Right foot elsewhere is unremarkable.       Medications   iopamidol (ISOVUE-370) solution 500 mL (80 mLs Intravenous Given 4/8/22 5789)   sodium chloride 0.9 % bag 100mL for CT scan flush use (100 mLs Intravenous Given  4/8/22 3991)       Assessments & Plan (with Medical Decision Making)  41-year-old female intoxicated fell tonight when she states her right leg went numb and gave out on her.  Numb from the hip down to the foot.  She fell and hit her head and may have lost consciousness for an unknown duration of time.  Unclear if this is all related to intoxication or due to leg weakness.  No other extremities were involved.  Blood alcohol level came back at 0.24.  CT of the head and C-spine were done because of the fall/trauma.  Also got a CT of her head and neck because of the right leg weakness.  All of those scans were unremarkable.  I suspect this was due more to her intoxication than a true vascular emergency.  She also has the underlying back issue which could have contributed.  X-rays of the right ankle and foot were negative for any acute fracture although there was some subtle lucency on the frontal view seen within the cuboid bone felt likely due to overlapping structures less likely due to a nondisplaced fracture but if persistent issues, follow-up CT recommended.  She was fitted with a boot and was able to ambulate without difficulty.  I suggested that she see Dr. Irving for recheck next week.  If she has persistent problems, CT of the foot could be obtained at that time.  Ice, elevation, boot when up and around, Tylenol/ibuprofen.  Limit alcohol use to moderate intake at most.  Verbal and written discharge instructions given.  She is comfortable with this plan.     I have reviewed the nursing notes.    I have reviewed the findings, diagnosis, plan and need for follow up with the patient.       New Prescriptions    No medications on file       Final diagnoses:   Sprain of right foot, initial encounter   Fall down stairs, initial encounter   Alcoholic intoxication without complication (H)   Contusion of face, scalp and neck, initial encounter       4/8/2022   [facility] EMERGENCY DEPT     Jeovanny Rodriguez,  Tripp Srivastava MD  04/09/22 0245

## 2022-04-09 NOTE — ED TRIAGE NOTES
"Pt presents with fall down 7 carpeted stairs landing on tile. Pt states hit head. Pt states she \"thinks she had LOC.\" . Right ankle pain/foot pain. Pt call 911. Pt alert and orientated. Pt states right leg from hip down went numb before she fell. Pt states did not have ETOH tonite. Pt does have history of back pain. Pt states left pupil always bigger than right. MD aware.   "

## 2022-04-18 NOTE — PROGRESS NOTES
HPI:  Tasha Short is a 41 year old female who is seen in consultation at the request of ED DEPT - Tripp Jeffers MD .    Pt presents for eval of:   (Onset, Location, L/R, Character, Treatments, Injury if yes)    XR Right foot and ankle 4/9/2022 March 2015 - Right 5th metatarsal fx w/non-union    DOS 11/15/2013 - Right peroneal tendon repair by Jesus Manuel Oden DPM     Onset 4/8/2022, while walking down the stairs, right leg numbness caused her to roll Right foot. Alcohol was involved. Presents today lateral Right foot pain that radiates to lateral Right ankle and slip on shoes. States she has numerous fx boots, and did obtain another from ED Dept but only wears intermittently.  Constant bruising, swelling, sharp, burning pain 6-8/10.     Ibuprofen, rest, elevation, intermittent use of fx boot. 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    ROS:  10 point ROS neg other than the symptoms noted above in the HPI.    Patient Active Problem List   Diagnosis     S/P LEEP     Narcolepsy and cataplexy     Health Care Home     Calcific tendonitis peroneals     Hypersomnia     Major depressive disorder, recurrent episode, moderate (H)     Generalized anxiety disorder     HTN, goal below 140/90     Irregular heartbeat     Tobacco use disorder     H/O ETOH abuse     Gastroesophageal reflux disease without esophagitis     Hypertriglyceridemia     Chronic seasonal allergic rhinitis due to pollen     Alcohol dependence in remission (H)     Encounter for surveillance of injectable contraceptive     Right hip pain     Opiate addiction (H)     Narcolepsy     Alcohol-induced mood disorder (H)     Acute coronary syndrome (H)     Closed displaced fracture of metatarsal bone of right foot with delayed healing, unspecified metatarsal, subsequent encounter     Weight loss     Anemia, unspecified type     Hemoglobin decreased     GI bleed     Acute GI bleeding     Anemia due to blood  loss, acute     Diarrhea, unspecified type     PUD (peptic ulcer disease)     Adrenal mass, right (H)     Electrolyte disturbance     Allergic contact dermatitis, unspecified trigger     Unequal pupil diameter - L>R     Traumatic brain injury, without loss of consciousness, subsequent encounter       PAST MEDICAL HISTORY:   Past Medical History:   Diagnosis Date     ALCOHOL ABUSE-IN REMISS 2007     Cataplexy and narcolepsy 2002    tried Provigil, Straterra, Ritalin (works)     ROSA 3 - cervical intraepithelial neoplasia grade 3 1/4/10    ROSA 2/3  on LEEP biopsy     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     Hypersomnia with sleep apnea      Hypertension      Irregular menstrual cycle 1997     Metrorrhagia 2001     Other acne      Other and unspecified adverse effect of drug, medicinal and biological substance 2001    Allergic and adverse reaction to Dilantin     Substance abuse (H) 10/2/2009    see 2009 confidential report        PAST SURGICAL HISTORY:   Past Surgical History:   Procedure Laterality Date      SECTION       COLONOSCOPY N/A 10/4/2020    Procedure: COLONOSCOPY, WITH POLYPECTOMY AND BIOPSY;  Surgeon: Mando Siegel MD;  Location:  GI     COLPOSCOPY CERVIX, LOOP ELECTRODE BIOPSY, COMBINED  2010    ROSA 2/3     ESOPHAGOSCOPY, GASTROSCOPY, DUODENOSCOPY (EGD), COMBINED N/A 2020    Procedure: Esophagogastroduodenoscopy with biopsies;  Surgeon: Ronan Pelayo MD;  Location:  GI     ESOPHAGOSCOPY, GASTROSCOPY, DUODENOSCOPY (EGD), COMBINED N/A 10/3/2020    Procedure: ESOPHAGOGASTRODUODENOSCOPY (EGD);  Surgeon: Mando Siegel MD;  Location: [facility]     HC REMOVAL OF TONSILS,12+ Y/O      Tonsils 12+y.o.     REPAIR TENDON PERONEAL  11/15/2013    Procedure: REPAIR TENDON PERONEAL;  right peroneal tendon  transfer;  Surgeon: Jesus Manuel Oden DPM;  Location:  OR        MEDICATIONS:   Current  Outpatient Medications:      cimetidine (TAGAMET) 800 MG tablet, Take 1 tablet (800 mg) by mouth At Bedtime, Disp: 90 tablet, Rfl: 3     fluticasone (FLONASE) 50 MCG/ACT nasal spray, Spray 1 spray into both nostrils daily, Disp: 16 g, Rfl: 11     lithium ER (LITHOBID) 300 MG CR tablet, Take 1 tablet (300 mg) by mouth daily (Patient taking differently: Take 300 mg by mouth At Bedtime ), Disp: 90 tablet, Rfl: 1     methylphenidate (RITALIN) 20 MG tablet, Take 1 tablet (20 mg) by mouth 2 times daily, Disp: 60 tablet, Rfl: 0     metoprolol succinate ER (TOPROL-XL) 50 MG 24 hr tablet, Take 1 tablet (50 mg) by mouth daily, Disp: 90 tablet, Rfl: 3     Multiple Vitamins-Minerals (SUPER THERA SUE M) TABS, Take 1 tablet by mouth daily, Disp: , Rfl:      nicotine (NICODERM CQ) 21 MG/24HR 24 hr patch, Place 1 patch onto the skin daily, Disp: 30 patch, Rfl: 3     nicotine (NICORETTE) 2 MG gum, Place 1 each (2 mg) inside cheek every hour as needed for smoking cessation, Disp: 100 each, Rfl: 4     nizatidine (AXID) 300 MG capsule, Take 1 capsule (300 mg) by mouth At Bedtime, Disp: 90 capsule, Rfl: 1     ondansetron (ZOFRAN-ODT) 4 MG ODT tab, Place 4 mg under the tongue, Disp: , Rfl:      vitamin C (ASCORBIC ACID) 500 MG tablet, Take 500 mg by mouth, Disp: , Rfl:      ALLERGIES:    Allergies   Allergen Reactions     Bupropion Other (See Comments)     seizures     Lisinopril Swelling     Angioedema      Penicillins      hives     Phenytoin      rash,puritis (Dilantin)     Seasonal Allergies         SOCIAL HISTORY:   Social History     Socioeconomic History     Marital status: Single     Spouse name: Not on file     Number of children: 0     Years of education: 14     Highest education level: Not on file   Occupational History     Occupation: RN     Comment: [place]     Employer: [employer]     Employer: [employer]     Employer: [employer], [place]   Tobacco Use     Smoking status: Light Tobacco Smoker      Packs/day: 0.25     Types: Cigarettes     Smokeless tobacco: Never Used     Tobacco comment: trying to quit   Vaping Use     Vaping Use: Former     Substances: Nicotine   Substance and Sexual Activity     Alcohol use: No     Drug use: No     Sexual activity: Yes     Partners: Male     Birth control/protection: Condom, Pill   Other Topics Concern      Service Not Asked     Blood Transfusions Not Asked     Caffeine Concern Not Asked     Occupational Exposure Not Asked     Hobby Hazards Not Asked     Sleep Concern Not Asked     Stress Concern Not Asked     Weight Concern Not Asked     Special Diet Not Asked     Back Care Not Asked     Exercise Not Asked     Bike Helmet Not Asked     Seat Belt Not Asked     Self-Exams Not Asked     Parent/sibling w/ CABG, MI or angioplasty before 65F 55M? No   Social History Narrative    Lives with significan other. Denies domestic  violence issues.     Social Determinants of Health     Financial Resource Strain: Not on file   Food Insecurity: Not on file   Transportation Needs: Not on file   Physical Activity: Not on file   Stress: Not on file   Social Connections: Not on file   Intimate Partner Violence: Not on file   Housing Stability: Not on file        FAMILY HISTORY:   Family History   Problem Relation Age of Onset     Depression Mother      Arthritis Paternal Grandmother      Hypertension Paternal Grandfather         birth FF     Asthma No family hx of      C.A.D. No family hx of      Diabetes No family hx of      Cancer - colorectal No family hx of      Prostate Cancer No family hx of      Coronary Artery Disease No family hx of      Ovarian Cancer No family hx of      Mental Illness No family hx of      Cerebrovascular Disease No family hx of      Anesthesia Reaction No family hx of      Other Cancer No family hx of      Osteoporosis No family hx of      Known Genetic Syndrome No family hx of      Obesity No family hx of      Unknown/Adopted No family hx of        "  EXAM:Vitals: BP (!) 148/94 (BP Location: Left arm, Patient Position: Sitting, Cuff Size: Adult Regular)   Ht 1.626 m (5' 4\")   Wt 60.8 kg (134 lb)   LMP 03/01/2022   BMI 23.00 kg/m    BMI= Body mass index is 23 kg/m .    General appearance: Patient is alert and fully cooperative with history & exam.  No sign of distress is noted during the visit.     Psychiatric: Affect is pleasant & appropriate.  Patient appears motivated to improve health.     Respiratory: Breathing is regular & unlabored while sitting.     HEENT: Hearing is intact to spoken word.  Speech is clear.  No gross evidence of visual impairment that would impact ambulation.     Vascular: DP & PT pulses are intact & regular bilaterally.  No significant edema or varicosities noted.  CFT and skin temperature is normal to both lower extremities.     Neurologic: Lower extremity sensation is intact to light touch.  No evidence of weakness or contracture in the lower extremities.  No evidence of neuropathy.    Dermatologic: Skin is intact to both lower extremities with adequate texture, turgor and tone about the integument.  No paronychia or evidence of soft tissue infection is noted.     Musculoskeletal: Patient is ambulatory without assistive device or brace.  There is edema about the lateral ankle and discomfort with firm palpation along the peroneal tendons.  Also along the lateral column fourth fifth metatarsal cuboid and calcaneus joint and sinus tarsi.     ASSESSMENT:       ICD-10-CM    1. Peroneal tendinitis of right lower extremity  M76.71 MR Ankle Right w/o Contrast   2. History of ankle surgery  Z98.890    3. H/O ETOH abuse  F10.11         PLAN:  Reviewed patient's chart in [facility].      4/18/2022   Discussed immobilization versus more advanced imaging with immobilization.  She has a fracture boot and she will return to that.  She would like to move forward with MRI.  The MRI was ordered and we will follow-up after the MRI.    Jean Irving, " RUEL

## 2022-04-18 NOTE — LETTER
4/18/2022         RE: Tasha Short  [address] [place] [address]        Dear Colleague,    Thank you for referring your patient, Tasha Short, to the [facility]. Please see a copy of my visit note below.    HPI:  Tasha Short is a 41 year old female who is seen in consultation at the request of ED DEPT - Tripp Jeffers MD .    Pt presents for eval of:   (Onset, Location, L/R, Character, Treatments, Injury if yes)    XR Right foot and ankle 4/9/2022 March 2015 - Right 5th metatarsal fx w/non-union    DOS 11/15/2013 - Right peroneal tendon repair by Jesus Manuel Oden DPM     Onset 4/8/2022, while walking down the stairs, right leg numbness caused her to roll Right foot. Alcohol was involved. Presents today lateral Right foot pain that radiates to lateral Right ankle and slip on shoes. States she has numerous fx boots, and did obtain another from ED Dept but only wears intermittently.  Constant bruising, swelling, sharp, burning pain 6-8/10.     Ibuprofen, rest, elevation, intermittent use of fx boot. 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    ROS:  10 point ROS neg other than the symptoms noted above in the HPI.    Patient Active Problem List   Diagnosis     S/P LEEP     Narcolepsy and cataplexy     Health Care Home     Calcific tendonitis peroneals     Hypersomnia     Major depressive disorder, recurrent episode, moderate (H)     Generalized anxiety disorder     HTN, goal below 140/90     Irregular heartbeat     Tobacco use disorder     H/O ETOH abuse     Gastroesophageal reflux disease without esophagitis     Hypertriglyceridemia     Chronic seasonal allergic rhinitis due to pollen     Alcohol dependence in remission (H)     Encounter for surveillance of injectable contraceptive     Right hip pain     Opiate addiction (H)     Narcolepsy     Alcohol-induced mood disorder (H)     Acute coronary  syndrome (H)     Closed displaced fracture of metatarsal bone of right foot with delayed healing, unspecified metatarsal, subsequent encounter     Weight loss     Anemia, unspecified type     Hemoglobin decreased     GI bleed     Acute GI bleeding     Anemia due to blood loss, acute     Diarrhea, unspecified type     PUD (peptic ulcer disease)     Adrenal mass, right (H)     Electrolyte disturbance     Allergic contact dermatitis, unspecified trigger     Unequal pupil diameter - L>R     Traumatic brain injury, without loss of consciousness, subsequent encounter       PAST MEDICAL HISTORY:   Past Medical History:   Diagnosis Date     ALCOHOL ABUSE-IN REMISS 2007     Cataplexy and narcolepsy 2002    tried Provigil, Straterra, Ritalin (works)     ROSA 3 - cervical intraepithelial neoplasia grade 3 1/4/10    ROSA 2/3  on LEEP biopsy     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     Hypersomnia with sleep apnea      Hypertension      Irregular menstrual cycle 1997     Metrorrhagia 2001     Other acne      Other and unspecified adverse effect of drug, medicinal and biological substance 2001    Allergic and adverse reaction to Dilantin     Substance abuse (H) 10/2/2009    see 2009 confidential report        PAST SURGICAL HISTORY:   Past Surgical History:   Procedure Laterality Date      SECTION       COLONOSCOPY N/A 10/4/2020    Procedure: COLONOSCOPY, WITH POLYPECTOMY AND BIOPSY;  Surgeon: Mando Siegel MD;  Location: [facility]     COLPOSCOPY CERVIX, LOOP ELECTRODE BIOPSY, COMBINED  2010    ROSA 2/3     ESOPHAGOSCOPY, GASTROSCOPY, DUODENOSCOPY (EGD), COMBINED N/A 2020    Procedure: Esophagogastroduodenoscopy with biopsies;  Surgeon: Ronan Pelayo MD;  Location: [facility]     ESOPHAGOSCOPY, GASTROSCOPY, DUODENOSCOPY (EGD), COMBINED N/A 10/3/2020    Procedure: ESOPHAGOGASTRODUODENOSCOPY (EGD);  Surgeon: Mando Siegel MD;   Location:  GI     HC REMOVAL OF TONSILS,12+ Y/O  1999    Tonsils 12+y.o.     REPAIR TENDON PERONEAL  11/15/2013    Procedure: REPAIR TENDON PERONEAL;  right peroneal tendon  transfer;  Surgeon: Jesus Manuel Oden DPM;  Location:  OR        MEDICATIONS:   Current Outpatient Medications:      cimetidine (TAGAMET) 800 MG tablet, Take 1 tablet (800 mg) by mouth At Bedtime, Disp: 90 tablet, Rfl: 3     fluticasone (FLONASE) 50 MCG/ACT nasal spray, Spray 1 spray into both nostrils daily, Disp: 16 g, Rfl: 11     lithium ER (LITHOBID) 300 MG CR tablet, Take 1 tablet (300 mg) by mouth daily (Patient taking differently: Take 300 mg by mouth At Bedtime ), Disp: 90 tablet, Rfl: 1     methylphenidate (RITALIN) 20 MG tablet, Take 1 tablet (20 mg) by mouth 2 times daily, Disp: 60 tablet, Rfl: 0     metoprolol succinate ER (TOPROL-XL) 50 MG 24 hr tablet, Take 1 tablet (50 mg) by mouth daily, Disp: 90 tablet, Rfl: 3     Multiple Vitamins-Minerals (SUPER THERA SUE M) TABS, Take 1 tablet by mouth daily, Disp: , Rfl:      nicotine (NICODERM CQ) 21 MG/24HR 24 hr patch, Place 1 patch onto the skin daily, Disp: 30 patch, Rfl: 3     nicotine (NICORETTE) 2 MG gum, Place 1 each (2 mg) inside cheek every hour as needed for smoking cessation, Disp: 100 each, Rfl: 4     nizatidine (AXID) 300 MG capsule, Take 1 capsule (300 mg) by mouth At Bedtime, Disp: 90 capsule, Rfl: 1     ondansetron (ZOFRAN-ODT) 4 MG ODT tab, Place 4 mg under the tongue, Disp: , Rfl:      vitamin C (ASCORBIC ACID) 500 MG tablet, Take 500 mg by mouth, Disp: , Rfl:      ALLERGIES:    Allergies   Allergen Reactions     Bupropion Other (See Comments)     seizures     Lisinopril Swelling     Angioedema      Penicillins      hives     Phenytoin      rash,puritis (Dilantin)     Seasonal Allergies         SOCIAL HISTORY:   Social History     Socioeconomic History     Marital status: Single     Spouse name: Not on file     Number of children: 0     Years of education: 14      Highest education level: Not on file   Occupational History     Occupation: RN     Comment: [place]     Employer: [employer]     Employer: [employer]     Employer: [employer]   Tobacco Use     Smoking status: Light Tobacco Smoker     Packs/day: 0.25     Types: Cigarettes     Smokeless tobacco: Never Used     Tobacco comment: trying to quit   Vaping Use     Vaping Use: Former     Substances: Nicotine   Substance and Sexual Activity     Alcohol use: No     Drug use: No     Sexual activity: Yes     Partners: Male     Birth control/protection: Condom, Pill   Other Topics Concern      Service Not Asked     Blood Transfusions Not Asked     Caffeine Concern Not Asked     Occupational Exposure Not Asked     Hobby Hazards Not Asked     Sleep Concern Not Asked     Stress Concern Not Asked     Weight Concern Not Asked     Special Diet Not Asked     Back Care Not Asked     Exercise Not Asked     Bike Helmet Not Asked     Seat Belt Not Asked     Self-Exams Not Asked     Parent/sibling w/ CABG, MI or angioplasty before 65F 55M? No   Social History Narrative    Lives with significan other. Denies domestic  violence issues.     Social Determinants of Health     Financial Resource Strain: Not on file   Food Insecurity: Not on file   Transportation Needs: Not on file   Physical Activity: Not on file   Stress: Not on file   Social Connections: Not on file   Intimate Partner Violence: Not on file   Housing Stability: Not on file        FAMILY HISTORY:   Family History   Problem Relation Age of Onset     Depression Mother      Arthritis Paternal Grandmother      Hypertension Paternal Grandfather         birth FF     Asthma No family hx of      C.A.D. No family hx of      Diabetes No family hx of      Cancer - colorectal No family hx of      Prostate Cancer No family hx of      Coronary Artery Disease No family hx of      Ovarian Cancer No family hx of      Mental Illness No family hx of       "Cerebrovascular Disease No family hx of      Anesthesia Reaction No family hx of      Other Cancer No family hx of      Osteoporosis No family hx of      Known Genetic Syndrome No family hx of      Obesity No family hx of      Unknown/Adopted No family hx of         EXAM:Vitals: BP (!) 148/94 (BP Location: Left arm, Patient Position: Sitting, Cuff Size: Adult Regular)   Ht 1.626 m (5' 4\")   Wt 60.8 kg (134 lb)   LMP 03/01/2022   BMI 23.00 kg/m    BMI= Body mass index is 23 kg/m .    General appearance: Patient is alert and fully cooperative with history & exam.  No sign of distress is noted during the visit.     Psychiatric: Affect is pleasant & appropriate.  Patient appears motivated to improve health.     Respiratory: Breathing is regular & unlabored while sitting.     HEENT: Hearing is intact to spoken word.  Speech is clear.  No gross evidence of visual impairment that would impact ambulation.     Vascular: DP & PT pulses are intact & regular bilaterally.  No significant edema or varicosities noted.  CFT and skin temperature is normal to both lower extremities.     Neurologic: Lower extremity sensation is intact to light touch.  No evidence of weakness or contracture in the lower extremities.  No evidence of neuropathy.    Dermatologic: Skin is intact to both lower extremities with adequate texture, turgor and tone about the integument.  No paronychia or evidence of soft tissue infection is noted.     Musculoskeletal: Patient is ambulatory without assistive device or brace.  There is edema about the lateral ankle and discomfort with firm palpation along the peroneal tendons.  Also along the lateral column fourth fifth metatarsal cuboid and calcaneus joint and sinus tarsi.     ASSESSMENT:       ICD-10-CM    1. Peroneal tendinitis of right lower extremity  M76.71 MR Ankle Right w/o Contrast   2. History of ankle surgery  Z98.890    3. H/O ETOH abuse  F10.11         PLAN:  Reviewed patient's chart in [facility].  "     4/18/2022   Discussed immobilization versus more advanced imaging with immobilization.  She has a fracture boot and she will return to that.  She would like to move forward with MRI.  The MRI was ordered and we will follow-up after the MRI.    Jean Irving DPM            Again, thank you for allowing me to participate in the care of your patient.        Sincerely,        Jean Irving DPM

## 2022-04-21 NOTE — RESULT ENCOUNTER NOTE
Josefina Cordova    Your results were normal. OK to proceed with surgery     The results are attached for your review.       Donnie Mansfield PA-C

## 2022-04-21 NOTE — PATIENT INSTRUCTIONS
Preparing for Your Surgery  Getting started  A nurse will call you to review your health history and instructions. They will give you an arrival time based on your scheduled surgery time. Please be ready to share:    Your doctor's clinic name and phone number    Your medical, surgical and anesthesia history    A list of allergies and sensitivities    A list of medicines, including herbal treatments and over-the-counter drugs    Whether the patient has a legal guardian (ask how to send us the papers in advance)  Please tell us if you're pregnant--or if there's any chance you might be pregnant. Some surgeries may injure a fetus (unborn baby), so they require a pregnancy test. Surgeries that are safe for a fetus don't always need a test, and you can choose whether to have one.   If you have a child who's having surgery, please ask for a copy of Preparing for Your Child's Surgery.    Preparing for surgery    Within 30 days of surgery: Have a pre-op exam (sometimes called an H&P, or History and Physical). This can be done at a clinic or pre-operative center.  ? If you're having a , you may not need this exam. Talk to your care team.    At your pre-op exam, talk to your care team about all medicines you take. If you need to stop any medicines before surgery, ask when to start taking them again.  ? We do this for your safety. Many medicines can make you bleed too much during surgery. Some change how well surgery (anesthesia) drugs work.    Call your insurance company to let them know you're having surgery. (If you don't have insurance, call [phone].)    Call your clinic if there's any change in your health. This includes signs of a cold or flu (sore throat, runny nose, cough, rash, fever). It also includes a scrape or scratch near the surgery site.    If you have questions on the day of surgery, call your hospital or surgery center.  COVID testing  You may need to be tested for COVID-19 before having  surgery. If so, your surgical team will give you instructions for scheduling this test, separate from your preoperative history and physical.  Eating and drinking guidelines  For your safety: Unless your surgeon tells you otherwise, follow the guidelines below.    Eat and drink as usual until 8 hours before surgery. After that, no food or milk.    Drink clear liquids until 2 hours before surgery. These are liquids you can see through, like water, Gatorade and Propel Water. You may also have black coffee and tea (no cream or milk).    Nothing by mouth within 2 hours of surgery. This includes gum, candy and breath mints.    If you drink alcohol: Stop drinking it the night before surgery.    If your care team tells you to take medicine on the morning of surgery, it's okay to take it with a sip of water.  Preventing infection    Shower or bathe the night before and morning of your surgery. Follow the instructions your clinic gave you. (If no instructions, use regular soap.)    Don't shave or clip hair near your surgery site. We'll remove the hair if needed.    Don't smoke or vape the morning of surgery. You may chew nicotine gum up to 2 hours before surgery. A nicotine patch is okay.  ? Note: Some surgeries require you to completely quit smoking and nicotine. Check with your surgeon.    Your care team will make every effort to keep you safe from infection. We will:  ? Clean our hands often with soap and water (or an alcohol-based hand rub).  ? Clean the skin at your surgery site with a special soap that kills germs.  ? Give you a special gown to keep you warm. (Cold raises the risk of infection.)  ? Wear special hair covers, masks, gowns and gloves during surgery.  ? Give antibiotic medicine, if prescribed. Not all surgeries need antibiotics.  What to bring on the day of surgery    Photo ID and insurance card    Copy of your health care directive, if you have one    Glasses and hearing aides (bring cases)  ? You can't  wear contacts during surgery    Inhaler and eye drops, if you use them (tell us about these when you arrive)    CPAP machine or breathing device, if you use them    A few personal items, if spending the night    If you have . . .  ? A pacemaker, ICD (cardiac defibrillator) or other implant: Bring the ID card.  ? An implanted stimulator: Bring the remote control.  ? A legal guardian: Bring a copy of the certified (court-stamped) guardianship papers.  Please remove any jewelry, including body piercings. Leave jewelry and other valuables at home.  If you're going home the day of surgery    You must have a responsible adult drive you home. They should stay with you overnight as well.    If you don't have someone to stay with you, and you aren't safe to go home alone, we may keep you overnight. Insurance often won't pay for this.  After surgery  If it's hard to control your pain or you need more pain medicine, please call your surgeon's office.  Questions?   If you have any questions for your care team, list them here: _________________________________________________________________________________________________________________________________________________________________________ ____________________________________ ____________________________________ ____________________________________  For informational purposes only. Not to replace the advice of your health care provider. Copyright   2003, 2019 [facility]. All rights reserved. Clinically reviewed by Steffi Hernandez MD. [employer] [id number] - REV 07/21.

## 2022-04-21 NOTE — PROGRESS NOTES
[address]  [facility] [address]  Phone: [phone]  Primary Provider: Enid Mansfield  Pre-op Performing Provider: ENID MANSFIELD      PREOPERATIVE EVALUATION:  Today's date: 4/21/2022    Tasha Short is a 41 year old female who presents for a preoperative evaluation.    Surgical Information:  Surgery/Procedure: spine injection  Surgery Location: [facility]  Surgeon: Dr. Steele  Surgery Date: 5/12/22  Time of Surgery: unknown at time of pre-op  Where patient plans to recover: At home with family  Fax number for surgical facility: Note does not need to be faxed, will be available electronically in Epic.    Type of Anesthesia Anticipated: General    Assessment & Plan     The proposed surgical procedure is considered LOW risk.      ICD-10-CM    1. Preop general physical exam  Z01.818 CBC with platelets     Comprehensive metabolic panel (BMP + Alb, Alk Phos, ALT, AST, Total. Bili, TP)     CBC with platelets     Comprehensive metabolic panel (BMP + Alb, Alk Phos, ALT, AST, Total. Bili, TP)   2. HTN, goal below 140/90  I10 Comprehensive metabolic panel (BMP + Alb, Alk Phos, ALT, AST, Total. Bili, TP)     Comprehensive metabolic panel (BMP + Alb, Alk Phos, ALT, AST, Total. Bili, TP)     Catecholamines fractioned free urine   3. Vitamin D deficiency  E55.9 25 OH Vit D therapy monitoring     25 OH Vit D therapy monitoring   4. Hypersomnia  G47.10 methylphenidate (RITALIN) 20 MG tablet     Catecholamines fractioned free urine   5. Lumbar radiculopathy  M54.16 Catecholamines fractioned free urine   6. Anemia, unspecified type  D64.9 Catecholamines fractioned free urine   7. Gastroesophageal reflux disease without esophagitis  K21.9 Catecholamines fractioned free urine   8. Major depressive disorder, recurrent episode, moderate (H)  F33.1 Catecholamines fractioned free urine   9. Hypertriglyceridemia  E78.1 Catecholamines fractioned  free urine         Risks and Recommendations:  The patient has the following additional risks and recommendations for perioperative complications:  Social and Substance:    - Alcohol abuse and risk of withdrawal - has been sober for several months now     Medication Instructions:  Patient is to take all scheduled medications on the day of surgery EXCEPT for modifications listed below:   - Beta Blockers: Continue taking on the day of surgery.   - lithium: Check lithium level. Continue without modification.    - SSRIs, SNRIs, TCAs, Antipsychotics: Continue without modification.     RECOMMENDATION:  APPROVAL GIVEN to proceed with proposed procedure pending review of diagnostic evaluation.    Review of the result(s) of each unique test - see list     4/8/22 - CMP, CBC, EKG       3/24/22 - EKG   Diagnosis or treatment significantly limited by social determinants of health - None     25 minutes spent on the date of the encounter doing chart review, history and exam, documentation and further activities per the note    Signed Electronically by: Enid Mansfield PA-C  Copy of this evaluation report is provided to requesting physician.            Subjective     HPI related to upcoming procedure: Patient with chronic back pain with radiculopathy, recent MRI showed L5-S1 disc bulge and degenerative changes, did well with ELSY in the past, was helpful, consult was done with neurosurgery that recommended injection       Preop Questions 4/21/2022   1. Have you ever had a heart attack or stroke? No   2. Have you ever had surgery on your heart or blood vessels, such as a stent placement, a coronary artery bypass, or surgery on an artery in your head, neck, heart, or legs? No   3. Do you have chest pain with activity? No   4. Do you have a history of  heart failure? No   5. Do you currently have a cold, bronchitis or symptoms of other infection? No   6. Do you have a cough, shortness of breath, or wheezing? No   7. Do  you or anyone in your family have previous history of blood clots? No   8. Do you or does anyone in your family have a serious bleeding problem such as prolonged bleeding following surgeries or cuts? YES, patient has history of anemia, was related to GI issues, last labs have been normal/stable    9. Have you ever had problems with anemia or been told to take iron pills? No   10. Have you had any abnormal blood loss such as black, tarry or bloody stools, or abnormal vaginal bleeding? No   11. Have you ever had a blood transfusion? YES - 2020, due to alcohol use and GI issues, patient is sober    11a. Have you ever had a transfusion reaction? No   12. Are you willing to have a blood transfusion if it is medically needed before, during, or after your surgery? Yes   13. Have you or any of your relatives ever had problems with anesthesia? No   14. Do you have sleep apnea, excessive snoring or daytime drowsiness? No   15. Do you have any artifical heart valves or other implanted medical devices like a pacemaker, defibrillator, or continuous glucose monitor? No   16. Do you have artificial joints? No   17. Are you allergic to latex? No   18. Is there any chance that you may be pregnant? No       Health Care Directive:  Patient does not have a Health Care Directive or Living Will: Discussed advance care planning with patient; however, patient declined at this time.    Preoperative Review of :   reviewed - controlled substances reflected in medication list.      Status of Chronic Conditions:  See problem list for active medical problems.  Problems all longstanding and stable, except as noted/documented.  See ROS for pertinent symptoms related to these conditions.    HYPERTENSION - Patient has longstanding history of HTN , currently denies any symptoms referable to elevated blood pressure. Specifically denies chest pain, palpitations, dyspnea, orthopnea, PND or peripheral edema. Blood pressure readings have been in  normal range. Current medication regimen is as listed below. Patient denies any side effects of medication.       Review of Systems  Constitutional, neuro, ENT, endocrine, pulmonary, cardiac, gastrointestinal, genitourinary, musculoskeletal, integument and psychiatric systems are negative, except as otherwise noted.    Patient Active Problem List    Diagnosis Date Noted     Health Care Home 06/08/2011     Priority: High     x  DX V65.8 REPLACED WITH 07169 HEALTH CARE HOME (04/08/2013)       Unequal pupil diameter - L>R 12/21/2021     Priority: Medium     Traumatic brain injury, without loss of consciousness, subsequent encounter 12/21/2021     Priority: Medium     Allergic contact dermatitis, unspecified trigger 08/12/2021     Priority: Medium     Electrolyte disturbance 07/15/2021     Priority: Medium     Adrenal mass, right (H) 02/01/2021     Priority: Medium     PUD (peptic ulcer disease) 11/02/2020     Priority: Medium     Anemia due to blood loss, acute 09/23/2020     Priority: Medium     Diarrhea, unspecified type 09/23/2020     Priority: Medium     GI bleed 09/16/2020     Priority: Medium     Acute GI bleeding 09/16/2020     Priority: Medium     Weight loss 09/15/2020     Priority: Medium     Anemia, unspecified type 09/15/2020     Priority: Medium     Hemoglobin decreased 09/15/2020     Priority: Medium     Closed displaced fracture of metatarsal bone of right foot with delayed healing, unspecified metatarsal, subsequent encounter 09/14/2020     Priority: Medium     Opiate addiction (H) 09/10/2020     Priority: Medium     Encounter for surveillance of injectable contraceptive 08/08/2019     Priority: Medium     Alcohol dependence in remission (H) 04/03/2019     Priority: Medium     Hypertriglyceridemia 05/08/2018     Priority: Medium     Chronic seasonal allergic rhinitis due to pollen 05/08/2018     Priority: Medium     Gastroesophageal reflux disease without esophagitis 06/19/2017     Priority: Medium      H/O ETOH abuse 05/15/2017     Priority: Medium     Right hip pain 10/20/2016     Priority: Medium     Narcolepsy 10/20/2016     Priority: Medium     Alcohol-induced mood disorder (H) 08/04/2016     Priority: Medium     Irregular heartbeat 04/18/2016     Priority: Medium     Tobacco use disorder 04/18/2016     Priority: Medium     Acute coronary syndrome (H) 04/14/2016     Priority: Medium     HTN, goal below 140/90 11/24/2014     Priority: Medium     Major depressive disorder, recurrent episode, moderate (H) 10/22/2014     Priority: Medium     Generalized anxiety disorder 10/22/2014     Priority: Medium     Hypersomnia 04/12/2013     Priority: Medium     Calcific tendonitis peroneals 06/21/2012     Priority: Medium     Narcolepsy and cataplexy      Priority: Medium     S/P LEEP 11/17/2010     Priority: Medium     12/14/09 ASCUS + HPV 18, 53, 58 (high risk)  1/13/10 colposcopy- bx (dysplasia) ECC (negative) recommend repeat pap at 6 and 12 months  6/28/10 pap ASCUS + HPV 16 (high risk) recommend colpo  7/21/10 colposcopy- BX ( ROSA 2/3 with gland involvement)  11/4/10 LEEP- (ROSA 2/3) not extending to margins.  ECC (negative). Plan: pap in 6 mo.   5/9/11- NIL pap. Plan: pap in 6 mo  10/8/13 NIL pap, neg HR HPV. Plan: pap in 3 years.  5/22/15 NIL pap, neg HR HPV. Plan: pap in 3 years.  4/2/19 NIL pap, + HR HPV (not 16 or 18). Plan: cotest in 1 yr.  2/1/21 NIL Pap, Neg HR HPV. Plan: Cotest in 1 yr, due to hx of ROSA 2/3.  1/17/22 Reminder mychart  2/24/22 Appt. Notes added - pap not done.   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 02/19/2001     Hypersomnia with sleep apnea      Hypertension      Irregular  menstrual cycle 1997     Metrorrhagia 2001     Other acne      Other and unspecified adverse effect of drug, medicinal and biological substance 2001    Allergic and adverse reaction to Dilantin     Substance abuse (H) 10/2/2009    see 2009 confidential report     Past Surgical History:   Procedure Laterality Date      SECTION       COLONOSCOPY N/A 10/4/2020    Procedure: COLONOSCOPY, WITH POLYPECTOMY AND BIOPSY;  Surgeon: Mando Siegel MD;  Location:  GI     COLPOSCOPY CERVIX, LOOP ELECTRODE BIOPSY, COMBINED  2010    ROSA 2/3     ESOPHAGOSCOPY, GASTROSCOPY, DUODENOSCOPY (EGD), COMBINED N/A 2020    Procedure: Esophagogastroduodenoscopy with biopsies;  Surgeon: Ronan Pelayo MD;  Location:  GI     ESOPHAGOSCOPY, GASTROSCOPY, DUODENOSCOPY (EGD), COMBINED N/A 10/3/2020    Procedure: ESOPHAGOGASTRODUODENOSCOPY (EGD);  Surgeon: Mando Siegel MD;  Location:  GI     HC REMOVAL OF TONSILS,12+ Y/O  1999    Tonsils 12+y.o.     REPAIR TENDON PERONEAL  11/15/2013    Procedure: REPAIR TENDON PERONEAL;  right peroneal tendon  transfer;  Surgeon: Jesus Manuel Oden DPM;  Location:  OR     Current Outpatient Medications   Medication Sig Dispense Refill     cimetidine (TAGAMET) 800 MG tablet Take 1 tablet (800 mg) by mouth At Bedtime 90 tablet 3     fluticasone (FLONASE) 50 MCG/ACT nasal spray Spray 1 spray into both nostrils daily 16 g 11     lithium ER (LITHOBID) 300 MG CR tablet Take 1 tablet (300 mg) by mouth daily (Patient taking differently: Take 300 mg by mouth At Bedtime) 90 tablet 1     methylphenidate (RITALIN) 20 MG tablet Take 1 tablet (20 mg) by mouth 2 times daily 60 tablet 0     metoprolol succinate ER (TOPROL-XL) 50 MG 24 hr tablet Take 1 tablet (50 mg) by mouth daily 90 tablet 3     Multiple Vitamins-Minerals (SUPER THERA SUE M) TABS Take 1 tablet by mouth daily       nicotine (NICODERM CQ) 21 MG/24HR 24 hr patch Place 1 patch onto the skin daily  "30 patch 3     nicotine (NICORETTE) 2 MG gum Place 1 each (2 mg) inside cheek every hour as needed for smoking cessation 100 each 4     ondansetron (ZOFRAN-ODT) 4 MG ODT tab Place 4 mg under the tongue       nizatidine (AXID) 300 MG capsule Take 1 capsule (300 mg) by mouth At Bedtime 90 capsule 1     vitamin C (ASCORBIC ACID) 500 MG tablet Take 500 mg by mouth (Patient not taking: Reported on 4/21/2022)         Allergies   Allergen Reactions     Bupropion Other (See Comments)     seizures     Lisinopril Swelling     Angioedema      Penicillins      hives     Phenytoin      rash,puritis (Dilantin)     Seasonal Allergies         Social History     Tobacco Use     Smoking status: Light Tobacco Smoker     Packs/day: 0.25     Types: Cigarettes     Smokeless tobacco: Never Used     Tobacco comment: trying to quit   Substance Use Topics     Alcohol use: No     Family History   Problem Relation Age of Onset     Depression Mother      Arthritis Paternal Grandmother      Hypertension Paternal Grandfather         birth FF     Asthma No family hx of      C.A.D. No family hx of      Diabetes No family hx of      Cancer - colorectal No family hx of      Prostate Cancer No family hx of      Coronary Artery Disease No family hx of      Ovarian Cancer No family hx of      Mental Illness No family hx of      Cerebrovascular Disease No family hx of      Anesthesia Reaction No family hx of      Other Cancer No family hx of      Osteoporosis No family hx of      Known Genetic Syndrome No family hx of      Obesity No family hx of      Unknown/Adopted No family hx of      History   Drug Use No         Objective     /82   Pulse 77   Temp 99.9  F (37.7  C) (Temporal)   Resp 16   Ht 1.621 m (5' 3.8\")   Wt 60.4 kg (133 lb 4 oz)   LMP 04/17/2022   SpO2 100%   Breastfeeding No   BMI 23.02 kg/m      Physical Exam    GENERAL APPEARANCE: healthy, alert and no distress     EYES: EOMI, PERRL     HENT: ear canals and TM's normal and " nose and mouth without ulcers or lesions     NECK: no adenopathy, no asymmetry, masses, or scars and thyroid normal to palpation     RESP: lungs clear to auscultation - no rales, rhonchi or wheezes     CV: regular rates and rhythm, normal S1 S2, no S3 or S4 and no murmur, click or rub     MS: extremities normal- no gross deformities noted, no evidence of inflammation in joints, FROM in all extremities.     SKIN: no suspicious lesions or rashes     NEURO: Normal strength and tone, sensory exam grossly normal, mentation intact and speech normal     PSYCH: mentation appears normal. and affect normal/bright     LYMPHATICS: No cervical adenopathy    Recent Labs   Lab Test 04/08/22  2335 03/24/22  1555   HGB 12.5 14.5    177    132*   POTASSIUM 3.7 3.4   CR 0.69 1.07*        Diagnostics:  Labs pending at this time.  Results will be reviewed when available.   EKG: appears normal, NSR, normal axis, normal intervals, no acute ST/T changes c/w ischemia, no LVH by voltage criteria, unchanged from previous tracings from date 4/8/22 and 3/24/22     Revised Cardiac Risk Index (RCRI):  The patient has the following serious cardiovascular risks for perioperative complications:   - No serious cardiac risks = 0 points     RCRI Interpretation: 0 points: Class I (very low risk - 0.4% complication rate)

## 2022-04-23 NOTE — PROGRESS NOTES
ALL SMARTFIELDS MUST BE COMPLETED FOR PATIENT CARE AND BILLING    4/23/2022     E-Consult has been accepted.    Interprofessional consultation requested by:  Enid Mansfield PA-C      Clinical Question/Purpose: MY CLINICAL QUESTION IS: Additional imaging or labs need to be completed prior to her apt in June? Does she needs to be seen sooner?     Patient assessment and information reviewed:   Referral is for adrenal nodule   9/24/2020 CT abdomen : right adrenal nodule 1.9 x 1.5 x 2.5  10/4/2020 CT abdomen stable adrenal nodule right 1.98 cm - not changed compared with 9/17/2020 ; heterogeneous with internal cystic area?   10/28/2020 MRI adrenal: 2.3 cm right adrenal nodule ; increased size compared with 7/20/15 when 1.6 cm ; no significant drop out on out of phase images .  3/7/2022 MRI adrenal right adrenal nodule 2.3 x 2.1 heterogeneous, high T2 , central cystic area     3/9/2022 metanephrine 1.14, normetanephrine 1.4 nM  3/13/2022 spot ?  urinemetanephrine 189 mcg/day, 491 mcg/g creatinine (0-300), normetaneprhine 122 mcg/day    High metanephrine -- concern for pheochromocytoma based on this testing and the adrenal images   Right adrenal mass , enlarging; features could be consistent with pheochromocytoma.     Recommendations:   24 hour urine metanephrine and catecholamines.    Labs: cortisol, ACTH, catecholamines, metanephrine  Ideally you would hold methylphenidate for one week prior to collections.   Let us know if results are abnormal in which case we will expedite endocrine visit.     The recommendations provided in this E-Consult are based on a review of clinical data pertinent to the clinical question presented, without a review of the patient's complete medical record or, the benefit of a comprehensive in-person or virtual patient evaluation. This consultation should not replace the clinical judgement and evaluation of the provider ordering this E-Consult. Any new clinical issues, or  changes in patient status since the filing of this E-Consult will need to be taken into account when assessing these recommendations. Please contact me if you have further questions.    My total time spent reviewing clinical information and formulating assessment was 15 minutes.    Report sent automatically to requesting provider once signed.   [id number]}  Nilam Cassidy MD

## 2022-04-25 NOTE — TELEPHONE ENCOUNTER
Sent recommendations for E-consult to patient. Await response.     Donnie Whiting-MARTHA Block  [facility]       Recommendations:   24 hour urine metanephrine and catecholamines.    Labs: cortisol, ACTH, catecholamines, metanephrine  Ideally you would hold methylphenidate for one week prior to collections.   Let us know if results are abnormal in which case we will expedite endocrine visit.

## 2022-04-25 NOTE — RESULT ENCOUNTER NOTE
Josefina Cordova    Vitamin D in a good range.     The results are attached for your review.       Donnie Mansfield PA-C

## 2022-04-26 NOTE — TELEPHONE ENCOUNTER
Patient has not reviewed my MyChart I sent her yesterday.     Please call patient with this message   I heard back from endocrinology. They would like some more labs, but they would like them after you have held your Methylphenidate for 1 week. What do you think about that? Do you think it might be something you could struggle through?        Donnie Mansfield PA-C  [facility]

## 2022-04-27 NOTE — TELEPHONE ENCOUNTER
Have her take only 1 Ritalin for 3-4 days, then stop medication and schedule lab appointment for 1 week later.     Donnie Mansfield PA-C  [facility]

## 2022-04-28 NOTE — TELEPHONE ENCOUNTER
Message left for patient with information from provider.   I also sent the same information to patients my chart    China Munoz MA on 4/28/2022 at 10:20 AM

## 2022-04-28 NOTE — TELEPHONE ENCOUNTER
FYI to provider - Patient is lost to pap tracking follow-up. Attempts to contact pt have been made per reminder process and there has been no reply and/or no appt scheduled. Contact hx listed below.     12/14/09 ASCUS + HPV 18, 53, 58 (high risk)  1/13/10 colposcopy- bx (dysplasia) ECC (negative) recommend repeat pap at 6 and 12 months  6/28/10 pap ASCUS + HPV 16 (high risk) recommend colpo  7/21/10 colposcopy- BX ( ROSA 2/3 with gland involvement)  11/4/10 LEEP- (ROSA 2/3) not extending to margins.  ECC (negative). Plan: pap in 6 mo.   5/9/11- NIL pap. Plan: pap in 6 mo  10/8/13 NIL pap, neg HR HPV. Plan: pap in 3 years.  5/22/15 NIL pap, neg HR HPV. Plan: pap in 3 years.  4/2/19 NIL pap, + HR HPV (not 16 or 18). Plan: cotest in 1 yr.  2/1/21 NIL Pap, Neg HR HPV. Plan: Cotest in 1 yr, due to hx of ROSA 2/3.  1/17/22 Reminder mychart  2/24/22 Appt. Notes added - pap not done.   3/25/22 Reminder call - lm  4/27/22 Lost to follow-up for pap tracking

## 2022-04-28 NOTE — LETTER
4/28/2022         RE: Tasha Short  [address] [place] [address]        Dear Colleague,    Thank you for referring your patient, Tasha Short, to the [facility]. Please see a copy of my visit note below.    Chief Complaint   Patient presents with     Results     MRI Right ankle 4/26/2022     RECHECK     (2w6d) WB w/tall gray fx boot - Right peroneal tendinitis, DOI 4/8/2022; LOV 4/18/2022     HPI:  Tasha Short is a 41 year old female who is seen in consultation at the request of ED DEPT - Tripp Jeffers MD .    Pt presents for eval of:   (Onset, Location, L/R, Character, Treatments, Injury if yes)    XR Right foot and ankle 4/9/2022 March 2015 - Right 5th metatarsal fx w/non-union    DOS 11/15/2013 - Right peroneal tendon repair by Jesus Manuel Oden DPM     Onset 4/8/2022, while walking down the stairs, right leg numbness caused her to roll Right foot. Alcohol was involved. Presents today lateral Right foot pain that radiates to lateral Right ankle and slip on shoes. States she has numerous fx boots, and did obtain another from ED Dept but only wears intermittently.  Constant bruising, swelling, sharp, burning pain 6-8/10.     Ibuprofen, rest, elevation, intermittent use of fx boot. 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    ROS:  10 point ROS neg other than the symptoms noted above in the HPI.    Patient Active Problem List   Diagnosis     S/P LEEP     Narcolepsy and cataplexy     Health Care Home     Calcific tendonitis peroneals     Hypersomnia     Major depressive disorder, recurrent episode, moderate (H)     Generalized anxiety disorder     HTN, goal below 140/90     Irregular heartbeat     Tobacco use disorder     H/O ETOH abuse     Gastroesophageal reflux disease without esophagitis     Hypertriglyceridemia     Chronic seasonal allergic rhinitis due to pollen     Alcohol dependence in  remission (H)     Encounter for surveillance of injectable contraceptive     Right hip pain     Opiate addiction (H)     Narcolepsy     Alcohol-induced mood disorder (H)     Acute coronary syndrome (H)     Closed displaced fracture of metatarsal bone of right foot with delayed healing, unspecified metatarsal, subsequent encounter     Weight loss     Anemia, unspecified type     Hemoglobin decreased     GI bleed     Acute GI bleeding     Anemia due to blood loss, acute     Diarrhea, unspecified type     PUD (peptic ulcer disease)     Adrenal mass, right (H)     Electrolyte disturbance     Allergic contact dermatitis, unspecified trigger     Unequal pupil diameter - L>R     Traumatic brain injury, without loss of consciousness, subsequent encounter       PAST MEDICAL HISTORY:   Past Medical History:   Diagnosis Date     ALCOHOL ABUSE-IN REMISS 2007     Cataplexy and narcolepsy 2002    tried Provigil, Straterra, Ritalin (works)     ROSA 3 - cervical intraepithelial neoplasia grade 3 1/4/10    ROSA 2/3  on LEEP biopsy     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     Hypersomnia with sleep apnea      Hypertension      Irregular menstrual cycle 1997     Metrorrhagia 2001     Other acne      Other and unspecified adverse effect of drug, medicinal and biological substance 2001    Allergic and adverse reaction to Dilantin     Substance abuse (H) 10/2/2009    see 2009 confidential report        PAST SURGICAL HISTORY:   Past Surgical History:   Procedure Laterality Date      SECTION       COLONOSCOPY N/A 10/4/2020    Procedure: COLONOSCOPY, WITH POLYPECTOMY AND BIOPSY;  Surgeon: Mando Siegel MD;  Location:  GI     COLPOSCOPY CERVIX, LOOP ELECTRODE BIOPSY, COMBINED  2010    ROSA 2/3     ESOPHAGOSCOPY, GASTROSCOPY, DUODENOSCOPY (EGD), COMBINED N/A 2020    Procedure: Esophagogastroduodenoscopy with biopsies;  Surgeon: Ronan Pelayo,  MD;  Location:  GI     ESOPHAGOSCOPY, GASTROSCOPY, DUODENOSCOPY (EGD), COMBINED N/A 10/3/2020    Procedure: ESOPHAGOGASTRODUODENOSCOPY (EGD);  Surgeon: Mando Siegel MD;  Location:  GI     HC REMOVAL OF TONSILS,12+ Y/O  1999    Tonsils 12+y.o.     REPAIR TENDON PERONEAL  11/15/2013    Procedure: REPAIR TENDON PERONEAL;  right peroneal tendon  transfer;  Surgeon: Jesus Manuel Oden DPM;  Location:  OR        MEDICATIONS:   Current Outpatient Medications:      cimetidine (TAGAMET) 800 MG tablet, Take 1 tablet (800 mg) by mouth At Bedtime, Disp: 90 tablet, Rfl: 3     fluticasone (FLONASE) 50 MCG/ACT nasal spray, Spray 1 spray into both nostrils daily, Disp: 16 g, Rfl: 11     lithium ER (LITHOBID) 300 MG CR tablet, Take 1 tablet (300 mg) by mouth daily (Patient taking differently: Take 300 mg by mouth At Bedtime), Disp: 90 tablet, Rfl: 1     [START ON 6/20/2022] methylphenidate (RITALIN) 20 MG tablet, Take 1 tablet (20 mg) by mouth 2 times daily, Disp: 60 tablet, Rfl: 0     metoprolol succinate ER (TOPROL-XL) 50 MG 24 hr tablet, Take 1 tablet (50 mg) by mouth daily, Disp: 90 tablet, Rfl: 3     Multiple Vitamins-Minerals (SUPER THERA SUE M) TABS, Take 1 tablet by mouth daily, Disp: , Rfl:      nicotine (NICODERM CQ) 21 MG/24HR 24 hr patch, Place 1 patch onto the skin daily, Disp: 30 patch, Rfl: 3     nicotine (NICORETTE) 2 MG gum, Place 1 each (2 mg) inside cheek every hour as needed for smoking cessation, Disp: 100 each, Rfl: 4     nizatidine (AXID) 300 MG capsule, Take 1 capsule (300 mg) by mouth At Bedtime, Disp: 90 capsule, Rfl: 1     ondansetron (ZOFRAN-ODT) 4 MG ODT tab, Place 4 mg under the tongue, Disp: , Rfl:      ALLERGIES:    Allergies   Allergen Reactions     Bupropion Other (See Comments)     seizures     Lisinopril Swelling     Angioedema      Penicillins      hives     Phenytoin      rash,puritis (Dilantin)     Seasonal Allergies         SOCIAL HISTORY:   Social History     Socioeconomic  History     Marital status: Single     Spouse name: Not on file     Number of children: 0     Years of education: 14     Highest education level: Not on file   Occupational History     Occupation: RN     Comment: [place]     Employer: [employer]     Employer: [employer]     Employer: [employer]   Tobacco Use     Smoking status: Light Tobacco Smoker     Packs/day: 0.25     Types: Cigarettes     Smokeless tobacco: Never Used     Tobacco comment: trying to quit   Vaping Use     Vaping Use: Former     Substances: Nicotine   Substance and Sexual Activity     Alcohol use: No     Drug use: No     Sexual activity: Yes     Partners: Male     Birth control/protection: Condom, Pill   Other Topics Concern      Service Not Asked     Blood Transfusions Not Asked     Caffeine Concern Not Asked     Occupational Exposure Not Asked     Hobby Hazards Not Asked     Sleep Concern Not Asked     Stress Concern Not Asked     Weight Concern Not Asked     Special Diet Not Asked     Back Care Not Asked     Exercise Not Asked     Bike Helmet Not Asked     Seat Belt Not Asked     Self-Exams Not Asked     Parent/sibling w/ CABG, MI or angioplasty before 65F 55M? No   Social History Narrative    Lives with significan other. Denies domestic  violence issues.     Social Determinants of Health     Financial Resource Strain: Not on file   Food Insecurity: Not on file   Transportation Needs: Not on file   Physical Activity: Not on file   Stress: Not on file   Social Connections: Not on file   Intimate Partner Violence: Not on file   Housing Stability: Not on file        FAMILY HISTORY:   Family History   Problem Relation Age of Onset     Depression Mother      Arthritis Paternal Grandmother      Hypertension Paternal Grandfather         birth FF     Asthma No family hx of      C.A.D. No family hx of      Diabetes No family hx of      Cancer - colorectal No family hx of      Prostate Cancer No family hx of       "Coronary Artery Disease No family hx of      Ovarian Cancer No family hx of      Mental Illness No family hx of      Cerebrovascular Disease No family hx of      Anesthesia Reaction No family hx of      Other Cancer No family hx of      Osteoporosis No family hx of      Known Genetic Syndrome No family hx of      Obesity No family hx of      Unknown/Adopted No family hx of         EXAM:Vitals: /82 (BP Location: Left arm, Patient Position: Sitting, Cuff Size: Adult Regular)   Ht 1.621 m (5' 3.8\")   Wt 60.4 kg (133 lb 4 oz)   LMP 04/17/2022   BMI 23.02 kg/m    BMI= Body mass index is 23.02 kg/m .    General appearance: Patient is alert and fully cooperative with history & exam.  No sign of distress is noted during the visit.     Psychiatric: Affect is pleasant & appropriate.  Patient appears motivated to improve health.     Respiratory: Breathing is regular & unlabored while sitting.     HEENT: Hearing is intact to spoken word.  Speech is clear.  No gross evidence of visual impairment that would impact ambulation.     Vascular: DP & PT pulses are intact & regular bilaterally.  No significant edema or varicosities noted.  CFT and skin temperature is normal to both lower extremities.     Neurologic: Lower extremity sensation is intact to light touch.  No evidence of weakness or contracture in the lower extremities.  No evidence of neuropathy.    Dermatologic: Skin is intact to both lower extremities with adequate texture, turgor and tone about the integument.  No paronychia or evidence of soft tissue infection is noted.     Musculoskeletal: Patient is ambulatory without assistive device or brace.  There is edema about the lateral ankle and discomfort with firm palpation along the peroneal tendons.  Also along the lateral column fourth fifth metatarsal cuboid and calcaneus joint and sinus tarsi.    MRI: Demonstrates previous peroneal tendon transfer with acute cortical reaction at the fifth metatarsal base and " significant degenerative change at the fifth metatarsal cuboid joint.     ASSESSMENT:       ICD-10-CM    1. Peroneal tendinitis of right lower extremity  M76.71 XR Foot Right G/E 3 Views   2. Stress fracture of metatarsal bone of right foot, initial encounter  M84.374A XR Foot Right G/E 3 Views        PLAN:  Reviewed patient's [facility] in [facility].      4/18/2022   Discussed immobilization versus more advanced imaging with immobilization.  She has a fracture boot and she will return to that.  She would like to move forward with MRI.  The MRI was ordered and we will follow-up after the MRI.    4/28/2022  Reviewed MRI findings with her  Follow up in 3 weeks aka 6 weeks post injury to repeat xray  Would highly recommend progressing to stiff sturdy shoes and custom molded orthotics when progressing out of the fracture boot.  Follow-up in 3 weeks repeat imaging.        Jean Irving DPM                Again, thank you for allowing me to participate in the care of your patient.        Sincerely,        Jean Irving DPM

## 2022-05-12 NOTE — ANESTHESIA PREPROCEDURE EVALUATION
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     Hypersomnia with sleep apnea      Hypertension      Irregular menstrual cycle 1997     Metrorrhagia 2001     Other acne      Other and unspecified adverse effect of drug, medicinal and biological substance 2001    Allergic and adverse reaction to Dilantin     Substance abuse (H) 10/2/2009    see 2009 confidential report      Past Surgical History:   Procedure Laterality Date      SECTION       COLONOSCOPY N/A 10/4/2020    Procedure: COLONOSCOPY, WITH POLYPECTOMY AND BIOPSY;  Surgeon: Mando Siegel MD;  Location:  GI     COLPOSCOPY CERVIX, LOOP ELECTRODE BIOPSY, COMBINED  2010    ROSA 2/3     ESOPHAGOSCOPY, GASTROSCOPY, DUODENOSCOPY (EGD), COMBINED N/A 2020    Procedure: Esophagogastroduodenoscopy with biopsies;  Surgeon: Ronan Pelayo MD;  Location: [facility]     ESOPHAGOSCOPY, GASTROSCOPY, DUODENOSCOPY (EGD), COMBINED N/A 10/3/2020    Procedure: ESOPHAGOGASTRODUODENOSCOPY (EGD);  Surgeon: Mando Siegel MD;  Location: [facility]     HC REMOVAL OF TONSILS,12+ Y/O      Tonsils 12+y.o.     REPAIR TENDON PERONEAL  11/15/2013    Procedure: REPAIR TENDON PERONEAL;  right peroneal tendon  transfer;  Surgeon: Jesus Manuel Oden DPM;  Location: [facility] OR      Allergies   Allergen Reactions     Bupropion Other (See Comments)     seizures     Lisinopril Swelling     Angioedema      Penicillins      hives     Phenytoin      rash,puritis (Dilantin)      Seasonal Allergies       Social History     Tobacco Use     Smoking status: Light Tobacco Smoker     Packs/day: 0.25     Types: Cigarettes     Smokeless tobacco: Never Used     Tobacco comment: trying to quit   Substance Use Topics     Alcohol use: No      Wt Readings from Last 1 Encounters:   04/28/22 60.4 kg (133 lb 4 oz)        Anesthesia Evaluation   Pt has had prior anesthetic. Type: MAC.        ROS/MED HX  ENT/Pulmonary:  - neg pulmonary ROS     Neurologic: Comment: Lumbar radiculopathy       Cardiovascular:     (+) Dyslipidemia hypertension-range: goal <140/90/ ----    METS/Exercise Tolerance:     Hematologic:     (+) anemia,     Musculoskeletal:  - neg musculoskeletal ROS     GI/Hepatic:     (+) GERD,     Renal/Genitourinary:  - neg Renal ROS     Endo:  - neg endo ROS     Psychiatric/Substance Use: Comment: Hx chronic opioid abuse and ETOH; sober now several months    (+) psychiatric history depression alcohol abuse H/O chronic opiod use .     Infectious Disease:  - neg infectious disease ROS     Malignancy:  - neg malignancy ROS     Other:  - neg other ROS          Physical Exam    Airway  airway exam normal      Mallampati: II   TM distance: > 3 FB   Neck ROM: full   Mouth opening: > 3 cm    Respiratory Devices and Support         Dental  no notable dental history         Cardiovascular   cardiovascular exam normal          Pulmonary   pulmonary exam normal                OUTSIDE LABS:  CBC:   Lab Results   Component Value Date    WBC 7.1 04/21/2022    WBC 5.2 04/08/2022    HGB 13.9 04/21/2022    HGB 12.5 04/08/2022    HCT 41.0 04/21/2022    HCT 36.4 04/08/2022     04/21/2022     04/08/2022     BMP:   Lab Results   Component Value Date     04/21/2022     04/08/2022    POTASSIUM 4.0 04/21/2022    POTASSIUM 3.7 04/08/2022    CHLORIDE 103 04/21/2022    CHLORIDE 108 04/08/2022    CO2 27 04/21/2022    CO2 28 04/08/2022    BUN 7 04/21/2022    BUN 13 04/08/2022    CR 0.78 04/21/2022     CR 0.69 04/08/2022    GLC 99 04/21/2022     (H) 04/08/2022     COAGS:   Lab Results   Component Value Date    PTT 25 06/03/2016    INR 0.97 06/03/2016     POC:   Lab Results   Component Value Date    BGM 92 10/06/2020    HCG Negative 11/18/2019    HCGS Negative 10/04/2020     HEPATIC:   Lab Results   Component Value Date    ALBUMIN 4.2 04/21/2022    PROTTOTAL 7.1 04/21/2022    ALT 28 04/21/2022    AST 24 04/21/2022     (H) 02/24/2022    ALKPHOS 61 04/21/2022    BILITOTAL 0.6 04/21/2022     OTHER:   Lab Results   Component Value Date    PH 6.5 06/09/2010    LACT 1.0 03/24/2022    JANET 8.8 04/21/2022    PHOS 3.8 09/25/2020    MAG 1.8 02/24/2022    LIPASE 303 01/24/2022    AMYLASE 65 11/02/2020    TSH 1.48 12/21/2021    T4 0.79 07/09/2009    CRP 19.2 (H) 03/24/2022    SED 8 09/22/2020       Anesthesia Plan    ASA Status:  2      Anesthesia Type: MAC.     - Reason for MAC: straight local not clinically adequate   Induction: Propofol.   Maintenance: TIVA.        Consents         - Extended Intubation/Ventilatory Support Discussed: No.      - Patient is DNR/DNI Status: No    Use of blood products discussed: Yes.     Postoperative Care    Pain management: Multi-modal analgesia.   PONV prophylaxis: Background Propofol Infusion     Comments:                Chani Moe

## 2022-05-12 NOTE — OP NOTE
PRIMARY PROBLEM: Low back pain and bilateral buttock pain    PROCEDURE: Bilateral L4-5  Transforaminal Epidural Steroid Injections with fluoroscopic guidance and contrast.     PROCEDURE DETAILS: After written informed consent was obtained from the patient, the patient was escorted to the procedure room.  The patient was placed in the prone position.  A  time out  was conducted to verify patient identity, procedure to be performed, side, site, allergies and any special requirements.  The skin over the thoracolumbar region was prepped and draped in normal sterile fashion with ChloraPrep. Fluoroscopy was used to identify the neural foramen in AP view and the skin was anesthetized with 2 mL of 1% lidocaine with bicarbonate buffer.  2 separate 22-gauge Quincke needles were advanced in towards the L4-5 foramens from the oblique approaches at approximately 25 degrees.  [place] were walked into the posterior aspect of the foramen in the lateral fluoroscopic image.  In the AP image Omnipaque contrast was injected which showed spread into the epidural space but not a lot of central epidural spread therefore a separate Touhy needle was placed in the right L4-5 paramedian epidural interspace with loss resistance with saline.  An epidurogram was confirmed at this level showing proper epidural spread in the central epidural space with no evidence of any intravascular, intrathecal, intraneural injection.  I then injected 10 mg of Depo-Medrol with 2 cc of preservative-free normal saline into each foraminal opening and at the paramedian location another 20 mg of Depo-Medrol with 3 cc of preservative-free normal saline was injected with good washout covering both the L4-5 and L5-S1 segments.  The patient was monitored with blood pressure and pulse oximetry machines with the assistance of an RN throughout the procedure.  The patient was alert and responsive to questions throughout the procedure.   The patient tolerated the procedure  well and was observed in the post-procedural area.  The patient was dismissed without apparent complications.     BLOOD LOSS: < 5 cc    DIAGNOSIS:  1.  Disc degeneration at L4-5 and L5-S1 causing back pain and buttock pains    PLAN:  1. Performed bilateral L4-5  transforaminal epidural steroid injections.  2. The patient was instructed to follow-up per Dr. Steele's instructions.      Cholo Steele MD  Diplomate of the American Board of Anesthesiology, Pain Medicine

## 2022-05-12 NOTE — ANESTHESIA CARE TRANSFER NOTE
Patient: Tasha Short    Procedure: Procedure(s):  INJECTION, SPINE, BILATERAL LUMBAR 4-5, EPIDURAL TRANSFORAMINAL       Diagnosis: Lumbar radiculopathy [M54.16]  Diagnosis Additional Information: No value filed.    Anesthesia Type:   MAC     Note:    Oropharynx: oropharynx clear of all foreign objects and spontaneously breathing  Level of Consciousness: drowsy  Oxygen Supplementation: face mask    Independent Airway: airway patency satisfactory and stable  Dentition: dentition unchanged  Vital Signs Stable: post-procedure vital signs reviewed and stable  Report to RN Given: handoff report given  Patient transferred to: Phase II    Handoff Report: Identifed the Patient, Identified the Reponsible Provider, Reviewed the pertinent medical history, Discussed the surgical course, Reviewed Intra-OP anesthesia mangement and issues during anesthesia, Set expectations for post-procedure period and Allowed opportunity for questions and acknowledgement of understanding      Vitals:  Vitals Value Taken Time   BP 98/68 05/12/22 0840   Temp     Pulse 87 05/12/22 0840   Resp     SpO2 95 % 05/12/22 0842   Vitals shown include unvalidated device data.    Electronically Signed By: FLORES Ratliff CRNA  May 12, 2022  8:42 AM

## 2022-05-12 NOTE — ANESTHESIA POSTPROCEDURE EVALUATION
Patient: Tasha Short    Procedure: Procedure(s):  INJECTION, SPINE, BILATERAL LUMBAR 4-5, EPIDURAL TRANSFORAMINAL       Anesthesia Type:  MAC    Note:  Disposition: Outpatient   Postop Pain Control: Uneventful            Sign Out: Well controlled pain   PONV: No   Neuro/Psych: Uneventful            Sign Out: Acceptable/Baseline neuro status   Airway/Respiratory: Uneventful            Sign Out: Acceptable/Baseline resp. status   CV/Hemodynamics: Uneventful            Sign Out: Acceptable CV status   Other NRE: NONE   DID A NON-ROUTINE EVENT OCCUR? No    Event details/Postop Comments:  Pt was happy with anesthesia care.  No complications.  I will follow up with the pt if needed.           Last vitals:  Vitals Value Taken Time   /75 05/12/22 0914   Temp     Pulse 88 05/12/22 0914   Resp 17 05/12/22 0900   SpO2 98 % 05/12/22 0914   Vitals shown include unvalidated device data.    Electronically Signed By: FLORES Ratliff CRNA  May 12, 2022  9:19 AM

## 2022-05-12 NOTE — DISCHARGE INSTRUCTIONS

## 2022-06-13 NOTE — TELEPHONE ENCOUNTER
DIAGNOSIS: I need a new x-ray, as I can't put any weight on my foot   APPOINTMENT DATE: 06/24/2022   NOTES STATUS DETAILS   OFFICE NOTE from referring provider N/A    OFFICE NOTE from other specialist Internal 04/28/2022 Dr Irving [facility]    DISCHARGE SUMMARY from hospital N/A    DISCHARGE REPORT from the ER N/A    OPERATIVE REPORT N/A    EMG report N/A    MEDICATION LIST N/A    MRI Internal 04/26/2022 RT ankle   DEXA (osteoporosis/bone health) N/A    CT SCAN N/A    XRAYS (IMAGES & REPORTS) Internal 04/08/2022 RT ankle foot

## 2022-06-29 NOTE — PATIENT INSTRUCTIONS
Perform ankle exercises on your own as you can do comfortably  Begin weaning out of the boot by decreasing the amount of time he spent in the boot each day.  Consider transitioning into a lace up ankle brace or Ace wrap for comfort out of the boot  Consider following up with physical therapy  May also consider using topical diclofenac gel on the foot 3-4 times daily as needed for pain  Contact our clinic if you would like to try a steroid injection into the joint at the base of the fifth metatarsal        Ankle  Exercises    As soon as it doesn t hurt too much to put pressure on the ball of your foot, start stretching your ankle using the towel stretch. When this stretch is easy, try the other exercises.    Towel stretch: Sit on a hard surface with your injured leg stretched out in front of you. Loop a towel around your toes and the ball of your foot and pull the towel toward your body keeping your leg straight. Hold this position for 15 to 30 seconds and then relax. Repeat 3 times.    Standing calf stretch: Stand facing a wall with your hands on the wall at about eye level. Keep your injured leg back with your heel on the floor. Keep the other leg forward with the knee bent. Turn your back foot slightly inward (as if you were pigeon-toed). Slowly lean into the wall until you feel a stretch in the back of your calf. Hold the stretch for 15 to 30 seconds. Return to the starting position. Repeat 3 times. Do this exercise several times each day.    Standing soleus stretch: Stand facing a wall with your hands on the wall at about chest height. Keep your injured leg back with your heel on the floor. Keep the other leg forward with the knee bent. Turn your back foot slightly inward (as if you were pigeon-toed). Bend your back knee slightly and gently lean into the wall until you feel a stretch in the lower calf of your injured leg. Hold the stretch for 15 to 30 seconds. Return to the starting position. Repeat 3  times.    Ankle range of motion: Sit or lie down with your legs straight and your knees pointing toward the ceiling. Point your toes on your injured side toward your nose, then away from your body. Point your toes in toward your other foot and then out away from your other foot. Finally, move the top of your foot in circles. Move only your foot and ankle. Don't move your leg. Repeat 10 times in each direction. Push hard in all directions.  Resisted ankle dorsiflexion: Tie a knot in one end of the elastic tubing and shut the knot in a door. Tie a loop in the other end of the tubing and put the foot on your injured side through the loop so that the tubing goes around the top of the foot. Sit facing the door with your injured leg straight out in front of you. Move away from the door until there is tension in the tubing. Keeping your leg straight, pull the top of your foot toward your body, stretching the tubing. Slowly return to the starting position. Do 2 sets of 15.  Resisted ankle plantar flexion: Sit with your injured leg stretched out in front of you. Loop the tubing around the ball of your foot. Hold the ends of the tubing with both hands. Gently press the ball of your foot down and point your toes, stretching the tubing. Return to the starting position. Do 2 sets of 15.    Resisted ankle inversion: Sit with your legs stretched out in front of you. Cross the ankle of your uninjured leg over your other ankle. Wrap elastic tubing around the ball of the foot of your injured leg and then loop it around your other foot so that the tubing is anchored there at one end. Hold the other end of the tubing in your hand. Turn the foot of your injured leg inward and upward. This will stretch the tubing. Return to the starting position. Do 2 sets of 15.    Resisted ankle eversion: Sit with both legs stretched out in front of you, with your feet about a shoulder's width apart. Tie a loop in one end of elastic tubing. Put the  foot of your injured leg through the loop so that the tubing goes around the arch of that foot and wraps around the outside of the other foot. Hold onto the other end of the tubing with your hand to provide tension. Turn the foot of your injured leg up and out. Make sure you keep your other foot still so that it will allow the tubing to stretch as you move the foot of your injured leg. Return to the starting position. Do 2 sets of 15.  You may do the following exercises when you can stand on your injured ankle without pain.    Heel raise: Stand behind a chair or counter with both feet flat on the floor. Using the chair or counter as a support, rise up onto your toes and hold for 5 seconds. Then slowly lower yourself down without holding onto the support. (It's OK to keep holding onto the support if you need to.) When this exercise becomes less painful, try doing this exercise while you are standing on the injured leg only. Repeat 15 times. Do 2 sets of 15. Rest 30 seconds between sets.    Step-up: Stand with the foot of your injured leg on a support 3 to 5 inches (8 to 13 centimeters) high --like a small step or block of wood. Keep your other foot flat on the floor. Shift your weight onto the injured leg on the support. Straighten your injured leg as the other leg comes off the floor. Return to the starting position by bending your injured leg and slowly lowering your uninjured leg back to the floor. Do 2 sets of 15.    Balance and reach exercises: Stand next to a chair with your injured leg farther from the chair. The chair will provide support if you need it. Stand on the foot of your injured leg and bend your knee slightly. Try to raise the arch of this foot while keeping your big toe on the floor. Keep your foot in this position.    With the hand that is farther away from the chair, reach forward in front of you by bending at the waist. Avoid bending your knee any more as you do this. Repeat this 15 times. To  make the exercise more challenging, reach farther in front of you. Do 2 sets of 15.    While keeping your arch raised, reach the hand that is farther away from the chair across your body toward the chair. The farther you reach, the more challenging the exercise. Do 2 sets of 15.    Side-lying leg lift: Lie on your uninjured side. Tighten the front thigh muscles on your injured leg and lift that leg 8 to 10 inches (20 to 25 centimeters) away from the other leg. Keep the leg straight and lower it slowly. Do 2 sets of 15.  If you have access to a wobble board, do the following exercises:    Wobble board exercises  Stand on a wobble board with your feet shoulder-width apart.    Rock the board forwards and backwards 30 times, then side to side 30 times. Hold on to a chair if you need support.  Rotate the wobble board around so that the edge of the board is in contact with the floor at all times. Do this 30 times in a clockwise and then a counterclockwise direction.  Balance on the wobble board for as long as you can without letting the edges touch the floor. Try to do this for 2 minutes without touching the floor.  Rotate the wobble board in clockwise and counterclockwise circles, but do not let the edge of the board touch the floor.  When you have mastered the wobble exercises standing on both legs, try repeating them while standing on just your injured leg. After you are able to do these exercises on one leg, try to do them with your eyes closed. Make sure you have something nearby to support you in case you lose your balance.    Developed by [employer].  Published by [employer].  Copyright  2014 [employer] and/or one of its subsidiaries. All rights reserved.

## 2022-06-29 NOTE — TELEPHONE ENCOUNTER
6/29 Provided phone number [phone] to schedule physical therapy.     Kathy pierre Procedure   Orthopedics, Podiatry, Sports Medicine, ENT/Eye Specialties  [facility]   [phone]

## 2022-06-29 NOTE — LETTER
6/29/2022         RE: Tasha Short  [address] [place] [address]        Dear Colleague,    Thank you for referring your patient, Tasha Short, to the [facility] [place]. Please see a copy of my visit note below.      [facility]  SPORTS MEDICINE CLINIC VISIT     Jun 29, 2022        ASSESSMENT & PLAN    41-year-old with persistent fifth metatarsal base pain after 2 months in tall walking boot.  Does have finding of edema in the base of the fifth metatarsal at the junction with the cuboid which is suspect is secondary to joint irritation and less likely stress fracture at this point in time.    Reviewed imaging and assessment with patient in detail  Perform ankle exercises on your own as you can do comfortably  Begin weaning out of the boot by decreasing the amount of time spent in the boot each day.  Consider transitioning into a lace up ankle brace or Ace wrap for comfort out of the boot  Consider following up with physical therapy  May also consider using topical diclofenac gel on the foot 3-4 times daily as needed for pain  Contact our clinic if you would like to try a steroid injection into the joint at the base of the fifth metatarsal    Rod Pike MD  [facility]    -----  Chief Complaint   Patient presents with     RECHECK     Right foot pain - numbness and tingling       SUBJECTIVE  Tasha Short is a/an 41 year old female who is seen as a self referral for evaluation of right foot pain. 6 years ago had surgery on foot and 2 months ago, noticed pain and was seeing a physician in [place].     The patient is seen with their dad.  The patient is Right handed    Onset: 2 month(s) ago. Reports insidious onset without acute precipitating event.  Location of Pain: right lateral foot  Worsened by: Walking, sleeping, being in boot  Better with: Nothing  Treatments tried: Tylenol, Aleve and  casting/splinting/bracing  Associated symptoms: swelling and numbness    Orthopedic/Surgical history: YES - Date: 2016  Social History/Occupation: [employer]      REVIEW OF SYSTEMS:    Do you have fever, chills, weight loss? No    Do you have any vision problems? No    Do you have any chest pain or edema? No    Do you have any shortness of breath or wheezing?  No    Do you have stomach problems? No    Do you have any numbness or focal weakness? No    Do you have diabetes? No    Do you have problems with bleeding or clotting? No    Do you have an rashes or other skin lesions? No    OBJECTIVE:  There were no vitals taken for this visit.     General: Alert, pleasant, no distress  Right foot: warm, well perfused, SILT throughout     Inspection: Surgical scars over the lateral ankle and foot.  No significant swelling or deformity     ROM: Global stiffness particularly in dorsiflexion plantarflexion.       Strength: Weakness in plantarflexion and dorsiflexion compared to contralateral side.     Palpation: TTP over the fifth metatarsal, particularly over the base and base of the fourth metatarsal.  No TTP over the lateral or medial ankle.  Tibiotalar joint     Special Tests: None        RADIOLOGY:    3 view xrays of right foot performed and reviewed independently demonstrating accessory bone over the cuboid seen previously and stable.  No bony abnormality in the base the fifth metatarsal.  Some slight DJD at the metatarsal cuboid joint. See EMR for formal radiology report.              Again, thank you for allowing me to participate in the care of your patient.        Sincerely,        Rod Pike MD

## 2022-06-29 NOTE — PROGRESS NOTES
[facility]  SPORTS MEDICINE CLINIC VISIT     Jun 29, 2022        ASSESSMENT & PLAN    41-year-old with persistent fifth metatarsal base pain after 2 months in tall walking boot.  Does have finding of edema in the base of the fifth metatarsal at the junction with the cuboid which is suspect is secondary to joint irritation and less likely stress fracture at this point in time.    Reviewed imaging and assessment with patient in detail  Perform ankle exercises on your own as you can do comfortably  Begin weaning out of the boot by decreasing the amount of time spent in the boot each day.  Consider transitioning into a lace up ankle brace or Ace wrap for comfort out of the boot  Consider following up with physical therapy  May also consider using topical diclofenac gel on the foot 3-4 times daily as needed for pain  Contact our clinic if you would like to try a steroid injection into the joint at the base of the fifth metatarsal    Rod Pike MD  [facility] [place]    -----  Chief Complaint   Patient presents with     RECHECK     Right foot pain - numbness and tingling       SUBJECTIVE  Tasha Short is a/an 41 year old female who is seen as a self referral for evaluation of right foot pain. 6 years ago had surgery on foot and 2 months ago, noticed pain and was seeing a physician in [place].     The patient is seen with their dad.  The patient is Right handed    Onset: 2 month(s) ago. Reports insidious onset without acute precipitating event.  Location of Pain: right lateral foot  Worsened by: Walking, sleeping, being in boot  Better with: Nothing  Treatments tried: Tylenol, Aleve and casting/splinting/bracing  Associated symptoms: swelling and numbness    Orthopedic/Surgical history: YES - Date: 2016  Social History/Occupation: [employer]      REVIEW OF SYSTEMS:    Do you have fever, chills, weight loss? No    Do you have any vision problems? No    Do you have  any chest pain or edema? No    Do you have any shortness of breath or wheezing?  No    Do you have stomach problems? No    Do you have any numbness or focal weakness? No    Do you have diabetes? No    Do you have problems with bleeding or clotting? No    Do you have an rashes or other skin lesions? No    OBJECTIVE:  There were no vitals taken for this visit.     General: Alert, pleasant, no distress  Right foot: warm, well perfused, SILT throughout     Inspection: Surgical scars over the lateral ankle and foot.  No significant swelling or deformity     ROM: Global stiffness particularly in dorsiflexion plantarflexion.       Strength: Weakness in plantarflexion and dorsiflexion compared to contralateral side.     Palpation: TTP over the fifth metatarsal, particularly over the base and base of the fourth metatarsal.  No TTP over the lateral or medial ankle.  Tibiotalar joint     Special Tests: None        RADIOLOGY:    3 view xrays of right foot performed and reviewed independently demonstrating accessory bone over the cuboid seen previously and stable.  No bony abnormality in the base the fifth metatarsal.  Some slight DJD at the metatarsal cuboid joint. See EMR for formal radiology report.

## 2022-06-30 NOTE — PROGRESS NOTES
Patient was here today for labs.Please place orders as needed   Thank you  Caryn DONG) [facility]

## 2022-07-19 NOTE — ED TRIAGE NOTES
"Patient seen yesterday at [facility]. She was diagnosed with Lymes and UTI. Today she has multiple complaints, vomiting, weakness, joint pain, \"nerve pain\", facial swelling.      Triage Assessment     Row Name 07/19/22 1032       Triage Assessment (Adult)    Airway WDL WDL       Respiratory WDL    Respiratory WDL WDL              "

## 2022-07-19 NOTE — ED NOTES
Patient up to the bathroom to void. Nausea improved when resting in bed but returned while up to the bathroom to void. Pain is improved but not gone.

## 2022-07-19 NOTE — ED PROVIDER NOTES
History     Chief Complaint   Patient presents with     Generalized Weakness     HPI  Tasha Short is a 41 year old female who presents to the emergency department with complaints of generalized body aches, nausea and vomiting, generalized abdominal discomfort and just not feeling well.  Patient was seen yesterday in urgent care and diagnosed with a urinary tract infection based on a urine dip and presumed Lyme's, no confirmation test though.  Patient was diagnosed with Lyme's based on the fact that she had a tick bite 6 days ago to her right flank and then developed a subsequent erythematous migrans rash to her right torso which is now resolved.  Patient was placed on doxycycline because they thought that the Doxy would cover a urinary tract infection and also cover Lyme's disease.  Patient states that she is feeling worse than yesterday.  She has been unable to keep anything down because of vomiting this morning.  She denies a cough or sore throat.  They did do a COVID test yesterday which was negative.  Patient has not had any diarrhea.  There are no sick contacts noted at home.    Allergies:  Allergies   Allergen Reactions     Bupropion Other (See Comments)     seizures     Lisinopril Swelling     Angioedema      Penicillins      hives     Phenytoin      rash,puritis (Dilantin)     Seasonal Allergies        Problem List:    Patient Active Problem List    Diagnosis Date Noted     Health Care Home 06/08/2011     Priority: High     x  DX V65.8 REPLACED WITH 57246 [facility]ALTH CARE HOME (04/08/2013)       Unequal pupil diameter - L>R 12/21/2021     Priority: Medium     Traumatic brain injury, without loss of consciousness, subsequent encounter 12/21/2021     Priority: Medium     Allergic contact dermatitis, unspecified trigger 08/12/2021     Priority: Medium     Electrolyte disturbance 07/15/2021     Priority: Medium     Adrenal mass, right (H) 02/01/2021     Priority: Medium     PUD (peptic ulcer disease)  11/02/2020     Priority: Medium     Anemia due to blood loss, acute 09/23/2020     Priority: Medium     Diarrhea, unspecified type 09/23/2020     Priority: Medium     GI bleed 09/16/2020     Priority: Medium     Acute GI bleeding 09/16/2020     Priority: Medium     Weight loss 09/15/2020     Priority: Medium     Anemia, unspecified type 09/15/2020     Priority: Medium     Hemoglobin decreased 09/15/2020     Priority: Medium     Closed displaced fracture of metatarsal bone of right foot with delayed healing, unspecified metatarsal, subsequent encounter 09/14/2020     Priority: Medium     Opiate addiction (H) 09/10/2020     Priority: Medium     Encounter for surveillance of injectable contraceptive 08/08/2019     Priority: Medium     Alcohol dependence in remission (H) 04/03/2019     Priority: Medium     Hypertriglyceridemia 05/08/2018     Priority: Medium     Chronic seasonal allergic rhinitis due to pollen 05/08/2018     Priority: Medium     Gastroesophageal reflux disease without esophagitis 06/19/2017     Priority: Medium     H/O ETOH abuse 05/15/2017     Priority: Medium     Right hip pain 10/20/2016     Priority: Medium     Narcolepsy 10/20/2016     Priority: Medium     Alcohol-induced mood disorder (H) 08/04/2016     Priority: Medium     Irregular heartbeat 04/18/2016     Priority: Medium     Tobacco use disorder 04/18/2016     Priority: Medium     Acute coronary syndrome (H) 04/14/2016     Priority: Medium     HTN, goal below 140/90 11/24/2014     Priority: Medium     Major depressive disorder, recurrent episode, moderate (H) 10/22/2014     Priority: Medium     Generalized anxiety disorder 10/22/2014     Priority: Medium     Hypersomnia 04/12/2013     Priority: Medium     Calcific tendonitis peroneals 06/21/2012     Priority: Medium     Narcolepsy and cataplexy      Priority: Medium     S/P LEEP 11/17/2010     Priority: Medium     12/14/09 ASCUS + HPV 18, 53, 58 (high risk)  1/13/10 colposcopy- bx (dysplasia)  ECC (negative) recommend repeat pap at 6 and 12 months  6/28/10 pap ASCUS + HPV 16 (high risk) recommend colpo  7/21/10 colposcopy- BX ( ROSA 2/3 with gland involvement)  11/4/10 LEEP- (ROSA 2/3) not extending to margins.  ECC (negative). Plan: pap in 6 mo.   11- NIL pap. Plan: pap in 6 mo  10/8/13 NIL pap, neg HR HPV. Plan: pap in 3 years.  5/22/15 NIL pap, neg HR HPV. Plan: pap in 3 years.  19 NIL pap, + HR HPV (not 16 or 18). Plan: cotest in 1 yr.  21 NIL Pap, Neg HR HPV. Plan: Cotest in 1 yr, due to hx of ROSA 2/3.  22 Reminder mychart  22 Appt. Notes added - pap not done.   3/25/22 Reminder call - lm  22 Lost to follow-up for pap tracking                Past Medical History:    Past Medical History:   Diagnosis Date     ALCOHOL ABUSE-IN REMISS 2007     Cataplexy and narcolepsy      ROSA 3 - cervical intraepithelial neoplasia grade 3 1/4/10     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     Hypersomnia with sleep apnea      Hypertension      Irregular menstrual cycle 1997     Metrorrhagia 2001     Other acne      Other and unspecified adverse effect of drug, medicinal and biological substance 2001     Substance abuse (H) 10/2/2009       Past Surgical History:    Past Surgical History:   Procedure Laterality Date      SECTION       COLONOSCOPY N/A 10/4/2020    Procedure: COLONOSCOPY, WITH POLYPECTOMY AND BIOPSY;  Surgeon: Mando Siegel MD;  Location:  GI     COLPOSCOPY CERVIX, LOOP ELECTRODE BIOPSY, COMBINED  2010    ROSA 2/3     ESOPHAGOSCOPY, GASTROSCOPY, DUODENOSCOPY (EGD), COMBINED N/A 2020    Procedure: Esophagogastroduodenoscopy with biopsies;  Surgeon: Ronan Pelayo MD;  Location: [facility]     ESOPHAGOSCOPY, GASTROSCOPY, DUODENOSCOPY (EGD), COMBINED N/A 10/3/2020    Procedure: ESOPHAGOGASTRODUODENOSCOPY (EGD);  Surgeon: Mando Siegel MD;  Location: [facility]     HC REMOVAL OF  TONSILS,12+ Y/O  1999    Tonsils 12+y.o.     INJECT EPIDURAL LUMBAR N/A 5/12/2022    Procedure: Bilateral Lumbar 4-5 Transforaminal Epidural Steroid Injections with fluoroscopic guidance and contrast;  Surgeon: Cholo Steele MD;  Location: [facility] OR     REPAIR TENDON PERONEAL  11/15/2013    Procedure: REPAIR TENDON PERONEAL;  right peroneal tendon  transfer;  Surgeon: Jesus Manuel Oden DPM;  Location: [facility] OR       Family History:    Family History   Problem Relation Age of Onset     Depression Mother      Arthritis Paternal Grandmother      Hypertension Paternal Grandfather         birth FF     Asthma No family hx of      C.A.D. No family hx of      Diabetes No family hx of      Cancer - colorectal No family hx of      Prostate Cancer No family hx of      Coronary Artery Disease No family hx of      Ovarian Cancer No family hx of      Mental Illness No family hx of      Cerebrovascular Disease No family hx of      Anesthesia Reaction No family hx of      Other Cancer No family hx of      Osteoporosis No family hx of      Known Genetic Syndrome No family hx of      Obesity No family hx of      Unknown/Adopted No family hx of        Social History:  Marital Status:  Single [1]  Social History     Tobacco Use     Smoking status: Light Tobacco Smoker     Packs/day: 0.25     Types: Cigarettes     Smokeless tobacco: Never Used     Tobacco comment: trying to quit   Vaping Use     Vaping Use: Former     Substances: Nicotine   Substance Use Topics     Alcohol use: No     Drug use: No        Medications:    lithium (ESKALITH CR/LITHOBID) 450 MG CR tablet  naltrexone (DEPADE/REVIA) 50 MG tablet  cimetidine (TAGAMET) 800 MG tablet  doxycycline hyclate (VIBRAMYCIN) 100 MG capsule  fluticasone (FLONASE) 50 MCG/ACT nasal spray  methylphenidate (RITALIN) 20 MG tablet  metoprolol succinate ER (TOPROL-XL) 50 MG 24 hr tablet  Multiple Vitamins-Minerals (SUPER THERA SUE M) TABS  nicotine (NICODERM CQ) 21 MG/24HR 24 hr patch  nicotine  (NICORETTE) 2 MG gum  nizatidine (AXID) 300 MG capsule  ondansetron (ZOFRAN-ODT) 4 MG ODT tab  XOPENEX HFA 45 MCG/ACT inhaler          Review of Systems   All other systems reviewed and are negative.      Physical Exam   BP: (!) 185/124  Pulse: 109  Temp: 100.2  F (37.9  C)  Resp: 16  Weight: 53.1 kg (117 lb)  SpO2: 97 %      Physical Exam  Vitals and nursing note reviewed.   Constitutional:       General: She is not in acute distress.     Appearance: She is well-developed. She is not diaphoretic.   HENT:      Head: Normocephalic and atraumatic.      Right Ear: Tympanic membrane normal.      Left Ear: Tympanic membrane normal.      Nose: Nose normal.      Mouth/Throat:      Mouth: Mucous membranes are moist.      Pharynx: No oropharyngeal exudate or posterior oropharyngeal erythema.   Eyes:      Conjunctiva/sclera: Conjunctivae normal.   Cardiovascular:      Rate and Rhythm: Normal rate and regular rhythm.      Heart sounds: Normal heart sounds. No murmur heard.    No friction rub.   Pulmonary:      Effort: Pulmonary effort is normal. No respiratory distress.      Breath sounds: Normal breath sounds. No stridor. No wheezing or rales.   Abdominal:      General: Bowel sounds are normal. There is no distension.      Palpations: Abdomen is soft. There is no mass.      Tenderness: There is abdominal tenderness (diffuse). There is no guarding.   Musculoskeletal:         General: No tenderness. Normal range of motion.      Cervical back: Normal range of motion and neck supple.   Skin:     General: Skin is warm and dry.      Capillary Refill: Capillary refill takes less than 2 seconds.      Findings: No erythema or rash.   Neurological:      Mental Status: She is alert and oriented to person, place, and time.   Psychiatric:         Judgment: Judgment normal.         ED Course                 Procedures        Results for orders placed or performed during the hospital encounter of 07/19/22 (from the past 24 hour(s))   CBC  with platelets differential    Narrative    The following orders were created for panel order CBC with platelets differential.  Procedure                               Abnormality         Status                     ---------                               -----------         ------                     CBC with platelets and d...[[id number]]  Abnormal            Final result                 Please view results for these tests on the individual orders.   Comprehensive metabolic panel   Result Value Ref Range    Sodium 131 (L) 133 - 144 mmol/L    Potassium 3.4 3.4 - 5.3 mmol/L    Chloride 93 (L) 94 - 109 mmol/L    Carbon Dioxide (CO2) 31 20 - 32 mmol/L    Anion Gap 7 3 - 14 mmol/L    Urea Nitrogen 8 7 - 30 mg/dL    Creatinine 0.72 0.52 - 1.04 mg/dL    Calcium 8.9 8.5 - 10.1 mg/dL    Glucose 185 (H) 70 - 99 mg/dL    Alkaline Phosphatase 118 40 - 150 U/L     (H) 0 - 45 U/L     (H) 0 - 50 U/L    Protein Total 8.3 6.8 - 8.8 g/dL    Albumin 4.2 3.4 - 5.0 g/dL    Bilirubin Total 1.6 (H) 0.2 - 1.3 mg/dL    GFR Estimate >90 >60 mL/min/1.73m2   Lipase   Result Value Ref Range    Lipase 312 73 - 393 U/L   Lactic acid whole blood   Result Value Ref Range    Lactic Acid 1.3 0.7 - 2.0 mmol/L   Magnesium   Result Value Ref Range    Magnesium 1.2 (L) 1.6 - 2.3 mg/dL   CRP inflammation   Result Value Ref Range    CRP Inflammation <2.9 0.0 - 8.0 mg/L   Erythrocyte sedimentation rate auto   Result Value Ref Range    Erythrocyte Sedimentation Rate 11 0 - 20 mm/hr   CBC with platelets and differential   Result Value Ref Range    WBC Count 3.4 (L) 4.0 - 11.0 10e3/uL    RBC Count 4.41 3.80 - 5.20 10e6/uL    Hemoglobin 14.8 11.7 - 15.7 g/dL    Hematocrit 41.6 35.0 - 47.0 %    MCV 94 78 - 100 fL    MCH 33.6 (H) 26.5 - 33.0 pg    MCHC 35.6 31.5 - 36.5 g/dL    RDW 15.4 (H) 10.0 - 15.0 %    Platelet Count 114 (L) 150 - 450 10e3/uL    % Neutrophils 73 %    % Lymphocytes 12 %    % Monocytes 14 %    % Eosinophils 0 %    % Basophils 1 %     % Immature Granulocytes 0 %    NRBCs per 100 WBC 0 <1 /100    Absolute Neutrophils 2.5 1.6 - 8.3 10e3/uL    Absolute Lymphocytes 0.4 (L) 0.8 - 5.3 10e3/uL    Absolute Monocytes 0.5 0.0 - 1.3 10e3/uL    Absolute Eosinophils 0.0 0.0 - 0.7 10e3/uL    Absolute Basophils 0.0 0.0 - 0.2 10e3/uL    Absolute Immature Granulocytes 0.0 <=0.4 10e3/uL    Absolute NRBCs 0.0 10e3/uL   Bilirubin direct   Result Value Ref Range    Bilirubin Direct 0.5 (H) 0.0 - 0.2 mg/dL   Extra Tube    Narrative    The following orders were created for panel order Extra Tube.  Procedure                               Abnormality         Status                     ---------                               -----------         ------                     Extra Blue Top Tube[[id number]]                              Final result                 Please view results for these tests on the individual orders.   Extra Blue Top Tube   Result Value Ref Range    Hold Specimen JIC    UA with Microscopic reflex to Culture    Specimen: Urine, Midstream   Result Value Ref Range    Color Urine Yellow Colorless, Straw, Light Yellow, Yellow    Appearance Urine Clear Clear    Glucose Urine 100  (A) Negative mg/dL    Bilirubin Urine Negative Negative    Ketones Urine Trace (A) Negative mg/dL    Specific Gravity Urine <=1.005 1.003 - 1.035    Blood Urine Small (A) Negative    pH Urine 7.0 5.0 - 7.0    Protein Albumin Urine 100  (A) Negative mg/dL    Urobilinogen Urine Normal Normal, 2.0 mg/dL    Nitrite Urine Negative Negative    Leukocyte Esterase Urine Negative Negative    RBC Urine <1 <=2 /HPF    WBC Urine 1 <=5 /HPF    Squamous Epithelials Urine <1 <=1 /HPF    Narrative    Urine Culture not indicated   Abdomen US, limited (RUQ only)    Narrative    ULTRASOUND ABDOMEN LIMITED July 29, 2022 1:36 PM     HISTORY: Abdominal pain, elevated liver function tests and bilirubin  level.    COMPARISON: Abdominal MRI on 3/7/2022.    FINDINGS:    Gallbladder: No cholelithiasis,  gallbladder wall thickening or  pericholecystic fluid. Sonographic Stewart's sign is negative.    Bile ducts: CHD is normal diameter. No intrahepatic biliary  dilatation. The distal aspect of the common bile duct is obscured by  overlying bowel gas.    Liver: It demonstrates increased parenchymal echogenicity. No focal  hepatic mass is visualized.    Pancreas: Partially obscured by overlying bowel gas,  but grossly  unremarkable.     Right kidney: No hydronephrosis or shadowing calculi.    Aorta and IVC: Not specifically assessed.       Impression    IMPRESSION:    1. Hepatic steatosis. No focal hepatic mass is visualized.  2. No evidence for intra or extrahepatic biliary dilatation.    DALTON PIMENTEL MD         SYSTEM ID:  [id number]       Medications   ondansetron (ZOFRAN) injection 4 mg (4 mg Intravenous Given 7/19/22 1152)   0.9% sodium chloride BOLUS (0 mLs Intravenous Stopped 7/19/22 1401)     Followed by   0.9% sodium chloride BOLUS (1,000 mLs Intravenous New Bag 7/19/22 1342)     Followed by   sodium chloride 0.9% infusion (has no administration in time range)   ketorolac (TORADOL) injection 30 mg (30 mg Intravenous Given 7/19/22 1154)   magnesium sulfate 1 gm in 100mL D5W intermittent infusion (0 g Intravenous Stopped 7/19/22 1326)     Labs have come back and everything seems to point towards most likely a Lyme's infection.  Patient's white count is low, there is some elevation liver function testing.  There is no focal findings otherwise.  I did not do a chest x-ray as patient has no cough, I did not repeat the COVID as this was done yesterday and was negative.  I reviewed the urine from urgent care yesterday and the urine from today, doxycycline would not cover most things in the urine so I do not think that we are missing an untreated UTI.  At this point I would recommend just continuing on the doxycycline to treat for presumed Lyme's which would certainly explain all of her symptoms.  Patient feels  better after the fluids.  Patient has an appoint with her primary care doctor in a couple days, recommend follow-up with this.  Patient's blood pressure was a little bit high here but she did seem a little bit anxious.  I did recommend that she touch base with her doctor about this for follow-up.    Assessments & Plan (with Medical Decision Making)  Lyme's disease, hypertension     I have reviewed the nursing notes.    I have reviewed the findings, diagnosis, plan and need for follow up with the patient.          Final diagnoses:   Lyme disease       7/19/2022   [facility] EMERGENCY DEPT     Christ Pacheco MD  07/19/22 [id number]

## 2022-07-20 NOTE — RESULT ENCOUNTER NOTE
Final result for Lyme Disease Total Abs Bld with Reflex to Confirm CLIA is NEGATIVE.    No change in treatment per [facility] Lab Result Lyme Disease protocol.

## 2022-07-21 NOTE — RESULT ENCOUNTER NOTE
"Final urine culture report shows \"NO GROWTH\" and is NEGATIVE.  [facility] Emergency Dept discharge antibiotic: None  Recommendations in treatment per [facility] ED Lab result Urine culture protocol.  "

## 2022-08-03 NOTE — TELEPHONE ENCOUNTER
Endocrinology would like patient to repeat 24 hour urine collection. I have placed this order.     Donnie Mansfield PA-C  [facility]

## 2022-08-11 PROBLEM — F10.10 ALCOHOL ABUSE: Status: ACTIVE | Noted: 2021-05-15

## 2022-08-11 PROBLEM — Z87.19 H/O: GI BLEED: Status: ACTIVE | Noted: 2020-12-12

## 2022-08-11 PROBLEM — Z30.42 ENCOUNTER FOR SURVEILLANCE OF INJECTABLE CONTRACEPTIVE: Status: RESOLVED | Noted: 2019-08-08 | Resolved: 2022-01-01

## 2022-08-11 PROBLEM — T56.892A: Status: ACTIVE | Noted: 2021-06-25

## 2022-08-11 PROBLEM — F10.21 ALCOHOL DEPENDENCE IN REMISSION (H): Status: RESOLVED | Noted: 2019-04-03 | Resolved: 2022-01-01

## 2022-08-11 NOTE — PROGRESS NOTES
Assessment & Plan     Hematemesis with nausea  Bruising  Elevated liver enzymes  Alcohol use disorder, severe, dependence (H)  Thrombocytopenia (H)  See HPI for full information. Patient reports hematemesis for the past 3 days without clear cause. She denies alcohol consumption despite strong history of alcohol dependence, most recent treatment 5 months ago. US returned unremarkable. Labs returned with persistently elevated AST/ALT with AST twice the level of ALT and GGT at 1467. In addition, patient is moderately thrombocytopenic with persistent leukopenia. I am concerned for heavy alcohol consumption causing gastric or duodenal ulcer as well as damage to the liver and vomiting causing tee noble tears. I have started the patient on PPI and sucralfate as well as placed order for upper endoscopy for further evaluation. Patient has been instructed on avoidance of alcohol as well as NSAIDs and other triggers which could exacerbate bleed or ulcer.   - Comprehensive metabolic panel (BMP + Alb, Alk Phos, ALT, AST, Total. Bili, TP); Future  - CBC with platelets and differential; Future  - Helicobacter pylori Antigen Stool; Future  - omeprazole (PRILOSEC) 20 MG DR capsule; Take 2 capsules (40 mg) by mouth daily for 30 days, THEN 1 capsule (20 mg) daily for 30 days, THEN 1 capsule (20 mg) every other day for 30 days.  - sucralfate (CARAFATE) 1 GM tablet; Take 1 tablet (1 g) by mouth 4 times daily as needed for nausea  - US Abdomen Limited; Future  - Comprehensive metabolic panel (BMP + Alb, Alk Phos, ALT, AST, Total. Bili, TP)  - CBC with platelets and differential  - Helicobacter pylori Antigen Stool  - GGT; Future  - GGT  - Lyme Disease Total Abs Bld with Reflex to Confirm CLIA; Future  - Adult GI  Referral - Procedure Only; Future  - Lyme Disease Total Abs Bld with Reflex to Confirm CLIA    Adrenal mass, right (H)  Did not correctly complete previous 24 hour urine and needs to repeat. Heart rate was  elevated today which could be due to a pheochromocytoma or from suspected alcohol consumption.   - Cortisol Cortisone Free 24 Hour Urine; Future    MARTHA [facility]    Over 50 minutes was spent reviewing chart, collecting history, providing education.     Josse Cordova is a 41 year old accompanied by her spouse and Grand Daughter, presenting for the following health issues:  Hospital F/U (Urgent care [facility] in ER)    2-3 days       History of Present Illness       Back Pain:  She presents for follow up of back pain. Patient's back pain is a chronic problem.  Location of back pain:  Right lower back, left lower back and right middle of back  Description of back pain: fullness, gnawing and sharp  Back pain spreads: right buttocks, left buttocks and right foot    Since patient first noticed back pain, pain is: gradually worsening  Does back pain interfere with her job:  Yes      Mental Health Follow-up:  Patient presents to follow-up on Depression & Anxiety.Patient's depression since last visit has been:  Good  The patient is not having other symptoms associated with depression.  Patient's anxiety since last visit has been:  Good  The patient is not having other symptoms associated with anxiety.  Any significant life events: other  Patient is not feeling anxious or having panic attacks.  Patient has no concerns about alcohol or drug use.    Reason for visit:  Throwing up blood, dark tarry stools, fu for limes disease  Symptom onset:  1-3 days ago  Symptoms include:  Throwing up blood, dark tarry stools, weakness  Symptom intensity:  Severe  Symptom progression:  Worsening  Had these symptoms before:  Yes  Has tried/received treatment for these symptoms:  Yes  Previous treatment was successful:  Yes    She eats 2-3 servings of fruits and vegetables daily.She consumes 1 sweetened beverage(s) daily.She exercises with enough effort to increase her heart rate 60 or  more minutes per day.  She exercises with enough effort to increase her heart rate 5 days per week.   She is taking medications regularly.    Today's PHQ-9         PHQ-9 Total Score: 7    PHQ-9 Q9 Thoughts of better off dead/self-harm past 2 weeks :   Not at all    How difficult have these problems made it for you to do your work, take care of things at home, or get along with other people: Somewhat difficult  Today's ROMI-7 Score: 8       Patient is a 41 year old female with past medical history alcohol abuse, opiate addiction, TBI, h/o of GI bleeds, ACS, anemia and adrenal mass who presents today with report of hematemesis for past few days. She was seen at the [facility] ED on 07/19 with complaint of myalgias, nausea/vomiting and abdominal discomfort. The patient had been at an urgent care the day prior, 07/18, and started on doxycycline due to suspicion for lyme disease given recent tick bite.     Today she informs me that she completed the doxycycline as directed, but several of her symptoms have persisted. In addition, she states that she has been vomiting blood for the past 2-3 days. I asked her about recent alcohol use given her history which she denied. However, since the visit, the patient's labs have returned and her liver enzymes have remained persistently elevated with AST twice the level of ALT. In addition, GGT returned elevated at 1467. The last time that this level was elevated was this past February and March 2022 when she was being treated for alcohol abuse.     At this time, as the patient appears to be not reporting her history with complete honesty, there is no way to determine whether the rest of reported history is accurate.     She states that she vomited blood 3x since arriving to the clinic. She denies trauma or subsequent illness since completing the doxycycline. Associated symptoms include melena, persistent nausea and fatigue. She denies changes to diet, medication, supplements.     Labs  have come back and everything seems to point towards most likely a Lyme's infection.  Patient's white count is low, there is some elevation liver function testing.  There is no focal findings otherwise.  I did not do a chest x-ray as patient has no cough, I did not repeat the COVID as this was done yesterday and was negative.  I reviewed the urine from urgent care yesterday and the urine from today, doxycycline would not cover most things in the urine so I do not think that we are missing an untreated UTI.  At this point I would recommend just continuing on the doxycycline to treat for presumed Lyme's which would certainly explain all of her symptoms.  Patient feels better after the fluids.  Patient has an appoint with her primary care doctor in a couple days, recommend follow-up with this.  Patient's blood pressure was a little bit high here but she did seem a little bit anxious.  I did recommend that she touch base with her doctor about this for follow-up.    Review of Systems   Constitutional, HEENT, cardiovascular, pulmonary, gi and gu systems are negative, except as otherwise noted.      Objective    /80   Pulse (!) 139   Temp 98.4  F (36.9  C) (Temporal)   Wt 51.7 kg (114 lb)   LMP 08/11/2022   SpO2 99%   BMI 19.69 kg/m    Body mass index is 19.69 kg/m .  Physical Exam   GENERAL: healthy, alert and no distress  RESP: lungs clear to auscultation - no rales, rhonchi or wheezes  CV: regular rate and rhythm, normal S1 S2, no S3 or S4, no murmur, click or rub, no peripheral edema and peripheral pulses strong  ABDOMEN: soft, moderate epigastric pain, no hepatosplenomegaly, no masses and bowel sounds normal  MS: no gross musculoskeletal defects noted, no edema  NEURO: Normal strength and tone, mentation intact and speech normal  PSYCH: mentation appears normal, affect normal/bright    Results for orders placed or performed in visit on 08/11/22 (from the past 24 hour(s))   Comprehensive metabolic panel  (BMP + Alb, Alk Phos, ALT, AST, Total. Bili, TP)   Result Value Ref Range    Sodium 133 133 - 144 mmol/L    Potassium 3.9 3.4 - 5.3 mmol/L    Chloride 97 94 - 109 mmol/L    Carbon Dioxide (CO2) 28 20 - 32 mmol/L    Anion Gap 8 3 - 14 mmol/L    Urea Nitrogen 15 7 - 30 mg/dL    Creatinine 0.57 0.52 - 1.04 mg/dL    Calcium 9.2 8.5 - 10.1 mg/dL    Glucose 115 (H) 70 - 99 mg/dL    Alkaline Phosphatase 104 40 - 150 U/L     (H) 0 - 45 U/L     (H) 0 - 50 U/L    Protein Total 7.9 6.8 - 8.8 g/dL    Albumin 4.1 3.4 - 5.0 g/dL    Bilirubin Total 1.7 (H) 0.2 - 1.3 mg/dL    GFR Estimate >90 >60 mL/min/1.73m2   CBC with platelets and differential    Narrative    The following orders were created for panel order CBC with platelets and differential.  Procedure                               Abnormality         Status                     ---------                               -----------         ------                     CBC with platelets and d...[[id number]]  Abnormal            Final result                 Please view results for these tests on the individual orders.   Lipase   Result Value Ref Range    Lipase 266 73 - 393 U/L   CBC with platelets and differential   Result Value Ref Range    WBC Count 3.7 (L) 4.0 - 11.0 10e3/uL    RBC Count 3.70 (L) 3.80 - 5.20 10e6/uL    Hemoglobin 13.0 11.7 - 15.7 g/dL    Hematocrit 36.4 35.0 - 47.0 %    MCV 98 78 - 100 fL    MCH 35.1 (H) 26.5 - 33.0 pg    MCHC 35.7 31.5 - 36.5 g/dL    RDW 14.4 10.0 - 15.0 %    Platelet Count 91 (L) 150 - 450 10e3/uL    % Neutrophils 72 %    % Lymphocytes 13 %    % Monocytes 11 %    % Eosinophils 2 %    % Basophils 1 %    % Immature Granulocytes 1 %    NRBCs per 100 WBC 0 <1 /100    Absolute Neutrophils 2.7 1.6 - 8.3 10e3/uL    Absolute Lymphocytes 0.5 (L) 0.8 - 5.3 10e3/uL    Absolute Monocytes 0.4 0.0 - 1.3 10e3/uL    Absolute Eosinophils 0.1 0.0 - 0.7 10e3/uL    Absolute Basophils 0.0 0.0 - 0.2 10e3/uL    Absolute Immature Granulocytes 0.0 <=0.4  10e3/uL    Absolute NRBCs 0.0 10e3/uL   GGT   Result Value Ref Range    GGT 1,467 (H) 0 - 40 U/L       US ABDOMEN LIMITED 8/11/2022 10:50 AM     CLINICAL HISTORY: Epigastric pain with 3 days of hemoptysis. Slight  bruising around the umbilicus; Hemoptysis; Serafin's sign present;  Elevated liver enzymes  TECHNIQUE: Limited abdominal ultrasound.     COMPARISON: 7/19/2022  FINDINGS:  GALLBLADDER: The gallbladder is normal. No gallstones, wall  thickening, or pericholecystic fluid. Negative sonographic Stewart's  sign.  BILE DUCTS: There is no biliary dilatation. The common duct measures  2mm   LIVER: Hepatic steatosis. No liver lesions.  RIGHT KIDNEY: No hydronephrosis.  PANCREAS: The visualized portions of the pancreas are normal.  No ascites.                                                                 IMPRESSION:  1.  Hepatic steatosis. Otherwise normal examination.     TRISTAN OLGUIN MD         SYSTEM ID:  [id number]

## 2022-08-15 NOTE — TELEPHONE ENCOUNTER
Screening Questions  BlueKIND OF PREP RedLOCATION [review exclusion criteria] GreenSEDATION TYPE      1.  yes Are you active on mychart?       2.  Noman Lombardo, MARTHA Ordering/Referring Provider:      3. Blue plus  What insurance is in the chart?    4.  y Do you have a legal guardian or medical Power of ?              Are you able to give consent for your medical care?                (Sedation review/consideration needed)      5.   19.69  BMI  [BMI OVER 40-EXTENDED PREP]  If greater than 40 review exclusion criteria [PAC APPT IF @ [facility]]       6.   n Have you had a positive covid test in the last 90 days?      7.   Respiratory Screening :  [If yes to any of the following HOSPITAL setting only]                     n Do you use daily home oxygen?   n Do you have mod to severe Obstructive Sleep Apnea?  [OKAY @ [facility] SH PH RI]                  n Do you have Pulmonary Hypertension?                   n Do you have UNCONTROLLED asthma?         8.   n Have you had a heart or lung transplant?       9.   n Are you currently on dialysis?   [ If yes, G-PREP & HOSPITAL setting only]      10.   n  Do you have chronic kidney disease?  [ If yes, G-PREP ]     11.  n Have you had a stroke or Transient ischemic attack (TIA - aka  mini stroke ) within 6 months?   (If yes, please review exclusion criteria)     12.   n In the past 6 months, have you had any heart related issues including cardiomyopathy or heart attack?           n If yes, did it require cardiac stenting or other implantable device?       13.   n Do you have any implantable devices in your body (pacemaker, defib, LVAD)?  (If yes, please review exclusion criteria)     14.   n Do you take nitroglycerin?            n If yes, how often? n  (if yes, HOSPITAL setting ONLY)     15.   n Are you currently taking any blood thinners?           [IF YES, INFORM PATIENT TO FOLLOW UP W/ ORDERING PROVIDER FOR BRIDGING INSTRUCTIONS]      16.    n  Do you have a  diagnosis of diabetes?  [ If yes, G-PREP ]     17.   [FEMALES] n Are you currently pregnant?    n If yes, how many weeks?      18.    n  Are you taking any prescription pain medications on a routine schedule?    [ If yes, EXTENDED PREP.] [If yes, MAC]    22.  Do you take the medication Phentermine?     Yes-> Hold for 7 days before procedure.  Please consult your prescribing provider if you have questions about holding this medication.     No-> Continue to next question.     19.   n Do you have any chemical dependencies such as alcohol, street drugs, or methadone?   [If yes, MAC]     20.   n Do you have any history of post-traumatic stress syndrome, severe anxiety or history of psychosis?   [If yes, MAC]     21.   y Do you transfer independently?       22.   n  On a regular basis do you go 3-5 days between bowel movements?  [ If yes, EXTENDED PREP.]     23.    Preferred LOCAL Pharmacy for Pre Prescription       [facility] - [place], [place] - [address] [place] [address]          Scheduling Details         Tasha : Caller   (Please ask for phone number if not scheduled by patient)    Upper Endoscopy [EGD] : Type of Procedure Scheduled    Which Colonoscopy Prep was Sent?      Joesph Surgeon    08/25 Date of Procedure   MG Location    MOD Sedation Type     Conscious Sedation- Needs  for 6 hours after the procedure  MAC/General-Needs  for 24 hours after procedure     n Pre-op Required at [facility] [place], [place] and OR for MAC sedation   (advise patient they will need a pre-op prior to procedure -)       y Informed patient they will need an adult    Cannot take any type of public or medical transportation alone     home Pre-Procedure Covid test to be completed at [facility] or Externally  [[facility] PCR Testing Required]     y  Confirmed Nurse will call to complete assessment     Additional comments:

## 2022-08-25 NOTE — H&P
ENDOSCOPY PRE-SEDATION H&P FOR OUTPATIENT PROCEDURES    Tasha Short  [id number]  1980    Procedure: EGD    Pre-procedure diagnosis: hematemesis    Past medical history:   Past Medical History:   Diagnosis Date     ALCOHOL ABUSE-IN REMISS 2007     Cataplexy and narcolepsy 2002    tried Provigil, Straterra, Ritalin (works)     ROSA 3 - cervical intraepithelial neoplasia grade 3 2010    ROSA 2/3  on LEEP biopsy     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     Hypersomnia with sleep apnea      Hypertension      Irregular menstrual cycle 1997     Metrorrhagia 2001     Other acne      Other and unspecified adverse effect of drug, medicinal and biological substance 2001    Allergic and adverse reaction to Dilantin     Pancreatic disease      Substance abuse (H) 10/02/2009    see 2009 confidential report       Past surgical history:   Past Surgical History:   Procedure Laterality Date      SECTION       COLONOSCOPY N/A 10/4/2020    Procedure: COLONOSCOPY, WITH POLYPECTOMY AND BIOPSY;  Surgeon: Mando Seigel MD;  Location:  GI     COLPOSCOPY CERVIX, LOOP ELECTRODE BIOPSY, COMBINED  2010    ROSA 2/3     ESOPHAGOSCOPY, GASTROSCOPY, DUODENOSCOPY (EGD), COMBINED N/A 2020    Procedure: Esophagogastroduodenoscopy with biopsies;  Surgeon: Ronan Pelayo MD;  Location:  GI     ESOPHAGOSCOPY, GASTROSCOPY, DUODENOSCOPY (EGD), COMBINED N/A 10/3/2020    Procedure: ESOPHAGOGASTRODUODENOSCOPY (EGD);  Surgeon: Mando Siegel MD;  Location: [facility]     HC REMOVAL OF TONSILS,12+ Y/O      Tonsils 12+y.o.     INJECT EPIDURAL LUMBAR N/A 2022    Procedure: Bilateral Lumbar 4-5 Transforaminal Epidural Steroid Injections with fluoroscopic guidance and contrast;  Surgeon: Cholo Steele MD;  Location:  OR     REPAIR TENDON PERONEAL  11/15/2013    Procedure: REPAIR TENDON PERONEAL;  right peroneal tendon  transfer;   Surgeon: Jesus Manuel Oden DPM;  Location:  OR       Current Outpatient Medications   Medication     doxycycline hyclate (VIBRAMYCIN) 100 MG capsule     fluticasone (FLONASE) 50 MCG/ACT nasal spray     lithium (ESKALITH CR/LITHOBID) 450 MG CR tablet     metoprolol succinate ER (TOPROL-XL) 50 MG 24 hr tablet     Multiple Vitamins-Minerals (SUPER THERA SUE M) TABS     naltrexone (DEPADE/REVIA) 50 MG tablet     omeprazole (PRILOSEC) 20 MG DR capsule     prochlorperazine (COMPAZINE) 10 MG tablet     sucralfate (CARAFATE) 1 GM tablet     methylphenidate (RITALIN) 20 MG tablet     nicotine (NICODERM CQ) 21 MG/24HR 24 hr patch     nicotine (NICORETTE) 2 MG gum     nizatidine (AXID) 300 MG capsule     ondansetron (ZOFRAN-ODT) 4 MG ODT tab     XOPENEX HFA 45 MCG/ACT inhaler     Current Facility-Administered Medications   Medication     lidocaine (LMX4) kit     lidocaine 1 % 0.1-1 mL     sodium chloride (PF) 0.9% PF flush 3 mL     sodium chloride (PF) 0.9% PF flush 3 mL       Allergies   Allergen Reactions     Bupropion Other (See Comments)     seizures     Lisinopril Swelling     Angioedema      Penicillins      hives     Phenytoin      rash,puritis (Dilantin)     Seasonal Allergies        History of Anesthesia/Sedation Problems: no    Physical Exam:    Mental status: alert  Heart: Normal  Lung: Normal  Assessment of patient's airway: Normal  Other as pertinent for procedure: None     ASA Score: See Provation note    Mallampati score:  II - Faucial pillars and soft palate may be seen, but uvula is masked by the base of the tongue    Assessment/Plan:     The patient is an appropriate candidate to receive sedation.    Informed consent was discussed with the patient/family, including the risks, benefits, potential complications and any alternative options associated with sedation.    Patient assessment completed just prior to sedation and while under constant observation by the provider. Condition determined to be  adequate for proceeding with sedation.    The specific risks for the procedure were discussed with the patient at the time of informed consent and include but are not limited to perforation which could require surgery, missing significant neoplasm or lesion, hemorrhage and adverse sedative complication.      Kang Hutchinson MD

## 2022-08-30 NOTE — LETTER
8/30/2022         RE: Tasha Short  [address] [place] [address]        Dear Colleague,    Thank you for referring your patient, Tasha Short, to the [facility] ENDOCRINOLOGY. Please see a copy of my visit note below.    Tasha Short  is being evaluated via a billable video visit.      How would you like to obtain your AVS? Mail a copy  For the video visit, send the invitation by: Text to cell phone: [phone]  Will anyone else be joining your video visit? No            Endocrine Consult Video visit    Attending Assessment/Plan     #R adrenal nodule  -increased in size from previous scan last year  -will screen w/DHEA-s given reported facial hair growth, as well as devyn and plasma free metanephrines.    -plan to do 1mg dst after that   -assuming labs come back non-secreting, will plan for followup imaging per rads recommendation (likely 6 months)    Due to the COVID 19 pandemic this visit was a telephone/video visit in order to help prevent spread of infection in this patient and the general population. The patient gave verbal consent for the visit today. I have independently reviewed and interpreted labs, imaging as indicated.     Chart review/prep time 1    Visit Start time 1000  Visit Stop time  1038  45 minutes spent on the date of the encounter doing chart review, history and exam, documentation and further activities as noted above.    RTC depending on results of labs    Chief complaint:  Tasha is a 41 year old female seen in consultation for known R adrenal nodule with increase in size.     I have reviewed Care Everywhere including [facility], [facility], [facility], [place], [facility], [facility] , [facility], [place] lab reports, imaging reports and provider notes as indicated.      HISTORY OF PRESENT ILLNESS    Tasha comes to endocrine clinic for evaluation of known R adrenal nodule that increased in size on last MR adrenal.      She reports diagnosis of the nodule about 7 years ago.      She does not have DM.  She reports poor appetite.  She denies new abdominal striae. She does endorse some facial hair grown on her chin - shaving frequency 1x/wk. She denies panic attacks or heart palps.  LMP 1 month ago and was heavier than usual, timing normal.   Endocrine relevant labs are as follows:    Relevant imaging is as follows:    MR adrenal per rads report:  Gradually increasing size of a 2.3 x 2.1 cm T2 hyperintense,  heterogeneous, well-circumscribed right adrenal gland nodule (series  14, image 35), previously measuring 2.2 x 1.7 cm on 4/7/2021, 2.1 x  1.6 cm on 10/4/2022 and 1.2 x 0.9 cm on 7/20/2015. This nodule is  predominantly solid and enhancing with a cystic/necrotic central  portion. No loss of signal on the out of phase images. No macroscopic  fat. The lesion demonstrates restricted diffusion (series 28, image  68). Normal left adrenal gland.    REVIEW OF SYSTEMS  Answers for HPI/ROS submitted by the patient on 8/30/2022  General Symptoms: Yes  Skin Symptoms: No  HENT Symptoms: Yes  EYE SYMPTOMS: No  HEART SYMPTOMS: No  LUNG SYMPTOMS: No  INTESTINAL SYMPTOMS: Yes  URINARY SYMPTOMS: No  GYNECOLOGIC SYMPTOMS: No  BREAST SYMPTOMS: No  SKELETAL SYMPTOMS: No  BLOOD SYMPTOMS: No  NERVOUS SYSTEM SYMPTOMS: No  MENTAL HEALTH SYMPTOMS: Yes  Ear pain: No  Ear discharge: No  Hearing loss: No  Tinnitus: No  Nosebleeds: No  Congestion: No  Sinus pain: No  Trouble swallowing: Yes   Voice hoarseness: No  Mouth sores: No  Sore throat: No  Tooth pain: No  Gum tenderness: No  Bleeding gums: No  Change in taste: No  Change in sense of smell: No  Dry mouth: No  Hearing aid used: No  Neck lump: No  Fever: No  Loss of appetite: Yes  Weight loss: Yes  Weight gain: No  Fatigue: Yes  Night sweats: Yes  Chills: No  Increased stress: Yes  Excessive hunger: No  Excessive thirst: No  Feeling hot or cold when others believe the temperature is normal: No  Loss  of height: No  Post-operative complications: No  Surgical site pain: No  Hallucinations: No  Change in or Loss of Energy: Yes  Hyperactivity: No  Confusion: No  Heart burn or indigestion: No  Nausea: Yes  Vomiting: Yes  Abdominal pain: Yes  Bloating: Yes  Constipation: No  Diarrhea: No  Blood in stool: Yes  Black stools: No  Rectal or Anal pain: No  Fecal incontinence: No  Yellowing of skin or eyes: No  Vomit with blood: No  Change in stools: No  Nervous or Anxious: No  Depression: Yes  Trouble sleeping: Yes  Trouble thinking or concentrating: No  Mood changes: No  Panic attacks: No      10 system ROS otherwise as per the HPI or negative    Past Medical History  Past Medical History:   Diagnosis Date     ALCOHOL ABUSE-IN REMISS 07/30/2007     Cataplexy and narcolepsy 2002    tried Provigil, Straterra, Ritalin (works)     ROSA 3 - cervical intraepithelial neoplasia grade 3 01/04/2010    ROSA 2/3  on LEEP biopsy     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 02/19/2001     Hypersomnia with sleep apnea      Hypertension      Irregular menstrual cycle 05/12/1997     Metrorrhagia 02/08/2001     Other acne      Other and unspecified adverse effect of drug, medicinal and biological substance 02/19/2001    Allergic and adverse reaction to Dilantin     Pancreatic disease      Substance abuse (H) 10/02/2009    see 9/23/2009 confidential report       Medications  Current Outpatient Medications   Medication Sig Dispense Refill     doxycycline hyclate (VIBRAMYCIN) 100 MG capsule Take 100 mg by mouth 2 times daily       fluticasone (FLONASE) 50 MCG/ACT nasal spray Spray 1 spray into both nostrils daily 16 g 11     lithium (ESKALITH CR/LITHOBID) 450 MG CR tablet Take 450 mg by mouth At Bedtime       metoprolol succinate ER (TOPROL-XL) 50 MG 24 hr tablet Take 1 tablet (50 mg) by mouth daily 90 tablet 3     Multiple Vitamins-Minerals (SUPER THERA SUE M) TABS Take 1 tablet by mouth daily       naltrexone  (DEPADE/REVIA) 50 MG tablet Take 50 mg by mouth       nizatidine (AXID) 300 MG capsule Take 1 capsule (300 mg) by mouth At Bedtime 90 capsule 1     omeprazole (PRILOSEC) 40 MG DR capsule Take 1 capsule (40 mg) by mouth 2 times daily (before meals) for 90 days 180 capsule 0     prochlorperazine (COMPAZINE) 10 MG tablet Take 1 tablet (10 mg) by mouth every 6 hours as needed for nausea or vomiting 15 tablet 0     sucralfate (CARAFATE) 1 GM tablet Take 1 tablet (1 g) by mouth 4 times daily as needed for nausea 60 tablet 0     methylphenidate (RITALIN) 20 MG tablet Take 1 tablet (20 mg) by mouth 2 times daily (Patient not taking: No sig reported) 60 tablet 0     nicotine (NICODERM CQ) 21 MG/24HR 24 hr patch Place 1 patch onto the skin daily (Patient not taking: No sig reported) 30 patch 3     nicotine (NICORETTE) 2 MG gum Place 1 each (2 mg) inside cheek every hour as needed for smoking cessation (Patient not taking: No sig reported) 100 each 4     ondansetron (ZOFRAN-ODT) 4 MG ODT tab Place 4 mg under the tongue (Patient not taking: No sig reported)       XOPENEX HFA 45 MCG/ACT inhaler INHALE 2 PUFFS BY MOUTH EVERY 4 HOURS AS NEEDED (Patient not taking: No sig reported)         Allergies  Allergies   Allergen Reactions     Bupropion Other (See Comments)     seizures     Lisinopril Swelling     Angioedema      Penicillins      hives     Phenytoin      rash,puritis (Dilantin)     Seasonal Allergies        Family History  family history includes Arthritis in her paternal grandmother; Depression in her mother; Hypertension in her paternal grandfather.    Social History  Social History     Tobacco Use     Smoking status: Light Tobacco Smoker     Packs/day: 0.25     Types: Cigarettes     Smokeless tobacco: Never Used     Tobacco comment: trying to quit   Vaping Use     Vaping Use: Every day     Substances: Nicotine   Substance Use Topics     Alcohol use: No     Drug use: No       Physical Exam  There is no height or weight on  file to calculate BMI.  GENERAL: no distress  SKIN: Visible skin clear. No significant rash, abnormal pigmentation or lesions.  EYES: Eyes grossly normal to inspection.  No discharge or erythema, or obvious scleral/conjunctival abnormalities.  NECK: visible goiter is not present; no visible neck masses  RESP: No audible wheeze, cough, or visible cyanosis.  No visible retractions or increased work of breathing.    NEURO: Awake, alert, responds appropriately to questions.  Mentation and speech fluent.  PSYCH:affect normal and appearance well-groomed.      DATA REVIEW    Endocrine Consult note    Attending Assessment/Plan :         I have independently reviewed and interpreted labs, imaging as indicated.      Chief complaint:  Tasha is a 41 year old female seen in consultation at the request of   I have reviewed Care Everywhere including [facility], [facility], [place] lab reports, imaging reports and provider notes as indicated.      HISTORY OF PRESENT ILLNESS      Endocrine relevant labs are as follows:    Relevant imaging is as follows: (as read by me as it pertains to endocrine relevant organs)    REVIEW OF SYSTEMS    10 system ROS otherwise as per the HPI or negative    Past Medical History  Past Medical History:   Diagnosis Date     ALCOHOL ABUSE-IN REMISS 07/30/2007     Cataplexy and narcolepsy 2002    tried Provigil, Straterra, Ritalin (works)     ROSA 3 - cervical intraepithelial neoplasia grade 3 01/04/2010    ROSA 2/3  on LEEP biopsy     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 02/19/2001     Hypersomnia with sleep apnea      Hypertension      Irregular menstrual cycle 05/12/1997     Metrorrhagia 02/08/2001     Other acne      Other and unspecified adverse effect of drug, medicinal and biological substance 02/19/2001    Allergic and adverse reaction to Dilantin     Pancreatic disease      Substance abuse  (H) 10/02/2009    see 9/23/2009 confidential report       Medications  Current Outpatient Medications   Medication Sig Dispense Refill     doxycycline hyclate (VIBRAMYCIN) 100 MG capsule Take 100 mg by mouth 2 times daily       fluticasone (FLONASE) 50 MCG/ACT nasal spray Spray 1 spray into both nostrils daily 16 g 11     lithium (ESKALITH CR/LITHOBID) 450 MG CR tablet Take 450 mg by mouth At Bedtime       metoprolol succinate ER (TOPROL-XL) 50 MG 24 hr tablet Take 1 tablet (50 mg) by mouth daily 90 tablet 3     Multiple Vitamins-Minerals (SUPER THERA SUE M) TABS Take 1 tablet by mouth daily       naltrexone (DEPADE/REVIA) 50 MG tablet Take 50 mg by mouth       nizatidine (AXID) 300 MG capsule Take 1 capsule (300 mg) by mouth At Bedtime 90 capsule 1     omeprazole (PRILOSEC) 40 MG DR capsule Take 1 capsule (40 mg) by mouth 2 times daily (before meals) for 90 days 180 capsule 0     prochlorperazine (COMPAZINE) 10 MG tablet Take 1 tablet (10 mg) by mouth every 6 hours as needed for nausea or vomiting 15 tablet 0     sucralfate (CARAFATE) 1 GM tablet Take 1 tablet (1 g) by mouth 4 times daily as needed for nausea 60 tablet 0     methylphenidate (RITALIN) 20 MG tablet Take 1 tablet (20 mg) by mouth 2 times daily (Patient not taking: No sig reported) 60 tablet 0     nicotine (NICODERM CQ) 21 MG/24HR 24 hr patch Place 1 patch onto the skin daily (Patient not taking: No sig reported) 30 patch 3     nicotine (NICORETTE) 2 MG gum Place 1 each (2 mg) inside cheek every hour as needed for smoking cessation (Patient not taking: No sig reported) 100 each 4     ondansetron (ZOFRAN-ODT) 4 MG ODT tab Place 4 mg under the tongue (Patient not taking: No sig reported)       XOPENEX HFA 45 MCG/ACT inhaler INHALE 2 PUFFS BY MOUTH EVERY 4 HOURS AS NEEDED (Patient not taking: No sig reported)         Allergies  Allergies   Allergen Reactions     Bupropion Other (See Comments)     seizures     Lisinopril Swelling     Angioedema       Penicillins      hives     Phenytoin      rash,puritis (Dilantin)     Seasonal Allergies          Family History  family history includes Arthritis in her paternal grandmother; Depression in her mother; Hypertension in her paternal grandfather.    Social History  Social History     Tobacco Use     Smoking status: Light Tobacco Smoker     Packs/day: 0.25     Types: Cigarettes     Smokeless tobacco: Never Used     Tobacco comment: trying to quit   Vaping Use     Vaping Use: Every day     Substances: Nicotine   Substance Use Topics     Alcohol use: No     Drug use: No       Physical Exam  LMP 08/11/2022   There is no height or weight on file to calculate BMI.  GENERAL :  In no apparent distress  SKIN: Normal color, normal temperature, texture.  No hirsutism, alopecia or purple striae.     EYES: EOMI, No scleral icterus  NECK: No visible masses.  RESP: normal effort, no resp distress  ABDOMEN: ND       NEURO: awake, alert, responds appropriately to questions.               Again, thank you for allowing me to participate in the care of your patient.        Sincerely,        Cliff Rdz MD

## 2022-08-30 NOTE — PROGRESS NOTES
Tasha Short  is being evaluated via a billable video visit.      How would you like to obtain your AVS? Mail a copy  For the video visit, send the invitation by: Text to cell phone: [phone]  Will anyone else be joining your video visit? No

## 2022-08-31 NOTE — TELEPHONE ENCOUNTER
Prior Authorization Approval    Authorization Effective Date: 6/2/2022  Authorization Expiration Date: 8/31/2023  Medication: Omeprazole 40 mg-PA APPROVED   Approved Dose/Quantity: UP TO 60 CAPSULES PER 30 DAYS   Reference #:     Insurance Company: [employer] - Phone [phone] Fax [phone]  Expected CoPay:       CoPay Card Available:      Foundation Assistance Needed:    Which Pharmacy is filling the prescription (Not needed for infusion/clinic administered): [facility] - [place], [place] - [address] [place] [address]  Pharmacy Notified: Yes- **Instructed pharmacy to notify patient when script is ready to /ship.**  Patient Notified: Yes

## 2022-08-31 NOTE — TELEPHONE ENCOUNTER
Central Prior Authorization Team   Phone: [phone]    PA Initiation    Medication: Omeprazole 40 mg  Insurance Company: [employer] - Phone [phone] Fax [phone]  Pharmacy Filling the Rx: [facility] [place] - [place], [place] - [address]  Filling Pharmacy Phone: [phone]  Filling Pharmacy Fax: [phone]  Start Date: 8/31/2022

## 2022-08-31 NOTE — TELEPHONE ENCOUNTER
Prior Authorization Retail Medication Request    Medication/Dose: Omeprazole 40 mg  ICD code (if different than what is on RX):    Previously Tried and Failed:    Rationale:      Insurance Name:    Insurance ID:        Pharmacy Information (if different than what is on RX)  Name:    Phone:

## 2022-09-01 NOTE — PROGRESS NOTES
Endocrine Consult Video visit    Attending Assessment/Plan     #R adrenal nodule  -increased in size from previous scan last year  -will screen w/DHEA-s given reported facial hair growth, as well as devyn and plasma free metanephrines.    -plan to do 1mg dst after that   -assuming labs come back non-secreting, will plan for followup imaging per rads recommendation (likely 6 months)    Due to the COVID 19 pandemic this visit was a telephone/video visit in order to help prevent spread of infection in this patient and the general population. The patient gave verbal consent for the visit today. I have independently reviewed and interpreted labs, imaging as indicated.     Chart review/prep time 1    Visit Start time 1000  Visit Stop time  1038  45 minutes spent on the date of the encounter doing chart review, history and exam, documentation and further activities as noted above.    RTC depending on results of labs    Chief complaint:  Tasha is a 41 year old female seen in consultation for known R adrenal nodule with increase in size.     I have reviewed Care Everywhere including [facility], [facility] [place] [facility], [facility], [facility], [facility] , [facility], [place] lab reports, imaging reports and provider notes as indicated.      HISTORY OF PRESENT ILLNESS    Tasha comes to endocrine clinic for evaluation of known R adrenal nodule that increased in size on last MR adrenal.     She reports diagnosis of the nodule about 7 years ago.      She does not have DM.  She reports poor appetite.  She denies new abdominal striae. She does endorse some facial hair grown on her chin - shaving frequency 1x/wk. She denies panic attacks or heart palps.  LMP 1 month ago and was heavier than usual, timing normal.   Endocrine relevant labs are as follows:    Relevant imaging is as follows:    MR adrenal per rads report:  Gradually increasing size of a 2.3 x 2.1 cm T2 hyperintense,  heterogeneous, well-circumscribed right  adrenal gland nodule (series  14, image 35), previously measuring 2.2 x 1.7 cm on 4/7/2021, 2.1 x  1.6 cm on 10/4/2022 and 1.2 x 0.9 cm on 7/20/2015. This nodule is  predominantly solid and enhancing with a cystic/necrotic central  portion. No loss of signal on the out of phase images. No macroscopic  fat. The lesion demonstrates restricted diffusion (series 28, image  68). Normal left adrenal gland.    REVIEW OF SYSTEMS  Answers for HPI/ROS submitted by the patient on 8/30/2022  General Symptoms: Yes  Skin Symptoms: No  HENT Symptoms: Yes  EYE SYMPTOMS: No  HEART SYMPTOMS: No  LUNG SYMPTOMS: No  INTESTINAL SYMPTOMS: Yes  URINARY SYMPTOMS: No  GYNECOLOGIC SYMPTOMS: No  BREAST SYMPTOMS: No  SKELETAL SYMPTOMS: No  BLOOD SYMPTOMS: No  NERVOUS SYSTEM SYMPTOMS: No  MENTAL HEALTH SYMPTOMS: Yes  Ear pain: No  Ear discharge: No  Hearing loss: No  Tinnitus: No  Nosebleeds: No  Congestion: No  Sinus pain: No  Trouble swallowing: Yes   Voice hoarseness: No  Mouth sores: No  Sore throat: No  Tooth pain: No  Gum tenderness: No  Bleeding gums: No  Change in taste: No  Change in sense of smell: No  Dry mouth: No  Hearing aid used: No  Neck lump: No  Fever: No  Loss of appetite: Yes  Weight loss: Yes  Weight gain: No  Fatigue: Yes  Night sweats: Yes  Chills: No  Increased stress: Yes  Excessive hunger: No  Excessive thirst: No  Feeling hot or cold when others believe the temperature is normal: No  Loss of height: No  Post-operative complications: No  Surgical site pain: No  Hallucinations: No  Change in or Loss of Energy: Yes  Hyperactivity: No  Confusion: No  Heart burn or indigestion: No  Nausea: Yes  Vomiting: Yes  Abdominal pain: Yes  Bloating: Yes  Constipation: No  Diarrhea: No  Blood in stool: Yes  Black stools: No  Rectal or Anal pain: No  Fecal incontinence: No  Yellowing of skin or eyes: No  Vomit with blood: No  Change in stools: No  Nervous or Anxious: No  Depression: Yes  Trouble sleeping: Yes  Trouble thinking or  concentrating: No  Mood changes: No  Panic attacks: No      10 system ROS otherwise as per the HPI or negative    Past Medical History  Past Medical History:   Diagnosis Date     ALCOHOL ABUSE-IN REMISS 07/30/2007     Cataplexy and narcolepsy 2002    tried Provigil, Straterra, Ritalin (works)     ROSA 3 - cervical intraepithelial neoplasia grade 3 01/04/2010    ROSA 2/3  on LEEP biopsy     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 02/19/2001     Hypersomnia with sleep apnea      Hypertension      Irregular menstrual cycle 05/12/1997     Metrorrhagia 02/08/2001     Other acne      Other and unspecified adverse effect of drug, medicinal and biological substance 02/19/2001    Allergic and adverse reaction to Dilantin     Pancreatic disease      Substance abuse (H) 10/02/2009    see 9/23/2009 confidential report       Medications  Current Outpatient Medications   Medication Sig Dispense Refill     doxycycline hyclate (VIBRAMYCIN) 100 MG capsule Take 100 mg by mouth 2 times daily       fluticasone (FLONASE) 50 MCG/ACT nasal spray Spray 1 spray into both nostrils daily 16 g 11     lithium (ESKALITH CR/LITHOBID) 450 MG CR tablet Take 450 mg by mouth At Bedtime       metoprolol succinate ER (TOPROL-XL) 50 MG 24 hr tablet Take 1 tablet (50 mg) by mouth daily 90 tablet 3     Multiple Vitamins-Minerals (SUPER THERA SUE M) TABS Take 1 tablet by mouth daily       naltrexone (DEPADE/REVIA) 50 MG tablet Take 50 mg by mouth       nizatidine (AXID) 300 MG capsule Take 1 capsule (300 mg) by mouth At Bedtime 90 capsule 1     omeprazole (PRILOSEC) 40 MG DR capsule Take 1 capsule (40 mg) by mouth 2 times daily (before meals) for 90 days 180 capsule 0     prochlorperazine (COMPAZINE) 10 MG tablet Take 1 tablet (10 mg) by mouth every 6 hours as needed for nausea or vomiting 15 tablet 0     sucralfate (CARAFATE) 1 GM tablet Take 1 tablet (1 g) by mouth 4 times daily as needed for nausea 60 tablet 0      methylphenidate (RITALIN) 20 MG tablet Take 1 tablet (20 mg) by mouth 2 times daily (Patient not taking: No sig reported) 60 tablet 0     nicotine (NICODERM CQ) 21 MG/24HR 24 hr patch Place 1 patch onto the skin daily (Patient not taking: No sig reported) 30 patch 3     nicotine (NICORETTE) 2 MG gum Place 1 each (2 mg) inside cheek every hour as needed for smoking cessation (Patient not taking: No sig reported) 100 each 4     ondansetron (ZOFRAN-ODT) 4 MG ODT tab Place 4 mg under the tongue (Patient not taking: No sig reported)       XOPENEX HFA 45 MCG/ACT inhaler INHALE 2 PUFFS BY MOUTH EVERY 4 HOURS AS NEEDED (Patient not taking: No sig reported)         Allergies  Allergies   Allergen Reactions     Bupropion Other (See Comments)     seizures     Lisinopril Swelling     Angioedema      Penicillins      hives     Phenytoin      rash,puritis (Dilantin)     Seasonal Allergies        Family History  family history includes Arthritis in her paternal grandmother; Depression in her mother; Hypertension in her paternal grandfather.    Social History  Social History     Tobacco Use     Smoking status: Light Tobacco Smoker     Packs/day: 0.25     Types: Cigarettes     Smokeless tobacco: Never Used     Tobacco comment: trying to quit   Vaping Use     Vaping Use: Every day     Substances: Nicotine   Substance Use Topics     Alcohol use: No     Drug use: No       Physical Exam  There is no height or weight on file to calculate BMI.  GENERAL: no distress  SKIN: Visible skin clear. No significant rash, abnormal pigmentation or lesions.  EYES: Eyes grossly normal to inspection.  No discharge or erythema, or obvious scleral/conjunctival abnormalities.  NECK: visible goiter is not present; no visible neck masses  RESP: No audible wheeze, cough, or visible cyanosis.  No visible retractions or increased work of breathing.    NEURO: Awake, alert, responds appropriately to questions.  Mentation and speech fluent.  PSYCH:affect normal  and appearance well-groomed.      DATA REVIEW    Endocrine Consult note    Attending Assessment/Plan :         I have independently reviewed and interpreted labs, imaging as indicated.      Chief complaint:  Tasha is a 41 year old female seen in consultation at the request of   I have reviewed Care Everywhere including [facility], [facility], [facility], [facility], [facility] , [facility], [place] lab reports, imaging reports and provider notes as indicated.      HISTORY OF PRESENT ILLNESS      Endocrine relevant labs are as follows:    Relevant imaging is as follows: (as read by me as it pertains to endocrine relevant organs)    REVIEW OF SYSTEMS    10 system ROS otherwise as per the HPI or negative    Past Medical History  Past Medical History:   Diagnosis Date     ALCOHOL ABUSE-IN REMISS 07/30/2007     Cataplexy and narcolepsy 2002    tried Provigil, Straterra, Ritalin (works)     ROSA 3 - cervical intraepithelial neoplasia grade 3 01/04/2010    ROSA 2/3  on LEEP biopsy     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 02/19/2001     Hypersomnia with sleep apnea      Hypertension      Irregular menstrual cycle 05/12/1997     Metrorrhagia 02/08/2001     Other acne      Other and unspecified adverse effect of drug, medicinal and biological substance 02/19/2001    Allergic and adverse reaction to Dilantin     Pancreatic disease      Substance abuse (H) 10/02/2009    see 9/23/2009 confidential report       Medications  Current Outpatient Medications   Medication Sig Dispense Refill     doxycycline hyclate (VIBRAMYCIN) 100 MG capsule Take 100 mg by mouth 2 times daily       fluticasone (FLONASE) 50 MCG/ACT nasal spray Spray 1 spray into both nostrils daily 16 g 11     lithium (ESKALITH CR/LITHOBID) 450 MG CR tablet Take 450 mg by mouth At Bedtime       metoprolol succinate ER (TOPROL-XL) 50 MG 24 hr tablet Take 1 tablet (50 mg) by mouth daily 90 tablet 3     Multiple  Vitamins-Minerals (SUPER THERA SUE M) TABS Take 1 tablet by mouth daily       naltrexone (DEPADE/REVIA) 50 MG tablet Take 50 mg by mouth       nizatidine (AXID) 300 MG capsule Take 1 capsule (300 mg) by mouth At Bedtime 90 capsule 1     omeprazole (PRILOSEC) 40 MG DR capsule Take 1 capsule (40 mg) by mouth 2 times daily (before meals) for 90 days 180 capsule 0     prochlorperazine (COMPAZINE) 10 MG tablet Take 1 tablet (10 mg) by mouth every 6 hours as needed for nausea or vomiting 15 tablet 0     sucralfate (CARAFATE) 1 GM tablet Take 1 tablet (1 g) by mouth 4 times daily as needed for nausea 60 tablet 0     methylphenidate (RITALIN) 20 MG tablet Take 1 tablet (20 mg) by mouth 2 times daily (Patient not taking: No sig reported) 60 tablet 0     nicotine (NICODERM CQ) 21 MG/24HR 24 hr patch Place 1 patch onto the skin daily (Patient not taking: No sig reported) 30 patch 3     nicotine (NICORETTE) 2 MG gum Place 1 each (2 mg) inside cheek every hour as needed for smoking cessation (Patient not taking: No sig reported) 100 each 4     ondansetron (ZOFRAN-ODT) 4 MG ODT tab Place 4 mg under the tongue (Patient not taking: No sig reported)       XOPENEX HFA 45 MCG/ACT inhaler INHALE 2 PUFFS BY MOUTH EVERY 4 HOURS AS NEEDED (Patient not taking: No sig reported)         Allergies  Allergies   Allergen Reactions     Bupropion Other (See Comments)     seizures     Lisinopril Swelling     Angioedema      Penicillins      hives     Phenytoin      rash,puritis (Dilantin)     Seasonal Allergies          Family History  family history includes Arthritis in her paternal grandmother; Depression in her mother; Hypertension in her paternal grandfather.    Social History  Social History     Tobacco Use     Smoking status: Light Tobacco Smoker     Packs/day: 0.25     Types: Cigarettes     Smokeless tobacco: Never Used     Tobacco comment: trying to quit   Vaping Use     Vaping Use: Every day     Substances: Nicotine   Substance Use  Topics     Alcohol use: No     Drug use: No       Physical Exam  LMP 08/11/2022   There is no height or weight on file to calculate BMI.  GENERAL :  In no apparent distress  SKIN: Normal color, normal temperature, texture.  No hirsutism, alopecia or purple striae.     EYES: EOMI, No scleral icterus  NECK: No visible masses.  RESP: normal effort, no resp distress  ABDOMEN: ND       NEURO: awake, alert, responds appropriately to questions.

## 2022-09-01 NOTE — TELEPHONE ENCOUNTER
Prior authorization for Omeprazole 40 mg has been approved. Pharmacy notified and will contact patient when prescription is ready for .     Luisa Buckley LPN

## 2022-09-13 NOTE — TELEPHONE ENCOUNTER
RECORDS RECEIVED FROM: Internal   Appt Date: 09.29.2022   NOTES STATUS DETAILS   OFFICE NOTE from referring provider Internal 08.26.2022 Noman Lombardo PA-C   OFFICE NOTES from other specialists     DISCHARGE SUMMARY from hospital Internal 01.24.2022  Health   MEDICATION LIST Internal    LIVER BIOSPY (IF APPLICABLE)      PATHOLOGY REPORTS      IMAGING     ENDOSCOPY (IF AVAILABLE) Internal 08.25.2022   COLONOSCOPY (IF AVAILABLE) Internal 09.25.2020   ULTRASOUND LIVER Internal 08.11.2022, 07.19.2022 US ABDOMEN LIMITED    CT OF ABDOMEN     MRI OF LIVER     FIBROSCAN, US ELASTOGRAPHY, FIBROSIS SCAN, MR ELASTOGRAPHY     LABS     HEPATIC PANEL (LIVER PANEL) Care Everywhere 03.01.2022   BASIC METABOLIC PANEL Internal 03.24.2022   COMPLETE METABOLIC PANEL Internal 08.11.2022   COMPLETE BLOOD COUNT (CBC) Internal 08.11.2022   INTERNATIONAL NORMALIZED RATIO (INR)     HEPATITIS C ANTIBODY Internal 08.12.2021   HEPATITIS C VIRAL LOAD/PCR     HEPATITIS C GENOTYPE     HEPATITIS B SURFACE ANTIGEN     HEPATITIS B SURFACE ANTIBODY     HEPATITIS B DNA QUANT LEVEL     HEPATITIS B CORE ANTIBODY

## 2022-09-14 NOTE — PROGRESS NOTES
[facility] Hepatology    New Patient Visit    Referring provider:  Noman Lombardo  Chief complaint:  Alcohol related liver disease    HPI:  41-year-old female with past medical history of alcohol-related liver disease with portal hypertension, alcohol use disorder, history of opioid use disorder, and tobacco use who presents to [facility] care.    She mentions that she is struggling with her mental health and is interested in seeing a mental health specialist.  Because of her mental health she has returned to alcohol use with her last use about a week ago.  She is engaging in outpatient programming once a week but she is not happy with the current programming.  She is still adherent to naltrexone.    She reports that she is got lower extremity numbness and throbbing that has been bothersome for the last 2 months.  She takes Tylenol up to 3 g/day.  She has not yet seen her primary care doctor for tried medications such as gabapentin.    She has noticed that her appetite has been minimal and she has been noting some bloating.  She denies any lower extremity swelling.  She has been having daily acid reflux.  She is adherent to her proton pump inhibitor daily before meals.  However most days she does not eat breakfast.      She denies any signs of decompensation such as jaundice, confusion, abdominal swelling or hematemesis.     Alcohol use  - Follows with [facility] for addiction medicine. Last seen 5/2022. Patient with alcohol use disorder and history of opioid use disorder (following foot surgery, [id number])  - Last alcohol use 9/23/2022. Started alcohol use at age 14.   - Current treatment: outpatient programming 1x/week - not happy with this, doesn't feel as though it is working  - Current medications: Naltrexone  - Prior treatment: 6/2021 [facility]; 9/2021 [facility] Inpatient alcohol use disorder  - History of hand  use   - Other substances: History of meth/amphetamine use in her  20s, history of cocaine use in 20s, history of marijuana use in 20s.     Medical hx Surgical hx   Past Medical History:   Diagnosis Date     ALCOHOL ABUSE-IN REMISS 2007     Cataplexy and narcolepsy 2002    tried Provigil, Straterra, Ritalin (works)     ROSA 3 - cervical intraepithelial neoplasia grade 3 2010    ROSA 2/3  on LEEP biopsy     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     Hypersomnia with sleep apnea      Hypertension      Irregular menstrual cycle 1997     Metrorrhagia 2001     Other acne      Other and unspecified adverse effect of drug, medicinal and biological substance 2001    Allergic and adverse reaction to Dilantin     Pancreatic disease      Substance abuse (H) 10/02/2009    see 2009 confidential report      Past Surgical History:   Procedure Laterality Date      SECTION       COLONOSCOPY N/A 10/4/2020    Procedure: COLONOSCOPY, WITH POLYPECTOMY AND BIOPSY;  Surgeon: Mando Siegel MD;  Location:  GI     COLPOSCOPY CERVIX, LOOP ELECTRODE BIOPSY, COMBINED  2010    ROSA 2/3     COMBINED ESOPHAGOSCOPY, GASTROSCOPY, DUODENOSCOPY (EGD) WITH CO2 INSUFFLATION N/A 2022    Procedure: ESOPHAGOGASTRODUODENOSCOPY, WITH CO2 INSUFFLATION;  Surgeon: Kang Hutchinson MD;  Location:  OR     ESOPHAGOSCOPY, GASTROSCOPY, DUODENOSCOPY (EGD), COMBINED N/A 2020    Procedure: Esophagogastroduodenoscopy with biopsies;  Surgeon: Ronan Pelayo MD;  Location:  GI     ESOPHAGOSCOPY, GASTROSCOPY, DUODENOSCOPY (EGD), COMBINED N/A 10/3/2020    Procedure: ESOPHAGOGASTRODUODENOSCOPY (EGD);  Surgeon: Mando Siegel MD;  Location: [facility]     ESOPHAGOSCOPY, GASTROSCOPY, DUODENOSCOPY (EGD), COMBINED N/A 2022    Procedure: ESOPHAGOGASTRODUODENOSCOPY, WITH BIOPSY;  Surgeon: Kang Hutchinson MD;  Location: [facility] OR     HC REMOVAL OF TONSILS,12+ Y/O  1999    Tonsils 12+y.o.     INJECT EPIDURAL  LUMBAR N/A 5/12/2022    Procedure: Bilateral Lumbar 4-5 Transforaminal Epidural Steroid Injections with fluoroscopic guidance and contrast;  Surgeon: Cholo Steele MD;  Location: [facility] OR     REPAIR TENDON PERONEAL  11/15/2013    Procedure: REPAIR TENDON PERONEAL;  right peroneal tendon  transfer;  Surgeon: Jesus Manuel Oden DPM;  Location: [facility] OR   - C- section       Medications  Current Outpatient Medications   Medication Sig Dispense Refill     doxycycline hyclate (VIBRAMYCIN) 100 MG capsule Take 100 mg by mouth 2 times daily       fluticasone (FLONASE) 50 MCG/ACT nasal spray Spray 1 spray into both nostrils daily 16 g 11     lithium (ESKALITH CR/LITHOBID) 450 MG CR tablet Take 450 mg by mouth At Bedtime       methylphenidate (RITALIN) 20 MG tablet Take 1 tablet (20 mg) by mouth 2 times daily (Patient not taking: No sig reported) 60 tablet 0     metoprolol succinate ER (TOPROL-XL) 50 MG 24 hr tablet Take 1 tablet (50 mg) by mouth daily 90 tablet 3     Multiple Vitamins-Minerals (SUPER THERA SUE M) TABS Take 1 tablet by mouth daily       naltrexone (DEPADE/REVIA) 50 MG tablet Take 50 mg by mouth       nicotine (NICODERM CQ) 21 MG/24HR 24 hr patch Place 1 patch onto the skin daily (Patient not taking: No sig reported) 30 patch 3     nicotine (NICORETTE) 2 MG gum Place 1 each (2 mg) inside cheek every hour as needed for smoking cessation (Patient not taking: No sig reported) 100 each 4     nizatidine (AXID) 300 MG capsule Take 1 capsule (300 mg) by mouth At Bedtime 90 capsule 1     omeprazole (PRILOSEC) 40 MG DR capsule Take 1 capsule (40 mg) by mouth 2 times daily (before meals) for 90 days 180 capsule 0     ondansetron (ZOFRAN-ODT) 4 MG ODT tab Place 4 mg under the tongue (Patient not taking: No sig reported)       prochlorperazine (COMPAZINE) 10 MG tablet Take 1 tablet (10 mg) by mouth every 6 hours as needed for nausea or vomiting 15 tablet 0     sucralfate (CARAFATE) 1 GM tablet Take 1 tablet (1 g) by mouth 4  times daily as needed for nausea 60 tablet 0     XOPENEX HFA 45 MCG/ACT inhaler INHALE 2 PUFFS BY MOUTH EVERY 4 HOURS AS NEEDED (Patient not taking: No sig reported)         Allergies  Allergies   Allergen Reactions     Bupropion Other (See Comments)     seizures     Lisinopril Swelling     Angioedema      Penicillins      hives     Phenytoin      rash,puritis (Dilantin)     Seasonal Allergies        Family hx Social hx   Family History   Problem Relation Age of Onset     Depression Mother      Arthritis Paternal Grandmother      Hypertension Paternal Grandfather         birth FF     Asthma No family hx of      C.A.D. No family hx of      Diabetes No family hx of      Cancer - colorectal No family hx of      Prostate Cancer No family hx of      Coronary Artery Disease No family hx of      Ovarian Cancer No family hx of      Mental Illness No family hx of      Cerebrovascular Disease No family hx of      Anesthesia Reaction No family hx of      Other Cancer No family hx of      Osteoporosis No family hx of      Known Genetic Syndrome No family hx of      Obesity No family hx of      Unknown/Adopted No family hx of    No family history of liver disease  No family history of colon cancer   Social History     Tobacco Use     Smoking status: Light Tobacco Smoker     Packs/day: 0.25     Types: Cigarettes     Smokeless tobacco: Never Used     Tobacco comment: trying to quit   Vaping Use     Vaping Use: Every day     Substances: Nicotine   Substance Use Topics     Alcohol use: No     Drug use: No     - Employment: Intermittent landscaping work  - Lives with father  - Alcohol: Ongoing active use  - Tobacco: Ongoing active use     Review of systems  A 10-point review of systems was negative.    Examination  Telephone visit - no vitals or physical exam    Laboratory  BMP  Recent Labs   Lab Test 08/11/22  1055 07/27/22  1302 07/19/22  1117 04/21/22  0757    136 131* 136   POTASSIUM 3.9 3.5 3.4 4.0   CHLORIDE 97 98 93* 103    JANET 9.2 8.4* 8.9 8.8   CO2 28 29 31 27   BUN 15 8 8 7   CR 0.57 0.82 0.72 0.78   * 152* 185* 99     CBC  Recent Labs   Lab Test 08/11/22  1055 07/19/22  1117 04/21/22  0757 04/08/22  2335   WBC 3.7* 3.4* 7.1 5.2   RBC 3.70* 4.41 4.37 3.91   HGB 13.0 14.8 13.9 12.5   HCT 36.4 41.6 41.0 36.4   MCV 98 94 94 93   MCH 35.1* 33.6* 31.8 32.0   MCHC 35.7 35.6 33.9 34.3   RDW 14.4 15.4* 13.8 14.1   PLT 91* 114* 186 187     Liver Enzymes   Recent Labs   Lab Test 08/11/22  1055   PROTTOTAL 7.9   ALBUMIN 4.1   BILITOTAL 1.7*   ALKPHOS 104   *   *      INR   INR   Date Value Ref Range Status   06/03/2016 0.97 0.86 - 1.14 Final      Ethanol: 0.34 (9/2022)  Ethanol: 0.286 (8/2021)  Ethanol: 0.341 (6/2021)    Hep A non-immune  Hep B s Ab 2 (non-immune)  Hep B s Ag non-reactive 2017  HIV non-reactive 6/2021  Hepatitis C Ab: non-reactive 8/2021    Radiology  Limited Abd US 8/2022  GALLBLADDER: The gallbladder is normal. No gallstones, wall thickening, or pericholecystic fluid. Negative sonographic Stewart's sign.  BILE DUCTS: There is no biliary dilatation. The common duct measures 2mm.  LIVER: Hepatic steatosis. No liver lesions.  RIGHT KIDNEY: No hydronephrosis.  PANCREAS: The visualized portions of the pancreas are normal.  No ascites.  * No splenic assessment                                                                    IMPRESSION:  1.  Hepatic steatosis. Otherwise normal examination.    Endoscopy  EGD 8/2022: No esophageal or gastric varices. Esophageal mucosal changes suggestive of eosinophilic esophagitis. Biopsied. Severe portal hypertensive gastropathy. Erosions of duodenal bulb. Otherwise, normal duodenal bulb                                                                                    Assessment  41-year-old female with past medical history of alcohol-related liver disease with portal hypertension, alcohol use disorder, history of opioid use disorder, and tobacco use who presents to  establish care.    In terms of her alcohol-related liver disease she has evidence of portal hypertension with upper endoscopy that demonstrates portal hypertensive gastropathy.  Abdominal ultrasound in August did not demonstrate any hepatic lesions.  No splenic assessment was done at that time.  Her recent labs demonstrate thrombocytopenia which is suggestive of portal hypertension versus toxic alcohol effect. MELD-Na: 6. Last alcohol use: 9/2022.     Given her ongoing alcohol use she would most benefit from alcohol programming that she likes and engages in, will place a social work referral. Also, she believes that her uncontrolled mental health is contributing to her ongoing alcohol use that she would like to be referred to mental health.  At this time she has no evidence of decompensation but if she has ongoing active alcohol use - she has a significant risk of decompensation.     Plan  -- Referral to mental health, Magan Barger  -- Referral to social work for addiction services; she would benefit from abstinence from alcohol use  -- Counseled on low sodium diet (2g Na) and high protein diet   -- Recommend Hep A and B vaccination per PCP given non-immune  -- Repeat EGD due 1-2 years given ongoing injury/alcohol use  -- Repeat abdominal US + AFP due 2/2023    RTC: 3 months    Discussed with attending hepatologist, Dr. Leventhal Elizabeth Aby, MD  Transplant Hepatology Fellow  [phone]    Tasha is a 41 year old who is being evaluated via a billable video visit.    .  How would you like to obtain your AVS? MyChart  If the video visit is dropped, the invitation should be resent by: Text to cell phone: [phone]  Will anyone else be joining your video visit? No        Video-Visit Details    Video Start Time: 9:34 AM    Type of service:  Video Visit    Video End Time: 9:50 AM    Originating Location (pt. Location): Home    Distant Location (provider location):  [facility]  [place]     Platform used for Video Visit: Unable to complete video visit  * Video visit was attempted with [employer] and [employer], but patient wasn't able to do either. Thus, we opted for a telephone call  Alfred Naidu on 9/29/2022 at 9:06 AM

## 2022-09-15 NOTE — TELEPHONE ENCOUNTER
----- Message from Rosalie Cortez MD sent at 9/14/2022  2:07 PM CDT -----  Can we get the following labs prior to her appointment (on 9/29/22) please:  CBC, CMP, INR, Hep A IgG, Hep B s Ag, Hep B s Ab and Hep B c Ab    Thanks,  Yolanda

## 2022-09-15 NOTE — TELEPHONE ENCOUNTER
Lab orders entered for upcoming appointment. Pt update via [employer] message labs and lab appointment needed prior to seeing Dr. Leventhal per Dr. Cortez.    Marce GOMES LPN  Hepatology Clinic

## 2022-09-21 NOTE — ED PROVIDER NOTES
History     Chief Complaint   Patient presents with     Neck Pain     Muscle and joint pain - knees swollen. Recent diagnosis of lyme disease     HPI  Tasha Short is a 41 year old female who presents with multiple complaints including generalized joint swelling and pain.  Patient is a very poor historian as her story changed multiple times while I was talking to her.  At one point she says the swelling was going on for few weeks then she says it was more for a few months and then it was back to just a few weeks again.  She was wondering if it could be a fracture because she was only having swelling in her right leg but then she says she is having swelling in both legs and then she was seen and she is also having swelling in her wrist also.  Patient states that she fell but it was just a few days ago and then she says now she fell 2 weeks ago before this started and then she says that she did not fall.  She states that she has been diagnosed with Lyme's disease but all of her labs have been normal thus far.  Patient also states that she has had surgeries on her leg a before but that was many years ago.,  The story continues to change.  Patient does admit to no recent fevers or chills.  Denies any rashes.  Denies any change in medications.  Patient is being followed for an adrenal tumor by her regular doctor.  Denies any dysuria or hematuria.  Denies any nausea any vomiting.    Allergies:  Allergies   Allergen Reactions     Bupropion Other (See Comments)     seizures     Lisinopril Swelling     Angioedema      Penicillins      hives     Phenytoin      rash,puritis (Dilantin)     Seasonal Allergies        Problem List:    Patient Active Problem List    Diagnosis Date Noted     Health Care Home 06/08/2011     Priority: High     x  DX V65.8 REPLACED WITH 56527 [facility]ALTH CARE HOME (04/08/2013)       Unequal pupil diameter - L>R 12/21/2021     Priority: Medium     Traumatic brain injury, without loss of  consciousness, subsequent encounter 12/21/2021     Priority: Medium     Allergic contact dermatitis, unspecified trigger 08/12/2021     Priority: Medium     Electrolyte disturbance 07/15/2021     Priority: Medium     Lithium overdose, intentional self-harm, initial encounter (H) 06/25/2021     Priority: Medium     Alcohol abuse 05/15/2021     Priority: Medium     Adrenal mass, right (H) 02/01/2021     Priority: Medium     H/O: GI bleed 12/12/2020     Priority: Medium     PUD (peptic ulcer disease) 11/02/2020     Priority: Medium     Anemia due to blood loss, acute 09/23/2020     Priority: Medium     Diarrhea, unspecified type 09/23/2020     Priority: Medium     GI bleed 09/16/2020     Priority: Medium     Acute GI bleeding 09/16/2020     Priority: Medium     Weight loss 09/15/2020     Priority: Medium     Anemia, unspecified type 09/15/2020     Priority: Medium     Hemoglobin decreased 09/15/2020     Priority: Medium     Closed displaced fracture of metatarsal bone of right foot with delayed healing, unspecified metatarsal, subsequent encounter 09/14/2020     Priority: Medium     Opiate addiction (H) 09/10/2020     Priority: Medium     Hypertriglyceridemia 05/08/2018     Priority: Medium     Chronic seasonal allergic rhinitis due to pollen 05/08/2018     Priority: Medium     Gastroesophageal reflux disease without esophagitis 06/19/2017     Priority: Medium     H/O ETOH abuse 05/15/2017     Priority: Medium     Right hip pain 10/20/2016     Priority: Medium     Narcolepsy 10/20/2016     Priority: Medium     Alcohol-induced mood disorder (H) 08/04/2016     Priority: Medium     Irregular heartbeat 04/18/2016     Priority: Medium     Tobacco use disorder 04/18/2016     Priority: Medium     Acute coronary syndrome (H) 04/14/2016     Priority: Medium     HTN, goal below 140/90 11/24/2014     Priority: Medium     Major depressive disorder, recurrent episode, moderate (H) 10/22/2014     Priority: Medium     Generalized  anxiety disorder 10/22/2014     Priority: Medium     Hypersomnia 2013     Priority: Medium     Calcific tendonitis peroneals 2012     Priority: Medium     Narcolepsy and cataplexy      Priority: Medium     S/P LEEP 2010     Priority: Medium     09 ASCUS + HPV 18, 53, 58 (high risk)  1/13/10 colposcopy- bx (dysplasia) ECC (negative) recommend repeat pap at 6 and 12 months  6/28/10 pap ASCUS + HPV 16 (high risk) recommend colpo  7/21/10 colposcopy- BX ( ROSA 2/3 with gland involvement)  11/4/10 LEEP- (ROSA 2/3) not extending to margins.  ECC (negative). Plan: pap in 6 mo.   11- NIL pap. Plan: pap in 6 mo  10/8/13 NIL pap, neg HR HPV. Plan: pap in 3 years.  5/22/15 NIL pap, neg HR HPV. Plan: pap in 3 years.  19 NIL pap, + HR HPV (not 16 or 18). Plan: cotest in 1 yr.  21 NIL Pap, Neg HR HPV. Plan: Cotest in 1 yr, due to hx of ROSA 2/3.  22 Reminder mychart  22 Appt. Notes added - pap not done.   3/25/22 Reminder call - lm  22 Lost to follow-up for pap tracking                Past Medical History:    Past Medical History:   Diagnosis Date     ALCOHOL ABUSE-IN REMISS 2007     Cataplexy and narcolepsy 2002     ROSA 3 - cervical intraepithelial neoplasia grade 3 2010     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     Hypersomnia with sleep apnea      Hypertension      Irregular menstrual cycle 1997     Metrorrhagia 2001     Other acne      Other and unspecified adverse effect of drug, medicinal and biological substance 2001     Pancreatic disease      Substance abuse (H) 10/02/2009       Past Surgical History:    Past Surgical History:   Procedure Laterality Date      SECTION       COLONOSCOPY N/A 10/4/2020    Procedure: COLONOSCOPY, WITH POLYPECTOMY AND BIOPSY;  Surgeon: Mando Siegel MD;  Location:  GI     COLPOSCOPY CERVIX, LOOP ELECTRODE BIOPSY, COMBINED  2010    ROSA 2/3     COMBINED  ESOPHAGOSCOPY, GASTROSCOPY, DUODENOSCOPY (EGD) WITH CO2 INSUFFLATION N/A 8/25/2022    Procedure: ESOPHAGOGASTRODUODENOSCOPY, WITH CO2 INSUFFLATION;  Surgeon: Kang Hutchinson MD;  Location: [facility]     ESOPHAGOSCOPY, GASTROSCOPY, DUODENOSCOPY (EGD), COMBINED N/A 9/25/2020    Procedure: Esophagogastroduodenoscopy with biopsies;  Surgeon: Ronan Pelayo MD;  Location: [facility]     ESOPHAGOSCOPY, GASTROSCOPY, DUODENOSCOPY (EGD), COMBINED N/A 10/3/2020    Procedure: ESOPHAGOGASTRODUODENOSCOPY (EGD);  Surgeon: Mando Siegel MD;  Location: [facility]     ESOPHAGOSCOPY, GASTROSCOPY, DUODENOSCOPY (EGD), COMBINED N/A 8/25/2022    Procedure: ESOPHAGOGASTRODUODENOSCOPY, WITH BIOPSY;  Surgeon: Kang Hutchinson MD;  Location: [facility] OR     HC REMOVAL OF TONSILS,12+ Y/O  1999    Tonsils 12+y.o.     INJECT EPIDURAL LUMBAR N/A 5/12/2022    Procedure: Bilateral Lumbar 4-5 Transforaminal Epidural Steroid Injections with fluoroscopic guidance and contrast;  Surgeon: Cholo Steele MD;  Location: [facility]     REPAIR TENDON PERONEAL  11/15/2013    Procedure: REPAIR TENDON PERONEAL;  right peroneal tendon  transfer;  Surgeon: Jesus Manuel Oden DPM;  Location: [facility]       Family History:    Family History   Problem Relation Age of Onset     Depression Mother      Arthritis Paternal Grandmother      Hypertension Paternal Grandfather         birth FF     Asthma No family hx of      C.A.D. No family hx of      Diabetes No family hx of      Cancer - colorectal No family hx of      Prostate Cancer No family hx of      Coronary Artery Disease No family hx of      Ovarian Cancer No family hx of      Mental Illness No family hx of      Cerebrovascular Disease No family hx of      Anesthesia Reaction No family hx of      Other Cancer No family hx of      Osteoporosis No family hx of      Known Genetic Syndrome No family hx of      Obesity No family hx of      Unknown/Adopted No family hx of        Social History:  Marital  "Status:  Single [1]  Social History     Tobacco Use     Smoking status: Light Tobacco Smoker     Packs/day: 0.25     Types: Cigarettes     Smokeless tobacco: Never Used     Tobacco comment: trying to quit   Vaping Use     Vaping Use: Every day     Substances: Nicotine   Substance Use Topics     Alcohol use: No     Drug use: No        Medications:    doxycycline hyclate (VIBRAMYCIN) 100 MG capsule  fluticasone (FLONASE) 50 MCG/ACT nasal spray  lithium (ESKALITH CR/LITHOBID) 450 MG CR tablet  methylphenidate (RITALIN) 20 MG tablet  metoprolol succinate ER (TOPROL-XL) 50 MG 24 hr tablet  Multiple Vitamins-Minerals (SUPER THERA SUE M) TABS  naltrexone (DEPADE/REVIA) 50 MG tablet  nicotine (NICODERM CQ) 21 MG/24HR 24 hr patch  nicotine (NICORETTE) 2 MG gum  nizatidine (AXID) 300 MG capsule  omeprazole (PRILOSEC) 40 MG DR capsule  ondansetron (ZOFRAN-ODT) 4 MG ODT tab  prochlorperazine (COMPAZINE) 10 MG tablet  sucralfate (CARAFATE) 1 GM tablet  XOPENEX HFA 45 MCG/ACT inhaler          Review of Systems   All other systems reviewed and are negative.      Physical Exam   BP: (!) 141/106  Pulse: 94  Temp: 98.1  F (36.7  C)  Resp: 16  Height: 162.6 cm (5' 4\")  Weight: 51.5 kg (113 lb 8 oz)  SpO2: 98 %      Physical Exam  Vitals and nursing note reviewed.   Constitutional:       General: She is not in acute distress.     Appearance: She is well-developed. She is not diaphoretic.   HENT:      Head: Normocephalic and atraumatic.      Nose: Nose normal.      Mouth/Throat:      Pharynx: No oropharyngeal exudate.   Eyes:      Conjunctiva/sclera: Conjunctivae normal.   Cardiovascular:      Rate and Rhythm: Normal rate and regular rhythm.      Heart sounds: Normal heart sounds. No murmur heard.    No friction rub.   Pulmonary:      Effort: Pulmonary effort is normal. No respiratory distress.      Breath sounds: Normal breath sounds. No stridor. No wheezing or rales.   Abdominal:      General: Bowel sounds are normal. There is no " distension.      Palpations: Abdomen is soft. There is no mass.      Tenderness: There is no abdominal tenderness. There is no guarding.   Musculoskeletal:         General: No tenderness. Normal range of motion.      Cervical back: Normal range of motion and neck supple.      Right lower leg: No edema.      Left lower leg: No edema.      Comments: I did not appreciate any obvious edema on exam.   Skin:     General: Skin is warm and dry.      Capillary Refill: Capillary refill takes less than 2 seconds.      Findings: No erythema.   Neurological:      Mental Status: She is alert and oriented to person, place, and time.   Psychiatric:         Judgment: Judgment normal.         ED Course                 Procedures           Results for orders placed or performed during the hospital encounter of 09/21/22   Ankle XR, G/E 3 views, right     Status: None    Narrative    ANKLE RIGHT THREE OR MORE;  FOOT RIGHT THREE OR MORE VIEWS   9/21/2022 11:39 AM     HISTORY: Ankle and foot pain and swelling.    COMPARISON: Right foot and ankle x-rays dated 4/9/2022, right ankle  MRI dated 4/26/2022.    FINDINGS:    Right ankle x-rays: No acute fracture, malalignment, or significant  joint space loss is identified. Ankle mortise and talar dome are  symmetric and well-maintained. There is mild soft tissue swelling  lateral to the lateral malleolus. Small, stable, well-corticated  ossifications just distal to the medial and lateral malleoli likely  represent chronic avulsion fractures or ossified intra-articular free  bodies.    Right foot x-rays: There is no acute fracture or malalignment. Joint  spaces are grossly well-maintained. Small soft tissue ossification  just lateral to the distal interphalangeal joint of the little toe is  stable. Small os perineum again noted. There is a bipartite medial  sesamoid.      Impression    IMPRESSION:  1. Evidence of chronic injuries to the distal medial and lateral  malleoli again noted.  2. No  acute fracture or malalignment.  3. Bipartite medial sesamoid is again noted.  4. Mild soft tissue swelling along the lateral malleolus is again  noted and could represent lateral soft tissue injury (inversion  injury).  5. No other significant abnormalities. Etiology for patient's symptoms  is otherwise not definitely seen.     TRISTAN MCKEE MD         SYSTEM ID:  [id number]   Foot  XR, G/E 3 views, right     Status: None    Narrative    ANKLE RIGHT THREE OR MORE;  FOOT RIGHT THREE OR MORE VIEWS   9/21/2022 11:39 AM     HISTORY: Ankle and foot pain and swelling.    COMPARISON: Right foot and ankle x-rays dated 4/9/2022, right ankle  MRI dated 4/26/2022.    FINDINGS:    Right ankle x-rays: No acute fracture, malalignment, or significant  joint space loss is identified. Ankle mortise and talar dome are  symmetric and well-maintained. There is mild soft tissue swelling  lateral to the lateral malleolus. Small, stable, well-corticated  ossifications just distal to the medial and lateral malleoli likely  represent chronic avulsion fractures or ossified intra-articular free  bodies.    Right foot x-rays: There is no acute fracture or malalignment. Joint  spaces are grossly well-maintained. Small soft tissue ossification  just lateral to the distal interphalangeal joint of the little toe is  stable. Small os perineum again noted. There is a bipartite medial  sesamoid.      Impression    IMPRESSION:  1. Evidence of chronic injuries to the distal medial and lateral  malleoli again noted.  2. No acute fracture or malalignment.  3. Bipartite medial sesamoid is again noted.  4. Mild soft tissue swelling along the lateral malleolus is again  noted and could represent lateral soft tissue injury (inversion  injury).  5. No other significant abnormalities. Etiology for patient's symptoms  is otherwise not definitely seen.     TRISTAN MCKEE MD         SYSTEM ID:  [id number]   Comprehensive metabolic panel     Status: Abnormal   Result  Value Ref Range    Sodium 139 133 - 144 mmol/L    Potassium 3.2 (L) 3.4 - 5.3 mmol/L    Chloride 102 94 - 109 mmol/L    Carbon Dioxide (CO2) 28 20 - 32 mmol/L    Anion Gap 9 3 - 14 mmol/L    Urea Nitrogen 2 (L) 7 - 30 mg/dL    Creatinine 0.52 0.52 - 1.04 mg/dL    Calcium 8.4 (L) 8.5 - 10.1 mg/dL    Glucose 97 70 - 99 mg/dL    Alkaline Phosphatase 110 40 - 150 U/L     (H) 0 - 45 U/L    ALT 53 (H) 0 - 50 U/L    Protein Total 7.6 6.8 - 8.8 g/dL    Albumin 4.0 3.4 - 5.0 g/dL    Bilirubin Total 0.6 0.2 - 1.3 mg/dL    GFR Estimate >90 >60 mL/min/1.73m2   CRP inflammation     Status: Normal   Result Value Ref Range    CRP Inflammation 4.1 0.0 - 8.0 mg/L   Erythrocyte sedimentation rate auto     Status: Normal   Result Value Ref Range    Erythrocyte Sedimentation Rate 18 0 - 20 mm/hr   CBC with platelets and differential     Status: Abnormal   Result Value Ref Range    WBC Count 2.9 (L) 4.0 - 11.0 10e3/uL    RBC Count 3.56 (L) 3.80 - 5.20 10e6/uL    Hemoglobin 12.6 11.7 - 15.7 g/dL    Hematocrit 35.4 35.0 - 47.0 %    MCV 99 78 - 100 fL    MCH 35.4 (H) 26.5 - 33.0 pg    MCHC 35.6 31.5 - 36.5 g/dL    RDW 13.5 10.0 - 15.0 %    Platelet Count 86 (L) 150 - 450 10e3/uL    % Neutrophils 63 %    % Lymphocytes 23 %    % Monocytes 10 %    % Eosinophils 1 %    % Basophils 1 %    % Immature Granulocytes 2 %    NRBCs per 100 WBC 0 <1 /100    Absolute Neutrophils 1.8 1.6 - 8.3 10e3/uL    Absolute Lymphocytes 0.7 (L) 0.8 - 5.3 10e3/uL    Absolute Monocytes 0.3 0.0 - 1.3 10e3/uL    Absolute Eosinophils 0.0 0.0 - 0.7 10e3/uL    Absolute Basophils 0.0 0.0 - 0.2 10e3/uL    Absolute Immature Granulocytes 0.1 <=0.4 10e3/uL    Absolute NRBCs 0.0 10e3/uL   Symptomatic; Auto-generated order Influenza A/B & SARS-CoV2 (COVID-19) Virus PCR Multiplex Nose     Status: Normal    Specimen: Nose; Swab   Result Value Ref Range    Influenza A PCR Negative Negative    Influenza B PCR Negative Negative    SARS CoV2 PCR Negative Negative    Narrative     Testing was performed using the alphonso SARS-CoV-2 & Influenza A/B Assay on the [employer] Kryst[employer] System. This test should be ordered for the detection of SARS-CoV-2 and influenza viruses in individuals who meet clinical and/or epidemiological criteria. Test performance is unknown in asymptomatic patients. This test is for in vitro diagnostic use under the FDA EUA for laboratories certified under CLIA to perform moderate and/or high complexity testing. This test has not been FDA cleared or approved. A negative result does not rule out the presence of PCR inhibitors in the specimen or target RNA in concentration below the limit of detection for the assay. If only one viral target is positive but coinfection with multiple targets is suspected, the sample should be re-tested with another FDA cleared, approved or authorized test, if coinfection would change clinical management. [facility] are certified under the Clinical Laboratory Improvement Amendments of 1988 (CLIA-88) as  qualified to perform moderate and/or high complexity laboratory testing.   Alcohol level blood     Status: Abnormal   Result Value Ref Range    Alcohol ethyl 0.34 (HH) <=0.01 g/dL   CBC with platelets differential     Status: Abnormal    Narrative    The following orders were created for panel order CBC with platelets differential.  Procedure                               Abnormality         Status                     ---------                               -----------         ------                     CBC with platelets and d...[[id number]]  Abnormal            Final result                 Please view results for these tests on the individual orders.         Medications   ketorolac (TORADOL) injection 30 mg (30 mg Intravenous Given 9/21/22 4780)        Patient was worked up and shows old fractures of her foot and ankle.  There is some swelling over this area so this certainly could be more of a strain of this area.  A lot of her other  symptoms I think are from her continued alcohol use.  I did confront her about this and she states that she is working to get into a dual diagnosis unit to help with mental health and alcoholism.  I stressed to her that with her recent GI bleed and continued worsening liver function test, a lot of her generalized swelling is likely from her chronic alcohol use if she does not stop things will continue to get worse.  Recommend close follow-up with her doctor.  Patient will be discharged at this time.    Assessments & Plan (with Medical Decision Making)  Chronic pain of the right foot, chronic alcoholism     I have reviewed the nursing notes.    I have reviewed the findings, diagnosis, plan and need for follow up with the patient.      Discharge Medication List as of 9/21/2022 12:43 PM          Final diagnoses:   Chronic pain in right foot   Chronic alcoholism (H)       9/21/2022   [facility] EMERGENCY DEPT     Christ Pacheco MD  09/22/22 [id number]

## 2022-09-21 NOTE — DISCHARGE INSTRUCTIONS
1.  You can take up to 3 g of Tylenol in the day.  I will be 1 extra strength Tylenol every 6 hours

## 2022-09-21 NOTE — ED TRIAGE NOTES
"Muscle and joint pain - knees swollen. Recent diagnosis of lyme disease  \"pain 10/10 last two weeks\" - calm, cooperative.  Per pt her primary care told her she is unable to take tylenol and ibuprofen \"due to my labs.\"     Triage Assessment     Row Name 09/21/22 1024       Triage Assessment (Adult)    Airway WDL WDL       Respiratory WDL    Respiratory WDL WDL       Skin Circulation/Temperature WDL    Skin Circulation/Temperature WDL WDL       Cognitive/Neuro/Behavioral WDL    Cognitive/Neuro/Behavioral WDL WDL              "

## 2022-09-23 NOTE — TELEPHONE ENCOUNTER
Left message letting patient know lab appointment prior to seeing Dr. Leventhal will be cancelled since Dr. Leventhal doesn't require labs prior to seeing new patients.    Marce GOMES LPN  [facility]ology [facility]

## 2022-09-23 NOTE — TELEPHONE ENCOUNTER
----- Message from Lizbeth Sy LPN sent at 9/23/2022  9:12 AM CDT -----  Regarding: FW: lab appt 9.29.22    ----- Message -----  From: Patrice Lunsford CLT  Sent: 9/23/2022   9:07 AM CDT  To: [facility] Hepatology [facility]  Subject: lab appt 9.29.22                                 In order to offer our patients the best possible experience, the lab schedule has been reviewed and found that this patient does not have any lab orders.   Please place lab orders ASAP.   Thank you, your [facility] Lab Team [phone]

## 2022-09-29 NOTE — LETTER
9/29/2022         RE: Tasha Short  [address] [place] [address]        Dear Colleague,    Thank you for referring your patient, Tasha Short, to the [facility] [place]. Please see a copy of my visit note below.    [facility] Hepatology    New Patient Visit    Referring provider:  Noman Lombadro  Chief complaint:  Alcohol related liver disease    HPI:  41-year-old female with past medical history of alcohol-related liver disease with portal hypertension, alcohol use disorder, history of opioid use disorder, and tobacco use who presents to [facility] care.    She mentions that she is struggling with her mental health and is interested in seeing a mental health specialist.  Because of her mental health she has returned to alcohol use with her last use about a week ago.  She is engaging in outpatient programming once a week but she is not happy with the current programming.  She is still adherent to naltrexone.    She reports that she is got lower extremity numbness and throbbing that has been bothersome for the last 2 months.  She takes Tylenol up to 3 g/day.  She has not yet seen her primary care doctor for tried medications such as gabapentin.    She has noticed that her appetite has been minimal and she has been noting some bloating.  She denies any lower extremity swelling.  She has been having daily acid reflux.  She is adherent to her proton pump inhibitor daily before meals.  However most days she does not eat breakfast.      She denies any signs of decompensation such as jaundice, confusion, abdominal swelling or hematemesis.     Alcohol use  - Follows with [facility] for addiction medicine. Last seen 5/2022. Patient with alcohol use disorder and history of opioid use disorder (following foot surgery, [id number])  - Last alcohol use 9/23/2022. Started alcohol use at age 14.   - Current treatment: outpatient programming 1x/week - not happy  with this, doesn't feel as though it is working  - Current medications: Naltrexone  - Prior treatment: 2021 [facility]; 2021 [facility] Inpatient alcohol use disorder  - History of hand  use   - Other substances: History of meth/amphetamine use in her 20s, history of cocaine use in 20s, history of marijuana use in 20s.     Medical hx Surgical hx   Past Medical History:   Diagnosis Date     ALCOHOL ABUSE-IN REMISS 2007     Cataplexy and narcolepsy 2002    tried Provigil, Straterra, Ritalin (works)     ROSA 3 - cervical intraepithelial neoplasia grade 3 2010    ROSA 2/3  on LEEP biopsy     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     Hypersomnia with sleep apnea      Hypertension      Irregular menstrual cycle 1997     Metrorrhagia 2001     Other acne      Other and unspecified adverse effect of drug, medicinal and biological substance 2001    Allergic and adverse reaction to Dilantin     Pancreatic disease      Substance abuse (H) 10/02/2009    see 2009 confidential report      Past Surgical History:   Procedure Laterality Date      SECTION       COLONOSCOPY N/A 10/4/2020    Procedure: COLONOSCOPY, WITH POLYPECTOMY AND BIOPSY;  Surgeon: Mando Siegel MD;  Location:  GI     COLPOSCOPY CERVIX, LOOP ELECTRODE BIOPSY, COMBINED  2010    ROSA 2/3     COMBINED ESOPHAGOSCOPY, GASTROSCOPY, DUODENOSCOPY (EGD) WITH CO2 INSUFFLATION N/A 2022    Procedure: ESOPHAGOGASTRODUODENOSCOPY, WITH CO2 INSUFFLATION;  Surgeon: Kang Hutchinson MD;  Location:  OR     ESOPHAGOSCOPY, GASTROSCOPY, DUODENOSCOPY (EGD), COMBINED N/A 2020    Procedure: Esophagogastroduodenoscopy with biopsies;  Surgeon: Ronan Pelayo MD;  Location:  GI     ESOPHAGOSCOPY, GASTROSCOPY, DUODENOSCOPY (EGD), COMBINED N/A 10/3/2020    Procedure: ESOPHAGOGASTRODUODENOSCOPY (EGD);  Surgeon: Mando Siegel MD;   Location:  GI     ESOPHAGOSCOPY, GASTROSCOPY, DUODENOSCOPY (EGD), COMBINED N/A 8/25/2022    Procedure: ESOPHAGOGASTRODUODENOSCOPY, WITH BIOPSY;  Surgeon: Kang Hutchinson MD;  Location: [facility] OR     HC REMOVAL OF TONSILS,12+ Y/O  1999    Tonsils 12+y.o.     INJECT EPIDURAL LUMBAR N/A 5/12/2022    Procedure: Bilateral Lumbar 4-5 Transforaminal Epidural Steroid Injections with fluoroscopic guidance and contrast;  Surgeon: Cholo Steele MD;  Location: [facility] OR     REPAIR TENDON PERONEAL  11/15/2013    Procedure: REPAIR TENDON PERONEAL;  right peroneal tendon  transfer;  Surgeon: Jesus Manuel Oden DPM;  Location: [facility] OR   - C- section       Medications  Current Outpatient Medications   Medication Sig Dispense Refill     doxycycline hyclate (VIBRAMYCIN) 100 MG capsule Take 100 mg by mouth 2 times daily       fluticasone (FLONASE) 50 MCG/ACT nasal spray Spray 1 spray into both nostrils daily 16 g 11     lithium (ESKALITH CR/LITHOBID) 450 MG CR tablet Take 450 mg by mouth At Bedtime       methylphenidate (RITALIN) 20 MG tablet Take 1 tablet (20 mg) by mouth 2 times daily (Patient not taking: No sig reported) 60 tablet 0     metoprolol succinate ER (TOPROL-XL) 50 MG 24 hr tablet Take 1 tablet (50 mg) by mouth daily 90 tablet 3     Multiple Vitamins-Minerals (SUPER THERA SUE M) TABS Take 1 tablet by mouth daily       naltrexone (DEPADE/REVIA) 50 MG tablet Take 50 mg by mouth       nicotine (NICODERM CQ) 21 MG/24HR 24 hr patch Place 1 patch onto the skin daily (Patient not taking: No sig reported) 30 patch 3     nicotine (NICORETTE) 2 MG gum Place 1 each (2 mg) inside cheek every hour as needed for smoking cessation (Patient not taking: No sig reported) 100 each 4     nizatidine (AXID) 300 MG capsule Take 1 capsule (300 mg) by mouth At Bedtime 90 capsule 1     omeprazole (PRILOSEC) 40 MG DR capsule Take 1 capsule (40 mg) by mouth 2 times daily (before meals) for 90 days 180 capsule 0     ondansetron (ZOFRAN-ODT) 4  MG ODT tab Place 4 mg under the tongue (Patient not taking: No sig reported)       prochlorperazine (COMPAZINE) 10 MG tablet Take 1 tablet (10 mg) by mouth every 6 hours as needed for nausea or vomiting 15 tablet 0     sucralfate (CARAFATE) 1 GM tablet Take 1 tablet (1 g) by mouth 4 times daily as needed for nausea 60 tablet 0     XOPENEX HFA 45 MCG/ACT inhaler INHALE 2 PUFFS BY MOUTH EVERY 4 HOURS AS NEEDED (Patient not taking: No sig reported)         Allergies  Allergies   Allergen Reactions     Bupropion Other (See Comments)     seizures     Lisinopril Swelling     Angioedema      Penicillins      hives     Phenytoin      rash,puritis (Dilantin)     Seasonal Allergies        Family hx Social hx   Family History   Problem Relation Age of Onset     Depression Mother      Arthritis Paternal Grandmother      Hypertension Paternal Grandfather         birth FF     Asthma No family hx of      C.A.D. No family hx of      Diabetes No family hx of      Cancer - colorectal No family hx of      Prostate Cancer No family hx of      Coronary Artery Disease No family hx of      Ovarian Cancer No family hx of      Mental Illness No family hx of      Cerebrovascular Disease No family hx of      Anesthesia Reaction No family hx of      Other Cancer No family hx of      Osteoporosis No family hx of      Known Genetic Syndrome No family hx of      Obesity No family hx of      Unknown/Adopted No family hx of    No family history of liver disease  No family history of colon cancer   Social History     Tobacco Use     Smoking status: Light Tobacco Smoker     Packs/day: 0.25     Types: Cigarettes     Smokeless tobacco: Never Used     Tobacco comment: trying to quit   Vaping Use     Vaping Use: Every day     Substances: Nicotine   Substance Use Topics     Alcohol use: No     Drug use: No     - Employment: Intermittent landscaping work  - Lives with father  - Alcohol: Ongoing active use  - Tobacco: Ongoing active use     Review of  systems  A 10-point review of systems was negative.    Examination  Telephone visit - no vitals or physical exam    Laboratory  BMP  Recent Labs   Lab Test 08/11/22  1055 07/27/22  1302 07/19/22  1117 04/21/22  0757    136 131* 136   POTASSIUM 3.9 3.5 3.4 4.0   CHLORIDE 97 98 93* 103   JANET 9.2 8.4* 8.9 8.8   CO2 28 29 31 27   BUN 15 8 8 7   CR 0.57 0.82 0.72 0.78   * 152* 185* 99     CBC  Recent Labs   Lab Test 08/11/22  1055 07/19/22  1117 04/21/22  0757 04/08/22  2335   WBC 3.7* 3.4* 7.1 5.2   RBC 3.70* 4.41 4.37 3.91   HGB 13.0 14.8 13.9 12.5   HCT 36.4 41.6 41.0 36.4   MCV 98 94 94 93   MCH 35.1* 33.6* 31.8 32.0   MCHC 35.7 35.6 33.9 34.3   RDW 14.4 15.4* 13.8 14.1   PLT 91* 114* 186 187     Liver Enzymes   Recent Labs   Lab Test 08/11/22  1055   PROTTOTAL 7.9   ALBUMIN 4.1   BILITOTAL 1.7*   ALKPHOS 104   *   *      INR   INR   Date Value Ref Range Status   06/03/2016 0.97 0.86 - 1.14 Final      Ethanol: 0.34 (9/2022)  Ethanol: 0.286 (8/2021)  Ethanol: 0.341 (6/2021)    Hep A non-immune  Hep B s Ab 2 (non-immune)  Hep B s Ag non-reactive 2017  HIV non-reactive 6/2021  Hepatitis C Ab: non-reactive 8/2021    Radiology  Limited Abd US 8/2022  GALLBLADDER: The gallbladder is normal. No gallstones, wall thickening, or pericholecystic fluid. Negative sonographic Stewart's sign.  BILE DUCTS: There is no biliary dilatation. The common duct measures 2mm.  LIVER: Hepatic steatosis. No liver lesions.  RIGHT KIDNEY: No hydronephrosis.  PANCREAS: The visualized portions of the pancreas are normal.  No ascites.  * No splenic assessment                                                                    IMPRESSION:  1.  Hepatic steatosis. Otherwise normal examination.    Endoscopy  EGD 8/2022: No esophageal or gastric varices. Esophageal mucosal changes suggestive of eosinophilic esophagitis. Biopsied. Severe portal hypertensive gastropathy. Erosions of duodenal bulb. Otherwise, normal duodenal  bulb                                                                                    Assessment  41-year-old female with past medical history of alcohol-related liver disease with portal hypertension, alcohol use disorder, history of opioid use disorder, and tobacco use who presents to establish care.    In terms of her alcohol-related liver disease she has evidence of portal hypertension with upper endoscopy that demonstrates portal hypertensive gastropathy.  Abdominal ultrasound in August did not demonstrate any hepatic lesions.  No splenic assessment was done at that time.  Her recent labs demonstrate thrombocytopenia which is suggestive of portal hypertension versus toxic alcohol effect. MELD-Na: 6. Last alcohol use: 9/2022.     Given her ongoing alcohol use she would most benefit from alcohol programming that she likes and engages in, will place a social work referral. Also, she believes that her uncontrolled mental health is contributing to her ongoing alcohol use that she would like to be referred to mental health.  At this time she has no evidence of decompensation but if she has ongoing active alcohol use - she has a significant risk of decompensation.     Plan  -- Referral to mental health, Magan Barger  -- Referral to social work for addiction services; she would benefit from abstinence from alcohol use  -- Counseled on low sodium diet (2g Na) and high protein diet   -- Recommend Hep A and B vaccination per PCP given non-immune  -- Repeat EGD due 1-2 years given ongoing injury/alcohol use  -- Repeat abdominal US + AFP due 2/2023    RTC: 3 months    Discussed with attending hepatologist, Dr. Leventhal Elizabeth Aby, MD  Transplant Hepatology Fellow  [phone]    Tasha is a 41 year old who is being evaluated via a billable video visit.    .  How would you like to obtain your AVS? MyChart  If the video visit is dropped, the invitation should be resent by: Text to cell phone: [phone]  Will  anyone else be joining your video visit? No        Video-Visit Details    Video Start Time: 9:34 AM    Type of service:  Video Visit    Video End Time: 9:50 AM    Originating Location (pt. Location): Home    Distant Location (provider location):  [facility] [place]     Platform used for Video Visit: Unable to complete video visit  * Video visit was attempted with Concepción and Arianne, but patient wasn't able to do either. Thus, we opted for a telephone call  Alfred Naidu on 9/29/2022 at 9:06 AM      Attestation signed by Leventhal, Thomas Michael, MD at 10/3/2022 10:23 AM:  Staff Physician Attestation  I, Thomas M. Leventhal, M.D., discussed and examined this patient with the fellow and agree with the fellow s findings and plan of care as documented in the fellow s note.  I personally reviewed vital signs, medications, labs, and imaging.    Thomas M. Leventhal, M.D.   of Medicine  Advanced & Transplant Hepatology  [facility]  Date of Service: 9/29/2022

## 2022-09-29 NOTE — PROGRESS NOTES
"Tasha is a 41 year old who is being evaluated via a billable video visit.    .  How would you like to obtain your AVS? MyChart  If the video visit is dropped, the invitation should be resent by: Text to cell phone: [phone]  Will anyone else be joining your video visit? No  {If patient encounters technical issues they should call [phone] :[id number]}      Video-Visit Details    Video Start Time: {video visit start/end time for provider to select:154090}    Type of service:  Video Visit    Video End Time:{video visit start/end time for provider to select:467096}    Originating Location (pt. Location): {video visit patient location:650984::\"Home\"}    Distant Location (provider location):  [facility] [place]     Platform used for Video Visit: {Virtual Visit Platforms:362210::\"[employer]\"}  Alfred Naidu on 9/29/2022 at 9:06 AM    "

## 2022-10-03 NOTE — PROGRESS NOTES
Social Work Telephone Message Note  [facility]    Patient Name:  Tasha Short  /Age:  1980 (42 year old)    Referral Source: Hepatology Clinic  Reason for Referral:  Community programming resources    Contacted Patient on 2022 and left vm, and 10/03/2022 via My Chart. Will await Patient's return phone call and will provide assistance at that time.    EL Gupta, [facility]    [facility] [place]  Ph: [phone], Pgr: [phone]  10/3/2022

## 2022-10-28 PROBLEM — K76.6 PORTAL HYPERTENSIVE GASTROPATHY (H): Status: ACTIVE | Noted: 2022-01-01

## 2022-10-28 PROBLEM — K31.89 PORTAL HYPERTENSIVE GASTROPATHY (H): Status: ACTIVE | Noted: 2022-01-01

## 2022-10-28 NOTE — PROGRESS NOTES
Assessment & Plan     Portal hypertensive gastropathy (H)  Patient's had virtual visit with GI on 09/29. She was referred to psychiatric and addiction services to help her achieve complete abstinence from alcohol. The metabolic panel was repeated today, this returned grossly normal.   - Comprehensive metabolic panel (BMP + Alb, Alk Phos, ALT, AST, Total. Bili, TP); Future  - CBC with platelets; Future  - CBC with platelets  - Comprehensive metabolic panel (BMP + Alb, Alk Phos, ALT, AST, Total. Bili, TP)    Chronic pain of both knees  Patient informs me her pains seemed to start after a lyme disease infection this past summer. She reports that the pain is concentrated in the feet/knees/shoulders and back. She describes it as a constant burning/throbbing. Patient has cirrhotic liver, but has been use NSAIDs for her pain. She was instructed to avoid use of these medications. The pain has been making it difficult to sleep and has been interfering with her work. She is employed in [employer]. Has had to reduce her schedule to 3 days/week due to pain. No findings on exam concerning for soft tissue injury. I recommended that she begin to work with PT to help with the knee pain. Patient can use topical voltaren gel vs oral NSAIDs.   - Physical Therapy Referral; Future    Chronic pain in right foot  Peroneal tendonitis, right  Patient had been working with podiatry in 04/2022 for management of chronic avulsion fractures to the lateral malleolus. She was advised on use of CAM boot and MRI. Review of last visit on 04/28/22 shows that she was advised on use of sturdy, supportive shoes as well as orthotics. It appears she was a no show for follow up. She has increased pain along the peroneal tendon today. It sounds like she has not followed podiatrist's instructions. The patient was instructed on PT for the pain as well as to return to podiatry to discuss orthotics. Patient will also begin duloxetine to help with pains and  mood.  - Primary Care Referral  - DULoxetine (CYMBALTA) 20 MG capsule; Take 1 capsule (20 mg) by mouth daily  - Physical Therapy Referral; Future  - Orthopedic  Referral; Future    Generalized anxiety disorder  Major depressive disorder, recurrent episode, moderate (H)  Patient has been managing her depression and anxiety with lithium. Last lithium level was checked in 03/2022. She was receptive to adding low dose duloxetine to this to help improve mood as well as for pain benefits. We discussed MOA of the medication as well as possible adverse effects. The patient was educated on serotonin toxicity. Follow up in 1-2 months.   - DULoxetine (CYMBALTA) 20 MG capsule; Take 1 capsule (20 mg) by mouth daily    Hematemesis with nausea  Has been relying on carafate for management of nausea. We discussed importance of following GI recommendations as well as abstaining from alcohol and NSAIDs.   - sucralfate (CARAFATE) 1 GM tablet; Take 1 tablet (1 g) by mouth 4 times daily as needed for nausea    Hypersomnia  Break in medication due to endocrinology testing. This has since been completed and patient would like to resume medication.   - methylphenidate (RITALIN) 20 MG tablet; Take 1 tablet (20 mg) by mouth 2 times daily    Chronic alcoholism (H)  See above. Importance of abstinence stress to patient.   - Primary Care Referral  - Comprehensive metabolic panel (BMP + Alb, Alk Phos, ALT, AST, Total. Bili, TP); Future  - CBC with platelets; Future  - CBC with platelets  - Comprehensive metabolic panel (BMP + Alb, Alk Phos, ALT, AST, Total. Bili, TP)    Return in about 6 weeks (around 12/9/2022) for Medication Recheck.    Noman Lombardo PA-C  [facility] BENJAMÍN Cordova is a 42 year old, presenting for the following health issues:  Hospital F/U      HPI     ED/UC Followup:    Facility:  [facility] Emergency Department  Date of visit: 09/21/2022  Reason for visit: Neck  "pain,  Foot pain  Current Status: worsening    Review of Systems   Constitutional, HEENT, cardiovascular, pulmonary, gi and gu systems are negative, except as otherwise noted.      Objective    BP (!) 138/94 (Cuff Size: Adult Regular)   Pulse 82   Temp 99.4  F (37.4  C) (Temporal)   Resp 18   Ht 1.626 m (5' 4\")   Wt 50.3 kg (111 lb)   SpO2 99%   BMI 19.05 kg/m    Body mass index is 19.05 kg/m .  Physical Exam   GENERAL: healthy, alert and no distress  RESP: lungs clear to auscultation - no rales, rhonchi or wheezes  CV: regular rate and rhythm, normal S1 S2, no S3 or S4, no murmur, click or rub, no peripheral edema and peripheral pulses strong  MS: Normal AROM of hips/knees/ankles, slightly reduced strength to resisted flex/ext hips or knees, neg varus/valgus stress bilaterally. Tenderness to palpation along the right lateral foot, lateral malleolus and lateral right lower leg  PSYCH: mentation appears normal, affect normal/bright      "

## 2022-11-04 NOTE — H&P
ENDOSCOPY PRE-SEDATION H&P FOR OUTPATIENT PROCEDURES    Tasha Short  [id number]  1980    Procedure: EGD    Pre-procedure diagnosis: dysphagia and esophagitis desiccans superficialis    Past medical history:   Past Medical History:   Diagnosis Date     ALCOHOL ABUSE-IN REMISS 2007     Cataplexy and narcolepsy 2002    tried Provigil, Straterra, Ritalin (works)     ROSA 3 - cervical intraepithelial neoplasia grade 3 2010    ROSA 2/3  on LEEP biopsy     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     Hypersomnia with sleep apnea      Hypertension      Irregular menstrual cycle 1997     Metrorrhagia 2001     Other acne      Other and unspecified adverse effect of drug, medicinal and biological substance 2001    Allergic and adverse reaction to Dilantin     Pancreatic disease      Substance abuse (H) 10/02/2009    see 2009 confidential report       Past surgical history:   Past Surgical History:   Procedure Laterality Date      SECTION       COLONOSCOPY N/A 10/4/2020    Procedure: COLONOSCOPY, WITH POLYPECTOMY AND BIOPSY;  Surgeon: Mando Siegel MD;  Location:  GI     COLPOSCOPY CERVIX, LOOP ELECTRODE BIOPSY, COMBINED  2010    ROSA 2/3     COMBINED ESOPHAGOSCOPY, GASTROSCOPY, DUODENOSCOPY (EGD) WITH CO2 INSUFFLATION N/A 2022    Procedure: ESOPHAGOGASTRODUODENOSCOPY, WITH CO2 INSUFFLATION;  Surgeon: Kang Hutchinson MD;  Location:  OR     ESOPHAGOSCOPY, GASTROSCOPY, DUODENOSCOPY (EGD), COMBINED N/A 2020    Procedure: Esophagogastroduodenoscopy with biopsies;  Surgeon: Ronan Pelayo MD;  Location:  GI     ESOPHAGOSCOPY, GASTROSCOPY, DUODENOSCOPY (EGD), COMBINED N/A 10/3/2020    Procedure: ESOPHAGOGASTRODUODENOSCOPY (EGD);  Surgeon: Mando Siegel MD;  Location: [facility]     ESOPHAGOSCOPY, GASTROSCOPY, DUODENOSCOPY (EGD), COMBINED N/A 2022    Procedure: ESOPHAGOGASTRODUODENOSCOPY,  WITH BIOPSY;  Surgeon: Kang Hutchinson MD;  Location: [facility] OR     HC REMOVAL OF TONSILS,12+ Y/O  1999    Tonsils 12+y.o.     INJECT EPIDURAL LUMBAR N/A 5/12/2022    Procedure: Bilateral Lumbar 4-5 Transforaminal Epidural Steroid Injections with fluoroscopic guidance and contrast;  Surgeon: Cholo Steele MD;  Location: [facility] OR     REPAIR TENDON PERONEAL  11/15/2013    Procedure: REPAIR TENDON PERONEAL;  right peroneal tendon  transfer;  Surgeon: Jesus Manuel Oden DPM;  Location: [facility] OR       Current Outpatient Medications   Medication     DULoxetine (CYMBALTA) 20 MG capsule     lithium (ESKALITH CR/LITHOBID) 450 MG CR tablet     magnesium 250 MG tablet     methylphenidate (RITALIN) 20 MG tablet     metoprolol succinate ER (TOPROL-XL) 50 MG 24 hr tablet     Multiple Vitamins-Minerals (SUPER THERA SUE M) TABS     naltrexone (DEPADE/REVIA) 50 MG tablet     omeprazole (PRILOSEC) 40 MG DR capsule     ondansetron (ZOFRAN-ODT) 4 MG ODT tab     sucralfate (CARAFATE) 1 GM tablet     fluticasone (FLONASE) 50 MCG/ACT nasal spray     nicotine (NICODERM CQ) 21 MG/24HR 24 hr patch     nicotine (NICORETTE) 2 MG gum     Current Facility-Administered Medications   Medication     lactated ringers infusion     lidocaine (LMX4) kit     lidocaine (LMX4) kit     lidocaine (LMX4) kit     lidocaine 1 % 0.1-1 mL     lidocaine 1 % 0.1-1 mL     lidocaine 1 % 0.1-1 mL     ondansetron (ZOFRAN) injection 4 mg     sodium chloride (PF) 0.9% PF flush 3 mL     sodium chloride (PF) 0.9% PF flush 3 mL     sodium chloride (PF) 0.9% PF flush 3 mL     sodium chloride (PF) 0.9% PF flush 3 mL     sodium chloride (PF) 0.9% PF flush 3 mL     sodium chloride (PF) 0.9% PF flush 3 mL       Allergies   Allergen Reactions     Bupropion Other (See Comments)     seizures     Lisinopril Swelling     Angioedema      Penicillins      hives     Phenytoin      rash,puritis (Dilantin)     Seasonal Allergies        History of Anesthesia/Sedation Problems:  no    Physical Exam:    Mental status: alert  Heart: Normal  Lung: Normal  Assessment of patient's airway: Normal  Other as pertinent for procedure: None     ASA Score: See Provation note    Mallampati score:  I - Faucial pillars, soft palate, and uvula are visible    Assessment/Plan:     The patient is an appropriate candidate to receive sedation.    Informed consent was discussed with the patient/family, including the risks, benefits, potential complications and any alternative options associated with sedation.    Patient assessment completed just prior to sedation and while under constant observation by the provider. Condition determined to be adequate for proceeding with sedation.    The specific risks for the procedure were discussed with the patient at the time of informed consent and include but are not limited to perforation which could require surgery, missing significant neoplasm or lesion, hemorrhage and adverse sedative complication.      Kang Hutchinson MD

## 2022-11-04 NOTE — ANESTHESIA POSTPROCEDURE EVALUATION
Patient: Tasha Short    Procedure: Procedure(s):  ESOPHAGOGASTRODUODENOSCOPY, WITH CO2 INSUFFLATION       Anesthesia Type:  MAC    Note:     Postop Pain Control: Uneventful            Sign Out: Well controlled pain   PONV: No   Neuro/Psych: Uneventful            Sign Out: Acceptable/Baseline neuro status   Airway/Respiratory: Uneventful            Sign Out: Acceptable/Baseline resp. status   CV/Hemodynamics: Uneventful            Sign Out: Acceptable CV status; No obvious hypovolemia; No obvious fluid overload   Other NRE: NONE   DID A NON-ROUTINE EVENT OCCUR? No           Last vitals:  Vitals Value Taken Time   /90 11/04/22 1215   Temp 97.2  F (36.2  C) 11/04/22 1146   Pulse     Resp 16 11/04/22 1215   SpO2 100 % 11/04/22 1215       Electronically Signed By: Cholo Crowe MD  November 4, 2022  12:50 PM

## 2022-11-04 NOTE — ANESTHESIA CARE TRANSFER NOTE
Patient: Tasha Short    Procedure: Procedure(s):  ESOPHAGOGASTRODUODENOSCOPY, WITH CO2 INSUFFLATION       Diagnosis: Eosinophilic esophagitis [K20.0]  Diagnosis Additional Information: No value filed.    Anesthesia Type:   MAC     Note:    Oropharynx: oropharynx clear of all foreign objects and spontaneously breathing  Level of Consciousness: awake  Oxygen Supplementation: room air    Independent Airway: airway patency satisfactory and stable  Dentition: dentition unchanged  Vital Signs Stable: post-procedure vital signs reviewed and stable  Report to RN Given: handoff report given  Patient transferred to: Phase II    Handoff Report: Identifed the Patient, Identified the Reponsible Provider, Reviewed the pertinent medical history, Discussed the surgical course, Reviewed Intra-OP anesthesia mangement and issues during anesthesia, Set expectations for post-procedure period and Allowed opportunity for questions and acknowledgement of understanding      Vitals:  Vitals Value Taken Time   BP     Temp     Pulse     Resp     SpO2         Electronically Signed By: FLORES Lowe CRNA  November 4, 2022  11:45 AM

## 2022-11-04 NOTE — ANESTHESIA PREPROCEDURE EVALUATION
Anesthesia Pre-Procedure Evaluation    Patient: Tasha Short   MRN: [id number] : 1980        Procedure : Procedure(s):  EGD          Past Medical History:   Diagnosis Date     ALCOHOL ABUSE-IN REMISS 2007     Cataplexy and narcolepsy 2002    tried Provigil, Straterra, Ritalin (works)     ROSA 3 - cervical intraepithelial neoplasia grade 3 2010    ROSA 2/3  on LEEP biopsy     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     Hypersomnia with sleep apnea      Hypertension      Irregular menstrual cycle 1997     Metrorrhagia 2001     Other acne      Other and unspecified adverse effect of drug, medicinal and biological substance 2001    Allergic and adverse reaction to Dilantin     Pancreatic disease      Substance abuse (H) 10/02/2009    see 2009 confidential report      Past Surgical History:   Procedure Laterality Date      SECTION       COLONOSCOPY N/A 10/4/2020    Procedure: COLONOSCOPY, WITH POLYPECTOMY AND BIOPSY;  Surgeon: Mando Siegel MD;  Location:  GI     COLPOSCOPY CERVIX, LOOP ELECTRODE BIOPSY, COMBINED  2010    ROSA 2/3     COMBINED ESOPHAGOSCOPY, GASTROSCOPY, DUODENOSCOPY (EGD) WITH CO2 INSUFFLATION N/A 2022    Procedure: ESOPHAGOGASTRODUODENOSCOPY, WITH CO2 INSUFFLATION;  Surgeon: Kang Hutchinson MD;  Location:  OR     ESOPHAGOSCOPY, GASTROSCOPY, DUODENOSCOPY (EGD), COMBINED N/A 2020    Procedure: Esophagogastroduodenoscopy with biopsies;  Surgeon: Ronan Pelayo MD;  Location:  GI     ESOPHAGOSCOPY, GASTROSCOPY, DUODENOSCOPY (EGD), COMBINED N/A 10/3/2020    Procedure: ESOPHAGOGASTRODUODENOSCOPY (EGD);  Surgeon: Mando Siegel MD;  Location: [facility]     ESOPHAGOSCOPY, GASTROSCOPY, DUODENOSCOPY (EGD), COMBINED N/A 2022    Procedure: ESOPHAGOGASTRODUODENOSCOPY, WITH BIOPSY;  Surgeon: Kang Hutchinson MD;  Location: [facility] OR     HC REMOVAL OF TONSILS,12+  Y/O  1999    Tonsils 12+y.o.     INJECT EPIDURAL LUMBAR N/A 5/12/2022    Procedure: Bilateral Lumbar 4-5 Transforaminal Epidural Steroid Injections with fluoroscopic guidance and contrast;  Surgeon: Cholo Steele MD;  Location: [facility] OR     REPAIR TENDON PERONEAL  11/15/2013    Procedure: REPAIR TENDON PERONEAL;  right peroneal tendon  transfer;  Surgeon: Jesus Manuel Oden DPM;  Location: [facility] OR      Allergies   Allergen Reactions     Bupropion Other (See Comments)     seizures     Lisinopril Swelling     Angioedema      Penicillins      hives     Phenytoin      rash,puritis (Dilantin)     Seasonal Allergies       Social History     Tobacco Use     Smoking status: Light Smoker     Packs/day: 0.25     Types: Cigarettes     Smokeless tobacco: Never     Tobacco comments:     trying to quit   Substance Use Topics     Alcohol use: No      Wt Readings from Last 1 Encounters:   10/28/22 50.3 kg (111 lb)        Anesthesia Evaluation   Pt has had prior anesthetic. Type: MAC.        ROS/MED HX  ENT/Pulmonary:  - neg pulmonary ROS     Neurologic: Comment: Lumbar radiculopathy       Cardiovascular:     (+) Dyslipidemia hypertension-range: goal <140/90/ ----    METS/Exercise Tolerance:     Hematologic:     (+) anemia,     Musculoskeletal:  - neg musculoskeletal ROS     GI/Hepatic:     (+) GERD,     Renal/Genitourinary:  - neg Renal ROS     Endo:  - neg endo ROS     Psychiatric/Substance Use: Comment: Hx chronic opioid abuse and ETOH; sober now several months    (+) psychiatric history depression alcohol abuse H/O chronic opiod use .     Infectious Disease:  - neg infectious disease ROS     Malignancy:  - neg malignancy ROS     Other:  - neg other ROS          Physical Exam    Airway  airway exam normal      Mallampati: II   TM distance: > 3 FB   Neck ROM: full   Mouth opening: > 3 cm    Respiratory Devices and Support         Dental  no notable dental history   Comment: Temp crown upper right incisor        Cardiovascular    cardiovascular exam normal          Pulmonary   pulmonary exam normal                OUTSIDE LABS:  CBC:   Lab Results   Component Value Date    WBC 4.9 10/28/2022    WBC 2.9 (L) 09/21/2022    HGB 11.3 (L) 10/28/2022    HGB 12.6 09/21/2022    HCT 34.0 (L) 10/28/2022    HCT 35.4 09/21/2022     10/28/2022    PLT 86 (L) 09/21/2022     BMP:   Lab Results   Component Value Date     10/28/2022     09/21/2022    POTASSIUM 3.1 (L) 10/28/2022    POTASSIUM 3.2 (L) 09/21/2022    CHLORIDE 103 10/28/2022    CHLORIDE 102 09/21/2022    CO2 29 10/28/2022    CO2 28 09/21/2022    BUN 4 (L) 10/28/2022    BUN 2 (L) 09/21/2022    CR 0.53 10/28/2022    CR 0.52 09/21/2022    GLC 99 10/28/2022    GLC 97 09/21/2022     COAGS:   Lab Results   Component Value Date    PTT 25 06/03/2016    INR 0.97 09/16/2022     POC:   Lab Results   Component Value Date    BGM 92 10/06/2020    HCG Negative 11/18/2019    HCGS Negative 10/04/2020     HEPATIC:   Lab Results   Component Value Date    ALBUMIN 3.6 10/28/2022    PROTTOTAL 6.9 10/28/2022    ALT 50 10/28/2022     (H) 10/28/2022    GGT 1,467 (H) 08/11/2022    ALKPHOS 133 10/28/2022    BILITOTAL 0.5 10/28/2022     OTHER:   Lab Results   Component Value Date    PH 6.5 06/09/2010    LACT 1.3 07/19/2022    JANET 8.6 10/28/2022    PHOS 3.8 09/25/2020    MAG 1.1 (L) 07/27/2022    LIPASE 266 08/11/2022    AMYLASE 65 11/02/2020    TSH 1.48 12/21/2021    T4 0.79 07/09/2009    CRP 4.1 09/21/2022    SED 18 09/21/2022       Anesthesia Plan    ASA Status:  2   NPO Status:  NPO Appropriate    Anesthesia Type: MAC.     - Reason for MAC: straight local not clinically adequate   Induction: Propofol.   Maintenance: TIVA.        Consents    Anesthesia Plan(s) and associated risks, benefits, and realistic alternatives discussed. Questions answered and patient/representative(s) expressed understanding.     - Discussed: Risks, Benefits and Alternatives for BOTH SEDATION and the PROCEDURE were  discussed     - Discussed with:  Patient      - Extended Intubation/Ventilatory Support Discussed: No.      - Patient is DNR/DNI Status: No    Use of blood products discussed: No .     Postoperative Care       PONV prophylaxis: Background Propofol Infusion     Comments:           H&P reviewed: Unable to attach H&P to encounter due to EHR limitations. H&P Update: appropriate H&P reviewed, patient examined. No interval changes since H&P (within 30 days).             Cholo Crowe MD

## 2022-11-18 PROBLEM — G89.29 CHRONIC PAIN OF BOTH KNEES: Status: ACTIVE | Noted: 2022-01-01

## 2022-11-18 PROBLEM — M79.671 RIGHT FOOT PAIN: Status: ACTIVE | Noted: 2022-01-01

## 2022-11-18 PROBLEM — M25.561 CHRONIC PAIN OF BOTH KNEES: Status: ACTIVE | Noted: 2022-01-01

## 2022-11-18 PROBLEM — R53.81 PHYSICAL DECONDITIONING: Status: ACTIVE | Noted: 2022-01-01

## 2022-11-18 PROBLEM — M25.562 CHRONIC PAIN OF BOTH KNEES: Status: ACTIVE | Noted: 2022-01-01

## 2022-11-18 NOTE — PROGRESS NOTES
Physical Therapy Initial Evaluation  Subjective:  The history is provided by the patient. No  was used.   Patient Health History  Tasha Short being seen for weakness and pain Bless/knees and R foot.     Problem began: 2022.   Problem occurred: diagnosed with lymes disease, fracture of R foot   Pain is reported as 6/10 on pain scale.  General health as reported by patient is fair.  Pertinent medical history includes: depression, high blood pressure, history of fractures and mental illness.   Red flags:  Pain at rest/night and unexplained weight loss.   Other medical allergies details: lisinopril, wellbutrin, PCN.    Other surgery history details: R tendon transfer of foot, .    Current medications:  Anti-depressants, high blood pressure medication and pain medication. Other medications details: acid reflux.    Current occupation is lawn, landscapping.   Primary job tasks include:  Lifting/carrying, prolonged standing, pushing/pulling and repetitive tasks.                  Therapist Generated HPI Evaluation  Problem details: She has had a really bad year.  She has been dealing with chronic lymes for the past 3 years.  She fractured her lateral metatarsals in 2022.  The pain finally healed after 5 months.  She has lost 20lbs in the past year.  Her electrolytes are still off as well.  Her knees started to increase in pain about 3 months ago.  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  Her knees have felt a little better since her landscaping job came to an end.  Pain increases with being on feet > 10-15 min.  She does stairs in [place] time pattern.  Some relief with Tylenol and rest..         Type of problem:  Bilateral knees (R foot).    This is a chronic condition.  Condition occurred with:  Insidious onset.  Where condition occurred: for unknown reasons (after lymes).  Patient reports pain:  In the joint.  Pain is described as aching (throbbing) and is  constant.  Pain radiates to:  Lower leg (could be related to lymes). Pain is worse in the A.M..  Since onset symptoms are unchanged.  Associated symptoms:  Buckling/giving out, loss of motion/stiffness and edema. Symptoms are exacerbated by standing, running, kneeling, certain positions, ascending stairs, bending/squatting, descending stairs, walking and activity  and relieved by rest and NSAID's.      Restrictions due to condition include:  Currently not working due to present treatment.  Barriers include:  Stairs (6 stairs with hand rail).                        Objective:  System    Physical Exam    General     ROS  Tasha Short , : 1980, MRN: [id number]    Physical Therapy Objective Findings  Subjective information, goals, clinical impression, daily documentation and other information found in EPISODES tab.  Knee Objective Findings    Posture: normal    Gait:  Poor push off B, R knee avoids terminal knee ext, poor glute control R>L    Foot Position in Standing:  normal    Ankle screen:  + tinnels over superficial fibular n from foot to fibular head, ankle and foot mobility wnl    Hip Screen: + FADIR B, + OSVALDO L    Range of Motion:                                    Right AROM            Right PROM Left AROM              Left PROM                Extension/flexion wnl wnl wnl wnl          Other:         Manual Muscle Testing  (graded 0-5, measured at 0 degrees unless otherwise noted):                                                                       Right                                                  Left                                                      Flexion 4 (+ in knee)         4 (+ in knee)      Extension 4 (+ in knee)    4 (+ in knee)      Hip Abduction     Quad Set     VMO     Other: iso bridge 0:50/3:00 difficulty 5/5    (+ mild pain, ++ moderate pain, +++ severe pain)    Edema:                                                                        Right                                                   Left                                                               Special Tests:                                                                    Right                                                   Left                                                      Step Test Height           -Control Comment     Functional Squat (deg.) 75 deg    Lachmans     Posterior Sag     Anterior Drawer     Posterior Drawer     Varus at 0 & 30 - -   Valgus at 0 & 30 - -   Kelly's     Apley's Distraction     Apley's Compression     External Rotation Test     OTHER:  Knee hyperflexion  Knee hyper extension  Patellar grind   -  -  +   -  -  +     Flexibility:                                                                    Right                                                   Left                                                      Gastroc normal normal   Soleus     Hamstrings normal normal   Hip Flexors normal normal   Quadricep normal normal   ITB normal normal          Joint Mobility                                                                       Right                                                   Left                                                       Patellar Static Position normal normal   Patellar Passive Mobility normal normal   Patellar Dynamic Mobility Mild lateral tracking Significant lateral tracking   Proximal Tib-fib     Tibiofemoral            Palpation: tender medial and lateral patellar facets B    Assessment/Plan:    Patient is a 42 year old female with both sides knee and right side ankle complaints.    Patient has the following significant findings with corresponding treatment plan.                Diagnosis 1:  B knee pain consistent with patellofemoral pain and R foot pain consistent with irritation of superficial fibular nerve, made worse by deconditioning from chronic lymes    Pain -  self management, education and home program  Decreased strength - therapeutic  exercise, therapeutic activities and home program  Impaired gait - gait training and home program  Decreased function - therapeutic activities and home program    Therapy Evaluation Codes:   1) History comprised of:   Personal factors that impact the plan of care:      Time since onset of symptoms.    Comorbidity factors that impact the plan of care are:      Depression, High blood pressure, Mental illness, Weakness and previous R ankle surgery.     Medications impacting care: Anti-depressant, High blood pressure and Pain.  2) Examination of Body Systems comprised of:   Body structures and functions that impact the plan of care:      Ankle and Knee.   Activity limitations that impact the plan of care are:      Dressing, Jumping, Lifting, Running, Sports, Squatting/kneeling, Stairs, Standing, Walking and Working.  3) Clinical presentation characteristics are:   Evolving/Changing.  4) Decision-Making    Moderate complexity using standardized patient assessment instrument and/or measureable assessment of functional outcome.  Cumulative Therapy Evaluation is: Moderate complexity.    Previous and current functional limitations:  (See Goal Flow Sheet for this information)    Short term and Long term goals: (See Goal Flow Sheet for this information)     Communication ability:  Patient appears to be able to clearly communicate and understand verbal and written communication and follow directions correctly.  Treatment Explanation - The following has been discussed with the patient:   RX ordered/plan of care  Anticipated outcomes  Possible risks and side effects  This patient would benefit from PT intervention to resume normal activities.   Rehab potential is good.    Frequency:  2 X a month, once daily  Duration:  for 3 months  Discharge Plan:  Achieve all LTG.  Independent in home treatment program.  Reach maximal therapeutic benefit.    Please refer to the daily flowsheet for treatment today, total treatment time and time  spent performing 1:1 timed codes.     Luis Felipe Lorenzana,PT, DPT, OCS

## 2022-11-18 NOTE — PROGRESS NOTES
[facility]    OUTPATIENT Physical Therapy ORTHOPEDIC EVALUATION  PLAN OF TREATMENT FOR OUTPATIENT REHABILITATION  (COMPLETE FOR INITIAL CLAIMS ONLY)  Patient's Last Name, First Name, M.I.  YOB: 1980  MichelleclaudettegeoffTasha  LINDSEY    Provider s Name:  [facility]   Medical Record No.  [id number]   Start of Care Date:  11/18/22   Onset Date:   10/28/22 (MD order)   Treatment Diagnosis:  chronic pain R foot, chronic pain B knees Medical Diagnosis:     Chronic pain in right foot  Chronic pain of both knees  Right foot pain  Physical deconditioning       Goals:     11/18/22 0500   Body Part   Goals listed below are for R foot, B knees   Goal #1   Goal #1 ambulation   Previous Functional Level Miles patient could walk   Performance Level 1 mile   Current Functional Level Blocks patient can walk   Performance Level 2 blocks, difficulty 4/5   STG Target Performance Blocks patient will be able to  walk   Performance Level 2 blocks, difficulty 2/5   Rationale for safe household ambulation;for safe outdoor household ambulation;for safe community ambulation;for safe work place ambulation;to maintain proper body mechanics/posture while ambulating to avoid additional compensatory injury due to improper gait mechanics;to promote a healthy and active lifestyle   Due Date 12/30/22    LTG Target Performance Blocks patient will be able to walk   Performance Level 6 blocks, difficulty 3/5   Rationale for safe household ambulation;for safe outdoor household ambulation;for safe community ambulation;for safe work place ambulation   Due Date 02/18/23       Therapy Frequency:  2x/month  Predicted Duration of Therapy Intervention:  3 months    Luis Felipe Lorenzana, PT                 I CERTIFY THE NEED FOR THESE SERVICES FURNISHED UNDER        THIS PLAN OF TREATMENT AND WHILE UNDER MY CARE     (Physician  co-signature of this document indicates review and certification of the therapy plan).                     Certification Date From:  11/18/22   Certification Date To:  02/18/23    Referring Provider:  Noman Lombardo    Initial Assessment        See Epic Evaluation SOC Date: 11/18/22

## 2022-12-01 NOTE — TELEPHONE ENCOUNTER
Pending Prescriptions:                       Disp   Refills    methylphenidate (RITALIN) 20 MG tablet     60 tab*0        Sig: Take 1 tablet (20 mg) by mouth 2 times daily    Routing refill request to provider for review/approval because:  Drug not on the [facility] refill protocol     methylphenidate (RITALIN) 20 MG tablet 60 tablet 0 10/28/2022  No   Sig - Route: Take 1 tablet (20 mg) by mouth 2 times daily - Oral   Sent to pharmacy as: Methylphenidate HCl 20 MG Oral Tablet (RITALIN)   Class: E-Prescribe   Earliest Fill Date: 10/28/2022   Order: [id number]   E-Prescribing Status: Receipt confirmed by pharmacy (10/28/2022 11:20 AM CDT)     Nuvia Gramajo RN on 12/1/2022 at 3:33 PM

## 2022-12-02 NOTE — TELEPHONE ENCOUNTER
I have not seen patient or refilled this since 4/2022. Recommend appointment to discuss.     Donnie Whiting-MARTHA Block  [facility]

## 2022-12-02 NOTE — TELEPHONE ENCOUNTER
I have not address this concern with the patient. Please forward this to PCP.     Thanks,  Noman Lombardo PA-C on 12/1/2022 at 9:20 PM

## 2022-12-02 NOTE — TELEPHONE ENCOUNTER
[employer] message sent. Postponing to see if the patient views message.     Adrien Villarreal

## 2022-12-02 NOTE — TELEPHONE ENCOUNTER
PCP requested the appointment.     Nuvia Gramajo, JERRELLN, RN, PHN  Registered Nurse-Clinic Triage  [facility]  12/2/2022 at 2:59 PM

## 2022-12-05 NOTE — TELEPHONE ENCOUNTER
Schedule apt. OK to use virtual as I have openings.     Donnie Mansfield PA-C  [facility]

## 2022-12-05 NOTE — TELEPHONE ENCOUNTER
Called patient and read back message from provider below. Scheduled follow up appt for 12/12/2022.

## 2022-12-13 NOTE — PROGRESS NOTES
Tasha is a 42 year old who is being evaluated via a billable video visit.      How would you like to obtain your AVS? MyChart  If the video visit is dropped, the invitation should be resent by: Text to cell phone: [phone]  Will anyone else be joining your video visit? No      Assessment & Plan     ICD-10-CM    1. Alcohol abuse  F10.10       2. Chronic seasonal allergic rhinitis due to pollen  J30.1 fluticasone (FLONASE) 50 MCG/ACT nasal spray      3. HTN, goal below 140/90  I10 metoprolol succinate ER (TOPROL XL) 50 MG 24 hr tablet      4. Narcolepsy and cataplexy  G47.411 methylphenidate (RITALIN) 20 MG tablet      5. Hypersomnia  G47.10 methylphenidate (RITALIN) 20 MG tablet      6. Gastroesophageal reflux disease without esophagitis  K21.9 omeprazole (PRILOSEC) 40 MG DR capsule        1. ETOH  - Reports 1 month sober, will be starting a new program at [facility]  - Encouraged her efforts     Extensive review of all medications including use and side effects     2. Reviewed medication use and side effects, refilled     3. HTN   - Reports checks periodically   - Reviewed medication use and side effects, refilled   - Will update labs at follow up appointment     4 & 5.   - Wishes to restart Ritalin   - Helped a lot   -  reviewed, was filled by different provider at our clinic last month, otherwise not since the spring   - Discussed need for visit every 3 months for refills   - Discussed need for CSA and Utox   - Will schedule recheck in 1 month for these     6. Reviewed medication use and side effects, refilled     Review of the result(s) of each unique test - See list     11/29/22 - all labs  Diagnosis or treatment significantly limited by social determinants of health - none    20 minutes spent on the date of the encounter doing chart review, history and exam, documentation and further activities as noted above    The patient indicates understanding of these issues and agrees with the plan.    Return in about  1 month (around 1/13/2023) for Recheck.    MARTHA Metz [facility]      Josse Cordova is a 42 year old, presenting for the following health issues:  A.D.H.D    Patient reports new medications or medications changes. Provider please review. Omeprazole and refills also    HPI     SUBJECTIVE:  Tasha is here today to recheck ADHD/ADD for Ritalin.    Updates since last visit: during the day is okay but hyperinsolmia at night    Routine for taking medicine, including time: 8-9am and at 1 pm  Time medicine wears off: 7-8pm  Issues at school/work: none  Issues at home: none  Control of symptoms: yes    Side effects:  Headaches: No  Stomach aches: No  Irritability/mood swings: No  Difficulties with sleep: Yes but has been sick  Social withdrawal: No  Unusual movements/tics: No  Decreased appetite: No    Other concerns: none        - Work done for the year, has to find temp job  - Starting a new mental health pro    [place] outpatient 2 individual therapist and 1 chem dep counselor      Marce IBARRA Is psychiatry at [facility]      Was doing outpatient and every few months would relapse, so trying in patient   - Emerald back at home   - Sober about 1 month   - Only drinks when Emerald is at her dad's house   - Had a lot of pain, would cause relapse      Lyme, GI bleed   - Cymbalta made restless   - Stopped smoking but vaping   - Omeprazole       40 mg BID due to GI bleed   - Started protein shakes         Review of Systems   Constitutional, HEENT, cardiovascular, pulmonary, gi and gu systems are negative, except as otherwise noted.      Objective    Vitals - Patient Reported  Pain Score: Severe Pain (6)  Pain Loc: Lower Leg  Vitals:  No vitals were obtained today due to virtual visit.    Physical Exam   GENERAL: Healthy, alert and no distress  EYES: Eyes grossly normal to inspection.  No discharge or erythema, or obvious scleral/conjunctival abnormalities.  RESP: No audible  wheeze, cough, or visible cyanosis.  No visible retractions or increased work of breathing.    SKIN: Visible skin clear. No significant rash, abnormal pigmentation or lesions.  NEURO: Cranial nerves grossly intact.  Mentation and speech appropriate for age.  PSYCH: Mentation appears normal, affect normal/bright, judgement and insight intact, normal speech and appearance well-groomed.        Diagnostics: reviewed in epic         Not able to connect, switched to telephone visit - 15 minutes

## 2023-01-01 NOTE — PROGRESS NOTES
36 year old  at 15w5d weeks presents to the clinic for a routine prenatal visit.  Feeling well.  No vaginal bleeding, leakage of fluid, or contractions   Fundal height=s=d  CUXu=777's  GERD=will order Zantac  Discussed quad screen and she declines  Will schedule anatomy ultrasound with MFM  Tobacco abuse=quit  ADD=pt has not been taking the Ritalin  RTC 4 weeks.    Madeline Benavidez   [phone]

## 2023-02-07 PROBLEM — M79.671 RIGHT FOOT PAIN: Status: RESOLVED | Noted: 2022-01-01 | Resolved: 2023-02-07

## 2023-02-07 PROBLEM — G89.29 CHRONIC PAIN OF BOTH KNEES: Status: RESOLVED | Noted: 2022-01-01 | Resolved: 2023-02-07

## 2023-02-07 PROBLEM — R53.81 PHYSICAL DECONDITIONING: Status: RESOLVED | Noted: 2022-01-01 | Resolved: 2023-02-07

## 2023-02-07 PROBLEM — M25.561 CHRONIC PAIN OF BOTH KNEES: Status: RESOLVED | Noted: 2022-01-01 | Resolved: 2023-02-07

## 2023-02-07 PROBLEM — M25.562 CHRONIC PAIN OF BOTH KNEES: Status: RESOLVED | Noted: 2022-01-01 | Resolved: 2023-02-07

## 2023-12-02 NOTE — RESULT ENCOUNTER NOTE
Josefina Cordova    Your results were normal.     The results are attached for your review.       Donnie Mansfield PA-C   No

## 2025-01-16 NOTE — CONSULTS
CLINICAL NUTRITION SERVICES  -  ASSESSMENT NOTE      Recommendations Ordered by Registered Dietitian (RD):   Add Berry Boost Breeze 10 am and 2 pm    Malnutrition:   % Weight Loss:  Up to 20% in 1 year (non-severe malnutrition)  % Intake:  </= 75% for >/= 1 month (severe malnutrition)  Subcutaneous Fat Loss:  Upper arm region moderate depletion - Per RD 9/17  Muscle Loss:  Clavicle bone region mild depletion and Acromion bone region mild depletion - Per RD 9/17  Fluid Retention:  None noted    Malnutrition Diagnosis: Non-Severe malnutrition  In Context of:  Chronic illness or disease        REASON FOR ASSESSMENT  Tasha Short is a 40 year old female seen by Registered Dietitian for Admission Nutrition Risk Screen for unintentional loss of 10# or more in the past two months      NUTRITION HISTORY  - Information obtained from [facility] records as patient was not available today.  - Pt has had abdominal pain and nausea for the past 2 days.    - RD from [facility] visited recently with patient on 9/17 as below:  -Pt has had some nausea/vomiting and loose/watery diarrhea  -PMH of anemia, opioid addiction, alcohol dependence in remission, GERD, tobacco use, HTN, depression     Information obtained from pt:  -Pt reports that her appetite has been poor for about six months. She believes this is r/t Lyme's disease that she had last March. She was on doxycycline for this and states that it caused her to become nauseated. Even though she finished this treatment, she still feels nauseated every day and vomits after eating almost daily. Pt reports that she is only able to eat small amounts of food at a time and she grazes throughout the day. All food items cause her to feel nauseated. She does not feel nauseated when drinking water though, and she drinks this throughout the day. Usual intake:  B: Skips  L: Protein shakes (protein powder mixed w/2% milk), meat and cheese sandwich with chips or crackers  D: Mac & cheese, grilled  "cheese, or pasta  Fluids: water throughout the day    Note the RD had added Boost Breeze BID btw meals for nutrition push.    CURRENT NUTRITION ORDERS  Diet Order:     Clear Liquid     Current Intake/Tolerance:  N/A    9/25:  Endoscopy (outpt) which was notable for ulcerative reflux esophagitis, gastric ulcer, duodenal ulcers and erosive gastropathy  10/3:  EGD = LA Grade C reflux esophagitis, Normal stomach, Non-bleeding duodenal ulcers with no stigmata of bleeding, Normal second portion of the duodenum, third portion of the duodenum and fourth portion of the duodenum.   10/4:  Colonoscopy = pending    NUTRITION FOCUSED PHYSICAL ASSESSMENT FOR DIAGNOSING MALNUTRITION)  No:  Completed by RD 9/17/20         Observed:  (per RD visit 9/17)  Muscle wasting (refer to documentation in Malnutrition section)   Subcutaneous fat loss (refer to documentation in Malnutrition section)    Obtained from Chart/Interdisciplinary Team:  None noted    ANTHROPOMETRICS  Height: 5' 4\"  Weight: 45.4 kg (100 lbs 0 oz)  Body mass index is 17.07 kg/m .  Weight Status:  Underweight BMI <18.5  IBW: 120 lb/55 kg  % IBW: 83%  Weight History:       Wt Readings from Last 10 Encounters:   09/16/20 45.4 kg (100 lb)   09/14/20 45.3 kg (99 lb 12.8 oz)   09/10/20 45.8 kg (101 lb)   07/24/20 45.4 kg (100 lb)   05/22/20 49 kg (108 lb)   05/16/20 49 kg (108 lb)   03/05/20 49.2 kg (108 lb 6.4 oz)   12/04/19 51 kg (112 lb 8 oz)   11/18/19 53 kg (116 lb 12.8 oz)   08/08/19 55.7 kg (122 lb 12.8 oz)   -Pt has lost 8 lb (7.4%) in the past 4-5 months and 22 lb (18% BW) over the past 1 year which is not clinically significant      LABS  Labs reviewed  Glu 59 (L)  Lipase 513 (H)    MEDICATIONS  Medications reviewed  Folic acid and Thiamine - for ETOH history  NaCl at 75 mL/hr = for fluid delivery      ASSESSED NUTRITION NEEDS PER APPROVED PRACTICE GUIDELINES:    Dosing Weight:  45.4 kg (current wt)  Estimated Energy Needs:  4368-7165 kcals (30-35 " Kcal/Kg)  Justification: repletion and underweight  Estimated Protein Needs:  55-68 grams protein (1.2-1.5 g pro/Kg)  Justification: Repletion and hypercatabolism with acute illness  Estimated Fluid Needs:  1490-4781 mL (1 mL/Kcal)  Justification: maintenance    MALNUTRITION:  % Weight Loss:  Up to 20% in 1 year (non-severe malnutrition)  % Intake:  </= 75% for >/= 1 month (severe malnutrition)  Subcutaneous Fat Loss:  Upper arm region moderate depletion - Per RD 9/17  Muscle Loss:  Clavicle bone region mild depletion and Acromion bone region mild depletion - Per RD 9/17  Fluid Retention:  None noted    Malnutrition Diagnosis: Non-Severe malnutrition  In Context of:  Chronic illness or disease    NUTRITION DIAGNOSIS:  Inadequate oral intake related to persistent N/V over the past six months as evidenced by unintended weight loss 18% over the past year, BMI only 17, and loss of subcutaneous fat and muscle mass      NUTRITION INTERVENTIONS  Recommendations / Nutrition Prescription  Add Berry Boost Breeze 10 am and 2 pm     Implementation  Nutrition education: Not appropriate at this time due to patient condition  Medical Food Supplement      Nutrition Goals  Diet will be advanced to solids and pt will consume > 75% meals and supplements in 3-5 days.      MONITORING AND EVALUATION:  Progress towards goals will be monitored and evaluated per protocol and Practice Guidelines    Cayla Gongora, RD, LD, NSC           Treatment Goal Explanation (Does Not Render In The Note): Stable for the purposes of categorizing medical decision making is defined by the specific treatment goals for an individual patient. A patient that is not at their treatment goal is not stable, even if the condition has not changed and there is no short- term threat to life or function.

## (undated) DEVICE — SYR 05ML LL W/O NDL

## (undated) DEVICE — TRAY PROCEDURE SUPPORT PAIN MANAGEMENT 332114

## (undated) DEVICE — TUBING IV EXTENSION SET 34"

## (undated) DEVICE — NEEDLE SPINAL DISP 22GA X 5" QUINCKE 3333355

## (undated) DEVICE — SYR 10ML LL W/O NDL

## (undated) DEVICE — GLOVE PROTEXIS W/NEU-THERA 7.5  2D73TE75

## (undated) DEVICE — PREP CHLORAPREP 26ML TINTED ORANGE  260815

## (undated) RX ORDER — LIDOCAINE HYDROCHLORIDE 20 MG/ML
SOLUTION OROPHARYNGEAL
Status: DISPENSED
Start: 2020-09-25

## (undated) RX ORDER — LIDOCAINE HYDROCHLORIDE 20 MG/ML
INJECTION, SOLUTION EPIDURAL; INFILTRATION; INTRACAUDAL; PERINEURAL
Status: DISPENSED
Start: 2020-09-25

## (undated) RX ORDER — FENTANYL CITRATE 50 UG/ML
INJECTION, SOLUTION INTRAMUSCULAR; INTRAVENOUS
Status: DISPENSED
Start: 2022-01-01

## (undated) RX ORDER — GLYCOPYRROLATE 0.2 MG/ML
INJECTION INTRAMUSCULAR; INTRAVENOUS
Status: DISPENSED
Start: 2020-09-25

## (undated) RX ORDER — ONDANSETRON 2 MG/ML
INJECTION INTRAMUSCULAR; INTRAVENOUS
Status: DISPENSED
Start: 2022-01-01

## (undated) RX ORDER — DIPHENHYDRAMINE HYDROCHLORIDE 50 MG/ML
INJECTION INTRAMUSCULAR; INTRAVENOUS
Status: DISPENSED
Start: 2022-01-01

## (undated) RX ORDER — PROPOFOL 10 MG/ML
INJECTION, EMULSION INTRAVENOUS
Status: DISPENSED
Start: 2020-09-25

## (undated) RX ORDER — FENTANYL CITRATE 50 UG/ML
INJECTION, SOLUTION INTRAMUSCULAR; INTRAVENOUS
Status: DISPENSED
Start: 2020-09-25

## (undated) RX ORDER — SIMETHICONE 40MG/0.6ML
SUSPENSION, DROPS(FINAL DOSAGE FORM)(ML) ORAL
Status: DISPENSED
Start: 2022-01-01

## (undated) RX ORDER — FENTANYL CITRATE 50 UG/ML
INJECTION, SOLUTION INTRAMUSCULAR; INTRAVENOUS
Status: DISPENSED
Start: 2020-10-04

## (undated) RX ORDER — FENTANYL CITRATE 50 UG/ML
INJECTION, SOLUTION INTRAMUSCULAR; INTRAVENOUS
Status: DISPENSED
Start: 2020-10-03